# Patient Record
Sex: FEMALE | Race: WHITE | NOT HISPANIC OR LATINO | Employment: FULL TIME | URBAN - METROPOLITAN AREA
[De-identification: names, ages, dates, MRNs, and addresses within clinical notes are randomized per-mention and may not be internally consistent; named-entity substitution may affect disease eponyms.]

---

## 2017-01-04 DIAGNOSIS — G43.809 OTHER MIGRAINE, NOT INTRACTABLE, WITHOUT STATUS MIGRAINOSUS: Primary | ICD-10-CM

## 2017-01-04 NOTE — TELEPHONE ENCOUNTER
Elavil     Last Written Prescription Date: 10/10/16  Last Fill Quantity: 60; # refills: 2  Last Office Visit with [facility], [facility] or  Health prescribing provider:  10/4/16        Last PHQ-9 score on record=   PHQ-9 SCORE 11/3/2015   Total Score -   Total Score 8       AST       10   4/28/2016  ALT       18   4/28/2016

## 2017-01-18 DIAGNOSIS — F31.81 BIPOLAR 2 DISORDER (H): Primary | ICD-10-CM

## 2017-01-18 RX ORDER — LAMOTRIGINE 100 MG/1
TABLET ORAL
Qty: 90 TABLET | Refills: 3 | Status: SHIPPED | OUTPATIENT
Start: 2017-01-18 | End: 2017-05-21

## 2017-03-10 ENCOUNTER — OFFICE VISIT (OUTPATIENT)
Dept: FAMILY MEDICINE | Facility: CLINIC | Age: 22
End: 2017-03-10
Payer: COMMERCIAL

## 2017-03-10 VITALS
SYSTOLIC BLOOD PRESSURE: 104 MMHG | DIASTOLIC BLOOD PRESSURE: 50 MMHG | RESPIRATION RATE: 16 BRPM | TEMPERATURE: 98.3 F | HEART RATE: 124 BPM | BODY MASS INDEX: 22.08 KG/M2 | WEIGHT: 120 LBS | HEIGHT: 62 IN | OXYGEN SATURATION: 97 %

## 2017-03-10 DIAGNOSIS — J06.9 VIRAL UPPER RESPIRATORY INFECTION: Primary | ICD-10-CM

## 2017-03-10 PROCEDURE — 99213 OFFICE O/P EST LOW 20 MIN: CPT | Performed by: FAMILY MEDICINE

## 2017-03-10 RX ORDER — BUDESONIDE AND FORMOTEROL FUMARATE DIHYDRATE 80; 4.5 UG/1; UG/1
2 AEROSOL RESPIRATORY (INHALATION) 2 TIMES DAILY
Qty: 1 INHALER | Refills: 1 | COMMUNITY
Start: 2017-03-10 | End: 2021-08-05

## 2017-03-10 RX ORDER — PREDNISONE 20 MG/1
TABLET ORAL DAILY
COMMUNITY
Start: 2017-03-10 | End: 2017-04-26

## 2017-03-10 NOTE — PROGRESS NOTES
Chief Complaint   Patient presents with     Cough     Nursing note reviewed with patient.  Accurracy and completeness verified.    5 days of SOB, cough, and ST. started on some prednisone that she had at home a couple of days ago  Mainly worried about having strep    Ill contacts:  others at home with similar illness  Denies These symptoms(Neg ROS): fever, Nausea/Vomiting/Diarrhea, rash,  Review of symptoms except as noted in the HPI is otherwise negative.    MEDICATIONS  Current Outpatient Prescriptions   Medication Sig Dispense Refill     budesonide-formoterol (SYMBICORT) 80-4.5 MCG/ACT Inhaler Inhale 2 puffs into the lungs 2 times daily 1 Inhaler 1     predniSONE (DELTASONE) 20 MG tablet Take by mouth daily       ranitidine (ZANTAC) 150 MG/10ML syrup Take 8 mLs (120 mg) by mouth 2 times daily 600 mL      lamoTRIgine (LAMICTAL) 100 MG tablet Take 2 and 1/2 to 3 per day 90 tablet 3     amitriptyline (ELAVIL) 25 MG tablet Take 2 tablets (50 mg) by mouth At Bedtime 180 tablet 2     guaiFENesin-codeine (ROBITUSSIN AC) 100-10 MG/5ML SOLN Take 5-10 mLs by mouth every 4 hours as needed 120 mL 0     citalopram (CELEXA) 40 MG tablet Take 1 tablet (40 mg) by mouth daily 90 tablet 3     hyoscyamine (LEVSIN/SL) 0.125 MG SL tablet Take 1 tablet (0.125 mg) by mouth every 4 hours as needed 120 tablet 3     medroxyPROGESTERone (DEPO-PROVERA) 150 MG/ML injection Inject 1 mL (150 mg) into the muscle every 3 months 0.9 mL 11     ipratropium (ATROVENT) 0.02 % nebulizer solution Take 0.5 mg by nebulization daily as needed for wheezing       metoclopramide (REGLAN) 5 MG tablet Take 1 tablet (5 mg) by mouth 3 times daily as needed 90 tablet 2     Powders (VAGISIL EX)        medroxyPROGESTERone (DEPO-PROVERA) 150 MG/ML injection Inject 1 mL (150 mg) into the muscle every 3 months 0.9 mL 0     ipratropium (ATROVENT HFA) 17 MCG/ACT inhaler Inhale 2 puffs into the lungs every 6 hours       nystatin-triamcinolone (MYCOLOG II) cream Apply  "topically 2 times daily 30 g 1     triamcinolone (KENALOG) 0.1 % ointment Apply sparingly to affected area three times daily for 14 days. 30 g 0     Melatonin 10 MG TABS Take 10 mg by mouth nightly as needed       Multiple Vitamins-Minerals (MULTIVITAMIN OR) 1 tablet daily       medroxyPROGESTERone (DEPO-PROVERA) 150 MG/ML injection Inject 1 mL (150 mg) into the muscle every 3 months 1 mL 5     VITAMIN D, CHOLECALCIFEROL, PO Take  by mouth daily.       Allergies:    Allergies   Allergen Reactions     Adhesive Tape Hives     EKG Patches; any adhesives including band aides; burns         Azithromycin Nausea and Vomiting       SOCIAL   reports that she has never smoked. She has never used smokeless tobacco.  OBJECTIVE:  /50 (BP Location: Left arm, Cuff Size: Adult Regular)  Pulse 124  Temp 98.3  F (36.8  C) (Oral)  Resp 16  Ht 5' 2\" (1.575 m)  Wt 120 lb (54.4 kg)  SpO2 97%  BMI 21.95 kg/m2  General appearance: healthy, alert and cooperative.  Left Ear: normal; external ear canal and TM clear, nontender.  Right Ear: normal; external ear canal and TM clear, nontender.  Nose: clear rhinorrhea  Sinuses: normal; sinuses nontender  Oropharynx: mild erythema  Neck: few small nontender anterior cervical nodes  Lungs: normal chest wall and respirations; clear to auscultation.    ASSESSMENT/PLAN:    ICD-10-CM    1. Viral upper respiratory infection J06.9     B97.89      Bilateral respiratory tract infection patient is using a lot of inhalers, including Symbicort and steroids  No clear diagnosis of asthma  No wheezing today  Since she is already starting the steroids I think she should continue them for a total of 5 days    Instructions on use of OTC medications given  Call or return to clinic if these symptoms worsen or fail to improve as anticipated.    Italo Cota MD MPH    "

## 2017-03-10 NOTE — MR AVS SNAPSHOT
After Visit Summary   3/10/2017    Angela Jones    MRN: [id number]           Patient Information     Date Of Birth          1995        Visit Information        Provider Department      3/10/2017 2:00 PM Italo Cota MD [facility]        Today's Diagnoses     Viral upper respiratory infection    -  1       Follow-ups after your visit        Your next 10 appointments already scheduled     Mar 23, 2017  3:15 PM CDT   New Patient Visit with Cara Pagan MD   [facility] ([facility])    [facility]  [address]  [address]  [facility] [address] [phone]              Who to contact     If you have questions or need follow up information about today's clinic visit or your schedule please contact [facility] directly at [phone].  Normal or non-critical lab and imaging results will be communicated to you by MyChart, letter or phone within 4 business days after the clinic has received the results. If you do not hear from us within 7 days, please contact the clinic through MyChart or phone. If you have a critical or abnormal lab result, we will notify you by phone as soon as possible.  Submit refill requests through [employer] or call your pharmacy and they will forward the refill request to us. Please allow 3 business days for your refill to be completed.          Additional Information About Your Visit        MyChart Information     [employer] gives you secure access to your electronic health record. If you see a primary care provider, you can also send messages to your care team and make appointments. If you have questions, please call your primary care clinic.  If you do not have a primary care provider, please call [phone] and they will assist you.        Care EveryWhere ID     This is your Care EveryWhere ID. This could be used by other organizations to access your [facility]  "records  [id number]        Your Vitals Were     Pulse Temperature Respirations Height Pulse Oximetry BMI (Body Mass Index)    124 98.3  F (36.8  C) (Oral) 16 5' 2\" (1.575 m) 97% 21.95 kg/m2       Blood Pressure from Last 3 Encounters:   03/10/17 104/50   11/27/16 94/50   11/20/16 105/67    Weight from Last 3 Encounters:   03/10/17 120 lb (54.4 kg)   11/27/16 115 lb (52.2 kg)   11/20/16 116 lb (52.6 kg)              Today, you had the following     No orders found for display       Primary Care Provider Office Phone # Fax #    Dixie Ashely Godoy [phone]       [address]  [facility] [address]        Thank you!     Thank you for choosing [facility]  for your care. Our goal is always to provide you with excellent care. Hearing back from our patients is one way we can continue to improve our services. Please take a few minutes to complete the written survey that you may receive in the mail after your visit with us. Thank you!             Your Updated Medication List - Protect others around you: Learn how to safely use, store and throw away your medicines at www.disposemymeds.org.          This list is accurate as of: 3/10/17  2:26 PM.  Always use your most recent med list.                   Brand Name Dispense Instructions for use    amitriptyline 25 MG tablet    ELAVIL    180 tablet    Take 2 tablets (50 mg) by mouth At Bedtime       ATROVENT HFA 17 MCG/ACT Inhaler   Generic drug:  ipratropium      Inhale 2 puffs into the lungs every 6 hours       budesonide-formoterol 80-4.5 MCG/ACT Inhaler    SYMBICORT    1 Inhaler    Inhale 2 puffs into the lungs 2 times daily       citalopram 40 MG tablet    celeXA    90 tablet    Take 1 tablet (40 mg) by mouth daily       * DEPO-PROVERA 150 MG/ML injection   Generic drug:  medroxyPROGESTERone     1 mL    Inject 1 mL (150 mg) into the muscle every 3 months       * medroxyPROGESTERone 150 MG/ML injection    " DEPO-PROVERA    0.9 mL    Inject 1 mL (150 mg) into the muscle every 3 months       * medroxyPROGESTERone 150 MG/ML injection    DEPO-PROVERA    0.9 mL    Inject 1 mL (150 mg) into the muscle every 3 months       guaiFENesin-codeine 100-10 MG/5ML Soln solution    ROBITUSSIN AC    120 mL    Take 5-10 mLs by mouth every 4 hours as needed       hyoscyamine 0.125 MG sublingual tablet    LEVSIN/SL    120 tablet    Take 1 tablet (0.125 mg) by mouth every 4 hours as needed       ipratropium 0.02 % nebulizer solution    ATROVENT     Take 0.5 mg by nebulization daily as needed for wheezing       lamoTRIgine 100 MG tablet    LaMICtal    90 tablet    Take 2 and 1/2 to 3 per day       Melatonin 10 MG Tabs tablet      Take 10 mg by mouth nightly as needed       metoclopramide 5 MG tablet    REGLAN    90 tablet    Take 1 tablet (5 mg) by mouth 3 times daily as needed       MULTIVITAMIN PO      1 tablet daily       nystatin-triamcinolone cream    MYCOLOG II    30 g    Apply topically 2 times daily       predniSONE 20 MG tablet    DELTASONE     Take by mouth daily       ranitidine 150 MG/10ML syrup    Zantac    600 mL    Take 8 mLs (120 mg) by mouth 2 times daily       triamcinolone 0.1 % ointment    KENALOG    30 g    Apply sparingly to affected area three times daily for 14 days.       VAGISIL EX          VITAMIN D (CHOLECALCIFEROL) PO      Take  by mouth daily.       * Notice:  This list has 3 medication(s) that are the same as other medications prescribed for you. Read the directions carefully, and ask your doctor or other care provider to review them with you.

## 2017-03-10 NOTE — NURSING NOTE
"Chief Complaint   Patient presents with     Cough     initial /50 (BP Location: Left arm, Cuff Size: Adult Regular)  Pulse 124  Temp 98.3  F (36.8  C) (Oral)  Resp 16  Ht 5' 2\" (1.575 m)  Wt 120 lb (54.4 kg)  SpO2 97%  BMI 21.95 kg/m2 Estimated body mass index is 21.95 kg/(m^2) as calculated from the following:    Height as of this encounter: 5' 2\" (1.575 m).    Weight as of this encounter: 120 lb (54.4 kg).  BP completed using cuff size: regular.  L  arm      Health Maintenance that is potentially due pending provider review:  NONE    n/a    Zack Morris ma  "

## 2017-03-23 ENCOUNTER — OFFICE VISIT (OUTPATIENT)
Dept: OBGYN | Facility: CLINIC | Age: 22
End: 2017-03-23
Attending: OBSTETRICS & GYNECOLOGY
Payer: COMMERCIAL

## 2017-03-23 VITALS
BODY MASS INDEX: 21.86 KG/M2 | HEART RATE: 128 BPM | WEIGHT: 118.8 LBS | HEIGHT: 62 IN | SYSTOLIC BLOOD PRESSURE: 106 MMHG | DIASTOLIC BLOOD PRESSURE: 70 MMHG

## 2017-03-23 DIAGNOSIS — Z30.430 ENCOUNTER FOR IUD INSERTION: Primary | ICD-10-CM

## 2017-03-23 PROCEDURE — 58300 INSERT INTRAUTERINE DEVICE: CPT | Mod: ZF | Performed by: STUDENT IN AN ORGANIZED HEALTH CARE EDUCATION/TRAINING PROGRAM

## 2017-03-23 PROCEDURE — 25000125 ZZHC RX 250: Mod: ZF

## 2017-03-23 PROCEDURE — 99213 OFFICE O/P EST LOW 20 MIN: CPT | Mod: ZF

## 2017-03-23 NOTE — LETTER
3/23/2017       RE: Angela Jones  [address]   [facility] [address]     Dear Colleague,    Thank you for referring your patient, Angela Jones, to the [facility] at [facility]. Please see a copy of my visit note below.    [facility]    Chief complaint:  Discussion of contraception    HISTORY OF PRESENT ILLNESS   Angela Jones is a 22 year old , No LMP recorded. Patient has had an injection., here to discuss contraception methods. She had irregular menstrual bleeding in high school. She started depo-provera in high school and has had minimal to no menstrual bleeding since. She would like to switch to a different form of contraception because it is hard to remember to get her shot on time. She would like contraception that she does not need to remind herself to take. She is not interested in a Nexplanon because she does not want a foreign object in her arm. She is interested in the Mirena because of its favorable bleeding profile.     Past medical history significant for:  -Migraine with aura: amitriptyline  -Bipolar disorder, OCD, Anxiety and Depression: Lamotrigine and citalopram  -Asthma: Symbicort, Atrovent     GYN History  - Menses: As above   - Pap Smears:   Lab Results   Component Value Date    PAP NIL 10/04/2016     - Contraception: Depo  - Sexual Activity/Concerns: None  - Hx STIs/UTIs: None    OBHx  Obstetric History       T0      TAB0   SAB0   E0   M0   L0           PAST MEDICAL HISTORY:   Past Medical History:   Diagnosis Date     Flat foot 3/25/2014     JOSHUA (generalised anxiety disorder) 3/25/2014     Hypoxia in liveborn infant     born hypoxic     Migraine with aura 2014     Migraines      Moderate major depression (H) 3/25/2014     OCD (obsessive compulsive disorder) 2013     Palpitations      Self mutilating behavior 2013    cut self with pencil sharpener blade, left arm       PAST SURGICAL  HISTORY:   Past Surgical History:   Procedure Laterality Date     UPPER GI ENDOSCOPY  6/7/12       MEDICATIONS:   Current Outpatient Prescriptions   Medication Sig Dispense Refill     levonorgestrel (MIRENA, 52 MG,) 20 MCG/24HR IUD 1 each (20 mcg) by Intrauterine route once for 1 dose 1 each 0     budesonide-formoterol (SYMBICORT) 80-4.5 MCG/ACT Inhaler Inhale 2 puffs into the lungs 2 times daily 1 Inhaler 1     predniSONE (DELTASONE) 20 MG tablet Take by mouth daily       ranitidine (ZANTAC) 150 MG/10ML syrup Take 8 mLs (120 mg) by mouth 2 times daily 600 mL      lamoTRIgine (LAMICTAL) 100 MG tablet Take 2 and 1/2 to 3 per day 90 tablet 3     amitriptyline (ELAVIL) 25 MG tablet Take 2 tablets (50 mg) by mouth At Bedtime 180 tablet 2     citalopram (CELEXA) 40 MG tablet Take 1 tablet (40 mg) by mouth daily 90 tablet 3     hyoscyamine (LEVSIN/SL) 0.125 MG SL tablet Take 1 tablet (0.125 mg) by mouth every 4 hours as needed 120 tablet 3     ipratropium (ATROVENT) 0.02 % nebulizer solution Take 0.5 mg by nebulization daily as needed for wheezing       metoclopramide (REGLAN) 5 MG tablet Take 1 tablet (5 mg) by mouth 3 times daily as needed 90 tablet 2     Powders (VAGISIL EX)        ipratropium (ATROVENT HFA) 17 MCG/ACT inhaler Inhale 2 puffs into the lungs every 6 hours       nystatin-triamcinolone (MYCOLOG II) cream Apply topically 2 times daily 30 g 1     triamcinolone (KENALOG) 0.1 % ointment Apply sparingly to affected area three times daily for 14 days. 30 g 0     Melatonin 10 MG TABS Take 10 mg by mouth nightly as needed       Multiple Vitamins-Minerals (MULTIVITAMIN OR) 1 tablet daily       VITAMIN D, CHOLECALCIFEROL, PO Take  by mouth daily.         ALLERGIES:       Allergies   Allergen Reactions     Adhesive Tape Hives     EKG Patches; any adhesives including band aides; burns         Azithromycin Nausea and Vomiting       SOCIAL HISTORY:   Social History     Social History     Marital status: Single      "Spouse name: N/A     Number of children: N/A     Years of education: N/A     Occupational History     Not on file.     Social History Main Topics     Smoking status: Never Smoker     Smokeless tobacco: Never Used     Alcohol use No     Drug use: No     Sexual activity: Yes     Partners: Male     Birth control/ protection: Injection     Other Topics Concern     Parent/Sibling W/ Cabg, Mi Or Angioplasty Before 65f 55m? No     Social History Narrative       REVIEW OF SYSTEMS:  Negative    PHYSICAL EXAM  /70  Pulse 128  Ht 1.575 m (5' 2\")  Wt 53.9 kg (118 lb 12.8 oz)  Breastfeeding? No  BMI 21.73 kg/m2  Body mass index is 21.73 kg/(m^2).  Gen: Well-appearing, NAD  HEENT: Normocephalic, atraumatic  Abd: Soft, non-tender, non-distended  Ext: No LE edema, extremities warm and well perfused    Pelvic:  Normal appearing external female genitalia. Normal hair distribution. On bimanual exam, uterus is retroverted, small, mobile, non-tender. No adnexal tenderness or masses. On speculum exam, vagina is without lesions. normal discharge. Cervix nuliparous, no lesions, no cervical motion tenderness.      Verification of Procedure:  Just before the procedure begans through verbal and active participation of team members, I verified:     Initials   Patient Name MG   Patient  MG   Procedure to be performed MG     Consent:  Risks, benefits of treatment, and alternative options for contraception were discussed.  Patient's questions were elicited and answered.  Written consent was obtained and scanned into medical record.     PROCEDURE NOTE  --  Mirena Insertion    Reason for Insertion:  contraception.    Pregnancy test: Negative    Counseling:  Patient counseled on efficacy, benefits, risks, potential side effects of IUD.  Insertion procedure and risk of infection, perforation, and spontaneous expulsion reviewed.   Advised to plan removal and/or replacement of IUD in 5 years from today or when desired.      Under sterile " technique, cervix was visualized with a medium Graves speculum and prepped with Betadine solution. The uterus was sounded to 6 cm. The Mirena IUD insertion apparatus was prepared and placed through the cervix without significant resistance and deployed at the fundus in the usual fashion. The strings were trimmed 3 cm from the external os.      Device Lot #: [id number]  Device Expiration Date: 10/2019     EBL:  Minimal     Complications: None      Angela Jones tolerated procedure well. She had cramping after the procedure, Tylenol given since she does not like Ibuprofen       ASSESSMENT  Angela Jones is a 22 year old  female here for contraception, discussed different options including Mirena, Mary, ParaGard, and Nexplanon. She desires a Mirena.     PLAN  1. IUD insertion   1. Written information on IUD use reviewed and given.    2. Take Naproxen PRN for cramping  3. Instructed to make an appointment in 4 weeks to check IUD strings. Reminded to check for IUD strings every month.      2. Preventative Care  1. Pap smear up to date    Return to clinic in 4 weeks for a string check    Staffed with Dr. Edwin Pagan MD   Resident Physician, PGY1  Obstetrics, Gynecology, and Women's Health    I, Angela Pineda, saw this patient with the resident and agree with the resident's findings and plan of care as documented in the resident's note. I was present throughout the insertion of IUD procedure.  Angela Pineda MD          Again, thank you for allowing me to participate in the care of your patient.      Sincerely,    Cara Pagan MD

## 2017-03-23 NOTE — PROGRESS NOTES
[facility]    Chief complaint:  Discussion of contraception    HISTORY OF PRESENT ILLNESS   Angela Jonse is a 22 year old , No LMP recorded. Patient has had an injection., here to discuss contraception methods. She had irregular menstrual bleeding in high school. She started depo-provera in high school and has had minimal to no menstrual bleeding since. She would like to switch to a different form of contraception because it is hard to remember to get her shot on time. She would like contraception that she does not need to remind herself to take. She is not interested in a Nexplanon because she does not want a foreign object in her arm. She is interested in the Mirena because of its favorable bleeding profile.     Past medical history significant for:  -Migraine with aura: amitriptyline  -Bipolar disorder, OCD, Anxiety and Depression: Lamotrigine and citalopram  -Asthma: Symbicort, Atrovent     GYN History  - Menses: As above   - Pap Smears:   Lab Results   Component Value Date    PAP NIL 10/04/2016     - Contraception: Depo  - Sexual Activity/Concerns: None  - Hx STIs/UTIs: None    OBHx  Obstetric History       T0      TAB0   SAB0   E0   M0   L0           PAST MEDICAL HISTORY:   Past Medical History:   Diagnosis Date     Flat foot 3/25/2014     JOSHUA (generalised anxiety disorder) 3/25/2014     Hypoxia in liveborn infant     born hypoxic     Migraine with aura 2014     Migraines      Moderate major depression (H) 3/25/2014     OCD (obsessive compulsive disorder) 2013     Palpitations      Self mutilating behavior 2013    cut self with pencil sharpener blade, left arm       PAST SURGICAL HISTORY:   Past Surgical History:   Procedure Laterality Date     UPPER GI ENDOSCOPY  12       MEDICATIONS:   Current Outpatient Prescriptions   Medication Sig Dispense Refill     levonorgestrel (MIRENA, 52 MG,) 20 MCG/24HR IUD 1 each (20 mcg) by Intrauterine route once for 1 dose 1 each 0      budesonide-formoterol (SYMBICORT) 80-4.5 MCG/ACT Inhaler Inhale 2 puffs into the lungs 2 times daily 1 Inhaler 1     predniSONE (DELTASONE) 20 MG tablet Take by mouth daily       ranitidine (ZANTAC) 150 MG/10ML syrup Take 8 mLs (120 mg) by mouth 2 times daily 600 mL      lamoTRIgine (LAMICTAL) 100 MG tablet Take 2 and 1/2 to 3 per day 90 tablet 3     amitriptyline (ELAVIL) 25 MG tablet Take 2 tablets (50 mg) by mouth At Bedtime 180 tablet 2     citalopram (CELEXA) 40 MG tablet Take 1 tablet (40 mg) by mouth daily 90 tablet 3     hyoscyamine (LEVSIN/SL) 0.125 MG SL tablet Take 1 tablet (0.125 mg) by mouth every 4 hours as needed 120 tablet 3     ipratropium (ATROVENT) 0.02 % nebulizer solution Take 0.5 mg by nebulization daily as needed for wheezing       metoclopramide (REGLAN) 5 MG tablet Take 1 tablet (5 mg) by mouth 3 times daily as needed 90 tablet 2     Powders (VAGISIL EX)        ipratropium (ATROVENT HFA) 17 MCG/ACT inhaler Inhale 2 puffs into the lungs every 6 hours       nystatin-triamcinolone (MYCOLOG II) cream Apply topically 2 times daily 30 g 1     triamcinolone (KENALOG) 0.1 % ointment Apply sparingly to affected area three times daily for 14 days. 30 g 0     Melatonin 10 MG TABS Take 10 mg by mouth nightly as needed       Multiple Vitamins-Minerals (MULTIVITAMIN OR) 1 tablet daily       VITAMIN D, CHOLECALCIFEROL, PO Take  by mouth daily.         ALLERGIES:       Allergies   Allergen Reactions     Adhesive Tape Hives     EKG Patches; any adhesives including band aides; burns         Azithromycin Nausea and Vomiting       SOCIAL HISTORY:   Social History     Social History     Marital status: Single     Spouse name: N/A     Number of children: N/A     Years of education: N/A     Occupational History     Not on file.     Social History Main Topics     Smoking status: Never Smoker     Smokeless tobacco: Never Used     Alcohol use No     Drug use: No     Sexual activity: Yes     Partners: Male     Birth  "control/ protection: Injection     Other Topics Concern     Parent/Sibling W/ Cabg, Mi Or Angioplasty Before 65f 55m? No     Social History Narrative       REVIEW OF SYSTEMS:  Negative    PHYSICAL EXAM  /70  Pulse 128  Ht 1.575 m (5' 2\")  Wt 53.9 kg (118 lb 12.8 oz)  Breastfeeding? No  BMI 21.73 kg/m2  Body mass index is 21.73 kg/(m^2).  Gen: Well-appearing, NAD  HEENT: Normocephalic, atraumatic  Abd: Soft, non-tender, non-distended  Ext: No LE edema, extremities warm and well perfused    Pelvic:  Normal appearing external female genitalia. Normal hair distribution. On bimanual exam, uterus is retroverted, small, mobile, non-tender. No adnexal tenderness or masses. On speculum exam, vagina is without lesions. normal discharge. Cervix nuliparous, no lesions, no cervical motion tenderness.      Verification of Procedure:  Just before the procedure begans through verbal and active participation of team members, I verified:     Initials   Patient Name MG   Patient  MG   Procedure to be performed MG     Consent:  Risks, benefits of treatment, and alternative options for contraception were discussed.  Patient's questions were elicited and answered.  Written consent was obtained and scanned into medical record.     PROCEDURE NOTE  --  Mirena Insertion    Reason for Insertion:  contraception.    Pregnancy test: Negative    Counseling:  Patient counseled on efficacy, benefits, risks, potential side effects of IUD.  Insertion procedure and risk of infection, perforation, and spontaneous expulsion reviewed.   Advised to plan removal and/or replacement of IUD in 5 years from today or when desired.      Under sterile technique, cervix was visualized with a medium Graves speculum and prepped with Betadine solution. The uterus was sounded to 6 cm. The Mirena IUD insertion apparatus was prepared and placed through the cervix without significant resistance and deployed at the fundus in the usual fashion. The strings " were trimmed 3 cm from the external os.      Device Lot #: [id number]  Device Expiration Date: 10/2019     EBL:  Minimal     Complications: None      Angela Jones tolerated procedure well. She had cramping after the procedure, Tylenol given since she does not like Ibuprofen       ASSESSMENT  Angela Jones is a 22 year old  female here for contraception, discussed different options including Mirena, Mary, ParaGard, and Nexplanon. She desires a Mirena.     PLAN  1. IUD insertion   1. Written information on IUD use reviewed and given.    2. Take Naproxen PRN for cramping  3. Instructed to make an appointment in 4 weeks to check IUD strings. Reminded to check for IUD strings every month.      2. Preventative Care  1. Pap smear up to date    Return to clinic in 4 weeks for a string check    Staffed with Dr. Edwin Pagan MD   Resident Physician, PGY1  Obstetrics, Gynecology, and Women's Health    I, Angela Pineda, saw this patient with the resident and agree with the resident's findings and plan of care as documented in the resident's note. I was present throughout the insertion of IUD procedure.  Angela Pineda MD

## 2017-03-23 NOTE — MR AVS SNAPSHOT
After Visit Summary   3/23/2017    Angela Jones    MRN: [id number]           Patient Information     Date Of Birth          1995        Visit Information        Provider Department      3/23/2017 3:15 PM Cara Pagan MD [facility]        Today's Diagnoses     Encounter for IUD insertion    -  1      Care Instructions    IUD pamphlet reviewed and given to patient.    Return to clinic in 4 weeks for string check        Follow-ups after your visit        Follow-up notes from your care team     Return in about 4 weeks (around 4/20/2017).      Who to contact     Please call your clinic at [phone] to:    Ask questions about your health    Make or cancel appointments    Discuss your medicines    Learn about your test results    Speak to your doctor   If you have compliments or concerns about an experience at your clinic, or if you wish to file a complaint, please contact [facility] Patient Relations at [phone] or email us at [email or web address]         Additional Information About Your Visit        MyChart Information     [employer] gives you secure access to your electronic health record. If you see a primary care provider, you can also send messages to your care team and make appointments. If you have questions, please call your primary care clinic.  If you do not have a primary care provider, please call [phone] and they will assist you.      'Rock' Your Paper is an electronic gateway that provides easy, online access to your medical records. With 'Rock' Your Paper, you can request a clinic appointment, read your test results, renew a prescription or communicate with your care team.     To access your existing account, please contact your [facility] or call [phone] for assistance.        Care EveryWhere ID     This is your Care EveryWhere ID. This could be used by other organizations to access your [place]  "medical records  [id number]        Your Vitals Were     Pulse Height Breastfeeding? BMI (Body Mass Index)          128 1.575 m (5' 2\") No 21.73 kg/m2         Blood Pressure from Last 3 Encounters:   03/23/17 106/70   03/10/17 104/50   11/27/16 94/50    Weight from Last 3 Encounters:   03/23/17 53.9 kg (118 lb 12.8 oz)   03/10/17 54.4 kg (120 lb)   11/27/16 52.2 kg (115 lb)              We Performed the Following     hCG qual urine POCT [POC7]     Insertion of IUD [08329]          Today's Medication Changes          These changes are accurate as of: 3/23/17  4:25 PM.  If you have any questions, ask your nurse or doctor.               Start taking these medicines.        Dose/Directions    levonorgestrel 20 MCG/24HR IUD   Commonly known as:  MIRENA (52 MG)   Used for:  Encounter for IUD insertion   Started by:  Cara Pagan MD        Dose:  1 each   1 each (20 mcg) by Intrauterine route once for 1 dose   Quantity:  1 each   Refills:  0         Stop taking these medicines if you haven't already. Please contact your care team if you have questions.     DEPO-PROVERA 150 MG/ML injection   Generic drug:  medroxyPROGESTERone   Stopped by:  Cara Pagan MD           medroxyPROGESTERone 150 MG/ML injection   Commonly known as:  DEPO-PROVERA   Stopped by:  Cara Pagan MD                Where to get your medicines      Some of these will need a paper prescription and others can be bought over the counter.  Ask your nurse if you have questions.     You don't need a prescription for these medications     levonorgestrel 20 MCG/24HR IUD                Primary Care Provider Office Phone # Fax #    Dixie Godoy [phone]       [address] [address]        Thank you!     Thank you for choosing [facility]  for your care. Our goal is always to provide you with excellent care. Hearing back from our patients is one way we can " continue to improve our services. Please take a few minutes to complete the written survey that you may receive in the mail after your visit with us. Thank you!             Your Updated Medication List - Protect others around you: Learn how to safely use, store and throw away your medicines at www.disposemymeds.org.          This list is accurate as of: 3/23/17  4:25 PM.  Always use your most recent med list.                   Brand Name Dispense Instructions for use    amitriptyline 25 MG tablet    ELAVIL    180 tablet    Take 2 tablets (50 mg) by mouth At Bedtime       ATROVENT HFA 17 MCG/ACT Inhaler   Generic drug:  ipratropium      Inhale 2 puffs into the lungs every 6 hours       budesonide-formoterol 80-4.5 MCG/ACT Inhaler    SYMBICORT    1 Inhaler    Inhale 2 puffs into the lungs 2 times daily       citalopram 40 MG tablet    celeXA    90 tablet    Take 1 tablet (40 mg) by mouth daily       hyoscyamine 0.125 MG sublingual tablet    LEVSIN/SL    120 tablet    Take 1 tablet (0.125 mg) by mouth every 4 hours as needed       ipratropium 0.02 % nebulizer solution    ATROVENT     Take 0.5 mg by nebulization daily as needed for wheezing       lamoTRIgine 100 MG tablet    LaMICtal    90 tablet    Take 2 and 1/2 to 3 per day       levonorgestrel 20 MCG/24HR IUD    MIRENA (52 MG)    1 each    1 each (20 mcg) by Intrauterine route once for 1 dose       Melatonin 10 MG Tabs tablet      Take 10 mg by mouth nightly as needed       metoclopramide 5 MG tablet    REGLAN    90 tablet    Take 1 tablet (5 mg) by mouth 3 times daily as needed       MULTIVITAMIN PO      1 tablet daily       nystatin-triamcinolone cream    MYCOLOG II    30 g    Apply topically 2 times daily       predniSONE 20 MG tablet    DELTASONE     Take by mouth daily       ranitidine 150 MG/10ML syrup    Zantac    600 mL    Take 8 mLs (120 mg) by mouth 2 times daily       triamcinolone 0.1 % ointment    KENALOG    30 g    Apply sparingly to affected area  three times daily for 14 days.       VAGISIL EX          VITAMIN D (CHOLECALCIFEROL) PO      Take  by mouth daily.

## 2017-04-26 ENCOUNTER — OFFICE VISIT (OUTPATIENT)
Dept: OBGYN | Facility: CLINIC | Age: 22
End: 2017-04-26
Attending: ADVANCED PRACTICE MIDWIFE
Payer: COMMERCIAL

## 2017-04-26 VITALS
WEIGHT: 122 LBS | SYSTOLIC BLOOD PRESSURE: 111 MMHG | HEIGHT: 62 IN | BODY MASS INDEX: 22.45 KG/M2 | DIASTOLIC BLOOD PRESSURE: 65 MMHG

## 2017-04-26 DIAGNOSIS — Z30.431 IUD CHECK UP: Primary | ICD-10-CM

## 2017-04-26 PROCEDURE — 99211 OFF/OP EST MAY X REQ PHY/QHP: CPT | Mod: ZF

## 2017-04-26 ASSESSMENT — PAIN SCALES - GENERAL: PAINLEVEL: NO PAIN (0)

## 2017-04-26 NOTE — LETTER
4/26/2017       RE: Angela Jones  [address]   [facility] [address]     Dear Colleague,    Thank you for referring your patient, Angela Jones, to the [facility] at [facility]. Please see a copy of my visit note below.    SUBJECTIVE:   22 year old  female presenting for string check of Mirena IUD placed on 3/23/17.  Reports feeling well overall.   Reports light spotting in first week of having IUD placed, no further discharge, blood, or significant pelvic pain since that time. Denies dyspareunia. No UTI symptoms. Previous birth control method was depo shots but patient reports kept forgetting to schedule appointments for shots and elected to switch to IUD.  Denies history of known exposure to STD.     OBJECTIVE:   She appears well, afebrile.    Pelvic Exam:  Vulva: No external lesions, normal hair distribution, no adenopathy  Vagina: Moist, pink, no abnormal discharge, well rugated, no lesions  Cervix: nulliparous, smooth, pink, no visible lesions; IUD strings visualized  Uterus: bimanual deferred  Rectal exam: No mass, non-tender, normal sphincter tone, hemoccult negative    ASSESSMENT:   IUD in situ    PLAN:   - Reassured pt of correct placement of IUD  - Reviewed monthly string checks if desired  - Patient instructed to return to clinic with significant change in bleeding pattern, severe abdominal cramping/pain, or dyspareunia develops.  - RTC in ~ 1 year for annual check up    ILee MS3 served as a scribe for GISELLE Tinajero CNM - COLUMBA Morris  The encounter was performed by me and scribed by the student. The scribed note accurately reflects my personal services and decisions made by me. -GISELLE Gan CNM

## 2017-04-26 NOTE — MR AVS SNAPSHOT
After Visit Summary   4/26/2017    Angela Jones    MRN: [id number]           Patient Information     Date Of Birth          1995        Visit Information        Provider Department      4/26/2017 1:45 PM Miguel Barriga APRN [facility]        Today's Diagnoses     IUD check up    -  1       Follow-ups after your visit        Follow-up notes from your care team     Return in about 1 year (around 4/26/2018).      Your next 10 appointments already scheduled     May 05, 2017  4:00 PM CDT   Bashir Short with Michelle Pimentel PA-C   [facility]    [address] [place] [address] [phone]              Who to contact     Please call your clinic at [phone] to:    Ask questions about your health    Make or cancel appointments    Discuss your medicines    Learn about your test results    Speak to your doctor   If you have compliments or concerns about an experience at your clinic, or if you wish to file a complaint, please contact [facility] Patient Relations at [phone] or email us at [email or web address]         Additional Information About Your Visit        MyChart Information     [employer] gives you secure access to your electronic health record. If you see a primary care provider, you can also send messages to your care team and make appointments. If you have questions, please call your primary care clinic.  If you do not have a primary care provider, please call [phone] and they will assist you.      NXE is an electronic gateway that provides easy, online access to your medical records. With NXE, you can request a clinic appointment, read your test results, renew a prescription or communicate with your care team.     To access your existing account, please contact your [facility] or call [phone] for  "assistance.        Care EveryWhere ID     This is your Care EveryWhere ID. This could be used by other organizations to access your [place] medical records  [id number]        Your Vitals Were     Height Breastfeeding? BMI (Body Mass Index)             1.575 m (5' 2\") No 22.31 kg/m2          Blood Pressure from Last 3 Encounters:   04/26/17 111/65   03/23/17 106/70   03/10/17 104/50    Weight from Last 3 Encounters:   04/26/17 55.3 kg (122 lb)   03/23/17 53.9 kg (118 lb 12.8 oz)   03/10/17 54.4 kg (120 lb)              Today, you had the following     No orders found for display         Today's Medication Changes          These changes are accurate as of: 4/26/17 11:59 PM.  If you have any questions, ask your nurse or doctor.               Stop taking these medicines if you haven't already. Please contact your care team if you have questions.     predniSONE 20 MG tablet   Commonly known as:  DELTASONE   Stopped by:  Miguel Barriga APRN JONELLE                    Primary Care Provider Office Phone # Fax #    Dixie Ashely Godoy [phone]       [address]  [facility] [address]        Thank you!     Thank you for choosing [facility]  for your care. Our goal is always to provide you with excellent care. Hearing back from our patients is one way we can continue to improve our services. Please take a few minutes to complete the written survey that you may receive in the mail after your visit with us. Thank you!             Your Updated Medication List - Protect others around you: Learn how to safely use, store and throw away your medicines at www.disposemymeds.org.          This list is accurate as of: 4/26/17 11:59 PM.  Always use your most recent med list.                   Brand Name Dispense Instructions for use    amitriptyline 25 MG tablet    ELAVIL    180 tablet    Take 2 tablets (50 mg) by mouth At Bedtime       ATROVENT HFA 17 MCG/ACT " Inhaler   Generic drug:  ipratropium      Inhale 2 puffs into the lungs every 6 hours       budesonide-formoterol 80-4.5 MCG/ACT Inhaler    SYMBICORT    1 Inhaler    Inhale 2 puffs into the lungs 2 times daily       citalopram 40 MG tablet    celeXA    90 tablet    Take 1 tablet (40 mg) by mouth daily       hyoscyamine 0.125 MG sublingual tablet    LEVSIN/SL    120 tablet    Take 1 tablet (0.125 mg) by mouth every 4 hours as needed       ipratropium 0.02 % nebulizer solution    ATROVENT     Take 0.5 mg by nebulization daily as needed for wheezing       lamoTRIgine 100 MG tablet    LaMICtal    90 tablet    Take 2 and 1/2 to 3 per day       levonorgestrel 20 MCG/24HR IUD    MIRENA (52 MG)    1 each    1 each (20 mcg) by Intrauterine route once for 1 dose       Melatonin 10 MG Tabs tablet      Take 10 mg by mouth nightly as needed       metoclopramide 5 MG tablet    REGLAN    90 tablet    Take 1 tablet (5 mg) by mouth 3 times daily as needed       MULTIVITAMIN PO      1 tablet daily       nystatin-triamcinolone cream    MYCOLOG II    30 g    Apply topically 2 times daily       ranitidine 150 MG/10ML syrup    Zantac    600 mL    Take 8 mLs (120 mg) by mouth 2 times daily       triamcinolone 0.1 % ointment    KENALOG    30 g    Apply sparingly to affected area three times daily for 14 days.       VAGISIL EX          VITAMIN D (CHOLECALCIFEROL) PO      Take  by mouth daily.

## 2017-04-26 NOTE — NURSING NOTE
Chief Complaint   Patient presents with     RECHECK   mirena iud check was inserted 3-   Has acne   But otherwise good-  One week of spotting.

## 2017-04-26 NOTE — PROGRESS NOTES
SUBJECTIVE:   22 year old  female presenting for string check of Mirena IUD placed on 3/23/17.  Reports feeling well overall.   Reports light spotting in first week of having IUD placed, no further discharge, blood, or significant pelvic pain since that time. Denies dyspareunia. No UTI symptoms. Previous birth control method was depo shots but patient reports kept forgetting to schedule appointments for shots and elected to switch to IUD.  Denies history of known exposure to STD.     OBJECTIVE:   She appears well, afebrile.    Pelvic Exam:  Vulva: No external lesions, normal hair distribution, no adenopathy  Vagina: Moist, pink, no abnormal discharge, well rugated, no lesions  Cervix: nulliparous, smooth, pink, no visible lesions; IUD strings visualized  Uterus: bimanual deferred  Rectal exam: No mass, non-tender, normal sphincter tone, hemoccult negative    ASSESSMENT:   IUD in situ    PLAN:   - Reassured pt of correct placement of IUD  - Reviewed monthly string checks if desired  - Patient instructed to return to clinic with significant change in bleeding pattern, severe abdominal cramping/pain, or dyspareunia develops.  - RTC in ~ 1 year for annual check up    ILee, MS3 served as a scribe for GISELLE Tinajero CNM - [facility] Morris  The encounter was performed by me and scribed by the student. The scribed note accurately reflects my personal services and decisions made by me. -GISELLE Gan CNM

## 2017-05-03 DIAGNOSIS — G43.809 OTHER MIGRAINE WITHOUT STATUS MIGRAINOSUS, NOT INTRACTABLE: ICD-10-CM

## 2017-05-03 RX ORDER — METOCLOPRAMIDE 5 MG/1
5 TABLET ORAL 3 TIMES DAILY PRN
Qty: 90 TABLET | Refills: 0 | Status: SHIPPED | OUTPATIENT
Start: 2017-05-03 | End: 2018-03-14

## 2017-05-03 NOTE — TELEPHONE ENCOUNTER
Reglan  Last Written Prescription Date:  4/29/16  Last Fill Quantity: 90,   # refills: 2  Last Office Visit : 10/4/16  Future Office visit:  no    Routing refill request to provider for review/approval because:  Future appts show pt scheduled 5/5 with outside provider re: nausea  Refill routed to Dr. Posada and clinic RN if pt contact is needed

## 2017-05-17 ENCOUNTER — OFFICE VISIT (OUTPATIENT)
Dept: DERMATOLOGY | Facility: CLINIC | Age: 22
End: 2017-05-17

## 2017-05-17 DIAGNOSIS — L21.9 DERMATITIS, SEBORRHEIC: ICD-10-CM

## 2017-05-17 DIAGNOSIS — L30.9 DERMATITIS: Primary | ICD-10-CM

## 2017-05-17 DIAGNOSIS — L01.1 SECONDARY IMPETIGINIZATION: ICD-10-CM

## 2017-05-17 RX ORDER — CLOBETASOL PROPIONATE 0.5 MG/ML
SOLUTION TOPICAL 2 TIMES DAILY
Qty: 50 ML | Refills: 3 | Status: SHIPPED | OUTPATIENT
Start: 2017-05-17 | End: 2017-08-29

## 2017-05-17 RX ORDER — KETOCONAZOLE 20 MG/ML
SHAMPOO TOPICAL DAILY PRN
Qty: 120 ML | Refills: 11 | Status: SHIPPED | OUTPATIENT
Start: 2017-05-17 | End: 2021-08-05

## 2017-05-17 RX ORDER — CLOBETASOL PROPIONATE 0.5 MG/G
OINTMENT TOPICAL 2 TIMES DAILY
Qty: 30 G | Refills: 1 | Status: SHIPPED | OUTPATIENT
Start: 2017-05-17 | End: 2021-08-05

## 2017-05-17 RX ORDER — MUPIROCIN 20 MG/G
OINTMENT TOPICAL
Qty: 22 G | Refills: 3 | Status: SHIPPED | OUTPATIENT
Start: 2017-05-17 | End: 2017-09-24

## 2017-05-17 ASSESSMENT — PAIN SCALES - GENERAL: PAINLEVEL: MODERATE PAIN (4)

## 2017-05-17 NOTE — MR AVS SNAPSHOT
After Visit Summary   5/17/2017    Angela Jones    MRN: [id number]           Patient Information     Date Of Birth          1995        Visit Information        Provider Department      5/17/2017 12:00 PM Rhett Campos MD [facility]        Today's Diagnoses     Dermatitis    -  1    Dermatitis, seborrheic        Secondary impetiginization          Care Instructions    [email or web address]          Follow-ups after your visit        Who to contact     Please call your clinic at [phone] to:    Ask questions about your health    Make or cancel appointments    Discuss your medicines    Learn about your test results    Speak to your doctor   If you have compliments or concerns about an experience at your clinic, or if you wish to file a complaint, please contact [facility] Patient Relations at [phone] or email us at [email or web address]         Additional Information About Your Visit        MyChart Information     [employer] gives you secure access to your electronic health record. If you see a primary care provider, you can also send messages to your care team and make appointments. If you have questions, please call your primary care clinic.  If you do not have a primary care provider, please call [phone] and they will assist you.      [employer] is an electronic gateway that provides easy, online access to your medical records. With [employer], you can request a clinic appointment, read your test results, renew a prescription or communicate with your care team.     To access your existing account, please contact your [facility] or call [phone] for assistance.        Care EveryWhere ID     This is your Care EveryWhere ID. This could be used by other organizations to access your [place] medical records  [id number]        Your Vitals Were     Breastfeeding?                   No            Blood Pressure from Last  3 Encounters:   04/26/17 111/65   03/23/17 106/70   03/10/17 104/50    Weight from Last 3 Encounters:   04/26/17 55.3 kg (122 lb)   03/23/17 53.9 kg (118 lb 12.8 oz)   03/10/17 54.4 kg (120 lb)              Today, you had the following     No orders found for display         Today's Medication Changes          These changes are accurate as of: 5/17/17 11:59 PM.  If you have any questions, ask your nurse or doctor.               Start taking these medicines.        Dose/Directions    * clobetasol 0.05 % ointment   Commonly known as:  TEMOVATE   Used for:  Dermatitis   Started by:  Rhett Campos MD        Apply topically 2 times daily Avoid face, underarms, groin.   Quantity:  30 g   Refills:  1       * clobetasol 0.05 % external solution   Commonly known as:  TEMOVATE   Used for:  Dermatitis   Started by:  Rhett Campos MD        Apply topically 2 times daily   Quantity:  50 mL   Refills:  3       ketoconazole 2 % shampoo   Commonly known as:  NIZORAL   Used for:  Dermatitis, seborrheic   Started by:  Rhett Campos MD        Apply topically daily as needed for itching or irritation   Quantity:  120 mL   Refills:  11       mupirocin 2 % ointment   Commonly known as:  BACTROBAN   Used for:  Dermatitis   Started by:  Rhett Campos MD        Use 2 times a day to affected area.   Quantity:  22 g   Refills:  3       * Notice:  This list has 2 medication(s) that are the same as other medications prescribed for you. Read the directions carefully, and ask your doctor or other care provider to review them with you.         Where to get your medicines      These medications were sent to [id number] - [place], [place] - [address]  [address] [address]     Phone:  [phone]     clobetasol 0.05 % external solution    clobetasol 0.05 % ointment    ketoconazole 2 % shampoo    mupirocin 2 % ointment                Primary Care Provider Office Phone # Fax #    Dixie  Ashely Godoy [phone]       [place] [id number]  [facility] [address]        Thank you!     Thank you for choosing [facility] DERMAT[facility]  for your care. Our goal is always to provide you with excellent care. Hearing back from our patients is one way we can continue to improve our services. Please take a few minutes to complete the written survey that you may receive in the mail after your visit with us. Thank you!             Your Updated Medication List - Protect others around you: Learn how to safely use, store and throw away your medicines at www.disposemymeds.org.          This list is accurate as of: 5/17/17 11:59 PM.  Always use your most recent med list.                   Brand Name Dispense Instructions for use    amitriptyline 25 MG tablet    ELAVIL    180 tablet    Take 2 tablets (50 mg) by mouth At Bedtime       ATROVENT HFA 17 MCG/ACT Inhaler   Generic drug:  ipratropium      Inhale 2 puffs into the lungs every 6 hours       budesonide-formoterol 80-4.5 MCG/ACT Inhaler    SYMBICORT    1 Inhaler    Inhale 2 puffs into the lungs 2 times daily       citalopram 40 MG tablet    celeXA    90 tablet    Take 1 tablet (40 mg) by mouth daily       * clobetasol 0.05 % ointment    TEMOVATE    30 g    Apply topically 2 times daily Avoid face, underarms, groin.       * clobetasol 0.05 % external solution    TEMOVATE    50 mL    Apply topically 2 times daily       ipratropium 0.02 % nebulizer solution    ATROVENT     Take 0.5 mg by nebulization daily as needed for wheezing       ketoconazole 2 % shampoo    NIZORAL    120 mL    Apply topically daily as needed for itching or irritation       levonorgestrel 20 MCG/24HR IUD    MIRENA (52 MG)    1 each    1 each (20 mcg) by Intrauterine route once for 1 dose       Melatonin 10 MG Tabs tablet      Take 10 mg by mouth nightly as needed       metoclopramide 5 MG tablet    REGLAN    90 tablet    Take 1 tablet (5 mg) by mouth 3  times daily as needed       MULTIVITAMIN PO      1 tablet daily       mupirocin 2 % ointment    BACTROBAN    22 g    Use 2 times a day to affected area.       nystatin-triamcinolone cream    MYCOLOG II    30 g    Apply topically 2 times daily       ranitidine 150 MG/10ML syrup    Zantac    600 mL    Take 8 mLs (120 mg) by mouth 2 times daily       triamcinolone 0.1 % ointment    KENALOG    30 g    Apply sparingly to affected area three times daily for 14 days.       VAGISIL EX          VITAMIN D (CHOLECALCIFEROL) PO      Take  by mouth daily.       * Notice:  This list has 2 medication(s) that are the same as other medications prescribed for you. Read the directions carefully, and ask your doctor or other care provider to review them with you.

## 2017-05-17 NOTE — LETTER
5/17/2017       RE: Angela Jones  [address]   [place] [facility] [address]     Dear Colleague,    Thank you for referring your patient, Angela Jones, to the [facility] DERMATOLOGY at [facility]. Please see a copy of my visit note below.    [facility] Dermatology Note    Dermatology Problem List:  1. Dermatitis of the neck  - s/p biopsy with subacute spongiotic dermatitis 1/6/15, terbinafine, betamethasone ointment mixed with ketoconazole cream, triamcinolone ointment mixed with ketoconazole cream  - clobetasol 0.05% ointment, mupirocin 2% ointment, clobetasol 0.05% solution  2. Seborrheic dermatitis  - ketoconazole 2% shampoo    CC:   Chief Complaint   Patient presents with     Derm Problem      is here for a rash on the base of her scalp.  States it has been there for about a week and feels it is getting worse      Date of Service: May 17, 2017    History of Present Illness:  Ms. Angela Jones is a 22 year old female who presents for an evaluation of a rash on the base of her scalp. Today the patient reports that it has been there for about a week and it feels like it is getting worse. She states that she has eczema, but it has never gotten this bad with a yellow crust. She has used betamethasone ointment, ketoconazole cream, triamcinolone ointment, and also has taken terbinafine. She does scratch the area on the back of neck. She denies any other involvement. The patient reports no other lesions of concern at this time.    Otherwise, the patient reports no painful, bleeding, nonhealing, or pruritic lesions, and denies new or changing moles.    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 Patient is present with her  and a service dog.  She works at home.     Family History:  No family history of skin cancer.     Medications:  Current Outpatient Prescriptions   Medication Sig Dispense Refill     clobetasol (TEMOVATE) 0.05 % ointment Apply topically 2 times daily Avoid face, underarms, groin. 30 g 1     mupirocin (BACTROBAN) 2 % ointment Use 2 times a day to affected area. 22 g 3     clobetasol (TEMOVATE) 0.05 % external solution Apply topically 2 times daily 50 mL 3     ketoconazole (NIZORAL) 2 % shampoo Apply topically daily as needed for itching or irritation 120 mL 11     metoclopramide (REGLAN) 5 MG tablet Take 1 tablet (5 mg) by mouth 3 times daily as needed 90 tablet 0     levonorgestrel (MIRENA, 52 MG,) 20 MCG/24HR IUD 1 each (20 mcg) by Intrauterine route once for 1 dose 1 each 0     budesonide-formoterol (SYMBICORT) 80-4.5 MCG/ACT Inhaler Inhale 2 puffs into the lungs 2 times daily 1 Inhaler 1     ranitidine (ZANTAC) 150 MG/10ML syrup Take 8 mLs (120 mg) by mouth 2 times daily 600 mL      lamoTRIgine (LAMICTAL) 100 MG tablet Take 2 and 1/2 to 3 per day 90 tablet 3     amitriptyline (ELAVIL) 25 MG tablet Take 2 tablets (50 mg) by mouth At Bedtime 180 tablet 2     citalopram (CELEXA) 40 MG tablet Take 1 tablet (40 mg) by mouth daily 90 tablet 3     hyoscyamine (LEVSIN/SL) 0.125 MG SL tablet Take 1 tablet (0.125 mg) by mouth every 4 hours as needed 120 tablet 3     ipratropium (ATROVENT) 0.02 % nebulizer solution Take 0.5 mg by  nebulization daily as needed for wheezing       Powders (VAGISIL EX)        ipratropium (ATROVENT HFA) 17 MCG/ACT inhaler Inhale 2 puffs into the lungs every 6 hours       nystatin-triamcinolone (MYCOLOG II) cream Apply topically 2 times daily 30 g 1     triamcinolone (KENALOG) 0.1 % ointment Apply sparingly to affected area three times daily for 14 days. 30 g 0     Melatonin 10 MG TABS Take 10 mg by mouth nightly as needed       Multiple Vitamins-Minerals (MULTIVITAMIN OR) 1 tablet daily       VITAMIN D, CHOLECALCIFEROL, PO Take  by mouth daily.       Allergies:  Allergies   Allergen Reactions     Adhesive Tape Hives     EKG Patches; any adhesives including band aides; burns         Azithromycin Nausea and Vomiting     Review of Systems:  - Skin: As above in HPI. No additional skin concerns.    Physical exam:  Vitals: Breastfeeding? No  GEN: This is a well developed, well-nourished female in no acute distress, in a pleasant mood.    SKIN: Sun-exposed skin, which includes the head/face, neck, both arms, digits, and/or nails was examined.   - Geographic 5.5 cm x 2.5 cm thin plaque on the right occipital scalp with overlying honey colored crusting and  serous exudate   - Medially white light flaky scale  - No psoriasiform plate scale is noted  - Post auricular sulcus is clear  - Nails are without pits or oil spots  - Scalp notable for mild seborrhea bitemporally   - No other lesions of concern on areas examined.     Impression/Plan:  1. Dermatitis of the neck; concerning for allergic contact dermatitis to a scalp product, with secondary impetiginization  - Discussion to not scratch the areas and maintain shorter fingernails.    - Start clobetasol 0.05% ointment - apply BID to affected areas. Avoid face, underarms, groin.   - Start mupirocin 2% ointment - apply BID to affected areas.   - Start clobetasol 0.05% solution - apply BID to affected areas once major symptoms have reduced.     2. Seborrheic  dermatitis  - Start ketoconazole 2% shampoo - apply PRN for itching or irritation.     Follow-up PRN.     Staff Involved:  Staff Only    Scribe Disclosure:   I, Stephen Currie, am serving as a scribe to document services personally performed by Dr. Rhett Campos, based on data collection and the provider's statements to me.     Staff attestation:  The documentation recorded by the scribe accurately reflects the services I personally performed and the decisions I personally made.    Rhett Campos MD  Staff Dermatologist    Department of Dermatology

## 2017-05-17 NOTE — PROGRESS NOTES
[facility] Dermatology Note    Dermatology Problem List:  1. Dermatitis of the neck  - s/p biopsy with subacute spongiotic dermatitis 1/6/15, terbinafine, betamethasone ointment mixed with ketoconazole cream, triamcinolone ointment mixed with ketoconazole cream  - clobetasol 0.05% ointment, mupirocin 2% ointment, clobetasol 0.05% solution  2. Seborrheic dermatitis  - ketoconazole 2% shampoo    CC:   Chief Complaint   Patient presents with     Derm Problem      is here for a rash on the base of her scalp.  States it has been there for about a week and feels it is getting worse      Date of Service: May 17, 2017    History of Present Illness:  Ms. Angela Jones is a 22 year old female who presents for an evaluation of a rash on the base of her scalp. Today the patient reports that it has been there for about a week and it feels like it is getting worse. She states that she has eczema, but it has never gotten this bad with a yellow crust. She has used betamethasone ointment, ketoconazole cream, triamcinolone ointment, and also has taken terbinafine. She does scratch the area on the back of neck. She denies any other involvement. The patient reports no other lesions of concern at this time.    Otherwise, the patient reports no painful, bleeding, nonhealing, or pruritic lesions, and denies new or changing moles.    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 Patient is present with her  and a service dog.  She works at home.     Family History:  No family history of skin cancer.     Medications:  Current Outpatient Prescriptions   Medication Sig Dispense Refill     clobetasol (TEMOVATE) 0.05 % ointment Apply topically 2 times daily Avoid face, underarms, groin. 30 g 1     mupirocin (BACTROBAN) 2 % ointment Use 2 times a day to affected area. 22 g 3     clobetasol (TEMOVATE) 0.05 % external solution Apply topically 2 times daily 50 mL 3     ketoconazole (NIZORAL) 2 % shampoo Apply topically daily as needed for itching or irritation 120 mL 11     metoclopramide (REGLAN) 5 MG tablet Take 1 tablet (5 mg) by mouth 3 times daily as needed 90 tablet 0     levonorgestrel (MIRENA, 52 MG,) 20 MCG/24HR IUD 1 each (20 mcg) by Intrauterine route once for 1 dose 1 each 0     budesonide-formoterol (SYMBICORT) 80-4.5 MCG/ACT Inhaler Inhale 2 puffs into the lungs 2 times daily 1 Inhaler 1     ranitidine (ZANTAC) 150 MG/10ML syrup Take 8 mLs (120 mg) by mouth 2 times daily 600 mL      lamoTRIgine (LAMICTAL) 100 MG tablet Take 2 and 1/2 to 3 per day 90 tablet 3     amitriptyline (ELAVIL) 25 MG tablet Take 2 tablets (50 mg) by mouth At Bedtime 180 tablet 2     citalopram (CELEXA) 40 MG tablet Take 1 tablet (40 mg) by mouth daily 90 tablet 3     hyoscyamine (LEVSIN/SL) 0.125 MG SL tablet Take 1 tablet (0.125 mg) by mouth every 4 hours as needed 120 tablet 3     ipratropium (ATROVENT) 0.02 % nebulizer solution Take 0.5 mg by nebulization daily as needed for wheezing       Powders (VAGISIL EX)        ipratropium (ATROVENT HFA) 17 MCG/ACT inhaler Inhale 2 puffs into the lungs every 6 hours       nystatin-triamcinolone (MYCOLOG II) cream Apply topically 2 times daily 30 g 1     triamcinolone (KENALOG) 0.1 % ointment  Apply sparingly to affected area three times daily for 14 days. 30 g 0     Melatonin 10 MG TABS Take 10 mg by mouth nightly as needed       Multiple Vitamins-Minerals (MULTIVITAMIN OR) 1 tablet daily       VITAMIN D, CHOLECALCIFEROL, PO Take  by mouth daily.       Allergies:  Allergies   Allergen Reactions     Adhesive Tape Hives     EKG Patches; any adhesives including band aides; burns         Azithromycin Nausea and Vomiting     Review of Systems:  - Skin: As above in HPI. No additional skin concerns.    Physical exam:  Vitals: Breastfeeding? No  GEN: This is a well developed, well-nourished female in no acute distress, in a pleasant mood.    SKIN: Sun-exposed skin, which includes the head/face, neck, both arms, digits, and/or nails was examined.   - Geographic 5.5 cm x 2.5 cm thin plaque on the right occipital scalp with overlying honey colored crusting and  serous exudate   - Medially white light flaky scale  - No psoriasiform plate scale is noted  - Post auricular sulcus is clear  - Nails are without pits or oil spots  - Scalp notable for mild seborrhea bitemporally   - No other lesions of concern on areas examined.     Impression/Plan:  1. Dermatitis of the neck; concerning for allergic contact dermatitis to a scalp product, with secondary impetiginization  - Discussion to not scratch the areas and maintain shorter fingernails.    - Start clobetasol 0.05% ointment - apply BID to affected areas. Avoid face, underarms, groin.   - Start mupirocin 2% ointment - apply BID to affected areas.   - Start clobetasol 0.05% solution - apply BID to affected areas once major symptoms have reduced.     2. Seborrheic dermatitis  - Start ketoconazole 2% shampoo - apply PRN for itching or irritation.     Follow-up PRN.     Staff Involved:  Staff Only    Scribe Disclosure:   Stephen MIRANDA, am serving as a scribe to document services personally performed by Dr. Rhett Campos, based on data collection and the  provider's statements to me.     Staff attestation:  The documentation recorded by the scribe accurately reflects the services I personally performed and the decisions I personally made.    Rhett Campos MD  Staff Dermatologist    Department of Dermatology

## 2017-05-17 NOTE — NURSING NOTE
Dermatology Rooming Note    Angela Jones's goals for this visit include:   Chief Complaint   Patient presents with     Derm Problem      is here for a rash on the base of her scalp.  States it has been there for about a week and feels it is getting worse          Laurita Jules LPN

## 2017-05-21 DIAGNOSIS — F31.81 BIPOLAR 2 DISORDER (H): ICD-10-CM

## 2017-05-23 RX ORDER — LAMOTRIGINE 100 MG/1
TABLET ORAL
Qty: 90 TABLET | Refills: 3 | Status: SHIPPED | OUTPATIENT
Start: 2017-05-23 | End: 2017-08-04

## 2017-05-30 DIAGNOSIS — K58.9 IRRITABLE BOWEL SYNDROME WITHOUT DIARRHEA: ICD-10-CM

## 2017-05-30 NOTE — TELEPHONE ENCOUNTER
hyoscyamine (LEVSIN/SL) 0.125 MG SL tablet      Last Written Prescription Date:  9/8/2016  Last Fill Quantity: 120,   # refills: 3  Last Office Visit : 10/4/2016  Future Office visit:  0

## 2017-08-04 ENCOUNTER — OFFICE VISIT (OUTPATIENT)
Dept: PSYCHIATRY | Facility: CLINIC | Age: 22
End: 2017-08-04
Payer: COMMERCIAL

## 2017-08-04 DIAGNOSIS — F31.81 BIPOLAR 2 DISORDER (H): Primary | ICD-10-CM

## 2017-08-04 PROCEDURE — 99215 OFFICE O/P EST HI 40 MIN: CPT | Performed by: PSYCHIATRY & NEUROLOGY

## 2017-08-04 RX ORDER — LAMOTRIGINE 200 MG/1
TABLET ORAL
Qty: 60 TABLET | Refills: 4 | Status: SHIPPED | OUTPATIENT
Start: 2017-08-04 | End: 2017-09-13

## 2017-08-04 ASSESSMENT — ANXIETY QUESTIONNAIRES
5. BEING SO RESTLESS THAT IT IS HARD TO SIT STILL: SEVERAL DAYS
GAD7 TOTAL SCORE: 8
1. FEELING NERVOUS, ANXIOUS, OR ON EDGE: MORE THAN HALF THE DAYS
3. WORRYING TOO MUCH ABOUT DIFFERENT THINGS: SEVERAL DAYS
6. BECOMING EASILY ANNOYED OR IRRITABLE: NEARLY EVERY DAY
7. FEELING AFRAID AS IF SOMETHING AWFUL MIGHT HAPPEN: NOT AT ALL
2. NOT BEING ABLE TO STOP OR CONTROL WORRYING: SEVERAL DAYS

## 2017-08-04 ASSESSMENT — PATIENT HEALTH QUESTIONNAIRE - PHQ9: 5. POOR APPETITE OR OVEREATING: NOT AT ALL

## 2017-08-04 NOTE — PATIENT INSTRUCTIONS
Treatment Plan:  Increase Lamictal (lamotrigine) to 400 mg per day   Continue Celexa (citalopram) 40 mg per day and amitriptyline 50 mg at bedtime    Dr. Rock will call with genetic testing results, and may suggest a change in medications.  Will need an evaluation for ADHD and Autism spectrum disorder   A referral has been made for a consultation with a pharmacist to review medications. They should be calling you soon to schedule a visit. Their phone # is [phone] in case you need to reach them.   Continue vitamin D   Safety plan was reviewed; to the ER as needed or call after hours crisis line; [phone]  Education and counseling was done regarding use of medications, psychotherapy options

## 2017-08-04 NOTE — MR AVS SNAPSHOT
MRN:[id number]                      After Visit Summary   8/4/2017    Angela Jones    MRN: [id number]           Visit Information        Provider Department      8/4/2017 11:00 AM Rashawn Rock MD [facility]        Care Instructions          Treatment Plan:  Increase Lamictal (lamotrigine) to 400 mg per day   Continue Celexa (citalopram) 40 mg per day and amitriptyline 50 mg at bedtime    Dr. Rock will call with genetic testing results, and may suggest a change in medications.  Will need an evaluation for ADHD and Autism spectrum disorder   A referral has been made for a consultation with a pharmacist to review medications. They should be calling you soon to schedule a visit. Their phone # is [phone] in case you need to reach them.   Continue vitamin D   Safety plan was reviewed; to the ER as needed or call after hours crisis line; [phone]  Education and counseling was done regarding use of medications, psychotherapy options         MyChart Information     [employer] gives you secure access to your electronic health record. If you see a primary care provider, you can also send messages to your care team and make appointments. If you have questions, please call your primary care clinic.  If you do not have a primary care provider, please call [phone] and they will assist you.        Care EveryWhere ID     This is your Care EveryWhere ID. This could be used by other organizations to access your [place] medical records  [id number]        Equal Access to Services     YESI LAYTON : Hadii morena Steele, mac mancera, qaybta mansi stallworth. So [facility] [phone].    ATENCIÓN: Si habla español, tiene a bennett disposición servicios gratuitos de asistencia lingüística. Llame al [phone].    We comply with applicable federal civil rights laws and [place] laws. We do not discriminate on the basis  of race, color, national origin, age, disability sex, sexual orientation or gender identity.

## 2017-08-04 NOTE — PROGRESS NOTES
Psychiatric Progress Note    Name: Angela Solis  Date: August 4, 2017   Length of Visit: 45 minutes  MRN: [id number]      Current Outpatient Prescriptions   Medication Sig     hyoscyamine (LEVSIN/SL) 0.125 MG sublingual tablet Take 1 tablet (0.125 mg) by mouth every 4 hours as needed     lamoTRIgine (LAMICTAL) 100 MG tablet TAKE TWO AND ONE-HALF TO THREE TABLETS (2.5-3) BY MOUTH PER DAY     clobetasol (TEMOVATE) 0.05 % ointment Apply topically 2 times daily Avoid face, underarms, groin.     mupirocin (BACTROBAN) 2 % ointment Use 2 times a day to affected area.     clobetasol (TEMOVATE) 0.05 % external solution Apply topically 2 times daily     ketoconazole (NIZORAL) 2 % shampoo Apply topically daily as needed for itching or irritation     metoclopramide (REGLAN) 5 MG tablet Take 1 tablet (5 mg) by mouth 3 times daily as needed     levonorgestrel (MIRENA, 52 MG,) 20 MCG/24HR IUD 1 each (20 mcg) by Intrauterine route once for 1 dose     budesonide-formoterol (SYMBICORT) 80-4.5 MCG/ACT Inhaler Inhale 2 puffs into the lungs 2 times daily     ranitidine (ZANTAC) 150 MG/10ML syrup Take 8 mLs (120 mg) by mouth 2 times daily     amitriptyline (ELAVIL) 25 MG tablet Take 2 tablets (50 mg) by mouth At Bedtime     citalopram (CELEXA) 40 MG tablet Take 1 tablet (40 mg) by mouth daily     ipratropium (ATROVENT) 0.02 % nebulizer solution Take 0.5 mg by nebulization daily as needed for wheezing     Powders (VAGISIL EX)      ipratropium (ATROVENT HFA) 17 MCG/ACT inhaler Inhale 2 puffs into the lungs every 6 hours     nystatin-triamcinolone (MYCOLOG II) cream Apply topically 2 times daily     triamcinolone (KENALOG) 0.1 % ointment Apply sparingly to affected area three times daily for 14 days.     Melatonin 10 MG TABS Take 10 mg by mouth nightly as needed     Multiple Vitamins-Minerals (MULTIVITAMIN OR) 1 tablet daily     VITAMIN D, CHOLECALCIFEROL, PO Take  by mouth daily.     No current facility-administered medications  for this visit.      Past medication trials: (patient was presented with a list to review all currently available antidepressants, mood stabilizers, tranquilizers, hypnotics and antipsychotics)  New Antidepressants: Prozac (fluoxetine), Zoloft (sertraline), Celexa (citalopram), Wellbutrin, Zyban, Aplenzin (bupropion), Effexor (venlafaxine) and Cymbalta (duloxetine), Lexapro  Stimulants / ADHD Meds: Adderall (amphetamine salts), Concerta (methylphenidate), Focalin (dexmethylphenidate), Ritalin (methylphenidate), Strattera (atomoxetine), Vyvanse (lisdexamfetamine) and Intuniv (guanfacine)  Newer Antipsychotics: Risperdal (risperidone) and Abilify (aripriprazole)  Sedatives/Hypnotics: OTC: Benadryl (diphenhydramine)   Mood stabilizers: Lamictal    Therapist:  Was with Shannan CENTENO Until pt moved to [facility]  Shannan had suggested Neuropsychiatric testing     PHQ-9:  PHQ-9 SCORE 10/6/2015 11/3/2015 8/4/2017   Total Score - - -   Total Score 7 8 16        JOSHUA-7:  JOSHUA-7 SCORE 10/6/2015 11/3/2015 8/4/2017   Total Score - - -   Total Score 4 4 8   Total Score - - -      Interim History:  Her  comes in with her to this visit.  Since she switched from depo shot to Mirena IUD in March the efficacy of Lamictal has markedly diminished (but was declining a few months before the switch).   More mood swings, irritability, crying spells   Also wondering about Asperger's (would get help with disability with this diagnosis). She does have some difficulty interacting with people, tends to isolate and has difficulties with writing.    Assessment: from last visit 10/6/15  I would like to try increase Lamictal before giving up on it. Plan B will be Topamax.   Lexapro may be helping somewhat, but Celexa may have worked better for her.     Treatment Plan:  May change from 20 mg Lexapro (escitalopram) to 40 mg Celexa (citalopram)   Increase Lamictal (lamotrigine) to 250 mg for a week, then 300 mg as tolerated   Continue vitamin D   Safety  "plan was reviewed; to the ER as needed or call after hours crisis line; [phone]  Education and counseling was done regarding use of medications, psychotherapy options  Call for a follow up appointment with Dr. Rock in 2-3 months; [phone].     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       Mental Status Assessment:  Appearance:  Well groomed    Behavior/relationship to examiner/demeanor:  Cooperative, engaged and pleasant  Motor activity:  Normal  Gait:  Normal   Speech:  Normal in volume, slightly dysarthric   Mood (subjective report):  \"down\"   Affect (objective appearance):  Mood congruent  Thought Process (Associations):  Logical, linear and goal directed  Thought content:  No evidence of suicidal or homicidal ideation,          no overt psychosis and                    patient does not appear to be responding to internal stimuli  Oriented to person, place, date/time   Attention Span and concentration: Intact   Memory:  Short-term memory intact and Long-term memory; Intact  Language:  Fluent   Fund of Knowledge/Intelligence:  Average  Use of language: Intact   Abstraction:  Normal  Insight:  Adequate  Judgment:  Adequate for safety    DSM-5 DIAGNOSIS:  Attention-Deficit/Hyperactivity Disorder 314.01 (F90.1) Predominantly hyperactive / impulsive presentation Serverity: Moderate  F33.1 MDD, mixed   300.00 (F41.9) Unspecified Anxiety Disorder  Cluster B traits; Zanarini BPD scale is positive for 7/10 items     Vitamin D " Deficiency Screening Results:  No results found for: VITDT     Assessment:  She has lost the efficacy of Lamictal likely related to the change from Depo-Provera to the Mirena IUD. Presumably this exposes her to more estrogen which accelerates the metabolism of Lamictal; dose increase may help.  She has well-documented ADHD but had mixed response to the stimulants and Strattera (although I do not recall the details of the problems). Genesight testing may help clarify the issues regarding the response to the medications.  In regards to the question about whether she may have Asperger's, she will need testing by someone who specializes in this area. Also hopefully they would look at the ADHD symptoms.    Treatment Plan:  Increase Lamictal (lamotrigine) to 400 mg per day   Continue Celexa (citalopram) 40 mg per day and amitriptyline 50 mg at bedtime    Dr. Rock will call with genetic testing results, and may suggest a change in medications.  Will need an evaluation for ADHD and Autism spectrum disorder   A referral has been made for a consultation with a pharmacist to review medications. They should be calling you soon to schedule a visit. Their phone # is [phone] in case you need to reach them.   Continue vitamin D   Safety plan was reviewed; to the ER as needed or call after hours crisis line; [phone]  Education and counseling was done regarding use of medications, psychotherapy options  Call for a follow up appointment with Dr. Rock in 6-8 weeks [phone].     Signed: Rashawn Rock M.D.

## 2017-08-05 ASSESSMENT — ANXIETY QUESTIONNAIRES: GAD7 TOTAL SCORE: 8

## 2017-08-05 ASSESSMENT — PATIENT HEALTH QUESTIONNAIRE - PHQ9: SUM OF ALL RESPONSES TO PHQ QUESTIONS 1-9: 16

## 2017-08-06 DIAGNOSIS — F41.9 ANXIETY: ICD-10-CM

## 2017-08-07 ENCOUNTER — TELEPHONE (OUTPATIENT)
Dept: PHARMACY | Facility: OTHER | Age: 22
End: 2017-08-07

## 2017-08-07 RX ORDER — CITALOPRAM HYDROBROMIDE 40 MG/1
40 TABLET ORAL DAILY
Qty: 60 TABLET | Refills: 0 | Status: SHIPPED | OUTPATIENT
Start: 2017-08-07 | End: 2017-08-29

## 2017-08-07 NOTE — TELEPHONE ENCOUNTER
citalopram       Last Written Prescription Date:  11/2/16  Last Fill Quantity: 90,   # refills: 3  Last Office Visit : 10/4/16  Future Office visit:  NONE

## 2017-08-07 NOTE — TELEPHONE ENCOUNTER
MTM referral from: [place] clinic visit (referral by provider)    MTM referral outreach attempt #1 on August 7, 2017 at 4:13 PM      Outcome: Left Message    Brittany Giron MTM Coordinator

## 2017-08-08 DIAGNOSIS — F41.9 ANXIETY: ICD-10-CM

## 2017-08-09 ENCOUNTER — TELEPHONE (OUTPATIENT)
Dept: PSYCHIATRY | Facility: CLINIC | Age: 22
End: 2017-08-09

## 2017-08-09 DIAGNOSIS — E72.12 5,10-METHYLENETETRAHYDROFOLATE REDUCTASE DEFICIENCY (H): Primary | ICD-10-CM

## 2017-08-09 RX ORDER — CITALOPRAM HYDROBROMIDE 40 MG/1
40 TABLET ORAL DAILY
Qty: 60 TABLET | Refills: 0 | Status: SHIPPED | OUTPATIENT
Start: 2017-08-09 | End: 2017-10-27

## 2017-08-09 NOTE — TELEPHONE ENCOUNTER
citalopram       Last Written Prescription Date:  8/7/17  Last Fill Quantity: 60,   # refills: 0  Last Office Visit : 10/4/16  Future Office visit:  0

## 2017-08-09 NOTE — TELEPHONE ENCOUNTER
Patient Medical Record Information  Angela Jones: 1995        Diagnoses  According to DSM-V criteria, patient is suffering from refractory moderate to severe depression.        ICD-10 Codes  (F33.1) Major depressive disorder, recurrent, moderate  (F90.1) Attn-defct hyperactivity disorder, predom hyperactive type  (F33.1) Major depressive disorder, recurrent, moderate        Clinical Notations  Patient has failed or is currently failing at least one neuropsychiatric medication.  Medication(s) failed or failing: Prozac, Zoloft, Celexa, Cymbalta, Lexapro, Wellbutrin, Effexor    Patient has failed or is currently failing at least one neuropsychiatric medication and I am contemplating an alteration in a neuropsychiatric medication treatment.  I am considering folate supplementation for patient.        I am contemplating an alteration in a neuropsychiatric medication treatment, which may include medication elimination, switching, augmentation, and/or dose adjustment, and therefore have ordered GeneSight Psychotropic, ADHD, MTHFR on 2017.      Ordered at the direction of: Rashawn Rock

## 2017-08-15 NOTE — TELEPHONE ENCOUNTER
Metabacusight test results rec'd.  Will provide copy to MD for review.       From last OV with Dr. Rock 8/4/17  Past medication trials: (patient was presented with a list to review all currently available antidepressants, mood stabilizers, tranquilizers, hypnotics and antipsychotics)  New Antidepressants: Prozac (fluoxetine), Zoloft (sertraline), Celexa (citalopram), Wellbutrin, Zyban, Aplenzin (bupropion), Effexor (venlafaxine) and Cymbalta (duloxetine), Lexapro  Stimulants / ADHD Meds: Adderall (amphetamine salts), Concerta (methylphenidate), Focalin (dexmethylphenidate), Ritalin (methylphenidate), Strattera (atomoxetine), Vyvanse (lisdexamfetamine) and Intuniv (guanfacine)  Newer Antipsychotics: Risperdal (risperidone) and Abilify (aripriprazole)  Sedatives/Hypnotics: OTC: Benadryl (diphenhydramine)  Mood stabilizers: Lamictal    Assessment:  She has lost the efficacy of Lamictal likely related to the change from Depo-Provera to the Mirena IUD. Presumably this exposes her to more estrogen which accelerates the metabolism of Lamictal; dose increase may help.  She has well-documented ADHD but had mixed response to the stimulants and Strattera (although I do not recall the details of the problems). Genesight testing may help clarify the issues regarding the response to the medications.  In regards to the question about whether she may have Asperger's, she will need testing by someone who specializes in this area. Also hopefully they would look at the ADHD symptoms.     Treatment Plan:  Increase Lamictal (lamotrigine) to 400 mg per day   Continue Celexa (citalopram) 40 mg per day and amitriptyline 50 mg at bedtime    Dr. Rock will call with genetic testing results, and may suggest a change in medications.  Will need an evaluation for ADHD and Autism spectrum disorder   A referral has been made for a consultation with a pharmacist to review medications. They should be calling you soon to schedule a visit. Their phone #  is [phone] in case you need to reach them.   Continue vitamin D   Safety plan was reviewed; to the ER as needed or call after hours crisis line; [phone]  Education and counseling was done regarding use of medications, psychotherapy options  Call for a follow up appointment with Dr. Rock in 6-8 weeks [phone].      Signed:                     Rashawn Rock M.D.

## 2017-08-16 RX ORDER — PRENATAL VIT/IRON FUM/FOLIC AC 27MG-0.8MG
1 TABLET ORAL DAILY
Qty: 100 TABLET | Refills: 3 | Status: SHIPPED | OUTPATIENT
Start: 2017-08-16 | End: 2021-08-05

## 2017-08-16 NOTE — TELEPHONE ENCOUNTER
"    The Instacart testing was received; results reviewed for interpretation and medication management. Will scan to media tab.     Currently taking   Celexa 40 mg ; middle column  \"serum level may be too high; lower doses may be required\" and is S/S   Lamictal 200 mg BID ; R column ; \"serum level may be too low\"   Has Mirena IUD   Elavil 50 mg ; middle column ; footnotes 3,7 (difficult to predict)     All stimulants are in L column     MTHFR ; middle     I left message;   Sent in RX for prenatal vits   Advised to drop dose of Celexa if having any side effects   To discuss raising Lamictal dose when seen for MTM 8/28. Consider getting serum level if not already done?   "

## 2017-08-17 ENCOUNTER — TELEPHONE (OUTPATIENT)
Dept: PSYCHIATRY | Facility: CLINIC | Age: 22
End: 2017-08-17

## 2017-08-17 NOTE — TELEPHONE ENCOUNTER
Reason for call:  Other   Patient called regarding (reason for call): call back and returning call  Additional comments: Patient called back to discuss closed encounter from today.        Phone number to reach patient:  Home number on file [phone] (home)    Best Time:  any    Can we leave a detailed message on this number?  YES

## 2017-08-18 NOTE — TELEPHONE ENCOUNTER
Left VM for Pt to schedule an appt with Dr. Rock, as well as asking if she had specific questions.

## 2017-08-26 ENCOUNTER — NURSE TRIAGE (OUTPATIENT)
Dept: NURSING | Facility: CLINIC | Age: 22
End: 2017-08-26

## 2017-08-26 NOTE — TELEPHONE ENCOUNTER
Patient calling to report one week history of double vision and dizziness since she increased her Lamictal.  Currently on 400mg per day. No headache, loss of vision reported. Advised to go to  or ED for  Evaluation. Patient states she will go to [facility]>  Gricelda Delgado RN   [facility]    Reason for Disposition    Double vision    Additional Information    Negative: Followed getting substance in the eye    Negative: Foreign body or object is or was lodged in the eye    Negative: Followed an eye injury    Negative: Followed sun lamp or sun exposure (UV keratitis)    Negative: Yellow or green discharge (pus) in the eye    Negative: Pregnant    Negative: Postpartum    Negative: Complete loss of vision in 1 or both eyes    Negative: Severe eye pain    Negative: Flashes of light  (Exception: brief from pressing on the eyeball)    Negative: [1] Eye pain AND [2] brief (now gone) blurred vision or visual changes    Negative: [1] Taking digoxin (e.g., Lanoxin, Digitek, Cardoxin, Lanoxicaps) AND [2] blurred vision, yellow vision, or yellow-green halos    Protocols used: VISION LOSS OR CHANGE-ADULT-

## 2017-08-29 ENCOUNTER — OFFICE VISIT (OUTPATIENT)
Dept: PHARMACY | Facility: CLINIC | Age: 22
End: 2017-08-29
Payer: COMMERCIAL

## 2017-08-29 DIAGNOSIS — L40.9 PSORIASIS: ICD-10-CM

## 2017-08-29 DIAGNOSIS — F31.81 BIPOLAR 2 DISORDER (H): Primary | ICD-10-CM

## 2017-08-29 DIAGNOSIS — K21.9 ESOPHAGEAL REFLUX: ICD-10-CM

## 2017-08-29 DIAGNOSIS — J45.909 ASTHMA: ICD-10-CM

## 2017-08-29 DIAGNOSIS — E55.9 VITAMIN D DEFICIENCY: ICD-10-CM

## 2017-08-29 DIAGNOSIS — G43.109 MIGRAINE WITH AURA: ICD-10-CM

## 2017-08-29 DIAGNOSIS — R11.0 NAUSEA: ICD-10-CM

## 2017-08-29 PROCEDURE — 99607 MTMS BY PHARM ADDL 15 MIN: CPT | Performed by: PHARMACIST

## 2017-08-29 PROCEDURE — 99605 MTMS BY PHARM NP 15 MIN: CPT | Performed by: PHARMACIST

## 2017-08-29 NOTE — Clinical Note
Hi Dr. Posada,  I saw pt for MTM, she has been taking ranitidine OTC for GERD and would like an Rx for this. I let her know I would send you a message, but that you might want to assess her before prescribing.  Let me know if you have questions!  Thank you,   Caryn

## 2017-08-29 NOTE — Clinical Note
Hi Dr. Rock,  Saw this [facility] pt. Advised Lamictal BID, will f/u with her via phone. Are you ok with me putting in vitamin D level?  Thanks,   Caryn

## 2017-08-29 NOTE — MR AVS SNAPSHOT
After Visit Summary   8/29/2017    Angela NIETO    MRN: [id number]           Patient Information     Date Of Birth          1995        Visit Information        Provider Department      8/29/2017 2:30 PM Caryn Wolfe, [facility]        Today's Diagnoses     Bipolar 2 disorder (H)    -  1    Migraine with aura        Asthma        Esophageal reflux        Nausea        Psoriasis        Vitamin D deficiency          Care Instructions    Recommendations from today's MTM visit:                                                        1. Pt to try splitting dose of Lamictal to 200mg twice daily  2. Dr. Rock to consider vitamin D level  3. PCP to consider Rx for ranitidine daily  4. Pt to make appt with Dr. Rock    Next MTM visit: will call in 1-2 weeks    To schedule another MTM appointment, please call the clinic directly or you may call the MTM scheduling line at [phone] or toll-free at [phone].     My Clinical Pharmacist's contact information:                                                      It was a pleasure seeing you today!  Please feel free to contact me with any questions or concerns you have.      Caryn Wolfe, [facility], [facility]  Medication Therapy Management Pharmacist  [facility]      You may receive a survey about the MTM services you received.  I would appreciate your feedback to help me serve you better in the future. Please fill it out and return it when you can. Your comments will be anonymous.                      Follow-ups after your visit        Your next 10 appointments already scheduled     Aug 31, 2017  7:30 AM CDT   (Arrive by 7:15 AM)   Return Visit with Uyen Kimball MD   [facility] ([facility])    [address] [place] [id number] [phone]            Sep 13, 2017 10:30 AM CDT   TELEMEDICINE with PSYCH BERNARDO    [facility] ([facility])    [address] [place] [id number] [phone]           Note: this is not an onsite visit; there is no need to come to the facility.              Who to contact     If you have questions or need follow up information about today's clinic visit or your schedule please contact [facility] directly at [phone].  Normal or non-critical lab and imaging results will be communicated to you by [employer], letter or phone within 4 business days after the clinic has received the results. If you do not hear from us within 7 days, please contact the clinic through [employer] or phone. If you have a critical or abnormal lab result, we will notify you by phone as soon as possible.  Submit refill requests through [employer] or call your pharmacy and they will forward the refill request to us. Please allow 3 business days for your refill to be completed.          Additional Information About Your Visit        [employer] Information     [employer] gives you secure access to your electronic health record. If you see a primary care provider, you can also send messages to your care team and make appointments. If you have questions, please call your primary care clinic.  If you do not have a primary care provider, please call [phone] and they will assist you.        Care EveryWhere ID     This is your Care EveryWhere ID. This could be used by other organizations to access your [facility] medical records  [id number]         Blood Pressure from Last 3 Encounters:   04/26/17 111/65   03/23/17 106/70   03/10/17 104/50    Weight from Last 3 Encounters:   04/26/17 122 lb (55.3 kg)   03/23/17 118 lb 12.8 oz (53.9 kg)   03/10/17 120 lb (54.4 kg)              Today, you had the following     No orders found for display         Today's Medication Changes          These changes are  accurate as of: 8/29/17 11:59 PM.  If you have any questions, ask your nurse or doctor.               These medicines have changed or have updated prescriptions.        Dose/Directions    citalopram 40 MG tablet   Commonly known as:  celeXA   This may have changed:    - when to take this  - additional instructions   Used for:  Anxiety        Dose:  40 mg   Take 1 tablet (40 mg) by mouth daily *SCHEDULE ANNUAL MD APPT.**   Quantity:  60 tablet   Refills:  0       clobetasol 0.05 % ointment   Commonly known as:  TEMOVATE   This may have changed:    - when to take this  - reasons to take this  - additional instructions   Used for:  Dermatitis        Apply topically 2 times daily Avoid face, underarms, groin.   Quantity:  30 g   Refills:  1       lamoTRIgine 200 MG tablet   Commonly known as:  LaMICtal   This may have changed:    - how much to take  - how to take this  - when to take this  - additional instructions   Used for:  Bipolar 2 disorder (H)        Take 1 tablet twice a day   Quantity:  60 tablet   Refills:  4                Primary Care Provider Office Phone # Fax #    Dixie Ashely Godoy [phone]       [address] [place] [address]  [facility] [address]        Equal Access to Services     RENATO LAYTON : Hadii morena kuhno Sodena, waaxda luqadaha, qaybta kaalmada adeegyada, mansi bauman. So [facility] [phone].    ATENCIÓN: Si habla español, tiene a bennett disposición servicios gratuitos de asistencia lingüística. [facility] [phone].    We comply with applicable federal civil rights laws and [place] laws. We do not discriminate on the basis of race, color, national origin, age, disability sex, sexual orientation or gender identity.            Thank you!     Thank you for choosing [facility]  for your care. Our goal is always to provide you with excellent care. Hearing back from our patients is one way we can continue to  improve our services. Please take a few minutes to complete the written survey that you may receive in the mail after your visit with us. Thank you!             Your Updated Medication List - Protect others around you: Learn how to safely use, store and throw away your medicines at www.disposemymeds.org.          This list is accurate as of: 8/29/17 11:59 PM.  Always use your most recent med list.                   Brand Name Dispense Instructions for use Diagnosis    amitriptyline 25 MG tablet    ELAVIL    180 tablet    Take 2 tablets (50 mg) by mouth At Bedtime    Other migraine, not intractable, without status migrainosus       ATROVENT HFA 17 MCG/ACT Inhaler   Generic drug:  ipratropium      Inhale 2 puffs into the lungs every 6 hours    Allergic reaction, subsequent encounter       budesonide-formoterol 80-4.5 MCG/ACT Inhaler    SYMBICORT    1 Inhaler    Inhale 2 puffs into the lungs 2 times daily        citalopram 40 MG tablet    celeXA    60 tablet    Take 1 tablet (40 mg) by mouth daily *SCHEDULE ANNUAL MD APPT.**    Anxiety       clobetasol 0.05 % ointment    TEMOVATE    30 g    Apply topically 2 times daily Avoid face, underarms, groin.    Dermatitis       hyoscyamine 0.125 MG sublingual tablet    LEVSIN/SL    120 tablet    Take 1 tablet (0.125 mg) by mouth every 4 hours as needed    Irritable bowel syndrome without diarrhea       ipratropium 0.02 % nebulizer solution    ATROVENT     Take 0.5 mg by nebulization daily as needed for wheezing        ketoconazole 2 % shampoo    NIZORAL    120 mL    Apply topically daily as needed for itching or irritation    Dermatitis, seborrheic       lamoTRIgine 200 MG tablet    LaMICtal    60 tablet    Take 1 tablet twice a day    Bipolar 2 disorder (H)       levonorgestrel 20 MCG/24HR IUD    MIRENA (52 MG)    1 each    1 each (20 mcg) by Intrauterine route once for 1 dose    Encounter for IUD insertion       Melatonin 10 MG Tabs tablet      Take 10 mg by mouth nightly as  needed        metoclopramide 5 MG tablet    REGLAN    90 tablet    Take 1 tablet (5 mg) by mouth 3 times daily as needed    Other migraine without status migrainosus, not intractable       mupirocin 2 % ointment    BACTROBAN    22 g    Use 2 times a day to affected area.    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    Dermatitis       VAGISIL EX           VITAMIN D (CHOLECALCIFEROL) PO      Take  by mouth daily.

## 2017-08-29 NOTE — PROGRESS NOTES
SUBJECTIVE/OBJECTIVE:                           Angela INETO is a 22 year old female coming in for an initial visit for Medication Therapy Management.  She is here today with her . She was referred to me from Dr. Rock.     Chief Complaint: blurry vision with Lamictal dose increase. Wants to review genesight results.     Allergies/ADRs: Reviewed in Epic  Tobacco: No tobacco use  Alcohol: none  PMH: Reviewed in Epic    Medication Adherence: sets up own med boxes    Bipolar 2: Current therapy includes: Celexa 40mg, melatonin 10mg, Lamictal 400mg daily. She has been on this dose for ~3 weeks (increased from 300mg). She started experiencing double vision 2 days after dose increase. It starts shortly after taking the dose and lasts for 60-90 mins before going away on its own. Takes Lamictal between 10-11am. Did have a little dizziness with dose increase at first, but that has resolved. Has not noticed improvement in symptoms (depression/anxiety) with dose increase, does not feel like Celexa has been particularly helpful. Prenatal vitamin (folic acid) started by Dr. Rock based on Genesight results- pt has not started this yet, but plans to pick it up from the pharmacy. Is interested in reviewing Genesight results today.       Past med trials per pt:   Zoloft, Wellbutrin- sleepiness, Prozac- irritablility, Effexor, Lexapro- not helpful.   Risperidone and Abilify- doesn't remember details    Migraine: Current therapy: Amitriptyline 50mg QHS for ppx (and sleep). Helpful for migraine prevention. Rarely gets migraines. Denies side effects. Sleeping well at night.     Asthma?: No mention of asthma diagnosis in chart/on problem list, no spirometry on file.  Pt reports asthma diagnosis.  Current asthma medications: Ipratropium MDI and Nebs PRN and Budesonide+Formoterol (Symbicort) twice daily.   Pt reports the following symptoms: none.  AAP on file: NO  No ACT on file    GERD: Current medications include: Zantac  (ranitidine) 150mg once daily. Pt has been on this for 1-2 months and feels like it has greatly improved her heartburn symptoms.  She is currently taking it OTC and is interested in getting a prescription for it.      Nausea/GI: Reglan and hyoscamine PRN, takes each 1-3x/week and finds them helpful when needed.  Plans to see a GI specialist for her ongoing issues.      Derm: Reports psoriasis diagnosis. Current therapy includes: clobetasol cream PRN, ketoconazole shampoo PRN, triamcinolone cream PRN.     Vitamin D deficiency: Current therapy includes: vitamin D (dose unknown). No vitamin D level on file.     Current labs include:  BP Readings from Last 3 Encounters:   04/26/17 111/65   03/23/17 106/70   03/10/17 104/50       Liver Function Studies -   Recent Labs   Lab Test  04/28/16   1925   PROTTOTAL  7.0   ALBUMIN  4.5   BILITOTAL  0.2   ALKPHOS  60   AST  10   ALT  18       Last Basic Metabolic Panel:  Lab Results   Component Value Date     04/28/2016      Lab Results   Component Value Date    POTASSIUM 4.2 04/28/2016     Lab Results   Component Value Date    CHLORIDE 108 04/28/2016     Lab Results   Component Value Date    BUN 9 04/28/2016     Lab Results   Component Value Date    CR 0.68 04/28/2016     GFR Estimate   Date Value Ref Range Status   04/28/2016 >90  Non  GFR Calc   >60 mL/min/1.7m2 Final   10/29/2014 >90  Non  GFR Calc   >60 mL/min/1.7m2 Final   11/19/2013 >60 ml/min/1.73m2 Final       TSH   Date Value Ref Range Status   04/28/2016 1.83 0.40 - 4.00 mU/L Final   ]    Most Recent Immunizations   Administered Date(s) Administered     DTAP (<7y) 06/20/2000     HPVQuadrivalent 12/30/2016     HepB-Peds 01/03/2001     MMR 06/20/2000     Meningococcal (Menomune ) 08/31/2007     Poliovirus, inactivated (IPV) 06/20/2000     Tdap (Adacel,Boostrix) 08/13/2007     Varicella 04/19/2000       ASSESSMENT:                             Current medications were reviewed today.      Medication Adherence: no issues identified    Bipolar 2: Needs improvement. Recommend patient split Lamictal dose to 200mg BID to see if that helps with side effect of blurry vision (may be related to Cmax with once daily 400mg dosing). Discussed other medication options to consider if Lamictal+Celexa combination is not fully effective. Discussed option of switching mood stabilizer and consideration of Topamax, as previously mentioned by Dr. Rock (17).  Pt would be agreeable to trying Topamax if needed. We also discussed possible antidepressant options in light of Genesight results (Trintellix, Viibryd).  Will start with splitting dose of Lamictal for now. Advised pt to make f/u appt with Dr. Rock.     Reviewed Genesight results with patient.  Discussed that results will not tell us which drugs will work but can give us some insight into dosing and side effects based on his individual metabolism of each medication.   Given current therapy, notable Genesight results include:   1. Antidepressant medications:     --- Reduced Response of the serotonin transporter gene (short/short polymorphism): may have decreased response to SSRIs   --- Increased sensitivity at the serotonin 2A receptor (G/G allele): may be associated with increased risk of side effects with SSRIs    2. Individual AQP096 enzyme activity:     --- Ultrarapid Metabolizer at CY and UGT1A4: medications metabolized through this pathway may result in lower serum levels than expected (i.e. Clozapine, olanzapine)   --- Intermediate Metabolizer at NGH0G31 and 2C9: medications metabolized through this pathway may result in higher serum levels than expected (i.e. citalopram)    3. Reduced activity at the MTHFR gene- this is associated with reduced folic acid synthesis and low serum folate- may contribute to reduced efficacy and antidepressants. Pt plans to start folic acid.     Migraine: Currently stable.     Asthma?: Unclear diagnosis. Could  consider spirometry to assess.     GERD: Dicussed that PCP could consider prescribing ranitidine for heartburn. Will route to PCP to approval/denial.      Nausea/GI: Currently stable.  Pt plans to see GI.     Derm: Currently stable. Followed by derm.     Vitamin D deficiency: Pt is interested in obtaining vitamin D level.  Recommend this to assess efficacy of current therapy.      PLAN:                            1. Pt to try splitting dose of Lamictal to 200mg BID  2. Dr. Rock to consider vitamin D level  3. PCP to consider Rx for ranitidine daily  4. Pt to make appt with Dr. Rock    I spent 60 minutes with this patient today. A copy of the visit note was provided to the patient's primary care provider and psychiatry provider.    Will follow up in 1-2 weeks.    The patient declined a summary of these recommendations as an after visit summary. Was given copy of [employer] Interactive results    Caryn Wolfe, PharmD, BCPP  Medication Therapy Management Pharmacist  [facility]    Addendum: vitamin D level ok per Dr. Rock. Ordered level.

## 2017-08-30 NOTE — PATIENT INSTRUCTIONS
Recommendations from today's MTM visit:                                                        1. Pt to try splitting dose of Lamictal to 200mg twice daily  2. Dr. Rock to consider vitamin D level  3. PCP to consider Rx for ranitidine daily  4. Pt to make appt with Dr. Rock    Next MTM visit: will call in 1-2 weeks    To schedule another MTM appointment, please call the clinic directly or you may call the MTM scheduling line at [phone] or toll-free at [phone].     My Clinical Pharmacist's contact information:                                                      It was a pleasure seeing you today!  Please feel free to contact me with any questions or concerns you have.      Caryn Wolfe, Lizzette, BCPP  Medication Therapy Management Pharmacist  [facility]      You may receive a survey about the MTM services you received.  I would appreciate your feedback to help me serve you better in the future. Please fill it out and return it when you can. Your comments will be anonymous.

## 2017-09-01 ENCOUNTER — TELEPHONE (OUTPATIENT)
Dept: PSYCHIATRY | Facility: CLINIC | Age: 22
End: 2017-09-01

## 2017-09-01 NOTE — TELEPHONE ENCOUNTER
Reason for call:  Symptom   Symptom or request: panic attacks and double vision    Duration (how long have symptoms been present): since the change in lamictal  Have you been treated for this before? Yes    Additional comments: Per patient, Caryn Wolfe may be a helpful resource as well when discussing these medication symptoms      Phone number to reach patient:  Home number on file [phone] (home)    Best Time:  anytime    Can we leave a detailed message on this number?  YES

## 2017-09-05 NOTE — TELEPHONE ENCOUNTER
"Chart reviewed. Patient has Pharm D appointment on 9/13/17 with Caryn Wolfe.   RN spoke with patient. She still has \"insomnia, double vision, and loss of appetite.\"  She stated double vision started about three weeks ago. The other side effects started about a week ago. She didn't call sooner because she was on vacation. She stated she tried taking 200 mg BID, but felt \"withdrawal side effects\" of nausea, dizziness, depth perception problems, unable to get out of bed.     Plan was to go back down to 300 mg of Lamictal, which she tolerated. She will discuss further at her next appointment. She will call in the interim if needed.       From 08/04/2017:  DSM-5 DIAGNOSIS:  Attention-Deficit/Hyperactivity Disorder 314.01 (F90.1) Predominantly hyperactive / impulsive presentation Serverity: Moderate  F33.1 MDD, mixed   300.00 (F41.9) Unspecified Anxiety Disorder  Cluster B traits; Zanarini BPD scale is positive for 7/10 items      Vitamin D Deficiency Screening Results:  No results found for: VITDT      Assessment:  She has lost the efficacy of Lamictal likely related to the change from Depo-Provera to the Mirena IUD. Presumably this exposes her to more estrogen which accelerates the metabolism of Lamictal; dose increase may help.  She has well-documented ADHD but had mixed response to the stimulants and Strattera (although I do not recall the details of the problems). Genesight testing may help clarify the issues regarding the response to the medications.  In regards to the question about whether she may have Asperger's, she will need testing by someone who specializes in this area. Also hopefully they would look at the ADHD symptoms.     Treatment Plan:  Increase Lamictal (lamotrigine) to 400 mg per day   Continue Celexa (citalopram) 40 mg per day and amitriptyline 50 mg at bedtime    Dr. Rock will call with genetic testing results, and may suggest a change in medications.  Will need an evaluation for ADHD and Autism " spectrum disorder   A referral has been made for a consultation with a pharmacist to review medications. They should be calling you soon to schedule a visit. Their phone # is [phone] in case you need to reach them.   Continue vitamin D   Safety plan was reviewed; to the ER as needed or call after hours crisis line; [phone]  Education and counseling was done regarding use of medications, psychotherapy options  Call for a follow up appointment with Dr. Rock in 6-8 weeks [phone].      Signed:                     Rashawn Rock M.D.

## 2017-09-08 DIAGNOSIS — G43.809 OTHER MIGRAINE, NOT INTRACTABLE, WITHOUT STATUS MIGRAINOSUS: ICD-10-CM

## 2017-09-12 NOTE — TELEPHONE ENCOUNTER
amitriptyline        Last Written Prescription Date: 1/4/17  Last Fill Quantity: 180,   # refills: 2  Last Office Visit : 10/4/16  Future Office visit:  NONE     Creatinine   Date Value Ref Range Status   04/28/2016 0.68 0.52 - 1.04 mg/dL Final     BP Readings from Last 3 Encounters:   04/26/17 111/65   03/23/17 106/70   03/10/17 104/50     Routing refill request to provider for review/approval because: LABS are over due route to provider

## 2017-09-13 ENCOUNTER — ALLIED HEALTH/NURSE VISIT (OUTPATIENT)
Dept: PHARMACY | Facility: CLINIC | Age: 22
End: 2017-09-13
Payer: COMMERCIAL

## 2017-09-13 DIAGNOSIS — F31.81 BIPOLAR 2 DISORDER (H): Primary | ICD-10-CM

## 2017-09-13 PROCEDURE — 99606 MTMS BY PHARM EST 15 MIN: CPT | Performed by: PHARMACIST

## 2017-09-13 NOTE — Clinical Note
Jose Angel- followed up with this pt- blurry vision resolved! Encouraged her to make follow-up appt with you at some point to help monitor efficacy of Lamictal.   Thanks!  Caryn

## 2017-09-13 NOTE — PATIENT INSTRUCTIONS
Recommendations from today's MTM visit:                                                        1. Continue medications as is.  2. Make follow-up appointment with Dr. Rock.     Next MTM visit: 6-8 weeks    To schedule another MTM appointment, please call the clinic directly or you may call the MTM scheduling line at [phone] or toll-free at [phone].     My Clinical Pharmacist's contact information:                                                      It was a pleasure seeing you today!  Please feel free to contact me with any questions or concerns you have.      Caryn Wolfe, Lizzette, [facility]  Medication Therapy Management Pharmacist  [facility]      You may receive a survey about the MTM services you received.  I would appreciate your feedback to help me serve you better in the future. Please fill it out and return it when you can. Your comments will be anonymous.

## 2017-09-13 NOTE — PROGRESS NOTES
"SUBJECTIVE/OBJECTIVE:                Angela NIETO is a 22 year old female called for a follow-up visit for Medication Therapy Management.  She was referred to me from Dr. Rock.       Chief Complaint: Follow up from our visit on 8/29.  Blurry vision from Lamictal.     Allergies/ADRs: Reviewed in Epic  Tobacco: No tobacco use  Alcohol: none  PMH: Reviewed in Epic    Medication Adherence: no issues reported    Bipolar 2: At the last visit we discussed changing Lamictal to 200mg BID (from 400mg QD) to help with blurry vision.  Pt reports her vision was still blurry, so Dr. Rock advised decreasing dose to 350mg daily.  Pt made this change and reports her blurry vision is completely resolved.  She denies any other medication side effects.  She reports overall her mood is \"pretty good, actually\". She reports some occasional mood swings, but overall, things are perhaps a little better.      Current labs include:  BP Readings from Last 3 Encounters:   04/26/17 111/65   03/23/17 106/70   03/10/17 104/50     Liver Function Studies -   Recent Labs   Lab Test  04/28/16   1925   PROTTOTAL  7.0   ALBUMIN  4.5   BILITOTAL  0.2   ALKPHOS  60   AST  10   ALT  18     Last Basic Metabolic Panel:  Lab Results   Component Value Date     04/28/2016      Lab Results   Component Value Date    POTASSIUM 4.2 04/28/2016     Lab Results   Component Value Date    CHLORIDE 108 04/28/2016     Lab Results   Component Value Date    BUN 9 04/28/2016     Lab Results   Component Value Date    CR 0.68 04/28/2016     GFR Estimate   Date Value Ref Range Status   04/28/2016 >90  Non  GFR Calc   >60 mL/min/1.7m2 Final   10/29/2014 >90  Non  GFR Calc   >60 mL/min/1.7m2 Final   11/19/2013 >60 ml/min/1.73m2 Final       TSH   Date Value Ref Range Status   04/28/2016 1.83 0.40 - 4.00 mU/L Final   ]    Most Recent Immunizations   Administered Date(s) Administered     DTAP (<7y) 06/20/2000     HPV 12/30/2016     HepB " 01/03/2001     MMR 06/20/2000     Meningococcal (Menomune ) 08/31/2007     Poliovirus, inactivated (IPV) 06/20/2000     Tdap (Adacel,Boostrix) 08/13/2007     Varicella 04/19/2000       ASSESSMENT:              Current medications were reviewed today as discussed above.      Medication Adherence: no issues identified    Bipolar 2: Improving. Lamictal side effects resolved.  Recommend continuing to monitor mood symptoms and follow-up with Dr. Rock.  Previously discussed changing to Topamax if Lamictal not fully effective. Could also consider changing antidepressant (see MTM note 8/29/17). Pt agreeable with plan.      PLAN:                  1. Continue medications as is.  2. Make follow-up appointment with Dr. Rock.     I spent 15 minutes with this patient today. A copy of the visit note was provided to the patient's primary care provider.     Will follow up in 6-8 weeks, pt to see Dr. Rock in the interim.    The patient declined a summary of these recommendations as an after visit summary.    Caryn Wolfe, PharmD, BCPP  Medication Therapy Management Pharmacist  [facility]

## 2017-09-13 NOTE — MR AVS SNAPSHOT
After Visit Summary   9/13/2017    Angela NIETO    MRN: [id number]           Patient Information     Date Of Birth          1995        Visit Information        Provider Department      9/13/2017 10:30 AM PSYCH PHARMD [facility]        Today's Diagnoses     Bipolar 2 disorder (H)    -  1      Care Instructions    Recommendations from today's MTM visit:                                                        1. Continue medications as is.  2. Make follow-up appointment with Dr. Rock.     Next MTM visit: 6-8 weeks    To schedule another MTM appointment, please call the clinic directly or you may call the MTM scheduling line at [phone] or toll-free at [phone].     My Clinical Pharmacist's contact information:                                                      It was a pleasure seeing you today!  Please feel free to contact me with any questions or concerns you have.      Caryn Wolfe, PharmD, [facility]  Medication Therapy Management Pharmacist  [facility]      You may receive a survey about the MTM services you received.  I would appreciate your feedback to help me serve you better in the future. Please fill it out and return it when you can. Your comments will be anonymous.                        Follow-ups after your visit        Who to contact     If you have questions or need follow up information about today's clinic visit or your schedule please contact [facility] directly at [phone].  Normal or non-critical lab and imaging results will be communicated to you by MyChart, letter or phone within 4 business days after the clinic has received the results. If you do not hear from us within 7 days, please contact the clinic through MyChart or phone. If you have a critical or abnormal lab result, we will notify you by phone as soon as possible.  Submit refill requests through [employer] or  call your pharmacy and they will forward the refill request to us. Please allow 3 business days for your refill to be completed.          Additional Information About Your Visit        [employer] Information     [employer] gives you secure access to your electronic health record. If you see a primary care provider, you can also send messages to your care team and make appointments. If you have questions, please call your primary care clinic.  If you do not have a primary care provider, please call [phone] and they will assist you.        Care EveryWhere ID     This is your Care EveryWhere ID. This could be used by other organizations to access your [place] medical records  [id number]         Blood Pressure from Last 3 Encounters:   04/26/17 111/65   03/23/17 106/70   03/10/17 104/50    Weight from Last 3 Encounters:   04/26/17 122 lb (55.3 kg)   03/23/17 118 lb 12.8 oz (53.9 kg)   03/10/17 120 lb (54.4 kg)              Today, you had the following     No orders found for display         Today's Medication Changes          These changes are accurate as of: 9/13/17 11:51 AM.  If you have any questions, ask your nurse or doctor.               These medicines have changed or have updated prescriptions.        Dose/Directions    citalopram 40 MG tablet   Commonly known as:  celeXA   This may have changed:    - when to take this  - additional instructions   Used for:  Anxiety        Dose:  40 mg   Take 1 tablet (40 mg) by mouth daily *SCHEDULE ANNUAL MD APPT.**   Quantity:  60 tablet   Refills:  0       clobetasol 0.05 % ointment   Commonly known as:  TEMOVATE   This may have changed:    - when to take this  - reasons to take this  - additional instructions   Used for:  Dermatitis        Apply topically 2 times daily Avoid face, underarms, groin.   Quantity:  30 g   Refills:  1                Primary Care Provider Office Phone # Fax #    Dixie Ashely Godoy [phone]       [address] [place] [address]  [facility] [address]  [facility] [address]        Equal Access to Services     YESI LAYTON : Hadii morena pimentel hussein Steele, waaxda luqadaha, qaybta kajaleesa carolynmiguel ángelbruno, waxkianna valentin chaubill harrisjamessumanth bauman. So [facility] [phone].    ATENCIÓN: Si habla español, tiene a bennett disposición servicios gratuitos de asistencia lingüística. Rodolfoame al [phone].    We comply with applicable federal civil rights laws and [place] laws. We do not discriminate on the basis of race, color, national origin, age, disability sex, sexual orientation or gender identity.            Thank you!     Thank you for choosing [facility]  for your care. Our goal is always to provide you with excellent care. Hearing back from our patients is one way we can continue to improve our services. Please take a few minutes to complete the written survey that you may receive in the mail after your visit with us. Thank you!             Your Updated Medication List - Protect others around you: Learn how to safely use, store and throw away your medicines at www.disposemymeds.org.          This list is accurate as of: 9/13/17 11:51 AM.  Always use your most recent med list.                   Brand Name Dispense Instructions for use Diagnosis    amitriptyline 25 MG tablet    ELAVIL    60 tablet    Take 2 tablets (50 mg) by mouth At Bedtime * PLEASE SCHEDULE MD APPT.*    Other migraine, not intractable, without status migrainosus       ATROVENT HFA 17 MCG/ACT Inhaler   Generic drug:  ipratropium      Inhale 2 puffs into the lungs every 6 hours    Allergic reaction, subsequent encounter       budesonide-formoterol 80-4.5 MCG/ACT Inhaler    SYMBICORT    1 Inhaler    Inhale 2 puffs into the lungs 2 times daily        citalopram 40 MG tablet    celeXA    60 tablet    Take 1 tablet (40 mg) by mouth daily *SCHEDULE ANNUAL MD APPT.**    Anxiety       clobetasol 0.05 % ointment    TEMOVATE    30 g    Apply topically 2 times daily Avoid face,  underarms, groin.    Dermatitis       hyoscyamine 0.125 MG sublingual tablet    LEVSIN/SL    120 tablet    Take 1 tablet (0.125 mg) by mouth every 4 hours as needed    Irritable bowel syndrome without diarrhea       ipratropium 0.02 % nebulizer solution    ATROVENT     Take 0.5 mg by nebulization daily as needed for wheezing        ketoconazole 2 % shampoo    NIZORAL    120 mL    Apply topically daily as needed for itching or irritation    Dermatitis, seborrheic       LAMOTRIGINE PO      Take 350 mg by mouth daily        levonorgestrel 20 MCG/24HR IUD    MIRENA (52 MG)    1 each    1 each (20 mcg) by Intrauterine route once for 1 dose    Encounter for IUD insertion       Melatonin 10 MG Tabs tablet      Take 10 mg by mouth nightly as needed        metoclopramide 5 MG tablet    REGLAN    90 tablet    Take 1 tablet (5 mg) by mouth 3 times daily as needed    Other migraine without status migrainosus, not intractable       mupirocin 2 % ointment    BACTROBAN    22 g    Use 2 times a day to affected area.    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    Dermatitis       VAGISIL EX           VITAMIN D (CHOLECALCIFEROL) PO      Take  by mouth daily.

## 2017-09-19 ENCOUNTER — OFFICE VISIT (OUTPATIENT)
Dept: PSYCHIATRY | Facility: CLINIC | Age: 22
End: 2017-09-19
Payer: COMMERCIAL

## 2017-09-19 DIAGNOSIS — G43.809 OTHER MIGRAINE, NOT INTRACTABLE, WITHOUT STATUS MIGRAINOSUS: ICD-10-CM

## 2017-09-19 DIAGNOSIS — F41.1 GENERALIZED ANXIETY DISORDER: Primary | ICD-10-CM

## 2017-09-19 PROCEDURE — 99214 OFFICE O/P EST MOD 30 MIN: CPT | Performed by: PSYCHIATRY & NEUROLOGY

## 2017-09-19 RX ORDER — LAMOTRIGINE 100 MG/1
TABLET ORAL
Qty: 110 TABLET | Refills: 6 | Status: SHIPPED | OUTPATIENT
Start: 2017-09-19 | End: 2022-02-18

## 2017-09-19 RX ORDER — CITALOPRAM HYDROBROMIDE 20 MG/1
20 TABLET ORAL DAILY
Qty: 90 TABLET | Refills: 1 | Status: SHIPPED | OUTPATIENT
Start: 2017-09-19 | End: 2017-10-27

## 2017-09-19 ASSESSMENT — ANXIETY QUESTIONNAIRES
6. BECOMING EASILY ANNOYED OR IRRITABLE: SEVERAL DAYS
GAD7 TOTAL SCORE: 7
5. BEING SO RESTLESS THAT IT IS HARD TO SIT STILL: SEVERAL DAYS
3. WORRYING TOO MUCH ABOUT DIFFERENT THINGS: MORE THAN HALF THE DAYS
7. FEELING AFRAID AS IF SOMETHING AWFUL MIGHT HAPPEN: NOT AT ALL
1. FEELING NERVOUS, ANXIOUS, OR ON EDGE: SEVERAL DAYS
2. NOT BEING ABLE TO STOP OR CONTROL WORRYING: SEVERAL DAYS

## 2017-09-19 ASSESSMENT — PATIENT HEALTH QUESTIONNAIRE - PHQ9
SUM OF ALL RESPONSES TO PHQ QUESTIONS 1-9: 12
5. POOR APPETITE OR OVEREATING: SEVERAL DAYS

## 2017-09-19 NOTE — MR AVS SNAPSHOT
MRN:[id number]                      After Visit Summary   9/19/2017    Angela NIETO    MRN: [id number]           Visit Information        Provider Department      9/19/2017 2:00 PM Rashawn Rock MD [facility]        Care Instructions          Treatment Plan:  Increase Lamictal (lamotrigine) to 350 mg per day   Drop Celexa (citalopram) to 20 mg per day and and continue amitriptyline 50 mg at bedtime    Continue vitamin D   Safety plan was reviewed; to the ER as needed or call after hours crisis line; [phone]  Education and counseling was done regarding use of medications, psychotherapy options  Call for a follow up appointment with Dr. Rock in 8-10 weeks [phone].          MyChart Information     [employer] gives you secure access to your electronic health record. If you see a primary care provider, you can also send messages to your care team and make appointments. If you have questions, please call your primary care clinic.  If you do not have a primary care provider, please call [phone] and they will assist you.        Care EveryWhere ID     This is your Care EveryWhere ID. This could be used by other organizations to access your [facility] medical records  [id number]        Equal Access to Services     YESI LAYTON : Genevieve Steele, mac mancera, jayesh mario, mansi bauman. So [facility] [phone].    ATENCIÓN: Si habla español, tiene a bennett disposición servicios gratuitos de asistencia lingüística. Llame al [phone].    We comply with applicable federal civil rights laws and [place] laws. We do not discriminate on the basis of race, color, national origin, age, disability sex, sexual orientation or gender identity.

## 2017-09-19 NOTE — PATIENT INSTRUCTIONS
Treatment Plan:  Increase Lamictal (lamotrigine) to 350 mg per day   Drop Celexa (citalopram) to 20 mg per day and and continue amitriptyline 50 mg at bedtime    Continue vitamin D   Safety plan was reviewed; to the ER as needed or call after hours crisis line; [phone]  Education and counseling was done regarding use of medications, psychotherapy options  Call for a follow up appointment with Dr. Rock in 8-10 weeks [phone].

## 2017-09-19 NOTE — PROGRESS NOTES
Psychiatric Progress Note    Name: Angela Solis  Date: September 19, 2017   Length of Visit: 45 minutes  MRN: [id number]      Current Outpatient Prescriptions   Medication Sig     LAMOTRIGINE PO Take 350 mg by mouth daily     amitriptyline (ELAVIL) 25 MG tablet Take 2 tablets (50 mg) by mouth At Bedtime * PLEASE SCHEDULE MD APPT.*     ranitidine (ZANTAC) 150 MG capsule Take 150 mg by mouth daily     Prenatal Vit-Fe Fumarate-FA (PRENATAL MULTIVITAMIN PLUS IRON) 27-0.8 MG TABS per tablet Take 1 tablet by mouth daily (Patient not taking: Reported on 8/29/2017)     citalopram (CELEXA) 40 MG tablet Take 1 tablet (40 mg) by mouth daily *SCHEDULE ANNUAL MD APPT.** (Patient taking differently: Take 40 mg by mouth At Bedtime *SCHEDULE ANNUAL MD APPT.**)     hyoscyamine (LEVSIN/SL) 0.125 MG sublingual tablet Take 1 tablet (0.125 mg) by mouth every 4 hours as needed     clobetasol (TEMOVATE) 0.05 % ointment Apply topically 2 times daily Avoid face, underarms, groin. (Patient taking differently: Apply topically 2 times daily as needed Avoid face, underarms, groin.)     mupirocin (BACTROBAN) 2 % ointment Use 2 times a day to affected area.     ketoconazole (NIZORAL) 2 % shampoo Apply topically daily as needed for itching or irritation     metoclopramide (REGLAN) 5 MG tablet Take 1 tablet (5 mg) by mouth 3 times daily as needed     levonorgestrel (MIRENA, 52 MG,) 20 MCG/24HR IUD 1 each (20 mcg) by Intrauterine route once for 1 dose     budesonide-formoterol (SYMBICORT) 80-4.5 MCG/ACT Inhaler Inhale 2 puffs into the lungs 2 times daily     ipratropium (ATROVENT) 0.02 % nebulizer solution Take 0.5 mg by nebulization daily as needed for wheezing     Powders (VAGISIL EX)      ipratropium (ATROVENT HFA) 17 MCG/ACT inhaler Inhale 2 puffs into the lungs every 6 hours     nystatin-triamcinolone (MYCOLOG II) cream Apply topically 2 times daily     triamcinolone (KENALOG) 0.1 % ointment Apply sparingly to affected area three  times daily for 14 days.     Melatonin 10 MG TABS Take 10 mg by mouth nightly as needed     VITAMIN D, CHOLECALCIFEROL, PO Take  by mouth daily.     No current facility-administered medications for this visit.      Past medication trials: (patient was presented with a list to review all currently available antidepressants, mood stabilizers, tranquilizers, hypnotics and antipsychotics)  New Antidepressants: Prozac (fluoxetine), Zoloft (sertraline), Celexa (citalopram), Wellbutrin, Zyban, Aplenzin (bupropion), Effexor (venlafaxine) and Cymbalta (duloxetine), Lexapro  Stimulants / ADHD Meds: Adderall (amphetamine salts), Concerta (methylphenidate), Focalin (dexmethylphenidate), Ritalin (methylphenidate), Strattera (atomoxetine), Vyvanse (lisdexamfetamine) and Intuniv (guanfacine)  Newer Antipsychotics: Risperdal (risperidone) and Abilify (aripriprazole)  Sedatives/Hypnotics: OTC: Benadryl (diphenhydramine)   Mood stabilizers: Lamictal    Therapist:  Was with Shannan CENTENO Until pt moved to [facility]  Shannan had suggested Neuropsychiatric testing     PHQ-9:  PHQ-9 SCORE 11/3/2015 8/4/2017 9/19/2017   Total Score - - -   Total Score 8 16 12        JOSHUA-7:  JOSHUA-7 SCORE 11/3/2015 8/4/2017 9/19/2017   Total Score - - -   Total Score 4 8 7   Total Score - - -      Interim History:  See recent phone calls; had AEs at higher dose, tried BID dosing (didn't work), so ended up back at 300 mg; doing ok at this dose. But felt better at 350 mg   Situation is complicated by her forgetting to take Celexa the week before last. Had some w/d side effects, but mood improved, had more energy, etc.     Assessment: from last visit 8/14  She has lost the efficacy of Lamictal likely related to the change from Depo-Provera to the Mirena IUD. Presumably this exposes her to more estrogen which accelerates the metabolism of Lamictal; dose increase may help.  She has well-documented ADHD but had mixed response to the stimulants and Strattera (although I do  "not recall the details of the problems). Genesight testing may help clarify the issues regarding the response to the medications.  In regards to the question about whether she may have Asperger's, she will need testing by someone who specializes in this area. Also hopefully they would look at the ADHD symptoms.    Treatment Plan:  Increase Lamictal (lamotrigine) to 400 mg per day   Continue Celexa (citalopram) 40 mg per day and amitriptyline 50 mg at bedtime    Dr. Rock will call with genetic testing results, and may suggest a change in medications.  Will need an evaluation for ADHD and Autism spectrum disorder   A referral has been made for a consultation with a pharmacist to review medications. They should be calling you soon to schedule a visit. Their phone # is [phone] in case you need to reach them.   Continue vitamin D   Safety plan was reviewed; to the ER as needed or call after hours crisis line; [phone]  Education and counseling was done regarding use of medications, psychotherapy options  Call for a follow up appointment with Dr. Rock in 6-8 weeks [phone].       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       Mental Status Assessment:  Appearance:  Well groomed    Behavior/relationship to examiner/demeanor:  Cooperative, engaged and pleasant  Motor activity:  Normal  Gait:  Normal   Speech:  Normal in volume, slightly dysarthric   Mood (subjective report):  \"down\"   Affect (objective appearance):  Mood " congruent  Thought Process (Associations):  Logical, linear and goal directed  Thought content:  No evidence of suicidal or homicidal ideation,          no overt psychosis and                    patient does not appear to be responding to internal stimuli  Oriented to person, place, date/time   Attention Span and concentration: Intact   Memory:  Short-term memory intact and Long-term memory; Intact  Language:  Fluent   Fund of Knowledge/Intelligence:  Average  Use of language: Intact   Abstraction:  Normal  Insight:  Adequate  Judgment:  Adequate for safety    DSM-5 DIAGNOSIS:  Attention-Deficit/Hyperactivity Disorder 314.01 (F90.1) Predominantly hyperactive / impulsive presentation Serverity: Moderate  F33.1 MDD, mixed   300.00 (F41.9) Unspecified Anxiety Disorder  Cluster B traits; Zanarini BPD scale is positive for 7/10 items     Vitamin D Deficiency Screening Results:  No results found for: VITDT     Assessment:  Due to Genesight result, she should be on less Celexa   Also does better at 350 mg Lamictal   Re; stimulant; no compelling reason to be on them, but all in L column     Treatment Plan:  Increase Lamictal (lamotrigine) to 350 mg per day   Drop Celexa (citalopram) to 20 mg per day and and continue amitriptyline 50 mg at bedtime    Continue vitamin D   Safety plan was reviewed; to the ER as needed or call after hours crisis line; [phone]  Education and counseling was done regarding use of medications, psychotherapy options  Call for a follow up appointment with Dr. Rock in 8-10 weeks [phone].     Signed: Rashawn Rock M.D.

## 2017-09-20 ASSESSMENT — ANXIETY QUESTIONNAIRES: GAD7 TOTAL SCORE: 7

## 2017-09-24 DIAGNOSIS — L30.9 DERMATITIS: ICD-10-CM

## 2017-09-25 NOTE — TELEPHONE ENCOUNTER
Last seen 5/17/17.        Impression/Plan:  1. Dermatitis of the neck; concerning for allergic contact dermatitis to a scalp product, with secondary impetiginization  - Discussion to not scratch the areas and maintain shorter fingernails.    - Start clobetasol 0.05% ointment - apply BID to affected areas. Avoid face, underarms, groin.   - Start mupirocin 2% ointment - apply BID to affected areas.   - Start clobetasol 0.05% solution - apply BID to affected areas once major symptoms have reduced.      2. Seborrheic dermatitis  - Start ketoconazole 2% shampoo - apply PRN for itching or irritation.      Follow-up PRN.      Staff Involved:  Staff Only

## 2017-09-26 RX ORDER — MUPIROCIN 20 MG/G
OINTMENT TOPICAL
Qty: 22 G | Refills: 2 | Status: SHIPPED | OUTPATIENT
Start: 2017-09-26 | End: 2021-08-05

## 2017-09-28 ENCOUNTER — MYC MEDICAL ADVICE (OUTPATIENT)
Dept: PSYCHIATRY | Facility: CLINIC | Age: 22
End: 2017-09-28

## 2017-09-29 NOTE — TELEPHONE ENCOUNTER
"Will send to MD.        Rashawn Rock MD     4:59 PM   The Genesight testing was received; results reviewed for interpretation and medication management. Will scan to media tab.      Currently taking   Celexa 40 mg ; middle column  \"serum level may be too high; lower doses may be required\" and is S/S   Lamictal 200 mg BID ; R column ; \"serum level may be too low\"   Has Mirena IUD   Elavil 50 mg ; middle column ; footnotes 3,7 (difficult to predict)      All stimulants are in L column      MTHFR ; middle      I left message;   Sent in RX for prenatal vits   Advised to drop dose of Celexa if having any side effects   To discuss raising Lamictal dose when seen for MTM 8/28. Consider getting serum level if not already done?          August 15, 2017           8:56 AM   You routed this conversation to Rashawn Rock MD    Me       8:51 AM   Note      Gene[employer]ight test results rec'd.  Will provide copy to MD for review.         From last OV with Dr. Rock 8/4/17  Past medication trials: (patient was presented with a list to review all currently available antidepressants, mood stabilizers, tranquilizers, hypnotics and antipsychotics)  New Antidepressants: Prozac (fluoxetine), Zoloft (sertraline), Celexa (citalopram), Wellbutrin, Zyban, Aplenzin (bupropion), Effexor (venlafaxine) and Cymbalta (duloxetine), Lexapro  Stimulants / ADHD Meds: Adderall (amphetamine salts), Concerta (methylphenidate), Focalin (dexmethylphenidate), Ritalin (methylphenidate), Strattera (atomoxetine), Vyvanse (lisdexamfetamine) and Intuniv (guanfacine)  Newer Antipsychotics: Risperdal (risperidone) and Abilify (aripriprazole)  Sedatives/Hypnotics: OTC: Benadryl (diphenhydramine)  Mood stabilizers: Lamictal     Assessment:  She has lost the efficacy of Lamictal likely related to the change from Depo-Provera to the Mirena IUD. Presumably this exposes her to more estrogen which accelerates the metabolism of Lamictal; dose increase may help.  She " has well-documented ADHD but had mixed response to the stimulants and Strattera (although I do not recall the details of the problems). Genesight testing may help clarify the issues regarding the response to the medications.  In regards to the question about whether she may have Asperger's, she will need testing by someone who specializes in this area. Also hopefully they would look at the ADHD symptoms.      Treatment Plan:  Increase Lamictal (lamotrigine) to 400 mg per day   Continue Celexa (citalopram) 40 mg per day and amitriptyline 50 mg at bedtime    Dr. Rock will call with genetic testing results, and may suggest a change in medications.  Will need an evaluation for ADHD and Autism spectrum disorder   A referral has been made for a consultation with a pharmacist to review medications. They should be calling you soon to schedule a visit. Their phone # is [phone] in case you need to reach them.   Continue vitamin D   Safety plan was reviewed; to the ER as needed or call after hours crisis line; [phone]  Education and counseling was done regarding use of medications, psychotherapy options  Call for a follow up appointment with Dr. Rock in 6-8 weeks [phone].       Signed:                     Rashawn Rock M.D.

## 2017-10-02 NOTE — TELEPHONE ENCOUNTER
I think that she should go down to 10 mg of Celexa for a week before stopping it (had some discontinuation symptoms before going off of it)   Trintellix does likely have less sexual side effects than Viibryd?   Would have see what they would cost....      Brianna Parikh, [facility]   to Angela NIETO           12:51 PM   Hi ,     I am covering for Caryn while she is on vacation.  You're right- unfortunately, your side effects can be common with medication like Celexa.  I did see in your chart that there may be some considerations about switching to a different antidepressant, such as Viibryd or Trintellix, based on your [employer] testing results.  Both of those medications may have less effect on sex drive than Celexa.  As far as stopping the medication, we would need to check with Dr. Rock before making any medication changes, since he is your prescriber.  He is also out today, but will be back on Tuesday, 10/3.  I would recommend you continue taking it until hearing back from Dr. Rock so that you are not without an antidepressant, but know that there are some other options available, as mentioned above.     Please let me know if you have further questions.     Brianna Parikh, PharmD   Medication Therapy Management Pharmacist   [facility]   Phone: [phone]      Last read by Angela NIETO at 11:55 AM on 10/2/2017.              Question about medications            Sade Nicholson, RN   You 3 days ago                 She is having sexual side effects from the Celexa and has reduced her dose to 20 mg. SHe is interested in stopping, and changing meds. No appt scheduled. You just saw her on the 19th.  (Routing comment)

## 2017-10-03 ENCOUNTER — TELEPHONE (OUTPATIENT)
Dept: PSYCHIATRY | Facility: CLINIC | Age: 22
End: 2017-10-03

## 2017-10-03 DIAGNOSIS — F33.1 MAJOR DEPRESSIVE DISORDER, RECURRENT EPISODE, MODERATE (H): Primary | ICD-10-CM

## 2017-10-03 NOTE — TELEPHONE ENCOUNTER
Provider started a new encounter with below documentation.    Rashawn Rock MD        10/3/17 1:01 PM   Note         I spoke with her about options   Will start Trintellix 10 mg per day (and to call in a few weeks if she wants to go up). RX sent to [facility]    To go down to 10 mg Celexa for 2 weeks, then stop                          Brianna Parikh RPH routed conversation to You; Psych Rn - [facility] 1 hour ago (11:45 AM)

## 2017-10-03 NOTE — TELEPHONE ENCOUNTER
I did a test claim for both Trintellix and Viibryd-  Both are covered by her insurance with no copay.  Please advise what you'd like to do.  Please let me know if you'd like for me to reach her or if RN will take care of it.

## 2017-10-03 NOTE — TELEPHONE ENCOUNTER
I spoke with her about options   Will start Trintellix 10 mg per day (and to call in a few weeks if she wants to go up). RX sent to [facility]    To go down to 10 mg Celexa for 2 weeks, then stop               Brianna Parikh RPH routed conversation to You; Psych Rn - [facility] 1 hour ago (11:45 AM)                     Brianna Parikh RPH 1 hour ago (11:43 AM)                 I did a test claim for both Trintellix and Viibryd-  Both are covered by her insurance with no copay.  Please advise what you'd like to do.  Please let me know if you'd like for me to reach her or if RN will take care of it.

## 2017-10-27 ENCOUNTER — OFFICE VISIT (OUTPATIENT)
Dept: FAMILY MEDICINE | Facility: CLINIC | Age: 22
End: 2017-10-27

## 2017-10-27 VITALS
BODY MASS INDEX: 22.09 KG/M2 | TEMPERATURE: 98.6 F | SYSTOLIC BLOOD PRESSURE: 111 MMHG | HEART RATE: 94 BPM | OXYGEN SATURATION: 98 % | RESPIRATION RATE: 16 BRPM | WEIGHT: 120.8 LBS | DIASTOLIC BLOOD PRESSURE: 73 MMHG

## 2017-10-27 DIAGNOSIS — B37.31 YEAST INFECTION OF THE VAGINA: ICD-10-CM

## 2017-10-27 DIAGNOSIS — R30.0 DYSURIA: Primary | ICD-10-CM

## 2017-10-27 DIAGNOSIS — N89.8 VAGINAL ITCHING: ICD-10-CM

## 2017-10-27 LAB
BILIRUBIN UR: NORMAL MG/DL
BLOOD UR: NEGATIVE MG/DL
CLARITY, URINE: CLEAR
CLUE CELLS: NORMAL
COLOR UR: YELLOW
GLUCOSE URINE: NEGATIVE MG/DL
KETONES UR QL: NORMAL MG/DL
LEUKOCYTE ESTERASE UR: NORMAL U/L
MOTILE TRICHOMONAS: NEGATIVE
NITRITE UR QL STRIP: NEGATIVE MG/DL
PH UR STRIP: 6 [PH] (ref 5–7)
PROTEIN UR: NORMAL MG/DL
SP GR UR STRIP: 1.02
UROBILINOGEN UR STRIP-ACNC: NORMAL E.U./DL
YEAST: PRESENT

## 2017-10-27 RX ORDER — FLUCONAZOLE 150 MG/1
150 TABLET ORAL ONCE
Qty: 2 TABLET | Refills: 0 | Status: SHIPPED | OUTPATIENT
Start: 2017-10-27 | End: 2017-10-27

## 2017-10-27 ASSESSMENT — ENCOUNTER SYMPTOMS
FEVER: 0
CHILLS: 0
FATIGUE: 1

## 2017-10-27 ASSESSMENT — ANXIETY QUESTIONNAIRES
2. NOT BEING ABLE TO STOP OR CONTROL WORRYING: SEVERAL DAYS
5. BEING SO RESTLESS THAT IT IS HARD TO SIT STILL: SEVERAL DAYS
3. WORRYING TOO MUCH ABOUT DIFFERENT THINGS: NOT AT ALL
7. FEELING AFRAID AS IF SOMETHING AWFUL MIGHT HAPPEN: NOT AT ALL
GAD7 TOTAL SCORE: 5
6. BECOMING EASILY ANNOYED OR IRRITABLE: MORE THAN HALF THE DAYS
IF YOU CHECKED OFF ANY PROBLEMS ON THIS QUESTIONNAIRE, HOW DIFFICULT HAVE THESE PROBLEMS MADE IT FOR YOU TO DO YOUR WORK, TAKE CARE OF THINGS AT HOME, OR GET ALONG WITH OTHER PEOPLE: SOMEWHAT DIFFICULT
1. FEELING NERVOUS, ANXIOUS, OR ON EDGE: SEVERAL DAYS

## 2017-10-27 ASSESSMENT — PATIENT HEALTH QUESTIONNAIRE - PHQ9
SUM OF ALL RESPONSES TO PHQ QUESTIONS 1-9: 14
5. POOR APPETITE OR OVEREATING: NOT AT ALL

## 2017-10-27 ASSESSMENT — PAIN SCALES - GENERAL: PAINLEVEL: NO PAIN (0)

## 2017-10-27 NOTE — MR AVS SNAPSHOT
After Visit Summary   10/27/2017    Angela NIETO    MRN: [id number]           Patient Information     Date Of Birth          1995        Visit Information        Provider Department      10/27/2017 3:30 PM Idania Li NP [facility]        Today's Diagnoses     Dysuria    -  1    Vaginal itching        Yeast infection of the vagina          Care Instructions      Candida Vaginal Infection    You have a Candida vaginal infection. This is also known as a yeast infection. It is most often caused by a type of yeast (fungus) called Candida. Candida are normally found in the vagina. But if they increase in number, this can lead to infection and cause symptoms.  Symptoms of a yeast infection can include:    Clumpy or thin, white discharge, which may look like cottage cheese    Itching or burning    Burning with urination  Certain factors can make a yeast infection more likely. These can include:    Taking certain medicines, such as antibiotics or birth control pills    Pregnancy    Diabetes    Weakened immune system  A yeast infection is most often treated with antifungal medicine. This may be given as a vaginal cream or pills you take by mouth. Treatment may last for about 1 to 7 days. Women with severe or recurrent infections may need longer courses of treatment.  Home care    If you re prescribed medicine, be sure to use it as directed. Finish all of the medicine, even if your symptoms go away. Note: Don t try to treat yourself using over-the-counter products without talking to your provider first. He or she will let you know if this is a good option for you.    Ask your provider what steps you can take to help reduce your risk of having a yeast infection in the future.  Follow-up care  Follow up with your healthcare provider, or as directed.  When to seek medical advice  Call your healthcare provider right away if:    You have a fever of 100.4 F (38 C) or higher, or as directed by  your provider.    Your symptoms worsen, or they don t go away within a few days of starting treatment.    You have new pain in the lower belly or pelvic region.    You have side effects that bother you or a reaction to the cream or pills you re prescribed.    You or any partners you have sex with have new symptoms, such as a rash, joint pain, or sores.  Date Last Reviewed: 7/30/2015 2000-2017 The [employer]. [address] [place] [address], [place] [address]. All rights reserved. This information is not intended as a substitute for professional medical care. Always follow your healthcare professional's instructions.                Follow-ups after your visit        Who to contact     Please call your clinic at [phone] to:    Ask questions about your health    Make or cancel appointments    Discuss your medicines    Learn about your test results    Speak to your doctor   If you have compliments or concerns about an experience at your clinic, or if you wish to file a complaint, please contact [facility] Patient Relations at [phone] or email us at [email or web address]         Additional Information About Your Visit        [employer] Information     [employer] gives you secure access to your electronic health record. If you see a primary care provider, you can also send messages to your care team and make appointments. If you have questions, please call your primary care clinic.  If you do not have a primary care provider, please call [phone] and they will assist you.      [employer] is an electronic gateway that provides easy, online access to your medical records. With [employer], you can request a clinic appointment, read your test results, renew a prescription or communicate with your care team.     To access your existing account, please contact your [facility] or call [phone] for assistance.        Care EveryWhere ID     This is your  Care EveryWhere ID. This could be used by other organizations to access your [facility] medical records  [id number]        Your Vitals Were     Pulse Temperature Respirations Last Period Pulse Oximetry Breastfeeding?    94 98.6  F (37  C) (Oral) 16 (LMP Unknown) 98% No    BMI (Body Mass Index)                   22.09 kg/m2            Blood Pressure from Last 3 Encounters:   10/27/17 111/73   04/26/17 111/65   03/23/17 106/70    Weight from Last 3 Encounters:   10/27/17 120 lb 12.8 oz (54.8 kg)   04/26/17 122 lb (55.3 kg)   03/23/17 118 lb 12.8 oz (53.9 kg)              We Performed the Following     Urinalysis, Micro If (UA) ([facility])     Urine Culture Aerobic Bacterial     Wet Prep (LabDAQ)          Today's Medication Changes          These changes are accurate as of: 10/27/17  4:10 PM.  If you have any questions, ask your nurse or doctor.               Start taking these medicines.        Dose/Directions    fluconazole 150 MG tablet   Commonly known as:  DIFLUCAN   Used for:  Yeast infection of the vagina   Started by:  Idania Li, NP        Dose:  150 mg   Take 1 tablet (150 mg) by mouth once for 1 dose May repeat x 1 if no improvement in 72 hours   Quantity:  2 tablet   Refills:  0         These medicines have changed or have updated prescriptions.        Dose/Directions    lamoTRIgine 100 MG tablet   Commonly known as:  LaMICtal   This may have changed:    - how much to take  - how to take this  - when to take this  - additional instructions   Used for:  Generalized anxiety disorder        Take 3 and 1/2 per day   Quantity:  110 tablet   Refills:  6            Where to get your medicines      These medications were sent to [id number] - [place], [place] - [address]  [address] [facility] [address]     Phone:  [phone]     fluconazole 150 MG tablet                Primary Care Provider Office Phone # Fax #    Dixie Godoy [phone]  [phone]       [address] [place] [address] [address]  [facility] [address]        Equal Access to Services     YESI LAYTON : Hadii aad loren bamo Sodena, waaxda luqadaha, qaybta kaalmada adeimani, mansi kazin hayaabill leonandi porter manjinder bauman. So [facility] [phone].    ATENCIÓN: Si habla español, tiene a bennett disposición servicios gratuitos de asistencia lingüística. Llame al [phone].    We comply with applicable federal civil rights laws and [place] laws. We do not discriminate on the basis of race, color, national origin, age, disability, sex, sexual orientation, or gender identity.            Thank you!     Thank you for choosing [facility]  for your care. Our goal is always to provide you with excellent care. Hearing back from our patients is one way we can continue to improve our services. Please take a few minutes to complete the written survey that you may receive in the mail after your visit with us. Thank you!             Your Updated Medication List - Protect others around you: Learn how to safely use, store and throw away your medicines at www.disposemymeds.org.          This list is accurate as of: 10/27/17  4:10 PM.  Always use your most recent med list.                   Brand Name Dispense Instructions for use Diagnosis    amitriptyline 25 MG tablet    ELAVIL    60 tablet    Take 2 tablets (50 mg) by mouth At Bedtime    Other migraine, not intractable, without status migrainosus       ATROVENT HFA 17 MCG/ACT Inhaler   Generic drug:  ipratropium      Inhale 2 puffs into the lungs every 6 hours    Allergic reaction, subsequent encounter       budesonide-formoterol 80-4.5 MCG/ACT Inhaler    SYMBICORT    1 Inhaler    Inhale 2 puffs into the lungs 2 times daily        clobetasol 0.05 % ointment    TEMOVATE    30 g    Apply topically 2 times daily Avoid face, underarms, groin.    Dermatitis       fluconazole 150 MG tablet    DIFLUCAN    2 tablet    Take 1 tablet (150 mg) by mouth once for 1 dose May  repeat x 1 if no improvement in 72 hours    Yeast infection of the vagina       hyoscyamine 0.125 MG sublingual tablet    LEVSIN/SL    120 tablet    Take 1 tablet (0.125 mg) by mouth every 4 hours as needed    Irritable bowel syndrome without diarrhea       ipratropium 0.02 % nebulizer solution    ATROVENT     Take 0.5 mg by nebulization daily as needed for wheezing        ketoconazole 2 % shampoo    NIZORAL    120 mL    Apply topically daily as needed for itching or irritation    Dermatitis, seborrheic       lamoTRIgine 100 MG tablet    LaMICtal    110 tablet    Take 3 and 1/2 per day    Generalized anxiety disorder       levonorgestrel 20 MCG/24HR IUD    MIRENA (52 MG)    1 each    1 each (20 mcg) by Intrauterine route once for 1 dose    Encounter for IUD insertion       Melatonin 10 MG Tabs tablet      Take 10 mg by mouth nightly as needed        metoclopramide 5 MG tablet    REGLAN    90 tablet    Take 1 tablet (5 mg) by mouth 3 times daily as needed    Other migraine without status migrainosus, not intractable       mupirocin 2 % ointment    BACTROBAN    22 g    APPLY TO AFFECTED AREA TWICE DAILY    Dermatitis       nystatin-triamcinolone cream    MYCOLOG II    30 g    Apply topically 2 times daily    Rash       prenatal multivitamin plus iron 27-0.8 MG Tabs per tablet     100 tablet    Take 1 tablet by mouth daily    5,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    Dermatitis       VAGISIL EX           VITAMIN D (CHOLECALCIFEROL) PO      Take  by mouth daily.        vortioxetine 10 MG tablet    TRINTELLIX    30 tablet    Take 1 tablet (10 mg) by mouth daily    Major depressive disorder, recurrent episode, moderate (H)

## 2017-10-27 NOTE — PROGRESS NOTES
HPI:       Angela NIETO is a 22 year old who presents for the following  Patient presents with:  Vaginal Problem: Lots of itching, burning when urinating, when she wipes she notices blood. No change in color to discharge. Have not had a yeast infection before.     Patient has had symptoms for about one week, staying about the same. Most bothersome symptom is vaginal itching and burning with urination. Has noticed scant blood on toilet paper with urination.     History of about 2 UTIs. Believes she may have had one prior yeast infection.     Problem, Medication and Allergy Lists were reviewed and are current.  Patient is a new patient to this clinic and so  I reviewed/updated the Past Medical History.         Review of Systems:   Review of Systems   Constitutional: Positive for fatigue. Negative for chills and fever.        Having changes in medication dosages which she thinks is likely related to her fatigue.   Genitourinary: Negative for menstrual problem, pelvic pain, urgency and vaginal discharge.        Some odorous discharge             Physical Exam:   Patient Vitals for the past 24 hrs:   BP Temp Temp src Pulse Resp SpO2 Weight   10/27/17 1519 111/73 98.6  F (37  C) Oral 94 16 98 % 120 lb 12.8 oz (54.8 kg)     Body mass index is 22.09 kg/(m^2).  Vitals were reviewed and were normal     Physical Exam   Genitourinary: Uterus normal. There is rash on the right labia. There is rash on the left labia. Cervix exhibits discharge. Right adnexum displays no mass and no tenderness. Left adnexum displays no mass and no tenderness. Vaginal discharge found.   Genitourinary Comments: External labia with mild erythema, white discharge, mild odor.   Psychiatric: Her mood appears anxious.   Discharge has mild odor. Labia appears erythematous and irritated.       Results:      Results from the last 24 hours  Results for orders placed or performed in visit on 10/27/17 (from the past 24 hour(s))   Urinalysis, Micro If  (UA) ([facility])   Result Value Ref Range    Leukocyte Esterase UR Trace Negative U/l    Nitrite Urine Negative Negative mg/dL    Protein UR Trace Negative mg/dL    Glucose Urine Negative Negative mg/dL    Ketones Urine 1+ Negative mg/dL    Urobilinogen mg/dL 0.2 E.U./dL 0.2 E.U./dL    Bilirubin UR 1+ Negative mg/dL    Blood UR Negative Negative mg/dL    Specific Gravity Urine 1.025 1.005 - 1.030    pH Urine 6.0 4.5 - 8.0    Color Urine Yellow Yellow    Clarity, urine Clear Clear   Wet Prep (LabDAQ)   Result Value Ref Range    Yeast Wet Prep Present none    Motile Trichomonas Wet Prep Negative Negative    Clue Cells Wet Prep None NONE     Assessment and Plan     1. Dysuria  Painful urination likely related to vaginal irritation. UA did show trace leukocytes. Will culture and follow up with patient by secure message.   - Urinalysis, Micro If (UA) ([facility])  - Urine Culture Aerobic Bacterial    2. Vaginal itching  - Wet Prep (LabDAQ)    3. Yeast infection of the vagina  Yeast found on wet prep. Will treat with diflucan. Patient should return to clinic if symptoms worsen or do not resolve with treatment.   Educated patient to start treatment plan noted above and to continue to monitor, reviewed handout below, all questions answered. Call clinic if worsening or no improvement.     - fluconazole (DIFLUCAN) 150 MG tablet; Take 1 tablet (150 mg) by mouth once for 1 dose May repeat x 1 if no improvement in 72 hours  Dispense: 2 tablet; Refill: 0  Medications Discontinued During This Encounter   Medication Reason     citalopram (CELEXA) 20 MG tablet Therapy completed     citalopram (CELEXA) 40 MG tablet Therapy completed     LAMOTRIGINE PO Therapy completed     Options for treatment and follow-up care were reviewed with the patient. Angela NIETO  engaged in the decision making process and verbalized understanding of the options discussed and agreed with the final plan.    Patient seen and examined with  P student Alisha Isbell. Note co-authored, I agree with documentation and plan.    Idania Li NP    Patient Instructions     Candida Vaginal Infection    You have a Candida vaginal infection. This is also known as a yeast infection. It is most often caused by a type of yeast (fungus) called Candida. Candida are normally found in the vagina. But if they increase in number, this can lead to infection and cause symptoms.  Symptoms of a yeast infection can include:    Clumpy or thin, white discharge, which may look like cottage cheese    Itching or burning    Burning with urination  Certain factors can make a yeast infection more likely. These can include:    Taking certain medicines, such as antibiotics or birth control pills    Pregnancy    Diabetes    Weakened immune system  A yeast infection is most often treated with antifungal medicine. This may be given as a vaginal cream or pills you take by mouth. Treatment may last for about 1 to 7 days. Women with severe or recurrent infections may need longer courses of treatment.  Home care    If you re prescribed medicine, be sure to use it as directed. Finish all of the medicine, even if your symptoms go away. Note: Don t try to treat yourself using over-the-counter products without talking to your provider first. He or she will let you know if this is a good option for you.    Ask your provider what steps you can take to help reduce your risk of having a yeast infection in the future.  Follow-up care  Follow up with your healthcare provider, or as directed.  When to seek medical advice  Call your healthcare provider right away if:    You have a fever of 100.4 F (38 C) or higher, or as directed by your provider.    Your symptoms worsen, or they don t go away within a few days of starting treatment.    You have new pain in the lower belly or pelvic region.    You have side effects that bother you or a reaction to the cream or pills you re prescribed.    You or any  partners you have sex with have new symptoms, such as a rash, joint pain, or sores.  Date Last Reviewed: 7/30/2015 2000-2017 The [employer]. [address] [place] [address], [place] [address]. All rights reserved. This information is not intended as a substitute for professional medical care. Always follow your healthcare professional's instructions.

## 2017-10-27 NOTE — NURSING NOTE
22 year old  Chief Complaint   Patient presents with     Vaginal Problem     Lots of itching, burning when urinating, when she wipes she notices blood. No change in color to discharge. Have not had a yeast infection before.        Blood pressure 111/73, pulse 94, temperature 98.6  F (37  C), temperature source Oral, resp. rate 16, weight 120 lb 12.8 oz (54.8 kg), SpO2 98 %, not currently breastfeeding. Body mass index is 22.09 kg/(m^2).  BP completed using cuff size: regular    Patient Active Problem List   Diagnosis     OCD (obsessive compulsive disorder)     Pes planus     Moderate major depression (H)     Generalized anxiety disorder     Migraine with aura     Health Care Home       Wt Readings from Last 2 Encounters:   10/27/17 120 lb 12.8 oz (54.8 kg)   04/26/17 122 lb (55.3 kg)     BP Readings from Last 3 Encounters:   10/27/17 111/73   04/26/17 111/65   03/23/17 106/70       Current Outpatient Prescriptions   Medication     vortioxetine (TRINTELLIX) 10 MG tablet     amitriptyline (ELAVIL) 25 MG tablet     lamoTRIgine (LAMICTAL) 100 MG tablet     Prenatal Vit-Fe Fumarate-FA (PRENATAL MULTIVITAMIN PLUS IRON) 27-0.8 MG TABS per tablet     hyoscyamine (LEVSIN/SL) 0.125 MG sublingual tablet     ketoconazole (NIZORAL) 2 % shampoo     metoclopramide (REGLAN) 5 MG tablet     ipratropium (ATROVENT) 0.02 % nebulizer solution     Powders (VAGISIL EX)     ipratropium (ATROVENT HFA) 17 MCG/ACT inhaler     triamcinolone (KENALOG) 0.1 % ointment     Melatonin 10 MG TABS     VITAMIN D, CHOLECALCIFEROL, PO     mupirocin (BACTROBAN) 2 % ointment     [DISCONTINUED] LAMOTRIGINE PO     ranitidine (ZANTAC) 150 MG capsule     clobetasol (TEMOVATE) 0.05 % ointment     levonorgestrel (MIRENA, 52 MG,) 20 MCG/24HR IUD     budesonide-formoterol (SYMBICORT) 80-4.5 MCG/ACT Inhaler     nystatin-triamcinolone (MYCOLOG II) cream     No current facility-administered medications for this visit.        Social History   Substance Use Topics      Smoking status: Never Smoker     Smokeless tobacco: Never Used     Alcohol use No       Health Maintenance Due   Topic Date Due     MIGRAINE ACTION PLAN  03/15/2013     DEPRESSION ACTION PLAN Q1 YR  10/13/2016     TETANUS IMMUNIZATION (SYSTEM ASSIGNED)  08/13/2017     INFLUENZA VACCINE (SYSTEM ASSIGNED)  09/01/2017     CHLAMYDIA SCREENING  10/04/2017       Lab Results   Component Value Date    PAP NIL 10/04/2016       PHQ-2 ( 1999 Pfizer) 10/27/2017 3/10/2017   Q1: Little interest or pleasure in doing things 1 0   Q2: Feeling down, depressed or hopeless 1 0   PHQ-2 Score 2 0       PHQ-9 SCORE 11/3/2015 8/4/2017 9/19/2017 10/27/2017   Total Score - - - -   Total Score 8 16 12 14       JOSHUA-7 SCORE 8/4/2017 9/19/2017 10/27/2017   Total Score - - -   Total Score 8 7 5   Total Score - - -       No flowsheet data found.    Kassie Vanessa MA  October 27, 2017 3:26 PM

## 2017-10-27 NOTE — PATIENT INSTRUCTIONS
Candida Vaginal Infection    You have a Candida vaginal infection. This is also known as a yeast infection. It is most often caused by a type of yeast (fungus) called Candida. Candida are normally found in the vagina. But if they increase in number, this can lead to infection and cause symptoms.  Symptoms of a yeast infection can include:    Clumpy or thin, white discharge, which may look like cottage cheese    Itching or burning    Burning with urination  Certain factors can make a yeast infection more likely. These can include:    Taking certain medicines, such as antibiotics or birth control pills    Pregnancy    Diabetes    Weakened immune system  A yeast infection is most often treated with antifungal medicine. This may be given as a vaginal cream or pills you take by mouth. Treatment may last for about 1 to 7 days. Women with severe or recurrent infections may need longer courses of treatment.  Home care    If you re prescribed medicine, be sure to use it as directed. Finish all of the medicine, even if your symptoms go away. Note: Don t try to treat yourself using over-the-counter products without talking to your provider first. He or she will let you know if this is a good option for you.    Ask your provider what steps you can take to help reduce your risk of having a yeast infection in the future.  Follow-up care  Follow up with your healthcare provider, or as directed.  When to seek medical advice  Call your healthcare provider right away if:    You have a fever of 100.4 F (38 C) or higher, or as directed by your provider.    Your symptoms worsen, or they don t go away within a few days of starting treatment.    You have new pain in the lower belly or pelvic region.    You have side effects that bother you or a reaction to the cream or pills you re prescribed.    You or any partners you have sex with have new symptoms, such as a rash, joint pain, or sores.  Date Last Reviewed: 7/30/2015 2000-2017 The  [employer]. [address] [place] [address], [place] [address]. All rights reserved. This information is not intended as a substitute for professional medical care. Always follow your healthcare professional's instructions.

## 2017-10-28 LAB
BACTERIA SPEC CULT: NORMAL
Lab: NORMAL
SPECIMEN SOURCE: NORMAL

## 2017-10-28 ASSESSMENT — ANXIETY QUESTIONNAIRES: GAD7 TOTAL SCORE: 5

## 2017-12-28 ENCOUNTER — TELEPHONE (OUTPATIENT)
Dept: PSYCHIATRY | Facility: CLINIC | Age: 22
End: 2017-12-28

## 2017-12-28 NOTE — TELEPHONE ENCOUNTER
We don't have coupons for Trintellix (vortioxetine)    I think Dr Rock was providing samples in the past, but we cannot do that.  Patient should ask her pharmacy or look to  web site for coupons.

## 2017-12-28 NOTE — TELEPHONE ENCOUNTER
Patient is looking to have coupons of Trintellix put at the  for her to . Once ready, I'll call her and notify her of them being ready. Aware it won't be until the end on next week.

## 2018-01-26 ENCOUNTER — TELEPHONE (OUTPATIENT)
Dept: PSYCHIATRY | Facility: CLINIC | Age: 23
End: 2018-01-26

## 2018-01-26 NOTE — TELEPHONE ENCOUNTER
Called [facility] at [phone] and representative stated that her coverage ended 12/31/2017. Called [facility] at [phone] per pharmacist they do not have her newest insurance information. Called patient at [phone] to see if she has new pharmacy benefit information. She gave ID [id number], Bin [id number], [facility] and group [id number]. Also she stated she just got  and her new last name is Jones.    PA Initiation    Medication: vortioxetine (TRINTELLIX) 10 MG tablet  Insurance Company: [employer] - Phone [phone] Fax [phone]  Pharmacy Filling the Rx: [id number] - [place], [place] - [address]  Filling Pharmacy Phone: [phone]  Filling Pharmacy Fax: [phone]  Start Date: 1/26/2018    Initiated through [employer] Online portal

## 2018-01-26 NOTE — TELEPHONE ENCOUNTER
Reason for call:  Other   Patient called regarding (reason for call): prescription  Additional comments: Patient needs PA done for Trintellix. [facility] [address].     Phone number to reach patient:  Home number on file [phone] (home)    Best Time:  any    Can we leave a detailed message on this number?  YES

## 2018-01-30 NOTE — TELEPHONE ENCOUNTER
Called [employer] at [phone] and confirmed with representative that her new plan does not become active until 02/01/18.     Prior Authorization Approval    Authorization Effective Date: 2/1/2018  Authorization Expiration Date: 2/1/2019  Medication: vortioxetine (TRINTELLIX) 10 MG tablet- APPROVED  Approved Dose/Quantity: 30/30 days  Reference #: [id number]   Insurance Company: [employer] - Phone [phone] Fax [phone]  Expected CoPay: n/a- her new insurance does not start until 02/01/2018     CoPay Card Available:      Foundation Assistance Needed:    Which Pharmacy is filling the prescription (Not needed for infusion/clinic administered): [id number] - [place], [place] - [address]  Pharmacy Notified: No  Patient Notified: YesComment:  left voicemail

## 2018-03-14 ENCOUNTER — OFFICE VISIT (OUTPATIENT)
Dept: PSYCHIATRY | Facility: CLINIC | Age: 23
End: 2018-03-14
Payer: COMMERCIAL

## 2018-03-14 DIAGNOSIS — G43.809 OTHER MIGRAINE WITHOUT STATUS MIGRAINOSUS, NOT INTRACTABLE: ICD-10-CM

## 2018-03-14 DIAGNOSIS — F41.1 GENERALIZED ANXIETY DISORDER: ICD-10-CM

## 2018-03-14 DIAGNOSIS — F33.1 MAJOR DEPRESSIVE DISORDER, RECURRENT EPISODE, MODERATE (H): Primary | ICD-10-CM

## 2018-03-14 PROCEDURE — 99215 OFFICE O/P EST HI 40 MIN: CPT | Performed by: PSYCHIATRY & NEUROLOGY

## 2018-03-14 ASSESSMENT — ANXIETY QUESTIONNAIRES
2. NOT BEING ABLE TO STOP OR CONTROL WORRYING: SEVERAL DAYS
GAD7 TOTAL SCORE: 11
1. FEELING NERVOUS, ANXIOUS, OR ON EDGE: SEVERAL DAYS
7. FEELING AFRAID AS IF SOMETHING AWFUL MIGHT HAPPEN: SEVERAL DAYS
5. BEING SO RESTLESS THAT IT IS HARD TO SIT STILL: SEVERAL DAYS
3. WORRYING TOO MUCH ABOUT DIFFERENT THINGS: SEVERAL DAYS
6. BECOMING EASILY ANNOYED OR IRRITABLE: NEARLY EVERY DAY

## 2018-03-14 ASSESSMENT — PATIENT HEALTH QUESTIONNAIRE - PHQ9: 5. POOR APPETITE OR OVEREATING: NEARLY EVERY DAY

## 2018-03-14 NOTE — MR AVS SNAPSHOT
MRN:[id number]                      After Visit Summary   3/14/2018    Angela Jones    MRN: [id number]           Visit Information        Provider Department      3/14/2018 3:30 PM Rashawn Rock MD [facility]        Care Instructions          Treatment Plan:  Drop Lamictal (lamotrigine) by 50 mg per day per week until off it   Increase Trintellix (vortioxitine) to 20 mg   Continue amitriptyline 50 mg at bedtime and vitamin D   Call the [facility] for neuropsychiatric testing; [phone]   MTM with Caryn is an interim option; [phone]  Safety plan was reviewed; to the ER   Education and counseling was done regarding use of medications, psychotherapy options  Referral through [facility] has been submitted.          [facility] Information     [employer] gives you secure access to your electronic health record. If you see a primary care provider, you can also send messages to your care team and make appointments. If you have questions, please call your primary care clinic.  If you do not have a primary care provider, please call [phone] and they will assist you.        Care EveryWhere ID     This is your Care EveryWhere ID. This could be used by other organizations to access your [place] medical records  [id number]        Equal Access to Services     YESI LAYTON : Genevieve Steele, mac mancera, qamisty kaalnichelle amrio, mansi bauman. So [facility] [phone].    ATENCIÓN: Si habla español, tiene a bennett disposición servicios gratelvisos de asistencia lingüística. Llame al [phone].    We comply with applicable federal civil rights laws and [place] laws. We do not discriminate on the basis of race, color, national origin, age, disability, sex, sexual orientation, or gender identity.

## 2018-03-14 NOTE — PROGRESS NOTES
Psychiatric Progress Note    Name: Angela Solis  Date: March 14, 2018   Length of Visit: Spent 40 minutes face to face with this patient, Where at least 50 % of this time was spent in counselling on/or about depression and anxiety .      MRN: [id number]      Current Outpatient Prescriptions   Medication Sig     vortioxetine (TRINTELLIX) 10 MG tablet Take 1 tablet (10 mg) by mouth daily     mupirocin (BACTROBAN) 2 % ointment APPLY TO AFFECTED AREA TWICE DAILY (Patient not taking: Reported on 10/27/2017)     amitriptyline (ELAVIL) 25 MG tablet Take 2 tablets (50 mg) by mouth At Bedtime     lamoTRIgine (LAMICTAL) 100 MG tablet Take 3 and 1/2 per day (Patient taking differently: Take 350 mg by mouth daily Take 3 and 1/2 per day)     ranitidine (ZANTAC) 150 MG capsule Take 150 mg by mouth daily     Prenatal Vit-Fe Fumarate-FA (PRENATAL MULTIVITAMIN PLUS IRON) 27-0.8 MG TABS per tablet Take 1 tablet by mouth daily     hyoscyamine (LEVSIN/SL) 0.125 MG sublingual tablet Take 1 tablet (0.125 mg) by mouth every 4 hours as needed     clobetasol (TEMOVATE) 0.05 % ointment Apply topically 2 times daily Avoid face, underarms, groin. (Patient not taking: Reported on 10/27/2017)     ketoconazole (NIZORAL) 2 % shampoo Apply topically daily as needed for itching or irritation     metoclopramide (REGLAN) 5 MG tablet Take 1 tablet (5 mg) by mouth 3 times daily as needed     levonorgestrel (MIRENA, 52 MG,) 20 MCG/24HR IUD 1 each (20 mcg) by Intrauterine route once for 1 dose     budesonide-formoterol (SYMBICORT) 80-4.5 MCG/ACT Inhaler Inhale 2 puffs into the lungs 2 times daily     ipratropium (ATROVENT) 0.02 % nebulizer solution Take 0.5 mg by nebulization daily as needed for wheezing     Powders (VAGISIL EX)      ipratropium (ATROVENT HFA) 17 MCG/ACT inhaler Inhale 2 puffs into the lungs every 6 hours     nystatin-triamcinolone (MYCOLOG II) cream Apply topically 2 times daily (Patient not taking: Reported on 10/27/2017)      "triamcinolone (KENALOG) 0.1 % ointment Apply sparingly to affected area three times daily for 14 days.     Melatonin 10 MG TABS Take 10 mg by mouth nightly as needed     VITAMIN D, CHOLECALCIFEROL, PO Take  by mouth daily.     No current facility-administered medications for this visit.      Past medication trials: (patient was presented with a list to review all currently available antidepressants, mood stabilizers, tranquilizers, hypnotics and antipsychotics)  New Antidepressants: Prozac (fluoxetine), Zoloft (sertraline), Celexa (citalopram), Wellbutrin, Zyban, Aplenzin (bupropion), Effexor (venlafaxine) and Cymbalta (duloxetine), Lexapro  Stimulants / ADHD Meds: Adderall (amphetamine salts), Concerta (methylphenidate), Focalin (dexmethylphenidate), Ritalin (methylphenidate), Strattera (atomoxetine), Vyvanse (lisdexamfetamine) and Intuniv (guanfacine)  Newer Antipsychotics: Risperdal (risperidone) and Abilify (aripriprazole)  Sedatives/Hypnotics: OTC: Benadryl (diphenhydramine)   Mood stabilizers: Lamictal    Therapist:  None for 2 years   Shannan had suggested Neuropsychiatric testing     PHQ-9:  PHQ-9 SCORE 9/19/2017 10/27/2017 3/14/2018   Total Score - - -   Total Score 12 14 19        JOSHUA-7:  JOSHUA-7 SCORE 9/19/2017 10/27/2017 3/14/2018   Total Score - - -   Total Score 7 5 11   Total Score - - -      Interim History:  Comes in with her  (of several years) and her emotional support dog Alvin).   \"The Lamictal isn't working\".   The Trintellix seems to work well; due to a lapse of insurance in January she had to go back to Celexa for a month and her mood really slipped.  Doing better back on Trintellix 10 mg per day.   Still having a lot of racing thoughts and intermittent irritability but the Lamictal really has not helped with this.  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   Neuropsychiatric testing has been suggested to them that her  checked on this would be $5000; I think " "he needs to recheck other options.  He is now again to various options to find a therapist      Assessment: from last visit 9/19  Due to Genesight result, she should be on less Celexa   Also does better at 350 mg Lamictal   Re; stimulant; no compelling reason to be on them, but all in L column     Treatment Plan:  Increase Lamictal (lamotrigine) to 350 mg per day   Drop Celexa (citalopram) to 20 mg per day and and continue amitriptyline 50 mg at bedtime    Continue vitamin D   Safety plan was reviewed; to the ER as needed or call after hours crisis line; [phone]  Education and counseling was done regarding use of medications, psychotherapy options  Call for a follow up appointment with Dr. Rock in 8-10 weeks [phone].    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       Mental Status Assessment:  Appearance:  Well groomed    Behavior/relationship to examiner/demeanor:  Cooperative, not very engaged  Motor activity:  Normal  Gait:  Normal   Speech:  Normal in volume, slightly dysarthric   Mood (subjective report):  \"down\"   Affect (objective appearance):  Mood congruent  Thought Process (Associations):  Logical, linear and goal directed  Thought content:  No evidence of suicidal or homicidal ideation,          no overt psychosis and                    patient does not appear to be responding to internal stimuli  Oriented to person, place, date/time   Attention Span and concentration: Intact   Memory:  Short-term memory fair and " Long-term memory; fair  Language:  Fluent   Fund of Knowledge/Intelligence:  Average  Use of language: Intact   Abstraction:  Normal  Insight:  Adequate  Judgment:  Adequate for safety    DSM-5 DIAGNOSIS:  Attention-Deficit/Hyperactivity Disorder 314.01 (F90.1) Predominantly hyperactive / impulsive presentation Serverity: Moderate  F33.1 MDD, mixed   300.00 (F41.9) Unspecified Anxiety Disorder  Cluster B traits; Zanarini BPD scale is positive for 7/10 items     Vitamin D Deficiency Screening Results:  No results found for: VITDT     Assessment:  It appears that the Lamictal is not helping much and may be adding to some of her cognitive difficulties; therefore we will taper off of it.  Will consider Topamax, but cognition is of concern   I think is very important that she start psychotherapy and she was encouraged to do so as soon as possible; it appears like she does have some significant needs in this regard.  Various providers have suggested Neuropsychiatric testing; she should look into this.   We discussed my transition out of the clinic mid March; I think she will need a psychiatrist to provide her with care but she has worked with Caryn for MTM so she is a resource in the interim.      Treatment Plan:  Drop Lamictal (lamotrigine) by 50 mg per day per week until off it   Increase Trintellix (vortioxitine) to 20 mg   Continue amitriptyline 50 mg at bedtime and vitamin D   Call the [facility] for neuropsychiatric testing; [phone]   MTM with Caryn is an interim option; [phone]  Safety plan was reviewed; to the ER   Education and counseling was done regarding use of medications, psychotherapy options  Referral through [facility] has been submitted.       Signed: Rashawn Rock M.D.

## 2018-03-14 NOTE — PATIENT INSTRUCTIONS
Treatment Plan:  Drop Lamictal (lamotrigine) by 50 mg per day per week until off it   Increase Trintellix (vortioxitine) to 20 mg   Continue amitriptyline 50 mg at bedtime and vitamin D   Call the [facility] for neuropsychiatric testing; [phone]   MTM with Caryn is an interim option; [phone]  Safety plan was reviewed; to the ER   Education and counseling was done regarding use of medications, psychotherapy options  Referral through [facility] has been submitted.

## 2018-03-15 ASSESSMENT — PATIENT HEALTH QUESTIONNAIRE - PHQ9: SUM OF ALL RESPONSES TO PHQ QUESTIONS 1-9: 19

## 2018-03-15 ASSESSMENT — ANXIETY QUESTIONNAIRES: GAD7 TOTAL SCORE: 11

## 2018-03-16 DIAGNOSIS — G43.809 OTHER MIGRAINE WITHOUT STATUS MIGRAINOSUS, NOT INTRACTABLE: ICD-10-CM

## 2018-03-16 RX ORDER — METOCLOPRAMIDE 5 MG/1
5 TABLET ORAL 3 TIMES DAILY PRN
Qty: 30 TABLET | Refills: 0 | Status: SHIPPED | OUTPATIENT
Start: 2018-03-16 | End: 2018-04-02

## 2018-03-16 NOTE — TELEPHONE ENCOUNTER
metoclopramide (REGLAN) 5 MG   Last Written Prescription Date:  5/3/17  Last Fill Quantity: 90,   # refills: 0  Last Office Visit : 10/4/16  Future Office visit:  NONE    OVERDUE RTC APPT.    30 DAY (LUPE) RF   Scheduling has been notified to contact the pt for MD APPT.

## 2018-03-19 RX ORDER — METOCLOPRAMIDE 5 MG/1
5 TABLET ORAL 3 TIMES DAILY PRN
Qty: 90 TABLET | Refills: 0 | OUTPATIENT
Start: 2018-03-19

## 2018-03-26 DIAGNOSIS — G43.809 OTHER MIGRAINE, NOT INTRACTABLE, WITHOUT STATUS MIGRAINOSUS: ICD-10-CM

## 2018-03-26 NOTE — TELEPHONE ENCOUNTER
"Last Written Prescription Date:  9/19/17  Last Fill Quantity: 60,  # refills: 4   Last office visit: 3/14/2018 with prescribing provider:     Future Office Visit:      Requested Prescriptions   Pending Prescriptions Disp Refills     amitriptyline (ELAVIL) 25 MG tablet 60 tablet 4     Sig: Take 2 tablets (50 mg) by mouth At Bedtime    Tricyclic Agents ( Annual appt and no PHQ9) Passed    3/26/2018  9:37 AM       Passed - Blood Pressure under 140/90 in past 12 mos    BP Readings from Last 3 Encounters:   10/27/17 111/73   04/26/17 111/65   03/23/17 106/70                Passed - Recent (12 mo) or future (30 days) visit within authorizing provider's specialty    Patient had office visit in the last 12 months or has a visit in the next 30 days with authorizing provider or within the authorizing provider's specialty.  See \"Patient Info\" tab in inbasket, or \"Choose Columns\" in Meds & Orders section of the refill encounter.           Passed - Patient is age 18 or older       Passed - Patient is not pregnant       Passed - No positive pregnancy test on record in past 12 mos          "

## 2018-03-26 NOTE — TELEPHONE ENCOUNTER
RN spoke to patient.     She stated she received a call [facility] but didn't schedule with them yet. She wanted the check with her insurance plan first. She has not yet done this. She has a week of Elavil left and is aware no further refills can come from Dr. Rock's former clinic.    RN gave patient [facility]'s number today and advised that she call and discuss her insurance with them and/or her plan ASAP. Patient agreed.     Will notify PCP team.

## 2018-03-27 ENCOUNTER — TELEPHONE (OUTPATIENT)
Dept: PSYCHIATRY | Facility: CLINIC | Age: 23
End: 2018-03-27

## 2018-03-27 NOTE — TELEPHONE ENCOUNTER
----- Message from Rashawn Rock MD sent at 3/27/2018  3:07 PM CDT -----  Regarding: FW: MENTAL HEALTH ORDER   H  They still don't seem to be going to you...    ----- Message -----     From: Chely Horta     Sent: 3/26/2018   4:17 PM       To: Rashawn Rock MD  Subject: MENTAL HEALTH ORDER                              Patient was contacted by [facility]. Patient was scheduled for medication management services with Uyen Manzo on 3.28.18 at 5PM.      Chely Horta   , [facility]

## 2018-04-02 DIAGNOSIS — G43.809 OTHER MIGRAINE WITHOUT STATUS MIGRAINOSUS, NOT INTRACTABLE: ICD-10-CM

## 2018-04-03 NOTE — TELEPHONE ENCOUNTER
metoclopramide (REGLAN) 5 MG tablet      Last Written Prescription Date:  3/16/18  Last Fill Quantity: 30,   # refills: 0  Last Office Visit : 10/4/16  Future Office visit:  none    Routing refill request to provider for review/approval because:  Needs to be seen- appt past due  Harini refill was sent last month and scheduling was notified.

## 2018-04-04 RX ORDER — METOCLOPRAMIDE 5 MG/1
5 TABLET ORAL 3 TIMES DAILY PRN
Qty: 15 TABLET | Refills: 0 | Status: SHIPPED | OUTPATIENT
Start: 2018-04-04 | End: 2021-11-29

## 2018-08-17 DIAGNOSIS — G43.809 OTHER MIGRAINE, NOT INTRACTABLE, WITHOUT STATUS MIGRAINOSUS: ICD-10-CM

## 2018-08-17 NOTE — TELEPHONE ENCOUNTER
amitriptyline (ELAVIL) 25 MG tablet  Take 2 tablets (50 mg) by mouth At Bedtime    Last Written Prescription Date:  3-26-18  Last Fill Quantity: 60,   # refills: 4  Last Office Visit : 10-4-16  Future Office visit:  none    Routing refill request to provider for review/approval because:  Drug failed the [facility] [place], [facility] or [facility] refill protocol.  Clinic appt due  30 day refill will be pended.

## 2018-08-18 NOTE — TELEPHONE ENCOUNTER
Called patient and left message to call back and schedule annual physical in the Primary Care Clinic.

## 2018-09-07 NOTE — TELEPHONE ENCOUNTER
lamoTRIgine       Last Written Prescription Date:  1/18/17  Last Fill Quantity: 90,   # refills: 3  Last Office Visit : 10/4/16  Future Office visit:  NONE    Routing refill request to provider for review/approval because:  Drug not on the [facility], [facility] or [facility] refill protocol or controlled substance       Patient is 18 years or older (and not pregnant)

## 2018-09-10 ENCOUNTER — COMMUNICATION - HEALTHEAST (OUTPATIENT)
Dept: FAMILY MEDICINE | Facility: CLINIC | Age: 23
End: 2018-09-10

## 2018-09-12 ENCOUNTER — OFFICE VISIT (OUTPATIENT)
Dept: NEUROPSYCHOLOGY | Facility: CLINIC | Age: 23
End: 2018-09-12
Payer: COMMERCIAL

## 2018-09-12 DIAGNOSIS — F09 COGNITIVE DISORDER: Primary | ICD-10-CM

## 2018-09-12 DIAGNOSIS — F41.9 ANXIETY: ICD-10-CM

## 2018-09-12 NOTE — PROGRESS NOTES
Pt was seen for neuropsychological evaluation at the request of Dr. Rashawn Rock for the purposes of diagnostic clarification and treatment planning. 2 hours of face-to-face testing were provided by this writer. Please see Dr. Maribell Rodriguez's report for a full interpretation of the findings.    Baljit [address]  Psychometrist

## 2018-09-12 NOTE — MR AVS SNAPSHOT
After Visit Summary   9/12/2018    Angela Jones    MRN: [id number]           Patient Information     Date Of Birth          1995        Visit Information        Provider Department      9/12/2018 8:30 AM Maribell Rodriguez PsyD [facility]        Today's Diagnoses     Cognitive disorder    -  1    Anxiety           Follow-ups after your visit        Who to contact     Please call your clinic at [phone] to:    Ask questions about your health    Make or cancel appointments    Discuss your medicines    Learn about your test results    Speak to your doctor            Additional Information About Your Visit        MyChart Information     [employer] gives you secure access to your electronic health record. If you see a primary care provider, you can also send messages to your care team and make appointments. If you have questions, please call your primary care clinic.  If you do not have a primary care provider, please call [phone] and they will assist you.      [employer] is an electronic gateway that provides easy, online access to your medical records. With [employer], you can request a clinic appointment, read your test results, renew a prescription or communicate with your care team.     To access your existing account, please contact your [facility] or call [phone] for assistance.        Care EveryWhere ID     This is your Care EveryWhere ID. This could be used by other organizations to access your [place] medical records  [id number]         Blood Pressure from Last 3 Encounters:   10/27/17 111/73   04/26/17 111/65   03/23/17 106/70    Weight from Last 3 Encounters:   10/27/17 54.8 kg (120 lb 12.8 oz)   04/26/17 55.3 kg (122 lb)   03/23/17 53.9 kg (118 lb 12.8 oz)              We Performed the Following     [id number] TESTING, PER HR/PSYCHOLOGIST     NEUROPSYCH TESTING BY TECH          Today's Medication Changes          These changes  are accurate as of 9/12/18 11:59 PM.  If you have any questions, ask your nurse or doctor.               These medicines have changed or have updated prescriptions.        Dose/Directions    lamoTRIgine 100 MG tablet   Commonly known as:  LaMICtal   This may have changed:    - how much to take  - how to take this  - when to take this  - additional instructions   Used for:  Generalized anxiety disorder        Take 3 and 1/2 per day   Quantity:  110 tablet   Refills:  6                Primary Care Provider Office Phone # Fax Nanette Lund Swetha Godoy [phone]       [address] [place] [address] [address] [place] [id number]        Equal Access to Services     [facility] Center[facility] : Hadii morena Steele, wajudy mancera, qamisty kaalmabruno mario, mansi dunbar . So [facility] [phone].    ATENCIÓN: Si habla español, tiene a bennett disposición servicios gratuitos de asistencia lingüística. [facility] [phone].    We comply with applicable federal civil rights laws and [place] laws. We do not discriminate on the basis of race, color, national origin, age, disability, sex, sexual orientation, or gender identity.            Thank you!     Thank you for choosing [facility]  for your care. Our goal is always to provide you with excellent care. Hearing back from our patients is one way we can continue to improve our services. Please take a few minutes to complete the written survey that you may receive in the mail after your visit with us. Thank you!             Your Updated Medication List - Protect others around you: Learn how to safely use, store and throw away your medicines at www.disposemymeds.org.          This list is accurate as of 9/12/18 11:59 PM.  Always use your most recent med list.                   Brand Name Dispense Instructions for use Diagnosis    amitriptyline 25 MG tablet    ELAVIL    60 tablet    Take 2 tablets (50 mg) by mouth at bedtime. Need  clinic appt for further refills, scheduling # [phone]    Other migraine, not intractable, without status migrainosus       ATROVENT HFA 17 MCG/ACT Inhaler   Generic drug:  ipratropium      Inhale 2 puffs into the lungs every 6 hours    Allergic reaction, subsequent encounter       budesonide-formoterol 80-4.5 MCG/ACT Inhaler    SYMBICORT    1 Inhaler    Inhale 2 puffs into the lungs 2 times daily        clobetasol 0.05 % ointment    TEMOVATE    30 g    Apply topically 2 times daily Avoid face, underarms, groin.    Dermatitis       hyoscyamine 0.125 MG sublingual tablet    LEVSIN/SL    120 tablet    Take 1 tablet (0.125 mg) by mouth every 4 hours as needed    Irritable bowel syndrome without diarrhea       ipratropium 0.02 % nebulizer solution    ATROVENT     Take 0.5 mg by nebulization daily as needed for wheezing        ketoconazole 2 % shampoo    NIZORAL    120 mL    Apply topically daily as needed for itching or irritation    Dermatitis, seborrheic       lamoTRIgine 100 MG tablet    LaMICtal    110 tablet    Take 3 and 1/2 per day    Generalized anxiety disorder       levonorgestrel 20 MCG/24HR IUD    MIRENA (52 MG)    1 each    1 each (20 mcg) by Intrauterine route once for 1 dose    Encounter for IUD insertion       Melatonin 10 MG Tabs tablet      Take 10 mg by mouth nightly as needed        metoclopramide 5 MG tablet    REGLAN    15 tablet    Take 1 tablet (5 mg) by mouth 3 times daily as needed *MUST BE SEEN FOR FURTHER REFILLS*,    Other migraine without status migrainosus, not intractable       mupirocin 2 % ointment    BACTROBAN    22 g    APPLY TO AFFECTED AREA TWICE DAILY    Dermatitis       nystatin-triamcinolone cream    MYCOLOG II    30 g    Apply topically 2 times daily    Rash       prenatal multivitamin plus iron 27-0.8 MG Tabs per tablet     100 tablet    Take 1 tablet by mouth daily    5,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    Dermatitis       VAGISIL EX           VITAMIN D (CHOLECALCIFEROL) PO      Take  by mouth daily.        vortioxetine 20 MG tablet    TRINTELLIX    30 tablet    Take 1 tablet (20 mg) by mouth daily    Major depressive disorder, recurrent episode, moderate (H)

## 2018-09-19 ENCOUNTER — OFFICE VISIT - HEALTHEAST (OUTPATIENT)
Dept: FAMILY MEDICINE | Facility: CLINIC | Age: 23
End: 2018-09-19

## 2018-09-19 DIAGNOSIS — F41.1 GAD (GENERALIZED ANXIETY DISORDER): ICD-10-CM

## 2018-09-19 DIAGNOSIS — R56.9 SEIZURE-LIKE ACTIVITY (H): ICD-10-CM

## 2018-09-19 DIAGNOSIS — J45.30 MILD PERSISTENT ASTHMA, UNSPECIFIED WHETHER COMPLICATED: ICD-10-CM

## 2018-09-19 DIAGNOSIS — K58.2 IRRITABLE BOWEL SYNDROME WITH BOTH CONSTIPATION AND DIARRHEA: ICD-10-CM

## 2018-09-19 DIAGNOSIS — F31.81 BIPOLAR 2 DISORDER (H): ICD-10-CM

## 2018-09-19 ASSESSMENT — MIFFLIN-ST. JEOR: SCORE: 1257.41

## 2018-09-24 NOTE — PROGRESS NOTES
Service Date: 09/12/2018      NEUROPSYCHOLOGICAL EVALUATION      RELEVANT HISTORY AND REASON FOR REFERRAL:  Angela Jones is a 23-year-old  white woman with a long history of mood and anxiety disorders, diagnoses including major depression, generalized anxiety disorder, OCD and ADHD.  She is prone to migraines and has had spells of transient confusion witnessed by her . Mental health treatment has included individual and group therapies, an intensive day program, and multiple psychotropic trials, as outlined in the psychiatry records.  Most recently she has treated with psychiatrist, Dr. Rashawn Rock, but now is in the process of getting a new provider as Dr. Rock is no longer seeing outpatients.  The current medication regimen, per Epic, includes Elavil, Symbicort, Levsin, Lamictal, and melatonin.  This neuropsychological evaluation is requested by Dr. Rock to help with clarification of the confusion episodes and to assist with treatment planning.      The third of four girls, she was born in [place], [place], which is north of [place], and lived there for the first five years of her life, before her family relocated to [place], [place].  Her father has psychology training and has been a CD counselor, and now works at a mental health group home.  Her mother is a family therapist by training and has primarily worked with developmentally disabled patient populations, now in a group home.  Ms. Jones skipped the tenth grade, then repeated the eleventh grade, before dropping out during the twelfth grade.  Per her reports, there was no specific learning disability, but she had difficulty getting homework done in a timely manner.  She did get a GED.  She held a few jobs in the past, including [employer]  and a [employer] .  She has not worked in the last four years and is supported by her spouse of two years, who works in the [email or web address] field.  She lives with him in a small [place]  "apartment.      BEHAVIORAL OBSERVATIONS AND CLINICAL INTERVIEW FINDINGS:  Ms. Jones arrived on time and alone, presenting as a lean, petite, fair-skinned young woman wearing glasses, with shoulder length wavy hair.  She was fashionably attired in a long, colorful dress cinched with a sequined belt and a nayla jacket knotted at the bottom with the sleeves rolled up and sandals.  Eye contact was generally poor.  She seemed pensive with a shaky voice, but was quite articulate and seemed to have a good command of her situation.      About six months ago, she started having episodes of confusion, according to her spouse (the spells are less apparent to her).  During the spells, she doesn't respond fully or correctly when spoken to, and her verbalizations are not entirely coherent.  Afterwards, she can remember where she was during the spell, but nothing about what happened during the spells.  It's unclear from this description if she's truly amnestic for the spells.  Usually she starts to feel back to her normal self within about 45 minutes.  Sometimes there are repetitive, jerky movements associated with the events.  She has no recall of any conversations that occur during the spells.  These can occur in spurts with no particular pattern, or as infrequently as one a month.  There is no definite history of past seizures.  Her mother used to say she would not respond if she was absorbed in something, but this is hard to interpret given apparent lifelong inattentiveness (she was diagnosed with ADD as a child).        The extensive psychiatric history is well known to the referring psychiatrist and will not be reiterated in detail here.  Briefly, she thinks depression started around age 11 or 12, and she has been anxiety prone from a very early age as well.  She agonizes over decisions.  OCD features are described as \"rigidity of thinking.\"  She gets set in her ways and has been somewhat prone to intrusive thoughts, " "especially as a teen.  She's compelled to check to be sure doors are locked, but otherwise doesn't engage in ritualistic, compulsive behaviors.  She recalls being on \"just about every medication\" for ADHD until about 2006, and was tried on a host of other psychotropics, now on gabapentin as needed for panic attacks.  Depression is characterized by sleep disturbance (too much or too little).  She still has trouble falling asleep despite taking melatonin and amitriptyline.  Appetite diminishes when depressed, and she cries for no reason.  It is unclear whether she has ever been diagnosed with bipolar disorder, but she reports hypomanic episodes lasting for about a week, during which she becomes antsy, motorically restless, and impulsive.  She shops and judgment is diminished.  \"You feel bullet proof, invincible\".  Speech becomes hyperverbal and pressured.  Therapeutic interventions have included cognitive behavioral therapy approach, which she did not find helpful, and group therapy, which was more helpful.  Around 2012, she participated in an intensive day treatment program through the [facility] in [place].  She has never had psychotic episodes.  She engaged self-harm (cutting) around age 12 or 13, but made no serious suicide attempts.      Apart from the poorly remembered confusion episodes, she is not reporting much in the way of cognitive impairments.  Perhaps memory is a little below baseline.  She tends to have difficulty remembering what she is supposed to do from day to day - such as appointments and chores - and relies on lists.  Memory for conversations is generally intact.  She frequently loses her phone, but this seems to be associated with longstanding disorganization and inattentiveness. The neurological history is negative for head traumas, stroke symptoms, central nervous viruses or infections.      General health is noteworthy for recent asthma symptoms, and she infrequently gets migraine " headaches, the last one around 2009.  Aside from wisdom tooth extraction, she has not had any surgeries.      Regarding habits, she has never been a tobacco user.  Aside from small amounts of marijuana in high school, she has not been a recreational drug user.  Typical alcohol assumption is one or two drinks a week, typically in social situations.     Her routine includes feeding and walking their dog.  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  In the past, she enjoyed painting, sewing, and crafts, but hasn't been active with that lately, given the limited amount of space in their small apartment.  She does not drive due to anxiety.      During testing, she appeared slightly apprehensive and nervous, a slight bilateral hand tremor noticeable at times.  She was sufficiently alert and attentive and had no trouble understanding or following instructions.  Results are considered technically valid and an accurate reflection of current cognitive capabilities.      NEUROPSYCHOLOGICAL FINDINGS:  Select intellectual abilities were assessed with subtests from the Wechsler Adult Intelligence Scale-IV.  Speeded graphomotor learning (Coding) was impaired.  She was accurate but very slow on this timed transcription task.  All other intellectual functions assessed range from average to very superior.  Specifically, working memory on a digit sequence learning exercise (Digit Span) was average.  She could inconsistently repeat up to eight digits in the forward direction and four in the reverse order, and could mentally rearrange up to five random number strings in correct numerical order.  Visuospatial processing and constructional abilities (Block Design) were high average.  Verbal abstract reasoning (Similarities) was superior, and word knowledge and expressive communicability (Vocabulary) and social understanding, practical problem solving and verbal reasoning (Comprehension) were very superior.      Memory performance was  inconsistent.  Immediate memory for two story passages from the Wechsler Memory Scale-Revised was impaired with only 13 of 50 story elements recalled immediately after presentation.  Retention of the stories 30 minutes later was borderline impaired in absolute terms, but virtually all of the originally learned material was recalled after the delay.  In fact, she recalled more detail from the first passage than originally reported.  In contrast, word list learning (Wes Auditory Verbal Learning Test) was above average if not superior, with all 15 words learned by the third trial.  Retention of the entire list of 15 words was perfect after a brief distractor exercise and 30-minute delay, and 14 of the 15 words were correctly recognized when presented in multiple-choice format.  Immediate memory for simpler figural material (four designs from the WMS) was above average with near perfect retention of the material 30 minutes later.  All four figures were recognized when presented in multiple-choice format.  Her copy drawing of a complex figure (Wes-Osterrieth) was mildly impaired due to a few proportional and placement errors, but there were no gross distortions.  Retention of the figure immediately after presentation and 30 minutes later was solidly average, as was recognition of the individual design elements.      Executive abilities were grossly intact.  Performance on a simple numerical sequencing exercise (Trail Making Test, Part A), requiring sustained attention, was average for speed and error free.  Performance on a more complex sequencing exercise (Trail Making Test, Part B), requiring divided attention (alternating between number and letter sequences), was average for speed with one error.  Verbal associative fluency on the Controlled Oral Word Association Test (generating words beginning with target letters) was average, with 41 countable responses produced across the three 60 second trials.  Semantic fluency  (rapid naming of animals, fruits and vegetables) was high average with 55 countable responses produced.  Inductive reasoning on a one-deck version of the Wisconsin Card Sorting Test was within normal limits with four categories readily abstracted and few errors of any kind.      Finger tapping speeds were below average to mildly slowed bilaterally, with comparable speeds for both hands.  Fine motor speed and dexterity (Grooved Pegboard) was below average (mildly slowed) bilaterally, the right (dominant) hand faster than the left, as expected.  A variety of brief exercises requiring sustained attention and cognitive flexibility (Test of Sustained Attention and Tracking) were completed a little slowly with two errors on a serial subtraction exercise.  Confrontation naming on the McBee Naming Test was intact, with all 60 pictured items correctly, spontaneously named.      CONCLUSIONS AND RECOMMENDATIONS:  This 23-year-old woman has received extensive mental health services since late childhood, for mood and anxiety disorders and ADHD.  Within the last six months she's had spells of poorly remembered confusion in which she does not respond coherently when her  tries to talk with her, and seems to have poor recall of the event afterwards.  There is no known history of childhood febrile seizures or any other recognized seizure activity, and the neurological history is otherwise mentionable only for infrequent migraine headaches.  In school she had trouble staying organized and focused and did not get her homework done.  She was advanced a grade, but then repeated the eleventh grade and dropped out in the twelfth grade, later getting a GED, though there is no apparent history of a specific learning disability.  She has not been employed in the last four years, and is supported by her  of two years, but apparently has tried to get on disability.      During the interview and test session, she appeared mildly  anxious, avoiding eye contact, and had a slight hand tremor, but she had no trouble understanding or retaining instructions. The test findings are inconsistently abnormal.  She is slow on a timed, speeded transcription task, but performs at normal speeds on other speeded tasks requiring sustained visual attention.  Immediate memory for story passages is poor, seemingly due to inattentiveness, as she retains virtually all of the originally learned material.  Word list learning is actually above average to superior, with all 15 words learned by the third trial, retained perfectly after short and long delays.  Short and long-term retention of simple and complex figural material ranges from average to above average.  Verbal processing abilities are very superior, nonverbal reasoning above average, working memory average and processing speeds on a graphomotor learning task, impaired.  Executive abilities such as divided attention, speeded word retrieval and inductive reasoning are average to above average.  Finger tapping speeds and fine motor dexterity are mildly to moderately slowed for bilaterally.  Confrontation naming is fully intact.      The episodes of confusion are most likely related to some sort of medication side-effects, as detailed in Dr. Rock's report.  There is a small chance that this represents ictal activity, such as complex-partial epilepsy, which is characterized by brief loss of consciousness/unresponsiveness and amnesia for the event.  If these spells continue, she should see a neurologist to rule out seizures.  These particular spells frequently emanate from mesial temporal areas of the brain and therefore are often associated with poor memory.  That is not apparent, as her inconsistent memory across similar memory tasks is most likely attributable to functional (nonorganic) factors, and not a memory encoding deficit.  In fact, long-term retention was excellent on most tests.  Indeed, the test  results do not provide consistent or compelling evidence of acquired brain disease, and instead reflect longstanding inattentiveness, perhaps conceptualized as ADHD vs the distracting effects of anxiety.  From a cognitive standpoint, she is not disabled.  Continued mental health treatment is indicated, and I defer to her mental health providers in formulating a treatment strategy.        Desmond Linder.   Licensed Psychologist    Board Certified in Clinical Neuropsychology/[facility]      DIAGNOSTIC IMPRESSION:  Cognitive disorder associated with anxiety.  This evaluation included approximately 3 hours of testing administered by a psychometrist with interpretation by a neuropsychologist (CPT code 59053) and an additional 4 hours of professional time spent on the interview, data integration, record review and report preparation (CPT code 67966).         ALLISON LINDER             D: 2018   T: 2018   MT: AKA      Name:     COLEMAN GOOD   MRN:      [id number]        Account:      [id number]   :      1995           Service Date: 2018      Document: [id number]

## 2018-09-25 NOTE — PROGRESS NOTES
WECHSLER ADULT INTELLIGENCE SCALE-IV CONTROLLED WORD ASSOCIATION TEST                                Age-Scaled Score   Score          41       Comprehension                18             Vocabulary                       19       TRAIL MAKING TEST      Digit Span           9                Block Design                 12           Time Errors      Coding           4           A   27           0           Similarities                 15          B         71           1           WECHSLER MEMORY SCALES PSYCHOMOTOR TESTS       Logical Mem Immediate  7/6   Right    Left   Logical Mem 30 Minute  8/5  Finger Tapping   33   33    Visual Repr Immediate                 14  Grooved Pegboard         76           83     Visual Repr 30                  13   Drops: 1           Drops: 0           30 Minute Recognition                   4      [place] CARD SORTING TEST - 1 decEgnyte   TEST OF SUSTAINED ATTENTION & TRACKING            # of categories   4     Total Time     96       Total Errors    2  BOSTON NAMING TEST    Score       60    CLEMENTINE AUDITORY VERBAL LEARNING TEST     SEMANTIC FLUENCY     I  II  III IV  V VI VII  Score       55     7   14       15      15         15    8      15            30 Minute Recall  15       30 Minute Recognition  14     Intrusions           0         CLEMENTINE COMPLEX FIGURE TEST         Raw Score T-score %tile    Copy  32   --   2-5     Immediate Recall  24.5   49   46      30 Minute Recall  24.5   49   46      Recognition  22   52   58

## 2018-10-04 ENCOUNTER — COMMUNICATION - HEALTHEAST (OUTPATIENT)
Dept: FAMILY MEDICINE | Facility: CLINIC | Age: 23
End: 2018-10-04

## 2018-10-04 ENCOUNTER — COMMUNICATION - HEALTHEAST (OUTPATIENT)
Dept: ADMINISTRATIVE | Facility: CLINIC | Age: 23
End: 2018-10-04

## 2018-10-04 ENCOUNTER — OFFICE VISIT - HEALTHEAST (OUTPATIENT)
Dept: BEHAVIORAL HEALTH | Facility: CLINIC | Age: 23
End: 2018-10-04

## 2018-10-04 DIAGNOSIS — F31.81 BIPOLAR 2 DISORDER (H): ICD-10-CM

## 2018-10-23 ENCOUNTER — OFFICE VISIT - HEALTHEAST (OUTPATIENT)
Dept: BEHAVIORAL HEALTH | Facility: CLINIC | Age: 23
End: 2018-10-23

## 2018-10-23 DIAGNOSIS — F31.81 BIPOLAR 2 DISORDER (H): ICD-10-CM

## 2018-10-24 ENCOUNTER — RECORDS - HEALTHEAST (OUTPATIENT)
Dept: ADMINISTRATIVE | Facility: OTHER | Age: 23
End: 2018-10-24

## 2018-11-06 ENCOUNTER — AMBULATORY - HEALTHEAST (OUTPATIENT)
Dept: BEHAVIORAL HEALTH | Facility: CLINIC | Age: 23
End: 2018-11-06

## 2018-11-20 ENCOUNTER — COMMUNICATION - HEALTHEAST (OUTPATIENT)
Dept: TELEHEALTH | Facility: CLINIC | Age: 23
End: 2018-11-20

## 2018-11-20 ENCOUNTER — OFFICE VISIT - HEALTHEAST (OUTPATIENT)
Dept: BEHAVIORAL HEALTH | Facility: CLINIC | Age: 23
End: 2018-11-20

## 2018-11-20 DIAGNOSIS — F41.9 ANXIETY DISORDER, UNSPECIFIED TYPE: ICD-10-CM

## 2018-11-20 DIAGNOSIS — F32.1 CURRENT MODERATE EPISODE OF MAJOR DEPRESSIVE DISORDER, UNSPECIFIED WHETHER RECURRENT (H): ICD-10-CM

## 2018-11-29 ENCOUNTER — RECORDS - HEALTHEAST (OUTPATIENT)
Dept: ADMINISTRATIVE | Facility: OTHER | Age: 23
End: 2018-11-29

## 2018-12-14 ENCOUNTER — OFFICE VISIT - HEALTHEAST (OUTPATIENT)
Dept: BEHAVIORAL HEALTH | Facility: CLINIC | Age: 23
End: 2018-12-14

## 2018-12-14 DIAGNOSIS — F32.1 CURRENT MODERATE EPISODE OF MAJOR DEPRESSIVE DISORDER, UNSPECIFIED WHETHER RECURRENT (H): ICD-10-CM

## 2018-12-14 DIAGNOSIS — F41.9 ANXIETY DISORDER, UNSPECIFIED TYPE: ICD-10-CM

## 2018-12-16 ENCOUNTER — COMMUNICATION - HEALTHEAST (OUTPATIENT)
Dept: FAMILY MEDICINE | Facility: CLINIC | Age: 23
End: 2018-12-16

## 2018-12-28 ENCOUNTER — OFFICE VISIT - HEALTHEAST (OUTPATIENT)
Dept: BEHAVIORAL HEALTH | Facility: CLINIC | Age: 23
End: 2018-12-28

## 2018-12-28 DIAGNOSIS — F40.01 PANIC DISORDER WITH AGORAPHOBIA: ICD-10-CM

## 2018-12-28 DIAGNOSIS — F41.1 GAD (GENERALIZED ANXIETY DISORDER): ICD-10-CM

## 2018-12-28 ASSESSMENT — MIFFLIN-ST. JEOR: SCORE: 1249.47

## 2019-01-02 ENCOUNTER — COMMUNICATION - HEALTHEAST (OUTPATIENT)
Dept: BEHAVIORAL HEALTH | Facility: CLINIC | Age: 24
End: 2019-01-02

## 2019-01-04 ENCOUNTER — HOSPITAL ENCOUNTER (OUTPATIENT)
Dept: MRI IMAGING | Facility: CLINIC | Age: 24
Discharge: HOME | End: 2019-01-04
Attending: PSYCHIATRY & NEUROLOGY

## 2019-01-04 DIAGNOSIS — R40.4 EPISODES OF STARING: ICD-10-CM

## 2019-01-11 ENCOUNTER — RECORDS - HEALTHEAST (OUTPATIENT)
Dept: ADMINISTRATIVE | Facility: OTHER | Age: 24
End: 2019-01-11

## 2019-01-16 ENCOUNTER — OFFICE VISIT - HEALTHEAST (OUTPATIENT)
Dept: BEHAVIORAL HEALTH | Facility: CLINIC | Age: 24
End: 2019-01-16

## 2019-01-16 DIAGNOSIS — F41.9 ANXIETY DISORDER, UNSPECIFIED TYPE: ICD-10-CM

## 2019-01-16 DIAGNOSIS — F32.1 CURRENT MODERATE EPISODE OF MAJOR DEPRESSIVE DISORDER, UNSPECIFIED WHETHER RECURRENT (H): ICD-10-CM

## 2019-02-06 ENCOUNTER — OFFICE VISIT - HEALTHEAST (OUTPATIENT)
Dept: FAMILY MEDICINE | Facility: CLINIC | Age: 24
End: 2019-02-06

## 2019-02-06 DIAGNOSIS — F90.0 ATTENTION DEFICIT HYPERACTIVITY DISORDER (ADHD), PREDOMINANTLY INATTENTIVE TYPE: ICD-10-CM

## 2019-02-06 DIAGNOSIS — F31.81 BIPOLAR 2 DISORDER (H): ICD-10-CM

## 2019-02-06 DIAGNOSIS — Z23 NEED FOR VACCINATION: ICD-10-CM

## 2019-02-06 DIAGNOSIS — K58.2 IRRITABLE BOWEL SYNDROME WITH BOTH CONSTIPATION AND DIARRHEA: ICD-10-CM

## 2019-02-06 DIAGNOSIS — J45.30 MILD PERSISTENT ASTHMA, UNSPECIFIED WHETHER COMPLICATED: ICD-10-CM

## 2019-02-06 DIAGNOSIS — R56.9 SEIZURE-LIKE ACTIVITY (H): ICD-10-CM

## 2019-02-06 ASSESSMENT — MIFFLIN-ST. JEOR: SCORE: 1249.47

## 2019-02-08 ENCOUNTER — AMBULATORY - HEALTHEAST (OUTPATIENT)
Dept: BEHAVIORAL HEALTH | Facility: CLINIC | Age: 24
End: 2019-02-08

## 2019-02-08 ENCOUNTER — OFFICE VISIT - HEALTHEAST (OUTPATIENT)
Dept: BEHAVIORAL HEALTH | Facility: CLINIC | Age: 24
End: 2019-02-08

## 2019-02-08 DIAGNOSIS — F41.1 GAD (GENERALIZED ANXIETY DISORDER): ICD-10-CM

## 2019-02-08 DIAGNOSIS — F32.1 CURRENT MODERATE EPISODE OF MAJOR DEPRESSIVE DISORDER, UNSPECIFIED WHETHER RECURRENT (H): ICD-10-CM

## 2019-02-08 DIAGNOSIS — F90.9 ATTENTION DEFICIT HYPERACTIVITY DISORDER (ADHD), UNSPECIFIED ADHD TYPE: ICD-10-CM

## 2019-02-12 ENCOUNTER — AMBULATORY - HEALTHEAST (OUTPATIENT)
Dept: FAMILY MEDICINE | Facility: CLINIC | Age: 24
End: 2019-02-12

## 2019-02-12 ENCOUNTER — COMMUNICATION - HEALTHEAST (OUTPATIENT)
Dept: FAMILY MEDICINE | Facility: CLINIC | Age: 24
End: 2019-02-12

## 2019-03-08 ENCOUNTER — OFFICE VISIT - HEALTHEAST (OUTPATIENT)
Dept: BEHAVIORAL HEALTH | Facility: CLINIC | Age: 24
End: 2019-03-08

## 2019-03-08 DIAGNOSIS — F41.1 GAD (GENERALIZED ANXIETY DISORDER): ICD-10-CM

## 2019-03-08 DIAGNOSIS — F32.1 CURRENT MODERATE EPISODE OF MAJOR DEPRESSIVE DISORDER, UNSPECIFIED WHETHER RECURRENT (H): ICD-10-CM

## 2019-03-08 DIAGNOSIS — F90.9 ATTENTION DEFICIT HYPERACTIVITY DISORDER (ADHD), UNSPECIFIED ADHD TYPE: ICD-10-CM

## 2019-03-22 ENCOUNTER — OFFICE VISIT - HEALTHEAST (OUTPATIENT)
Dept: BEHAVIORAL HEALTH | Facility: CLINIC | Age: 24
End: 2019-03-22

## 2019-03-22 DIAGNOSIS — F41.1 GAD (GENERALIZED ANXIETY DISORDER): ICD-10-CM

## 2019-03-27 ENCOUNTER — OFFICE VISIT - HEALTHEAST (OUTPATIENT)
Dept: BEHAVIORAL HEALTH | Facility: CLINIC | Age: 24
End: 2019-03-27

## 2019-03-27 DIAGNOSIS — F41.1 GAD (GENERALIZED ANXIETY DISORDER): ICD-10-CM

## 2019-03-27 DIAGNOSIS — F40.01 PANIC DISORDER WITH AGORAPHOBIA: ICD-10-CM

## 2019-03-27 ASSESSMENT — MIFFLIN-ST. JEOR: SCORE: 1240.4

## 2019-04-02 ENCOUNTER — OFFICE VISIT - HEALTHEAST (OUTPATIENT)
Dept: FAMILY MEDICINE | Facility: CLINIC | Age: 24
End: 2019-04-02

## 2019-04-02 DIAGNOSIS — J01.90 ACUTE NON-RECURRENT SINUSITIS, UNSPECIFIED LOCATION: ICD-10-CM

## 2019-04-19 ENCOUNTER — OFFICE VISIT - HEALTHEAST (OUTPATIENT)
Dept: BEHAVIORAL HEALTH | Facility: CLINIC | Age: 24
End: 2019-04-19

## 2019-04-19 DIAGNOSIS — F41.1 GAD (GENERALIZED ANXIETY DISORDER): ICD-10-CM

## 2019-04-25 ENCOUNTER — COMMUNICATION - HEALTHEAST (OUTPATIENT)
Dept: BEHAVIORAL HEALTH | Facility: CLINIC | Age: 24
End: 2019-04-25

## 2019-04-29 ENCOUNTER — COMMUNICATION - HEALTHEAST (OUTPATIENT)
Dept: FAMILY MEDICINE | Facility: CLINIC | Age: 24
End: 2019-04-29

## 2019-04-29 DIAGNOSIS — F90.0 ATTENTION DEFICIT HYPERACTIVITY DISORDER (ADHD), PREDOMINANTLY INATTENTIVE TYPE: ICD-10-CM

## 2019-05-03 ENCOUNTER — OFFICE VISIT - HEALTHEAST (OUTPATIENT)
Dept: BEHAVIORAL HEALTH | Facility: CLINIC | Age: 24
End: 2019-05-03

## 2019-05-03 DIAGNOSIS — F32.1 CURRENT MODERATE EPISODE OF MAJOR DEPRESSIVE DISORDER, UNSPECIFIED WHETHER RECURRENT (H): ICD-10-CM

## 2019-05-03 DIAGNOSIS — F41.1 GAD (GENERALIZED ANXIETY DISORDER): ICD-10-CM

## 2019-05-17 ENCOUNTER — OFFICE VISIT - HEALTHEAST (OUTPATIENT)
Dept: BEHAVIORAL HEALTH | Facility: CLINIC | Age: 24
End: 2019-05-17

## 2019-05-17 DIAGNOSIS — F32.1 CURRENT MODERATE EPISODE OF MAJOR DEPRESSIVE DISORDER, UNSPECIFIED WHETHER RECURRENT (H): ICD-10-CM

## 2019-05-17 DIAGNOSIS — F41.1 GAD (GENERALIZED ANXIETY DISORDER): ICD-10-CM

## 2019-05-20 ENCOUNTER — OFFICE VISIT - HEALTHEAST (OUTPATIENT)
Dept: BEHAVIORAL HEALTH | Facility: CLINIC | Age: 24
End: 2019-05-20

## 2019-05-20 DIAGNOSIS — F41.1 GAD (GENERALIZED ANXIETY DISORDER): ICD-10-CM

## 2019-05-29 ENCOUNTER — COMMUNICATION - HEALTHEAST (OUTPATIENT)
Dept: FAMILY MEDICINE | Facility: CLINIC | Age: 24
End: 2019-05-29

## 2019-05-29 DIAGNOSIS — J45.30 MILD PERSISTENT ASTHMA, UNSPECIFIED WHETHER COMPLICATED: ICD-10-CM

## 2019-05-31 ENCOUNTER — OFFICE VISIT - HEALTHEAST (OUTPATIENT)
Dept: BEHAVIORAL HEALTH | Facility: CLINIC | Age: 24
End: 2019-05-31

## 2019-05-31 DIAGNOSIS — F41.1 GAD (GENERALIZED ANXIETY DISORDER): ICD-10-CM

## 2019-05-31 DIAGNOSIS — F40.01 PANIC DISORDER WITH AGORAPHOBIA: ICD-10-CM

## 2019-06-07 ENCOUNTER — COMMUNICATION - HEALTHEAST (OUTPATIENT)
Dept: BEHAVIORAL HEALTH | Facility: CLINIC | Age: 24
End: 2019-06-07

## 2019-06-14 ENCOUNTER — OFFICE VISIT - HEALTHEAST (OUTPATIENT)
Dept: BEHAVIORAL HEALTH | Facility: CLINIC | Age: 24
End: 2019-06-14

## 2019-06-14 ENCOUNTER — AMBULATORY - HEALTHEAST (OUTPATIENT)
Dept: BEHAVIORAL HEALTH | Facility: CLINIC | Age: 24
End: 2019-06-14

## 2019-06-14 DIAGNOSIS — F41.1 GAD (GENERALIZED ANXIETY DISORDER): ICD-10-CM

## 2019-06-14 DIAGNOSIS — F90.9 ATTENTION DEFICIT HYPERACTIVITY DISORDER (ADHD), UNSPECIFIED ADHD TYPE: ICD-10-CM

## 2019-06-14 DIAGNOSIS — F31.81 BIPOLAR 2 DISORDER (H): ICD-10-CM

## 2019-06-24 ENCOUNTER — OFFICE VISIT - HEALTHEAST (OUTPATIENT)
Dept: FAMILY MEDICINE | Facility: CLINIC | Age: 24
End: 2019-06-24

## 2019-06-24 DIAGNOSIS — F90.0 ATTENTION DEFICIT HYPERACTIVITY DISORDER (ADHD), PREDOMINANTLY INATTENTIVE TYPE: ICD-10-CM

## 2019-06-24 DIAGNOSIS — B86 SCABIES: ICD-10-CM

## 2019-06-24 ASSESSMENT — MIFFLIN-ST. JEOR: SCORE: 1212.62

## 2019-06-27 ENCOUNTER — COMMUNICATION - HEALTHEAST (OUTPATIENT)
Dept: FAMILY MEDICINE | Facility: CLINIC | Age: 24
End: 2019-06-27

## 2019-06-27 DIAGNOSIS — F90.0 ATTENTION DEFICIT HYPERACTIVITY DISORDER (ADHD), PREDOMINANTLY INATTENTIVE TYPE: ICD-10-CM

## 2019-06-28 ENCOUNTER — OFFICE VISIT - HEALTHEAST (OUTPATIENT)
Dept: BEHAVIORAL HEALTH | Facility: CLINIC | Age: 24
End: 2019-06-28

## 2019-06-28 DIAGNOSIS — F90.9 ATTENTION DEFICIT HYPERACTIVITY DISORDER (ADHD), UNSPECIFIED ADHD TYPE: ICD-10-CM

## 2019-06-28 DIAGNOSIS — F41.1 GAD (GENERALIZED ANXIETY DISORDER): ICD-10-CM

## 2019-07-12 ENCOUNTER — OFFICE VISIT - HEALTHEAST (OUTPATIENT)
Dept: BEHAVIORAL HEALTH | Facility: CLINIC | Age: 24
End: 2019-07-12

## 2019-07-12 DIAGNOSIS — F90.9 ATTENTION DEFICIT HYPERACTIVITY DISORDER (ADHD), UNSPECIFIED ADHD TYPE: ICD-10-CM

## 2019-07-12 DIAGNOSIS — F41.1 GAD (GENERALIZED ANXIETY DISORDER): ICD-10-CM

## 2019-07-24 ENCOUNTER — OFFICE VISIT - HEALTHEAST (OUTPATIENT)
Dept: BEHAVIORAL HEALTH | Facility: CLINIC | Age: 24
End: 2019-07-24

## 2019-07-24 DIAGNOSIS — F41.1 GAD (GENERALIZED ANXIETY DISORDER): ICD-10-CM

## 2019-07-24 DIAGNOSIS — F90.9 ATTENTION DEFICIT HYPERACTIVITY DISORDER (ADHD), UNSPECIFIED ADHD TYPE: ICD-10-CM

## 2019-08-02 ENCOUNTER — COMMUNICATION - HEALTHEAST (OUTPATIENT)
Dept: FAMILY MEDICINE | Facility: CLINIC | Age: 24
End: 2019-08-02

## 2019-08-09 ENCOUNTER — COMMUNICATION - HEALTHEAST (OUTPATIENT)
Dept: FAMILY MEDICINE | Facility: CLINIC | Age: 24
End: 2019-08-09

## 2019-08-16 ENCOUNTER — OFFICE VISIT - HEALTHEAST (OUTPATIENT)
Dept: BEHAVIORAL HEALTH | Facility: CLINIC | Age: 24
End: 2019-08-16

## 2019-08-16 DIAGNOSIS — F41.1 GAD (GENERALIZED ANXIETY DISORDER): ICD-10-CM

## 2019-08-16 DIAGNOSIS — F90.9 ATTENTION DEFICIT HYPERACTIVITY DISORDER (ADHD), UNSPECIFIED ADHD TYPE: ICD-10-CM

## 2019-08-30 ENCOUNTER — OFFICE VISIT - HEALTHEAST (OUTPATIENT)
Dept: BEHAVIORAL HEALTH | Facility: CLINIC | Age: 24
End: 2019-08-30

## 2019-08-30 DIAGNOSIS — F90.9 ATTENTION DEFICIT HYPERACTIVITY DISORDER (ADHD), UNSPECIFIED ADHD TYPE: ICD-10-CM

## 2019-08-30 DIAGNOSIS — F41.1 GAD (GENERALIZED ANXIETY DISORDER): ICD-10-CM

## 2019-09-11 ENCOUNTER — COMMUNICATION - HEALTHEAST (OUTPATIENT)
Dept: FAMILY MEDICINE | Facility: CLINIC | Age: 24
End: 2019-09-11

## 2019-09-16 ENCOUNTER — OFFICE VISIT - HEALTHEAST (OUTPATIENT)
Dept: FAMILY MEDICINE | Facility: CLINIC | Age: 24
End: 2019-09-16

## 2019-09-16 ENCOUNTER — COMMUNICATION - HEALTHEAST (OUTPATIENT)
Dept: FAMILY MEDICINE | Facility: CLINIC | Age: 24
End: 2019-09-16

## 2019-09-16 DIAGNOSIS — F52.9 SEXUAL AROUSAL DISORDER: ICD-10-CM

## 2019-09-16 DIAGNOSIS — F31.81 BIPOLAR 2 DISORDER (H): ICD-10-CM

## 2019-09-16 ASSESSMENT — MIFFLIN-ST. JEOR: SCORE: 1228.5

## 2019-09-16 ASSESSMENT — PATIENT HEALTH QUESTIONNAIRE - PHQ9: SUM OF ALL RESPONSES TO PHQ QUESTIONS 1-9: 14

## 2019-09-20 ENCOUNTER — OFFICE VISIT - HEALTHEAST (OUTPATIENT)
Dept: BEHAVIORAL HEALTH | Facility: CLINIC | Age: 24
End: 2019-09-20

## 2019-09-20 ENCOUNTER — AMBULATORY - HEALTHEAST (OUTPATIENT)
Dept: BEHAVIORAL HEALTH | Facility: CLINIC | Age: 24
End: 2019-09-20

## 2019-09-20 DIAGNOSIS — F90.9 ATTENTION DEFICIT HYPERACTIVITY DISORDER (ADHD), UNSPECIFIED ADHD TYPE: ICD-10-CM

## 2019-09-20 DIAGNOSIS — F41.1 GAD (GENERALIZED ANXIETY DISORDER): ICD-10-CM

## 2019-09-20 DIAGNOSIS — F32.1 CURRENT MODERATE EPISODE OF MAJOR DEPRESSIVE DISORDER, UNSPECIFIED WHETHER RECURRENT (H): ICD-10-CM

## 2019-09-20 ASSESSMENT — ANXIETY QUESTIONNAIRES
GAD7 TOTAL SCORE: 7
3. WORRYING TOO MUCH ABOUT DIFFERENT THINGS: SEVERAL DAYS
6. BECOMING EASILY ANNOYED OR IRRITABLE: SEVERAL DAYS
7. FEELING AFRAID AS IF SOMETHING AWFUL MIGHT HAPPEN: NOT AT ALL
5. BEING SO RESTLESS THAT IT IS HARD TO SIT STILL: SEVERAL DAYS
4. TROUBLE RELAXING: SEVERAL DAYS
2. NOT BEING ABLE TO STOP OR CONTROL WORRYING: SEVERAL DAYS
IF YOU CHECKED OFF ANY PROBLEMS ON THIS QUESTIONNAIRE, HOW DIFFICULT HAVE THESE PROBLEMS MADE IT FOR YOU TO DO YOUR WORK, TAKE CARE OF THINGS AT HOME, OR GET ALONG WITH OTHER PEOPLE: VERY DIFFICULT
1. FEELING NERVOUS, ANXIOUS, OR ON EDGE: MORE THAN HALF THE DAYS

## 2019-09-20 ASSESSMENT — PATIENT HEALTH QUESTIONNAIRE - PHQ9: SUM OF ALL RESPONSES TO PHQ QUESTIONS 1-9: 11

## 2019-09-29 ENCOUNTER — COMMUNICATION - HEALTHEAST (OUTPATIENT)
Dept: FAMILY MEDICINE | Facility: CLINIC | Age: 24
End: 2019-09-29

## 2019-09-29 DIAGNOSIS — K58.2 IRRITABLE BOWEL SYNDROME WITH BOTH CONSTIPATION AND DIARRHEA: ICD-10-CM

## 2019-09-30 ENCOUNTER — OFFICE VISIT - HEALTHEAST (OUTPATIENT)
Dept: BEHAVIORAL HEALTH | Facility: CLINIC | Age: 24
End: 2019-09-30

## 2019-09-30 DIAGNOSIS — F90.9 ATTENTION DEFICIT HYPERACTIVITY DISORDER (ADHD), UNSPECIFIED ADHD TYPE: ICD-10-CM

## 2019-09-30 DIAGNOSIS — F41.1 GAD (GENERALIZED ANXIETY DISORDER): ICD-10-CM

## 2019-10-17 ENCOUNTER — OFFICE VISIT - HEALTHEAST (OUTPATIENT)
Dept: BEHAVIORAL HEALTH | Facility: CLINIC | Age: 24
End: 2019-10-17

## 2019-10-17 DIAGNOSIS — F90.9 ATTENTION DEFICIT HYPERACTIVITY DISORDER (ADHD), UNSPECIFIED ADHD TYPE: ICD-10-CM

## 2019-10-17 DIAGNOSIS — F41.1 GAD (GENERALIZED ANXIETY DISORDER): ICD-10-CM

## 2019-10-28 ENCOUNTER — OFFICE VISIT - HEALTHEAST (OUTPATIENT)
Dept: BEHAVIORAL HEALTH | Facility: CLINIC | Age: 24
End: 2019-10-28

## 2019-10-28 DIAGNOSIS — F41.1 GAD (GENERALIZED ANXIETY DISORDER): ICD-10-CM

## 2019-10-28 DIAGNOSIS — F90.9 ATTENTION DEFICIT HYPERACTIVITY DISORDER (ADHD), UNSPECIFIED ADHD TYPE: ICD-10-CM

## 2019-10-28 DIAGNOSIS — F32.1 CURRENT MODERATE EPISODE OF MAJOR DEPRESSIVE DISORDER, UNSPECIFIED WHETHER RECURRENT (H): ICD-10-CM

## 2019-11-01 ENCOUNTER — OFFICE VISIT - HEALTHEAST (OUTPATIENT)
Dept: FAMILY MEDICINE | Facility: CLINIC | Age: 24
End: 2019-11-01

## 2019-11-01 DIAGNOSIS — Z12.4 CERVICAL CANCER SCREENING: ICD-10-CM

## 2019-11-01 DIAGNOSIS — Z00.00 ENCOUNTER FOR GENERAL ADULT MEDICAL EXAMINATION WITHOUT ABNORMAL FINDINGS: ICD-10-CM

## 2019-11-01 ASSESSMENT — MIFFLIN-ST. JEOR: SCORE: 1234.16

## 2019-11-04 LAB
C TRACH DNA SPEC QL PROBE+SIG AMP: NEGATIVE
N GONORRHOEA DNA SPEC QL NAA+PROBE: NEGATIVE

## 2019-11-08 ENCOUNTER — COMMUNICATION - HEALTHEAST (OUTPATIENT)
Dept: BEHAVIORAL HEALTH | Facility: CLINIC | Age: 24
End: 2019-11-08

## 2019-11-08 DIAGNOSIS — F41.1 GAD (GENERALIZED ANXIETY DISORDER): ICD-10-CM

## 2019-11-15 ENCOUNTER — OFFICE VISIT - HEALTHEAST (OUTPATIENT)
Dept: BEHAVIORAL HEALTH | Facility: CLINIC | Age: 24
End: 2019-11-15

## 2019-11-15 DIAGNOSIS — F84.0 AUTISM SPECTRUM DISORDER: ICD-10-CM

## 2019-11-15 DIAGNOSIS — F32.1 CURRENT MODERATE EPISODE OF MAJOR DEPRESSIVE DISORDER, UNSPECIFIED WHETHER RECURRENT (H): ICD-10-CM

## 2019-11-15 DIAGNOSIS — F41.1 GAD (GENERALIZED ANXIETY DISORDER): ICD-10-CM

## 2019-11-15 DIAGNOSIS — F90.9 ATTENTION DEFICIT HYPERACTIVITY DISORDER (ADHD), UNSPECIFIED ADHD TYPE: ICD-10-CM

## 2019-11-25 ENCOUNTER — OFFICE VISIT - HEALTHEAST (OUTPATIENT)
Dept: BEHAVIORAL HEALTH | Facility: CLINIC | Age: 24
End: 2019-11-25

## 2019-11-25 DIAGNOSIS — F41.1 GAD (GENERALIZED ANXIETY DISORDER): ICD-10-CM

## 2019-11-25 DIAGNOSIS — F84.0 AUTISM SPECTRUM DISORDER: ICD-10-CM

## 2019-11-25 DIAGNOSIS — F90.9 ATTENTION DEFICIT HYPERACTIVITY DISORDER (ADHD), UNSPECIFIED ADHD TYPE: ICD-10-CM

## 2019-11-25 DIAGNOSIS — F32.1 CURRENT MODERATE EPISODE OF MAJOR DEPRESSIVE DISORDER, UNSPECIFIED WHETHER RECURRENT (H): ICD-10-CM

## 2019-12-09 ENCOUNTER — COMMUNICATION - HEALTHEAST (OUTPATIENT)
Dept: BEHAVIORAL HEALTH | Facility: CLINIC | Age: 24
End: 2019-12-09

## 2019-12-09 ENCOUNTER — COMMUNICATION - HEALTHEAST (OUTPATIENT)
Dept: FAMILY MEDICINE | Facility: CLINIC | Age: 24
End: 2019-12-09

## 2019-12-09 DIAGNOSIS — F41.1 GAD (GENERALIZED ANXIETY DISORDER): ICD-10-CM

## 2019-12-13 ENCOUNTER — OFFICE VISIT - HEALTHEAST (OUTPATIENT)
Dept: BEHAVIORAL HEALTH | Facility: CLINIC | Age: 24
End: 2019-12-13

## 2019-12-13 ENCOUNTER — AMBULATORY - HEALTHEAST (OUTPATIENT)
Dept: BEHAVIORAL HEALTH | Facility: CLINIC | Age: 24
End: 2019-12-13

## 2019-12-13 DIAGNOSIS — F84.0 AUTISM SPECTRUM DISORDER: ICD-10-CM

## 2019-12-13 DIAGNOSIS — F90.9 ATTENTION DEFICIT HYPERACTIVITY DISORDER (ADHD), UNSPECIFIED ADHD TYPE: ICD-10-CM

## 2019-12-13 DIAGNOSIS — F32.1 CURRENT MODERATE EPISODE OF MAJOR DEPRESSIVE DISORDER, UNSPECIFIED WHETHER RECURRENT (H): ICD-10-CM

## 2019-12-13 DIAGNOSIS — F41.1 GAD (GENERALIZED ANXIETY DISORDER): ICD-10-CM

## 2019-12-13 ASSESSMENT — ANXIETY QUESTIONNAIRES
2. NOT BEING ABLE TO STOP OR CONTROL WORRYING: MORE THAN HALF THE DAYS
1. FEELING NERVOUS, ANXIOUS, OR ON EDGE: NEARLY EVERY DAY
3. WORRYING TOO MUCH ABOUT DIFFERENT THINGS: MORE THAN HALF THE DAYS
4. TROUBLE RELAXING: NOT AT ALL
5. BEING SO RESTLESS THAT IT IS HARD TO SIT STILL: NOT AT ALL
GAD7 TOTAL SCORE: 10
7. FEELING AFRAID AS IF SOMETHING AWFUL MIGHT HAPPEN: NOT AT ALL
6. BECOMING EASILY ANNOYED OR IRRITABLE: NEARLY EVERY DAY
IF YOU CHECKED OFF ANY PROBLEMS ON THIS QUESTIONNAIRE, HOW DIFFICULT HAVE THESE PROBLEMS MADE IT FOR YOU TO DO YOUR WORK, TAKE CARE OF THINGS AT HOME, OR GET ALONG WITH OTHER PEOPLE: VERY DIFFICULT

## 2019-12-13 ASSESSMENT — PATIENT HEALTH QUESTIONNAIRE - PHQ9: SUM OF ALL RESPONSES TO PHQ QUESTIONS 1-9: 13

## 2019-12-14 ENCOUNTER — COMMUNICATION - HEALTHEAST (OUTPATIENT)
Dept: BEHAVIORAL HEALTH | Facility: CLINIC | Age: 24
End: 2019-12-14

## 2019-12-14 DIAGNOSIS — F41.1 GAD (GENERALIZED ANXIETY DISORDER): ICD-10-CM

## 2019-12-20 ENCOUNTER — COMMUNICATION - HEALTHEAST (OUTPATIENT)
Dept: BEHAVIORAL HEALTH | Facility: CLINIC | Age: 24
End: 2019-12-20

## 2019-12-20 ENCOUNTER — COMMUNICATION - HEALTHEAST (OUTPATIENT)
Dept: FAMILY MEDICINE | Facility: CLINIC | Age: 24
End: 2019-12-20

## 2019-12-20 DIAGNOSIS — F41.1 GAD (GENERALIZED ANXIETY DISORDER): ICD-10-CM

## 2019-12-30 ENCOUNTER — OFFICE VISIT - HEALTHEAST (OUTPATIENT)
Dept: BEHAVIORAL HEALTH | Facility: CLINIC | Age: 24
End: 2019-12-30

## 2019-12-30 DIAGNOSIS — F41.1 GAD (GENERALIZED ANXIETY DISORDER): ICD-10-CM

## 2019-12-30 DIAGNOSIS — F32.1 CURRENT MODERATE EPISODE OF MAJOR DEPRESSIVE DISORDER, UNSPECIFIED WHETHER RECURRENT (H): ICD-10-CM

## 2019-12-30 DIAGNOSIS — F84.0 AUTISM SPECTRUM DISORDER: ICD-10-CM

## 2019-12-30 DIAGNOSIS — F90.9 ATTENTION DEFICIT HYPERACTIVITY DISORDER (ADHD), UNSPECIFIED ADHD TYPE: ICD-10-CM

## 2020-01-14 ENCOUNTER — OFFICE VISIT - HEALTHEAST (OUTPATIENT)
Dept: BEHAVIORAL HEALTH | Facility: CLINIC | Age: 25
End: 2020-01-14

## 2020-01-14 DIAGNOSIS — F32.1 CURRENT MODERATE EPISODE OF MAJOR DEPRESSIVE DISORDER, UNSPECIFIED WHETHER RECURRENT (H): ICD-10-CM

## 2020-01-14 DIAGNOSIS — F41.1 GAD (GENERALIZED ANXIETY DISORDER): ICD-10-CM

## 2020-01-14 DIAGNOSIS — F90.9 ATTENTION DEFICIT HYPERACTIVITY DISORDER (ADHD), UNSPECIFIED ADHD TYPE: ICD-10-CM

## 2020-01-14 DIAGNOSIS — F84.0 AUTISM SPECTRUM DISORDER: ICD-10-CM

## 2020-01-18 ENCOUNTER — COMMUNICATION - HEALTHEAST (OUTPATIENT)
Dept: FAMILY MEDICINE | Facility: CLINIC | Age: 25
End: 2020-01-18

## 2020-01-18 DIAGNOSIS — F41.1 GAD (GENERALIZED ANXIETY DISORDER): ICD-10-CM

## 2020-01-31 ENCOUNTER — OFFICE VISIT - HEALTHEAST (OUTPATIENT)
Dept: BEHAVIORAL HEALTH | Facility: CLINIC | Age: 25
End: 2020-01-31

## 2020-01-31 DIAGNOSIS — F32.1 CURRENT MODERATE EPISODE OF MAJOR DEPRESSIVE DISORDER, UNSPECIFIED WHETHER RECURRENT (H): ICD-10-CM

## 2020-01-31 DIAGNOSIS — F41.1 GAD (GENERALIZED ANXIETY DISORDER): ICD-10-CM

## 2020-01-31 DIAGNOSIS — F84.0 AUTISM SPECTRUM DISORDER: ICD-10-CM

## 2020-01-31 DIAGNOSIS — F90.9 ATTENTION DEFICIT HYPERACTIVITY DISORDER (ADHD), UNSPECIFIED ADHD TYPE: ICD-10-CM

## 2020-02-19 ENCOUNTER — COMMUNICATION - HEALTHEAST (OUTPATIENT)
Dept: FAMILY MEDICINE | Facility: CLINIC | Age: 25
End: 2020-02-19

## 2020-02-19 ENCOUNTER — OFFICE VISIT - HEALTHEAST (OUTPATIENT)
Dept: BEHAVIORAL HEALTH | Facility: CLINIC | Age: 25
End: 2020-02-19

## 2020-02-19 ENCOUNTER — AMBULATORY - HEALTHEAST (OUTPATIENT)
Dept: FAMILY MEDICINE | Facility: CLINIC | Age: 25
End: 2020-02-19

## 2020-02-19 DIAGNOSIS — F41.1 GAD (GENERALIZED ANXIETY DISORDER): ICD-10-CM

## 2020-02-19 DIAGNOSIS — F32.1 CURRENT MODERATE EPISODE OF MAJOR DEPRESSIVE DISORDER, UNSPECIFIED WHETHER RECURRENT (H): ICD-10-CM

## 2020-02-19 DIAGNOSIS — F84.0 AUTISM SPECTRUM DISORDER: ICD-10-CM

## 2020-02-19 DIAGNOSIS — F90.9 ATTENTION DEFICIT HYPERACTIVITY DISORDER (ADHD), UNSPECIFIED ADHD TYPE: ICD-10-CM

## 2020-02-21 ENCOUNTER — HOME CARE/HOSPICE - HEALTHEAST (OUTPATIENT)
Dept: HOME HEALTH SERVICES | Facility: HOME HEALTH | Age: 25
End: 2020-02-21

## 2020-02-21 ENCOUNTER — AMBULATORY - HEALTHEAST (OUTPATIENT)
Dept: FAMILY MEDICINE | Facility: CLINIC | Age: 25
End: 2020-02-21

## 2020-02-21 DIAGNOSIS — F90.2 ATTENTION DEFICIT HYPERACTIVITY DISORDER (ADHD), COMBINED TYPE: ICD-10-CM

## 2020-02-21 DIAGNOSIS — F33.1 MODERATE EPISODE OF RECURRENT MAJOR DEPRESSIVE DISORDER (H): ICD-10-CM

## 2020-02-21 DIAGNOSIS — F84.0 AUTISM SPECTRUM DISORDER: ICD-10-CM

## 2020-02-22 ENCOUNTER — COMMUNICATION - HEALTHEAST (OUTPATIENT)
Dept: HOME HEALTH SERVICES | Facility: HOME HEALTH | Age: 25
End: 2020-02-22

## 2020-02-26 ENCOUNTER — COMMUNICATION - HEALTHEAST (OUTPATIENT)
Dept: BEHAVIORAL HEALTH | Facility: CLINIC | Age: 25
End: 2020-02-26

## 2020-03-02 ENCOUNTER — HEALTH MAINTENANCE LETTER (OUTPATIENT)
Age: 25
End: 2020-03-02

## 2020-03-04 ENCOUNTER — OFFICE VISIT - HEALTHEAST (OUTPATIENT)
Dept: BEHAVIORAL HEALTH | Facility: CLINIC | Age: 25
End: 2020-03-04

## 2020-03-04 DIAGNOSIS — F84.0 AUTISM SPECTRUM DISORDER: ICD-10-CM

## 2020-03-04 DIAGNOSIS — F41.1 GAD (GENERALIZED ANXIETY DISORDER): ICD-10-CM

## 2020-03-04 DIAGNOSIS — F90.9 ATTENTION DEFICIT HYPERACTIVITY DISORDER (ADHD), UNSPECIFIED ADHD TYPE: ICD-10-CM

## 2020-03-04 DIAGNOSIS — F32.1 CURRENT MODERATE EPISODE OF MAJOR DEPRESSIVE DISORDER, UNSPECIFIED WHETHER RECURRENT (H): ICD-10-CM

## 2020-03-13 ENCOUNTER — OFFICE VISIT - HEALTHEAST (OUTPATIENT)
Dept: BEHAVIORAL HEALTH | Facility: CLINIC | Age: 25
End: 2020-03-13

## 2020-03-13 DIAGNOSIS — F41.1 GAD (GENERALIZED ANXIETY DISORDER): ICD-10-CM

## 2020-03-13 DIAGNOSIS — F32.1 CURRENT MODERATE EPISODE OF MAJOR DEPRESSIVE DISORDER, UNSPECIFIED WHETHER RECURRENT (H): ICD-10-CM

## 2020-03-13 DIAGNOSIS — F84.0 AUTISM SPECTRUM DISORDER: ICD-10-CM

## 2020-03-13 DIAGNOSIS — F90.9 ATTENTION DEFICIT HYPERACTIVITY DISORDER (ADHD), UNSPECIFIED ADHD TYPE: ICD-10-CM

## 2020-03-20 ENCOUNTER — OFFICE VISIT - HEALTHEAST (OUTPATIENT)
Dept: BEHAVIORAL HEALTH | Facility: CLINIC | Age: 25
End: 2020-03-20

## 2020-03-20 DIAGNOSIS — F41.1 GAD (GENERALIZED ANXIETY DISORDER): ICD-10-CM

## 2020-03-20 DIAGNOSIS — F84.0 AUTISM SPECTRUM DISORDER: ICD-10-CM

## 2020-03-20 DIAGNOSIS — F32.1 CURRENT MODERATE EPISODE OF MAJOR DEPRESSIVE DISORDER, UNSPECIFIED WHETHER RECURRENT (H): ICD-10-CM

## 2020-03-20 DIAGNOSIS — F90.9 ATTENTION DEFICIT HYPERACTIVITY DISORDER (ADHD), UNSPECIFIED ADHD TYPE: ICD-10-CM

## 2020-03-27 ENCOUNTER — AMBULATORY - HEALTHEAST (OUTPATIENT)
Dept: BEHAVIORAL HEALTH | Facility: CLINIC | Age: 25
End: 2020-03-27

## 2020-03-27 ENCOUNTER — OFFICE VISIT - HEALTHEAST (OUTPATIENT)
Dept: BEHAVIORAL HEALTH | Facility: CLINIC | Age: 25
End: 2020-03-27

## 2020-03-27 DIAGNOSIS — F32.1 CURRENT MODERATE EPISODE OF MAJOR DEPRESSIVE DISORDER, UNSPECIFIED WHETHER RECURRENT (H): ICD-10-CM

## 2020-03-27 DIAGNOSIS — F41.1 GAD (GENERALIZED ANXIETY DISORDER): ICD-10-CM

## 2020-03-27 DIAGNOSIS — F84.0 AUTISM SPECTRUM DISORDER: ICD-10-CM

## 2020-03-27 DIAGNOSIS — F90.9 ATTENTION DEFICIT HYPERACTIVITY DISORDER (ADHD), UNSPECIFIED ADHD TYPE: ICD-10-CM

## 2020-04-03 ENCOUNTER — OFFICE VISIT - HEALTHEAST (OUTPATIENT)
Dept: BEHAVIORAL HEALTH | Facility: CLINIC | Age: 25
End: 2020-04-03

## 2020-04-03 DIAGNOSIS — F84.0 AUTISM SPECTRUM DISORDER: ICD-10-CM

## 2020-04-03 DIAGNOSIS — F90.9 ATTENTION DEFICIT HYPERACTIVITY DISORDER (ADHD), UNSPECIFIED ADHD TYPE: ICD-10-CM

## 2020-04-03 DIAGNOSIS — F32.1 CURRENT MODERATE EPISODE OF MAJOR DEPRESSIVE DISORDER, UNSPECIFIED WHETHER RECURRENT (H): ICD-10-CM

## 2020-04-03 DIAGNOSIS — F41.1 GAD (GENERALIZED ANXIETY DISORDER): ICD-10-CM

## 2020-04-10 ENCOUNTER — OFFICE VISIT - HEALTHEAST (OUTPATIENT)
Dept: BEHAVIORAL HEALTH | Facility: CLINIC | Age: 25
End: 2020-04-10

## 2020-04-10 DIAGNOSIS — F41.1 GAD (GENERALIZED ANXIETY DISORDER): ICD-10-CM

## 2020-04-10 DIAGNOSIS — F32.1 CURRENT MODERATE EPISODE OF MAJOR DEPRESSIVE DISORDER, UNSPECIFIED WHETHER RECURRENT (H): ICD-10-CM

## 2020-04-10 DIAGNOSIS — F84.0 AUTISM SPECTRUM DISORDER: ICD-10-CM

## 2020-04-10 DIAGNOSIS — F90.9 ATTENTION DEFICIT HYPERACTIVITY DISORDER (ADHD), UNSPECIFIED ADHD TYPE: ICD-10-CM

## 2020-04-24 ENCOUNTER — OFFICE VISIT - HEALTHEAST (OUTPATIENT)
Dept: BEHAVIORAL HEALTH | Facility: CLINIC | Age: 25
End: 2020-04-24

## 2020-04-24 DIAGNOSIS — F41.1 GAD (GENERALIZED ANXIETY DISORDER): ICD-10-CM

## 2020-04-24 DIAGNOSIS — F32.1 CURRENT MODERATE EPISODE OF MAJOR DEPRESSIVE DISORDER, UNSPECIFIED WHETHER RECURRENT (H): ICD-10-CM

## 2020-04-24 DIAGNOSIS — F84.0 AUTISM SPECTRUM DISORDER: ICD-10-CM

## 2020-04-24 DIAGNOSIS — F90.9 ATTENTION DEFICIT HYPERACTIVITY DISORDER (ADHD), UNSPECIFIED ADHD TYPE: ICD-10-CM

## 2020-05-01 ENCOUNTER — OFFICE VISIT - HEALTHEAST (OUTPATIENT)
Dept: BEHAVIORAL HEALTH | Facility: CLINIC | Age: 25
End: 2020-05-01

## 2020-05-01 DIAGNOSIS — F32.1 CURRENT MODERATE EPISODE OF MAJOR DEPRESSIVE DISORDER, UNSPECIFIED WHETHER RECURRENT (H): ICD-10-CM

## 2020-05-01 DIAGNOSIS — F90.9 ATTENTION DEFICIT HYPERACTIVITY DISORDER (ADHD), UNSPECIFIED ADHD TYPE: ICD-10-CM

## 2020-05-01 DIAGNOSIS — F41.1 GAD (GENERALIZED ANXIETY DISORDER): ICD-10-CM

## 2020-05-01 DIAGNOSIS — F84.0 AUTISM SPECTRUM DISORDER: ICD-10-CM

## 2020-05-04 ENCOUNTER — COMMUNICATION - HEALTHEAST (OUTPATIENT)
Dept: FAMILY MEDICINE | Facility: CLINIC | Age: 25
End: 2020-05-04

## 2020-05-04 DIAGNOSIS — R56.9 SEIZURE-LIKE ACTIVITY (H): ICD-10-CM

## 2020-05-08 ENCOUNTER — OFFICE VISIT - HEALTHEAST (OUTPATIENT)
Dept: BEHAVIORAL HEALTH | Facility: CLINIC | Age: 25
End: 2020-05-08

## 2020-05-08 DIAGNOSIS — F90.9 ATTENTION DEFICIT HYPERACTIVITY DISORDER (ADHD), UNSPECIFIED ADHD TYPE: ICD-10-CM

## 2020-05-08 DIAGNOSIS — F41.1 GAD (GENERALIZED ANXIETY DISORDER): ICD-10-CM

## 2020-05-08 DIAGNOSIS — F84.0 AUTISM SPECTRUM DISORDER: ICD-10-CM

## 2020-05-08 DIAGNOSIS — F32.1 CURRENT MODERATE EPISODE OF MAJOR DEPRESSIVE DISORDER, UNSPECIFIED WHETHER RECURRENT (H): ICD-10-CM

## 2020-05-15 ENCOUNTER — OFFICE VISIT - HEALTHEAST (OUTPATIENT)
Dept: BEHAVIORAL HEALTH | Facility: CLINIC | Age: 25
End: 2020-05-15

## 2020-05-15 DIAGNOSIS — F90.9 ATTENTION DEFICIT HYPERACTIVITY DISORDER (ADHD), UNSPECIFIED ADHD TYPE: ICD-10-CM

## 2020-05-15 DIAGNOSIS — F32.1 CURRENT MODERATE EPISODE OF MAJOR DEPRESSIVE DISORDER, UNSPECIFIED WHETHER RECURRENT (H): ICD-10-CM

## 2020-05-15 DIAGNOSIS — F41.1 GAD (GENERALIZED ANXIETY DISORDER): ICD-10-CM

## 2020-05-15 DIAGNOSIS — F84.0 AUTISM SPECTRUM DISORDER: ICD-10-CM

## 2020-05-27 ENCOUNTER — COMMUNICATION - HEALTHEAST (OUTPATIENT)
Dept: HOME HEALTH SERVICES | Facility: HOME HEALTH | Age: 25
End: 2020-05-27

## 2020-05-27 DIAGNOSIS — F84.0 AUTISM SPECTRUM DISORDER: ICD-10-CM

## 2020-05-27 DIAGNOSIS — F41.1 GAD (GENERALIZED ANXIETY DISORDER): ICD-10-CM

## 2020-05-27 DIAGNOSIS — K58.2 IRRITABLE BOWEL SYNDROME WITH BOTH CONSTIPATION AND DIARRHEA: ICD-10-CM

## 2020-05-27 DIAGNOSIS — R56.9 SEIZURE-LIKE ACTIVITY (H): ICD-10-CM

## 2020-05-27 DIAGNOSIS — J45.30 MILD PERSISTENT ASTHMA, UNSPECIFIED WHETHER COMPLICATED: ICD-10-CM

## 2020-05-27 DIAGNOSIS — F90.0 ATTENTION DEFICIT HYPERACTIVITY DISORDER (ADHD), PREDOMINANTLY INATTENTIVE TYPE: ICD-10-CM

## 2020-05-27 DIAGNOSIS — F33.1 MODERATE EPISODE OF RECURRENT MAJOR DEPRESSIVE DISORDER (H): ICD-10-CM

## 2020-05-28 ENCOUNTER — COMMUNICATION - HEALTHEAST (OUTPATIENT)
Dept: FAMILY MEDICINE | Facility: CLINIC | Age: 25
End: 2020-05-28

## 2020-05-28 ENCOUNTER — AMBULATORY - HEALTHEAST (OUTPATIENT)
Dept: FAMILY MEDICINE | Facility: CLINIC | Age: 25
End: 2020-05-28

## 2020-05-28 DIAGNOSIS — K58.2 IRRITABLE BOWEL SYNDROME WITH BOTH CONSTIPATION AND DIARRHEA: ICD-10-CM

## 2020-05-28 DIAGNOSIS — F90.0 ATTENTION DEFICIT HYPERACTIVITY DISORDER (ADHD), PREDOMINANTLY INATTENTIVE TYPE: ICD-10-CM

## 2020-05-28 DIAGNOSIS — F41.1 GAD (GENERALIZED ANXIETY DISORDER): ICD-10-CM

## 2020-05-28 DIAGNOSIS — R56.9 SEIZURE-LIKE ACTIVITY (H): ICD-10-CM

## 2020-05-28 DIAGNOSIS — J45.30 MILD PERSISTENT ASTHMA, UNSPECIFIED WHETHER COMPLICATED: ICD-10-CM

## 2020-05-29 ENCOUNTER — COMMUNICATION - HEALTHEAST (OUTPATIENT)
Dept: NURSING | Facility: CLINIC | Age: 25
End: 2020-05-29

## 2020-06-01 ENCOUNTER — COMMUNICATION - HEALTHEAST (OUTPATIENT)
Dept: NURSING | Facility: CLINIC | Age: 25
End: 2020-06-01

## 2020-06-01 ENCOUNTER — COMMUNICATION - HEALTHEAST (OUTPATIENT)
Dept: CARE COORDINATION | Facility: CLINIC | Age: 25
End: 2020-06-01

## 2020-06-01 ASSESSMENT — ACTIVITIES OF DAILY LIVING (ADL): DEPENDENT_IADLS:: INDEPENDENT

## 2020-06-02 ENCOUNTER — COMMUNICATION - HEALTHEAST (OUTPATIENT)
Dept: FAMILY MEDICINE | Facility: CLINIC | Age: 25
End: 2020-06-02

## 2020-06-04 ENCOUNTER — COMMUNICATION - HEALTHEAST (OUTPATIENT)
Dept: NURSING | Facility: CLINIC | Age: 25
End: 2020-06-04

## 2020-06-05 ENCOUNTER — OFFICE VISIT - HEALTHEAST (OUTPATIENT)
Dept: BEHAVIORAL HEALTH | Facility: CLINIC | Age: 25
End: 2020-06-05

## 2020-06-05 DIAGNOSIS — F41.1 GAD (GENERALIZED ANXIETY DISORDER): ICD-10-CM

## 2020-06-05 DIAGNOSIS — F32.1 CURRENT MODERATE EPISODE OF MAJOR DEPRESSIVE DISORDER, UNSPECIFIED WHETHER RECURRENT (H): ICD-10-CM

## 2020-06-05 DIAGNOSIS — F90.9 ATTENTION DEFICIT HYPERACTIVITY DISORDER (ADHD), UNSPECIFIED ADHD TYPE: ICD-10-CM

## 2020-06-05 DIAGNOSIS — F84.0 AUTISM SPECTRUM DISORDER: ICD-10-CM

## 2020-06-08 ENCOUNTER — COMMUNICATION - HEALTHEAST (OUTPATIENT)
Dept: NURSING | Facility: CLINIC | Age: 25
End: 2020-06-08

## 2020-06-09 ENCOUNTER — COMMUNICATION - HEALTHEAST (OUTPATIENT)
Dept: CARE COORDINATION | Facility: CLINIC | Age: 25
End: 2020-06-09

## 2020-06-11 ENCOUNTER — COMMUNICATION - HEALTHEAST (OUTPATIENT)
Dept: CARE COORDINATION | Facility: CLINIC | Age: 25
End: 2020-06-11

## 2020-06-12 ENCOUNTER — OFFICE VISIT - HEALTHEAST (OUTPATIENT)
Dept: BEHAVIORAL HEALTH | Facility: CLINIC | Age: 25
End: 2020-06-12

## 2020-06-12 ENCOUNTER — AMBULATORY - HEALTHEAST (OUTPATIENT)
Dept: BEHAVIORAL HEALTH | Facility: CLINIC | Age: 25
End: 2020-06-12

## 2020-06-12 ENCOUNTER — COMMUNICATION - HEALTHEAST (OUTPATIENT)
Dept: BEHAVIORAL HEALTH | Facility: CLINIC | Age: 25
End: 2020-06-12

## 2020-06-12 DIAGNOSIS — F32.1 CURRENT MODERATE EPISODE OF MAJOR DEPRESSIVE DISORDER, UNSPECIFIED WHETHER RECURRENT (H): ICD-10-CM

## 2020-06-12 DIAGNOSIS — F41.1 GAD (GENERALIZED ANXIETY DISORDER): ICD-10-CM

## 2020-06-12 DIAGNOSIS — F84.0 AUTISM SPECTRUM DISORDER: ICD-10-CM

## 2020-06-12 DIAGNOSIS — F90.9 ATTENTION DEFICIT HYPERACTIVITY DISORDER (ADHD), UNSPECIFIED ADHD TYPE: ICD-10-CM

## 2020-06-19 ENCOUNTER — OFFICE VISIT - HEALTHEAST (OUTPATIENT)
Dept: BEHAVIORAL HEALTH | Facility: CLINIC | Age: 25
End: 2020-06-19

## 2020-06-19 DIAGNOSIS — F90.9 ATTENTION DEFICIT HYPERACTIVITY DISORDER (ADHD), UNSPECIFIED ADHD TYPE: ICD-10-CM

## 2020-06-19 DIAGNOSIS — F41.1 GAD (GENERALIZED ANXIETY DISORDER): ICD-10-CM

## 2020-06-19 DIAGNOSIS — F84.0 AUTISM SPECTRUM DISORDER: ICD-10-CM

## 2020-06-19 DIAGNOSIS — F32.1 CURRENT MODERATE EPISODE OF MAJOR DEPRESSIVE DISORDER, UNSPECIFIED WHETHER RECURRENT (H): ICD-10-CM

## 2020-06-22 ENCOUNTER — COMMUNICATION - HEALTHEAST (OUTPATIENT)
Dept: CARE COORDINATION | Facility: CLINIC | Age: 25
End: 2020-06-22

## 2020-06-25 ENCOUNTER — COMMUNICATION - HEALTHEAST (OUTPATIENT)
Dept: FAMILY MEDICINE | Facility: CLINIC | Age: 25
End: 2020-06-25

## 2020-06-26 ENCOUNTER — COMMUNICATION - HEALTHEAST (OUTPATIENT)
Dept: CARE COORDINATION | Facility: CLINIC | Age: 25
End: 2020-06-26

## 2020-06-26 ENCOUNTER — OFFICE VISIT - HEALTHEAST (OUTPATIENT)
Dept: BEHAVIORAL HEALTH | Facility: CLINIC | Age: 25
End: 2020-06-26

## 2020-06-26 DIAGNOSIS — F90.9 ATTENTION DEFICIT HYPERACTIVITY DISORDER (ADHD), UNSPECIFIED ADHD TYPE: ICD-10-CM

## 2020-06-26 DIAGNOSIS — F41.1 GAD (GENERALIZED ANXIETY DISORDER): ICD-10-CM

## 2020-06-26 DIAGNOSIS — F84.0 AUTISM SPECTRUM DISORDER: ICD-10-CM

## 2020-06-26 DIAGNOSIS — F32.1 CURRENT MODERATE EPISODE OF MAJOR DEPRESSIVE DISORDER, UNSPECIFIED WHETHER RECURRENT (H): ICD-10-CM

## 2020-07-03 ENCOUNTER — OFFICE VISIT - HEALTHEAST (OUTPATIENT)
Dept: BEHAVIORAL HEALTH | Facility: CLINIC | Age: 25
End: 2020-07-03

## 2020-07-03 DIAGNOSIS — F90.9 ATTENTION DEFICIT HYPERACTIVITY DISORDER (ADHD), UNSPECIFIED ADHD TYPE: ICD-10-CM

## 2020-07-03 DIAGNOSIS — F41.1 GAD (GENERALIZED ANXIETY DISORDER): ICD-10-CM

## 2020-07-03 DIAGNOSIS — F32.1 CURRENT MODERATE EPISODE OF MAJOR DEPRESSIVE DISORDER, UNSPECIFIED WHETHER RECURRENT (H): ICD-10-CM

## 2020-07-03 DIAGNOSIS — F84.0 AUTISM SPECTRUM DISORDER: ICD-10-CM

## 2020-07-08 ENCOUNTER — COMMUNICATION - HEALTHEAST (OUTPATIENT)
Dept: NURSING | Facility: CLINIC | Age: 25
End: 2020-07-08

## 2020-07-17 ENCOUNTER — OFFICE VISIT - HEALTHEAST (OUTPATIENT)
Dept: BEHAVIORAL HEALTH | Facility: CLINIC | Age: 25
End: 2020-07-17

## 2020-07-17 DIAGNOSIS — F90.9 ATTENTION DEFICIT HYPERACTIVITY DISORDER (ADHD), UNSPECIFIED ADHD TYPE: ICD-10-CM

## 2020-07-17 DIAGNOSIS — F84.0 AUTISM SPECTRUM DISORDER: ICD-10-CM

## 2020-07-17 DIAGNOSIS — F41.1 GAD (GENERALIZED ANXIETY DISORDER): ICD-10-CM

## 2020-07-17 DIAGNOSIS — F32.1 CURRENT MODERATE EPISODE OF MAJOR DEPRESSIVE DISORDER, UNSPECIFIED WHETHER RECURRENT (H): ICD-10-CM

## 2020-07-20 ENCOUNTER — COMMUNICATION - HEALTHEAST (OUTPATIENT)
Dept: NURSING | Facility: CLINIC | Age: 25
End: 2020-07-20

## 2020-07-24 ENCOUNTER — OFFICE VISIT - HEALTHEAST (OUTPATIENT)
Dept: BEHAVIORAL HEALTH | Facility: CLINIC | Age: 25
End: 2020-07-24

## 2020-07-24 DIAGNOSIS — F32.1 CURRENT MODERATE EPISODE OF MAJOR DEPRESSIVE DISORDER, UNSPECIFIED WHETHER RECURRENT (H): ICD-10-CM

## 2020-07-24 DIAGNOSIS — F41.1 GAD (GENERALIZED ANXIETY DISORDER): ICD-10-CM

## 2020-07-24 DIAGNOSIS — F84.0 AUTISM SPECTRUM DISORDER: ICD-10-CM

## 2020-07-24 DIAGNOSIS — F90.9 ATTENTION DEFICIT HYPERACTIVITY DISORDER (ADHD), UNSPECIFIED ADHD TYPE: ICD-10-CM

## 2020-07-31 ENCOUNTER — COMMUNICATION - HEALTHEAST (OUTPATIENT)
Dept: FAMILY MEDICINE | Facility: CLINIC | Age: 25
End: 2020-07-31

## 2020-07-31 ENCOUNTER — OFFICE VISIT - HEALTHEAST (OUTPATIENT)
Dept: BEHAVIORAL HEALTH | Facility: CLINIC | Age: 25
End: 2020-07-31

## 2020-07-31 DIAGNOSIS — F90.9 ATTENTION DEFICIT HYPERACTIVITY DISORDER (ADHD), UNSPECIFIED ADHD TYPE: ICD-10-CM

## 2020-07-31 DIAGNOSIS — F32.1 CURRENT MODERATE EPISODE OF MAJOR DEPRESSIVE DISORDER, UNSPECIFIED WHETHER RECURRENT (H): ICD-10-CM

## 2020-07-31 DIAGNOSIS — F41.1 GAD (GENERALIZED ANXIETY DISORDER): ICD-10-CM

## 2020-07-31 DIAGNOSIS — F84.0 AUTISM SPECTRUM DISORDER: ICD-10-CM

## 2020-08-07 ENCOUNTER — OFFICE VISIT - HEALTHEAST (OUTPATIENT)
Dept: BEHAVIORAL HEALTH | Facility: CLINIC | Age: 25
End: 2020-08-07

## 2020-08-07 DIAGNOSIS — F32.1 CURRENT MODERATE EPISODE OF MAJOR DEPRESSIVE DISORDER, UNSPECIFIED WHETHER RECURRENT (H): ICD-10-CM

## 2020-08-07 DIAGNOSIS — F41.1 GAD (GENERALIZED ANXIETY DISORDER): ICD-10-CM

## 2020-08-07 DIAGNOSIS — F90.9 ATTENTION DEFICIT HYPERACTIVITY DISORDER (ADHD), UNSPECIFIED ADHD TYPE: ICD-10-CM

## 2020-08-07 DIAGNOSIS — F84.0 AUTISM SPECTRUM DISORDER: ICD-10-CM

## 2020-08-14 ENCOUNTER — OFFICE VISIT - HEALTHEAST (OUTPATIENT)
Dept: BEHAVIORAL HEALTH | Facility: CLINIC | Age: 25
End: 2020-08-14

## 2020-08-14 DIAGNOSIS — F90.9 ATTENTION DEFICIT HYPERACTIVITY DISORDER (ADHD), UNSPECIFIED ADHD TYPE: ICD-10-CM

## 2020-08-14 DIAGNOSIS — F32.1 CURRENT MODERATE EPISODE OF MAJOR DEPRESSIVE DISORDER, UNSPECIFIED WHETHER RECURRENT (H): ICD-10-CM

## 2020-08-14 DIAGNOSIS — F41.1 GAD (GENERALIZED ANXIETY DISORDER): ICD-10-CM

## 2020-08-14 DIAGNOSIS — F84.0 AUTISM SPECTRUM DISORDER: ICD-10-CM

## 2020-08-18 ENCOUNTER — COMMUNICATION - HEALTHEAST (OUTPATIENT)
Dept: NURSING | Facility: CLINIC | Age: 25
End: 2020-08-18

## 2020-08-28 ENCOUNTER — OFFICE VISIT - HEALTHEAST (OUTPATIENT)
Dept: BEHAVIORAL HEALTH | Facility: CLINIC | Age: 25
End: 2020-08-28

## 2020-08-28 DIAGNOSIS — F32.1 CURRENT MODERATE EPISODE OF MAJOR DEPRESSIVE DISORDER, UNSPECIFIED WHETHER RECURRENT (H): ICD-10-CM

## 2020-08-28 DIAGNOSIS — F41.1 GAD (GENERALIZED ANXIETY DISORDER): ICD-10-CM

## 2020-08-28 DIAGNOSIS — F84.0 AUTISM SPECTRUM DISORDER: ICD-10-CM

## 2020-08-28 DIAGNOSIS — F90.9 ATTENTION DEFICIT HYPERACTIVITY DISORDER (ADHD), UNSPECIFIED ADHD TYPE: ICD-10-CM

## 2020-09-03 ENCOUNTER — COMMUNICATION - HEALTHEAST (OUTPATIENT)
Dept: FAMILY MEDICINE | Facility: CLINIC | Age: 25
End: 2020-09-03

## 2020-09-03 DIAGNOSIS — F41.1 GAD (GENERALIZED ANXIETY DISORDER): ICD-10-CM

## 2020-09-04 ENCOUNTER — OFFICE VISIT - HEALTHEAST (OUTPATIENT)
Dept: BEHAVIORAL HEALTH | Facility: CLINIC | Age: 25
End: 2020-09-04

## 2020-09-04 DIAGNOSIS — F32.1 CURRENT MODERATE EPISODE OF MAJOR DEPRESSIVE DISORDER, UNSPECIFIED WHETHER RECURRENT (H): ICD-10-CM

## 2020-09-04 DIAGNOSIS — F84.0 AUTISM SPECTRUM DISORDER: ICD-10-CM

## 2020-09-04 DIAGNOSIS — F90.9 ATTENTION DEFICIT HYPERACTIVITY DISORDER (ADHD), UNSPECIFIED ADHD TYPE: ICD-10-CM

## 2020-09-04 DIAGNOSIS — F41.1 GAD (GENERALIZED ANXIETY DISORDER): ICD-10-CM

## 2020-09-11 ENCOUNTER — OFFICE VISIT - HEALTHEAST (OUTPATIENT)
Dept: BEHAVIORAL HEALTH | Facility: CLINIC | Age: 25
End: 2020-09-11

## 2020-09-11 DIAGNOSIS — F32.1 CURRENT MODERATE EPISODE OF MAJOR DEPRESSIVE DISORDER, UNSPECIFIED WHETHER RECURRENT (H): ICD-10-CM

## 2020-09-11 DIAGNOSIS — F41.1 GAD (GENERALIZED ANXIETY DISORDER): ICD-10-CM

## 2020-09-11 DIAGNOSIS — F90.9 ATTENTION DEFICIT HYPERACTIVITY DISORDER (ADHD), UNSPECIFIED ADHD TYPE: ICD-10-CM

## 2020-09-11 DIAGNOSIS — F84.0 AUTISM SPECTRUM DISORDER: ICD-10-CM

## 2020-09-14 ENCOUNTER — COMMUNICATION - HEALTHEAST (OUTPATIENT)
Dept: FAMILY MEDICINE | Facility: CLINIC | Age: 25
End: 2020-09-14

## 2020-09-16 ENCOUNTER — VIRTUAL VISIT (OUTPATIENT)
Dept: FAMILY MEDICINE | Facility: OTHER | Age: 25
End: 2020-09-16

## 2020-09-16 ENCOUNTER — AMBULATORY - HEALTHEAST (OUTPATIENT)
Dept: INTERNAL MEDICINE | Facility: CLINIC | Age: 25
End: 2020-09-16

## 2020-09-16 ENCOUNTER — AMBULATORY - HEALTHEAST (OUTPATIENT)
Dept: FAMILY MEDICINE | Facility: CLINIC | Age: 25
End: 2020-09-16

## 2020-09-16 DIAGNOSIS — Z20.822 SUSPECTED COVID-19 VIRUS INFECTION: ICD-10-CM

## 2020-09-16 DIAGNOSIS — Z20.822 SUSPECTED 2019 NOVEL CORONAVIRUS INFECTION: ICD-10-CM

## 2020-09-16 NOTE — PROGRESS NOTES
"Date: 2020 13:42:49  Clinician: Afshan Evans  Clinician NPI: [id number]  Patient: Angela Jones  Patient : 1995  Patient Address: [address] [place] [address]  Patient Phone: [phone]  Visit Protocol: URI  Patient Summary:   is a 25 year old ( : 1995 ) female who initiated a [facility] Visit for COVID-19 (Coronavirus) evaluation and screening. When asked the question \"Please sign me up to receive news, health information and promotions. \",  responded \"No\".     states her symptoms started gradually 5-6 days ago. After her symptoms started, they improved and then got worse again.   Her symptoms consist of chills, malaise, myalgia, vomiting, nausea, and diarrhea.  also feels feverish.   Symptom details   Temperature: Her current temperature is 99.6 degrees Fahrenheit.     denies having facial pain or pressure, ear pain, anosmia, rhinitis, wheezing, sore throat, teeth pain, ageusia, cough, nasal congestion, and headache. She also denies taking antibiotic medication in the past month and having recent facial or sinus surgery in the past 60 days. She is not experiencing dyspnea.   Precipitating events  She has not recently been exposed to someone with influenza.  has been in close contact with the following high risk individuals: immunocompromised people.   Pertinent COVID-19 (Coronavirus) information  In the past 14 days,  has not worked in a congregate living setting.   She does not work or volunteer as healthcare worker or a  and does not work or volunteer in a healthcare facility.    also has not lived in a congregate living setting in the past 14 days. She does not live with a healthcare worker.    has not had a close contact with a laboratory-confirmed COVID-19 patient within 14 days of symptom onset.   Since 2019,  and has had upper respiratory infection (URI) or influenza-like illness. Has not been " diagnosed with lab-confirmed COVID-19 test      Date(s) of previous URI or influenza-like illness (free-text): Sept. 7-11     Symptoms  experienced during previous URI or influenza-like illness as reported by the patient (free-text): Fever, achyness, chest congestion, sinuses draining        Pertinent medical history   had 1 sinus infection within the past year.    does not get yeast infections when she takes antibiotics.    does not need a return to work/school note.   Weight: 107 lbs    does not smoke or use smokeless tobacco.   She denies pregnancy and denies breastfeeding. She does not menstruate.   Weight: 107 lbs    MEDICATIONS: mirtazapine oral, gabapentin oral, amitriptyline oral, famotidine oral, Trintellix oral, Benadryl Allergy oral, melatonin-herbal no.233 oral, guanfacine oral, hyoscyamine sulfate sublingual, Reglan oral, Complete Multivitamin-Multimineral oral, ALLERGIES: azithromycin  Clinician Response:  Dear ,   Your symptoms show that you may have coronavirus (COVID-19). This illness can cause fever, cough and trouble breathing. Many people get a mild case and get better on their own. Some people can get very sick.  What should I do?  We would like to test you for this virus.   1. Please call [phone] to schedule your visit. Explain that you were referred by [facility] to have a COVID-19 test. Be ready to share [facility].  The following will serve as your written order for this COVID Test, ordered by me, for the indication of suspected COVID [Z20.828]: The test will be ordered in [employer], our electronic health record, after you are scheduled. It will show as ordered and authorized by Devon Bacon MD.  Order: COVID-19 (Coronavirus) PCR for SYMPTOMATIC testing from [facility].      2. When it's time for your COVID test:  Stay at least 6 feet away from others. (If someone will drive you to your test, stay in the backseat, as far away from the  as you  "can.)   Cover your mouth and nose with a mask, tissue or washcloth.  Go straight to the testing site. Don't make any stops on the way there or back.      3.Starting now: Stay home and away from others (self-isolate) until:   You've had no fever---and no medicine that reduces fever---for one full day (24 hours). And...   Your other symptoms have gotten better. For example, your cough or breathing has improved. And...   At least 10 days have passed since your symptoms started.       During this time, don't leave the house except for testing or medical care.   Stay in your own room, even for meals. Use your own bathroom if you can.   Stay away from others in your home. No hugging, kissing or shaking hands. No visitors.  Don't go to work, school or anywhere else.    Clean \"high touch\" surfaces often (doorknobs, counters, handles, etc.). Use a household cleaning spray or wipes. You'll find a full list of  on the EPA website: www.epa.gov/pesticide-registration/list-n-disinfectants-use-against-sars-cov-2.   Cover your mouth and nose with a mask, tissue or washcloth to avoid spreading germs.  Wash your hands and face often. Use soap and water.  Caregivers in these groups are at risk for severe illness due to COVID-19:  o People 65 years and older  o People who live in a nursing home or long-term care facility  o People with chronic disease (lung, heart, cancer, diabetes, kidney, liver, immunologic)  o People who have a weakened immune system, including those who:   Are in cancer treatment  Take medicine that weakens the immune system, such as corticosteroids  Had a bone marrow or organ transplant  Have an immune deficiency  Have poorly controlled HIV or AIDS  Are obese (body mass index of 40 or higher)  Smoke regularly   o Caregivers should wear gloves while washing dishes, handling laundry and cleaning bedrooms and bathrooms.  o Use caution when washing and drying laundry: Don't shake dirty laundry, and use the " warmest water setting that you can.  o For more tips, go to www.cdc.gov/coronavirus/2019-ncov/downloads/10Things.pdf.    4.Sign up for [employer]. We know it's scary to hear that you might have COVID-19. We want to track your symptoms to make sure you're okay over the next 2 weeks. Please look for an email from [employer]---this is a free, online program that we'll use to keep in touch. To sign up, follow the link in the email. Learn more at [email or web address]  How can I take care of myself?   Get lots of rest. Drink extra fluids (unless a doctor has told you not to).   Take Tylenol (acetaminophen) for fever or pain. If you have liver or kidney problems, ask your family doctor if it's okay to take Tylenol.   Adults can take either:    650 mg (two 325 mg pills) every 4 to 6 hours, or...   1,000 mg (two 500 mg pills) every 8 hours as needed.    Note: Don't take more than 3,000 mg in one day. Acetaminophen is found in many medicines (both prescribed and over-the-counter medicines). Read all labels to be sure you don't take too much.   For children, check the Tylenol bottle for the right dose. The dose is based on the child's age or weight.    If you have other health problems (like cancer, heart failure, an organ transplant or severe kidney disease): Call your specialty clinic if you don't feel better in the next 2 days.       Know when to call 911. Emergency warning signs include:    Trouble breathing or shortness of breath Pain or pressure in the chest that doesn't go away Feeling confused like you haven't felt before, or not being able to wake up Bluish-colored lips or face.  Where can I get more information?    [facility] -- About COVID-19: [email or web address]   CDC -- What to Do If You're Sick: www.cdc.gov/coronavirus/2019-ncov/about/steps-when-sick.html   CDC -- Ending Home Isolation: www.cdc.gov/coronavirus/2019-ncov/hcp/disposition-in-home-patients.html   CDC -- Caring for  Someone: www.cdc.gov/coronavirus/2019-ncov/if-you-are-sick/care-for-someone.html   [facility] -- Interim Guidance for Hospital Discharge to Home: www.health.[facility].mn.us/diseases/coronavirus/hcp/hospdischarge.pdf   [facility] clinical trials (COVID-19 research studies): clinicalaffairs.[facility]-clinical-trials    Below are the COVID-19 hotlines at the [place] Department of Health ([facility]). Interpreters are available.    For health questions: Call 405-915-8085 or 1-897.183.6725 (7 a.m. to 7 p.m.) For questions about schools and childcare: Call 041-121-2048 or 1-640.939.7017 (7 a.m. to 7 p.m.)    Diagnosis: Diarrhea, unspecified  Diagnosis ICD: R19.7

## 2020-09-17 ENCOUNTER — AMBULATORY - HEALTHEAST (OUTPATIENT)
Dept: FAMILY MEDICINE | Facility: CLINIC | Age: 25
End: 2020-09-17

## 2020-09-17 ENCOUNTER — COMMUNICATION - HEALTHEAST (OUTPATIENT)
Dept: NURSING | Facility: CLINIC | Age: 25
End: 2020-09-17

## 2020-09-17 DIAGNOSIS — Z20.822 SUSPECTED COVID-19 VIRUS INFECTION: ICD-10-CM

## 2020-09-18 ENCOUNTER — OFFICE VISIT - HEALTHEAST (OUTPATIENT)
Dept: BEHAVIORAL HEALTH | Facility: CLINIC | Age: 25
End: 2020-09-18

## 2020-09-18 DIAGNOSIS — F84.0 AUTISM SPECTRUM DISORDER: ICD-10-CM

## 2020-09-18 DIAGNOSIS — F90.9 ATTENTION DEFICIT HYPERACTIVITY DISORDER (ADHD), UNSPECIFIED ADHD TYPE: ICD-10-CM

## 2020-09-18 DIAGNOSIS — F41.1 GAD (GENERALIZED ANXIETY DISORDER): ICD-10-CM

## 2020-09-18 DIAGNOSIS — F32.1 CURRENT MODERATE EPISODE OF MAJOR DEPRESSIVE DISORDER, UNSPECIFIED WHETHER RECURRENT (H): ICD-10-CM

## 2020-09-19 ENCOUNTER — COMMUNICATION - HEALTHEAST (OUTPATIENT)
Dept: SCHEDULING | Facility: CLINIC | Age: 25
End: 2020-09-19

## 2020-09-20 ENCOUNTER — COMMUNICATION - HEALTHEAST (OUTPATIENT)
Dept: FAMILY MEDICINE | Facility: CLINIC | Age: 25
End: 2020-09-20

## 2020-09-20 ENCOUNTER — VIRTUAL VISIT (OUTPATIENT)
Dept: FAMILY MEDICINE | Facility: OTHER | Age: 25
End: 2020-09-20

## 2020-09-20 DIAGNOSIS — R11.2 NON-INTRACTABLE VOMITING WITH NAUSEA, UNSPECIFIED VOMITING TYPE: ICD-10-CM

## 2020-09-20 DIAGNOSIS — R19.7 DIARRHEA OF PRESUMED INFECTIOUS ORIGIN: ICD-10-CM

## 2020-09-20 NOTE — PROGRESS NOTES
"Date: 2020 17:36:58  Clinician: Conchis Mejia  Clinician NPI: [id number]  Patient: Angela Jones  Patient : 1995  Patient Address: [address] [place] [address]  Patient Phone: [phone]  Visit Protocol: URI  Patient Summary:   is a 25 year old ( : 1995 ) female who initiated a [facility] Visit for COVID-19 (Coronavirus) evaluation and screening. When asked the question \"Please sign me up to receive news, health information and promotions. \",  responded \"No\".     states her symptoms started gradually 10-13 days ago. After her symptoms started, they improved and then got worse again.   Her symptoms consist of chills, malaise, diarrhea, nausea, and myalgia. She is experiencing mild difficulty breathing with activities but can speak normally in full sentences.  also feels feverish.   Symptom details   Temperature: Her current temperature is 99.2 degrees Fahrenheit.     denies having facial pain or pressure, sore throat, teeth pain, ageusia, cough, nasal congestion, headache, ear pain, anosmia, vomiting, rhinitis, and wheezing. She also denies taking antibiotic medication in the past month and having recent facial or sinus surgery in the past 60 days.   Precipitating events  She has not recently been exposed to someone with influenza.  has been in close contact with the following high risk individuals: immunocompromised people and people with asthma, heart disease or diabetes.   Pertinent COVID-19 (Coronavirus) information  In the past 14 days,  has not worked in a congregate living setting.   She does not work or volunteer as healthcare worker or a  and does not work or volunteer in a healthcare facility.    also has not lived in a congregate living setting in the past 14 days. She does not live with a healthcare worker.    has not had a close contact with a laboratory-confirmed COVID-19 patient within 14 days of symptom " onset.   Since December 2019,  and has had upper respiratory infection (URI) or influenza-like illness. Has not been diagnosed with lab-confirmed COVID-19 test      Date(s) of previous URI or influenza-like illness (free-text): Sept. 7-9     Symptoms  experienced during previous URI or influenza-like illness as reported by the patient (free-text): Fever, headache, dizziness, chills, nausea        Pertinent medical history   had 1 sinus infection within the past year.    does not get yeast infections when she takes antibiotics.    does not need a return to work/school note.   Weight: 109 lbs    does not smoke or use smokeless tobacco.   She denies pregnancy and denies breastfeeding. She does not menstruate.   Additional information as reported by the patient (free text): I have asthma   Weight: 109 lbs    MEDICATIONS: Dramamine oral, Vicks NyQuil Severe Cold-Flu oral, Complete Multivitamin-Multimineral oral, Reglan oral, hyoscyamine sulfate sublingual, guanfacine oral, melatonin-herbal no.233 oral, Benadryl Allergy oral, Trintellix oral, famotidine oral, amitriptyline oral, gabapentin oral, mirtazapine oral, ALLERGIES: azithromycin  Clinician Response:  Dear ,   Your symptoms show that you may have coronavirus (COVID-19). This illness can cause fever, cough and trouble breathing. Many people get a mild case and get better on their own. Some people can get very sick.  Based on the symptoms you have shared, I would like you to be re-checked in 2 to 3 days. Please call your family clinic to set up a video or phone visit.  Will I be tested for COVID-19?  We would like to test you for this virus.   Please call [phone] to schedule your visit. Explain that you were referred by [facility] to have a COVID-19 test. Be ready to share your [facility] visit ID number.   The following will serve as your written order for this COVID Test, ordered by me, for the indication of suspected COVID  "[Z20.828]: The test will be ordered in [employer], our electronic health record, after you are scheduled. It will show as ordered and authorized by Devon Bacon MD.  Order: COVID-19 (Coronavirus) PCR [facility]TOMATIC [facility].  1.When it's time for your COVID test:   Stay at least 6 feet away from others. (If someone will drive you to your test, stay in the backseat, as far away from the  as you can.)   Cover your mouth and nose with a mask, tissue or washcloth.  Go straight to the testing site. Don't make any stops on the way there or back.      2.Starting now: Stay home and away from others (self-isolate) until:   You've had no fever---and no medicine that reduces fever---for one full day (24 hours). And...   Your other symptoms have gotten better. For example, your cough or breathing has improved. And...   At least 10 days have passed since your symptoms started.       During this time, don't leave the house except for testing or medical care.   Stay in your own room, even for meals. Use your own bathroom if you can.   Stay away from others in your home. No hugging, kissing or shaking hands. No visitors.  Don't go to work, school or anywhere else.    Clean \"high touch\" surfaces often (doorknobs, counters, handles, etc.). Use a household cleaning spray or wipes. You'll find a full list of  on the EPA website: www.epa.gov/pesticide-registration/list-n-disinfectants-use-against-sars-cov-2.   Cover your mouth and nose with a mask, tissue or washcloth to avoid spreading germs.  Wash your hands and face often. Use soap and water.  Caregivers in these groups are at risk for severe illness due to COVID-19:  o People 65 years and older  o People who live in a nursing home or long-term care facility  o People with chronic disease (lung, heart, cancer, diabetes, kidney, liver, immunologic)   o People who have a weakened immune system, including those who:   Are in cancer treatment  Take medicine that " weakens the immune system, such as corticosteroids  Had a bone marrow or organ transplant  Have an immune deficiency  Have poorly controlled HIV or AIDS  Are obese (body mass index of 40 or higher)  Smoke regularly   o Caregivers should wear gloves while washing dishes, handling laundry and cleaning bedrooms and bathrooms.  o Use caution when washing and drying laundry: Don't shake dirty laundry, and use the warmest water setting that you can.  o For more tips, go to www.cdc.gov/coronavirus/2019-ncov/downloads/10Things.pdf.      How can I take care of myself?   Get lots of rest. Drink extra fluids (unless a doctor has told you not to)   Take Tylenol (acetaminophen) for fever or pain. If you have liver or kidney problems, ask your family doctor if it's okay to take Tylenol.   Adults can take either:    650 mg (two 325 mg pills) every 4 to 6 hours, or...   1,000 mg (two 500 mg pills) every 8 hours as needed.    Note: Don't take more than 3,000 mg in one day. Acetaminophen is found in many medicines (both prescribed and over-the-counter medicines). Read all labels to be sure you don't take too much.   For children, check the Tylenol bottle for the right dose. The dose is based on the child's age or weight.    If you have other health problems (like cancer, heart failure, an organ transplant or severe kidney disease): Call your specialty clinic if you don't feel better in the next 2 days.       Know when to call 911. Emergency warning signs include:    Trouble breathing or shortness of breath Pain or pressure in the chest that doesn't go away Feeling confused like you haven't felt before, or not being able to wake up Bluish-colored lips or face  Where can I get more information?    [facility] -- About COVID-19: [email or web address]   CDC -- What to Do If You're Sick: www.cdc.gov/coronavirus/2019-ncov/about/steps-when-sick.html   CDC -- Ending Home Isolation:  www.cdc.gov/coronavirus/2019-ncov/hcp/disposition-in-home-patients.html   [facility] -- Caring for Someone: www.cdc.gov/coronavirus/2019-ncov/if-you-are-sick/care-for-someone.html   [facility] -- Interim Guidance for Hospital Discharge to Home: www.[facility].mn.us/diseases/coronavirus/hcp/hospdischarge.pdf   [facility] clinical trials (COVID-19 research studies): clinicalaffairs.[facility]-clinical-trials    Below are the COVID-19 hotlines at the [place] Department of Health ([facility]). Interpreters are available.    For health questions: Call 452-395-3525 or 1-270.444.8842 (7 a.m. to 7 p.m.) For questions about schools and childcare: Call 953-939-4950 or 1-395.472.5519 (7 a.m. to 7 p.m.)       Diagnosis: Myalgia, unspecified site  Diagnosis ICD: M79.10

## 2020-09-21 ENCOUNTER — AMBULATORY - HEALTHEAST (OUTPATIENT)
Dept: FAMILY MEDICINE | Facility: CLINIC | Age: 25
End: 2020-09-21

## 2020-09-21 ENCOUNTER — COMMUNICATION - HEALTHEAST (OUTPATIENT)
Dept: SCHEDULING | Facility: CLINIC | Age: 25
End: 2020-09-21

## 2020-09-21 DIAGNOSIS — Z20.822 SUSPECTED COVID-19 VIRUS INFECTION: ICD-10-CM

## 2020-09-22 ENCOUNTER — AMBULATORY - HEALTHEAST (OUTPATIENT)
Dept: FAMILY MEDICINE | Facility: CLINIC | Age: 25
End: 2020-09-22

## 2020-09-22 DIAGNOSIS — Z20.822 SUSPECTED COVID-19 VIRUS INFECTION: ICD-10-CM

## 2020-09-24 ENCOUNTER — OFFICE VISIT - HEALTHEAST (OUTPATIENT)
Dept: BEHAVIORAL HEALTH | Facility: CLINIC | Age: 25
End: 2020-09-24

## 2020-09-24 ENCOUNTER — AMBULATORY - HEALTHEAST (OUTPATIENT)
Dept: BEHAVIORAL HEALTH | Facility: CLINIC | Age: 25
End: 2020-09-24

## 2020-09-24 ENCOUNTER — OFFICE VISIT - HEALTHEAST (OUTPATIENT)
Dept: FAMILY MEDICINE | Facility: CLINIC | Age: 25
End: 2020-09-24

## 2020-09-24 DIAGNOSIS — F32.1 CURRENT MODERATE EPISODE OF MAJOR DEPRESSIVE DISORDER, UNSPECIFIED WHETHER RECURRENT (H): ICD-10-CM

## 2020-09-24 DIAGNOSIS — F90.9 ATTENTION DEFICIT HYPERACTIVITY DISORDER (ADHD), UNSPECIFIED ADHD TYPE: ICD-10-CM

## 2020-09-24 DIAGNOSIS — F84.0 AUTISM SPECTRUM DISORDER: ICD-10-CM

## 2020-09-24 DIAGNOSIS — F41.1 GAD (GENERALIZED ANXIETY DISORDER): ICD-10-CM

## 2020-09-24 DIAGNOSIS — K52.9 GASTROENTERITIS: ICD-10-CM

## 2020-09-24 DIAGNOSIS — B34.9 VIRAL SYNDROME: ICD-10-CM

## 2020-09-24 ASSESSMENT — ANXIETY QUESTIONNAIRES
2. NOT BEING ABLE TO STOP OR CONTROL WORRYING: NEARLY EVERY DAY
GAD7 TOTAL SCORE: 12
1. FEELING NERVOUS, ANXIOUS, OR ON EDGE: NEARLY EVERY DAY
7. FEELING AFRAID AS IF SOMETHING AWFUL MIGHT HAPPEN: NOT AT ALL
4. TROUBLE RELAXING: NEARLY EVERY DAY
3. WORRYING TOO MUCH ABOUT DIFFERENT THINGS: SEVERAL DAYS
IF YOU CHECKED OFF ANY PROBLEMS ON THIS QUESTIONNAIRE, HOW DIFFICULT HAVE THESE PROBLEMS MADE IT FOR YOU TO DO YOUR WORK, TAKE CARE OF THINGS AT HOME, OR GET ALONG WITH OTHER PEOPLE: SOMEWHAT DIFFICULT
6. BECOMING EASILY ANNOYED OR IRRITABLE: NOT AT ALL
5. BEING SO RESTLESS THAT IT IS HARD TO SIT STILL: MORE THAN HALF THE DAYS

## 2020-09-24 ASSESSMENT — PATIENT HEALTH QUESTIONNAIRE - PHQ9: SUM OF ALL RESPONSES TO PHQ QUESTIONS 1-9: 9

## 2020-09-29 ENCOUNTER — COMMUNICATION - HEALTHEAST (OUTPATIENT)
Dept: NURSING | Facility: CLINIC | Age: 25
End: 2020-09-29

## 2020-09-29 ENCOUNTER — AMBULATORY - HEALTHEAST (OUTPATIENT)
Dept: LAB | Facility: CLINIC | Age: 25
End: 2020-09-29

## 2020-09-29 DIAGNOSIS — R19.7 DIARRHEA OF PRESUMED INFECTIOUS ORIGIN: ICD-10-CM

## 2020-09-29 LAB
C DIFF TOX B STL QL: NEGATIVE
RIBOTYPE 027/NAP1/BI: NORMAL

## 2020-09-30 LAB
G LAMBLIA AG STL QL IA: NORMAL
O+P STL MICRO: NORMAL
SHIGA TOXIN 1: NEGATIVE
SHIGA TOXIN 2: NEGATIVE

## 2020-10-02 ENCOUNTER — COMMUNICATION - HEALTHEAST (OUTPATIENT)
Dept: FAMILY MEDICINE | Facility: CLINIC | Age: 25
End: 2020-10-02

## 2020-10-02 LAB — BACTERIA SPEC CULT: NORMAL

## 2020-10-07 ENCOUNTER — COMMUNICATION - HEALTHEAST (OUTPATIENT)
Dept: NURSING | Facility: CLINIC | Age: 25
End: 2020-10-07

## 2020-10-08 ENCOUNTER — COMMUNICATION - HEALTHEAST (OUTPATIENT)
Dept: NURSING | Facility: CLINIC | Age: 25
End: 2020-10-08

## 2020-10-09 ENCOUNTER — OFFICE VISIT - HEALTHEAST (OUTPATIENT)
Dept: BEHAVIORAL HEALTH | Facility: CLINIC | Age: 25
End: 2020-10-09

## 2020-10-09 DIAGNOSIS — F90.9 ATTENTION DEFICIT HYPERACTIVITY DISORDER (ADHD), UNSPECIFIED ADHD TYPE: ICD-10-CM

## 2020-10-09 DIAGNOSIS — F41.1 GAD (GENERALIZED ANXIETY DISORDER): ICD-10-CM

## 2020-10-09 DIAGNOSIS — F84.0 AUTISM SPECTRUM DISORDER: ICD-10-CM

## 2020-10-09 DIAGNOSIS — F32.1 CURRENT MODERATE EPISODE OF MAJOR DEPRESSIVE DISORDER, UNSPECIFIED WHETHER RECURRENT (H): ICD-10-CM

## 2020-10-13 ENCOUNTER — COMMUNICATION - HEALTHEAST (OUTPATIENT)
Dept: NURSING | Facility: CLINIC | Age: 25
End: 2020-10-13

## 2020-10-14 ENCOUNTER — RECORDS - HEALTHEAST (OUTPATIENT)
Dept: ADMINISTRATIVE | Facility: OTHER | Age: 25
End: 2020-10-14

## 2020-10-14 ENCOUNTER — COMMUNICATION - HEALTHEAST (OUTPATIENT)
Dept: NURSING | Facility: CLINIC | Age: 25
End: 2020-10-14

## 2020-10-19 ENCOUNTER — COMMUNICATION - HEALTHEAST (OUTPATIENT)
Dept: CARE COORDINATION | Facility: CLINIC | Age: 25
End: 2020-10-19

## 2020-10-20 ENCOUNTER — COMMUNICATION - HEALTHEAST (OUTPATIENT)
Dept: NURSING | Facility: CLINIC | Age: 25
End: 2020-10-20

## 2020-10-21 ENCOUNTER — COMMUNICATION - HEALTHEAST (OUTPATIENT)
Dept: FAMILY MEDICINE | Facility: CLINIC | Age: 25
End: 2020-10-21

## 2020-10-21 DIAGNOSIS — K52.9 CHRONIC DIARRHEA: ICD-10-CM

## 2020-10-23 ENCOUNTER — OFFICE VISIT - HEALTHEAST (OUTPATIENT)
Dept: BEHAVIORAL HEALTH | Facility: CLINIC | Age: 25
End: 2020-10-23

## 2020-10-23 DIAGNOSIS — F84.0 AUTISM SPECTRUM DISORDER: ICD-10-CM

## 2020-10-23 DIAGNOSIS — F32.1 CURRENT MODERATE EPISODE OF MAJOR DEPRESSIVE DISORDER, UNSPECIFIED WHETHER RECURRENT (H): ICD-10-CM

## 2020-10-23 DIAGNOSIS — F90.9 ATTENTION DEFICIT HYPERACTIVITY DISORDER (ADHD), UNSPECIFIED ADHD TYPE: ICD-10-CM

## 2020-10-23 DIAGNOSIS — F41.1 GAD (GENERALIZED ANXIETY DISORDER): ICD-10-CM

## 2020-10-27 ENCOUNTER — RECORDS - HEALTHEAST (OUTPATIENT)
Dept: ADMINISTRATIVE | Facility: OTHER | Age: 25
End: 2020-10-27

## 2020-10-29 ENCOUNTER — COMMUNICATION - HEALTHEAST (OUTPATIENT)
Dept: FAMILY MEDICINE | Facility: CLINIC | Age: 25
End: 2020-10-29

## 2020-10-29 DIAGNOSIS — F84.0 AUTISM SPECTRUM DISORDER: ICD-10-CM

## 2020-10-29 DIAGNOSIS — K58.2 IRRITABLE BOWEL SYNDROME WITH BOTH CONSTIPATION AND DIARRHEA: ICD-10-CM

## 2020-10-29 DIAGNOSIS — F41.1 GAD (GENERALIZED ANXIETY DISORDER): ICD-10-CM

## 2020-10-30 ENCOUNTER — OFFICE VISIT - HEALTHEAST (OUTPATIENT)
Dept: BEHAVIORAL HEALTH | Facility: CLINIC | Age: 25
End: 2020-10-30

## 2020-10-30 DIAGNOSIS — F90.9 ATTENTION DEFICIT HYPERACTIVITY DISORDER (ADHD), UNSPECIFIED ADHD TYPE: ICD-10-CM

## 2020-10-30 DIAGNOSIS — F41.1 GAD (GENERALIZED ANXIETY DISORDER): ICD-10-CM

## 2020-10-30 DIAGNOSIS — F32.1 CURRENT MODERATE EPISODE OF MAJOR DEPRESSIVE DISORDER, UNSPECIFIED WHETHER RECURRENT (H): ICD-10-CM

## 2020-10-30 DIAGNOSIS — F84.0 AUTISM SPECTRUM DISORDER: ICD-10-CM

## 2020-11-05 ENCOUNTER — COMMUNICATION - HEALTHEAST (OUTPATIENT)
Dept: FAMILY MEDICINE | Facility: CLINIC | Age: 25
End: 2020-11-05

## 2020-11-06 ENCOUNTER — OFFICE VISIT - HEALTHEAST (OUTPATIENT)
Dept: BEHAVIORAL HEALTH | Facility: CLINIC | Age: 25
End: 2020-11-06

## 2020-11-06 DIAGNOSIS — F84.0 AUTISM SPECTRUM DISORDER: ICD-10-CM

## 2020-11-06 DIAGNOSIS — F32.1 CURRENT MODERATE EPISODE OF MAJOR DEPRESSIVE DISORDER, UNSPECIFIED WHETHER RECURRENT (H): ICD-10-CM

## 2020-11-06 DIAGNOSIS — F90.9 ATTENTION DEFICIT HYPERACTIVITY DISORDER (ADHD), UNSPECIFIED ADHD TYPE: ICD-10-CM

## 2020-11-06 DIAGNOSIS — F41.1 GAD (GENERALIZED ANXIETY DISORDER): ICD-10-CM

## 2020-11-13 ENCOUNTER — OFFICE VISIT - HEALTHEAST (OUTPATIENT)
Dept: BEHAVIORAL HEALTH | Facility: CLINIC | Age: 25
End: 2020-11-13

## 2020-11-13 DIAGNOSIS — F32.1 CURRENT MODERATE EPISODE OF MAJOR DEPRESSIVE DISORDER, UNSPECIFIED WHETHER RECURRENT (H): ICD-10-CM

## 2020-11-13 DIAGNOSIS — F84.0 AUTISM SPECTRUM DISORDER: ICD-10-CM

## 2020-11-13 DIAGNOSIS — F90.9 ATTENTION DEFICIT HYPERACTIVITY DISORDER (ADHD), UNSPECIFIED ADHD TYPE: ICD-10-CM

## 2020-11-13 DIAGNOSIS — F41.1 GAD (GENERALIZED ANXIETY DISORDER): ICD-10-CM

## 2020-11-18 ENCOUNTER — COMMUNICATION - HEALTHEAST (OUTPATIENT)
Dept: NURSING | Facility: CLINIC | Age: 25
End: 2020-11-18

## 2020-11-20 ENCOUNTER — RECORDS - HEALTHEAST (OUTPATIENT)
Dept: ADMINISTRATIVE | Facility: OTHER | Age: 25
End: 2020-11-20

## 2020-11-23 ENCOUNTER — COMMUNICATION - HEALTHEAST (OUTPATIENT)
Dept: NURSING | Facility: CLINIC | Age: 25
End: 2020-11-23

## 2020-11-24 ENCOUNTER — COMMUNICATION - HEALTHEAST (OUTPATIENT)
Dept: CARE COORDINATION | Facility: CLINIC | Age: 25
End: 2020-11-24

## 2020-12-04 ENCOUNTER — OFFICE VISIT - HEALTHEAST (OUTPATIENT)
Dept: BEHAVIORAL HEALTH | Facility: CLINIC | Age: 25
End: 2020-12-04

## 2020-12-04 DIAGNOSIS — F32.1 CURRENT MODERATE EPISODE OF MAJOR DEPRESSIVE DISORDER, UNSPECIFIED WHETHER RECURRENT (H): ICD-10-CM

## 2020-12-04 DIAGNOSIS — F84.0 AUTISM SPECTRUM DISORDER: ICD-10-CM

## 2020-12-04 DIAGNOSIS — F41.1 GAD (GENERALIZED ANXIETY DISORDER): ICD-10-CM

## 2020-12-04 DIAGNOSIS — F90.9 ATTENTION DEFICIT HYPERACTIVITY DISORDER (ADHD), UNSPECIFIED ADHD TYPE: ICD-10-CM

## 2020-12-11 ENCOUNTER — OFFICE VISIT - HEALTHEAST (OUTPATIENT)
Dept: BEHAVIORAL HEALTH | Facility: CLINIC | Age: 25
End: 2020-12-11

## 2020-12-11 DIAGNOSIS — F90.9 ATTENTION DEFICIT HYPERACTIVITY DISORDER (ADHD), UNSPECIFIED ADHD TYPE: ICD-10-CM

## 2020-12-11 DIAGNOSIS — F32.1 CURRENT MODERATE EPISODE OF MAJOR DEPRESSIVE DISORDER, UNSPECIFIED WHETHER RECURRENT (H): ICD-10-CM

## 2020-12-11 DIAGNOSIS — F84.0 AUTISM SPECTRUM DISORDER: ICD-10-CM

## 2020-12-11 DIAGNOSIS — F41.1 GAD (GENERALIZED ANXIETY DISORDER): ICD-10-CM

## 2020-12-14 ENCOUNTER — RECORDS - HEALTHEAST (OUTPATIENT)
Dept: ADMINISTRATIVE | Facility: OTHER | Age: 25
End: 2020-12-14

## 2020-12-16 ENCOUNTER — OFFICE VISIT - HEALTHEAST (OUTPATIENT)
Dept: BEHAVIORAL HEALTH | Facility: CLINIC | Age: 25
End: 2020-12-16

## 2020-12-16 DIAGNOSIS — F33.1 MODERATE EPISODE OF RECURRENT MAJOR DEPRESSIVE DISORDER (H): ICD-10-CM

## 2020-12-16 DIAGNOSIS — F90.9 ATTENTION DEFICIT HYPERACTIVITY DISORDER (ADHD), UNSPECIFIED ADHD TYPE: ICD-10-CM

## 2020-12-16 DIAGNOSIS — F41.1 GAD (GENERALIZED ANXIETY DISORDER): ICD-10-CM

## 2020-12-16 DIAGNOSIS — F84.0 AUTISM SPECTRUM DISORDER: ICD-10-CM

## 2020-12-16 ASSESSMENT — ANXIETY QUESTIONNAIRES
IF YOU CHECKED OFF ANY PROBLEMS ON THIS QUESTIONNAIRE, HOW DIFFICULT HAVE THESE PROBLEMS MADE IT FOR YOU TO DO YOUR WORK, TAKE CARE OF THINGS AT HOME, OR GET ALONG WITH OTHER PEOPLE: EXTREMELY DIFFICULT
GAD7 TOTAL SCORE: 11
3. WORRYING TOO MUCH ABOUT DIFFERENT THINGS: MORE THAN HALF THE DAYS
7. FEELING AFRAID AS IF SOMETHING AWFUL MIGHT HAPPEN: NOT AT ALL
5. BEING SO RESTLESS THAT IT IS HARD TO SIT STILL: MORE THAN HALF THE DAYS
6. BECOMING EASILY ANNOYED OR IRRITABLE: MORE THAN HALF THE DAYS
1. FEELING NERVOUS, ANXIOUS, OR ON EDGE: NEARLY EVERY DAY
2. NOT BEING ABLE TO STOP OR CONTROL WORRYING: MORE THAN HALF THE DAYS
4. TROUBLE RELAXING: NOT AT ALL

## 2020-12-16 ASSESSMENT — PATIENT HEALTH QUESTIONNAIRE - PHQ9: SUM OF ALL RESPONSES TO PHQ QUESTIONS 1-9: 21

## 2020-12-20 ENCOUNTER — HEALTH MAINTENANCE LETTER (OUTPATIENT)
Age: 25
End: 2020-12-20

## 2020-12-23 ENCOUNTER — COMMUNICATION - HEALTHEAST (OUTPATIENT)
Dept: FAMILY MEDICINE | Facility: CLINIC | Age: 25
End: 2020-12-23

## 2020-12-23 DIAGNOSIS — R56.9 SEIZURE-LIKE ACTIVITY (H): ICD-10-CM

## 2021-01-04 ENCOUNTER — RECORDS - HEALTHEAST (OUTPATIENT)
Dept: ADMINISTRATIVE | Facility: OTHER | Age: 26
End: 2021-01-04

## 2021-01-08 ENCOUNTER — OFFICE VISIT - HEALTHEAST (OUTPATIENT)
Dept: BEHAVIORAL HEALTH | Facility: CLINIC | Age: 26
End: 2021-01-08

## 2021-01-08 DIAGNOSIS — F33.1 MODERATE EPISODE OF RECURRENT MAJOR DEPRESSIVE DISORDER (H): ICD-10-CM

## 2021-01-08 DIAGNOSIS — F84.0 AUTISM SPECTRUM DISORDER: ICD-10-CM

## 2021-01-08 DIAGNOSIS — F41.1 GAD (GENERALIZED ANXIETY DISORDER): ICD-10-CM

## 2021-01-08 DIAGNOSIS — F90.9 ATTENTION DEFICIT HYPERACTIVITY DISORDER (ADHD), UNSPECIFIED ADHD TYPE: ICD-10-CM

## 2021-01-11 ENCOUNTER — COMMUNICATION - HEALTHEAST (OUTPATIENT)
Dept: FAMILY MEDICINE | Facility: CLINIC | Age: 26
End: 2021-01-11

## 2021-01-13 ENCOUNTER — OFFICE VISIT - HEALTHEAST (OUTPATIENT)
Dept: BEHAVIORAL HEALTH | Facility: CLINIC | Age: 26
End: 2021-01-13

## 2021-01-13 DIAGNOSIS — F33.1 MODERATE EPISODE OF RECURRENT MAJOR DEPRESSIVE DISORDER (H): ICD-10-CM

## 2021-01-13 DIAGNOSIS — F84.0 AUTISM SPECTRUM DISORDER: ICD-10-CM

## 2021-01-13 DIAGNOSIS — F90.8 ATTENTION DEFICIT HYPERACTIVITY DISORDER (ADHD), OTHER TYPE: ICD-10-CM

## 2021-01-13 DIAGNOSIS — F41.1 GENERALIZED ANXIETY DISORDER: ICD-10-CM

## 2021-01-15 ENCOUNTER — AMBULATORY - HEALTHEAST (OUTPATIENT)
Dept: BEHAVIORAL HEALTH | Facility: CLINIC | Age: 26
End: 2021-01-15

## 2021-01-15 ENCOUNTER — OFFICE VISIT - HEALTHEAST (OUTPATIENT)
Dept: BEHAVIORAL HEALTH | Facility: CLINIC | Age: 26
End: 2021-01-15

## 2021-01-15 DIAGNOSIS — F84.0 AUTISM SPECTRUM DISORDER: ICD-10-CM

## 2021-01-15 DIAGNOSIS — F90.8 ATTENTION DEFICIT HYPERACTIVITY DISORDER (ADHD), OTHER TYPE: ICD-10-CM

## 2021-01-15 DIAGNOSIS — F41.1 GENERALIZED ANXIETY DISORDER: ICD-10-CM

## 2021-01-15 DIAGNOSIS — F33.1 MODERATE EPISODE OF RECURRENT MAJOR DEPRESSIVE DISORDER (H): ICD-10-CM

## 2021-01-21 ENCOUNTER — COMMUNICATION - HEALTHEAST (OUTPATIENT)
Dept: NURSING | Facility: CLINIC | Age: 26
End: 2021-01-21

## 2021-01-22 ENCOUNTER — OFFICE VISIT - HEALTHEAST (OUTPATIENT)
Dept: BEHAVIORAL HEALTH | Facility: CLINIC | Age: 26
End: 2021-01-22

## 2021-01-22 ENCOUNTER — COMMUNICATION - HEALTHEAST (OUTPATIENT)
Dept: NURSING | Facility: CLINIC | Age: 26
End: 2021-01-22

## 2021-01-22 DIAGNOSIS — F41.1 GENERALIZED ANXIETY DISORDER: ICD-10-CM

## 2021-01-22 DIAGNOSIS — F84.0 AUTISM SPECTRUM DISORDER: ICD-10-CM

## 2021-01-22 DIAGNOSIS — F90.8 ATTENTION DEFICIT HYPERACTIVITY DISORDER (ADHD), OTHER TYPE: ICD-10-CM

## 2021-01-22 DIAGNOSIS — F33.1 MODERATE EPISODE OF RECURRENT MAJOR DEPRESSIVE DISORDER (H): ICD-10-CM

## 2021-01-25 ENCOUNTER — OFFICE VISIT - HEALTHEAST (OUTPATIENT)
Dept: FAMILY MEDICINE | Facility: CLINIC | Age: 26
End: 2021-01-25

## 2021-01-25 DIAGNOSIS — Z23 NEED FOR VACCINATION: ICD-10-CM

## 2021-01-25 DIAGNOSIS — J45.30 MILD PERSISTENT ASTHMA, UNSPECIFIED WHETHER COMPLICATED: ICD-10-CM

## 2021-01-25 DIAGNOSIS — G25.0 ESSENTIAL TREMOR: ICD-10-CM

## 2021-01-25 DIAGNOSIS — K58.2 IRRITABLE BOWEL SYNDROME WITH BOTH CONSTIPATION AND DIARRHEA: ICD-10-CM

## 2021-01-25 DIAGNOSIS — R42 LIGHTHEADEDNESS: ICD-10-CM

## 2021-01-25 DIAGNOSIS — R00.2 PALPITATIONS: ICD-10-CM

## 2021-01-25 DIAGNOSIS — Z23 NEED FOR IMMUNIZATION AGAINST INFLUENZA: ICD-10-CM

## 2021-01-25 DIAGNOSIS — F32.1 MODERATE MAJOR DEPRESSION (H): ICD-10-CM

## 2021-01-25 LAB
ANION GAP SERPL CALCULATED.3IONS-SCNC: 11 MMOL/L (ref 5–18)
BUN SERPL-MCNC: 11 MG/DL (ref 8–22)
CALCIUM SERPL-MCNC: 9.4 MG/DL (ref 8.5–10.5)
CHLORIDE BLD-SCNC: 105 MMOL/L (ref 98–107)
CO2 SERPL-SCNC: 23 MMOL/L (ref 22–31)
CREAT SERPL-MCNC: 0.7 MG/DL (ref 0.6–1.1)
ERYTHROCYTE [DISTWIDTH] IN BLOOD BY AUTOMATED COUNT: 9.8 % (ref 11–14.5)
GFR SERPL CREATININE-BSD FRML MDRD: >60 ML/MIN/1.73M2
GLUCOSE BLD-MCNC: 82 MG/DL (ref 70–125)
HCT VFR BLD AUTO: 40.5 % (ref 35–47)
HGB BLD-MCNC: 13.6 G/DL (ref 12–16)
MCH RBC QN AUTO: 31.9 PG (ref 27–34)
MCHC RBC AUTO-ENTMCNC: 33.7 G/DL (ref 32–36)
MCV RBC AUTO: 95 FL (ref 80–100)
PLATELET # BLD AUTO: 266 THOU/UL (ref 140–440)
PMV BLD AUTO: 8.1 FL (ref 7–10)
POTASSIUM BLD-SCNC: 4 MMOL/L (ref 3.5–5)
RBC # BLD AUTO: 4.27 MILL/UL (ref 3.8–5.4)
SODIUM SERPL-SCNC: 139 MMOL/L (ref 136–145)
TSH SERPL DL<=0.005 MIU/L-ACNC: 1.22 UIU/ML (ref 0.3–5)
WBC: 7.5 THOU/UL (ref 4–11)

## 2021-01-25 ASSESSMENT — MIFFLIN-ST. JEOR: SCORE: 1248.9

## 2021-01-25 ASSESSMENT — PATIENT HEALTH QUESTIONNAIRE - PHQ9: SUM OF ALL RESPONSES TO PHQ QUESTIONS 1-9: 15

## 2021-01-26 LAB — 25(OH)D3 SERPL-MCNC: 18.1 NG/ML (ref 30–80)

## 2021-01-27 ENCOUNTER — HOSPITAL ENCOUNTER (OUTPATIENT)
Dept: CARDIOLOGY | Facility: HOSPITAL | Age: 26
Discharge: HOME | End: 2021-01-27
Attending: FAMILY MEDICINE

## 2021-01-27 ENCOUNTER — TRANSFERRED RECORDS (OUTPATIENT)
Dept: HEALTH INFORMATION MANAGEMENT | Facility: CLINIC | Age: 26
End: 2021-01-27

## 2021-01-27 ENCOUNTER — COMMUNICATION - HEALTHEAST (OUTPATIENT)
Dept: FAMILY MEDICINE | Facility: CLINIC | Age: 26
End: 2021-01-27

## 2021-01-27 DIAGNOSIS — R42 LIGHTHEADEDNESS: ICD-10-CM

## 2021-01-27 DIAGNOSIS — E55.9 VITAMIN D DEFICIENCY: ICD-10-CM

## 2021-02-01 ENCOUNTER — COMMUNICATION - HEALTHEAST (OUTPATIENT)
Dept: NURSING | Facility: CLINIC | Age: 26
End: 2021-02-01

## 2021-02-08 ENCOUNTER — RECORDS - HEALTHEAST (OUTPATIENT)
Dept: ADMINISTRATIVE | Facility: OTHER | Age: 26
End: 2021-02-08

## 2021-02-12 ENCOUNTER — COMMUNICATION - HEALTHEAST (OUTPATIENT)
Dept: FAMILY MEDICINE | Facility: CLINIC | Age: 26
End: 2021-02-12

## 2021-02-13 ENCOUNTER — OFFICE VISIT - HEALTHEAST (OUTPATIENT)
Dept: FAMILY MEDICINE | Facility: CLINIC | Age: 26
End: 2021-02-13

## 2021-02-13 DIAGNOSIS — Z20.822 SUSPECTED COVID-19 VIRUS INFECTION: ICD-10-CM

## 2021-02-14 ENCOUNTER — AMBULATORY - HEALTHEAST (OUTPATIENT)
Dept: FAMILY MEDICINE | Facility: CLINIC | Age: 26
End: 2021-02-14

## 2021-02-14 DIAGNOSIS — Z20.822 SUSPECTED COVID-19 VIRUS INFECTION: ICD-10-CM

## 2021-02-15 LAB
SARS-COV-2 PCR COMMENT: NORMAL
SARS-COV-2 RNA SPEC QL NAA+PROBE: NEGATIVE
SARS-COV-2 VIRUS SPECIMEN SOURCE: NORMAL

## 2021-02-16 ENCOUNTER — COMMUNICATION - HEALTHEAST (OUTPATIENT)
Dept: SCHEDULING | Facility: CLINIC | Age: 26
End: 2021-02-16

## 2021-02-17 ENCOUNTER — AMBULATORY - HEALTHEAST (OUTPATIENT)
Dept: FAMILY MEDICINE | Facility: CLINIC | Age: 26
End: 2021-02-17

## 2021-02-17 ENCOUNTER — COMMUNICATION - HEALTHEAST (OUTPATIENT)
Dept: CARE COORDINATION | Facility: CLINIC | Age: 26
End: 2021-02-17

## 2021-02-17 ENCOUNTER — OFFICE VISIT - HEALTHEAST (OUTPATIENT)
Dept: FAMILY MEDICINE | Facility: CLINIC | Age: 26
End: 2021-02-17

## 2021-02-17 DIAGNOSIS — Z20.822 SUSPECTED COVID-19 VIRUS INFECTION: ICD-10-CM

## 2021-02-19 ENCOUNTER — OFFICE VISIT - HEALTHEAST (OUTPATIENT)
Dept: BEHAVIORAL HEALTH | Facility: CLINIC | Age: 26
End: 2021-02-19

## 2021-02-19 DIAGNOSIS — F41.1 GENERALIZED ANXIETY DISORDER: ICD-10-CM

## 2021-02-19 DIAGNOSIS — F90.8 ATTENTION DEFICIT HYPERACTIVITY DISORDER (ADHD), OTHER TYPE: ICD-10-CM

## 2021-02-19 DIAGNOSIS — F84.0 AUTISM SPECTRUM DISORDER: ICD-10-CM

## 2021-02-19 DIAGNOSIS — F33.1 MODERATE EPISODE OF RECURRENT MAJOR DEPRESSIVE DISORDER (H): ICD-10-CM

## 2021-03-19 ENCOUNTER — OFFICE VISIT - HEALTHEAST (OUTPATIENT)
Dept: BEHAVIORAL HEALTH | Facility: CLINIC | Age: 26
End: 2021-03-19

## 2021-03-19 DIAGNOSIS — F84.0 AUTISM SPECTRUM DISORDER: ICD-10-CM

## 2021-03-19 DIAGNOSIS — F41.1 GENERALIZED ANXIETY DISORDER: ICD-10-CM

## 2021-03-19 DIAGNOSIS — F90.8 ATTENTION DEFICIT HYPERACTIVITY DISORDER (ADHD), OTHER TYPE: ICD-10-CM

## 2021-03-19 DIAGNOSIS — F33.1 MODERATE EPISODE OF RECURRENT MAJOR DEPRESSIVE DISORDER (H): ICD-10-CM

## 2021-03-25 ENCOUNTER — COMMUNICATION - HEALTHEAST (OUTPATIENT)
Dept: FAMILY MEDICINE | Facility: CLINIC | Age: 26
End: 2021-03-25

## 2021-04-01 ENCOUNTER — OFFICE VISIT - HEALTHEAST (OUTPATIENT)
Dept: BEHAVIORAL HEALTH | Facility: CLINIC | Age: 26
End: 2021-04-01

## 2021-04-01 DIAGNOSIS — F33.1 MODERATE EPISODE OF RECURRENT MAJOR DEPRESSIVE DISORDER (H): ICD-10-CM

## 2021-04-01 DIAGNOSIS — F84.0 AUTISM SPECTRUM DISORDER: ICD-10-CM

## 2021-04-01 DIAGNOSIS — F90.8 ATTENTION DEFICIT HYPERACTIVITY DISORDER (ADHD), OTHER TYPE: ICD-10-CM

## 2021-04-01 DIAGNOSIS — F41.1 GENERALIZED ANXIETY DISORDER: ICD-10-CM

## 2021-04-07 ENCOUNTER — RECORDS - HEALTHEAST (OUTPATIENT)
Dept: ADMINISTRATIVE | Facility: OTHER | Age: 26
End: 2021-04-07

## 2021-04-10 ENCOUNTER — COMMUNICATION - HEALTHEAST (OUTPATIENT)
Dept: FAMILY MEDICINE | Facility: CLINIC | Age: 26
End: 2021-04-10

## 2021-04-16 ENCOUNTER — AMBULATORY - HEALTHEAST (OUTPATIENT)
Dept: BEHAVIORAL HEALTH | Facility: CLINIC | Age: 26
End: 2021-04-16

## 2021-04-16 ENCOUNTER — OFFICE VISIT - HEALTHEAST (OUTPATIENT)
Dept: BEHAVIORAL HEALTH | Facility: CLINIC | Age: 26
End: 2021-04-16

## 2021-04-16 DIAGNOSIS — F84.0 AUTISM SPECTRUM DISORDER: ICD-10-CM

## 2021-04-16 DIAGNOSIS — F33.1 MODERATE EPISODE OF RECURRENT MAJOR DEPRESSIVE DISORDER (H): ICD-10-CM

## 2021-04-16 DIAGNOSIS — F41.1 GENERALIZED ANXIETY DISORDER: ICD-10-CM

## 2021-04-16 DIAGNOSIS — F90.8 ATTENTION DEFICIT HYPERACTIVITY DISORDER (ADHD), OTHER TYPE: ICD-10-CM

## 2021-04-16 ASSESSMENT — ANXIETY QUESTIONNAIRES
IF YOU CHECKED OFF ANY PROBLEMS ON THIS QUESTIONNAIRE, HOW DIFFICULT HAVE THESE PROBLEMS MADE IT FOR YOU TO DO YOUR WORK, TAKE CARE OF THINGS AT HOME, OR GET ALONG WITH OTHER PEOPLE: VERY DIFFICULT
3. WORRYING TOO MUCH ABOUT DIFFERENT THINGS: SEVERAL DAYS
5. BEING SO RESTLESS THAT IT IS HARD TO SIT STILL: NOT AT ALL
6. BECOMING EASILY ANNOYED OR IRRITABLE: NEARLY EVERY DAY
7. FEELING AFRAID AS IF SOMETHING AWFUL MIGHT HAPPEN: NOT AT ALL
4. TROUBLE RELAXING: SEVERAL DAYS
GAD7 TOTAL SCORE: 9
1. FEELING NERVOUS, ANXIOUS, OR ON EDGE: NEARLY EVERY DAY
2. NOT BEING ABLE TO STOP OR CONTROL WORRYING: SEVERAL DAYS

## 2021-04-16 ASSESSMENT — PATIENT HEALTH QUESTIONNAIRE - PHQ9: SUM OF ALL RESPONSES TO PHQ QUESTIONS 1-9: 10

## 2021-04-18 ENCOUNTER — HEALTH MAINTENANCE LETTER (OUTPATIENT)
Age: 26
End: 2021-04-18

## 2021-04-21 ENCOUNTER — AMBULATORY - HEALTHEAST (OUTPATIENT)
Dept: NURSING | Facility: CLINIC | Age: 26
End: 2021-04-21

## 2021-04-22 ENCOUNTER — RECORDS - HEALTHEAST (OUTPATIENT)
Dept: ADMINISTRATIVE | Facility: OTHER | Age: 26
End: 2021-04-22

## 2021-05-10 ENCOUNTER — MEDICAL CORRESPONDENCE (OUTPATIENT)
Dept: HEALTH INFORMATION MANAGEMENT | Facility: CLINIC | Age: 26
End: 2021-05-10

## 2021-05-10 ENCOUNTER — OFFICE VISIT - HEALTHEAST (OUTPATIENT)
Dept: FAMILY MEDICINE | Facility: CLINIC | Age: 26
End: 2021-05-10

## 2021-05-10 DIAGNOSIS — G25.0 ESSENTIAL TREMOR: ICD-10-CM

## 2021-05-10 DIAGNOSIS — G90.A POTS (POSTURAL ORTHOSTATIC TACHYCARDIA SYNDROME): ICD-10-CM

## 2021-05-10 DIAGNOSIS — F84.0 AUTISM SPECTRUM DISORDER: ICD-10-CM

## 2021-05-10 DIAGNOSIS — F32.1 MODERATE MAJOR DEPRESSION (H): ICD-10-CM

## 2021-05-11 ENCOUNTER — TRANSCRIBE ORDERS (OUTPATIENT)
Dept: OTHER | Age: 26
End: 2021-05-11

## 2021-05-11 DIAGNOSIS — G90.A POTS (POSTURAL ORTHOSTATIC TACHYCARDIA SYNDROME): Primary | ICD-10-CM

## 2021-05-12 ENCOUNTER — AMBULATORY - HEALTHEAST (OUTPATIENT)
Dept: NURSING | Facility: CLINIC | Age: 26
End: 2021-05-12

## 2021-05-14 ENCOUNTER — OFFICE VISIT - HEALTHEAST (OUTPATIENT)
Dept: BEHAVIORAL HEALTH | Facility: CLINIC | Age: 26
End: 2021-05-14

## 2021-05-14 DIAGNOSIS — F90.8 ATTENTION DEFICIT HYPERACTIVITY DISORDER (ADHD), OTHER TYPE: ICD-10-CM

## 2021-05-14 DIAGNOSIS — F41.1 GENERALIZED ANXIETY DISORDER: ICD-10-CM

## 2021-05-14 DIAGNOSIS — F84.0 AUTISM SPECTRUM DISORDER: ICD-10-CM

## 2021-05-14 DIAGNOSIS — F33.1 MODERATE EPISODE OF RECURRENT MAJOR DEPRESSIVE DISORDER (H): ICD-10-CM

## 2021-05-26 ASSESSMENT — PATIENT HEALTH QUESTIONNAIRE - PHQ9
SUM OF ALL RESPONSES TO PHQ QUESTIONS 1-9: 11
SUM OF ALL RESPONSES TO PHQ QUESTIONS 1-9: 14
SUM OF ALL RESPONSES TO PHQ QUESTIONS 1-9: 13

## 2021-05-27 ENCOUNTER — COMMUNICATION - HEALTHEAST (OUTPATIENT)
Dept: FAMILY MEDICINE | Facility: CLINIC | Age: 26
End: 2021-05-27

## 2021-05-27 ASSESSMENT — PATIENT HEALTH QUESTIONNAIRE - PHQ9
SUM OF ALL RESPONSES TO PHQ QUESTIONS 1-9: 9
SUM OF ALL RESPONSES TO PHQ QUESTIONS 1-9: 15
SUM OF ALL RESPONSES TO PHQ QUESTIONS 1-9: 10
SUM OF ALL RESPONSES TO PHQ QUESTIONS 1-9: 21

## 2021-05-27 NOTE — PROGRESS NOTES
Correct pharmacy verified with patient and confirmed in snapshot? [x] yes []no    Charge captured ? [x] yes  [] no    Medications Phoned  to Pharmacy [] yes [x]no  Name of Pharmacist:  List Medications, including dose, quantity and instructions      Medication Prescriptions given to patient   [] yes  [x] no   List the name of the drug the prescription was written for.       Medications ordered this visit were e-scribed.  Verified by order class [x] yes  [] no elavil 25mg, gabapentin 100mg, remeron 15mg, trintellix 20mg    Medication changes or discontinuations were communicated to patient's pharmacy: [x] yes  [] no   clonidine 0.1mg  UA collected [] yes  [x] no    [place] Prescription Monitoring Program Reviewed? [] yes  [x] no    Referrals were made to:  None today    Future appointment was made: [x] yes  [] no    Dictation completed at time of chart check: [x] yes  [] no    I have checked the documentation for today s encounters and the above information has been reviewed and completed.

## 2021-05-27 NOTE — PROGRESS NOTES
Last visit Lexi: 1/2/19  Have you seen your PCP: 2/6/19   What medical conditions do we need to know about: no  Are you seeing a therapist: yes, [facility]  Are you taking your medications: yes  Any concerns about your sleep: yes  Any concerns about your Appetite: no  How is your Mood: fair  Any Pain scale 0-10, ten being the worst: 0   Any Anxiety scale 0-5, five being the worst: 3-4   Any Depression scale 0-5, five being the worst: 2  Any Suicidal or homicidal ideation: denies SI/HI.

## 2021-05-27 NOTE — PROGRESS NOTES
Mental Health Visit Note    This is a dual signature report. My supervising clinician is CHANTAL Cm.    3/22/2019    Start time: 4:00pm    Stop Time: 4:55pm   Session # 4    Session Type: Patient is presenting for an Individual session.    Angela Jones is a 24 y.o. female is being seen today for    Chief Complaint   Patient presents with      Follow Up     Psychotherapy follow-up visit for anxiety   .     New symptoms or complaints: Interpersonal conflicts and communication problems    Functional Impairment:   Personal: 4  Family: 2  Work: 4  Social:3    Clinical assessment of mental status:   Grooming: Well groomed  Attire: Appropriate  Age: Appears Stated  Behavior Towards Examiner: Cooperative  Motor Activity: Tremors/Tics   Eye Contact: Avoidant  Mood: Anxious  Affect: Congruent w/content of speech and Anxious  Speech/Language: Stuttering/Slammering  Attention: Distractible  Concentration: Brief  Thought Process: other  Thought Content: Hallucinations: none reported  Delusions: none reported  Orientation: X 3  Memory: Impairment, Recent and Remote  Judgement: Impairment, Minimal and Moderate  Estimated Intelligence: Average  Demonstrated Insight: Adequate  Fund of Knowledge: adequate   I've re-evaluated the mental status of the patient and no changes were noted since the date of the last encounter, 3/8/19.    Suicidal/Homicidal Ideation present: None Reported This Session    Patient's impression of their current status:   The patient reported that she is having a lot of difficulty at home with task completion. She feels overwhelmed and doesn't know how to begin. She also noted that she and her  disagree about how to organize and unpack the apartment. She wants everything to have a place and she has an idea about how to categorize the items they have. Her  has a different perspective and this is challenging for her. She is home alone during the day and feels pressure to get the  "house organized. She believes that her  may project his own anxieties onto her. She shared, \"he gets nervous that I'm going to get nervous\" which then stops her from participating in activities that may be more challenging. She shared, \"he doesn't like when I get anxious because he wants to fix the problem and find an immediate solution.\" She believes that she would have more capacity to manage her feelings if given the opportunity. She understands that he may be trying to help but sometimes his involvement is more hindering. She shared that she feels stuck at home all day because she can't drive on her own and can't leave the dog home because of separation anxiety. She never learned to drive and doesn't have her license. She would benefit from assistance in this area.       Therapist impression of patients current state:   The patient arrived on-time for a follow-up therapy visit. She began session without her  present but did have her emotional support animal with her. The patient's dog was very anxious today and appeared hypervigilant, creating a distraction during session. After 20 minutes, the patient brought the animal back out to the lobby to sit with her . She asserted that this was the first time the dog was present in the therapy room without her  so the animal was more anxious. The therapist wonders about the animal's effectiveness in actually soothing the patient and helping with anxiety.   The patient appeared anxious with slight tremors today. She was receptive to feedback and followed therapist prompts with little difficulty. She was more focused when her animal was not present in the room. The majority of today's session focused on recent interactions with patient's . She believes it may be helpful to invite him back to the next session in order to talk more about helpful ways to communicate. The therapist encouraged patient to stop avoiding situations that might " presumably make her anxious such as unpacking certain boxes in their new apartment. She and her  may need help establishing better expectations and boundaries around managing patient's anxiety.       Type of psychotherapeutic technique provided: Insight oriented and Client centered    Progress toward short term goals:Progress as expected, as patient appeared willing and motivated to implement techniques to help address anxiety symptoms and increase autonomy and independence    Review of long term goals: Not done at today's visit   Treatment plan updated on 2/8/19  Date of next review: May 2019    Diagnosis:   1. JOSHUA (generalized anxiety disorder)        Plan and Follow up: The patient will be scheduled to return for a follow-up visit on 4/5/2019.  Invite  to next session to talk about helpful responses to stress      Discharge Criteria/Planning: Client has chronic symptoms and ongoing therapy for maintenance stability recommended.     Performed and documented by CHANTAL Blackmon    I have read, discussed and reviewed the documentation as presented by CHANTAL Blackmon LICSW

## 2021-05-28 ASSESSMENT — ANXIETY QUESTIONNAIRES
GAD7 TOTAL SCORE: 9
GAD7 TOTAL SCORE: 12
GAD7 TOTAL SCORE: 11
GAD7 TOTAL SCORE: 10
GAD7 TOTAL SCORE: 7

## 2021-05-28 ASSESSMENT — ASTHMA QUESTIONNAIRES: ACT_TOTALSCORE: 20

## 2021-05-28 NOTE — PROGRESS NOTES
Correct pharmacy verified with patient and confirmed in snapshot? [x] yes []no    Charge captured ? [x] yes  [] no    Medications Phoned  to Pharmacy [] yes [x]no  Name of Pharmacist:  List Medications, including dose, quantity and instructions      Medication Prescriptions given to patient   [] yes  [x] no   List the name of the drug the prescription was written for.       Medications ordered this visit were e-scribed.  Verified by order class [x] yes  [] no elavil 25mg, neurontin 100mg, remeron 15mg, trintellix 20mg    Medication changes or discontinuations were communicated to patient's pharmacy: [] yes  [x] no    UA collected [] yes  [x] no    [place] Prescription Monitoring Program Reviewed? [] yes  [x] no    Referrals were made to:  no    Future appointment was made: [] yes  [x] no    Dictation completed at time of chart check: [x] yes  [] no    I have checked the documentation for today s encounters and the above information has been reviewed and completed.

## 2021-05-28 NOTE — PROGRESS NOTES
Mental Health Visit Note    This is a dual signature report. My supervising clinician is CHANTAL Cm.    5/17/2019    Start time: 3:00pm    Stop Time: 3:55pm   Session # 7    Session Type: Patient is presenting for an Individual session.    Angela Jones is a 24 y.o. female is being seen today for    Chief Complaint   Patient presents with      Follow Up     Psychotherapy follow-up visit for anxiety   .     New symptoms or complaints: None    Functional Impairment:   Personal: 4  Family: 2  Work: 4  Social:3    Clinical assessment of mental status:   Grooming: Well groomed  Attire: Appropriate  Age: Appears Stated  Behavior Towards Examiner: Cooperative  Motor Activity: Tremors/Tics   Eye Contact: Avoidant  Mood: Anxious  Affect: Congruent w/content of speech and Anxious  Speech/Language: Stuttering/Slammering  Attention: Distractible  Concentration: Brief  Thought Process: other  Thought Content: Hallucinations: none reported  Delusions: none reported  Orientation: X 3  Memory: Impairment, Recent and Remote  Judgement: Impairment, Minimal and Moderate  Estimated Intelligence: Average  Demonstrated Insight: Adequate  Fund of Knowledge: adequate   I've re-evaluated the mental status of the patient and no changes were noted since the date of the last encounter, 5/3/19.    Suicidal/Homicidal Ideation present: None Reported This Session    Patient's impression of their current status:   The patient reported that she has been working on self-soothing skills at home, finding and organizing her materials and sensory items in her new apartment. She discussed how she tries to implement strategies for self-regulation throughout the day. It has been a more stressful week as her  was sick. They still have some difficulty identifying expectations for daily tasks such as cleaning the apartment. She does create a list of goals for each day including a task list. She tries to go at her own pace and  acknowledge her accomplishments. She has found that writing down the daily task list does help her feel more organized and motivated. She is also keeping track of her mood. She continues to have some disagreements with her  about expectations. There is also a tension that exists surrounding her medical care and treatment. He is in the role of care taker and she wishes she could be more independent.     Therapist impression of patients current state:   The patient arrived on-time for a follow-up psychotherapy visit. She did not bring her service animal to session with her today, noting that the dog can be a distraction and is more comfortable in the waiting room with her . The patient easily engaged in dialogue about underlying symptoms of anxiety. She appeared insightful about strategies she is working to implement on a daily basis to improve overall functioning and productivity. She has found that keeping a journal which includes a daily task list is helpful. She and her  continue to disagree about household chores. She was encouraged to talk with him about family values and expectations that are more realistic.   The therapist completed a referral for [facility] services through [facility] during today's visit. The therapist had previously completed a referral for [facility] services through [facility] but patient never heard back.  The patient has upcoming neurological testing at [facility] next week and will keep therapist informed about results.  Future visits will continue to focus on helping patient practice skills in self-regulation. She may also consider asking her  to attend visits so that the therapist can help facilitate conversations about expectations.      Type of psychotherapeutic technique provided: Insight oriented and Client centered    Progress toward short term goals:Progress as expected, as patient has demonstrated some improved time management and task  completion skills with the help of a notebook/organizer.    Review of long term goals: Not done at today's visit   Treatment plan updated on 2/8/19  Date of next review: 5/31/2019    Diagnosis:   1. JOSHUA (generalized anxiety disorder)    2. Current moderate episode of major depressive disorder, unspecified whether recurrent (H)    R/O Panic Disorder with agoraphobia   R/O Bipolar II  R/O ADHD  R/O PTSD    Plan and Follow up: The patient will be scheduled to return for a follow-up visit on 5/31/19. Patient will notify clinic if unable to attend her next appointment.  Patient needs new psychiatrist.  [facility] appointment is on 5/23/19.  Patient still waiting on [facility] services - referral for [facility] completed during session today.      Discharge Criteria/Planning: Client has chronic symptoms and ongoing therapy for maintenance stability recommended.     Performed and documented by CHANTAL Blackmon    I have read, discussed and reviewed the documentation as presented by CHANTAL Blackmon LICSW

## 2021-05-28 NOTE — TELEPHONE ENCOUNTER
RN cannot approve Refill Request    RN can NOT refill this medication med is not covered by policy/route to provider     . Last office visit: 2/6/2019 Marissa Walton MD Last Physical: Visit date not found Last MTM visit: Visit date not found Last visit same specialty: 2/6/2019 Marissa Walton MD.  Next visit within 3 mo: Visit date not found  Next physical within 3 mo: Visit date not found      Uyen Bae, [facility] Triage/Med Refill 4/30/2019    Requested Prescriptions   Pending Prescriptions Disp Refills     guanFACINE 1 mg Tb24 [Pharmacy Med Name: guanFACINE HCl ER Oral Tablet Extended Release 24 Hour 1 MG] 90 tablet 0     Sig: Take 1 tablet (1 mg total) by mouth at bedtime. After 1 week, if needed, increase to 2 mg.       There is no refill protocol information for this order

## 2021-05-28 NOTE — PROGRESS NOTES
Mental Health Visit Note    This is a dual signature report. My supervising clinician is CHANTAL Cm.    5/3/2019    Start time: 4:00pm    Stop Time: 4:54pm   Session # 6    Session Type: Patient is presenting for an Individual session.    Angela Jones is a 24 y.o. female is being seen today for    Chief Complaint   Patient presents with      Follow Up     Psychotherapy follow-up visit for anxiety   .     New symptoms or complaints: None    Functional Impairment:   Personal: 4  Family: 2  Work: 4  Social:3    Clinical assessment of mental status:   Grooming: Well groomed  Attire: Appropriate  Age: Appears Stated  Behavior Towards Examiner: Cooperative  Motor Activity: Tremors/Tics   Eye Contact: Avoidant  Mood: Anxious  Affect: Congruent w/content of speech and Anxious  Speech/Language: Stuttering/Slammering  Attention: Distractible  Concentration: Brief  Thought Process: other  Thought Content: Hallucinations: none reported  Delusions: none reported  Orientation: X 3  Memory: Impairment, Recent and Remote  Judgement: Impairment, Minimal and Moderate  Estimated Intelligence: Average  Demonstrated Insight: Adequate  Fund of Knowledge: adequate   I've re-evaluated the mental status of the patient and no changes were noted since the date of the last encounter, 4/19/19.    Suicidal/Homicidal Ideation present: None Reported This Session    Patient's impression of their current status:   The patient reported that her  still becomes very anxious when she gets anxious. She and therapist examined expectations and boundaries regarding 's involvement in managing patient's symptoms. The patient does at times request that her  help soothe her but also recognizes how she may be expecting him to care for her instead of learning how to care for herself - these behavior patterns suggest codependency instead of interdependence. She agrees that they need to learn regulating skills independently  "instead of always relying upon each other. She would like him to seek help in the community to manage symptoms of anxiety. She also believes he would benefit from gaining support as a family member living with someone dealing with severe mental illness. The therapist recommended they look into [facility] support groups. The patient believes she had another \"episode\" on Sunday night (4/28). She reportedly couldn't speak or talk and doesn't understand what triggered it. She is still eager to better understand how her brain is functioning.      Therapist impression of patients current state:   The patient arrived on-time for a follow-up psychotherapy visit. She was accompanied by her service animal for the first portion of session but after 30 minutes delivered the dog back to her  waiting in the lobby. The patient insists that she and her  are training her dog to be a service animal but they cannot currently afford additional training in the community. The dog is very anxious (per patient report, he has separation anxiety and takes medication) and does not appear to help patient remain calm while in session.   The patient discussed plans to work with [facility] regarding suspected neurological issues. She needs to find a new psychiatrist. She still has not heard from [facility] regarding referral for [facility] services. Therapist will follow-up on this referral process.   The therapist encouraged patient to focus on short-term goals to manage symptoms of anxiety by identifying and practicing self-soothing skills as a way to work towards improved self-regulation. She was instructed to create a list of self-soothing strategies to bring to her next visit.   The long-term goal will be to help promote patient's independence.      Type of psychotherapeutic technique provided: Insight oriented and Client centered    Progress toward short term goals:Progress as expected, as patient appeared receptive to learning " strategies for self-regulation. Patient appeared aware of areas for self-improvement and change. Patient is still seeking to better understand the nature of her condition.    Review of long term goals: Not done at today's visit   Treatment plan updated on 2/8/19  Date of next review: May 2019    Diagnosis:   1. JOSHUA (generalized anxiety disorder)    2. Current moderate episode of major depressive disorder, unspecified whether recurrent (H)    R/O Panic Disorder  R/O Bipolar II  R/O ADHD  R/O PTSD    Plan and Follow up: The patient will be scheduled to return for a follow-up visit on 5/17/2019.  HW: develop list of self-soothing techniques to improve independent self-regulation skills; encourage  to seek support  Patient needs new psychiatrist.  [facility] appointment is on 5/23/19.  Patient still waiting on [facility] services.       Discharge Criteria/Planning: Client has chronic symptoms and ongoing therapy for maintenance stability recommended.     Performed and documented by CHANTAL Blackmon    I have read, discussed and reviewed the documentation as presented by CHANTAL Blackmon LICSW

## 2021-05-28 NOTE — PROGRESS NOTES
Mental Health Visit Note    This is a dual signature report. My supervising clinician is CHANTAL Cm.    4/19/2019    Start time: 4:20pm    Stop Time: 4:55pm   Session # 5    Session Type: Patient is presenting for an Individual session.    Angela Jones is a 24 y.o. female is being seen today for    Chief Complaint   Patient presents with      Follow Up     Psychotherapy follow-up visit for anxiety   .     New symptoms or complaints: None    Functional Impairment:   Personal: 4  Family: 2  Work: 4  Social:3    Clinical assessment of mental status:   Grooming: Well groomed  Attire: Appropriate  Age: Appears Stated  Behavior Towards Examiner: Cooperative  Motor Activity: Tremors/Tics   Eye Contact: Avoidant  Mood: Anxious  Affect: Congruent w/content of speech and Anxious  Speech/Language: Stuttering/Slammering  Attention: Distractible  Concentration: Brief  Thought Process: other  Thought Content: Hallucinations: none reported  Delusions: none reported  Orientation: X 3  Memory: Impairment, Recent and Remote  Judgement: Impairment, Minimal and Moderate  Estimated Intelligence: Average  Demonstrated Insight: Adequate  Fund of Knowledge: adequate   I've re-evaluated the mental status of the patient and no changes were noted since the date of the last encounter, 3/22/19.    Suicidal/Homicidal Ideation present: None Reported This Session    Patient's impression of their current status:   The patient reported that she wanted her  to join session today in order to revisit topics from last session about managing her anxiety. She openly communicated to her  that she does not want him to complete tasks for her just because she feels anxious. She also does not want to avoid tasks that may cause her to feel anxious. She feels that her  tries to prevent her from feeling anxious at all which only perpetuates her codependency and lack of self-confidence. He agreed that he does try to protect  her and limit anxiety provoking tasks out of love. They discussed different cleaning standards for their home and how they could work to compromise. They discussed their values and beliefs in an effort to establish more cohesive expectations for their family system. They discussed ideas surrounding conflict. The patient's  worries that conflict can be harmful and the patient cannot tolerate unresolved conflict. Thus, they both have their own ways of managing conflict which often results in confusion. They discussed how they could compromise about this.     Therapist impression of patients current state:   The patient arrived 20 minutes late for a follow-up psychotherapy visit. Her  and therapy dog joined today's session. The patient appeared anxious with slight tremors. She was receptive to feedback and easily engaged in dialogue with therapist and . The patient expressed a desire to obtain help establishing better expectations and boundaries around managing patient's anxiety with her . She initially requested some assistance from the therapist in regards to communicating her feelings but appeared able to do this independently. The couple appeared open and honest with each other about behavior patterns and factors influencing patient's underlying anxiety symptoms. They have different perceptions and ideas about how tasks should be completed at home, especially as they are attempting to resettle into a new apartment. They discussed the influence of their different family systems and appeared willing to establish their own family values as a way to move forward. The therapist provided them with a task list of items to work on in between sessions a homework.   The long-term goal will be to help promote patient's independence.      Type of psychotherapeutic technique provided: Insight oriented and Client centered    Progress toward short term goals:Progress as expected, as patient appeared  communicative with her  about presenting concerns. The couple appeared eager to work on assigned homework tasks.    Review of long term goals: Not done at today's visit   Treatment plan updated on 2/8/19  Date of next review: May 2019    Diagnosis:   1. JOSHUA (generalized anxiety disorder)    R/O Panic Disorder  R/O Bipolar II  R/O ADHD  R/O PTSD    Plan and Follow up: The patient will be scheduled to return for a follow-up visit on 5/3/2019.  Therapist provided task list for patient and  to work on items in between sessions.      Discharge Criteria/Planning: Client has chronic symptoms and ongoing therapy for maintenance stability recommended.     Performed and documented by CHANTAL Blackmon    I have read, discussed and reviewed the documentation as presented by CHANTAL Blackmon LICSW

## 2021-05-29 NOTE — PROGRESS NOTES
Mental Health Visit Note    This is a dual signature report. My supervising clinician is CHANTAL Cm.    6/14/2019    Start time: 4:00pm    Stop Time: 4:45pm   Session # 9    Session Type: Patient is presenting for an Individual session.    Angela Jones is a 24 y.o. female is being seen today for    Chief Complaint   Patient presents with      Follow Up     Psychotherapy follow-up visit for anxiety   .     New symptoms or complaints: Paperwork questions    Functional Impairment:   Personal: 4  Family: 2  Work: 4  Social:3    Clinical assessment of mental status:   Grooming: Well groomed  Attire: Appropriate  Age: Appears Stated  Behavior Towards Examiner: Cooperative  Motor Activity: Tremors/Tics   Eye Contact: Avoidant  Mood: Anxious  Affect: Congruent w/content of speech and Anxious  Speech/Language: Stuttering/Slammering  Attention: Distractible  Concentration: Brief  Thought Process: other  Thought Content: Hallucinations: none reported  Delusions: none reported  Orientation: X 3  Memory: Impairment, Recent and Remote  Judgement: Impairment, Minimal and Moderate  Estimated Intelligence: Average  Demonstrated Insight: Adequate  Fund of Knowledge: adequate   I've re-evaluated the mental status of the patient and no changes were noted since the date of the last encounter, 5/31/19.    Suicidal/Homicidal Ideation present: None Reported This Session    Patient's impression of their current status:   The patient initiated session by presenting a form from the social security office. The patient requested help addressing the form, noting that she was trying to obtain a new state ID. After much deliberation and help from [facility], the patient's plan was to return to the office to obtain a new form and to verify what information was actually needed from the clinic.   The patient's  noted that he will be transferring care to [facility] and plans to work with the other therapist here.  While updating  "patient's treatment plan, she noted that symptoms of depression are \"always there\" but are not causing impairments to functioning. She feels that her underlying depressive symptoms are more manageable compared to underlying symptoms of anxiety.   The patient reported that she is undergoing a 24 hour EEG on 7/1/19 at [facility] and her first psychiatry visit at [place] is scheduled for 7/3/19.     Therapist impression of patients current state:   The patient arrived on-time for a follow-up visit. She was accompanied by her  today while her therapy dog was at [facility]. The patient presented with an anxious mood and affect.  In review of patient's treatment plan, she completed assessment measurements for anxiety and depression. The patient noted that her depressive symptoms are more manageable and less problematic compared to symptoms of anxiety. The therapist noted that the patient's assessment measurement scores indicate a different picture. For instance, the patient's PHQ-9 score is much higher than her JOSHUA-7 which would suggest that depressive symptoms are more prevalent and more problematic. The patient became slightly defensive about this feedback, unable to explain or clarify this observation. It is possible that the patient is avoiding tasks that make her feel more anxious so her scores are lower in regards to prevalence and frequency. However, the patient continues to assert that anxiety symptoms are causing the most impairments to functioning. The patient continues to present with various discrepancies about her underlying symptoms. Ongoing assessment is needed to inform the treatment planning process. Furthermore, the patient is very focused on current seizure-like symptoms at the moment which is taking precedence over treatment of underlying mental health symptoms.   The patient was encouraged to continue utilizing her journal to help with daily task completion and in order to record thoughts, " feelings and behaviors.     Type of psychotherapeutic technique provided: Insight oriented and Client centered    Progress toward short term goals:Progress as expected, Patient is still working toward short-term goal of understand nature of seizures.   Patient exhibiting some progress with task completion by using a journal.        Review of long term goals: Treatment Plan updated   Date of next review: September 2019    Diagnosis:   1. JOSHUA (generalized anxiety disorder)    2. Attention deficit hyperactivity disorder (ADHD), unspecified ADHD type    3. Bipolar 2 disorder (H)    R/O Panic Disorder  R/O PTSD    Plan and Follow up: The patient will be scheduled to return for a follow-up visit on 6/28/19.  Patient's  is interested in establishing care with other [facility] therapist, Jessica Miller, and will talk to primary doctor about the referral process. He may transfer care to [facility] clinic anyway.  Patient presented a document from social security today but will bring in a new form to her next visit. Therapist will also consult with patient's PCP about the form.    Discharge Criteria/Planning: Client has chronic symptoms and ongoing therapy for maintenance stability recommended.     Performed and documented by CHANTAL Blackmon    I have read, discussed and reviewed the documentation as presented by CHANTAL Blackmon LICSW

## 2021-05-29 NOTE — PROGRESS NOTES
Outpatient Mental Health Treatment Plan    Name:  Angela Jones  :  1995  MRN:  [id number]    Treatment Plan:  Updated Treatment Plan  Intake/initial treatment plan date:    Intake: 2018  Benefit and risks and alternatives have been discussed: Yes  Is this treatment appropriate with minimal intrusion/restrictions: Yes  Estimated duration of treatment:  Approximately 10 sessions.  Anticipated frequency of services:  Every 2 weeks  Necessity for frequency: This frequency is needed to establish therapeutic goals and for continuity of care in order to monitor progress.  Necessity for treatment: To address cognitive, behavioral, and/or emotional barriers in order to work toward goals and to improve quality of life.    Session Type: Patient is presenting for an Individual session.    Plan:           ?   ? Anxiety    Goal:  Decrease average anxiety level from 4 to 3.   Strategies: ? [x] Learn and practice relaxation techniques and other coping strategies (e.g., thought stopping, reframing, meditation)     ? [x] Increase involvement in meaningful activities     ? [x] Discuss sleep hygiene     ? [x] Explore thoughts and expectations about self and others     ? [x] Identify and monitor triggers for panic/anxiety symptoms     ? [x] Implement physical activity routine (with physician approval)     ? [x] Consider introduction of bibliotherapy and/or videos     ? [x] Continue compliance with medical treatment plan (or explore barriers)   ?Degree to which this is a problem: 4  Degree to which goal is met: 1  Date of Review: 2019       ? Depression    Goal:  Decrease average depression level from 2 to 1.   Strategies:    ?[x] Decrease social isolation     [x] Increase involvement in meaningful activities     ?[x] Discuss sleep hygiene     ?[x] Explore thoughts and expectations about self and others     ?[x] Process grief (loss of significant person, independence, role, etc.)     ?[x] Assess for suicide  "risk     ?[x] Implement physical activity routine (with physician approval)     [x] Consider introduction of bibliotherapy and/or videos     [x] Continue compliance with medical treatment plan (or explore barriers)?  Degree to which this is a problem: 2  Degree to which goal is met: 2  Date of Review: September 2019    Additional goals:  Improve self-care  Improve organization skills (\"life skills\")  Reduce seizure-like episodes  Increase independence in daily functioning        Functional Impairment:  1=Not at all/Rarely  2=Some days  3=Most Days  4=Every Day    Personal : 4  Family : 3  Social : 2   Work/school : 4    Diagnosis (non-Axial as defined in DSM-5)  1. Generalized Anxiety Disorder      2. Current moderate episode of major depressive disorder, unspecified whether recurrent (H)      ADHD  Provisional Diagnosis (list only- no explanation needed)  Bipolar Disorder  OCD  PTSD      Clinical assessments and measures completed:.   WHODAS 2.0 12-item version: 7    Scores presented in qualifiers to represent level of disability.  MILD Problem (slight, low, ...) 5-24%  H1= 30  H2= 2  H3= 18    PHQ-9: 13 (2/8/19)  PHQ-9: 14 (6/14/19)     JOSHUA-7: 7 (2/8/19)  JOSHUA-7: 6 (6/14/19)     C-SSRS: Low to moderate risk. Patient does have history of self-harm and suicidal ideation with intent. No current ideation or intent.         Strengths:  Articulate, open-minded and willing to participate in mental health services. Good support from  and family. Good access to care and resourcefulness.   Limitations:  Trouble with self-control; intrinstic motivation; executive dysfunction with ADHD. Patient also limited by seizure-like activity with no clear understanding of origin.  Cultural Considerations: Patient is a 24  female. Identifies as Yazidism but more interested in science. Grew up in small town [place] and always had family close by. There are no significant ethnic, cultural or Evangelical factors that " may be relevant for therapy.     Persons responsible for this plan: Patient and Provider            Psychotherapist Signature           Patient Signature:              Guardian Signature             Provider: Performed and documented by CHANTAL Blackmon   Date:  6/14/2019

## 2021-05-29 NOTE — TELEPHONE ENCOUNTER
RN cannot approve Refill Request    RN can NOT refill this medication med is not covered by policy/route to provider       . Last office visit: 2/6/2019 Marissa Walton MD Last Physical: Visit date not found Last MTM visit: Visit date not found Last visit same specialty: 2/6/2019 Marissa Walton MD.  Next visit within 3 mo: Visit date not found  Next physical within 3 mo: Visit date not found      Uyen Bae, [facility] Triage/Med Refill 5/29/2019    Requested Prescriptions   Pending Prescriptions Disp Refills     SYMBICORT 80-4.5 mcg/actuation inhaler [Pharmacy Med Name: Symbicort Inhalation Aerosol 80-4.5 MCG/ACT]  2     Sig: Inhale 2 puffs 2 (two) times a day.       There is no refill protocol information for this order

## 2021-05-29 NOTE — PROGRESS NOTES
ASSESMENT AND PLAN:  Diagnoses and all orders for this visit:    Scabies  -     permethrin (ELIMITE) 5 % cream; Apply to body from neck down.  Wash off in 10 hours.  Repeat in 2 weeks.  Dispense: 60 g; Refill: 1  Follow-up if not resolved.  If her  develops similar symptoms he will call for treatment.    Attention deficit hyperactivity disorder (ADHD), predominantly inattentive type  refill-     guanFACINE 1 mg Tb24; Take 2 tablets (2 mg total) by mouth at bedtime.  Dispense: 60 tablet; Refill: 0  Follow-up in clinic with Dr. Walton          SUBJECTIVE: 24-year-old female with a 1 day history of itchy raised bumps on her fingers.  She had similar symptoms in the past and was diagnosed and treated for scabies, she tolerated that permethrin treatment well, and it worked well for her.  Currently her symptoms are localized to the right hand.  No itching or visible rash/bumps anywhere else on the body.  Patient also needs a refill on her ADD medication, she currently takes 2 mg/day and tolerates that well.    Past Medical History:   Diagnosis Date     Migraine      Patient Active Problem List   Diagnosis     Seizure-like activity (H)     Mild persistent asthma     Irritable bowel syndrome with both constipation and diarrhea     Bipolar 2 disorder (H)     JOSHUA (generalized anxiety disorder)     Current Outpatient Medications   Medication Sig Dispense Refill     amitriptyline (ELAVIL) 25 MG tablet Take 2 tablets (50 mg) by mouth at bedtime. 60 tablet 6     diphenhydrAMINE (BENADRYL) 50 MG capsule Take 50 mg by mouth every 6 (six) hours as needed for itching.       gabapentin (NEURONTIN) 100 MG capsule Take 200 mg by mouth 3 (three) times a day. 3 times a day as needed 20 minutes before stressful event 180 capsule 6     guanFACINE 1 mg Tb24 Take 2 tablets (2 mg total) by mouth at bedtime. 60 tablet 0     hyoscyamine (LEVSIN/SL) 0.125 mg SL tablet Take 0.125 mg by mouth.       ipratropium (ATROVENT HFA) 17 mcg/actuation  "inhaler Inhale 2 puffs.       levonorgestrel (MIRENA) 20 mcg/24 hr (5 years) IUD 20 mcg by Intrauterine route.       melatonin 10 mg Tab Take 10 mg by mouth.       metoclopramide (REGLAN) 5 MG tablet Take 5 mg by mouth.       mirtazapine (REMERON) 15 MG tablet Take 1 tablet (15 mg total) by mouth at bedtime. 90 tablet 1     ranitidine (ZANTAC) 150 MG tablet Take 1 tablet (150 mg total) by mouth 2 (two) times a day. 120 tablet 3     SYMBICORT 80-4.5 mcg/actuation inhaler Inhale 2 puffs 2 (two) times a day. 1 Inhaler 2     vortioxetine (TRINTELLIX) 20 mg Tab tablet Take 1 tablet (20 mg total) by mouth daily. 90 tablet 1     inhalational spacing device Spcr Inhale 2 Inhalation 2 (two) times a day. To use with Symbiocort inhaler. 1 each 0     inhalational spacing device Spcr Use as directed. 1 each 0     permethrin (ELIMITE) 5 % cream Apply to body from neck down.  Wash off in 10 hours.  Repeat in 2 weeks. 60 g 1     No current facility-administered medications for this visit.      Social History     Tobacco Use   Smoking Status Never Smoker   Smokeless Tobacco Never Used       OBJECTICE: /56 (Patient Site: Right Arm, Patient Position: Sitting)   Pulse 87   Temp 98.7  F (37.1  C) (Oral)   Resp 20   Ht 5' 3\" (1.6 m)   Wt 111 lb (50.3 kg)   SpO2 98%   Breastfeeding? No   BMI 19.66 kg/m       No results found for this or any previous visit (from the past 24 hour(s)).     GEN-alert, appropriate, in no apparent distress   SKIN-small raised papules of the hand with some mild surrounding redness consistent with scabies lesions.      Prem Pérez"

## 2021-05-29 NOTE — PROGRESS NOTES
Mental Health Visit Note    This is a dual signature report. My supervising clinician is CHANTAL Cm.    5/31/2019    Start time: 4:15pm    Stop Time: 5:00pm   Session # 8    Session Type: Patient is presenting for an Individual session.    Angela Jones is a 24 y.o. female is being seen today for    Chief Complaint   Patient presents with      Follow Up     Psychotherapy follow-up visit for anxiety   .     New symptoms or complaints: None    Functional Impairment:   Personal: 4  Family: 2  Work: 4  Social:3    Clinical assessment of mental status:   Grooming: Well groomed  Attire: Appropriate  Age: Appears Stated  Behavior Towards Examiner: Cooperative  Motor Activity: Tremors/Tics   Eye Contact: Avoidant  Mood: Anxious  Affect: Congruent w/content of speech and Anxious  Speech/Language: Stuttering/Slammering  Attention: Distractible  Concentration: Brief  Thought Process: other  Thought Content: Hallucinations: none reported  Delusions: none reported  Orientation: X 3  Memory: Impairment, Recent and Remote  Judgement: Impairment, Minimal and Moderate  Estimated Intelligence: Average  Demonstrated Insight: Adequate  Fund of Knowledge: adequate   I've re-evaluated the mental status of the patient and no changes were noted since the date of the last encounter, 5/17/19.    Suicidal/Homicidal Ideation present: None Reported This Session    Patient's impression of their current status:   The patient reflected upon her recent visit to [facility] and plans for further testing. She will be establishing care with a psychiatrist through [facility] in July. She was prompted to discuss the origins of her seizure-like symptoms, which started approximately 1 or 2 years ago. Her parents do not recall the symptoms being present during childhood. She denies any history of TBI. She feels frustrated about the episodes but denied feeling anxious that they will occur. She believes that the symptoms do cause impairments to  functioning because she doesn't feel that she's in control over her physical health. She reported being unaware when an episode is happening, only remembering feeling groggy afterwards. She and her  initially could not recall the last time she had an episode. Patient is not sure if she has them when  is not home. They believe the episodes happen in clusters but couldn't recall specific dates. They were asked to take a video next time an episode occurred. The patient eventually shared that the inquiry felt like an interrogation. She doesn't want people to think she's making this up. The conversation then shifted to her 's anxiety for which he agreed to obtain additional support.    Therapist impression of patients current state:   The patient arrived 15 minutes late for a follow-up therapy visit. She called ahead, notifying clinic staff that she would be late due to traffic delays. The patient presented for session with her  and emotional support animal today. The therapist attempted to gather more information about the nature of patient's symptoms today and the patient became very defensive and agitated. It became clear that patient perceives too many questions as an interrogation and signs of doubt about her reported problems. The therapist affirmed patient for sharing her feedback and feelings. The therapist explained the reasoning for questioning while demonstrating unconditional positive regard. The therapist believes that ongoing assessment is necessary to better understand the nature of her symptoms in order to inform the treatment process. At this time, there are many unanswered questions in regards to patient's functional impairments impacting the development of a thorough treatment plan. The patient has established care with [facility] for neurology and psychiatry which will be important for her future care and treatment trajectory. The therapist will continue to help patient  attempt to manage symptoms of anxiety during future visits. She was encouraged to continue utilizing her journal to help with daily task completion and in order to record thoughts, feelings and behaviors.     Type of psychotherapeutic technique provided: Insight oriented and Client centered    Progress toward short term goals:Progress as expected, as patient was able to communicate her feelings to her  and advocate that he establish care with a therapist. The couple appears willing to do independent work which would strengthen the relationship.   Patient is still working toward short-term goal of understand nature of seizures.    Review of long term goals: Not done at today's visit - patient presented late so review of goals was not completed due to shortened session time  Treatment plan updated on 2/8/19  Date of next review: 6/14/2019    Diagnosis:   1. JOSHUA (generalized anxiety disorder)    2. Panic disorder with agoraphobia      R/O Bipolar II  R/O ADHD  R/O PTSD    Plan and Follow up: The patient will be scheduled to return for a follow-up visit on 6/14/19.  Patient's  is interested in establishing care with other [facility] therapist, Jessica Miller, and will talk to primary doctor about the referral process.    Discharge Criteria/Planning: Client has chronic symptoms and ongoing therapy for maintenance stability recommended.     Performed and documented by CHANTAL Blackmon    I have read, discussed and reviewed the documentation as presented by CHANTAL Blackmon LICSW

## 2021-05-29 NOTE — TELEPHONE ENCOUNTER
Per Dr Rosales directive on 6.4.19, writer called patient to offer her follow up care in clinic with Tila Gilbert NP. Patient was informed she can make new patient 60 min appt with Tila in late August or early September 2019. Patient stated she will call back to schedule.

## 2021-05-30 NOTE — PROGRESS NOTES
Mental Health Visit Note    7/12/2019    Start time: 4:00pm    Stop Time: 4:52pm   Session # 11    Session Type: Patient is presenting for an Individual session.     Persons Present: Therapist, patient, patient's , service animal    Angela Jones is a 24 y.o. female is being seen today for    Chief Complaint   Patient presents with      Follow Up     Psychotherapy follow-up visit for anxiety and other concerns impacting daily functioning   .     New symptoms or complaints: Received diagnosis of functional neurological disorder     Functional Impairment:   Personal: 4  Family: 2  Work: 4  Social:3    Clinical assessment of mental status:   Grooming: Well groomed  Attire: Appropriate  Age: Appears Stated  Behavior Towards Examiner: Cooperative  Motor Activity: Tremors/Tics   Eye Contact: Avoidant  Mood: Anxious  Affect: Congruent w/content of speech and Anxious  Speech/Language: Stuttering/Slammering  Attention: Distractible  Concentration: Brief  Thought Process: other  Thought Content: Hallucinations: none reported  Delusions: none reported  Orientation: X 3  Memory: Impairment, Recent and Remote  Judgement: Impairment, Minimal and Moderate  Estimated Intelligence: Average  Demonstrated Insight: Adequate  Fund of Knowledge: adequate   I've re-evaluated the mental status of the patient and no changes were noted since the date of the last encounter, 6/28/19.    Suicidal/Homicidal Ideation present: None Reported This Session    Patient's impression of their current status:   The patient reflected upon recent assessment process through [facility] in [place]. They found that she was having nonepileptic episodes and diagnosed her with functional neurological disorder. She was also diagnosed with Borderline Personality Disorder traits and PTSD. She was suspected as being on the spectrum with high functioning Asperger's but will require further testing. She does feel hopeful that she is making progress in  regards to her desire to understand the nature of her condition. She is trying to be patient about further testing, remaining open-minded about treatment recommendations. She plans to talk more with her parents about her social development and early childhood experiences. She is currently seeking a long-term psychiatrist. Her  is in the process of seeking a referral for individual counseling.        Therapist impression of patients current state:   The patient arrived on-time for a follow-up visit. The patient was accompanied by her  and service animal for today's appointment. The patient appeared anxious but easily engaged in dialogue with the therapist. Today's visit was mostly an overview of current events and future care plans. The therapist continues to act as more of a care coordinator because patient's condition is so complex and still so unclear. The therapist provides unconditional positive regard from a strengths based perspective, offering feedback and reflections to help validate patient's experience. It was recommended by the doctor at [place] that patient participate in weekly individual therapy visits as well as DBT therapy. The patient and therapist agreed to hold off on any major intervention changes until after patient completes testing through [facility].       Type of psychotherapeutic technique provided: Insight oriented and Client centered    Progress toward short term goals:Progress as expected, as patient continues to create action steps promoting a growth mindset.      Review of long term goals: Not done at today's visit   Treatment plan updated on 6/14/19   Date of next review: September 2019    Diagnosis:   1. JOSHUA (generalized anxiety disorder)    2. Attention deficit hyperactivity disorder (ADHD), unspecified ADHD type    R/O Panic Disorder  R/O PTSD  R/O ASD    Plan and Follow up: The patient will be scheduled to return for a follow-up visit on 7/24/2019.  Plan: schedule intake  assessment with [facility] by contacting [facility] doctor for referral    Discharge Criteria/Planning: Client has chronic symptoms and ongoing therapy for maintenance stability recommended.     Performed and documented by CHANTAL Blackmon

## 2021-05-30 NOTE — PROGRESS NOTES
"Mental Health Visit Note    This is a dual signature report. My supervising clinician is CHANTAL Cm.    6/28/2019    Start time: 3:00pm    Stop Time: 3:50pm   Session # 10    Session Type: Patient is presenting for an Individual session.    Angela Jones is a 24 y.o. female is being seen today for    Chief Complaint   Patient presents with      Follow Up     Psychotherapy follow-up visit for anxiety   .     New symptoms or complaints: Task completion problems     Functional Impairment:   Personal: 4  Family: 2  Work: 4  Social:3    Clinical assessment of mental status:   Grooming: Well groomed  Attire: Appropriate  Age: Appears Stated  Behavior Towards Examiner: Cooperative  Motor Activity: Tremors/Tics   Eye Contact: Avoidant  Mood: Anxious  Affect: Congruent w/content of speech and Anxious  Speech/Language: Stuttering/Slammering  Attention: Distractible  Concentration: Brief  Thought Process: other  Thought Content: Hallucinations: none reported  Delusions: none reported  Orientation: X 3  Memory: Impairment, Recent and Remote  Judgement: Impairment, Minimal and Moderate  Estimated Intelligence: Average  Demonstrated Insight: Adequate  Fund of Knowledge: adequate   I've re-evaluated the mental status of the patient and no changes were noted since the date of the last encounter, 6/14/19.    Suicidal/Homicidal Ideation present: None Reported This Session    Patient's impression of their current status:   The patient expressed some feelings of embarrassment in regards to how others might respond to her service animal when he barks. She feels anxious when the dog gets anxious. She is still working on time management and task completion during the day. The expectation appears to be to keep the apartment clean but she finds this difficult because she becomes easily overwhelmed with the variety of tasks and energy each task requires. She stated, \"it's a matter of motivation - I have low motivation to " "do things especially with cleaning and I have trouble getting started.\" She has trouble getting started when each task appears to require a lot of energy. She discussed \"The Spoon Theory.\" She is managing feelings of guilt and shame when her  gets home. She believes that she should be keeping the house clean because she's home all day and he's working. She has frequent crying spells. She has been trying to keep a sticker chart.      Therapist impression of patients current state:   The patient arrived on-time for a follow-up visit. The patient presented with an anxious affect including excessive motor activity and rapid speech patterns in addition to stuttering and stammering. Her service animal stayed in the lobby today with her . The patient continues to experience impairments to functioning due to a combination of anxiety, depression and ADHD symptoms. She was receptive to feedback about using the SUDS scale to determine how she might be able to better prioritize tasks during the day which would help her create a more realistic daily and weekly routine/schedule. The patient has a long history of problems with task completion and time management. 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 She does not feel particularly inspired about staying home, tasked with cleaning and organizing but does not know what else to do. Further attention to increased career or school support would be beneficial for patient.     Type of psychotherapeutic technique provided: Insight oriented and Client centered    Progress toward short term goals:Progress as expected, as patient was receptive to feedback today regarding strategies for self-improvement and change.      Review of long term goals: Not done at today's visit   Treatment plan updated on 6/14/19   Date of next review: September 2019    Diagnosis:   1. JOSHUA (generalized anxiety " disorder)    2. Attention deficit hyperactivity disorder (ADHD), unspecified ADHD type    R/O Panic Disorder  R/O PTSD    Plan and Follow up: The patient will be scheduled to return for a follow-up visit on 7/12/2019.  HW task: utilize SUDS scale measurement to determine experienced level of distress/anxiety associated with daily tasks. Short-term goal will be to learn how to prioritize tasks, exert energy appropriately and create realistic expectations in order to decrease functional impairments. Long-term goal may be to consider obtaining assistance for employment or further training.     Discharge Criteria/Planning: Client has chronic symptoms and ongoing therapy for maintenance stability recommended.     Performed and documented by CHANTAL Blackmon    I have read, discussed and reviewed the documentation as presented by CHANTAL Blackmon LICSW

## 2021-05-30 NOTE — TELEPHONE ENCOUNTER
Per call to [facility] patient has a plan limitation of maximum of 1 tablet per day of this medication.  No PA can be done.  Rep stated that patient can take one tablet of the Guanfacine 2mg ER 24hr.        If provider is ok with this please send new Rx of Guanfacine 2mg ER 24hr tablet.  This is covered.   If patient is still titrating (previous Rx's state she was to take 1mg for 1 week then increase to 2mg)  Please route back to [facility] PA team and we can contact the pharmacy to see if they can do an override.

## 2021-05-30 NOTE — TELEPHONE ENCOUNTER
Queued med as 2mg  - IF ok with PCP, then sign and send, if not contact PA team as in message below. Thanks.

## 2021-05-30 NOTE — PROGRESS NOTES
Mental Health Visit Note    7/24/2019    Start time: 3:00pm    Stop Time: 3:50pm   Session # 12    Session Type: Patient is presenting for an Individual session.     Persons Present: Therapist and Patient    Angela Jones is a 24 y.o. female is being seen today for    Chief Complaint   Patient presents with      Follow Up     Psychotherapy follow-up visit for anxiety   .     New symptoms or complaints: None     Functional Impairment:   Personal: 4  Family: 2  Work: 4  Social:3    Clinical assessment of mental status:   Grooming: Well groomed  Attire: Appropriate  Age: Appears Stated  Behavior Towards Examiner: Cooperative  Motor Activity: Tremors/Tics   Eye Contact: Avoidant  Mood: Anxious  Affect: Congruent w/content of speech and Anxious  Speech/Language: Stuttering/Slammering  Attention: Distractible  Concentration: Brief  Thought Process: other  Thought Content: Hallucinations: none reported  Delusions: none reported  Orientation: X 3  Memory: Impairment, Recent and Remote  Judgement: Impairment, Minimal and Moderate  Estimated Intelligence: Average  Demonstrated Insight: Adequate  Fund of Knowledge: adequate   I've re-evaluated the mental status of the patient and no changes were noted since the date of the last encounter, 7/12/19.    Suicidal/Homicidal Ideation present: None Reported This Session    Patient's impression of their current status:   The patient reported feeling good today. She was recently visiting her family cabin. She was prompted to provide more details about her relationship history today. She described meeting her  and how they decided to get  in 2016. She described feeling insecure growing up without much dating experience prior to meeting her . She described having sensory issues and a speech impediment growing up which impacted her self-esteem. She recalled failing her classes and dropping out of high school in 12th grade. She relocated to a [employer] school where  she had more success because of the individualized education plan. She was never referred for neuropsychological testing to determine diagnosis of Autism before.        Therapist impression of patients current state:   The patient arrived on-time for a follow-up visit. She was not accompanied by her  or service animal today, they reportedly stayed in the car. The patient presented with signs of psychomotor agitation including slight tics and tremors. She appeared anxious and had some difficulty maintaining focus. The patient continues to present with features associated with Autism Spectrum Disorder. She was recommended by Dr. Chowdary, psychiatrist at [facility], to be referred for further testing through [facility]. The therapist helped complete paperwork for that referral today. Future visits will focus on helping patient establish appropriate care to address areas of dysfunction.      Type of psychotherapeutic technique provided: Insight oriented and Client centered    Progress toward short term goals:Progress as expected, as patient continues to create action steps promoting a growth mindset. No other changes noted today.    Review of long term goals: Not done at today's visit   Treatment plan updated on 6/14/19   Date of next review: September 2019    Diagnosis:   1. JOSHUA (generalized anxiety disorder)    2. Attention deficit hyperactivity disorder (ADHD), unspecified ADHD type    R/O Panic Disorder  R/O PTSD  R/O ASD    Plan and Follow up: The patient will be scheduled to return for a follow-up visit on 8/16/2019.  Patient requested that therapist complete referral for [facility]. Form was completed and faxed on 7/31/19.     Discharge Criteria/Planning: Client has chronic symptoms and ongoing therapy for maintenance stability recommended.     Performed and documented by CHANTAL Blackmon

## 2021-05-30 NOTE — TELEPHONE ENCOUNTER
Fax received from [facility] pharmacy requesting Prior Authorization    Medication Name:   guanFACINE 1 mg Tb24 60 tablet 0 6/24/2019     Sig - Route: Take 2 tablets (2 mg total) by mouth at bedtime. - Oral    Sent to pharmacy as: guanFACINE 1 mg Tb24    E-Prescribing Status: Receipt confirmed by pharmacy (6/24/2019  2:57 PM CDT)          Insurance Plan: [employer]   PBM: [facility]   Patient ID Number: [id number]    Please start PA process for Qty Limit

## 2021-05-30 NOTE — TELEPHONE ENCOUNTER
Central PA team  [phone]  Pool: HE PA MED ([id number])          PA has been initiated.       PA form completed and faxed insurance via Cover My Meds     Key:  [id number]       Medication:  guanFACINE 1 mg Tb24    Insurance:  [employer] [facility]         Response will be received via fax and may take up to 5-10 business days depending on plan

## 2021-05-31 NOTE — TELEPHONE ENCOUNTER
report indicate that the patient needs Physical/ pap  writer intends to contact the patient to assist in scheduling appointment.   Called patient and schedule for September 26,2019.

## 2021-05-31 NOTE — TELEPHONE ENCOUNTER
FYI - Status Update  Who is Calling: Laurita Nunez  Update: We are calling to up date [facility] know in regards to her referral that we were unable to reach this patient.  Okay to leave a detailed message?:  No return call needed

## 2021-05-31 NOTE — PROGRESS NOTES
Mental Health Visit Note    8/30/2019    Start time: 4:00pm    Stop Time: 4:50pm   Session # 14    Session Type: Patient is presenting for an Individual session.     Persons Present: Therapist and Patient    Angela Jones is a 24 y.o. female is being seen today for    Chief Complaint   Patient presents with      Follow Up     Psychotherapy follow-up visit for anxiety and ADHD   .     New symptoms or complaints: None     Functional Impairment:   Personal: 3  Family: 2  Work: 4  Social:3    Clinical assessment of mental status:   Grooming: Well groomed  Attire: Appropriate  Age: Appears Stated  Behavior Towards Examiner: Cooperative  Motor Activity: Tremors/Tics   Eye Contact: Avoidant  Mood: Anxious  Affect: Congruent w/content of speech and Anxious  Speech/Language: Stuttering/Slammering  Attention: Distractible  Concentration: Brief  Thought Process: other  Thought Content: Hallucinations: none reported  Delusions: none reported  Orientation: X 3  Memory: Impairment, Recent and Remote  Judgement: Impairment, Minimal and Moderate  Estimated Intelligence: Average  Demonstrated Insight: Adequate  Fund of Knowledge: adequate   I've re-evaluated the mental status of the patient and no changes were noted since the date of the last encounter, 8/16/19.    Suicidal/Homicidal Ideation present: None Reported This Session    Patient's impression of their current status:   The patient reported that she is doing okay. She continues to have difficulty getting started on various plans and tasks. She noted that the problem continues to be the expectation that she will keep the house clean while her  is at work. They do not reportedly share house tasks and have not established expectations about cleanliness. She would like to set a time to talk with her  weekly in order to communicate with intention and review responsibilities - this has not happened yet. She struggles with feelings of guilt and shame and has  difficulty being assertive about her needs. She is open to practicing communication techniques.     Therapist impression of patients current state:   The patient arrived on-time for a follow-up visit. She presented to session by herself today while her  and service animal waited in the car. Patient continues to present for scheduled visits without the presence of her service animal. Therapist impression is that patient has maintained the same level of functioning without any significant changes since intake. She has demonstrated small areas of improvement in regards to organization and task completion. She and her  continue to experience communication issues which reportedly impacts her functioning at home. The therapist plans to help patient practice assertive communication skills during future visits.      Type of psychotherapeutic technique provided: Insight oriented, Client centered and CBT     Progress toward short term goals:Progress as expected, as patient adequately engaged in the therapeutic process. She was open-minded while practicing communication skills. No other changes noted today.     Review of long term goals: Not done at today's visit   Treatment plan updated on 6/14/19   Date of next review: September 2019    Diagnosis:   1. JOSHUA (generalized anxiety disorder)    2. Attention deficit hyperactivity disorder (ADHD), unspecified ADHD type    R/O Panic Disorder  R/O PTSD  R/O ASD    Plan and Follow up: The patient will be scheduled to return for a follow-up visit on 9/20/19.  HW: Establish timeline and expectations for daily tasks; practice assertive communication techniques with     Discharge Criteria/Planning: Client has chronic symptoms and ongoing therapy for maintenance stability recommended.     Performed and documented by CHANTAL Blackmon

## 2021-05-31 NOTE — PROGRESS NOTES
Mental Health Visit Note    8/16/2019    Start time: 3:55pm    Stop Time: 4:50pm   Session # 13    Session Type: Patient is presenting for an Individual session.     Persons Present: Therapist and Patient    Angela Jones is a 24 y.o. female is being seen today for    Chief Complaint   Patient presents with      Follow Up     Psychotherapy follow-up visit for anxiety   .     New symptoms or complaints: None     Functional Impairment:   Personal: 4  Family: 2  Work: 4  Social:3    Clinical assessment of mental status:   Grooming: Well groomed  Attire: Appropriate  Age: Appears Stated  Behavior Towards Examiner: Cooperative  Motor Activity: Tremors/Tics   Eye Contact: Avoidant  Mood: Anxious  Affect: Congruent w/content of speech and Anxious  Speech/Language: Stuttering/Slammering  Attention: Distractible  Concentration: Brief  Thought Process: other  Thought Content: Hallucinations: none reported  Delusions: none reported  Orientation: X 3  Memory: Impairment, Recent and Remote  Judgement: Impairment, Minimal and Moderate  Estimated Intelligence: Average  Demonstrated Insight: Adequate  Fund of Knowledge: adequate   I've re-evaluated the mental status of the patient and no changes were noted since the date of the last encounter, 7/24/19.    Suicidal/Homicidal Ideation present: None Reported This Session    Patient's impression of their current status:   The patient reported that she has an intake appointment established with [facility] on October 31. She utilized session time to discuss current barriers and impairments to functioning. Notably, she feels stuck at home with limited transportation options. She is also battling feelings of guilt, shame and issues of co-dependence with her . There are also some underlying communication problems with her  that she reviewed today, seeking help to find opportunities for improvement.     Therapist impression of patients current state:   The patient arrived  on-time for a follow-up visit. She presented to session by herself today while her  and service animal waited in the lobby. She exhibited a pleasant demeanor and anxious affect. She displayed signs of rigid thinking with difficulty problem-solving especially in regards to transportation issues. She and the therapist discussed opportunities to implement CBT skills such as thought stopping and re-framing in addition to practicing more positive self-talk. She agreed that improving her communication patterns with her  would be helpful. He is in the process of obtaining his own therapist which should be beneficial for the couple. The patient has been more independent before in her life with less overall impairments to functioning, prior to getting . Short-term goals will focus on increasing independence and improving daily living skills.       Type of psychotherapeutic technique provided: Insight oriented, Client centered and CBT     Progress toward short term goals:Progress as expected, as patient is following through with care plans for further assessment and treatment planning. She has an intake with [facility] scheduled in October.  No other changes noted today.    Review of long term goals: Not done at today's visit   Treatment plan updated on 6/14/19   Date of next review: September 2019    Diagnosis:   1. JOSHUA (generalized anxiety disorder)    2. Attention deficit hyperactivity disorder (ADHD), unspecified ADHD type    R/O Panic Disorder  R/O PTSD  R/O ASD    Plan and Follow up: The patient will be scheduled to return for a follow-up visit on 8/30/19.  HW: practice positive self-talk; learn more about [facility]    Discharge Criteria/Planning: Client has chronic symptoms and ongoing therapy for maintenance stability recommended.     Performed and documented by CHANTAL Blackmon

## 2021-06-01 NOTE — PROGRESS NOTES
AURA Jones is a 24 y.o. female here with  Javy and medical dog Alvin for discussion of low libido.     She has noticed this issue for several years. She wants to be intimate with her  more often but finds it difficult to have interest in sex.  She attributes much of this to being on many psychiatric medications.  She states that about 2 years ago, she was started on vortioxetine after having tried many other SSRIs.  For couple of months, her libido seemed to improve but after that, it got worse.  However, the vortioxetine does help a lot with her anxiety and depression.    She and Javy to try to plan special date nights about once a week.  They feel they have a strong relationship.    They also wondering whether her medications are affecting vaginal lubrication.  They buy lubricant online and make sure it is a good quality type.    She is doing several things to try to help her mental health: therapy with [facility], weekly yoga, walking her dog. Doesn't like to do much cardio because she has asthma and finds it hard to breathe. Tries to wake up by 9:30 each AM, otherwise sleeps too long. She doesn't have much of a schedule during the day. She and Javy play [employer] each week with friends and also do a game night.     Past Medical History:   Diagnosis Date     Migraine      Current Outpatient Medications on File Prior to Visit   Medication Sig Dispense Refill     amitriptyline (ELAVIL) 25 MG tablet Take 2 tablets (50 mg) by mouth at bedtime. 60 tablet 6     cholecalciferol, vitamin D3, 5,000 unit Tab Take 10,000 Units by mouth.       diphenhydrAMINE (BENADRYL) 50 MG capsule Take 50 mg by mouth every 6 (six) hours as needed for itching.       gabapentin (NEURONTIN) 100 MG capsule Take 200 mg by mouth 3 (three) times a day. 3 times a day as needed 20 minutes before stressful event 180 capsule 6     guanFACINE 2 mg Tb24 Take 2 mg by mouth daily. 90 tablet 3     hyoscyamine  "(LEVSIN/SL) 0.125 mg SL tablet Take 0.125 mg by mouth.       inhalational spacing device Spcr Inhale 2 Inhalation 2 (two) times a day. To use with Symbiocort inhaler. 1 each 0     inhalational spacing device Spcr Use as directed. 1 each 0     ipratropium (ATROVENT HFA) 17 mcg/actuation inhaler Inhale 2 puffs.       levonorgestrel (MIRENA) 20 mcg/24 hr (5 years) IUD 20 mcg by Intrauterine route.       melatonin 10 mg Tab Take 10 mg by mouth.       metoclopramide (REGLAN) 5 MG tablet Take 5 mg by mouth.       mirtazapine (REMERON) 15 MG tablet Take 1 tablet (15 mg total) by mouth at bedtime. 90 tablet 1     ranitidine (ZANTAC) 150 MG tablet Take 1 tablet (150 mg total) by mouth 2 (two) times a day. 120 tablet 3     SYMBICORT 80-4.5 mcg/actuation inhaler Inhale 2 puffs 2 (two) times a day. 1 Inhaler 2     vortioxetine (TRINTELLIX) 20 mg Tab tablet Take 1 tablet (20 mg total) by mouth daily. 90 tablet 1     [DISCONTINUED] permethrin (ELIMITE) 5 % cream Apply to body from neck down.  Wash off in 10 hours.  Repeat in 2 weeks. 60 g 1     No current facility-administered medications on file prior to visit.        Past medical and social history reviewed with no changes.       ?  O  BP 92/56   Pulse 90   Temp 98.2  F (36.8  C) (Oral)   Resp 16   Ht 5' 3\" (1.6 m)   Wt 114 lb 8 oz (51.9 kg)   SpO2 98% Comment: ra  BMI 20.28 kg/m     Vitals reviewed. Nursing note reviewed.  General Appearance: Pleasant and alert, in no acute distress  HEENT: mucous membranes moist  Neuro: no focal deficits, CNs II-XII normal. Slight intention tremor of hands  Psych: mood and affect are normal. Slightly anxious    A/P   was seen today for follow-up.    Diagnoses and all orders for this visit:    Bipolar 2 disorder (H): PHQ-9 score is 14 today- similar score as In the past.   -     PHQ9 Depression Screen    Sexual arousal disorder    I had an extensive discussion with  and Javy about the connections between her low libido, " mental health and medications.  Discussed increasing her physical exercise (e.g. Doing home workout videos) which could benefit both mental health and self-image, experimenting with different locations and positions for sex, having physical contact with partner such as massage or cuddling in addition to sex.     Also offered to try decreasing vortioxetine to 10 or 15 mg/day and increasing gabapentin to 300 mg three times a day instead of 200 mg per dose, since vortioxetine is the medication most likely to be affecting libido (SSRI-like). She is going to think about it and let me know. Also explained that amitriptyline and benadryl are likely affecting her lubrication. She feels comfortable using lubricant.     Briefly discussed flibanserin and bremelanotide. Both have significant side effects (dizziness and nausea, respectively). I will do more reading on these and send her a [facility] message. Would like request OB/GYN consult if she wanted to pursue this further since I am not familiar with prescribing these.     Spent >25 min face to face with patient with more the 50% spent in counseling, reviewing chart, and coordination of care and discussing problems listed above.     Return in about 6 months (around 3/16/2020) for recheck.      Options for treatment and follow-up care were reviewed with the patient and/or guardian. Angela Jones and/or guardian engaged in the decision making process and verbalized understanding of the options discussed and agreed with the final plan.    Marissa Walton MD

## 2021-06-01 NOTE — PROGRESS NOTES
Outpatient Mental Health Treatment Plan    Name:  Angela Jones  :  1995  MRN:  [id number]    Treatment Plan:  Updated Treatment Plan  Intake/initial treatment plan date:    Intake: 2018  Benefit and risks and alternatives have been discussed: Yes  Is this treatment appropriate with minimal intrusion/restrictions: Yes  Estimated duration of treatment:  Approximately 10 sessions.  Anticipated frequency of services:  Every 2 weeks  Necessity for frequency: This frequency is needed to establish therapeutic goals and for continuity of care in order to monitor progress.  Necessity for treatment: To address cognitive, behavioral, and/or emotional barriers in order to work toward goals and to improve quality of life.    Session Type: Patient is presenting for an Individual session.    Plan:           ?   ? Anxiety    Goal:  Decrease average anxiety level from 4 to 3.   Strategies: ? [x] Learn and practice relaxation techniques and other coping strategies (e.g., thought stopping, reframing, meditation)     ? [x] Increase involvement in meaningful activities     ? [x] Discuss sleep hygiene     ? [x] Explore thoughts and expectations about self and others     ? [x] Identify and monitor triggers for panic/anxiety symptoms     ? [x] Implement physical activity routine (with physician approval)     ? [x] Consider introduction of bibliotherapy and/or videos     ? [x] Continue compliance with medical treatment plan (or explore barriers)   ?Degree to which this is a problem: 4  Degree to which goal is met: 1  Date of Review: 2019       ? Depression    Goal:  Decrease average depression level from 2 to 1.   Strategies:    ?[x] Decrease social isolation     [x] Increase involvement in meaningful activities     ?[x] Discuss sleep hygiene     ?[x] Explore thoughts and expectations about self and others     ?[x] Process grief (loss of significant person, independence, role, etc.)     ?[x] Assess for suicide  "risk     ?[x] Implement physical activity routine (with physician approval)     [x] Consider introduction of bibliotherapy and/or videos     [x] Continue compliance with medical treatment plan (or explore barriers)?  Degree to which this is a problem: 2  Degree to which goal is met: 3  Date of Review: November 2019    Additional goals:  Improve self-care  Improve organization skills (\"life skills\")  Reduce seizure-like episodes  Increase independence in daily functioning  Complete testing at [place] for ASD  Consider referral for DBT program        Functional Impairment:  1=Not at all/Rarely  2=Some days  3=Most Days  4=Every Day    Personal : 4  Family : 3  Social : 2   Work/school : 4    Diagnosis (non-Axial as defined in DSM-5)  1. Generalized Anxiety Disorder      2. Current moderate episode of major depressive disorder, unspecified whether recurrent (H)      ADHD  Provisional Diagnosis (list only- no explanation needed)  Autism Spectrum Disorder  Bipolar Disorder  OCD  PTSD      Clinical assessments and measures completed:.   WHODAS 2.0 12-item version: 7    Scores presented in qualifiers to represent level of disability.  MILD Problem (slight, low, ...) 5-24%  H1= 30  H2= 2  H3= 18    PHQ-9: 13 (2/8/19)  PHQ-9: 14 (6/14/19)  PHQ-9: 11 (9/20/19)     JOSHUA-7: 7 (2/8/19)  JOSHUA-7: 6 (6/14/19)  JOSHUA-7: 7 (9/20/19)     C-SSRS: Low to moderate risk. Patient does have history of self-harm and suicidal ideation with intent. No current ideation or intent.         Strengths:  Articulate, open-minded and willing to participate in mental health services. Good support from  and family. Good access to care and resourcefulness.   Limitations:  Trouble with self-control; intrinstic motivation; executive dysfunction with ADHD. Patient also limited by seizure-like activity with no clear understanding of origin.  Cultural Considerations: Patient is a 24  female. Identifies as Zoroastrian but more interested in science. " Grew up in [place] and always had family close by. There are no significant ethnic, cultural or Cheondoism factors that may be relevant for therapy.     Persons responsible for this plan: Patient and Provider            Psychotherapist Signature           Patient Signature:              Guardian Signature             Provider: Performed and documented by CHANTAL Blackmon   Date:  9/20/2019

## 2021-06-01 NOTE — PROGRESS NOTES
"Mental Health Visit Note    9/20/2019    Start time: 4:00pm    Stop Time: 4:52pm   Session # 15    Session Type: Patient is presenting for an Individual session.     Persons Present: Therapist and Patient    Angela Jones is a 24 y.o. female is being seen today for    Chief Complaint   Patient presents with      Follow Up     Psychotherapy follow-up visit for anxiety and depression   .     New symptoms or complaints: None     Functional Impairment:   Personal: 3  Family: 2  Work: 4  Social:3    Clinical assessment of mental status:   Grooming: Well groomed  Attire: Appropriate  Age: Appears Stated  Behavior Towards Examiner: Cooperative  Motor Activity: Tremors/Tics   Eye Contact: Avoidant  Mood: Anxious  Affect: Congruent w/content of speech and Anxious  Speech/Language: Stuttering/Slammering  Attention: Distractible  Concentration: Brief  Thought Process: other  Thought Content: Hallucinations: none reported  Delusions: none reported  Orientation: X 3  Memory: Impairment, Recent and Remote  Judgement: Impairment, Minimal and Moderate  Estimated Intelligence: Average  Demonstrated Insight: Adequate  Fund of Knowledge: adequate   I've re-evaluated the mental status of the patient and no changes were noted since the date of the last encounter, 8/30/19.    Suicidal/Homicidal Ideation present: None Reported This Session    Patient's impression of their current status:   The patient's impression of her current status is that things remain largely unchanged. She has been having trouble falling asleep and also finds it hard to wake up in the morning. She is experiencing a poor appetite and trouble concentrating while attempting to complete daily tasks. She reports that \"anxiety is a problem but also depends on the task at hand.\" She reported that her  gets nervous about her feeling anxious and tries to prevent her from participating in tasks that might increase anxiety. She reported that she \"has to be able to " "work through my anxiety and accepts that there will be times when I'm anxious.\" Her  is still planning to seek help for managing his own anxiety symptoms. She did talk to her  about identifying expectations for cleaning the house. They are trying to formulate a chore list so that it feels more equal. She is going to her family cabin for 1 week and reflected upon family dynamics and her upbringing. She reported that she never really learned how to manage her anxiety and how to operate within different systems with rules and expectations. She reported feeling stuck right now.   She reported that she and her  may have to move again.      Therapist impression of patients current state:   The patient arrived on-time for a follow-up visit. She presented to session by herself again today while her  and service animal waited in the car. Patient continues to present for scheduled visits without the presence of her service animal which is curious in regards to the effectiveness of the animal's service. Therapist impression is that patient has maintained the same level of functioning without any significant changes since intake. She continues to exhibit rigid thinking patterns while identifying many barriers to change. She identifies as feeling stuck without any solutions for small adjustments. She has not been observed as responding well to motivational interviewing techniques although she does identify the benefit of practicing assertive communication techniques to help improve communication in her marriage. She has followed through with small assignments to improve direct communication habits with her .     Type of psychotherapeutic technique provided: Insight oriented, Client centered and CBT     Progress toward short term goals:Progress as expected, as patient has been utilizing a planner to help organize daily tasks. She has demonstrated a commitment to using this coping strategy. " "Anxiety and depression symptoms still appear to be causing significant impairments to functioning.     Review of long term goals: Treatment Plan updated   Date of next review: December 2019    Diagnosis:   1. JOSHUA (generalized anxiety disorder)    2. Attention deficit hyperactivity disorder (ADHD), unspecified ADHD type    3. Current moderate episode of major depressive disorder, unspecified whether recurrent (H)    R/O Panic Disorder  R/O PTSD  R/O ASD    Plan and Follow up: The patient will be scheduled to return for a follow-up visit on 9/30/19.  HW: continue to practice assertive communication techniques with ; continue to challenge rigid thinking and anxious thought patterns; continue to utilize daily task planner  Assessment at [facility] is scheduled for 10/31/19 - patient reports \"feeling stuck\" and unable to make any significant changes at the moment    Discharge Criteria/Planning: Client has chronic symptoms and ongoing therapy for maintenance stability recommended.     Performed and documented by CHANTAL Blackmon      "

## 2021-06-01 NOTE — PATIENT INSTRUCTIONS - HE
One option to consider:     -Decreasing trintellix to 10 or 15 mg/day and increasing gabapentin from 200 to 300 mg three times per day.

## 2021-06-01 NOTE — PROGRESS NOTES
Mental Health Visit Note    9/30/2019    Start time: 4:00pm    Stop Time: 4:50pm   Session # 16    Session Type: Patient is presenting for an Individual session.     Persons Present: Therapist and Patient    Angela Jones is a 24 y.o. female is being seen today for    Chief Complaint   Patient presents with      Follow Up     Psychotherapy follow-up visit for anxiety   .     New symptoms or complaints: None     Functional Impairment:   Personal: 3  Family: 2  Work: 4  Social:3    Clinical assessment of mental status:   Grooming: Well groomed  Attire: Appropriate  Age: Appears Stated  Behavior Towards Examiner: Cooperative  Motor Activity: Tremors/Tics   Eye Contact: Avoidant  Mood: Anxious and tired  Affect: Congruent w/content of speech and Anxious and tired, slightly flat  Speech/Language: Stuttering/Slammering  Attention: Distractible  Concentration: Brief  Thought Process: other  Thought Content: Hallucinations: none reported  Delusions: none reported  Orientation: X 3  Memory: Impairment, Recent and Remote  Judgement: Impairment, Minimal and Moderate  Estimated Intelligence: Average  Demonstrated Insight: Adequate  Fund of Knowledge: adequate     Suicidal/Homicidal Ideation present: None Reported This Session    Patient's impression of their current status:   The patient's impression of her current status is that things remain largely unchanged although she reported feeling very tired today. She and her  continue to experience some interpersonal conflicts associated with poor communication patterns. They are both willing to work on improving their communication techniques.       Therapist impression of patients current state:   The patient arrived on-time for a follow-up visit. She presented to session by herself again today while her  and service animal waited in the car. Patient continues to present for scheduled visits without the presence of her service animal. Therapist impression is  "that patient has maintained the same level of functioning without any significant changes since intake. She appeared more tired and required more prompting to engage in today's visit. She was receptive to therapeutic interventions focused on improving communication techniques and practicing mindfulness skills.     Type of psychotherapeutic technique provided: Insight oriented, Client centered and CBT     Progress toward short term goals:Progress as expected, although patient believes that her progress is very gradual and slow. She is still improving her ability to complete tasks during the day.      Review of long term goals: Not done at today's visit   Treatment plan updated on 9/20/19   Date of next review: December 2019    Diagnosis:   1. JOSHUA (generalized anxiety disorder)    2. Attention deficit hyperactivity disorder (ADHD), unspecified ADHD type    R/O Panic Disorder  R/O PTSD  R/O ASD    Plan and Follow up: The patient will be scheduled to return for a follow-up visit on 10/17/19.  HW: continue to practice assertive communication techniques with ; continue to challenge rigid thinking and anxious thought patterns; continue to utilize daily task planner.  Therapist provided several handouts and worksheets to help patient practice communication strategies.  HW also to complete daily gratitude journal.    Assessment at [place] is scheduled for 10/31/19 - patient reports \"feeling stuck\" and unable to make any significant changes at the moment    Discharge Criteria/Planning: Client has chronic symptoms and ongoing therapy for maintenance stability recommended.     Performed and documented by CHANTAL Blackmon      "

## 2021-06-02 VITALS — BODY MASS INDEX: 18.49 KG/M2 | WEIGHT: 111 LBS | HEIGHT: 65 IN

## 2021-06-02 VITALS — WEIGHT: 114.75 LBS | HEIGHT: 65 IN | BODY MASS INDEX: 19.12 KG/M2

## 2021-06-02 VITALS — BODY MASS INDEX: 18.83 KG/M2 | WEIGHT: 113 LBS | HEIGHT: 65 IN

## 2021-06-02 NOTE — PROGRESS NOTES
"Mental Health Visit Note    10/17/2019    Start time: 4:11pm    Stop Time: 4:53pm   Session # 17    Session Type: Patient is presenting for an Individual session.     Persons Present: Therapist and Patient    Angela Jones is a 24 y.o. female is being seen today for    Chief Complaint   Patient presents with      Follow Up     Psychotherapy follow-up visit for anxiety   .     New symptoms or complaints: None     Functional Impairment:   Personal: 3  Family: 2  Work: 4  Social:3    Clinical assessment of mental status:   Grooming: Well groomed  Attire: Appropriate  Age: Appears Stated  Behavior Towards Examiner: Cooperative  Motor Activity: Tremors/Tics   Eye Contact: Avoidant  Mood: Anxious   Affect: Congruent w/content of speech and Anxious   Speech/Language: Stuttering/Slammering  Attention: Distractible  Concentration: Brief  Thought Process: other  Thought Content: Hallucinations: none reported  Delusions: none reported  Orientation: X 3  Memory: Impairment, Recent and Remote  Judgement: Impairment, Minimal and Moderate  Estimated Intelligence: Average  Demonstrated Insight: Adequate  Fund of Knowledge: adequate     Suicidal/Homicidal Ideation present: None Reported This Session    Patient's impression of their current status:   The patient's impression of her current status is that things remain largely unchanged. She reported feeling okay with no significant issues to address. She did provide an update and shared that they will be moving into a house. She reported being busy without much time to practice homework or skills. She does notice feeling irritated when experiencing a \"sensory overload.\" She is just trying to notice when and why this happens. She agreed to use a CBT model to document these experiences in order to work on adjusting behavior patterns.       Therapist impression of patients current state:   The patient arrived on-time for a follow-up visit. She presented to session by herself again " "today. Patient continues to present for scheduled visits without the presence of her service animal. The  therapist's impression is that patient has maintained the same level of functioning without any significant changes since intake. The therapist suspects that progress has stalled because she is awaiting further testing to determine diagnosis and severity of functional impairments. Her testing date at [place] is scheduled for 10/31/19. The therapist believes that treatment plans may change after testing is complete and will continue meeting with patient in the mean time.     Type of psychotherapeutic technique provided: Insight oriented, Client centered and CBT     Progress toward short term goals:Progress as expected, although patient believes that her progress is very gradual and slow. She is still improving her ability to complete tasks during the day.  No major changes noted.    Review of long term goals: Not done at today's visit   Treatment plan updated on 9/20/19   Date of next review: December 2019    Diagnosis:   1. JOSHUA (generalized anxiety disorder)    2. Attention deficit hyperactivity disorder (ADHD), unspecified ADHD type    R/O Panic Disorder  R/O PTSD  R/O ASD    Plan and Follow up: The patient will be scheduled to return for a follow-up visit on 10/28/19.  HW: continue to practice assertive communication techniques with ; continue to challenge rigid thinking and anxious thought patterns; continue to utilize daily task planner.  Therapist provided several handouts and worksheets to help patient practice CBT model.    Assessment at [place] is scheduled for 10/31/19 - patient reports \"feeling stuck\" and unable to make any significant changes at the moment    Discharge Criteria/Planning: Client has chronic symptoms and ongoing therapy for maintenance stability recommended.     Performed and documented by CHANTAL Blackmon      "

## 2021-06-03 ENCOUNTER — OFFICE VISIT - HEALTHEAST (OUTPATIENT)
Dept: BEHAVIORAL HEALTH | Facility: CLINIC | Age: 26
End: 2021-06-03

## 2021-06-03 VITALS
RESPIRATION RATE: 16 BRPM | SYSTOLIC BLOOD PRESSURE: 92 MMHG | DIASTOLIC BLOOD PRESSURE: 56 MMHG | TEMPERATURE: 98.2 F | BODY MASS INDEX: 20.29 KG/M2 | HEIGHT: 63 IN | OXYGEN SATURATION: 98 % | WEIGHT: 114.5 LBS | HEART RATE: 90 BPM

## 2021-06-03 VITALS — BODY MASS INDEX: 19.67 KG/M2 | WEIGHT: 111 LBS | HEIGHT: 63 IN

## 2021-06-03 VITALS
DIASTOLIC BLOOD PRESSURE: 52 MMHG | SYSTOLIC BLOOD PRESSURE: 90 MMHG | HEART RATE: 97 BPM | WEIGHT: 114 LBS | HEIGHT: 64 IN | RESPIRATION RATE: 16 BRPM | TEMPERATURE: 98.7 F | OXYGEN SATURATION: 97 % | BODY MASS INDEX: 19.46 KG/M2

## 2021-06-03 DIAGNOSIS — F41.1 GENERALIZED ANXIETY DISORDER: ICD-10-CM

## 2021-06-03 DIAGNOSIS — F33.1 MODERATE EPISODE OF RECURRENT MAJOR DEPRESSIVE DISORDER (H): ICD-10-CM

## 2021-06-03 DIAGNOSIS — F90.8 ATTENTION DEFICIT HYPERACTIVITY DISORDER (ADHD), OTHER TYPE: ICD-10-CM

## 2021-06-03 DIAGNOSIS — F84.0 AUTISM SPECTRUM DISORDER: ICD-10-CM

## 2021-06-03 NOTE — TELEPHONE ENCOUNTER
Pt did not establish care w/new provider as discussed 6/2019.  Telephone encounter: 6/2019 Was informed to call and schedule consult with Tila Gilbert CNP. Pt has yet to schedule at this time.

## 2021-06-03 NOTE — PROGRESS NOTES
Mental Health Visit Note    11/15/2019    Start time: 5:00pm    Stop Time: 5:50pm   Session # 19    Session Type: Patient is presenting for an Individual session.     Persons Present: Therapist and Patient    Angela Jones is a 24 y.o. female is being seen today for    Chief Complaint   Patient presents with      Follow Up     Psychotherapy follow-up visit for anxiety and depression   .     New symptoms or complaints: None     Functional Impairment:   Personal: 3  Family: 2  Work: 4  Social:3    Clinical assessment of mental status:   Grooming: Well groomed  Attire: Appropriate  Age: Appears Stated  Behavior Towards Examiner: Cooperative  Motor Activity: Tremors/Tics   Eye Contact: Avoidant  Mood: Anxious   Affect: Congruent w/content of speech and Anxious   Speech/Language: Stuttering/Slammering  Attention: Distractible  Concentration: Brief  Thought Process: other  Thought Content: Hallucinations: none reported  Delusions: none reported  Orientation: X 3  Memory: Impairment, Recent and Remote  Judgement: Impairment, Minimal and Moderate  Estimated Intelligence: Average  Demonstrated Insight: Adequate  Fund of Knowledge: adequate   I've re-evaluated the mental status of the patient and no changes were noted since the date of the last encounter, 10/28/19.    Suicidal/Homicidal Ideation present: None Reported This Session    Patient's impression of their current status:   The patient's impression of her current status is that she is feeling about the same. She did complete testing at [facility] which determined the following diagnoses: anxiety, depression, ADHD and autism spectrum disorder. She does feel encouraged about the potential resources but also feels slightly overwhelmed. This led to a discussion about finances, a topic that creates some distress in the relationship. They agreed to set aside time to come up with a budget for spending.      Therapist impression of patients current state:   The patient  arrived on-time for a follow-up visit. The patient presented for session with her  and therapy dog today. Therapist's impression is that patient prefers to have her  present when there is a conflict or topic to discuss between the couple that is creating stress for the patient. For instance, today they discussed the problem of finances with recurring problems about communication. The therapist has strongly encouraged the pair to consider couples counseling, as ongoing practice to improve interpersonal communication skills is needed.   The patient was receptive to the CBT model today, helping her organize the facts about a situation, identify thoughts and feelings, and then work towards a resolution that ultimately reduced stress.      Type of psychotherapeutic technique provided: Insight oriented, Client centered and CBT     Progress toward short term goals:Progress as expected, as patient demonstrated good awareness about problematic communication patterns which she adequately navigated today with some guidance. No other major changes noted.    Review of long term goals: Not done at today's visit   Treatment plan updated on 9/20/19   Date of next review: December 2019    Diagnosis:   1. JOSHUA (generalized anxiety disorder)    2. Attention deficit hyperactivity disorder (ADHD), unspecified ADHD type    3. Current moderate episode of major depressive disorder, unspecified whether recurrent (H)    4. Autism spectrum disorder    R/O Panic Disorder  R/O PTSD    Plan and Follow up: The patient will be scheduled to return for a follow-up visit on 11/25/19.  Therapy plan will continue to incorporate CBT interventions to help patient manage thoughts, feelings and behaviors.  Therapy plan will work in conjunction with [facility] resources. Patient encouraged to follow through with referral for [facility] and psychiatry services offered through [facility].   Patient and  were able to establish guidelines and review  expectations to help resolve an issue about finances. The couple continues to benefit from practicing communication techniques together and were encouraged to consider a referral for couples counseling.  Patient's  is still planning to obtain his own individual therapist but there were some concerns about changing his primary care provider.     Discharge Criteria/Planning: Client has chronic symptoms and ongoing therapy for maintenance stability recommended.     Performed and documented by CHANTAL Blackmon

## 2021-06-03 NOTE — PROGRESS NOTES
Mental Health Visit Note    11/25/2019    Start time: 4:00pm    Stop Time: 4:50pm   Session # 20    Session Type: Patient is presenting for an Individual session.     Persons Present: Therapist and Patient    Angela Jones is a 24 y.o. female is being seen today for    Chief Complaint   Patient presents with      Follow Up     Psychotherapy follow-up visit for anxiety   .     New symptoms or complaints: None     Functional Impairment:   Personal: 3  Family: 2  Work: 4  Social:3    Clinical assessment of mental status:   Grooming: Well groomed  Attire: Appropriate  Age: Appears Stated  Behavior Towards Examiner: Cooperative  Motor Activity: Tremors/Tics   Eye Contact: Avoidant  Mood: Anxious   Affect: Congruent w/content of speech and Anxious   Speech/Language: Stuttering/Slammering  Attention: Distractible  Concentration: Brief  Thought Process: other  Thought Content: Hallucinations: none reported  Delusions: none reported  Orientation: X 3  Memory: Impairment, Recent and Remote  Judgement: Impairment, Minimal and Moderate  Estimated Intelligence: Average  Demonstrated Insight: Adequate  Fund of Knowledge: adequate   I've re-evaluated the mental status of the patient and no changes were noted since the date of the last encounter, 11/15/19.    Suicidal/Homicidal Ideation present: None Reported This Session    Patient's impression of their current status:   The patient's impression of her current status is that she is feeling very tired and stressed. They are in the process of moving out of an apartment and into a home so there is a lot of packing to be done. The patient feels overwhelmed very easily and has difficulty completing tasks. She and her  agreed to talk each night to help the patient create a task list for the following day.     Therapist impression of patients current state:   The patient arrived on-time for a follow-up visit. The patient presented for session with her  and therapy dog  today. Therapist's impression is that patient benefits from having her  present for session in order to receive therapist guidance to practice and improve communication skills. The presence of her  allows the patient to practice therapeutic skills in and outside of session as well. The therapist has strongly encouraged the pair to consider couples counseling, as ongoing practice to improve interpersonal communication skills is needed. The patient appeared receptive to CBT interventions, helping her organize the facts about a situation, identify thoughts and feelings, and then work towards a resolution that ultimately reduced stress.      Type of psychotherapeutic technique provided: Insight oriented, Client centered and CBT     Progress toward short term goals:Progress as expected, as patient was able to articulate her needs and implement problem solving skills. No other major changes noted.    Review of long term goals: Not done at today's visit   Treatment plan updated on 9/20/19   Date of next review: December 2019    Diagnosis:   1. JOSHUA (generalized anxiety disorder)    2. Attention deficit hyperactivity disorder (ADHD), unspecified ADHD type    3. Current moderate episode of major depressive disorder, unspecified whether recurrent (H)    4. Autism spectrum disorder    R/O Panic Disorder  R/O PTSD    Plan and Follow up: The patient will be scheduled to return for a follow-up visit on 12/13/19.  Therapy plan will continue to incorporate CBT interventions to help patient manage thoughts, feelings and behaviors.  Therapy plan will work in conjunction with [facility] resources. Patient encouraged to follow through with referral for [facility] and psychiatry services offered through [facility]. Patient is going to call to inquire about [facility] services between visits.  Task for the couple will be to commit to a 5 minute conversation every night to help patient organize task list for the following day.    Discharge  Criteria/Planning: Client has chronic symptoms and ongoing therapy for maintenance stability recommended.     Performed and documented by CHANTAL Blackmon

## 2021-06-04 NOTE — PROGRESS NOTES
Mental Health Visit Note    12/30/2019    Start time: 3:00pm    Stop Time: 3:50pm   Session # 22    Session Type: Patient is presenting for an Individual session.     Persons Present: Therapist, Patient, Patient's  and therapy dog    Angela Jones is a 24 y.o. female is being seen today for    Chief Complaint   Patient presents with      Follow Up     Psychotherapy follow-up visit for anxiety   .     New symptoms or complaints: None     Functional Impairment:   Personal: 3  Family: 2  Work: 4  Social:3    Clinical assessment of mental status:   Grooming: Well groomed  Attire: Appropriate  Age: Appears Stated  Behavior Towards Examiner: Cooperative  Motor Activity: Tremors/Tics   Eye Contact: Avoidant  Mood: Anxious   Affect: Congruent w/content of speech and Anxious   Speech/Language: Stuttering/Slammering  Attention: Distractible  Concentration: Brief  Thought Process: other  Thought Content: Hallucinations: none reported  Delusions: none reported  Orientation: X 3  Memory: Impairment, Recent and Remote  Judgement: Impairment, Minimal and Moderate  Estimated Intelligence: Average  Demonstrated Insight: Adequate  Fund of Knowledge: adequate   I've re-evaluated the mental status of the patient and no changes were noted since the date of the last encounter, 12/13/19.    Suicidal/Homicidal Ideation present: None Reported This Session    Patient's impression of their current status:   The patient's impression of her current status is that she is feeling very anxious and overwhelmed. She explored precipitating factors. She is quite critical of herself and reported feeling emotionally exhausted. She reported having little energy and being unable to organize thoughts. She is having difficulty completing tasks. She is having difficulty remembering to take her medications in the morning. She reported trouble relaxing. She feels stuck.   She did request to have an independent project in the home. She also believes  it would be helpful to better understand how her  budgets their money. They are both stressed about finances.     Therapist impression of patients current state:   The patient arrived on-time for a follow-up visit. The patient presented for session with her  and therapy dog today. Therapist's impression of patient's status is that she is in a transitional period which is exacerbating underlying anxiety symptoms. She is having difficulty prioritizing tasks and establishing a routine causing her to feel overwhelmed. She was reminded to be mindful about her accomplishments during this transition while using positive self-talk. She and her  are still working on improving their communication patterns. Patient and  agreed that additional in-home services would be beneficial so that her  does not maintain the role of caretaker.      Type of psychotherapeutic technique provided: Insight oriented, Client centered and CBT     Progress toward short term goals:Progress as expected, as patient returned for a scheduled visit. She utilized the session to express thoughts and feelings while searching for solutions to problems. She continues to advocate for her needs.     Review of long term goals: Not done at today's visit   Treatment plan updated on 12/13/19   Date of next review: March 2020    Diagnosis:   1. JOSHUA (generalized anxiety disorder)    2. Attention deficit hyperactivity disorder (ADHD), unspecified ADHD type    3. Autism spectrum disorder    4. Current moderate episode of major depressive disorder, unspecified whether recurrent (H)    R/O Panic Disorder  R/O PTSD    Plan and Follow up: The patient will be scheduled to return for a follow-up visit on 1/14/20.  Patient requested a referral for [facility]. Therapist will complete referral following today's visit.  Therapy plan will continue to incorporate CBT interventions to help patient manage  "thoughts, feelings and behaviors. Patient will require assistance managing symptoms associated with ASD such as rigid thinking patterns and processing difficulties impacting communication skills.   Patient encouraged to consider how to challenge rigid thinking patterns by remembering that \"good enough is okay\" and that she has the capacity to make adjustments later as opposed to completing a task perfectly the first time. She was reminded that making \"mistakes\" is okay.   Patient would benefit from in-home services to help improve medication compliance.     Discharge Criteria/Planning: Client has chronic symptoms and ongoing therapy for maintenance stability recommended.     Performed and documented by CHANTAL Blackmon      "

## 2021-06-04 NOTE — TELEPHONE ENCOUNTER
Refill request sent to Belkys Carpenter CNP.  [facility] notified that we would not be refilling the medication, as  was notified in June that Belkys Carpenter CNP, would not be a provider in our clinic any more.  She was offered to see a different provider in our clinic, but turned that down.  [facility] instructed to contact  to identify who she is now following with.

## 2021-06-04 NOTE — PROGRESS NOTES
"Mental Health Visit Note    12/13/2019    Start time: 2:10pm    Stop Time: 2:55pm   Session # 21    Session Type: Patient is presenting for an Individual session.     Persons Present: Therapist and Patient    Angela Jones is a 24 y.o. female is being seen today for    Chief Complaint   Patient presents with      Follow Up     Psychotherapy follow-up visit for anxiety and ASD   .     New symptoms or complaints: None     Functional Impairment:   Personal: 3  Family: 2  Work: 4  Social:3    Clinical assessment of mental status:   Grooming: Well groomed  Attire: Appropriate  Age: Appears Stated  Behavior Towards Examiner: Cooperative  Motor Activity: Tremors/Tics   Eye Contact: Avoidant  Mood: Anxious   Affect: Congruent w/content of speech and Anxious   Speech/Language: Stuttering/Slammering  Attention: Distractible  Concentration: Brief  Thought Process: other  Thought Content: Hallucinations: none reported  Delusions: none reported  Orientation: X 3  Memory: Impairment, Recent and Remote  Judgement: Impairment, Minimal and Moderate  Estimated Intelligence: Average  Demonstrated Insight: Adequate  Fund of Knowledge: adequate   I've re-evaluated the mental status of the patient and no changes were noted since the date of the last encounter, 11/25/19.    Suicidal/Homicidal Ideation present: None Reported This Session    Patient's impression of their current status:   The patient's impression of her current status is that she is feeling more anxious most likely due to the moving process. She processed through thoughts and feelings about organization and task completion. She was able to agree that she puts a lot of pressure on herself to do something \"perfect\" the first time. She stated, \"I don't do good enough\" but agreed that this often prevents her from starting a task at all for fear that she won't do it well enough. She finds it overwhelming to have to adjust a mistake or fix something but later clarified that " she would probably be able to fix something if needed.   The patient discussed how she is adjusting to her identity and understanding of her ASD diagnosis.   She is still seeking a new psychiatrist and is interested in [facility] services.     Therapist impression of patients current state:   The patient arrived on-time for a follow-up visit. The patient presented for session without her  and therapy dog today. Therapist's impression of patient's status is that she did appear quite anxious. She was receptive to guidance and feedback in order to organize thoughts and feelings. She was receptive to thought re-framing while being open to the idea of challenging her rigid beliefs. Therapist helped describe some ASD characteristics that she was experiencing and offered some suggestions regarding how she might make adjustments.       Type of psychotherapeutic technique provided: Insight oriented, Client centered and CBT     Progress toward short term goals:Progress as expected, as patient was able to articulate her needs and implement problem solving skills. She was receptive to feedback and suggestions for how to achieve short-term goals. She did complete the assigned homework task of calling to inquire about an [facility] worker.  No other major changes noted.    Review of long term goals: Treatment Plan updated   Date of next review: March 2020    Diagnosis:   1. JOSHUA (generalized anxiety disorder)    2. Attention deficit hyperactivity disorder (ADHD), unspecified ADHD type    3. Autism spectrum disorder    4. Current moderate episode of major depressive disorder, unspecified whether recurrent (H)    R/O Panic Disorder  R/O PTSD    Plan and Follow up: The patient will be scheduled to return for a follow-up visit on 12/30/19.  Therapy plan will continue to incorporate CBT interventions to help patient manage thoughts, feelings and behaviors. Patient will require assistance managing symptoms associated with ASD such as rigid  "thinking patterns and processing difficulties impacting communication skills.   [facility] Patient may benefit from being referred for CCC due to complex needs and lack of access to community agencies.  Patient encouraged to consider how to challenge rigid thinking patterns by remembering that \"good enough is okay\" and that she has the capacity to make adjustments later as opposed to completing a task perfectly the first time. She was reminded that making \"mistakes\" is okay.     Discharge Criteria/Planning: Client has chronic symptoms and ongoing therapy for maintenance stability recommended.     Performed and documented by CHANTAL Blackmon      "

## 2021-06-04 NOTE — TELEPHONE ENCOUNTER
Medication Request  Medication name:    Disp Refills Start End    amitriptyline (ELAVIL) 25 MG tablet 60 tablet 6 5/20/2019     Sig: Take 2 tablets (50 mg) by mouth at bedtime.    Sent to pharmacy as: amitriptyline (ELAVIL) 25 MG tablet    E-Prescribing Status: Receipt confirmed by pharmacy (5/20/2019  2:52 PM CDT)      Pharmacy Name and Location: [facility]  Reason for request: Patient stated she talk to Marsisa Walton MD about filling this prescription while she waited for an appointment to see a new behavorial specialist. Patient stated Marissa Walton MD told her she would fill it as an interim fill.  When did you use medication last?:  Last night  Patient offered appointment:  No  Okay to leave a detailed message: yes  [phone]

## 2021-06-04 NOTE — PROGRESS NOTES
Outpatient Mental Health Treatment Plan    Name:  Angela Jones  :  1995  MRN:  [id number]    Treatment Plan:  Updated Treatment Plan  Intake/initial treatment plan date:    Intake: 2018  Benefit and risks and alternatives have been discussed: Yes  Is this treatment appropriate with minimal intrusion/restrictions: Yes  Estimated duration of treatment:  Approximately 10 sessions.  Anticipated frequency of services:  Every 2 weeks  Necessity for frequency: This frequency is needed to establish therapeutic goals and for continuity of care in order to monitor progress.  Necessity for treatment: To address cognitive, behavioral, and/or emotional barriers in order to work toward goals and to improve quality of life.    Session Type: Patient is presenting for an Individual session.    Plan:           ?   ? Anxiety    Goal:  Decrease average anxiety level from 4 to 3.   Strategies: ? [x] Learn and practice relaxation techniques and other coping strategies (e.g., thought stopping, reframing, meditation)     ? [x] Increase involvement in meaningful activities     ? [x] Discuss sleep hygiene     ? [x] Explore thoughts and expectations about self and others     ? [x] Identify and monitor triggers for panic/anxiety symptoms     ? [x] Implement physical activity routine (with physician approval)     ? [x] Consider introduction of bibliotherapy and/or videos     ? [x] Continue compliance with medical treatment plan (or explore barriers)   ?Degree to which this is a problem: 4  Degree to which goal is met: 1  Date of Review: 2020       ? Depression    Goal:  Decrease average depression level from 2 to 1.   Strategies:    ?[x] Decrease social isolation     [x] Increase involvement in meaningful activities     ?[x] Discuss sleep hygiene     ?[x] Explore thoughts and expectations about self and others     ?[x] Process grief (loss of significant person, independence, role, etc.)     ?[x] Assess for suicide  "risk     ?[x] Implement physical activity routine (with physician approval)     [x] Consider introduction of bibliotherapy and/or videos     [x] Continue compliance with medical treatment plan (or explore barriers)?  Degree to which this is a problem: 2  Degree to which goal is met: 3  Date of Review: March 2020    Additional goals:  Improve self-care  Improve organization skills (\"life skills\")  Reduce seizure-like episodes  Increase independence in daily functioning  Consider referral for DBT program  Complete testing at [facility] for ASD (task complete)          Functional Impairment:  1=Not at all/Rarely  2=Some days  3=Most Days  4=Every Day    Personal : 4  Family : 3  Social : 2   Work/school : 4    Diagnosis (non-Axial as defined in DSM-5)  1. Generalized Anxiety Disorder      2. Current moderate episode of major depressive disorder, unspecified whether recurrent (H)      ADHD  Autism Spectrum Disorder    Provisional Diagnosis (list only- no explanation needed)  Bipolar Disorder  OCD  PTSD      Clinical assessments and measures completed:.   WHODAS 2.0 12-item version: 7    Scores presented in qualifiers to represent level of disability.  MILD Problem (slight, low, ...) 5-24%  H1= 30  H2= 2  H3= 18    PHQ-9: 13 (2/8/19)  PHQ-9: 14 (6/14/19)  PHQ-9: 11 (9/20/19)  PHQ-9: 13 (12/13/19)     JOSHUA-7: 7 (2/8/19)  JOSHUA-7: 6 (6/14/19)  JOSHUA-7: 7 (9/20/19)  JOSHUA-7: 10 (12/13/19)     C-SSRS: Low to moderate risk. Patient does have history of self-harm and suicidal ideation with intent. No current ideation or intent.         Strengths:  Articulate, open-minded and willing to participate in mental health services. Good support from  and family. Good access to care and resourcefulness.   Limitations:  Trouble with self-control; intrinstic motivation; executive dysfunction with ADHD. Patient also limited by seizure-like activity with no clear understanding of origin. Patient recently diagnosed with Autism Spectrum " Disorder.   Cultural Considerations: Patient is a 24  female. Identifies as Hindu but more interested in science. G[place] up in [place] and always had family close by. There are no significant ethnic, cultural or Episcopalian factors that may be relevant for therapy.     Persons responsible for this plan: Patient and Provider            Psychotherapist Signature           Patient Signature:              Guardian Signature             Provider: Performed and documented by CHANTAL Blackmon   Date:  12/13/2019

## 2021-06-04 NOTE — TELEPHONE ENCOUNTER
Will hold for PCP, as it's not clear to me whether we are managing this med (vs managed by psychiatry).

## 2021-06-05 VITALS
DIASTOLIC BLOOD PRESSURE: 58 MMHG | RESPIRATION RATE: 16 BRPM | OXYGEN SATURATION: 96 % | TEMPERATURE: 98.3 F | BODY MASS INDEX: 20.02 KG/M2 | HEART RATE: 75 BPM | WEIGHT: 117.25 LBS | SYSTOLIC BLOOD PRESSURE: 100 MMHG | HEIGHT: 64 IN

## 2021-06-05 NOTE — TELEPHONE ENCOUNTER
Refill Approved    Rx renewed per Medication Renewal Policy. Medication was last renewed on 12/20/19.    Uyen Bae, [facility] Triage/Med Refill 1/19/2020     Requested Prescriptions   Pending Prescriptions Disp Refills     amitriptyline (ELAVIL) 25 MG tablet [Pharmacy Med Name: Amitriptyline HCl Oral Tablet 25 MG] 60 tablet 0     Sig: Take 2 tablets (50 mg) by mouth at bedtime.       Tricyclics/Misc Antidepressant/Antianxiety Meds Refill Protocol Passed - 1/18/2020  7:00 AM        Passed - PCP or prescribing provider visit in last year     Last office visit with prescriber/PCP: Visit date not found OR same dept: 9/16/2019 Marissa Walton MD OR same specialty: 9/16/2019 Marissa Walton MD  Last physical: Visit date not found Last MTM visit: Visit date not found   Next visit within 3 mo: Visit date not found  Next physical within 3 mo: Visit date not found  Prescriber OR PCP: Britany Greenberg MD  Last diagnosis associated with med order: 1. JOSHUA (generalized anxiety disorder)  - amitriptyline (ELAVIL) 25 MG tablet [Pharmacy Med Name: Amitriptyline HCl Oral Tablet 25 MG]; Take 2 tablets (50 mg) by mouth at bedtime.  Dispense: 60 tablet; Refill: 0    If protocol passes may refill for 12 months if within 3 months of last provider visit (or a total of 15 months).

## 2021-06-05 NOTE — PROGRESS NOTES
Mental Health Visit Note    1/14/2020    Start time: 3:07pm    Stop Time: 3:50pm   Session # 1    Session Type: Patient is presenting for an Individual session.     Persons Present: Therapist and Patient    Angela Jones is a 24 y.o. female is being seen today for    Chief Complaint   Patient presents with      Follow Up     Psychotherapy follow-up visit for anxiety   .     New symptoms or complaints: None     Functional Impairment:   Personal: 3  Family: 2  Work: 4  Social:3    Clinical assessment of mental status:   Grooming: Well groomed  Attire: Appropriate  Age: Appears Stated  Behavior Towards Examiner: Cooperative  Motor Activity: Tremors/Tics   Eye Contact: Avoidant  Mood: Anxious   Affect: Congruent w/content of speech and Anxious   Speech/Language: Stuttering/Slammering  Attention: Distractible  Concentration: Brief  Thought Process: other  Thought Content: Hallucinations: none reported  Delusions: none reported  Orientation: X 3  Memory: Impairment, Recent and Remote  Judgement: Impairment, Minimal and Moderate  Estimated Intelligence: Average  Demonstrated Insight: Adequate  Fund of Knowledge: adequate   I've re-evaluated the mental status of the patient and no changes were noted since the date of the last encounter, 12/30/19.    Suicidal/Homicidal Ideation present: None Reported This Session    Patient's impression of their current status:   The patient's impression of her current status is that she is still feeling very anxious. She and her  are still unpacking and getting settled in their new home. She discussed the difficulties associated with this transition. There are many things that make her anxious. She has been trying to create a list for her  to help her with things she can't fix on her own. She discussed ongoing communication issues with her . She agrees they would benefit from receiving more help in this area. She continues to face similar problems with lack of  independence mostly due to lack of independent transportation. She is very dependent upon her  which creates other issues for their relationship. She wonders how she could be more aware of her physical body. She wonders about support groups for Autism.      Therapist impression of patients current state:   The patient arrived several minutes late for a follow-up visit. The patient presented for session alone today, without her  and therapy dog. Therapist's impression of patient's status is that she is still in a transitional period (moving) which is exacerbating underlying anxiety symptoms. She has yet to establish a new daily routine and is becoming easily overwhelmed which is impacted by her rigid thinking patterns, lack of flexibility, and difficulty problem solving (associated with ASD diagnosis). She was again reminded to be mindful about her accomplishments during this transition while using positive self-talk. She was affirmed for keeping a daily planner and weekly to-do list. She did appear receptive to therapist suggestions and treatment recommendations today.        Type of psychotherapeutic technique provided: Insight oriented, Client centered and CBT     Progress toward short term goals:Progress as expected, as patient returned for a scheduled visit. She utilized the session to express thoughts and feelings while searching for solutions to problems. She continues to advocate for her needs. She continues to utilize her planner to help complete daily tasks.     Review of long term goals: Not done at today's visit   Treatment plan updated on 12/13/19   Date of next review: March 2020    Diagnosis:   1. Autism spectrum disorder    2. Attention deficit hyperactivity disorder (ADHD), unspecified ADHD type    3. JOSHUA (generalized anxiety disorder)    4. Current moderate episode of major depressive disorder, unspecified whether recurrent (H)    R/O Panic Disorder  R/O PTSD    Plan and Follow up: The  patient will be scheduled to return for a follow-up visit on 1/31/20.  Therapist completed a referral for [facility] as of 1/3/20.  Therapy plan will continue to incorporate CBT interventions to help patient manage thoughts, feelings and behaviors impacted by symptoms of anxiety.   Patient will require assistance managing symptoms associated with ASD such as rigid thinking patterns and processing difficulties impacting communication skills.   Therapist created a task list chart for patient to utilize in between appointments in order to establish a daily routine.      Discharge Criteria/Planning: Client has chronic symptoms and ongoing therapy for maintenance stability recommended.     Performed and documented by CHANTAL Blackmon

## 2021-06-05 NOTE — PROGRESS NOTES
"Mental Health Visit Note    1/31/2020    Start time: 4:11pm    Stop Time: 5:00pm   Session # 2    Session Type: Patient is presenting for an Individual session.     Persons Present: Therapist, Patient, Patient's  and patient's therapy dog    Angela Jones is a 24 y.o. female is being seen today for    Chief Complaint   Patient presents with      Follow Up     Psychotherapy follow-up visit for anxiety   .     New symptoms or complaints: incrased anxiety and \"tantrums\" including increased episodes of tearfulness      Functional Impairment:   Personal: 3  Family: 2  Work: 4  Social:3    Clinical assessment of mental status:   Grooming: Well groomed  Attire: Appropriate  Age: Appears Stated  Behavior Towards Examiner: Cooperative  Motor Activity: Tremors/Tics   Eye Contact: Avoidant  Mood: Anxious   Affect: Congruent w/content of speech and Anxious   Speech/Language: Stuttering/Slammering  Attention: Distractible  Concentration: Brief  Thought Process: other  Thought Content: Hallucinations: none reported  Delusions: none reported  Orientation: X 3  Memory: Impairment, Recent and Remote  Judgement: Impairment, Minimal and Moderate  Estimated Intelligence: Average  Demonstrated Insight: Adequate  Fund of Knowledge: adequate   I've re-evaluated the mental status of the patient and no changes were noted since the date of the last encounter, 1/14/2020.    Suicidal/Homicidal Ideation present: None Reported This Session    Patient's impression of their current status:   The patient's impression of her current status is that she had a \"very bad week.\" She is feeling overwhelmed today and requested that her  help articulate her symptoms at the beginning of session. The patient is feeling very anxious and is worried about the future. She lost sight of concrete goals to help her with long-term symptom management.  She did receive a letter from [facility] with instructions to call and set up an intake " "appointment. She agreed to do this next week after some coaching and reassurance. She did get in touch with someone from [facility] about psychiatry and is waiting to schedule. She requested to increase therapy session frequency due to increase in anxiety symptoms. She expressed a desire to \"be able to control the anxiety.\"      Therapist impression of patients current state:   The patient arrived several minutes late for a follow-up visit. She called ahead to notify therapist. The patient presented for session with her  and therapy dog today. Therapist's impression of patient's status is that she is still in a transitional period (moving) which is greatly exacerbating underlying anxiety symptoms. She is becoming easily overwhelmed with tasks which is impacted by her rigid thinking patterns, lack of flexibility, and difficulty problem solving (associated with ASD diagnosis). She was again reminded to be mindful about her accomplishments during this transition period while using positive self-talk. She was affirmed for areas of progress such as calling about psychiatry. She did appear receptive to therapist suggestions and treatment recommendations. A task list was created to help patient feel organized which can be found below.         Type of psychotherapeutic technique provided: Insight oriented, Client centered and CBT     Progress toward short term goals:Progress as expected, as patient returned for a scheduled visit. She did call [facility] to inquire about psychiatry services. She is receptive to treatment recommendations.      Review of long term goals: Not done at today's visit   Treatment plan updated on 12/13/19   Date of next review: March 2020    Diagnosis:   1. Autism spectrum disorder    2. Attention deficit hyperactivity disorder (ADHD), unspecified ADHD type    3. JOSHUA (generalized anxiety disorder)    4. Current moderate episode of major depressive disorder, unspecified whether recurrent (H)  "   R/O Panic Disorder  R/O PTSD    Plan and Follow up: The patient will be scheduled to return for a follow-up visit on 2/10/20.  *Request for in-home services sent to PCP      Working list towards long-term anxiety management:  Smaller Picture:  1. Daily practice of self-acceptance and non-judgment  2. Daily mindfulness practice   3. Return to daily task list  4. Use your planner  5. Start using self-soothing items again  6. Feel free to pick daily afternoon  chore without asking Javy  7. Try to pick one moving box per day  8. PROBABLY try to prioritize kitchen stuff  Big Picture:  1. Obtain  by scheduling intake  2. Establish care with a psychiatrist  3. Attend weekly therapy visits after 2/10  4. Table discussions about children for 2 months  5. Getting house organized (can take up to 1 year at least)  6. Consider group support for education about autism or anxiety     Discharge Criteria/Planning: Client has chronic symptoms and ongoing therapy for maintenance stability recommended.     Performed and documented by CHANTAL Blackmon

## 2021-06-05 NOTE — TELEPHONE ENCOUNTER
The pt would like to schedule a psychiatry appt w/ Tila Gilbert now.     Please connect to advise for scheduling.

## 2021-06-06 NOTE — PROGRESS NOTES
"Mental Health Visit Note    3/13/2020    Start time: 2:10pm    Stop Time: 2:50pm   Session # 5    Session Type: Patient is presenting for an Individual session.     Persons Present: Therapist and Patient    Angela Jones is a 25 y.o. female is being seen today for    Chief Complaint   Patient presents with      Follow Up     Psychotherapy follow-up visit for anxiety   .     New symptoms or complaints: None     Functional Impairment:   Personal: 4  Family: 3  Work: 4  Social:3    Clinical assessment of mental status:   Grooming: Well groomed  Attire: Appropriate  Age: Appears Stated  Behavior Towards Examiner: Cooperative  Motor Activity: Tremors/Tics   Eye Contact: Avoidant  Mood: Anxious   Affect: Congruent w/content of speech and Anxious   Speech/Language: Stuttering/Slammering  Attention: Distractible  Concentration: Brief  Thought Process: other  Thought Content: Hallucinations: none reported  Delusions: none reported  Orientation: X 3  Memory: Impairment, Recent and Remote  Judgement: Impairment, Minimal and Moderate  Estimated Intelligence: Average  Demonstrated Insight: Adequate  Fund of Knowledge: adequate   I've re-evaluated the mental status of the patient and no changes were noted since the date of the last encounter, 3/4/20.    Suicidal/Homicidal Ideation present: None Reported This Session    Patient's impression of their current status:   The patient's impression of her current status is that \"I'm doing a bit better this week.\" She is having \"less meltdowns.\" She is trying to follow her daily routine. She is seeking assistance with insurance changes in order to qualify for more services. She is working on improving her affect regulation skills.     Therapist impression of patients current state:   The patient arrived several minutes late for a follow-up visit. The patient presented for session without her  and therapy dog again today. Therapist's impression is that patient appeared anxious " but was observed as following prompts to self-soothe and improve mind-body connection. The therapist led patient through a grounding exercise, encouraging practice outside of session. The therapist identified and affirmed areas of progress.          Type of psychotherapeutic technique provided: Insight oriented, Client centered and CBT     Progress toward short term goals:Progress as expected, as patient returned for a scheduled visit. Progress toward short-term goal to follow daily routine and utilize planner. Progress toward goal to utilize community support including CM.         Review of long term goals: Not done at today's visit   Treatment plan updated on 12/13/19   Date of next review: March 2020  *Next visit    Diagnosis:   1. JOSHUA (generalized anxiety disorder)    2. Autism spectrum disorder    3. Current moderate episode of major depressive disorder, unspecified whether recurrent (H)    4. Attention deficit hyperactivity disorder (ADHD), unspecified ADHD type    R/O Panic Disorder  R/O PTSD    Plan and Follow up: The patient is scheduled to return for a follow-up visit on 3/20/20 but patient will be offered a tele-health visit due to COVID19 protocol instead.  *Pt still needs new psychiatrist  * still seeking a therapist    Updated working list towards long-term anxiety management:  Smaller Picture:  1. Daily practice of self-acceptance and non-judgment  2. Daily mindfulness practice   3. Create and refer to daily task list  4. Use your planner  5. Utilize self-soothing items   6. Pick daily afternoon  chore without asking   Big Picture:  1. Obtain  by scheduling intake (task complete)  2. Obtain home health nurse to help with medication compliance  3. Establish care with a psychiatrist  4. Attend weekly therapy visits   5. Table discussions about children for 2 months  6. Getting house organized (can take up to 1 year at least)  7. Consider group support for education about autism  or anxiety (resources provided)  8. Consider couples therapy depending upon insurance    Discharge Criteria/Planning: Client has chronic symptoms and ongoing therapy for maintenance stability recommended.     Performed and documented by CHANTAL Blackmon

## 2021-06-06 NOTE — TELEPHONE ENCOUNTER
reports indicate that the patient needs follow up for ASTHMA ACTION PLAN, HIV SCRN, ADVANCED CARE PLAN, ACT . Per clinic policy, writer will three times prior to closing encounter.

## 2021-06-06 NOTE — PROGRESS NOTES
"Mental Health Visit Note    2/19/2020    Start time: 2:08pm    Stop Time: 2:57pm   Session # 3    Session Type: Patient is presenting for an Individual session.     Persons Present: Therapist, Patient, Patient's  and patient's therapy dog    Angela Jones is a 24 y.o. female is being seen today for    Chief Complaint   Patient presents with      Follow Up     Psychotherapy follow-up visit for anxiety   .     New symptoms or complaints: Severe anxiety; self-doubt     Functional Impairment:   Personal: 4  Family: 3  Work: 4  Social:3    Clinical assessment of mental status:   Grooming: Well groomed  Attire: Appropriate  Age: Appears Stated  Behavior Towards Examiner: Cooperative  Motor Activity: Tremors/Tics   Eye Contact: Avoidant  Mood: Anxious and sad  Affect: Congruent w/content of speech and Anxious   Speech/Language: Stuttering/Slammering  Attention: Distractible  Concentration: Brief  Thought Process: other  Thought Content: Hallucinations: none reported  Delusions: none reported  Orientation: X 3  Memory: Impairment, Recent and Remote  Judgement: Impairment, Minimal and Moderate  Estimated Intelligence: Average  Demonstrated Insight: Adequate  Fund of Knowledge: adequate     Suicidal/Homicidal Ideation present: None Reported This Session    Patient's impression of their current status:   The patient's impression of her current status is that she continues to feel very anxious. With much prompting, patient was able to verbalize the following: I don't want to be different from everyone else. There's nothing that will fix Autism and it's not something I can just be okay with. I've just been having a hard time. I'm not trying to make your () life hard.   The patient agrees that she is questioning herself a lot more especially during social interactions. She believes that she is easily confused and \"can't use myself as a good measure.\" The couple believes that their relationship has shifted since " the patient received her Autism diagnosis. Her  is trying to navigate her needs but struggles with his own anxiety as well. He often feels unsure how to help her but patient explained that she often just needs validation and support without any proposed solution. The couple agreed to continue working on improving their communication.     Therapist impression of patients current state:   The patient arrived several minutes late for a follow-up visit. The patient presented for session with her  and therapy dog today. Therapist's impression is that patient is experiencing severe anxiety symptoms. The patient is questioning her sense of self after receiving the Autism diagnosis, contributing to self-doubt and increased stress. She and her  are navigating some significant life changes, causing some disruptions in their communication patterns. The therapist utilized CBT and mindfulness skills to help patient work towards acceptance from a stance of non-judgment. She was again reminded to be mindful about her accomplishments during this transition period while using positive self-talk. She was affirmed for areas of progress such as completing the CM intake. She did appear receptive to therapist suggestions and treatment recommendations. She appeared relieved when therapist was able to summarize and validate her current concerns. The couple was validated for demonstrating compassion and understanding toward each other.         Type of psychotherapeutic technique provided: Insight oriented, Client centered and CBT     Progress toward short term goals:Progress as expected, as patient returned for a scheduled visit. She followed through with short term task to complete case management intake process. She is using her planner and daily task list. She was open today about thoughts and feelings. She and her  exhibited good progress with improving communication patterns      Review of long term goals:  Not done at today's visit   Treatment plan updated on 12/13/19   Date of next review: March 2020    Diagnosis:   1. Autism spectrum disorder    2. JOSHUA (generalized anxiety disorder)    3. Current moderate episode of major depressive disorder, unspecified whether recurrent (H)    4. Attention deficit hyperactivity disorder (ADHD), unspecified ADHD type    R/O Panic Disorder  R/O PTSD    Plan and Follow up: The patient will be scheduled to return for a follow-up visit on 2/26/20.  Next PCP visit scheduled for 2/20/20.  *Request for in-home services sent to PCP for help with medication management.    Updated working list towards long-term anxiety management:  Smaller Picture:  1. Daily practice of self-acceptance and non-judgment  2. Daily mindfulness practice   3. Return to daily task list  4. Use your planner  5. Start using self-soothing items again  6. Feel free to pick daily afternoon  chore without asking Javy  7. Try to pick one moving box per day  8. PROBABLY try to prioritize kitchen stuff  Big Picture:  1. Obtain  by scheduling intake  2. Obtain home health nurse to help with medication compliance  3. Establish care with a psychiatrist  4. Attend weekly therapy visits   5. Table discussions about children for 2 months  6. Getting house organized (can take up to 1 year at least)  7. Consider group support for education about autism or anxiety   8. Consider couples therapy depending upon insurance    Discharge Criteria/Planning: Client has chronic symptoms and ongoing therapy for maintenance stability recommended.     Performed and documented by CHANTAL Blackmon

## 2021-06-06 NOTE — PROGRESS NOTES
"Mental Health Visit Note    3/4/2020    Start time: 2:10pm    Stop Time: 2:55pm   Session # 4    Session Type: Patient is presenting for an Individual session.     Persons Present: Therapist and Patient    Angela Jones is a 24 y.o. female is being seen today for    Chief Complaint   Patient presents with      Follow Up     Psychotherapy follow-up visit for anxiety   .     New symptoms or complaints: None     Functional Impairment:   Personal: 4  Family: 3  Work: 4  Social:3    Clinical assessment of mental status:   Grooming: Well groomed  Attire: Appropriate  Age: Appears Stated  Behavior Towards Examiner: Cooperative  Motor Activity: Tremors/Tics   Eye Contact: Avoidant  Mood: Anxious   Affect: Congruent w/content of speech and Anxious   Speech/Language: Stuttering/Slammering  Attention: Distractible  Concentration: Brief  Thought Process: other  Thought Content: Hallucinations: none reported  Delusions: none reported  Orientation: X 3  Memory: Impairment, Recent and Remote  Judgement: Impairment, Minimal and Moderate  Estimated Intelligence: Average  Demonstrated Insight: Adequate  Fund of Knowledge: adequate     Suicidal/Homicidal Ideation present: None Reported This Session    Patient's impression of their current status:   The patient's impression of her current status is that \"I'm still having meltdowns sometimes\" in reference to anxiety symptoms. She and her  have different ideas about how to manage anxiety symptoms. The patient stated, \"It's okay if things make me anxious. I don't need my  to fix it or intervene.\" They are still working on improving their communication skills.      Therapist impression of patients current state:   The patient arrived several minutes late for a follow-up visit. The patient presented for session without her  and therapy dog today. Therapist's impression is that patient appeared anxious. Long-term goals were reviewed in regards to managing anxiety " symptoms. Therapist provided resources today to increase support around Autism diagnosis, suggesting participation in free support groups through [employer] [place]. The therapist identified and affirmed areas of progress.          Type of psychotherapeutic technique provided: Insight oriented, Client centered and CBT     Progress toward short term goals:Progress as expected, as patient returned for a scheduled visit. She completed assigned hw tasks such as completing intake with [facility]. She provided contact information today and signed EDDI. Progress toward short-term goal to utilize daily planner. Progress toward goal to participate in independent activities such as taking the bus to the library. Progress toward short-term goal of gaining acceptance of diagnoses, embracing anxiety symptoms and using self-soothing techniques. Patient also working on improving medication compliance by using a medication box - she requested assistance from the  with [facility].       Review of long term goals: Not done at today's visit   Treatment plan updated on 12/13/19   Date of next review: March 2020  *Next visit    Diagnosis:   1. JOSHUA (generalized anxiety disorder)    2. Autism spectrum disorder    3. Current moderate episode of major depressive disorder, unspecified whether recurrent (H)    4. Attention deficit hyperactivity disorder (ADHD), unspecified ADHD type    R/O Panic Disorder  R/O PTSD    Plan and Follow up: The patient will be scheduled to return for a follow-up visit on 3/13/20.    Updated working list towards long-term anxiety management:  Smaller Picture:  1. Daily practice of self-acceptance and non-judgment  2. Daily mindfulness practice   3. Create and refer to daily task list  4. Use your planner  5. Utilize self-soothing items   6. Pick daily afternoon  chore without asking   Big Picture:  1. Obtain  by scheduling intake (task complete)  2. Obtain  home health nurse to help with medication compliance  3. Establish care with a psychiatrist  4. Attend weekly therapy visits   5. Table discussions about children for 2 months  6. Getting house organized (can take up to 1 year at least)  7. Consider group support for education about autism or anxiety (resources provided)  8. Consider couples therapy depending upon insurance    Discharge Criteria/Planning: Client has chronic symptoms and ongoing therapy for maintenance stability recommended.     Performed and documented by CHANTAL Blackmon

## 2021-06-06 NOTE — TELEPHONE ENCOUNTER
Dr. Walton,      Spoke with patient regarding her homebound status, she did confirm that she is not homebound and would like home care.    Explain that her [employer] will not cover her at 100% if patient is homebound and she will be responsible for 20% until her out of pocket amount is met.     Which she still decline home care. Advise that if she feels that she would like home care in the future, she will need to contact her primary care MD.    -Thanks,     [facility].  [phone]

## 2021-06-07 NOTE — PROGRESS NOTES
"Mental Health Visit Note    Patient: Angela Jones    : 1995 MRN: [id number]    Date: 3/27/2020   Start time: 1:30pm   Stop Time: 2:00pm   Session # 7    The patient has been notified of the following:   \"We have found that certain health care needs can be provided without the need for a face to face visit.  This service lets us provide the care you need with a phone conversation.  I will have full access to your [facility] medical record during this entire phone call.   I will be taking notes for your medical record. Since this is like an office visit, we will bill your insurance company for this service.  There are potential benefits and risks of telephone visits (e.g. limits to patient confidentiality) that differ from in-person visits.?  Confidentiality still applies for telephone services, and nobody will record the visit.  It is important to be in a quiet, private space that is free of distractions (including cell phone or other devices) during the visit.?? If during the course of the call I believe a telephone visit is not appropriate, you will not be charged for this service\"  Consent has been obtained for this service by care team member: Yes, per verbal agreement     Session Type: Patient is participating in a telephone visit    Chief Complaint   Patient presents with     Follow-up     Telephone visit for mental health       New symptoms or complaints: None    Functional Impairment:   Personal: 2  Family: 0  Work: 4  Social:2          ASSESSMENT: Current Emotional / Mental Status (status of significant symptoms):              Risk status (Self / Other harm or suicidal ideation)              Patient denies any risks to personal safety              Patient denies current or recent suicidal ideation or behaviors.              Patient denies current or recent homicidal ideation or behaviors.              Patient denies current or recent self injurious behavior or ideation.              Patient denies " "other safety concerns.              Patient denies any major risk factors              Patient denies any changes to protective factors              Recommended that patient call 911 or go to the local ED should there be a change in any of these risk factors.                Attitude:                                   Cooperative               Orientation:                             x3              Speech                          Rate / Production:       Normal                           Volume:                       Normal               Mood:                                      Anxious  Normal              Thought Content:                    Clear  Perservative               Thought Form:                        Coherent  Logical               Insight:                                     Good       Patient's impression of their current status:   No major reported changes over the past week. Status is relatively the same.   \"whatever I need to get done will get done\" - demonstrating more flexible thinking as opposed to rigid thinking.   Working on limiting comparisons with others and being less hard on self.     Therapist impression of patients current state:   Therapist prompted patient to share about current status and symptoms.   Patient encouraged to focus on the here and now. Patient is demonstrating progress toward goals to adjust mindset.   Therapist provided unconditional positive regard and support.     Type of psychotherapeutic technique provided: Insight oriented and CBT    Progress toward short term goals:Progress as expected, as patient has implemented a morning routine and is establishing a daily routine.    Review of long term goals: Treatment Plan updated   Patient not able to sign due to telephone visit       Diagnosis:  1. JOSHUA (generalized anxiety disorder)    2. Autism spectrum disorder    3. Current moderate episode of major depressive disorder, unspecified whether recurrent (H)    4. Attention " deficit hyperactivity disorder (ADHD), unspecified ADHD type          Plan and Follow up:   Patient and therapist agreed to conduct another telephone encounter on 4/3/20 until further notice.     Patient encouraged to do the followin. Stick to a routine. Go to sleep and wake up at a reasonable time, write a schedule that is varied and includes time for work as well as self-care.  2. Get out at least once a day, for at least thirty minutes. Try first thing in the morning if concerned about contact or just open all the windows in your house and blast a fan.   3. Find some time to move each day, again daily for at least thirty minutes. [employer] videos off free movement classes or just turn on the music and dance.  4. Lower expectations and practice radical self-acceptance. Accept everything about yourself, your current situation, and your life without question, blame or pushback. You cannot fail at this--there is no roadmap, no precedent for this, and we are all truly doing the best we can in an impossible situation.   Patient plans to start planting and sewing.     *Patient does have capability to due virtual visits.    Discharge Criteria/Planning: Client has chronic symptoms and ongoing therapy for maintenance stability recommended.              I have reviewed the note as documented above.  This accurately captures the substance of my conversation with the patient.  CHANTAL Blackmon

## 2021-06-07 NOTE — PROGRESS NOTES
Mental Health tele Visit Note    Patient: Angela Jones    : 1995 MRN: [id number]    Date: 2020   Start time: 3:00pm   Stop Time: 3:28pm   Session # 11    Consent:  The patient has verbally consented to: the potential risks and benefits of telemedicine (video visit) versus in person care; bill my insurance or make self-payment for services provided; and responsibility for payment of non-covered services.   Mode of Communication:  Video Conference via [employer]  Session Type: Patient is participating in a video visit for mental health    Telemedicine Visit: The patient's condition can be safely assessed and treated via synchronous audio and visual telemedicine encounter.    Reason for Telemedicine Visit: Services only offered telehealth  Originating Site (Patient Location): Patient's home  Distant Site (Provider Location): Provider Remote Setting  As the provider I attest to compliance with applicable laws and regulations related to telemedicine.  Those present for this visit include patient and therapist.    Follow up in regards to ongoing symptom management of anxiety and depression in addition to barriers associated with Autism Spectrum Disorder.      Chief Complaint   Patient presents with      Follow Up     Virtual visit for anxiety       New symptoms or complaints: None    Functional Impairment:   Personal: 3  Family: 1  Work: 2  Social:2          ASSESSMENT: Current Emotional / Mental Status (status of significant symptoms):              Risk status (Self / Other harm or suicidal ideation)              Patient denies risks to personal safety              Patient denies current or recent suicidal ideation or behaviors.              Patient denies current or recent homicidal ideation or behaviors.              Patient denies current or recent self injurious behavior or ideation.              Patient denies other safety concerns.              Patient denies changes to risk factors               "Patient denies changes to protective factors              Recommended that patient call 911 or go to the local ED should there be a change in any of these risk factors.                Attitude:                                   Cooperative               Orientation:                             x3              Speech                          Rate / Production:       Pressured                           Volume:                       Normal               Mood:                                      Anxious  Normal              Thought Content:                    Clear  Perservative               Thought Form:                        Coherent  Logical               Insight:                                     Good       Patient's impression of their current status:   Patient reports that things are still stressful. Patient reports feeling tired and having low energy. Patient reports feeling overwhelmed. Patient reports that she is taking care of her  and her dog is sick. Patient reports sleep disturbances.   \"I work better in a space that is clean and organized but I am the only one that can clean and organize right now.\"    Patient again expressed feeling overwhelmed. \"I'm trying to have more energy to do stuff.\"   Patient reports prioritizing tasks and identifying what is more important.  Patient reports being in her head a lot and having trouble shifting focus to her body.      Therapist impression of patients current state:   Therapist prompted patient to express thoughts and feelings related to current status. Therapist encouraged patient to approach emotions from a stance of non-judgment. Therapist encouraged patient to revisit expectations especially in regards to tasks. Therapist and patient discussed strategies to manage stress and implement learned coping strategies.   Therapist reviewed strategies for focusing on the mind-body connection.     Type of psychotherapeutic technique provided: Client centered " and CBT    Progress toward short term goals: Progress as expected, Pt discussing concerns and coping strategies during tele-sessions. Pt working on homework assignments and skills learned in therapy between sessions    Review of long term goals: Not done at today's visit  Treatment plan updated on 3/27/20       Diagnosis:  1. JOSHUA (generalized anxiety disorder)    2. Autism spectrum disorder    3. Current moderate episode of major depressive disorder, unspecified whether recurrent (H)    4. Attention deficit hyperactivity disorder (ADHD), unspecified ADHD type          Plan and Follow up:   Therapist and patient agreed to participate in weekly video visits to address symptoms of anxiety.    Patient encouraged to do the following for homework:  1. Stick to a routine. Go to sleep and wake up at a reasonable time, write a schedule that is varied and includes time for work as well as self-care.  2. Get out at least once a day, for at least thirty minutes. Try first thing in the morning if concerned about contact or just open all the windows in your house and blast a fan.   3. Find some time to move each day, again daily for at least thirty minutes. [employer] videos off free movement classes or just turn on the music and dance.  4. Lower expectations and practice radical self-acceptance. Accept everything about yourself, your current situation, and your life without question, blame or pushback. You cannot fail at this--there is no roadmap, no precedent for this, and we are all truly doing the best we can in an impossible situation.   5. Listen to Claus Brown podcast    Discharge Criteria/Planning: Client has chronic symptoms and ongoing therapy for maintenance stability recommended.    I have reviewed the note as documented above.  This accurately captures the substance of my conversation with the patient.    Performed and documented by CHANTAL Blackmon LICSW

## 2021-06-07 NOTE — PROGRESS NOTES
"Mental Health tele Visit Note    Patient: Angela Jones    : 1995 MRN: [id number]    Date: 4/3/2020   Start time: 3:00pm   Stop Time: 3:30pm   Session # 8    The patient has been notified of the following:   \"We have found that certain health care needs can be provided without the need for a face to face visit.  This service lets us provide the care you need with a phone conversation.  I will have full access to your [facility] medical record during this entire phone call.   I will be taking notes for your medical record. Since this is like an office visit, we will bill your insurance company for this service.  There are potential benefits and risks of telephone visits (e.g. limits to patient confidentiality) that differ from in-person visits.?  Confidentiality still applies for telephone services, and nobody will record the visit.  It is important to be in a quiet, private space that is free of distractions (including cell phone or other devices) during the visit.?? If during the course of the call I believe a telephone visit is not appropriate, you will not be charged for this service\"  Consent has been obtained for this service by care team member: Yes, per verbal agreement     Session Type: Patient is participating in a video visit    Chief Complaint   Patient presents with     Follow-up     Video visit for mental health       New symptoms or complaints: None    Functional Impairment:   Personal: 3  Family: 1  Work: 2  Social:2          ASSESSMENT: Current Emotional / Mental Status (status of significant symptoms):              Risk status (Self / Other harm or suicidal ideation)              Patient denies risks to personal safety              Patient denies current or recent suicidal ideation or behaviors.              Patient denies current or recent homicidal ideation or behaviors.              Patient denies current or recent self injurious behavior or ideation.              Patient denies other " safety concerns.              Patient denies changes to risk factors              Patient denies changes to protective factors              Recommended that patient call 911 or go to the local ED should there be a change in any of these risk factors.                Attitude:                                   Cooperative               Orientation:                             x3              Speech                          Rate / Production:       Pressured                           Volume:                       Normal               Mood:                                      Anxious  Normal              Thought Content:                    Clear  Perservative               Thought Form:                        Coherent  Logical               Insight:                                     Good       Patient's impression of their current status:   Patient experiencing 'cabin fever' due to being stuck at home. Patient feeling bored with limited access to meaningful activities. Patient attempting to get along with roommates. Patient reported trying to get outside when she can. Patient trying to craft. Has morning and evening routines in place but struggling with creating daytime routine.     Therapist impression of patients current state:   Therapist prompted patient to express thoughts and feelings related to current status. Therapist and patient explored opportunities to add more activities into her daily routine.   Patient encouraged to do the followin. Stick to a routine. Go to sleep and wake up at a reasonable time, write a schedule that is varied and includes time for work as well as self-care.  2. Get out at least once a day, for at least thirty minutes. Try first thing in the morning if concerned about contact or just open all the windows in your house and blast a fan.   3. Find some time to move each day, again daily for at least thirty minutes. [employer] videos off free movement classes or just turn on the music  and dance.  4. Lower expectations and practice radical self-acceptance. Accept everything about yourself, your current situation, and your life without question, blame or pushback. You cannot fail at this--there is no roadmap, no precedent for this, and we are all truly doing the best we can in an impossible situation.     Type of psychotherapeutic technique provided: Client centered and CBT    Progress toward short term goals: Progress as expected, Pt discussing concerns and coping strategies during tele-sessions. Pt working on homework assignments and skills learned in therapy between sessions    Review of long term goals: Not done at today's visit  Treatment plan updated on 3/27/20       Diagnosis:  1. JOSHUA (generalized anxiety disorder)    2. Autism spectrum disorder    3. Current moderate episode of major depressive disorder, unspecified whether recurrent (H)    4. Attention deficit hyperactivity disorder (ADHD), unspecified ADHD type          Plan and Follow up: To continue to work on personal goals. To continue participating in tele-therapy until face-to-face meetings are allowed. To maintain mental health and physical health through this period of heightened health concerns related to corona virus. To remain medication compliant. To notify this psychotherapist if additional psychotherapy sessions are needed due to environmental stressors. To communicate with community providers to address systemic barriers.      Discharge Criteria/Planning: Client has chronic symptoms and ongoing therapy for maintenance stability recommended.              I have reviewed the note as documented above.  This accurately captures the substance of my conversation with the patient.  CHANTAL Blackmon

## 2021-06-07 NOTE — PROGRESS NOTES
Outpatient Mental Health Treatment Plan    Name:  Angela Jones  :  1995  MRN:  [id number]    Treatment Plan:  Updated Treatment Plan  Intake/initial treatment plan date:    Intake: 2018  Benefit and risks and alternatives have been discussed: Yes  Is this treatment appropriate with minimal intrusion/restrictions: Yes  Estimated duration of treatment:  Approximately 10 sessions.  Anticipated frequency of services:  Every week  Necessity for frequency: This frequency is needed to establish therapeutic goals and for continuity of care in order to monitor progress.  Necessity for treatment: To address cognitive, behavioral, and/or emotional barriers in order to work toward goals and to improve quality of life.    Session Type: Patient is presenting for an Individual session.    Plan:           ?   ? Anxiety    Goal:  Decrease average anxiety level from 4 to 3.   Strategies: ? [x] Learn and practice relaxation techniques and other coping strategies (e.g., thought stopping, reframing, meditation)     ? [x] Increase involvement in meaningful activities     ? [x] Discuss sleep hygiene     ? [x] Explore thoughts and expectations about self and others     ? [x] Identify and monitor triggers for panic/anxiety symptoms     ? [x] Implement physical activity routine (with physician approval)     ? [x] Consider introduction of bibliotherapy and/or videos     ? [x] Continue compliance with medical treatment plan (or explore barriers)   ?Degree to which this is a problem: 3  Degree to which goal is met: 1.5  Date of Review: 2020       ? Depression    Goal:  Decrease average depression level from 2 to 1.   Strategies:    ?[x] Decrease social isolation     [x] Increase involvement in meaningful activities     ?[x] Discuss sleep hygiene     ?[x] Explore thoughts and expectations about self and others     ?[x] Process grief (loss of significant person, independence, role, etc.)     ?[x] Assess for suicide  "risk     ?[x] Implement physical activity routine (with physician approval)     [x] Consider introduction of bibliotherapy and/or videos     [x] Continue compliance with medical treatment plan (or explore barriers)?  Degree to which this is a problem: 2  Degree to which goal is met: 3  Date of Review: June 2020    Additional goals:  Improve self-care  Improve organization skills (\"life skills\")  Reduce seizure-like episodes  Increase independence in daily functioning  Consider referral for DBT program  Complete testing at [facility] for ASD (task complete)          Functional Impairment:  1=Not at all/Rarely  2=Some days  3=Most Days  4=Every Day    Personal : 4  Family : 3  Social : 2   Work/school : 4    Diagnosis (non-Axial as defined in DSM-5)  1. Generalized Anxiety Disorder      2. Current moderate episode of major depressive disorder, unspecified whether recurrent (H)      ADHD  Autism Spectrum Disorder    Provisional Diagnosis (list only- no explanation needed)  Bipolar Disorder  OCD  PTSD      Clinical assessments and measures completed:.   WHODAS 2.0 12-item version: 7    Scores presented in qualifiers to represent level of disability.  MILD Problem (slight, low, ...) 5-24%  H1= 30  H2= 2  H3= 18    PHQ-9: 13 (2/8/19)  PHQ-9: 14 (6/14/19)  PHQ-9: 11 (9/20/19)  PHQ-9: 13 (12/13/19)     JOSHUA-7: 7 (2/8/19)  JOSHUA-7: 6 (6/14/19)  JOSHUA-7: 7 (9/20/19)  JOSHUA-7: 10 (12/13/19)     C-SSRS: Low to moderate risk. Patient does have history of self-harm and suicidal ideation with intent. No current ideation or intent.         Strengths:  Articulate, open-minded and willing to participate in mental health services. Good support from  and family. Good access to care and resourcefulness.   Limitations:  Trouble with self-control; intrinstic motivation; executive dysfunction with ADHD. Patient also limited by seizure-like activity with no clear understanding of origin. Patient recently diagnosed with Autism Spectrum Disorder. "   Cultural Considerations: Patient is a 25  female. Identifies as Jain but more interested in science. [place] and always had family close by. There are no significant ethnic, cultural or Anabaptist factors that may be relevant for therapy.     Persons responsible for this plan: Patient and Provider   Patient not able to sign due to telephone visit            Psychotherapist Signature           Patient Signature:              Guardian Signature             Provider: Performed and documented by CHANTAL Blackmon   Date:  3/27/2020

## 2021-06-07 NOTE — PROGRESS NOTES
"Telephone Visit Note    Patient Name: Angela Jones                   Date:   3/20/20                                          Service Type:            Telephone Visit                The patient has been notified of following:     \"This telephone visit will be conducted via a call between you and your provider. We have found that certain health care needs can be provided without the need for an office visit.  This service lets us provide the care you need with a short phone conversation.    If during the course of the call the provider feels a telephone visit is not appropriate, you will not be charged for this service.\"                 Session Start Time:  3:05pm                    Session End Time:   3:35pm                Session Length:        30 minutes    Session #:      1 via telephone     Attendees:     Therapist and Patient                DATA                            Progress Since Last Session (Related to Symptoms / Goals / Homework):  Symptoms: Increased anxiety due to COVID19  \"Trying to get daily routine back in place\"                            Episode of Care Goals:   Review of symptoms and status                Current / Ongoing Stressors and Concerns:  \"Feeling kind of trapped\"  \"Feeling really stressed out\"  \"Feeling frustrated with uncertainty\"  \"I don't like not being able to plan\"                Intervention:  Active listening  Unconditional positive regard  CBT                              ASSESSMENT: Current Emotional / Mental Status (status of significant symptoms):              Risk status (Self / Other harm or suicidal ideation)              Patient denies any changes to personal safety              Patient denies current or recent suicidal ideation or behaviors.              Patient denies current or recent homicidal ideation or behaviors.              Patient denies current or recent self injurious behavior or ideation.              Patient denies other safety concerns.              " Patient denies any changes to risk factors              Patient denies any changes to protective factors              Recommended that patient call 911 or go to the local ED should there be a change in any of these risk factors.                Attitude:                                   Cooperative               Orientation:                             x3              Speech                          Rate / Production:       Normal                           Volume:                       Normal               Mood:                                      Anxious  Normal              Thought Content:                    Perservative  Rumination               Thought Form:                        Coherent  Logical               Insight:                                     Good                 Medication Compliance:              No reported changes in medication compliance                Changes in Health Issues:              No reported changes in health issues                Chemical Use Review:              Substance Use: no              Tobacco Use: no    Diagnosis:  1. JOSHUA (generalized anxiety disorder)    2. Autism spectrum disorder    3. Current moderate episode of major depressive disorder, unspecified whether recurrent (H)    4. Attention deficit hyperactivity disorder (ADHD), unspecified ADHD type          PLAN: (Patient Tasks / Therapist Tasks / Other)  Patient and therapist agreed to schedule weekly telephone visits until further notice.    Homework tasks:  1. Daily practice of self-acceptance and non-judgment  2. Daily mindfulness practice   3. Create and refer to daily task list  4. Use your planner  5. Utilize self-soothing items     I have reviewed the note as documented above.  This accurately captures the substance of my conversation with the patient.  Fanny Cobb, LASTSW

## 2021-06-07 NOTE — PROGRESS NOTES
Mental Health tele Visit Note    Patient: Angela Jones    : 1995 MRN: [id number]    Date: 2020   Start time: 3:00pm   Stop Time: 3:30pm   Session # 10    Consent:  The patient has verbally consented to: the potential risks and benefits of telemedicine (video visit) versus in person care; bill my insurance or make self-payment for services provided; and responsibility for payment of non-covered services.   Mode of Communication:  Video Conference via [employer]  Session Type: Patient is participating in a video visit for mental health    Telemedicine Visit: The patient's condition can be safely assessed and treated via synchronous audio and visual telemedicine encounter.    Reason for Telemedicine Visit: Services only offered telehealth  Originating Site (Patient Location): Patient's home  Distant Site (Provider Location): Provider Remote Setting  As the provider I attest to compliance with applicable laws and regulations related to telemedicine.  Those present for this visit include patient and therapist.    Follow up in regards to ongoing symptom management of anxiety and depression in addition to barriers associated with Autism Spectrum Disorder.      Chief Complaint   Patient presents with      Follow Up     Virtual visit for mental health symptoms associated with anxiety       New symptoms or complaints: None    Functional Impairment:   Personal: 3  Family: 1  Work: 2  Social:2          ASSESSMENT: Current Emotional / Mental Status (status of significant symptoms):              Risk status (Self / Other harm or suicidal ideation)              Patient denies risks to personal safety              Patient denies current or recent suicidal ideation or behaviors.              Patient denies current or recent homicidal ideation or behaviors.              Patient denies current or recent self injurious behavior or ideation.              Patient denies other safety concerns.              Patient  "denies changes to risk factors              Patient denies changes to protective factors              Recommended that patient call 911 or go to the local ED should there be a change in any of these risk factors.                Attitude:                                   Cooperative               Orientation:                             x3              Speech                          Rate / Production:       Pressured                           Volume:                       Normal               Mood:                                      Anxious  Normal              Thought Content:                    Clear  Perservative               Thought Form:                        Coherent  Logical               Insight:                                     Good       Patient's impression of their current status:   Patient reports that they found someone to take their cat. Patient reports that her  may have COVID19 but they are not able to test.  They are now on quarantine.  Patient reports feeling \"pretty stressed out.\" \"I'm just trying to do things when I have energy to do them.\" \"I've been kind of overwhelmed with trying to keep the house clean on my own but it seems to be falling into some sort of rhythm so that's something.\" \"I've been trying not to feel like I have to do everything myself.\" \"I do feel badly about concerning other people when I can't help it.\" Patient reports efforts to stay busy but that things are \"more or less the same in her house day to day.\"     Therapist impression of patients current state:   Therapist prompted patient to express thoughts and feelings related to current status. Therapist encouraged patient to approach emotions from a stance of non-judgment. Therapist and patient discussed strategies to manage stress and implement learned coping strategies.     Type of psychotherapeutic technique provided: Client centered and CBT    Progress toward short term goals: Progress as expected, Pt " discussing concerns and coping strategies during tele-sessions. Pt working on homework assignments and skills learned in therapy between sessions    Review of long term goals: Not done at today's visit  Treatment plan updated on 3/27/20       Diagnosis:  1. JOSHUA (generalized anxiety disorder)    2. Autism spectrum disorder    3. Current moderate episode of major depressive disorder, unspecified whether recurrent (H)    4. Attention deficit hyperactivity disorder (ADHD), unspecified ADHD type          Plan and Follow up:   Therapist and patient agreed to participate in weekly video visits to address symptoms of anxiety.    Patient encouraged to do the following for homework:  1. Stick to a routine. Go to sleep and wake up at a reasonable time, write a schedule that is varied and includes time for work as well as self-care.  2. Get out at least once a day, for at least thirty minutes. Try first thing in the morning if concerned about contact or just open all the windows in your house and blast a fan.   3. Find some time to move each day, again daily for at least thirty minutes. [employer] videos off free movement classes or just turn on the music and dance.  4. Lower expectations and practice radical self-acceptance. Accept everything about yourself, your current situation, and your life without question, blame or pushback. You cannot fail at this--there is no roadmap, no precedent for this, and we are all truly doing the best we can in an impossible situation.   5. Listen to Claus Leal podcast    Discharge Criteria/Planning: Client has chronic symptoms and ongoing therapy for maintenance stability recommended.    I have reviewed the note as documented above.  This accurately captures the substance of my conversation with the patient.  CHANTAL Blackmon

## 2021-06-07 NOTE — TELEPHONE ENCOUNTER
Per orders pt is to be taking gabapentin     Take 200 mg by mouth 3 (three) times a day. 3 times a day as needed 20 minutes before stressful event - Oral     So pt should be taking 600 mg a day.  PCP is not back until 3/27.  Does pt NTBS or just do a video/telephone visit with [facility] provider?  Thanks.

## 2021-06-07 NOTE — PROGRESS NOTES
Mental Health tele Visit Note    Patient: Angela Jones    : 1995 MRN: [id number]    Date: 4/10/2020   Start time: 2:30pm   Stop Time: 3:00pm   Session # 9    Consent:  The patient has verbally consented to: the potential risks and benefits of telemedicine (video visit) versus in person care; bill my insurance or make self-payment for services provided; and responsibility for payment of non-covered services.   Mode of Communication:  Video Conference via [employer]  Session Type: Patient is participating in a video visit for mental health    Telemedicine Visit: The patient's condition can be safely assessed and treated via synchronous audio and visual telemedicine encounter.    Reason for Telemedicine Visit: Services only offered telehealth  Originating Site (Patient Location): Patient's home  Distant Site (Provider Location): [facility]: [facility]  As the provider I attest to compliance with applicable laws and regulations related to telemedicine.  Those present for this visit include patient and therapist.    Follow up in regards to ongoing symptom management of anxiety and depression in addition to barriers associated with Autism Spectrum Disorder.      Chief Complaint   Patient presents with      Follow Up     Video visit for mental health       New symptoms or complaints: None    Functional Impairment:   Personal: 3  Family: 1  Work: 2  Social:2          ASSESSMENT: Current Emotional / Mental Status (status of significant symptoms):              Risk status (Self / Other harm or suicidal ideation)              Patient denies risks to personal safety              Patient denies current or recent suicidal ideation or behaviors.              Patient denies current or recent homicidal ideation or behaviors.              Patient denies current or recent self injurious behavior or ideation.              Patient denies other safety concerns.              Patient denies changes to  "risk factors              Patient denies changes to protective factors              Recommended that patient call 911 or go to the local ED should there be a change in any of these risk factors.                Attitude:                                   Cooperative               Orientation:                             x3              Speech                          Rate / Production:       Pressured                           Volume:                       Normal               Mood:                                      Anxious  Normal              Thought Content:                    Clear  Perservative               Thought Form:                        Coherent  Logical               Insight:                                     Good       Patient's impression of their current status:   Patient reported feeling stressed due to potential for getting rid of cat due to  allergies. Patient reported making (sewing) masks. Patient reported that the pandemic is a \"sensory nightmare\" especially as she is the one going out for items. Patient reported feeling tired because \"it's a lot to keep track of.\" Patient is exerting more energy than normal. Patient indicated being more aware of what she is doing and \"hyper-awareness.\" She is working on pre-planning which helps reduce some anxiety. Patient reported that she is doing more things to keep her mind distracted and keep herself busy.     Therapist impression of patients current state:   Therapist prompted patient to express thoughts and feelings related to current status. Therapist prompted patient to review homework tasks from last week. Patient is making progress toward short-term goals to follow a daily task plan while managing symptoms of anxiety. Therapist encouraged patient to approach emotions from a stance of non-judgment. Therapist and patient discussed plans for energy budgeting and maintaining balance. Therapist offered idea of a morning meeting with " roommates.     Type of psychotherapeutic technique provided: Client centered and CBT    Progress toward short term goals: Progress as expected, Pt discussing concerns and coping strategies during tele-sessions. Pt working on homework assignments and skills learned in therapy between sessions    Review of long term goals: Not done at today's visit  Treatment plan updated on 3/27/20       Diagnosis:  1. JOSHUA (generalized anxiety disorder)    2. Autism spectrum disorder    3. Current moderate episode of major depressive disorder, unspecified whether recurrent (H)    4. Attention deficit hyperactivity disorder (ADHD), unspecified ADHD type          Plan and Follow up:   Therapist and patient agreed to participate in weekly video visits to address symptoms of anxiety.  Patient encouraged to do the following for homework:  1. Stick to a routine. Go to sleep and wake up at a reasonable time, write a schedule that is varied and includes time for work as well as self-care.  2. Get out at least once a day, for at least thirty minutes. Try first thing in the morning if concerned about contact or just open all the windows in your house and blast a fan.   3. Find some time to move each day, again daily for at least thirty minutes. [employer] videos off free movement classes or just turn on the music and dance.  4. Lower expectations and practice radical self-acceptance. Accept everything about yourself, your current situation, and your life without question, blame or pushback. You cannot fail at this--there is no roadmap, no precedent for this, and we are all truly doing the best we can in an impossible situation.       Discharge Criteria/Planning: Client has chronic symptoms and ongoing therapy for maintenance stability recommended.              I have reviewed the note as documented above.  This accurately captures the substance of my conversation with the patient.  LAST BlackmonSW

## 2021-06-08 NOTE — PROGRESS NOTES
Outpatient Mental Health Treatment Plan    Name:  Angela Jones  :  1995  MRN:  [id number]    Treatment Plan:  Updated Treatment Plan  Intake/initial treatment plan date:    Intake: 2018  Benefit and risks and alternatives have been discussed: Yes  Is this treatment appropriate with minimal intrusion/restrictions: Yes  Estimated duration of treatment:  Approximately 10 sessions.  Anticipated frequency of services:  Every week  Necessity for frequency: This frequency is needed to establish therapeutic goals and for continuity of care in order to monitor progress.  Necessity for treatment: To address cognitive, behavioral, and/or emotional barriers in order to work toward goals and to improve quality of life.    Session Type: Patient is presenting for an Individual session.via video visit due to COVID19    Plan:           ?   ? Anxiety    Goal:  Decrease average anxiety level from 3 to 2.   Strategies: ? [x] Learn and practice relaxation techniques and other coping strategies (e.g., thought stopping, reframing, meditation)     ? [x] Increase involvement in meaningful activities     ? [x] Discuss sleep hygiene     ? [x] Explore thoughts and expectations about self and others     ? [x] Identify and monitor triggers for panic/anxiety symptoms     ? [x] Implement physical activity routine (with physician approval)     ? [x] Consider introduction of bibliotherapy and/or videos     ? [x] Continue compliance with medical treatment plan (or explore barriers)   ?Degree to which this is a problem: 2.5  Degree to which goal is met: 1.5  Date of Review: 2020       ? Depression    Goal:  Decrease average depression level from 2 to 1.   Strategies:    ?[x] Decrease social isolation     [x] Increase involvement in meaningful activities     ?[x] Discuss sleep hygiene     ?[x] Explore thoughts and expectations about self and others     ?[x] Process grief (loss of significant person, independence, role,  "etc.)     ?[x] Assess for suicide risk     ?[x] Implement physical activity routine (with physician approval)     [x] Consider introduction of bibliotherapy and/or videos     [x] Continue compliance with medical treatment plan (or explore barriers)?  Degree to which this is a problem: 2  Degree to which goal is met: 3  Date of Review: September 2020    Additional goals:  Improve self-care  Improve organization skills (\"life skills\")  Reduce seizure-like episodes  Increase independence in daily functioning  Consider referral for DBT program  Complete testing at [facility] for ASD (task complete)          Functional Impairment:  1=Not at all/Rarely  2=Some days  3=Most Days  4=Every Day    Personal : 4  Family : 3  Social : 2   Work/school : 4    Diagnosis (non-Axial as defined in DSM-5)  1. Generalized Anxiety Disorder      2. Current moderate episode of major depressive disorder, unspecified whether recurrent (H)      ADHD  Autism Spectrum Disorder    Provisional Diagnosis (list only- no explanation needed)  Bipolar Disorder  OCD  PTSD      Clinical assessments and measures completed:.   WHODAS 2.0 12-item version: 7    Scores presented in qualifiers to represent level of disability.  MILD Problem (slight, low, ...) 5-24%  H1= 30  H2= 2  H3= 18    PHQ-9: 13 (2/8/19)  PHQ-9: 14 (6/14/19)  PHQ-9: 11 (9/20/19)  PHQ-9: 13 (12/13/19)     JOSHUA-7: 7 (2/8/19)  JOSHUA-7: 6 (6/14/19)  JOSHUA-7: 7 (9/20/19)  JOSHUA-7: 10 (12/13/19)     C-SSRS: Low to moderate risk. Patient does have history of self-harm and suicidal ideation with intent. No current ideation or intent.         Strengths:  Articulate, open-minded and willing to participate in mental health services. Good support from  and family. Good access to care and resourcefulness.   Limitations:  Trouble with self-control; intrinstic motivation; executive dysfunction with ADHD. Patient also limited by seizure-like activity with no clear understanding of origin. Patient recently " diagnosed with Autism Spectrum Disorder.   Cultural Considerations: Patient is a 25  female. Identifies as Mandaeism but more interested in science. G[place] [place] [facility] [place] and always had family close by. There are no significant ethnic, cultural or Islam factors that may be relevant for therapy.     Persons responsible for this plan: Patient and Provider   Patient not able to sign due to virtual visit            Psychotherapist Signature           Patient Signature:              Guardian Signature             Provider: Performed and documented by CHANTAL Blackmon   Date:  6/12/2020

## 2021-06-08 NOTE — PROGRESS NOTES
Mental Health tele Visit Note    Patient: Angela Jones    : 1995 MRN: [id number]    Date: 2020   Start time: 3:30pm   Stop Time: 3:58pm   Session # 12    Consent:  The patient has verbally consented to: the potential risks and benefits of telemedicine (video visit) versus in person care; bill my insurance or make self-payment for services provided; and responsibility for payment of non-covered services.   Mode of Communication:  Video Conference via [employer]  Session Type: Patient is participating in a video visit for mental health    Telemedicine Visit: The patient's condition can be safely assessed and treated via synchronous audio and visual telemedicine encounter.    Reason for Telemedicine Visit: Services only offered telehealth  Originating Site (Patient Location): Patient's home  Distant Site (Provider Location): Provider Remote Setting  As the provider I attest to compliance with applicable laws and regulations related to telemedicine.  Those present for this visit include patient and therapist.    Follow up in regards to ongoing symptom management of anxiety and depression in addition to barriers associated with Autism Spectrum Disorder.      Chief Complaint   Patient presents with      Follow Up     Virtual visit to address symptoms of anxiety and depression       New symptoms or complaints: None    Functional Impairment:   Personal: 3  Family: 1  Work: 2  Social:2        ASSESSMENT: Current Emotional / Mental Status (status of significant symptoms):              Risk status (Self / Other harm or suicidal ideation)              Patient denies risks to personal safety              Patient denies current or recent suicidal ideation or behaviors.              Patient denies current or recent homicidal ideation or behaviors.              Patient denies current or recent self injurious behavior or ideation.              Patient denies other safety concerns.              Patient denies  "changes to risk factors              Patient denies changes to protective factors              Recommended that patient call 911 or go to the local ED should there be a change in any of these risk factors.                Attitude:                                   Cooperative               Orientation:                             x3              Speech                          Rate / Production:       Pressured                           Volume:                       Normal               Mood:                                      Anxious  Normal              Thought Content:                    Clear  Perservative               Thought Form:                        Coherent  Logical               Insight:                                     Good     Patient's impression of their current status:   Patient reports that the days are blending together. Patient reports that things are \"not great\" especially while  is sick.   Patient reports trying to be more flexible but this is still hard and she is still feeling quite anxious. Patient describes getting stuck in rigid thought patterns and spirals.     Therapist impression of patients current state:   Therapist prompted patient to express thoughts and feelings related to current status. Therapist encouraged patient to approach emotions from a stance of non-judgment.   Therapist reviewed strategies for focusing on the mind-body connection using screen-sharing for progressive muscle relaxation guided exercises.     Type of psychotherapeutic technique provided: Client centered and CBT    Progress toward short term goals: Progress as expected, Pt discussing concerns and coping strategies during tele-sessions. Pt working on homework assignments and skills learned in therapy between sessions    Review of long term goals: Not done at today's visit  Treatment plan updated on 3/27/20       Diagnosis:  1. JOSHUA (generalized anxiety disorder)    2. Autism spectrum disorder    3. " Current moderate episode of major depressive disorder, unspecified whether recurrent (H)    4. Attention deficit hyperactivity disorder (ADHD), unspecified ADHD type        Plan and Follow up:   Therapist and patient agreed to participate in weekly video visits to address symptoms of anxiety.    Patient encouraged to do the following for homework:  1. Stick to a routine. Go to sleep and wake up at a reasonable time, write a schedule that is varied and includes time for work as well as self-care.  2. Get out at least once a day, for at least thirty minutes. Try first thing in the morning if concerned about contact or just open all the windows in your house and blast a fan.   3. Find some time to move each day, again daily for at least thirty minutes. [employer] videos off free movement classes or just turn on the music and dance.  4. Lower expectations and practice radical self-acceptance. Accept everything about yourself, your current situation, and your life without question, blame or pushback. You cannot fail at this--there is no roadmap, no precedent for this, and we are all truly doing the best we can in an impossible situation.   5. Listen to Brene Brown podcast  6. Listen to progressive muscle relaxation video provided during today's visit    Discharge Criteria/Planning: Client has chronic symptoms and ongoing therapy for maintenance stability recommended.    I have reviewed the note as documented above.  This accurately captures the substance of my conversation with the patient.    Performed and documented by CHANTAL Blackmon LICSW

## 2021-06-08 NOTE — PROGRESS NOTES
Mental Health tele Visit Note    Patient: Angela Jones    : 1995 MRN: [id number]    Date: 2020   Start time: 3:00pm   Stop Time: 3:30pm   Session # 14    Consent:  The patient has verbally consented to: the potential risks and benefits of telemedicine (video visit) versus in person care; bill my insurance or make self-payment for services provided; and responsibility for payment of non-covered services.   Mode of Communication:  Video Conference via [employer]  Session Type: Patient is participating in a video visit for mental health    Telemedicine Visit: The patient's condition can be safely assessed and treated via synchronous audio and visual telemedicine encounter.    Reason for Telemedicine Visit: Services only offered telehealth  Originating Site (Patient Location): Patient's home  Distant Site (Provider Location): Provider Remote Setting  As the provider I attest to compliance with applicable laws and regulations related to telemedicine.  Those present for this visit include patient and therapist.    Follow up in regards to ongoing symptom management of anxiety and depression in addition to barriers associated with Autism Spectrum Disorder.      Chief Complaint   Patient presents with      Follow Up     Psychotherapy follow-up visit for anxiety       New symptoms or complaints: None    Functional Impairment:   Personal: 3  Family: 1  Work: 2  Social:2        ASSESSMENT: Current Emotional / Mental Status (status of significant symptoms):              Risk status (Self / Other harm or suicidal ideation)              Patient denies risks to personal safety              Patient denies current or recent suicidal ideation or behaviors.              Patient denies current or recent homicidal ideation or behaviors.              Patient denies current or recent self injurious behavior or ideation.              Patient denies other safety concerns.              Patient denies changes to risk  factors              Patient denies changes to protective factors              Recommended that patient call 911 or go to the local ED should there be a change in any of these risk factors.                Attitude:                                   Cooperative               Orientation:                             x3              Speech                          Rate / Production:       Pressured                           Volume:                       Normal               Mood:                                      Anxious  Normal              Thought Content:                    Clear  Perservative               Thought Form:                        Coherent  Logical               Insight:                                     Good     Patient's impression of their current status:   Patient reports that her  lost his job and they have been quite stressed. Now they no longer have health insurance through her 's job but patient can finally apply for MA. They talked with the  and financial worker through [place] and received some help.  Patient was able to obtain all of her medications before her insurance .   Patient discusses efforts to help out and to be proactive.      Therapist impression of patients current state:   Therapist prompted patient to express thoughts and feelings related to current status. Therapist normalized patient emotions and experiences with increased anxiety considering all of the circumstances and stressors. Therapist provided unconditional positive regard and support. Therapist reviewed strategies for grounding and mindfulness as strategies for managing anxiety symptoms.      Type of psychotherapeutic technique provided: Client centered and CBT    Progress toward short term goals: Progress as expected, Pt discussing concerns and coping strategies during tele-sessions. Pt working on homework assignments such as reviewing progressive muscle relaxation video.      Review of long term goals: Not done at today's visit  Treatment plan updated on 3/27/20  *need to update during next visit    Diagnosis:  1. JOSHUA (generalized anxiety disorder)    2. Autism spectrum disorder    3. Attention deficit hyperactivity disorder (ADHD), unspecified ADHD type    4. Current moderate episode of major depressive disorder, unspecified whether recurrent (H)        Plan and Follow up:   Therapist and patient agreed to participate in weekly video visits to address symptoms of anxiety.    Patient encouraged to do the following for homework:  1. Stick to a routine. Go to sleep and wake up at a reasonable time, write a schedule that is varied and includes time for work as well as self-care.  2. Get out at least once a day, for at least thirty minutes. Try first thing in the morning if concerned about contact or just open all the windows in your house and blast a fan.   3. Find some time to move each day, again daily for at least thirty minutes. [employer] videos off free movement classes or just turn on the music and dance.  4. Lower expectations and practice radical self-acceptance. Accept everything about yourself, your current situation, and your life without question, blame or pushback. You cannot fail at this--there is no roadmap, no precedent for this, and we are all truly doing the best we can in an impossible situation.   5. Listen to Brene Brown podcast  6. Listen to progressive muscle relaxation video provided during today's visit    Discharge Criteria/Planning: Client has chronic symptoms and ongoing therapy for maintenance stability recommended.    I have reviewed the note as documented above.  This accurately captures the substance of my conversation with the patient.    Performed and documented by CHANTAL Blackmon LICSW

## 2021-06-08 NOTE — TELEPHONE ENCOUNTER
Prior Authorization Request  Who s requesting:  Pharmacy  Pharmacy Name and Location: [facility] #[id number]  Medication Name: budesonide-formoteroL (SYMBICORT) 80-4.5 mcg/actuation inhaler   Insurance Plan: No info under the Verify Rx Benefits tab.   Insurance Member ID Number:  No info under the Verify Rx Benefits tab.   CoverMyMeds Key: [id number]  Informed patient that prior authorizations can take up to 10 business days for response:   NA  Okay to leave a detailed message: No

## 2021-06-08 NOTE — TELEPHONE ENCOUNTER
This patient is losing herinsurance because her  is being laid off. She has multiple medical and psychiatric conditions and really needs insurance! Could GRACIE Abdullahi connect with her about a way to apply  For M[employer]ure, perhaps?     Thank you.     Marissa Walton MD

## 2021-06-08 NOTE — PROGRESS NOTES
Clinic Care Coordination Contact  [facility]      Clinical Data: Care Coordinator Outreach    Outreach attempted x 1 regarding [facility] enrollment.  Could not leave a message.    Plan: Care Coordinator will attempt another outreach to the patient via phone regarding enrollment into [facility].      - CHW will also send a [employer] message and did tentatively schedule her with the [facility] SW for an Assessment for Monday 6/1 at 2pm and put this info in the [employer] message.

## 2021-06-08 NOTE — PROGRESS NOTES
Programs Applying For: Health Insurance   County:  Case #:  [facility] Worker:   [place] #:   PMI #:   Tracking:   Date Applied:     Outreach:   6/1/2020: FRW called patient on referral. Patient's spouse is losing job end of June and will lose family health insurance. FRW made appointment for 6/22 @ 11:00. FRW will call patient.       Health Insurance Information:       Referral:    does not work, and her  is being laid off and therefor they need help with insurance. She knows you will be reaching out to her. She has a SW Assessment scheduled for Monday 6/1 at 2pm to address any other concerns they may have.     I was able to connect with her via Connect for the screening questions. See below.     Here are the prescreening questions' answers below:     Pre-Screening Questions:   1. Have you recently applied for insurance or due for a renewal? If so, when? - No   2. How many people in your household? 2 people   3. Do you file taxes, who do you claim? No, I do not file taxes. I don't have a job and do not have any income.   5. What is the monthly gross income for the household (wages, social security, workers comp, and pension)? At the moment, $0. But it has been around $4,000, due to my  being out of the office and working half-time due to COVID-19 the past 2.5 months.     I'm currently also waiting on being certified as disabled by the state/[facility].     Let me know if you need anything else from me.           Thank you,     Adrienne

## 2021-06-08 NOTE — PROGRESS NOTES
Mental Health tele Visit Note    Patient: Angela Jones    : 1995 MRN: [id number]    Date: 2020   Start time: 3:00pm   Stop Time: 3:30pm   Session # 15    Consent:  The patient has verbally consented to: the potential risks and benefits of telemedicine (video visit) versus in person care; bill my insurance or make self-payment for services provided; and responsibility for payment of non-covered services.   Mode of Communication:  Video Conference via [employer]  Session Type: Patient is participating in a video visit for mental health    Telemedicine Visit: The patient's condition can be safely assessed and treated via synchronous audio and visual telemedicine encounter.    Reason for Telemedicine Visit: Services only offered telehealth  Originating Site (Patient Location): Patient's home  Distant Site (Provider Location): Provider Remote Setting  As the provider I attest to compliance with applicable laws and regulations related to telemedicine.  Those present for this visit include patient and therapist.    Follow up in regards to ongoing symptom management of anxiety and depression in addition to barriers associated with Autism Spectrum Disorder.      Chief Complaint   Patient presents with      Follow Up     Psychotherapy follow-up visit for anxiety       New symptoms or complaints: None    Functional Impairment:   Personal: 3  Family: 1  Work: 2  Social:2        ASSESSMENT: Current Emotional / Mental Status (status of significant symptoms):              Risk status (Self / Other harm or suicidal ideation)              Patient denies risks to personal safety              Patient denies current or recent suicidal ideation or behaviors.              Patient denies current or recent homicidal ideation or behaviors.              Patient denies current or recent self injurious behavior or ideation.              Patient denies other safety concerns.              Patient denies changes to risk  "factors              Patient denies changes to protective factors              Recommended that patient call 911 or go to the local ED should there be a change in any of these risk factors.                Attitude:                                   Cooperative               Orientation:                             x3              Speech                          Rate / Production:       Pressured                           Volume:                       Normal               Mood:                                      Anxious  Normal              Thought Content:                    Clear  Perservative               Thought Form:                        Coherent  Logical               Insight:                                     Good     Patient's impression of their current status:   Patient reports that this week has been relatively the same. She reports that \"there's nothing I can do about the situation right now so it is what it is.\" More tired than anything. Patient has a virtual visit with CM on Tuesday. Patient notices how the anxiety is present in her mind and body. Patient notices that it's hard to sit still and carrying tension in her neck. Patient reports that physical exercise can be painful and uncomfortable. Patient is still working on getting in touch with her body. Patient is hoping to get a bike soon.     Therapist impression of patients current state:   Therapist prompted patient to express thoughts and feelings following a CBT framework. Therapist provided unconditional positive regard and support. Therapist reviewed strategies for grounding and mindfulness as strategies for managing anxiety symptoms.      Type of psychotherapeutic technique provided: Client centered and CBT    Progress toward short term goals: Progress as expected, Pt discussing concerns and coping strategies during tele-sessions. Pt working on increasing awareness to focus on \"getting into her body.\"     Review of long term goals: "   Treatment plan updated     Diagnosis:  1. JOSHUA (generalized anxiety disorder)    2. Autism spectrum disorder    3. Attention deficit hyperactivity disorder (ADHD), unspecified ADHD type    4. Current moderate episode of major depressive disorder, unspecified whether recurrent (H)        Plan and Follow up:   Therapist and patient agreed to participate in weekly video visits to address symptoms of anxiety.    Patient encouraged to do the following for homework:  1. Stick to a routine. Go to sleep and wake up at a reasonable time, write a schedule that is varied and includes time for work as well as self-care.  2. Get out at least once a day, for at least thirty minutes. Try first thing in the morning if concerned about contact or just open all the windows in your house and blast a fan.   3. Find some time to move each day, again daily for at least thirty minutes. [facility] videos off free movement classes or just turn on the music and dance.  4. Lower expectations and practice radical self-acceptance. Accept everything about yourself, your current situation, and your life without question, blame or pushback. You cannot fail at this--there is no roadmap, no precedent for this, and we are all truly doing the best we can in an impossible situation.   5. Listen to Brene Brown podcast  6. Listen to progressive muscle relaxation video provided during today's visit    Discharge Criteria/Planning: Client has chronic symptoms and ongoing therapy for maintenance stability recommended.    I have reviewed the note as documented above.  This accurately captures the substance of my conversation with the patient.    Performed and documented by CHANTAL Blackmon LICSW

## 2021-06-08 NOTE — PROGRESS NOTES
Here are the prescreening questions' answers below:     Pre-Screening Questions:   1. Have you recently applied for insurance or due for a renewal? If so, when? - I know you have not applied yet as you are currently under you 's health insurance which will end in June.   2. How many people in your household? 2 people   3. Do you file taxes, who do you claim? No, I do not file taxes. I don't have a job and do not have any income.   5. What is the monthly gross income for the household (wages, social security, workers comp, and pension)? At the moment, $0. But it has been around $4,000, due to my  being out of the office and working half-time due to COVID-19 the past 2.5 months.     I'm currently also waiting on being certified as disabled by the state/[facility].     Let me know if you need anything else from me.       REMIND ME MESSAGE SENT TO FRW TODAY    SW Phone Assessment Monday 6/1 at 2pm

## 2021-06-08 NOTE — PROGRESS NOTES
Programs Applying For: Health Insurance   County:  Case #:  [facility] Worker:   [place] #:   PMI #:   Tracking:   Date Applied:     Outreach:    6/11/2020: FRW called patient again and left VM. FRW will wait to hear from patient if she would like to schedule appointment sooner then 6/22.  6/9/2020: CHW reached out to FRW stating patient lost insurance last month instead of this month. FRW called patient to see if she would like to schedule earlier FRW appointment. FRW left VM and will make outreach in 2-5 days.   6/1/2020: FRW called patient on referral. Patient's spouse is losing job end of June and will lose family health insurance. FRW made appointment for 6/22 @ 11:00. FRW will call patient.       Health Insurance Information:       Referral:    does not work, and her  is being laid off and therefor they need help with insurance. She knows you will be reaching out to her. She has a SW Assessment scheduled for Monday 6/1 at 2pm to address any other concerns they may have.     I was able to connect with her via [employer] for the screening questions. See below.     Here are the prescreening questions' answers below:     Pre-Screening Questions:   1. Have you recently applied for insurance or due for a renewal? If so, when? - No   2. How many people in your household? 2 people   3. Do you file taxes, who do you claim? No, I do not file taxes. I don't have a job and do not have any income.   5. What is the monthly gross income for the household (wages, social security, workers comp, and pension)? At the moment, $0. But it has been around $4,000, due to my  being out of the office and working half-time due to COVID-19 the past 2.5 months.     I'm currently also waiting on being certified as disabled by the state/county.     Let me know if you need anything else from me.           Thank you,     Adrienne

## 2021-06-08 NOTE — PROGRESS NOTES
Programs Applying For: Health Insurance   County:  Case #:  [facility] Worker:   Yuval #:   PMI #:   Tracking:   Date Applied:     Outreach:    6/9/2020: CHW reached out to FRW stating patient lost insurance last month instead of this month. FRW called patient to see if she would like to schedule earlier FRW appointment. FRW left  and will make outreach in 2-5 days.   6/1/2020: FRW called patient on referral. Patient's spouse is losing job end of June and will lose family health insurance. FRW made appointment for 6/22 @ 11:00. FRW will call patient.       Health Insurance Information:       Referral:    does not work, and her  is being laid off and therefor they need help with insurance. She knows you will be reaching out to her. She has a SW Assessment scheduled for Monday 6/1 at 2pm to address any other concerns they may have.     I was able to connect with her via [employer] for the screening questions. See below.     Here are the prescreening questions' answers below:     Pre-Screening Questions:   1. Have you recently applied for insurance or due for a renewal? If so, when? - No   2. How many people in your household? 2 people   3. Do you file taxes, who do you claim? No, I do not file taxes. I don't have a job and do not have any income.   5. What is the monthly gross income for the household (wages, social security, workers comp, and pension)? At the moment, $0. But it has been around $4,000, due to my  being out of the office and working half-time due to COVID-19 the past 2.5 months.     I'm currently also waiting on being certified as disabled by the [facility].     Let me know if you need anything else from me.           Thank you,     Adrienne
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
## 2021-06-08 NOTE — PROGRESS NOTES
Mental Health tele Visit Note    Patient: Angela Jones    : 1995 MRN: [id number]    Date: 5/15/2020   Start time: 3:08pm   Stop Time: 3:30pm   Session # 13    Consent:  The patient has verbally consented to: the potential risks and benefits of telemedicine (video visit) versus in person care; bill my insurance or make self-payment for services provided; and responsibility for payment of non-covered services.   Mode of Communication:  Video Conference via [employer]  Session Type: Patient is participating in a video visit for mental health    Telemedicine Visit: The patient's condition can be safely assessed and treated via synchronous audio and visual telemedicine encounter.    Reason for Telemedicine Visit: Services only offered telehealth  Originating Site (Patient Location): Patient's home  Distant Site (Provider Location): Provider Remote Setting  As the provider I attest to compliance with applicable laws and regulations related to telemedicine.  Those present for this visit include patient and therapist.    Follow up in regards to ongoing symptom management of anxiety and depression in addition to barriers associated with Autism Spectrum Disorder.      Chief Complaint   Patient presents with      Follow Up     Virtual visit to address symptoms of anxiety       New symptoms or complaints: None    Functional Impairment:   Personal: 3  Family: 1  Work: 2  Social:2        ASSESSMENT: Current Emotional / Mental Status (status of significant symptoms):              Risk status (Self / Other harm or suicidal ideation)              Patient denies risks to personal safety              Patient denies current or recent suicidal ideation or behaviors.              Patient denies current or recent homicidal ideation or behaviors.              Patient denies current or recent self injurious behavior or ideation.              Patient denies other safety concerns.              Patient denies changes to risk  "factors              Patient denies changes to protective factors              Recommended that patient call 911 or go to the local ED should there be a change in any of these risk factors.                Attitude:                                   Cooperative               Orientation:                             x3              Speech                          Rate / Production:       Pressured                           Volume:                       Normal               Mood:                                      Anxious  Normal              Thought Content:                    Clear  Perservative               Thought Form:                        Coherent  Logical               Insight:                                     Good     Patient's impression of their current status:   Patient reports that her  is feeling better and will return to work Monday for 4 hours per day. Patient reports that things are \"definitely better\" now that  has recovered. Patient reports feeling anxious about things being clean. Patient reports that she doesn't have a space in the house where she can be alone. Wondering about cleaning up the guest house and possibly hiring .     Therapist impression of patients current state:   Therapist prompted patient to express thoughts and feelings related to current status. Therapist provided unconditional positive regard and support. Therapist reviewed strategies for grounding and mindfulness as strategies for managing anxiety symptoms.      Type of psychotherapeutic technique provided: Client centered and CBT    Progress toward short term goals: Progress as expected, Pt discussing concerns and coping strategies during tele-sessions. Pt working on homework assignments such as reviewing progressive muscle relaxation video.     Review of long term goals: Not done at today's visit  Treatment plan updated on 3/27/20       Diagnosis:  1. JOSHUA (generalized anxiety disorder)    2. " Autism spectrum disorder    3. Attention deficit hyperactivity disorder (ADHD), unspecified ADHD type    4. Current moderate episode of major depressive disorder, unspecified whether recurrent (H)        Plan and Follow up:   Therapist and patient agreed to participate in weekly video visits to address symptoms of anxiety.    Patient encouraged to do the following for homework:  1. Stick to a routine. Go to sleep and wake up at a reasonable time, write a schedule that is varied and includes time for work as well as self-care.  2. Get out at least once a day, for at least thirty minutes. Try first thing in the morning if concerned about contact or just open all the windows in your house and blast a fan.   3. Find some time to move each day, again daily for at least thirty minutes. [email or web address] videos off free movement classes or just turn on the music and dance.  4. Lower expectations and practice radical self-acceptance. Accept everything about yourself, your current situation, and your life without question, blame or pushback. You cannot fail at this--there is no roadmap, no precedent for this, and we are all truly doing the best we can in an impossible situation.   5. Listen to Brene Brown podcast  6. Listen to progressive muscle relaxation video provided during today's visit    Discharge Criteria/Planning: Client has chronic symptoms and ongoing therapy for maintenance stability recommended.    I have reviewed the note as documented above.  This accurately captures the substance of my conversation with the patient.    Performed and documented by CHANTAL Blackmon LICSW

## 2021-06-09 ENCOUNTER — COMMUNICATION - HEALTHEAST (OUTPATIENT)
Dept: NURSING | Facility: CLINIC | Age: 26
End: 2021-06-09

## 2021-06-09 ENCOUNTER — COMMUNICATION - HEALTHEAST (OUTPATIENT)
Dept: FAMILY MEDICINE | Facility: CLINIC | Age: 26
End: 2021-06-09

## 2021-06-09 ENCOUNTER — OFFICE VISIT - HEALTHEAST (OUTPATIENT)
Dept: FAMILY MEDICINE | Facility: CLINIC | Age: 26
End: 2021-06-09

## 2021-06-09 DIAGNOSIS — F41.1 GAD (GENERALIZED ANXIETY DISORDER): ICD-10-CM

## 2021-06-09 DIAGNOSIS — F32.1 MODERATE MAJOR DEPRESSION (H): ICD-10-CM

## 2021-06-09 DIAGNOSIS — F84.0 AUTISM SPECTRUM DISORDER: ICD-10-CM

## 2021-06-09 DIAGNOSIS — G90.A POTS (POSTURAL ORTHOSTATIC TACHYCARDIA SYNDROME): ICD-10-CM

## 2021-06-09 NOTE — PROGRESS NOTES
Mental Health tele Visit Note    Patient: Angela Jones    : 1995 MRN: [id number]    Date: 2020   Start time: 3:05pm   Stop Time: 3:45pm   Session # 20    Consent:  The patient has verbally consented to: the potential risks and benefits of telemedicine (video visit) versus in person care; bill my insurance or make self-payment for services provided; and responsibility for payment of non-covered services.   Mode of Communication:  Video Conference via [employer] sent via email link ([email or web address])  Session Type: Patient is participating in a video visit for mental health    Telemedicine Visit: The patient's condition can be safely assessed and treated via synchronous audio and visual telemedicine encounter.    Reason for Telemedicine Visit: Services only offered telehealth  Originating Site (Patient Location): Patient's home  Distant Site (Provider Location): Provider Remote Setting  As the provider I attest to compliance with applicable laws and regulations related to telemedicine.  Those present for this visit include patient and therapist.    Follow up in regards to ongoing symptom management of anxiety and depression in addition to barriers associated with Autism Spectrum Disorder.      Chief Complaint   Patient presents with      Follow Up     Psychotherapy follow-up visit for anxiety, ADHD and autism       New symptoms or complaints: None    Functional Impairment:   Personal: 3  Family: 1  Work: 2  Social:2        ASSESSMENT: Current Emotional / Mental Status (status of significant symptoms):              Risk status (Self / Other harm or suicidal ideation)              Patient denies risks to personal safety              Patient denies current or recent suicidal ideation or behaviors.              Patient denies current or recent homicidal ideation or behaviors.              Patient denies current or recent self injurious behavior or ideation.              Patient denies other safety  "concerns.              Patient denies changes to risk factors              Patient denies changes to protective factors              Recommended that patient call 911 or go to the local ED should there be a change in any of these risk factors.                Attitude:                                   Cooperative               Orientation:                             x3              Speech                          Rate / Production:       Pressured                           Volume:                       Normal               Mood:                                      Anxious  Normal              Thought Content:                    Clear  Perservative               Thought Form:                        Coherent  Logical               Insight:                                     Good     Patient's impression of their current status:   Patient reports talking to her CM on Monday. [facility] application for financial aid for billing.     She and her  are worried about their current medical bill balance. She reports that her  is feeling very stressed and depressed. Patient reports that she's not as worried about finances as he is. Patient notices that there may be more on her plate. Patient reports \"feeling bad about not having a job to bring income in.\" She is feeling pretty stuck because there's no great option. However, she does notice being \"the most okay\" amongst her roommates. She is trying to follow her routine and \"not as much trouble starting things.\"     Therapist impression of patients current state:   Therapist prompted patient to express thoughts and feelings following a CBT framework. Therapist provided unconditional positive regard and support, normalizing patient emotions and experiences.  Therapist affirmed patient's ability to focus on her routine and stick to what is within her control.     Type of psychotherapeutic technique provided: Client centered and CBT    Progress toward short term " goals: Progress as expected, Pt discussing concerns and coping strategies during tele-sessions.   Patient is being more proactive in applying learned coping strategies:  Patient is focusing on taking actions on items within her control including eating 3 meals per day, drinking water, personal hygiene, stretching, using journal, take medications, get outside, etc. Adding achievements, gratitude, and mantra categories in her journal (as a way to slow down and be mindful)     Review of long term goals:   Not done at today's visit   Treatment plan updated on 6/12/20     Diagnosis:  1. JOSHUA (generalized anxiety disorder)    2. Autism spectrum disorder    3. Attention deficit hyperactivity disorder (ADHD), unspecified ADHD type    4. Current moderate episode of major depressive disorder, unspecified whether recurrent (H)        Plan and Follow up:   Therapist and patient agreed to participate in weekly video visits to address symptoms of anxiety.    Patient encouraged to do the following for homework:  1. Stick to morning routine and start the day on a positive note.  2. Get out at least once a day, for at least thirty minutes. Try first thing in the morning if concerned about contact or just open all the windows in your house and blast a fan.   3. Find some time to move each day, again daily for at least thirty minutes. [employer] videos off free movement classes or just turn on the music and dance.  4. Lower expectations and practice radical self-acceptance. Accept everything about yourself, your current situation, and your life without question, blame or pushback. You cannot fail at this--there is no roadmap, no precedent for this, and we are all truly doing the best we can in an impossible situation.   5. Listen to Brene Brown podcast  6. Practice deep breathing throughout the week  7. Use positive self-talk and daily affirmations     Discharge Criteria/Planning: Client has chronic symptoms and ongoing therapy for  maintenance stability recommended.    I have reviewed the note as documented above.  This accurately captures the substance of my conversation with the patient.    Performed and documented by CHANTAL Blackmon LICSW

## 2021-06-09 NOTE — TELEPHONE ENCOUNTER
Called to make virtual appt (f/u mental health) with Dr Walton but pt stated that she doesn't have insurance at this time and will call back once it is active again.

## 2021-06-09 NOTE — PROGRESS NOTES
Mental Health tele Visit Note    Patient: Angela Jones    : 1995 MRN: [id number]    Date: 2020   Start time: 3:00pm   Stop Time: 3:30pm   Session # 17    Consent:  The patient has verbally consented to: the potential risks and benefits of telemedicine (video visit) versus in person care; bill my insurance or make self-payment for services provided; and responsibility for payment of non-covered services.   Mode of Communication:  Video Conference via [employer]  Session Type: Patient is participating in a video visit for mental health    Telemedicine Visit: The patient's condition can be safely assessed and treated via synchronous audio and visual telemedicine encounter.    Reason for Telemedicine Visit: Services only offered telehealth  Originating Site (Patient Location): Patient's home  Distant Site (Provider Location): Provider Remote Setting  As the provider I attest to compliance with applicable laws and regulations related to telemedicine.  Those present for this visit include patient and therapist.    Follow up in regards to ongoing symptom management of anxiety and depression in addition to barriers associated with Autism Spectrum Disorder.      Chief Complaint   Patient presents with      Follow Up     Psychotherapy follow-up visit for anxiety       New symptoms or complaints: None    Functional Impairment:   Personal: 3  Family: 1  Work: 2  Social:2        ASSESSMENT: Current Emotional / Mental Status (status of significant symptoms):              Risk status (Self / Other harm or suicidal ideation)              Patient denies risks to personal safety              Patient denies current or recent suicidal ideation or behaviors.              Patient denies current or recent homicidal ideation or behaviors.              Patient denies current or recent self injurious behavior or ideation.              Patient denies other safety concerns.              Patient denies changes to risk  "factors              Patient denies changes to protective factors              Recommended that patient call 911 or go to the local ED should there be a change in any of these risk factors.                Attitude:                                   Cooperative               Orientation:                             x3              Speech                          Rate / Production:       Pressured                           Volume:                       Normal               Mood:                                      Anxious  Normal              Thought Content:                    Clear  Perservative               Thought Form:                        Coherent  Logical               Insight:                                     Good     Patient's impression of their current status:   Patient [place] from the cabin. She describes feeling frustrated about current status related to inability to obtain appropriate services. Her  is still seeking employment, she does not qualify for MA and now they have to shop around for new insurance. They will also call billing to determine any possible payment plans for current services. They are doing their best to manage these unfortunate circumstances. \"everything is frustrating but I'm trying to distract myself and focus on releasing tension in my body.\"    Therapist impression of patients current state:   Therapist prompted patient to express thoughts and feelings following a CBT framework. Therapist provided unconditional positive regard and support. Therapist reviewed strategies for grounding and mindfulness as strategies for managing anxiety symptoms.  Therapist affirmed patient's efforts to be an active participant in her mental health treatment.     Type of psychotherapeutic technique provided: Client centered and CBT    Progress toward short term goals: Progress as expected, Pt discussing concerns and coping strategies during tele-sessions.   Patient is " being more proactive in applying learned coping strategies:  Patient is focusing on taking actions on items within her control including eating 3 meals per day, drinking water, personal hygiene, stretching, using journal, take medications, get outside, etc. Adding achievements, gratitude, and mantra categories in her journal (as a way to slow down and be mindful)     Review of long term goals:   Not done at today's visit   Treatment plan updated on 6/12/20     Diagnosis:  1. JOSHUA (generalized anxiety disorder)    2. Autism spectrum disorder    3. Attention deficit hyperactivity disorder (ADHD), unspecified ADHD type    4. Current moderate episode of major depressive disorder, unspecified whether recurrent (H)        Plan and Follow up:   Therapist and patient agreed to participate in weekly video visits to address symptoms of anxiety.    Patient encouraged to do the following for homework:  1. Stick to a routine. Go to sleep and wake up at a reasonable time, write a schedule that is varied and includes time for work as well as self-care.  2. Get out at least once a day, for at least thirty minutes. Try first thing in the morning if concerned about contact or just open all the windows in your house and blast a fan.   3. Find some time to move each day, again daily for at least thirty minutes. [employer][email or web address]ube videos off free movement classes or just turn on the music and dance.  4. Lower expectations and practice radical self-acceptance. Accept everything about yourself, your current situation, and your life without question, blame or pushback. You cannot fail at this--there is no roadmap, no precedent for this, and we are all truly doing the best we can in an impossible situation.   5. Listen to Brene Brown podcast  6. Listen to progressive muscle relaxation video provided during today's visit    *Patient reports that despite insurance problems, she would like to continue participating in weekly therapy visits because it is  an important part of her regimen.     Discharge Criteria/Planning: Client has chronic symptoms and ongoing therapy for maintenance stability recommended.    I have reviewed the note as documented above.  This accurately captures the substance of my conversation with the patient.    Performed and documented by CHANTAL Blackmon LICSW

## 2021-06-09 NOTE — PROGRESS NOTES
"Mental Health tele Visit Note    Patient: Angela Jones    : 1995 MRN: [id number]    Date: 2020   Start time: 3:00pm   Stop Time: 3:20pm   Session # 16    The patient has been notified of the following:   \"We have found that certain health care needs can be provided without the need for a face to face visit.  This service lets us provide the care you need with a phone conversation.  I will have full access to your [place] medical record during this entire phone call.   I will be taking notes for your medical record. Since this is like an office visit, we will bill your insurance company for this service.  There are potential benefits and risks of telephone visits (e.g. limits to patient confidentiality) that differ from in-person visits.?  Confidentiality still applies for telephone services, and nobody will record the visit.  It is important to be in a quiet, private space that is free of distractions (including cell phone or other devices) during the visit.?? If during the course of the call I believe a telephone visit is not appropriate, you will not be charged for this service\"  Consent has been obtained for this service by care team member: Yes, per verbal agreement   Session Type: Patient is participating in a telemedicine phone visit due to technology issues affecting [email or web address]    Chief Complaint   Patient presents with      Follow Up     Psychotherapy follow-up visit for anxiety       New symptoms or complaints: None    Functional Impairment:   Personal: 3  Family: 1  Work: 2  Social:2        ASSESSMENT: Current Emotional / Mental Status (status of significant symptoms):              Risk status (Self / Other harm or suicidal ideation)              Patient denies risks to personal safety              Patient denies current or recent suicidal ideation or behaviors.              Patient denies current or recent homicidal ideation or behaviors.              Patient denies current or " "recent self injurious behavior or ideation.              Patient denies other safety concerns.              Patient denies changes to risk factors              Patient denies changes to protective factors              Recommended that patient call 911 or go to the local ED should there be a change in any of these risk factors.                Attitude:                                   Cooperative               Orientation:                             x3              Speech                          Rate / Production:       Pressured                           Volume:                       Normal               Mood:                                      Anxious  Normal              Thought Content:                    Clear  Perservative               Thought Form:                        Coherent  Logical               Insight:                                     Good     Patient's impression of their current status:   Patient reports that she has been doing pretty well this past week. She talked with her  on Tuesday who is looking into additional resources. Patient discovered that she cannot qualify for MA. She still does not have active health insurance. She is scheduled to talk with the [facility]SE [facility]BERLAIN on Monday. Patient reports that \"it's been hot and they don't have air conditioning.\" Patient reports feeling more irritable due to this variable. She and her  have been trying to get out of the house more. She is now [place] the [place] cabin and will be there through next Thursday.     Therapist impression of patients current state:   Therapist prompted patient to express thoughts and feelings following a CBT framework. Therapist provided unconditional positive regard and support. Therapist reviewed strategies for grounding and mindfulness as strategies for managing anxiety symptoms especially as she is in a different environment at the cabin.       Type of psychotherapeutic technique provided: Client centered " "and CBT    Progress toward short term goals: Progress as expected, Pt discussing concerns and coping strategies during tele-sessions. Pt working on increasing awareness to focus on \"getting into her body.\"     Review of long term goals:   Not done at today's visit  Treatment plan updated 6/12/20    Diagnosis:  1. JOSHUA (generalized anxiety disorder)    2. Autism spectrum disorder    3. Attention deficit hyperactivity disorder (ADHD), unspecified ADHD type    4. Current moderate episode of major depressive disorder, unspecified whether recurrent (H)        Plan and Follow up:   Therapist and patient agreed to participate in weekly video visits to address symptoms of anxiety.    Patient encouraged to do the following for homework:  1. Stick to a routine. Go to sleep and wake up at a reasonable time, write a schedule that is varied and includes time for work as well as self-care.  2. Get out at least once a day, for at least thirty minutes. Try first thing in the morning if concerned about contact or just open all the windows in your house and blast a fan.   3. Find some time to move each day, again daily for at least thirty minutes. [employer] videos off free movement classes or just turn on the music and dance.  4. Lower expectations and practice radical self-acceptance. Accept everything about yourself, your current situation, and your life without question, blame or pushback. You cannot fail at this--there is no roadmap, no precedent for this, and we are all truly doing the best we can in an impossible situation.   5. Listen to Brene Brown podcast  6. Watch progressive muscle relaxation video and continue with mind-body practices  7. Take notes in regards to the presence and impact of anxiety over next week, particularly in a new environment    Discharge Criteria/Planning: Client has chronic symptoms and ongoing therapy for maintenance stability recommended.    I have reviewed the note as documented above.  This " accurately captures the substance of my conversation with the patient.    Performed and documented by CHANTAL Blackmon LICSW

## 2021-06-09 NOTE — PROGRESS NOTES
Clinic Care Coordination Contact  [facility]/Voicemail       Clinical Data: Care Coordinator Outreach    Outreach attempted x 1.  Left message on patient's voicemail with call back information and requested return call.    Plan: Care Coordinator will make another attempt to reach the patient via phone.    Care Coordinator outreach on 7/20/2020

## 2021-06-09 NOTE — PROGRESS NOTES
Programs Applying For: Health Insurance   County:  Case #:  [facility] Worker:   [place] #:   PMI #:   Tracking:   Date Applied:     Outreach:     6/22/2020: Patient called FRW. New appointment set for 6/26 @ 10.  6/22/2020: FRW called patient at scheduled time to apply for health insurance. FRW left VM and will make another outreach in 1 week.   6/11/2020: FRW called patient again and left VM. FRW will wait to hear from patient if she would like to schedule appointment sooner then 6/22.  6/9/2020: CHW reached out to FRW stating patient lost insurance last month instead of this month. FRW called patient to see if she would like to schedule earlier FRW appointment. FRW left VM and will make outreach in 2-5 days.   6/1/2020: FRW called patient on referral. Patient's spouse is losing job end of June and will lose family health insurance. FRW made appointment for 6/22 @ 11:00. FRW will call patient.       Health Insurance Information:       Referral:    does not work, and her  is being laid off and therefor they need help with insurance. She knows you will be reaching out to her. She has a SW Assessment scheduled for Monday 6/1 at 2pm to address any other concerns they may have.     I was able to connect with her via [employer] for the screening questions. See below.     Here are the prescreening questions' answers below:     Pre-Screening Questions:   1. Have you recently applied for insurance or due for a renewal? If so, when? - No   2. How many people in your household? 2 people   3. Do you file taxes, who do you claim? No, I do not file taxes. I don't have a job and do not have any income.   5. What is the monthly gross income for the household (wages, social security, workers comp, and pension)? At the moment, $0. But it has been around $4,000, due to my  being out of the office and working half-time due to COVID-19 the past 2.5 months.     I'm currently also waiting on being certified as disabled by  the state/county.     Let me know if you need anything else from me.           Thank you,     Adrienne

## 2021-06-09 NOTE — PROGRESS NOTES
Programs Applying For: Health Insurance   County:  Case #:  [facility] Worker:   [place] #:   PMI #:   Tracking:   Date Applied:     Outreach:      6/26/2020: FRW called patient for scheduled appointment to apply for health insurance. Patient was able to conference her  in. Javy () explained his gross monthly income is $2,960. Family of 2 (filing taxes together) are over income for MA/MNCARE. Patient is going to call around carriers to get added to insurance plan until spouse gets new job/open enrollment. Patient is going to call billing department to get amount of how much therapy would be to see Fanny at [facility]. Patient will call FRW if she has any questions throughout this process. No further FRW needs, taking patient off panel.   6/22/2020: Patient called FRW. New appointment set for 6/26 @ 10.  6/22/2020: FRW called patient at scheduled time to apply for health insurance. FRW left VM and will make another outreach in 1 week.   6/11/2020: FRW called patient again and left VM. FRW will wait to hear from patient if she would like to schedule appointment sooner then 6/22.  6/9/2020: CHW reached out to FRW stating patient lost insurance last month instead of this month. FRW called patient to see if she would like to schedule earlier FRW appointment. FRW left VM and will make outreach in 2-5 days.   6/1/2020: FRW called patient on referral. Patient's spouse is losing job end of June and will lose family health insurance. FRW made appointment for 6/22 @ 11:00. FRW will call patient.       Health Insurance Information:       Referral:    does not work, and her  is being laid off and therefor they need help with insurance. She knows you will be reaching out to her. She has a SW Assessment scheduled for Monday 6/1 at 2pm to address any other concerns they may have.     I was able to connect with her via [employer] for the screening questions. See below.     Here are the prescreening  questions' answers below:     Pre-Screening Questions:   1. Have you recently applied for insurance or due for a renewal? If so, when? - No   2. How many people in your household? 2 people   3. Do you file taxes, who do you claim? No, I do not file taxes. I don't have a job and do not have any income.   5. What is the monthly gross income for the household (wages, social security, workers comp, and pension)? At the moment, $0. But it has been around $4,000, due to my  being out of the office and working half-time due to COVID-19 the past 2.5 months.     I'm currently also waiting on being certified as disabled by the state/county.     Let me know if you need anything else from me.           Thank you,     Adrienne

## 2021-06-09 NOTE — PROGRESS NOTES
Programs Applying For: Health Insurance   County:  Case #:  [facility] Worker:   [place] #:   PMI #:   Tracking:   Date Applied:     Outreach:     6/22/2020: FRW called patient at scheduled time to apply for health insurance. FRW left VM and will make another outreach in 1 week.   6/11/2020: FRW called patient again and left VM. FRW will wait to hear from patient if she would like to schedule appointment sooner then 6/22.  6/9/2020: CHW reached out to FRW stating patient lost insurance last month instead of this month. FRW called patient to see if she would like to schedule earlier FRW appointment. FRW left VM and will make outreach in 2-5 days.   6/1/2020: FRW called patient on referral. Patient's spouse is losing job end of June and will lose family health insurance. FRW made appointment for 6/22 @ 11:00. FRW will call patient.       Health Insurance Information:       Referral:    does not work, and her  is being laid off and therefor they need help with insurance. She knows you will be reaching out to her. She has a SW Assessment scheduled for Monday 6/1 at 2pm to address any other concerns they may have.     I was able to connect with her via [employer] for the screening questions. See below.     Here are the prescreening questions' answers below:     Pre-Screening Questions:   1. Have you recently applied for insurance or due for a renewal? If so, when? - No   2. How many people in your household? 2 people   3. Do you file taxes, who do you claim? No, I do not file taxes. I don't have a job and do not have any income.   5. What is the monthly gross income for the household (wages, social security, workers comp, and pension)? At the moment, $0. But it has been around $4,000, due to my  being out of the office and working half-time due to COVID-19 the past 2.5 months.     I'm currently also waiting on being certified as disabled by the state/[facility].     Let me know if you need anything else from me.            Thank you,     Adrienne

## 2021-06-09 NOTE — PROGRESS NOTES
Mental Health tele Visit Note    Patient: Angela Jones    : 1995 MRN: [id number]    Date: 2020   Start time: 3:00pm   Stop Time: 3:45pm   Session # 19    Consent:  The patient has verbally consented to: the potential risks and benefits of telemedicine (video visit) versus in person care; bill my insurance or make self-payment for services provided; and responsibility for payment of non-covered services.   Mode of Communication:  Video Conference via [employer] sent via email link ([email or web address])  Session Type: Patient is participating in a video visit for mental health    Telemedicine Visit: The patient's condition can be safely assessed and treated via synchronous audio and visual telemedicine encounter.    Reason for Telemedicine Visit: Services only offered telehealth  Originating Site (Patient Location): Patient's home  Distant Site (Provider Location): Provider Remote Setting  As the provider I attest to compliance with applicable laws and regulations related to telemedicine.  Those present for this visit include patient and therapist.    Follow up in regards to ongoing symptom management of anxiety and depression in addition to barriers associated with Autism Spectrum Disorder.      Chief Complaint   Patient presents with      Follow Up     Psychotherapy follow-up visit for anxiety       New symptoms or complaints: None    Functional Impairment:   Personal: 3  Family: 1  Work: 2  Social:2        ASSESSMENT: Current Emotional / Mental Status (status of significant symptoms):              Risk status (Self / Other harm or suicidal ideation)              Patient denies risks to personal safety              Patient denies current or recent suicidal ideation or behaviors.              Patient denies current or recent homicidal ideation or behaviors.              Patient denies current or recent self injurious behavior or ideation.              Patient denies other safety concerns.           "    Patient denies changes to risk factors              Patient denies changes to protective factors              Recommended that patient call 911 or go to the local ED should there be a change in any of these risk factors.                Attitude:                                   Cooperative               Orientation:                             x3              Speech                          Rate / Production:       Pressured                           Volume:                       Normal               Mood:                                      Anxious  Normal              Thought Content:                    Clear  Perservative               Thought Form:                        Coherent  Logical               Insight:                                     Good     Patient's impression of their current status:   Patient reports that her mom came to visit last week to help organize the house.  Patient reports feeling calmer in the space now that things are more organized. However, patient reports some difficulty with \"keeping things clean.\" Patient is wondering about semi-independent living skills (organizing, scheduling, meal planning, cleaning, errand completion) through the County. She is going to ask her CM about this on Monday.   Her  has had some job interviews this week which has been quite a relief.      Therapist impression of patients current state:   Therapist prompted patient to express thoughts and feelings following a CBT framework. Therapist provided unconditional positive regard and support. Therapist reviewed strategies for managing signs and symptoms of anxiety. Therapist helped patient identify physical signs of stress in order to gain awareness of when and how to implement relaxation, mind-body techniques.     Type of psychotherapeutic technique provided: Client centered and CBT    Progress toward short term goals: Progress as expected, Pt discussing concerns and coping strategies during " tele-sessions.   Patient is being more proactive in applying learned coping strategies:  Patient is focusing on taking actions on items within her control including eating 3 meals per day, drinking water, personal hygiene, stretching, using journal, take medications, get outside, etc. Adding achievements, gratitude, and mantra categories in her journal (as a way to slow down and be mindful)     Review of long term goals:   Not done at today's visit   Treatment plan updated on 6/12/20     Diagnosis:  1. JOSHUA (generalized anxiety disorder)    2. Autism spectrum disorder    3. Attention deficit hyperactivity disorder (ADHD), unspecified ADHD type    4. Current moderate episode of major depressive disorder, unspecified whether recurrent (H)        Plan and Follow up:   Therapist and patient agreed to participate in weekly video visits to address symptoms of anxiety.    Patient encouraged to do the following for homework:  1. Stick to a routine. Go to sleep and wake up at a reasonable time, write a schedule that is varied and includes time for work as well as self-care.  2. Get out at least once a day, for at least thirty minutes. Try first thing in the morning if concerned about contact or just open all the windows in your house and blast a fan.   3. Find some time to move each day, again daily for at least thirty minutes. [employer] videos off free movement classes or just turn on the music and dance.  4. Lower expectations and practice radical self-acceptance. Accept everything about yourself, your current situation, and your life without question, blame or pushback. You cannot fail at this--there is no roadmap, no precedent for this, and we are all truly doing the best we can in an impossible situation.   5. Listen to Brene Brown podcast  6. Practice deep breathing throughout the week    Discharge Criteria/Planning: Client has chronic symptoms and ongoing therapy for maintenance stability recommended.    I have reviewed  the note as documented above.  This accurately captures the substance of my conversation with the patient.    Performed and documented by CHANTAL Blackmon LICSW

## 2021-06-09 NOTE — PROGRESS NOTES
Clinic Care Coordination Contact  [facility] [place]/Voicemail       Clinical Data: Care Coordinator Outreach    Outreach attempted x 2.  Left message on patient's voicemail with call back information and requested return call.    Plan: Care Coordinator will make another attempt to reach the patient via phone.    Care Coordinator outreach on 8/20/2020

## 2021-06-09 NOTE — PROGRESS NOTES
Mental Health tele Visit Note    Patient: Angela Jones    : 1995 MRN: [id number]    Date: 7/3/2020   Start time: 3:00pm   Stop Time: 3:45pm   Session # 18    Consent:  The patient has verbally consented to: the potential risks and benefits of telemedicine (video visit) versus in person care; bill my insurance or make self-payment for services provided; and responsibility for payment of non-covered services.   Mode of Communication:  Video Conference via [employer]  Session Type: Patient is participating in a video visit for mental health    Telemedicine Visit: The patient's condition can be safely assessed and treated via synchronous audio and visual telemedicine encounter.    Reason for Telemedicine Visit: Services only offered telehealth  Originating Site (Patient Location): Patient's home  Distant Site (Provider Location): Provider Remote Setting  As the provider I attest to compliance with applicable laws and regulations related to telemedicine.  Those present for this visit include patient and therapist.    Follow up in regards to ongoing symptom management of anxiety and depression in addition to barriers associated with Autism Spectrum Disorder.      Chief Complaint   Patient presents with      Follow Up     Psychotherapy follow-up visit for anxiety       New symptoms or complaints: None    Functional Impairment:   Personal: 3  Family: 1  Work: 2  Social:2        ASSESSMENT: Current Emotional / Mental Status (status of significant symptoms):              Risk status (Self / Other harm or suicidal ideation)              Patient denies risks to personal safety              Patient denies current or recent suicidal ideation or behaviors.              Patient denies current or recent homicidal ideation or behaviors.              Patient denies current or recent self injurious behavior or ideation.              Patient denies other safety concerns.              Patient denies changes to risk  "factors              Patient denies changes to protective factors              Recommended that patient call 911 or go to the local ED should there be a change in any of these risk factors.                Attitude:                                   Cooperative               Orientation:                             x3              Speech                          Rate / Production:       Pressured                           Volume:                       Normal               Mood:                                      Anxious  Normal              Thought Content:                    Clear  Perservative               Thought Form:                        Coherent  Logical               Insight:                                     Good     Patient's impression of their current status:   Patient reports doing fine today. Patient reports feeling \"pretty anxious\" due to housework. \"I don't know what I'm supposed to be doing around the house.\" Patient reports that her  still has not found a job yet. Patient still does not have active insurance.   Patient is really trying to navigate the discomfort of their situation right now which is really challenging. Patient is starting to feel overwhelmed with the big picture. Her mom is going to come visit next week to help organize the house.       Therapist impression of patients current state:   Therapist prompted patient to express thoughts and feelings following a CBT framework. Therapist provided unconditional positive regard and support. Therapist reviewed strategies to help instill more self-compassion using positive self-talk. Therapist affirmed patient's efforts to be an active participant in her mental health treatment.     Type of psychotherapeutic technique provided: Client centered and CBT    Progress toward short term goals: Progress as expected, Pt discussing concerns and coping strategies during tele-sessions.   Patient is being more proactive in applying learned " coping strategies:  Patient is focusing on taking actions on items within her control including eating 3 meals per day, drinking water, personal hygiene, stretching, using journal, take medications, get outside, etc. Adding achievements, gratitude, and mantra categories in her journal (as a way to slow down and be mindful)     Review of long term goals:   Not done at today's visit   Treatment plan updated on 6/12/20   *reviewed bigger picture goals listed in note on 3/13/20    Diagnosis:  1. JOSHUA (generalized anxiety disorder)    2. Autism spectrum disorder    3. Attention deficit hyperactivity disorder (ADHD), unspecified ADHD type    4. Current moderate episode of major depressive disorder, unspecified whether recurrent (H)        Plan and Follow up:   Therapist and patient agreed to participate in weekly video visits to address symptoms of anxiety.    Patient encouraged to do the following for homework:  1. Stick to a routine. Go to sleep and wake up at a reasonable time, write a schedule that is varied and includes time for work as well as self-care.  2. Get out at least once a day, for at least thirty minutes. Try first thing in the morning if concerned about contact or just open all the windows in your house and blast a fan.   3. Find some time to move each day, again daily for at least thirty minutes. [employer] videos off free movement classes or just turn on the music and dance.  4. Lower expectations and practice radical self-acceptance. Accept everything about yourself, your current situation, and your life without question, blame or pushback. You cannot fail at this--there is no roadmap, no precedent for this, and we are all truly doing the best we can in an impossible situation.   5. Listen to Brene Brown podcast  6. Listen to progressive muscle relaxation video provided during today's visit    *Patient reports that despite insurance problems, she would like to continue participating in weekly therapy  visits because it is an important part of her regimen.     Discharge Criteria/Planning: Client has chronic symptoms and ongoing therapy for maintenance stability recommended.    I have reviewed the note as documented above.  This accurately captures the substance of my conversation with the patient.    Performed and documented by CHANTAL Blackmon LICSW

## 2021-06-10 NOTE — PROGRESS NOTES
Mental Health tele Visit Note    Patient: Angela Jones    : 1995 MRN: [id number]    Date: 2020   Start time: 2:00pm   Stop Time: 2:45pm   Session # 23    Consent:  The patient has verbally consented to: the potential risks and benefits of telemedicine (video visit) versus in person care; bill my insurance or make self-payment for services provided; and responsibility for payment of non-covered services.   Mode of Communication:  Video Conference via [employer] sent via email link ([email or web address])  Session Type: Patient is participating in a video visit for mental health    Telemedicine Visit: The patient's condition can be safely assessed and treated via synchronous audio and visual telemedicine encounter.    Reason for Telemedicine Visit: Services only offered telehealth  Originating Site (Patient Location): Patient's home  Distant Site (Provider Location): Provider Remote Setting  As the provider I attest to compliance with applicable laws and regulations related to telemedicine.  Those present for this visit include patient and therapist.    Follow up in regards to ongoing symptom management of anxiety and depression in addition to barriers associated with Autism Spectrum Disorder.      Chief Complaint   Patient presents with      Follow Up     Psychotherapy follow-up visit for anxiety, ADHD and autism spectrum disorder       New symptoms or complaints: None    Functional Impairment:   Personal: 3  Family: 1  Work: 2  Social:2        ASSESSMENT: Current Emotional / Mental Status (status of significant symptoms):              Risk status (Self / Other harm or suicidal ideation)              Patient denies risks to personal safety              Patient denies current or recent suicidal ideation or behaviors.              Patient denies current or recent homicidal ideation or behaviors.              Patient denies current or recent self injurious behavior or ideation.              Patient  "denies other safety concerns.              Patient denies changes to risk factors              Patient denies changes to protective factors              Recommended that patient call 911 or go to the local ED should there be a change in any of these risk factors.                Attitude:                                   Cooperative               Orientation:                             x3              Speech                          Rate / Production:       Pressured                           Volume:                       Normal               Mood:                                      Anxious  Normal              Thought Content:                    Clear  Perservative               Thought Form:                        Coherent  Logical               Insight:                                     Good     Patient's impression of their current status:   Patient has been up at her parent's house for the week.  She and her  are working on improving their communication especially in regards to schedule and structure.  Patient reports \"I'm not good at being flexible\" which can cause conflicts in the relationship. Patient reports feeling upset when things change last minute. Patient reports \"planning my day based on the information I have\" and wishing to have advance notice from her  about his own plans/schedule.  Patient worries that she's missing cues and interpreting things incorrectly causing her to feel more anxious and insecure in the relationship.   Patient reports that her  tried to apply for MA twice but faced some errors. Her CM will be calling her next week to help finish the application. CM may also try to find galen money to help her pay for her meds if  doesn't find a job.     Therapist impression of patients current state:   Therapist prompted patient to express thoughts and feelings following a CBT framework. Therapist provided unconditional positive regard and support, " normalizing patient emotions and experiences.    Therapist identified patient strengths and areas of progress (resourcefulness, assertiveness).  Therapist reviewed ground rule #1: you are only responsible for your own emotions. Pt agrees to talk to her  about this ground rule and will let therapist know if a family session is necessary.      Type of psychotherapeutic technique provided: Client centered and CBT    Progress toward short term goals: Progress as expected, Pt discussing concerns and coping strategies during tele-sessions.   Patient followed through with short-term task to talk to CM about MA, [employer] and medication galen money.    She also followed through with task to submit [employer] application.     Review of long term goals:   Not done at today's visit   Treatment plan updated on 6/12/20     Diagnosis:  1. JOSHUA (generalized anxiety disorder)    2. Autism spectrum disorder    3. Attention deficit hyperactivity disorder (ADHD), unspecified ADHD type    4. Current moderate episode of major depressive disorder, unspecified whether recurrent (H)        Plan and Follow up:   Therapist and patient agreed to participate in weekly video visits to address symptoms of anxiety.  *Pt will be completing MA application by next Monday with     Next visit: call [facility] with pt [phone]    Patient encouraged to do the following for homework:  1. Stick to morning routine and start the day on a positive note.  2. Get out at least once a day, for at least thirty minutes. Try first thing in the morning if concerned about contact or just open all the windows in your house and blast a fan.   3. Find some time to move each day, again daily for at least thirty minutes. [employer] videos off free movement classes or just turn on the music and dance.  4. Lower expectations and practice radical self-acceptance. Accept everything about yourself, your current situation, and your life  without question, blame or pushback. You cannot fail at this--there is no roadmap, no precedent for this, and we are all truly doing the best we can in an impossible situation.   5. Listen to Brene Brown podcast  6. Practice deep breathing throughout the week  7. Use positive self-talk and daily affirmations   8. Explore autism support groups in the community     Discharge Criteria/Planning: Client has chronic symptoms and ongoing therapy for maintenance stability recommended.    I have reviewed the note as documented above.  This accurately captures the substance of my conversation with the patient.    Performed and documented by CHANTAL Blackmon LICSW

## 2021-06-10 NOTE — PROGRESS NOTES
Mental Health tele Visit Note    Patient: Angela Jones    : 1995 MRN: [id number]    Date: 2020   Start time: 3:00pm   Stop Time: 3:45pm   Session # 21    Consent:  The patient has verbally consented to: the potential risks and benefits of telemedicine (video visit) versus in person care; bill my insurance or make self-payment for services provided; and responsibility for payment of non-covered services.   Mode of Communication:  Video Conference via [employer] sent via email link ([email or web address])  Session Type: Patient is participating in a video visit for mental health    Telemedicine Visit: The patient's condition can be safely assessed and treated via synchronous audio and visual telemedicine encounter.    Reason for Telemedicine Visit: Services only offered telehealth  Originating Site (Patient Location): Patient's home  Distant Site (Provider Location): Provider Remote Setting  As the provider I attest to compliance with applicable laws and regulations related to telemedicine.  Those present for this visit include patient and therapist.    Follow up in regards to ongoing symptom management of anxiety and depression in addition to barriers associated with Autism Spectrum Disorder.      Chief Complaint   Patient presents with      Follow Up     Psychotherapy follow-up visit for anxiety       New symptoms or complaints: None    Functional Impairment:   Personal: 3  Family: 1  Work: 2  Social:2        ASSESSMENT: Current Emotional / Mental Status (status of significant symptoms):              Risk status (Self / Other harm or suicidal ideation)              Patient denies risks to personal safety              Patient denies current or recent suicidal ideation or behaviors.              Patient denies current or recent homicidal ideation or behaviors.              Patient denies current or recent self injurious behavior or ideation.              Patient denies other safety concerns.           "    Patient denies changes to risk factors              Patient denies changes to protective factors              Recommended that patient call 911 or go to the local ED should there be a change in any of these risk factors.                Attitude:                                   Cooperative               Orientation:                             x3              Speech                          Rate / Production:       Pressured                           Volume:                       Normal               Mood:                                      Anxious  Normal              Thought Content:                    Clear  Perservative               Thought Form:                        Coherent  Logical               Insight:                                     Good     Patient's impression of their current status:   Patient reports that she and her  visited the family cabin over the past week. She briefly discussed dynamics with her parents.    She did talk to her CM this week and was provided with a resource for independent living skills.   Patient reports \"everything fell to the wayside due to being out of town this week.\" Patient reports that her  has also been sick.   Patient reports that she still struggles to ask specifically for \"what I need.\"     Therapist impression of patients current state:   Therapist prompted patient to express thoughts and feelings following a CBT framework. Therapist provided unconditional positive regard and support, normalizing patient emotions and experiences.  Therapist explored barriers to change. Therapist and patient discussed long-term goals to improve self-advocacy skills. Therapist and patient reviewed to do list: mail out application form today; Tuesday try to reapply for MA.      Type of psychotherapeutic technique provided: Client centered and CBT    Progress toward short term goals: Progress as expected, Pt discussing concerns and coping strategies during " tele-sessions.   *Pt did not follow-up with weekly task to get in contact with care guide or send in application for [facility]  Patient is being more proactive in applying learned coping strategies:  Patient is focusing on taking actions on items within her control including eating 3 meals per day, drinking water, personal hygiene, stretching, using journal, take medications, get outside, etc. Adding achievements, gratitude, and mantra categories in her journal (as a way to slow down and be mindful)     Review of long term goals:   Not done at today's visit   Treatment plan updated on 6/12/20     Diagnosis:  1. JOSHUA (generalized anxiety disorder)    2. Autism spectrum disorder    3. Attention deficit hyperactivity disorder (ADHD), unspecified ADHD type    4. Current moderate episode of major depressive disorder, unspecified whether recurrent (H)        Plan and Follow up:   Therapist and patient agreed to participate in weekly video visits to address symptoms of anxiety.    Patient encouraged to do the following for homework:  1. Stick to morning routine and start the day on a positive note.  2. Get out at least once a day, for at least thirty minutes. Try first thing in the morning if concerned about contact or just open all the windows in your house and blast a fan.   3. Find some time to move each day, again daily for at least thirty minutes. [employer] videos off free movement classes or just turn on the music and dance.  4. Lower expectations and practice radical self-acceptance. Accept everything about yourself, your current situation, and your life without question, blame or pushback. You cannot fail at this--there is no roadmap, no precedent for this, and we are all truly doing the best we can in an impossible situation.   5. Listen to Brene Brown podcast  6. Practice deep breathing throughout the week  7. Use positive self-talk and daily affirmations     Discharge Criteria/Planning: Client has chronic  symptoms and ongoing therapy for maintenance stability recommended.    I have reviewed the note as documented above.  This accurately captures the substance of my conversation with the patient.    Performed and documented by CHANTAL Blackmon LICSW

## 2021-06-10 NOTE — PROGRESS NOTES
Mental Health tele Visit Note    Patient: Angela Jones    : 1995 MRN: [id number]    Date: 2020   Start time: 3:00pm   Stop Time: 3:45pm   Session # 22    Consent:  The patient has verbally consented to: the potential risks and benefits of telemedicine (video visit) versus in person care; bill my insurance or make self-payment for services provided; and responsibility for payment of non-covered services.   Mode of Communication:  Video Conference via [employer] sent via email link ([email or web address])  Session Type: Patient is participating in a video visit for mental health    Telemedicine Visit: The patient's condition can be safely assessed and treated via synchronous audio and visual telemedicine encounter.    Reason for Telemedicine Visit: Services only offered telehealth  Originating Site (Patient Location): Patient's home  Distant Site (Provider Location): Provider Remote Setting  As the provider I attest to compliance with applicable laws and regulations related to telemedicine.  Those present for this visit include patient and therapist.    Follow up in regards to ongoing symptom management of anxiety and depression in addition to barriers associated with Autism Spectrum Disorder.      Chief Complaint   Patient presents with      Follow Up     Psychotherapy follow-up visit for anxiety and depression        New symptoms or complaints: None    Functional Impairment:   Personal: 3  Family: 1  Work: 2  Social:2        ASSESSMENT: Current Emotional / Mental Status (status of significant symptoms):              Risk status (Self / Other harm or suicidal ideation)              Patient denies risks to personal safety              Patient denies current or recent suicidal ideation or behaviors.              Patient denies current or recent homicidal ideation or behaviors.              Patient denies current or recent self injurious behavior or ideation.              Patient denies other safety  "concerns.              Patient denies changes to risk factors              Patient denies changes to protective factors              Recommended that patient call 911 or go to the local ED should there be a change in any of these risk factors.                Attitude:                                   Cooperative               Orientation:                             x3              Speech                          Rate / Production:       Pressured                           Volume:                       Normal               Mood:                                      Anxious  Normal              Thought Content:                    Clear  Perservative               Thought Form:                        Coherent  Logical               Insight:                                     Good     Patient's impression of their current status:   Patient reports feeling really tired today. She reports feeling on edge yesterday, with sensory issues and irritability. She had difficulty processing in her head causing her to verbalize thoughts that may have caused some issues with others. \"I don't have space in my head, need to get it out.\" She describes the presence of racing thoughts which she wanted to communicate out loud.  In the moment, she had difficulty articulating what she needed to her  when experiencing the severe anxiety. Patient recalls being on the verge of meltdown or shut down.   Patient has difficulty anticipating how she may feel and instead notices the feeling as it's happening.     Therapist impression of patients current state:   Therapist prompted patient to express thoughts and feelings following a CBT framework. Therapist provided unconditional positive regard and support, normalizing patient emotions and experiences.  Therapist attempted to explore patient's psychosocial history to help better understand her shut-downs and intervention history.     Type of psychotherapeutic technique provided: Client " centered and CBT    Progress toward short term goals: Progress as expected, Pt discussing concerns and coping strategies during tele-sessions.   *has not sent in application for [facility] application yet and will plan to talk to CM about the MA application   Patient is being more proactive in applying learned coping strategies:  Patient is focusing on taking actions on items within her control including eating 3 meals per day, drinking water, personal hygiene, stretching, using journal, take medications, get outside, etc. Adding achievements, gratitude, and mantra categories in her journal (as a way to slow down and be mindful)     Review of long term goals:   Not done at today's visit   Treatment plan updated on 6/12/20     Diagnosis:  1. JOSHUA (generalized anxiety disorder)    2. Autism spectrum disorder    3. Attention deficit hyperactivity disorder (ADHD), unspecified ADHD type    4. Current moderate episode of major depressive disorder, unspecified whether recurrent (H)        Plan and Follow up:   Therapist and patient agreed to participate in weekly video visits to address symptoms of anxiety.    Patient encouraged to do the following for homework:  1. Stick to morning routine and start the day on a positive note.  2. Get out at least once a day, for at least thirty minutes. Try first thing in the morning if concerned about contact or just open all the windows in your house and blast a fan.   3. Find some time to move each day, again daily for at least thirty minutes. [employer] videos off free movement classes or just turn on the music and dance.  4. Lower expectations and practice radical self-acceptance. Accept everything about yourself, your current situation, and your life without question, blame or pushback. You cannot fail at this--there is no roadmap, no precedent for this, and we are all truly doing the best we can in an impossible situation.   5. Listen to Brene Brown podcast  6. Practice deep  breathing throughout the week  7. Use positive self-talk and daily affirmations   8. Explore autism support groups in the community     Discharge Criteria/Planning: Client has chronic symptoms and ongoing therapy for maintenance stability recommended.    I have reviewed the note as documented above.  This accurately captures the substance of my conversation with the patient.    Performed and documented by CHANTAL Blackmon LICSW

## 2021-06-11 NOTE — PROGRESS NOTES
Outpatient Mental Health Treatment Plan    Name:  Angela Jones  :  1995  MRN:  [id number]    Treatment Plan:  Updated Treatment Plan  Intake/initial treatment plan date:    Intake: 2018  Benefit and risks and alternatives have been discussed: Yes  Is this treatment appropriate with minimal intrusion/restrictions: Yes  Estimated duration of treatment:  Approximately 10 sessions.  Anticipated frequency of services:  Every week  Necessity for frequency: This frequency is needed to establish therapeutic goals and for continuity of care in order to monitor progress.  Necessity for treatment: To address cognitive, behavioral, and/or emotional barriers in order to work toward goals and to improve quality of life.    Session Type: Patient is presenting for an Individual session.via video visit due to COVID19    Plan:           ?   ? Anxiety    Goal:  Decrease average anxiety level from 4 to 3.   Strategies: ? [x] Learn and practice relaxation techniques and other coping strategies (e.g., thought stopping, reframing, meditation)     ? [x] Increase involvement in meaningful activities     ? [x] Discuss sleep hygiene     ? [x] Explore thoughts and expectations about self and others     ? [x] Identify and monitor triggers for panic/anxiety symptoms     ? [x] Implement physical activity routine (with physician approval)     ? [x] Consider introduction of bibliotherapy and/or videos     ? [x] Continue compliance with medical treatment plan (or explore barriers)   ?Degree to which this is a problem: 2.5  Degree to which goal is met: 1.5  Date of Review: 2020       ? Depression    Goal:  Decrease average depression level from 3 to 2.   Strategies:    ?[x] Decrease social isolation     [x] Increase involvement in meaningful activities     ?[x] Discuss sleep hygiene     ?[x] Explore thoughts and expectations about self and others     ?[x] Process grief (loss of significant person, independence, role,  "etc.)     ?[x] Assess for suicide risk     ?[x] Implement physical activity routine (with physician approval)     [x] Consider introduction of bibliotherapy and/or videos     [x] Continue compliance with medical treatment plan (or explore barriers)?  Degree to which this is a problem: 2  Degree to which goal is met: 2  Date of Review: December 2020    Additional goals:  Improve self-care  Improve organization skills (\"life skills\")  Reduce seizure-like episodes  Increase independence in daily functioning  Consider referral for DBT program or support group for Autism           Functional Impairment:  1=Not at all/Rarely  2=Some days  3=Most Days  4=Every Day    Personal : 4  Family : 3  Social : 2   Work/school : 4    Diagnosis (non-Axial as defined in DSM-5)  1. Generalized Anxiety Disorder      2. Current moderate episode of major depressive disorder, unspecified whether recurrent (H)      ADHD  Autism Spectrum Disorder    Provisional Diagnosis (list only- no explanation needed)  Bipolar Disorder  OCD  PTSD      Clinical assessments and measures completed:.   WHODAS 2.0 12-item version: 7    Scores presented in qualifiers to represent level of disability.  MILD Problem (slight, low, ...) 5-24%  H1= 30  H2= 2  H3= 18    PHQ-9: 13 (2/8/19)  PHQ-9: 14 (6/14/19)  PHQ-9: 11 (9/20/19)  PHQ-9: 13 (12/13/19)  PHQ-9: 9 (9/24/20)     JOSHUA-7: 7 (2/8/19)  JOSHUA-7: 6 (6/14/19)  JOSHUA-7: 7 (9/20/19)  JOSHUA-7: 10 (12/13/19)  JOSHUA-7: 12 (9/24/20)     C-SSRS: Low to moderate risk. Patient does have history of self-harm and suicidal ideation with intent. No current ideation or intent.         Strengths:  Articulate, open-minded and willing to participate in mental health services. Good support from  and family. Good access to care and resourcefulness.   Limitations:  Trouble with self-control; intrinstic motivation; executive dysfunction with ADHD. Patient also limited by seizure-like activity with no clear understanding of origin. " Patient recently diagnosed with Autism Spectrum Disorder.   Cultural Considerations: Patient is a 25  female. Identifies as Hindu but more interested in science. Grew up in [place] [facility] and always had family close by. There are no significant ethnic, cultural or Jain factors that may be relevant for therapy.     Persons responsible for this plan: Patient and Provider   Patient not able to sign due to virtual visit            Psychotherapist Signature           Patient Signature:              Guardian Signature             Provider: Performed and documented by CHANTAL Blackmno   Date:  9/24/2020

## 2021-06-11 NOTE — PROGRESS NOTES
Mental Health tele Visit Note    Patient: Angela Jones    : 1995 MRN: [id number]    Date: 2020   Start time: 3:00pm   Stop Time: 3:45pm   Session # 27    Chief Complaint   Patient presents with      Follow Up     Psychotherapy follow-up visit for anxiety, depression, ADHD and ASD       Consent:  The patient has verbally consented to: the potential risks and benefits of telemedicine (video visit) versus in person care; bill my insurance or make self-payment for services provided; and responsibility for payment of non-covered services.   Mode of Communication:  Video Conference via [employer] sent via email link ([email or web address])  Session Type: Patient is participating in a video visit for mental health    Telemedicine Visit: The patient's condition can be safely assessed and treated via synchronous audio and visual telemedicine encounter.    Reason for Telemedicine Visit: Services only offered telehealth  Originating Site (Patient Location): Patient's home  Distant Site (Provider Location): Provider Remote Setting  As the provider I attest to compliance with applicable laws and regulations related to telemedicine.  Those present for this visit include patient and therapist.    Follow up in regards to ongoing symptom management of anxiety and depression in addition to barriers associated with Autism Spectrum Disorder.    New symptoms or complaints: None    Functional Impairment:   Personal: 3  Family: 1  Work: 2  Social:2        ASSESSMENT: Current Emotional / Mental Status (status of significant symptoms):              Risk status (Self / Other harm or suicidal ideation)              Patient denies risks to personal safety              Patient denies current or recent suicidal ideation or behaviors.              Patient denies current or recent homicidal ideation or behaviors.              Patient denies current or recent self injurious behavior or ideation.              Patient denies other  "safety concerns.              Patient denies changes to risk factors              Patient denies changes to protective factors              Recommended that patient call 911 or go to the local ED should there be a change in any of these risk factors.                Attitude:                                   Cooperative               Orientation:                             x3              Speech                          Rate / Production:       Pressured                           Volume:                       Normal               Mood:                                      Anxious  Normal              Thought Content:                    Clear  Perservative               Thought Form:                        Coherent  Logical               Insight:                                     Good     Patient's impression of their current status:   Patient reports that she has had a low-grade fever for the past 2 weeks. She has experienced some nausea and vomiting earlier in the week. Her roommate had exposure to COVID19 so she has known exposure. Her  is not staying at home to avoid exposure. She did get tested and is waiting for results. She has had to \"do everything by herself\" while not feeling well. She is feeling more anxious due to not knowing about her health status, \"like how long it will last?\" She is also experiencing some sensory issues around not feeling good.      Therapist impression of patients current state:   Therapist prompted patient to express thoughts and feelings following a CBT framework.   Therapist provided unconditional positive regard and support, normalizing patient emotions and experiences.        Type of psychotherapeutic technique provided: Client centered and CBT    Progress toward short term goals: Progress as expected, Patient discussing concerns and coping strategies during tele-sessions.   Patient practicing learning skills outside of session.     Review of long term goals:   Not " done at today's visit   Treatment plan updated on 6/12/20   Update by end of September (next visit)     Diagnosis:  1. JOSHUA (generalized anxiety disorder)    2. Autism spectrum disorder    3. Attention deficit hyperactivity disorder (ADHD), unspecified ADHD type    4. Current moderate episode of major depressive disorder, unspecified whether recurrent (H)        Plan and Follow up:   Therapist and patient agreed to participate in weekly video visits to address symptoms of anxiety.  Future appts have been made through October.      Patient encouraged to do the following for homework:  1. Stick to morning routine and start the day on a positive note.  2. Get out at least once a day, for at least thirty minutes. Try first thing in the morning if concerned about contact or just open all the windows in your house and blast a fan.   3. Find some time to move each day, again daily for at least thirty minutes. [employer] videos off free movement classes or just turn on the music and dance.  4. Lower expectations and practice radical self-acceptance. Accept everything about yourself, your current situation, and your life without question, blame or pushback. You cannot fail at this--there is no roadmap, no precedent for this, and we are all truly doing the best we can in an impossible situation.   5. Listen to Brene Brown podcast  6. Practice deep breathing throughout the week  7. Use positive self-talk and daily affirmations   8. Explore autism support groups in the community     Discharge Criteria/Planning: Client has chronic symptoms and ongoing therapy for maintenance stability recommended.    I have reviewed the note as documented above.  This accurately captures the substance of my conversation with the patient.    Performed and documented by CHANTAL Blackmon LICSW

## 2021-06-11 NOTE — PROGRESS NOTES
Mental Health tele Visit Note    Patient: Angela Jones    : 1995 MRN: [id number]    Date: 2020   Start time: 3:00pm   Stop Time: 3:45pm   Session # 26    Chief Complaint   Patient presents with      Follow Up     Psychotherapy follow-up visit for anxiety, depression, ADHD and autism        Consent:  The patient has verbally consented to: the potential risks and benefits of telemedicine (video visit) versus in person care; bill my insurance or make self-payment for services provided; and responsibility for payment of non-covered services.   Mode of Communication:  Video Conference via [employer] sent via email link ([email or web address])  Session Type: Patient is participating in a video visit for mental health    Telemedicine Visit: The patient's condition can be safely assessed and treated via synchronous audio and visual telemedicine encounter.    Reason for Telemedicine Visit: Services only offered telehealth  Originating Site (Patient Location): Patient's home  Distant Site (Provider Location): Provider Remote Setting  As the provider I attest to compliance with applicable laws and regulations related to telemedicine.  Those present for this visit include patient and therapist.    Follow up in regards to ongoing symptom management of anxiety and depression in addition to barriers associated with Autism Spectrum Disorder.    New symptoms or complaints: None    Functional Impairment:   Personal: 3  Family: 1  Work: 2  Social:2        ASSESSMENT: Current Emotional / Mental Status (status of significant symptoms):              Risk status (Self / Other harm or suicidal ideation)              Patient denies risks to personal safety              Patient denies current or recent suicidal ideation or behaviors.              Patient denies current or recent homicidal ideation or behaviors.              Patient denies current or recent self injurious behavior or ideation.              Patient denies  other safety concerns.              Patient denies changes to risk factors              Patient denies changes to protective factors              Recommended that patient call 911 or go to the local ED should there be a change in any of these risk factors.                Attitude:                                   Cooperative               Orientation:                             x3              Speech                          Rate / Production:       Pressured                           Volume:                       Normal               Mood:                                      Anxious  Normal              Thought Content:                    Clear  Perservative               Thought Form:                        Coherent  Logical               Insight:                                     Good     Patient's impression of their current status:    Patient expresses some concerns about the prospect of finding part-time work. She has more anxiety about the idea of finding a job versus anxiety about actually working. She does agree that working would give here more structure and that the added income would be helpful. It would also help her obtain MA-EPD insurance. She shares that she would be more successful at a job with accommodations. She hasn't worked since 2012 and isn't sure what kind of work she could even do.   [employer] certification- waiting for response - applied months ago.  She reports that she has been slightly sick this week but was still able to decorate her house and manage her anxiety.     Therapist impression of patients current state:   Therapist was able to connect with patient's CM, Stephanie, and shared about this with patient today. Therapist and patient reviewed patient's goals with her CM.  Therapist prompted patient to express thoughts and feelings following a CBT framework.   Therapist provided unconditional positive regard and support, normalizing patient emotions and experiences.        Type of  psychotherapeutic technique provided: Client centered and CBT    Progress toward short term goals: Progress as expected, Patient discussing concerns and coping strategies during tele-sessions.   Patient practicing learning skills outside of session. She is working with her CM on other goals.     Review of long term goals:   Not done at today's visit   Treatment plan updated on 6/12/20   Update by end of September     Diagnosis:  1. JOSHUA (generalized anxiety disorder)    2. Autism spectrum disorder    3. Attention deficit hyperactivity disorder (ADHD), unspecified ADHD type    4. Current moderate episode of major depressive disorder, unspecified whether recurrent (H)        Plan and Follow up:   Therapist and patient agreed to participate in weekly video visits to address symptoms of anxiety.  Future appts have been made through October.      Patient encouraged to do the following for homework:  1. Stick to morning routine and start the day on a positive note.  2. Get out at least once a day, for at least thirty minutes. Try first thing in the morning if concerned about contact or just open all the windows in your house and blast a fan.   3. Find some time to move each day, again daily for at least thirty minutes. [employer] videos off free movement classes or just turn on the music and dance.  4. Lower expectations and practice radical self-acceptance. Accept everything about yourself, your current situation, and your life without question, blame or pushback. You cannot fail at this--there is no roadmap, no precedent for this, and we are all truly doing the best we can in an impossible situation.   5. Listen to Brene Brown podcast  6. Practice deep breathing throughout the week  7. Use positive self-talk and daily affirmations   8. Explore autism support groups in the community     Discharge Criteria/Planning: Client has chronic symptoms and ongoing therapy for maintenance stability recommended.    I have reviewed the  note as documented above.  This accurately captures the substance of my conversation with the patient.    Performed and documented by CHANTAL Blackmon LICSW

## 2021-06-11 NOTE — PROGRESS NOTES
did respond back to the [employer] message that was sent on 9/17 stating she did not have an update on the stats of her financial aid application.

## 2021-06-11 NOTE — PROGRESS NOTES
Mental Health tele Visit Note    Patient: Angela Jones    : 1995 MRN: [id number]    Date: 2020   Start time: 3:00pm   Stop Time: 3:45pm   Session # 24    Chief Complaint   Patient presents with      Follow Up     Psychotherapy follow-up visit for anxiety and depression       Consent:  The patient has verbally consented to: the potential risks and benefits of telemedicine (video visit) versus in person care; bill my insurance or make self-payment for services provided; and responsibility for payment of non-covered services.   Mode of Communication:  Video Conference via [employer] sent via email link ([email or web address])  Session Type: Patient is participating in a video visit for mental health    Telemedicine Visit: The patient's condition can be safely assessed and treated via synchronous audio and visual telemedicine encounter.    Reason for Telemedicine Visit: Services only offered telehealth  Originating Site (Patient Location): Patient's home  Distant Site (Provider Location): Provider Remote Setting  As the provider I attest to compliance with applicable laws and regulations related to telemedicine.  Those present for this visit include patient and therapist.    Follow up in regards to ongoing symptom management of anxiety and depression in addition to barriers associated with Autism Spectrum Disorder.      Chief Complaint   Patient presents with      Follow Up     Psychotherapy follow-up visit for anxiety and depression       New symptoms or complaints: None    Functional Impairment:   Personal: 3  Family: 1  Work: 2  Social:2        ASSESSMENT: Current Emotional / Mental Status (status of significant symptoms):              Risk status (Self / Other harm or suicidal ideation)              Patient denies risks to personal safety              Patient denies current or recent suicidal ideation or behaviors.              Patient denies current or recent homicidal ideation or behaviors.      "         Patient denies current or recent self injurious behavior or ideation.              Patient denies other safety concerns.              Patient denies changes to risk factors              Patient denies changes to protective factors              Recommended that patient call 911 or go to the local ED should there be a change in any of these risk factors.                Attitude:                                   Cooperative               Orientation:                             x3              Speech                          Rate / Production:       Pressured                           Volume:                       Normal               Mood:                                      Anxious  Normal              Thought Content:                    Clear  Perservative               Thought Form:                        Coherent  Logical               Insight:                                     Good     Patient's impression of their current status:   Patient was spending more time at her parent's house the past week.   She believes that she and her  are attempting to improve their communication patterns. Her  still doesn't have a job.   She did successfully apply [place] so is still a work in progress.   She reports feeling pretty stuck and frustrated, \"I just want everything to go back to normal.\"    Therapist impression of patients current state:   Therapist prompted patient to express thoughts and feelings following a CBT framework.   Therapist provided unconditional positive regard and support, normalizing patient emotions and experiences.    Therapist helped patient send an email today to inquire about support groups at [facility].   Therapist and patient discussed other strategies for managing pandemic fatigue.     Type of psychotherapeutic technique provided: Client centered and CBT    Progress toward short term goals: Progress as expected, Pt " discussing concerns and coping strategies during tele-sessions.   Patient followed through with short-term task to talk to CM about insurance coverage.      Review of long term goals:   Not done at today's visit   Treatment plan updated on 6/12/20     Diagnosis:  1. JOSHUA (generalized anxiety disorder)    2. Autism spectrum disorder    3. Attention deficit hyperactivity disorder (ADHD), unspecified ADHD type    4. Current moderate episode of major depressive disorder, unspecified whether recurrent (H)        Plan and Follow up:   Therapist and patient agreed to participate in weekly video visits to address symptoms of anxiety.    Therapist will consult with patient CM to make sure that we are all following structured treatment plan:  Stephanie - [phone]  Patient gives verbal consent for therapist to communicate with CM    Patient encouraged to do the following for homework:  1. Stick to morning routine and start the day on a positive note.  2. Get out at least once a day, for at least thirty minutes. Try first thing in the morning if concerned about contact or just open all the windows in your house and blast a fan.   3. Find some time to move each day, again daily for at least thirty minutes. [employer] videos off free movement classes or just turn on the music and dance.  4. Lower expectations and practice radical self-acceptance. Accept everything about yourself, your current situation, and your life without question, blame or pushback. You cannot fail at this--there is no roadmap, no precedent for this, and we are all truly doing the best we can in an impossible situation.   5. Listen to Brene Brown podcast  6. Practice deep breathing throughout the week  7. Use positive self-talk and daily affirmations   8. Explore autism support groups in the community     Discharge Criteria/Planning: Client has chronic symptoms and ongoing therapy for maintenance stability recommended.    I have reviewed the note as documented  above.  This accurately captures the substance of my conversation with the patient.    Performed and documented by CHANTAL Blackmon LICSW

## 2021-06-11 NOTE — TELEPHONE ENCOUNTER
Patient complains of fever, fatigue, nausea, diarrhea.  Oral temperature last night 99.2; no diarrhea this date.  Taking dramamine, Ibuprofen, Nyquil with some relief.  Patient had Covid-19 testing on 9/17/20; results negative.  Patient has curbside Covid-19 testing later this afternoon.  Asking if she needs antibiotics.  Reviewed isolation guidelines with patient and general care instructions and that there is no prescribed medications for Covid-19.  If testing is negative, patient may need to be seen at office.    Angela Cabrera RN  [facility] Triage Nurse Advisor    Reason for Disposition    [1] Fever returns after gone for over 24 hours AND [2] symptoms worse or not improved    HIGH RISK patient (e.g., age > 64 years, diabetes, heart or lung disease, weak immune system) (Exception: Has already been evaluated by healthcare provider and has no new or worsening symptoms)    Additional Information    Negative: SEVERE difficulty breathing (e.g., struggling for each breath, speaks in single words)    Negative: Difficult to awaken or acting confused (e.g., disoriented, slurred speech)    Negative: Bluish (or gray) lips or face now    Negative: Shock suspected (e.g., cold/pale/clammy skin, too weak to stand, low BP, rapid pulse)    Negative: Sounds like a life-threatening emergency to the triager    Negative: [1] COVID-19 exposure AND [2] no symptoms    Negative: COVID-19 and Breastfeeding, questions about    Negative: [1] Adult with possible COVID-19 symptoms AND [2] triager concerned about severity of symptoms or other causes    Negative: Fever present > 3 days (72 hours)    Negative: [1] Fever > 100.0 F (37.8 C) AND [2] bedridden (e.g., nursing home patient, CVA, chronic illness, recovering from surgery)    Negative: [1] Fever > 101 F (38.3 C) AND [2] age > 60    Negative: Fever > 103 F (39.4 C)    Negative: Chest pain or pressure    Negative: MILD difficulty breathing (e.g., minimal/no SOB at rest, SOB with  walking, pulse <100)    Negative: Patient sounds very sick or weak to the triager    Negative: SEVERE or constant chest pain or pressure (Exception: mild central chest pain, present only when coughing)    Negative: MODERATE difficulty breathing (e.g., speaks in phrases, SOB even at rest, pulse 100-120)    Protocols used: CORONAVIRUS (COVID-19) DIAGNOSED OR [id number]-EZ 8.4.20

## 2021-06-11 NOTE — PROGRESS NOTES
Clinic Care Coordination Contact    Community Health Worker Follow Up    Goals:     Goals Addressed                 This Visit's Progress       Patient Stated      Financial Wellbeing (pt-stated)   60%     Goal Statement: I want to find out the status of my [employer] Application in the next 60 days.    Date Goal set: 8/18/2020  Barriers: Mental health   Strengths: Case Management support, family support, mental health therapy  Date to Achieve By: 10/18/2020  Patient expressed understanding of goal: Yes    Action steps to achieve this goal:  1. I will complete a new [employer] Application and provide proofs of income with the application and send it to the billing department in the next 4 weeks.                CHW Outreach Conversation:     was sent a [employer] message by the CHW today requesting an update on the status of her financial aid application with the billing department.     has chosen not to call the billing department to follow up on the status of her financial aid application since she submitted it. The CHW contacted the billing department and they report that do not see an application on file. The CHW reported to  that she will need to complete a new financial aid application and that the CHW did put in a request for a new one to be mailed to her.     understands that she can also access an application online if she has a printer.      was provided the contact name and information for the individual in the billing department who she will need to follow up with regarding her financial aid application.    Financial Aid  - Emilia Hinojosa - [phone]      CHW Next Follow-up: 10/8/2020    CHW Plan: Outreach via [employer] and ask  if she has received a new [employer] care application.

## 2021-06-11 NOTE — PROGRESS NOTES
"Angela Jones is a 25 y.o. female who is being evaluated via a billable video visit.      The patient has been notified of following:     \"This video visit will be conducted via a call between you and your physician/provider. We have found that certain health care needs can be provided without the need for an in-person physical exam.  This service lets us provide the care you need with a video conversation.  If a prescription is necessary we can send it directly to your pharmacy.  If lab work is needed we can place an order for that and you can then stop by our lab to have the test done at a later time.    Video visits are billed at different rates depending on your insurance coverage. Please reach out to your insurance provider with any questions.    If during the course of the call the physician/provider feels a video visit is not appropriate, you will not be charged for this service.\"    Patient has given verbal consent to a Video visit? Yes  How would you like to obtain your AVS? AVS Preference: MyChart.  If dropped by the video visit, the video invitation should be sent to: Send to e-mail at: [email or web address]  Will anyone else be joining your video visit? No      Video Start Time: 12:11 pm      is following up on her recent illness. Everything started 2 weeks ago. Had respiratory illness (cough, sneezing), and fever and got better for a day, then worse again. Then, had a fever for a week. Temp is still 99.8 this AM. She did have diarrhea but it stopped now. One day, she was in the bathroom for 4 hours. It was only liquid for 2-3 days. After that, she was so nauseous that she wasn't eating much but now, has started eating again and is not having diarrhea. She took zofran twice, one 3 days ago and one yesterday.      Now, hasn't been able to have a bowel movement in the last 4 days.     She is still doing weekly therapy visits with Fanny Cobb. She is frustrated because she did apply for chuck " "care since she has no insurance but hasn't heard back.     Still dizzy, fatigued, sees stars, hears \"static, whooshing\" in my ears.     She did not end up needing to start citalopram and was able to fill and pay for her Trintellix. Her  helped her with this.     She did two COVID tests and both were negative. One was 1 week ago and the other was 2 days ago.      Exam:  GENERAL: Healthy, alert and no distress  EYES: Eyes grossly normal to inspection. No discharge or erythema, or obvious scleral/conjunctival abnormalities.  RESP: No audible wheeze, cough, or visible cyanosis.  No visible retractions or increased work of breathing.    NEURO: Cranial nerves grossly intact. Mentation and speech appropriate for age.  PSYCH: Mentation appears normal, affect normal/bright, judgement and insight intact, normal speech and appearance well-groomed     was seen today for follow-up.    Diagnoses and all orders for this visit:    Viral syndrome    Gastroenteritis    Generalized anxiety disorder    Autism spectrum disorder    It is reassuring that her symptoms are getting better.  I explained that Zofran can be quite constipating, so I would recommend that she not take this again unless she really needs it.  She has taken Dulcolax for constipation in the past and she will try this again.  I had previously ordered stool studies for her given her diarrhea and fevers, but now that this has resolved, this is not is crucial.  She already picked up the containers so we will plan to do them once she has a bowel movement.    COVID-19 infection is still on the differential since she had several characteristic symptoms, including fever, cough, and GI upset.  However, this is less likely since she has had 2 negative tests.    She feels like her mental health is stable.  She will continue her weekly visits with Fanny Cobb and her same medication regimen.  She will get back in touch with me if she feels like her mental " health is getting worse.      Video-Visit Details    Type of service:  Video Visit    Video End Time (time video stopped): 12:24 pm  Originating Location (pt. Location): Home    Distant Location (provider location):  [facility] MEDICINE/OB      Platform used for Video Visit: Georgette Walton MD

## 2021-06-11 NOTE — PROGRESS NOTES
Mental Health tele Visit Note    Patient: Angela Jones    : 1995 MRN: [id number]    Date: 2020   Start time: 3:00pm   Stop Time: 3:45pm   Session # 25    Chief Complaint   Patient presents with      Follow Up     Psychotherapy follow-up visit for anxiety, ADHD and autism spectrum disorder       Consent:  The patient has verbally consented to: the potential risks and benefits of telemedicine (video visit) versus in person care; bill my insurance or make self-payment for services provided; and responsibility for payment of non-covered services.   Mode of Communication:  Video Conference via [employer] sent via email link ([email or web address])  Session Type: Patient is participating in a video visit for mental health    Telemedicine Visit: The patient's condition can be safely assessed and treated via synchronous audio and visual telemedicine encounter.    Reason for Telemedicine Visit: Services only offered telehealth  Originating Site (Patient Location): Patient's home  Distant Site (Provider Location): Provider Remote Setting  As the provider I attest to compliance with applicable laws and regulations related to telemedicine.  Those present for this visit include patient and therapist.    Follow up in regards to ongoing symptom management of anxiety and depression in addition to barriers associated with Autism Spectrum Disorder.    New symptoms or complaints: None    Functional Impairment:   Personal: 3  Family: 1  Work: 2  Social:2        ASSESSMENT: Current Emotional / Mental Status (status of significant symptoms):              Risk status (Self / Other harm or suicidal ideation)              Patient denies risks to personal safety              Patient denies current or recent suicidal ideation or behaviors.              Patient denies current or recent homicidal ideation or behaviors.              Patient denies current or recent self injurious behavior or ideation.              Patient denies  other safety concerns.              Patient denies changes to risk factors              Patient denies changes to protective factors              Recommended that patient call 911 or go to the local ED should there be a change in any of these risk factors.                Attitude:                                   Cooperative               Orientation:                             x3              Speech                          Rate / Production:       Pressured                           Volume:                       Normal               Mood:                                      Anxious  Normal              Thought Content:                    Clear  Perservative               Thought Form:                        Coherent  Logical               Insight:                                     Good     Patient's impression of their current status:   Patient reports working with her CM on obtaining assistance for her medications. She is almost out of vortioxetine and can't afford to refill without insurance.   Patient expresses concerns about finances and lack of insurance. She still hasn't heard back from [place] regarding [employer] care application.    Patient reports feeling badly that she isn't able to work. She shares how her  feels like a failure due to not working right now.     Therapist impression of patients current state:   Therapist prompted patient to express thoughts and feelings following a CBT framework.   Therapist provided unconditional positive regard and support, normalizing patient emotions and experiences.    Therapist and patient have not heard back about support groups at [facility]. Therapist and patient will continue to seek out sources of support in the community.  Therapist and patient discussed other strategies for managing stress.     Type of psychotherapeutic technique provided: Client centered and CBT    Progress toward short term goals: Progress as expected, Patient  discussing concerns and coping strategies during tele-sessions.   Patient practicing learning skills outside of session.      Review of long term goals:   Not done at today's visit   Treatment plan updated on 6/12/20     Diagnosis:  1. JOSHUA (generalized anxiety disorder)    2. Autism spectrum disorder    3. Attention deficit hyperactivity disorder (ADHD), unspecified ADHD type    4. Current moderate episode of major depressive disorder, unspecified whether recurrent (H)        Plan and Follow up:   Therapist and patient agreed to participate in weekly video visits to address symptoms of anxiety.  Future appts have been made through October.    Therapist will consult with patient CM to make sure that we are all following structured treatment plan:  Stephanie - [phone]  Patient gives verbal consent for therapist to communicate with CM    Patient encouraged to do the following for homework:  1. Stick to morning routine and start the day on a positive note.  2. Get out at least once a day, for at least thirty minutes. Try first thing in the morning if concerned about contact or just open all the windows in your house and blast a fan.   3. Find some time to move each day, again daily for at least thirty minutes. [employer] videos off free movement classes or just turn on the music and dance.  4. Lower expectations and practice radical self-acceptance. Accept everything about yourself, your current situation, and your life without question, blame or pushback. You cannot fail at this--there is no roadmap, no precedent for this, and we are all truly doing the best we can in an impossible situation.   5. Listen to Brene Brown podcast  6. Practice deep breathing throughout the week  7. Use positive self-talk and daily affirmations   8. Explore autism support groups in the community     Discharge Criteria/Planning: Client has chronic symptoms and ongoing therapy for maintenance stability recommended.    I have reviewed the note  as documented above.  This accurately captures the substance of my conversation with the patient.    Performed and documented by CHANTAL Blackmon LICSW

## 2021-06-11 NOTE — PROGRESS NOTES
Clinic Care Coordination Contact  [facility]/Voicemail       Clinical Data: Care Coordinator Outreach    Outreach attempted x 1.  CHW sent a [employer] message to  today and will give it up to 2 weeks to hear back from her.    Plan: Care Coordinator will make another attempt to reach the patient via phone.    Care Coordinator outreach on 9/30/2020

## 2021-06-11 NOTE — PROGRESS NOTES
Mental Health tele Visit Note    Patient: Angela Jones    : 1995 MRN: [id number]    Date: 2020   Start time: 1:05pm   Stop Time: 1:50pm   Session # 28    Chief Complaint   Patient presents with      Follow Up     Psychotherapy follow-up visit for anxiety, depression, ADHD and autism spectrum disorder       Consent:  The patient has verbally consented to: the potential risks and benefits of telemedicine (video visit) versus in person care; bill my insurance or make self-payment for services provided; and responsibility for payment of non-covered services.   Mode of Communication:  Video Conference via [employer] sent via email link ([email or web address])  Session Type: Patient is participating in a video visit for mental health    Telemedicine Visit: The patient's condition can be safely assessed and treated via synchronous audio and visual telemedicine encounter.    Reason for Telemedicine Visit: Services only offered telehealth  Originating Site (Patient Location): Patient's home  Distant Site (Provider Location): Provider Remote Setting  As the provider I attest to compliance with applicable laws and regulations related to telemedicine.  Those present for this visit include patient and therapist.    Follow up in regards to ongoing symptom management of anxiety and depression in addition to barriers associated with Autism Spectrum Disorder.    New symptoms or complaints: dizzy and low-grade fever; negative results for COVID19    Functional Impairment:   Personal: 3  Family: 1  Work: 2  Social:2        ASSESSMENT: Current Emotional / Mental Status (status of significant symptoms):              Risk status (Self / Other harm or suicidal ideation)              Patient denies risks to personal safety              Patient denies current or recent suicidal ideation or behaviors.              Patient denies current or recent homicidal ideation or behaviors.              Patient denies current or recent  self injurious behavior or ideation.              Patient denies other safety concerns.              Patient denies changes to risk factors              Patient denies changes to protective factors              Recommended that patient call 911 or go to the local ED should there be a change in any of these risk factors.                Attitude:                                   Cooperative               Orientation:                             x3              Speech                          Rate / Production:       Pressured                           Volume:                       Normal               Mood:                                      Anxious  Normal              Thought Content:                    Clear  Perservative               Thought Form:                        Coherent  Logical               Insight:                                     Good     Patient's impression of their current status:   Patient reports that she does not have COVID19 after being tested two times. She does feel dizzy and low-grade fever. Her  will plan to return home on Monday. She is feeling a lot better this week compared to last week at her last visit.   She notices how being sick impacts and increases her anxiety (worst case scenario thought patterns, ruminating and racing thoughts).   It's been hard for her to manage her anxiety over the past week while she was sick and home alone.   She reports less trouble with sleep over the past week. She historically has always had trouble sleeping (she depends upon her medication to fall asleep).     Therapist impression of patients current state:   Therapist administered assessment measurements to evaluate underlying symptoms.   Therapist prompted patient to express thoughts and feelings following a CBT framework.   Therapist provided unconditional positive regard and support, normalizing patient emotions and experiences.   Therapist and patient are still having trouble  connecting with support groups and agencies in the community.      Type of psychotherapeutic technique provided: Client centered and CBT    Progress toward short term goals: Progress as expected, Patient discussing concerns and coping strategies during tele-sessions.   Patient practicing learning skills outside of session.     Review of long term goals:   Treatment plan updated     JOSHUA-7 = 12  PHQ-9 = 9    Diagnosis:  1. JOSHUA (generalized anxiety disorder)    2. Autism spectrum disorder    3. Attention deficit hyperactivity disorder (ADHD), unspecified ADHD type    4. Current moderate episode of major depressive disorder, unspecified whether recurrent (H)        Plan and Follow up:   Therapist and patient agreed to participate in weekly video visits to address symptoms of anxiety.  Future appts have been made through October.  Patient will call billing.     Patient encouraged to do the following for homework:  1. Stick to morning routine and start the day on a positive note.  2. Get out at least once a day, for at least thirty minutes. Try first thing in the morning if concerned about contact or just open all the windows in your house and blast a fan.   3. Find some time to move each day, again daily for at least thirty minutes. [employer] videos off free movement classes or just turn on the music and dance.  4. Lower expectations and practice radical self-acceptance. Accept everything about yourself, your current situation, and your life without question, blame or pushback. You cannot fail at this--there is no roadmap, no precedent for this, and we are all truly doing the best we can in an impossible situation.   5. Listen to Brene Brown podcast  6. Practice deep breathing throughout the week  7. Use positive self-talk and daily affirmations   8. Explore autism support groups in the community     Discharge Criteria/Planning: Client has chronic symptoms and ongoing therapy for maintenance stability recommended.    I  have reviewed the note as documented above.  This accurately captures the substance of my conversation with the patient.    Performed and documented by CHANTAL Blackmon LICSW

## 2021-06-11 NOTE — TELEPHONE ENCOUNTER
Last office visit: managed by behavioral health   Last ordered: 05/28/20  Last lab check:   Next appointment: None

## 2021-06-12 NOTE — PROGRESS NOTES
Mental Health tele Visit Note    Patient: Angela Jones    : 1995 MRN: [id number]    Date: 10/23/2020   Start time: 3:00pm   Stop Time: 3:45pm   Session # 30    Chief Complaint   Patient presents with     MH Follow Up     Psychotherapy follow-up visit for a variety of mental health concerns       Consent:  The patient has verbally consented to: the potential risks and benefits of telemedicine (video visit) versus in person care; bill my insurance or make self-payment for services provided; and responsibility for payment of non-covered services.   Mode of Communication:  Video Conference via [employer] sent via email link ([email or web address])  Session Type: Patient is participating in a video visit for mental health    Telemedicine Visit: The patient's condition can be safely assessed and treated via synchronous audio and visual telemedicine encounter.    Reason for Telemedicine Visit: Services only offered telehealth  Originating Site (Patient Location): Patient's home  Distant Site (Provider Location): Provider Remote Setting  As the provider I attest to compliance with applicable laws and regulations related to telemedicine.  Those present for this visit include patient and therapist.    Follow up in regards to ongoing symptom management of anxiety and depression in addition to barriers associated with Autism Spectrum Disorder.    New symptoms or complaints: no new symptoms    Functional Impairment:   Personal: 3  Family: 1  Work: 2  Social:2        ASSESSMENT: Current Emotional / Mental Status (status of significant symptoms):              Risk status (Self / Other harm or suicidal ideation)              Patient denies risks to personal safety              Patient denies current or recent suicidal ideation or behaviors.              Patient denies current or recent homicidal ideation or behaviors.              Patient denies current or recent self injurious behavior or ideation.              Patient  "denies other safety concerns.              Patient denies changes to risk factors              Patient denies changes to protective factors              Recommended that patient call 911 or go to the local ED should there be a change in any of these risk factors.                Attitude:                                   Cooperative               Orientation:                             x3              Speech                          Rate / Production:       Pressured                           Volume:                       Normal               Mood:                                      Anxious  Normal              Thought Content:                    Clear  Perservative               Thought Form:                        Coherent  Logical               Insight:                                     Good     Patient's impression of their current status:   Patient reports that she has felt dizzy, nauseous and irritable bowel movements. She has been sick on and off for a few weeks. She needs to make an appointment with the GI per referral from her PCP.   She is trying to adjust her diet. She has been feeling more stressed and irritable due to not feeling good. She feels like she's taking things out on her  because she doesn't feel comfortable sharing too much with her family.   She discusses her limited social network - \"I'm not sure what adult friendships should look like.\"   Her  finally got a new job and starts on 11/2! They will obtain insurance through his job.     Therapist impression of patients current state:   Therapist prompted patient to express thoughts and feelings following a CBT framework.   Therapist affirmed patient's efforts to make a plan of action to improve her health.   Therapist helped identify cognitive distortions such as \"I don't want to bother other people with my problems.\" Therapist encouraged her to continue reaching out to members of her support network while practicing her " communication skills to increase interpersonal relationships.     Type of psychotherapeutic technique provided: Client centered and CBT    Progress toward short term goals: Progress as expected, Patient discussing concerns and coping strategies during tele-sessions. Patient practicing learning skills outside of session. She was open and honest about presenting issues.     Review of long term goals:   Not done at today's visit  Treatment plan updated on 9/24/20      Diagnosis:  1. JOSHUA (generalized anxiety disorder)    2. Autism spectrum disorder    3. Attention deficit hyperactivity disorder (ADHD), unspecified ADHD type    4. Current moderate episode of major depressive disorder, unspecified whether recurrent (H)        Plan and Follow up:   Therapist and patient agreed to participate in weekly video visits to address symptoms of anxiety.  Patient plans to follow-up with an autism support group facilitator.   She plans to call for a psychiatry appt.     Patient encouraged to do the following for homework:  1. Stick to morning routine and start the day on a positive note.  2. Get out at least once a day, for at least thirty minutes. Try first thing in the morning if concerned about contact or just open all the windows in your house and blast a fan.   3. Find some time to move each day, again daily for at least thirty minutes. [employer] videos off free movement classes or just turn on the music and dance.  4. Lower expectations and practice radical self-acceptance. Accept everything about yourself, your current situation, and your life without question, blame or pushback. You cannot fail at this--there is no roadmap, no precedent for this, and we are all truly doing the best we can in an impossible situation.   5. Listen to Brene Brown podcast  6. Practice deep breathing throughout the week  7. Use positive self-talk and daily affirmations   8. Explore autism support groups in the community     Discharge  Criteria/Planning: Client has chronic symptoms and ongoing therapy for maintenance stability recommended.    I have reviewed the note as documented above.  This accurately captures the substance of my conversation with the patient.    Performed and documented by CHANTAL Blackmon LICSW

## 2021-06-12 NOTE — PROGRESS NOTES
Clinic Care Coordination Contact  [facility]       Clinical Data: [facility] Outreach    Outreach attempted x 2. [employer] message sent yesterday.    Plan: [facility] will make another attempt to reach the patient via phone or [employer] in 1 month.    [facility] outreach on 11/11/2020

## 2021-06-12 NOTE — PROGRESS NOTES
Mental Health tele Visit Note    Patient: Angela Jones    : 1995 MRN: [id number]    Date: 10/9/2020   Start time: 3:02pm   Stop Time: 3:45pm   Session # 29    Chief Complaint   Patient presents with      Follow Up     Psychotherapy follow-up visit for anxiety, depression, ASD and ADHD       Consent:  The patient has verbally consented to: the potential risks and benefits of telemedicine (video visit) versus in person care; bill my insurance or make self-payment for services provided; and responsibility for payment of non-covered services.   Mode of Communication:  Video Conference via [employer] sent via email link ([email or web address])  Session Type: Patient is participating in a video visit for mental health    Telemedicine Visit: The patient's condition can be safely assessed and treated via synchronous audio and visual telemedicine encounter.    Reason for Telemedicine Visit: Services only offered telehealth  Originating Site (Patient Location): Patient's home  Distant Site (Provider Location): Provider Remote Setting  As the provider I attest to compliance with applicable laws and regulations related to telemedicine.  Those present for this visit include patient and therapist.    Follow up in regards to ongoing symptom management of anxiety and depression in addition to barriers associated with Autism Spectrum Disorder.    New symptoms or complaints: dizzy and upset stomach    Functional Impairment:   Personal: 3  Family: 1  Work: 2  Social:2        ASSESSMENT: Current Emotional / Mental Status (status of significant symptoms):              Risk status (Self / Other harm or suicidal ideation)              Patient denies risks to personal safety              Patient denies current or recent suicidal ideation or behaviors.              Patient denies current or recent homicidal ideation or behaviors.              Patient denies current or recent self injurious behavior or ideation.               "Patient denies other safety concerns.              Patient denies changes to risk factors              Patient denies changes to protective factors              Recommended that patient call 911 or go to the local ED should there be a change in any of these risk factors.                Attitude:                                   Cooperative               Orientation:                             x3              Speech                          Rate / Production:       Pressured                           Volume:                       Normal               Mood:                                      Anxious  Normal              Thought Content:                    Clear  Perservative               Thought Form:                        Coherent  Logical               Insight:                                     Good     Patient's impression of their current status:   Patient reports that she has been experiencing dizziness and upset stomach. She has had a series of medical exams with no underlying causation. Wondering if it is related to stress? She can identify various stressors that have remained unchanged for many months. She states, \"we're all trying to be productive but it's not working.\" She describes trying to follow a daily routine. She has trouble getting started with tasks.  She discusses the impact of her 's depression.     Therapist impression of patients current state:   Therapist prompted patient to express thoughts and feelings following a CBT framework.   Therapist provided unconditional positive regard and support, normalizing patient emotions and experiences.   Therapist and patient reviewed opportunities for following a daily structure and routine. Therapist prompted patient to examine moments in the day that bring her michelle.        Type of psychotherapeutic technique provided: Client centered and CBT    Progress toward short term goals: Progress as expected, Patient discussing concerns and coping " strategies during tele-sessions. Patient practicing learning skills outside of session.     Review of long term goals:   Not done at today's visit  Treatment plan updated on 9/24/20      Diagnosis:  1. JOSHUA (generalized anxiety disorder)    2. Autism spectrum disorder    3. Attention deficit hyperactivity disorder (ADHD), unspecified ADHD type    4. Current moderate episode of major depressive disorder, unspecified whether recurrent (H)        Plan and Follow up:   Therapist and patient agreed to participate in weekly video visits to address symptoms of anxiety.  Future appts have been made through October.  *take notice in the ordinary things that bring michelle     Patient encouraged to do the following for homework:  1. Stick to morning routine and start the day on a positive note.  2. Get out at least once a day, for at least thirty minutes. Try first thing in the morning if concerned about contact or just open all the windows in your house and blast a fan.   3. Find some time to move each day, again daily for at least thirty minutes. [employer] videos off free movement classes or just turn on the music and dance.  4. Lower expectations and practice radical self-acceptance. Accept everything about yourself, your current situation, and your life without question, blame or pushback. You cannot fail at this--there is no roadmap, no precedent for this, and we are all truly doing the best we can in an impossible situation.   5. Listen to Brene Brown podcast  6. Practice deep breathing throughout the week  7. Use positive self-talk and daily affirmations   8. Explore autism support groups in the community     Discharge Criteria/Planning: Client has chronic symptoms and ongoing therapy for maintenance stability recommended.    I have reviewed the note as documented above.  This accurately captures the substance of my conversation with the patient.    Performed and documented by CHANTAL Blackmon LICSW

## 2021-06-12 NOTE — PROGRESS NOTES
Mental Health tele Visit Note    Patient: Angela Jones    : 1995 MRN: [id number]    Date: 2020   Start time: 3:10pm   Stop Time: 3:50pm   Session # 32    Chief Complaint   Patient presents with      Follow Up     Psychotherapy follow-up visit for anxiety and depression and autism spectrum disorder       Consent:  The patient has verbally consented to: the potential risks and benefits of telemedicine (video visit) versus in person care; bill my insurance or make self-payment for services provided; and responsibility for payment of non-covered services.   Mode of Communication:  Video Conference via [employer] sent via email link ([email or web address])  Session Type: Patient is participating in a video visit for mental health    Telemedicine Visit: The patient's condition can be safely assessed and treated via synchronous audio and visual telemedicine encounter.    Reason for Telemedicine Visit: Services only offered telehealth  Originating Site (Patient Location): Patient's home  Distant Site (Provider Location): Provider Remote Setting  As the provider I attest to compliance with applicable laws and regulations related to telemedicine.  Those present for this visit include patient and therapist.    Follow up in regards to ongoing symptom management of anxiety and depression in addition to barriers associated with Autism Spectrum Disorder.    New symptoms or complaints: no new symptoms    Functional Impairment:   Personal: 3  Family: 1  Work: 2  Social:2        ASSESSMENT: Current Emotional / Mental Status (status of significant symptoms):              Risk status (Self / Other harm or suicidal ideation)              Patient denies risks to personal safety              Patient denies current or recent suicidal ideation or behaviors.              Patient denies current or recent homicidal ideation or behaviors.              Patient denies current or recent self injurious behavior or ideation.               Patient denies other safety concerns.              Patient denies changes to risk factors              Patient denies changes to protective factors              Recommended that patient call 911 or go to the local ED should there be a change in any of these risk factors.                Attitude:                                   Cooperative               Orientation:                             x3              Speech                          Rate / Production:       Pressured                           Volume:                       Normal               Mood:                                      Anxious  Normal              Thought Content:                    Clear  Perservative               Thought Form:                        Coherent  Logical               Insight:                                     Good     Patient's impression of their current status:   Patient reflects upon her symptoms in light of the stressful election week. She is doing her best to stay busy and maintain a positive outlook.  She reports that her stomach has been upset this week. She does have plans to follow up with doctor recommendations to examine underlying health concerns.   She identifies short-term goal of obtaining employment.     Therapist impression of patients current state:   Therapist prompted patient to express thoughts and feelings following a CBT framework.   Patient is practicing learned skills outside of session and is receptive to therapist feedback and recommendations while in session. She continues to be open and honest about presenting issues, demonstrating improved overall insight into the nature of her symptoms. Therapist provided unconditional positive regard and support.     Type of psychotherapeutic technique provided: Client centered and CBT    Progress toward short term goals: Progress as expected, with patient following through with assigned tasks over the past week including getting in touch with her  doctor about physical health concerns (dizziness).  She is making progress toward goal to adhere to a daily schedule.     Review of long term goals:   Not done at today's visit  Treatment plan updated on 9/24/20      Diagnosis:  1. JOSHUA (generalized anxiety disorder)    2. Autism spectrum disorder    3. Attention deficit hyperactivity disorder (ADHD), unspecified ADHD type    4. Current moderate episode of major depressive disorder, unspecified whether recurrent (H)        Plan and Follow up:   Therapist and patient agreed to participate in weekly video visits to address symptoms of anxiety.  Patient plans to follow-up with an autism support group facilitator.   Patient plans to explore possible employment opportunities.    Patient encouraged to do the following for homework:  1. Stick to morning routine and start the day on a positive note.  2. Get out at least once a day, for at least thirty minutes. Try first thing in the morning if concerned about contact or just open all the windows in your house and blast a fan.   3. Find some time to move each day, again daily for at least thirty minutes. [employer] videos off free movement classes or just turn on the music and dance.  4. Lower expectations and practice radical self-acceptance. Accept everything about yourself, your current situation, and your life without question, blame or pushback. You cannot fail at this--there is no roadmap, no precedent for this, and we are all truly doing the best we can in an impossible situation.   5. Listen to Brene Brown podcast  6. Practice deep breathing throughout the week  7. Use positive self-talk and daily affirmations   8. Explore autism support groups in the community     Discharge Criteria/Planning: Client has chronic symptoms and ongoing therapy for maintenance stability recommended.    I have reviewed the note as documented above.  This accurately captures the substance of my conversation with the patient.    Performed and  documented by Fanny Cobb, [facility]  Fanny Cobb, [facility]

## 2021-06-12 NOTE — PROGRESS NOTES
Clinic Care Coordination Contact    Community Health Worker Follow Up    Goals:     Goals Addressed                 This Visit's Progress       Patient Stated      Financial Wellbeing (pt-stated)   60%     Goal Statement: I want to find out the status of my [facility] Care Application in the next 4 months.    Date Goal set: 8/18/2020  Barriers: Mental health   Strengths: Case Management support, family support, mental health therapy  Date to Achieve By: 12/18/2020  Patient expressed understanding of goal: Yes    Action steps to achieve this goal:  1. I will monitor my incoming emails so that I may correspond with Emilia Hinojosa in the billing department regarding my new financial aid application.  2. I will send any additional documentation that may be needed.                CHW Outreach Conversation:    's original financial aid application was denied because she did not turn in all necessary documentation (checking, savings, 401K, MNSure denial). Emilia reported to the CHW that she will email  since  has chosen to not call for updates and follow up on her financial aid.     will need to stay in contact with Emilia to ensure Emilia has all needed documentation regarding the new financial aid application. The CHW did also send a new [employer] (MM) message providing  with all of the above information plus a better fax number for Emilia ([phone]).      CHW Next Follow-up: 11/20/2020    CHW Plan: Check on status of financial aid application, contact Emilia directly to see if she is missing any further information

## 2021-06-12 NOTE — PROGRESS NOTES
Clinic Care Coordination Contact    Community Health Worker Follow Up    Goals:     Goals Addressed                 This Visit's Progress       Patient Stated      Financial Wellbeing (pt-stated)        Goal Statement: I want to find out the status of my [facility] Care Application in the next 60 days.    Date Goal set: 8/18/2020  Barriers: Mental health   Strengths: Case Management support, family support, mental health therapy  Date to Achieve By: 10/18/2020  Patient expressed understanding of goal: Yes    Action steps to achieve this goal:  1. I will monitor incoming mail for a letter from the billing department regarding whether I was approved or denied financial aid.  2. I will reply back to the CHW [employer] message at next outreach.                  CHW Outreach Conversation:     did reply back to the CHW [employer] message and reported she faxed a new financial aid application and documents to the billing department.     CHW did leave Emilia in the billing department a detailed voicemail message requesting a call back regarding whether they have the faxed documents.      CHW Next Follow-up: 10/22/2020    CHW Plan: Follow up with  via [employer] with any updates

## 2021-06-12 NOTE — PROGRESS NOTES
Clinic Care Coordination Contact  [facility]       Clinical Data: Care Coordinator Outreach    Outreach attempted x 1.  [employer] message sent    Plan: CHW will make another attempt to reach the patient via [employer] if the CHW receives no response by next scheduled outreach.    Care Coordinator outreach on 10/14/2020

## 2021-06-12 NOTE — PROGRESS NOTES
Mental Health tele Visit Note    Patient: Angela Jones    : 1995 MRN: [id number]    Date: 10/30/2020   Start time: 3:00pm   Stop Time: 3:40pm   Session # 31    Chief Complaint   Patient presents with      Follow Up     Psychotherapy follow-up visit for anxiety and depression       Consent:  The patient has verbally consented to: the potential risks and benefits of telemedicine (video visit) versus in person care; bill my insurance or make self-payment for services provided; and responsibility for payment of non-covered services.   Mode of Communication:  Video Conference via [employer] sent via email link ([email or web address])  Session Type: Patient is participating in a video visit for mental health    Telemedicine Visit: The patient's condition can be safely assessed and treated via synchronous audio and visual telemedicine encounter.    Reason for Telemedicine Visit: Services only offered telehealth  Originating Site (Patient Location): Patient's home  Distant Site (Provider Location): Provider Remote Setting  As the provider I attest to compliance with applicable laws and regulations related to telemedicine.  Those present for this visit include patient and therapist.    Follow up in regards to ongoing symptom management of anxiety and depression in addition to barriers associated with Autism Spectrum Disorder.    New symptoms or complaints: no new symptoms    Functional Impairment:   Personal: 3  Family: 1  Work: 2  Social:2        ASSESSMENT: Current Emotional / Mental Status (status of significant symptoms):              Risk status (Self / Other harm or suicidal ideation)              Patient denies risks to personal safety              Patient denies current or recent suicidal ideation or behaviors.              Patient denies current or recent homicidal ideation or behaviors.              Patient denies current or recent self injurious behavior or ideation.              Patient denies other  safety concerns.              Patient denies changes to risk factors              Patient denies changes to protective factors              Recommended that patient call 911 or go to the local ED should there be a change in any of these risk factors.                Attitude:                                   Cooperative               Orientation:                             x3              Speech                          Rate / Production:       Pressured                           Volume:                       Normal               Mood:                                      Anxious  Normal              Thought Content:                    Clear  Perservative               Thought Form:                        Coherent  Logical               Insight:                                     Good     Patient's impression of their current status:   Patient reports that she had a GI tele-health appointment this week. She was told that she might have abdominal migraines - she is unable to fill the medication until Monday next week when the insurance starts.   She did follow through with getting a psychiatry appointment set up.  She heard back from [facility] with very positive results.   Her  will start his new job on Monday.    She reports feeling quite dizzy which impairs her ability to be more physically active. She reports that she has fainted twice over the past week.     Therapist impression of patients current state:   Therapist prompted patient to express thoughts and feelings following a CBT framework.   Therapist provided unconditional positive regard and support.     Type of psychotherapeutic technique provided: Client centered and CBT    Progress toward short term goals: Progress as expected, patient discussing concerns and coping strategies during tele-sessions. Patient is practicing learned skills outside of session. She continues to be open and honest about presenting issues.   She followed through with  plan to call for a psychiatry appointment which is scheduled for December.     Review of long term goals:   Not done at today's visit  Treatment plan updated on 9/24/20      Diagnosis:  1. JOSHUA (generalized anxiety disorder)    2. Autism spectrum disorder    3. Attention deficit hyperactivity disorder (ADHD), unspecified ADHD type    4. Current moderate episode of major depressive disorder, unspecified whether recurrent (H)        Plan and Follow up:   Therapist and patient agreed to participate in weekly video visits to address symptoms of anxiety.  Patient plans to follow-up with an autism support group facilitator.   She plans to talk with her PCP about dizziness problems    Patient encouraged to do the following for homework:  1. Stick to morning routine and start the day on a positive note.  2. Get out at least once a day, for at least thirty minutes. Try first thing in the morning if concerned about contact or just open all the windows in your house and blast a fan.   3. Find some time to move each day, again daily for at least thirty minutes. [employer] videos off free movement classes or just turn on the music and dance.  4. Lower expectations and practice radical self-acceptance. Accept everything about yourself, your current situation, and your life without question, blame or pushback. You cannot fail at this--there is no roadmap, no precedent for this, and we are all truly doing the best we can in an impossible situation.   5. Listen to Brene Brown podcast  6. Practice deep breathing throughout the week  7. Use positive self-talk and daily affirmations   8. Explore autism support groups in the community     Discharge Criteria/Planning: Client has chronic symptoms and ongoing therapy for maintenance stability recommended.    I have reviewed the note as documented above.  This accurately captures the substance of my conversation with the patient.    Performed and documented by CHANTAL Blackmon  Reza, LICSW

## 2021-06-13 NOTE — PROGRESS NOTES
Mental Health tele Visit Note    Patient: Angela Jones    : 1995 MRN: [id number]    Date: 2020   Start time: 3:00pm   Stop Time: 3:50pm   Session # 34    Chief Complaint   Patient presents with      Follow Up     Psychotherapy follow-up visit for anxiety, depression, ASD and ADHD       Consent:  The patient has verbally consented to: the potential risks and benefits of telemedicine (video visit) versus in person care; bill my insurance or make self-payment for services provided; and responsibility for payment of non-covered services.   Mode of Communication:  Video Conference via [employer] sent via email link ([email or web address])  Session Type: Patient is participating in a video visit for mental health    Telemedicine Visit: The patient's condition can be safely assessed and treated via synchronous audio and visual telemedicine encounter.    Reason for Telemedicine Visit: Services only offered telehealth  Originating Site (Patient Location): Patient's home  Distant Site (Provider Location): Provider Remote Setting  As the provider I attest to compliance with applicable laws and regulations related to telemedicine.  Those present for this visit include patient and therapist.    Follow up in regards to ongoing symptom management of anxiety and depression in addition to barriers associated with Autism Spectrum Disorder.    New symptoms or complaints: no new symptoms    Functional Impairment:   Personal: 3  Family: 1  Work: 2  Social:2        ASSESSMENT: Current Emotional / Mental Status (status of significant symptoms):              Risk status (Self / Other harm or suicidal ideation)              Patient denies risks to personal safety              Patient denies current or recent suicidal ideation or behaviors.              Patient denies current or recent homicidal ideation or behaviors.              Patient denies current or recent self injurious behavior or ideation.              Patient  "denies other safety concerns.              Patient denies changes to risk factors              Patient denies changes to protective factors              Recommended that patient call 911 or go to the local ED should there be a change in any of these risk factors.                Attitude:                                   Cooperative               Orientation:                             x3              Speech                          Rate / Production:       Pressured                           Volume:                       Normal               Mood:                                      Anxious  Normal              Thought Content:                    Clear  Perservative               Thought Form:                        Coherent  Logical               Insight:                                     Good     Patient's impression of their current status:   Patient's impression is that things are going okay but \"I'm quite anxious.\" She still isn't feeling very well, physically. She reports \"I mostly feel okay if I'm not trying to do things.\" She has followed up with other providers for stomach type issues.  She worked with her CM to get OT setup (through professional rehabilitation services) - this will start next week. She finally received an email back from the [employer] [place]. She has links to support groups (Thursdays).  She has rescheduled with a psychiatrist through [facility] and her appointment is that the end of the month.      Therapist impression of patients current state:   Patient easily engages in dialogue.  She is receptive to therapist feedback and recommendations.     Type of psychotherapeutic technique provided: Client centered and CBT    Progress toward short term goals: Progress as expected, with patient applying learned skills outside of session.  She has made progress toward goals to get connected with community resources.     Review of long term goals:   Not done at today's " visit  Treatment plan updated on 9/24/20      Diagnosis:  1. JOSHUA (generalized anxiety disorder)    2. Autism spectrum disorder    3. Attention deficit hyperactivity disorder (ADHD), unspecified ADHD type    4. Current moderate episode of major depressive disorder, unspecified whether recurrent (H)        Plan and Follow up:   Therapist and patient agreed to participate in weekly video visits to address symptoms of anxiety.  Patient plans to follow-up with employment opportunities.   Patient is interested in engaging in more trauma focused therapy techniques - she was encouraged to practice some active recovery techniques following session in order to regulate her emotions    Patient encouraged to do the following for homework:  1. Stick to morning routine and start the day on a positive note.  2. Get out at least once a day, for at least thirty minutes. Try first thing in the morning if concerned about contact or just open all the windows in your house and blast a fan.   3. Find some time to move each day, again daily for at least thirty minutes. [employer] videos off free movement classes or just turn on the music and dance.  4. Lower expectations and practice radical self-acceptance. Accept everything about yourself, your current situation, and your life without question, blame or pushback. You cannot fail at this--there is no roadmap, no precedent for this, and we are all truly doing the best we can in an impossible situation.   5. Practice deep breathing throughout the week  6. Use positive self-talk and daily affirmations     Discharge Criteria/Planning: Client has chronic symptoms and ongoing therapy for maintenance stability recommended.    I have reviewed the note as documented above.  This accurately captures the substance of my conversation with the patient.    Performed and documented by CHANTAL Blackmon LICSW

## 2021-06-13 NOTE — PROGRESS NOTES
Clinic Care Coordination Contact    Community Health Worker Follow Up    Goals:     Goals Addressed                 This Visit's Progress       Patient Stated      Financial Wellbeing (pt-stated)   90%     Goal Statement: I want to find out the status of my [facility] Care Application in the next 4 months.    Date Goal set: 8/18/2020  Barriers: Mental health   Strengths: Case Management support, family support, mental health therapy  Date to Achieve By: 12/18/2020  Patient expressed understanding of goal: Yes    Action steps to achieve this goal:  1. I will reply back the CHW's [employer] message in the next 2 weeks with an update regarding my [facility] Care Application.    Note: CHW does see a zero account balance so it is assumed  was approved. Message also was left for Emilia in billing by the CHW to confirm this.                CHW Outreach Conversation:    [employer] message sent to  regarding current goal. Other possible goal may be medication related but the CHW is waiting to confirm with  if there are still concerns about her medication costs.      CHW Next Follow-up: 12/2/2020    CHW Plan: Review current goals and outreach via [employer]

## 2021-06-13 NOTE — PATIENT INSTRUCTIONS - HE
Continue medications as prescribed  Have your pharmacy contact us for a refill if you are running low on medications (We may ask you to come into clinic to get a refill from the nurse  No Alcohol or drug use  No driving if sedated  Call the clinic with any questions or concerns   Reach out for help if you feel like hurting yourself or others ([facility] [phone]: [address] [facility] [place], [address] or [facility] Suicide Hotline [phone] , call 911 or go to nearest Emergency room    Follow up as directed, for your appointments, per your After Visit Summary Form.

## 2021-06-13 NOTE — PROGRESS NOTES
Clinic Care Coordination Contact     Situation: Pt chart reviewed by  care coordinator.     Background:   - Most recent  assessment completed on 6/1/20.  - Pt initially enrolled for assistance applying for health insurance and picking up 90-day-supplies of her medications (in June).  - Pt was determined to be over income for MA/MNCARE by FRW on 6/26/20. See note from this date for further detail.  - Pt reported (via Vaybee message with CHW on 8/18/20) that she had completed and submitted a financial aid/[employer] application.  - Per Vaybee message exchange between pt and CHW dated 9/29/20, billing department did not receive pt's financial aid application. Pt downloaded PDF of application to fill out and submit again per 10/13/20 Vaybee message exchange between pt and CHW.  - Per Vaybee message exchange between pt and CHW dated 11/18/20, pt was approved for [employer] and currently has an account balance of $0. Pt also indicated that she is working with [facility] to pay for her medications.  - Pt has continued working with CHANTAL Blackmon for psychotherapy. Last visit on 11/13/20. Upcoming visit scheduled for 12/4/20.  - Last outreach by CHW on 11/18/20. During this conversation, no new goals or concerns were identified.      Assessment: All previous goals completed. Enrollment status adjusted.      Plan/Recommendations:   1.) Pt will be transitioned to [facility]3] maintenance at this time.   - If no new goals/concerns identified at next outreach in 2 months (on/around 1/24/21), CHW will route chart to [facility]3] SW for graduation review.

## 2021-06-13 NOTE — PROGRESS NOTES
Mental Health tele Visit Note    Patient: Angela Jones    : 1995 MRN: [id number]    Date: 2020   Start time: 3:00pm   Stop Time: 3:45pm   Session # 35    Chief Complaint   Patient presents with      Follow Up     Psychotherapy follow-up visit for anxiety, depression, autism, ADHD       Consent:  The patient has verbally consented to: the potential risks and benefits of telemedicine (video visit) versus in person care; bill my insurance or make self-payment for services provided; and responsibility for payment of non-covered services.   Mode of Communication:  Video Conference via [employer] sent via email link ([email or web address])  Session Type: Patient is participating in a video visit for mental health    Telemedicine Visit: The patient's condition can be safely assessed and treated via synchronous audio and visual telemedicine encounter.    Reason for Telemedicine Visit: Services only offered telehealth  Originating Site (Patient Location): Patient's home  Distant Site (Provider Location): Provider Remote Setting  As the provider I attest to compliance with applicable laws and regulations related to telemedicine.  Those present for this visit include patient and therapist.    Follow up in regards to ongoing symptom management of anxiety and depression in addition to barriers associated with Autism Spectrum Disorder.    New symptoms or complaints: no new symptoms    Functional Impairment:   Personal: 3  Family: 1  Work: 2  Social:2        ASSESSMENT: Current Emotional / Mental Status (status of significant symptoms):              Risk status (Self / Other harm or suicidal ideation)              Patient denies risks to personal safety              Patient denies current or recent suicidal ideation or behaviors.              Patient denies current or recent homicidal ideation or behaviors.              Patient denies current or recent self injurious behavior or ideation.              Patient  denies other safety concerns.              Patient denies changes to risk factors              Patient denies changes to protective factors              Recommended that patient call 911 or go to the local ED should there be a change in any of these risk factors.                Attitude:                                   Cooperative               Orientation:                             x3              Speech                          Rate / Production:       Pressured                           Volume:                       Normal               Mood:                                      Anxious  Normal              Thought Content:                    Clear  Perservative               Thought Form:                        Coherent  Logical               Insight:                                     Good     Patient's impression of their current status:   Patient's impression is that she has been feeling more depressed. She notes decreased appetite, anhedonia, fatigue and low energy.   She has difficulty completing organizational type tasks.   She had an intake appointment for OT this week but won't start services until 2021.    She is getting a colonoscopy next week and is also meeting with psychiatry. She plans to attend the autism support group next Thursday as well.      Therapist impression of patients current state:   Patient easily engages in dialogue.  She is receptive to therapist feedback and recommendations. Her mental health symptoms continue to fluctuate, compounded by physical health complications.     Type of psychotherapeutic technique provided: Client centered and CBT    Progress toward short term goals: Progress as expected, with patient applying learned skills outside of session and following through with treatment recommendations.     Review of long term goals:   Not done at today's visit  Treatment plan updated on 9/24/20      Diagnosis:  1. JOSHUA (generalized anxiety disorder)    2. Autism spectrum  disorder    3. Attention deficit hyperactivity disorder (ADHD), unspecified ADHD type    4. Current moderate episode of major depressive disorder, unspecified whether recurrent (H)        Plan and Follow up:   Therapist and patient agreed to participate in weekly video visits to address symptoms of anxiety - this will adjust some in the coming weeks due to the holidays.    Patient encouraged to do the following for homework/continued practice of therapy skills:  1. Stick to morning routine and start the day on a positive note.  2. Get out at least once a day, for at least thirty minutes.    3. Find some time to move each day, again daily for at least thirty minutes.   4. Lower expectations and practice radical self-acceptance.   5. Practice deep breathing   6. Use positive self-talk and daily affirmations     Discharge Criteria/Planning: Client has chronic symptoms and ongoing therapy for maintenance stability recommended.    I have reviewed the note as documented above.  This accurately captures the substance of my conversation with the patient.    Performed and documented by CHANTAL Blackmon LICSW
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
## 2021-06-13 NOTE — PROGRESS NOTES
Clinic Care Coordination Contact     Patient has completed all goals with Clinic Care Coordination.     Please review the chart and confirm if Maintenance is approved.     - Approved or [employer] Application and she will need to reapply every 6 months while she needs it.    - Working with Neeraj for her medications at this time due to not having health insurance.      CHW Outreach: 2 months    CHW Plan: Review for Graduation
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
## 2021-06-14 NOTE — PROGRESS NOTES
Mental Health tele Visit Note    Patient: Angela Jones    : 1995 MRN: [id number]    Date: 2021   Start time: 3:00pm   Stop Time: 3:45pm   Session # 3    Chief Complaint   Patient presents with      Follow Up     Psychotherapy follow-up visit for anxiety, depression and ASD       Consent:  The patient has verbally consented to: the potential risks and benefits of telemedicine (video visit) versus in person care; bill my insurance or make self-payment for services provided; and responsibility for payment of non-covered services.   Mode of Communication:  Video Conference via [employer] sent via email link ([email or web address])  Session Type: Patient is participating in a video visit for mental health    Telemedicine Visit: The patient's condition can be safely assessed and treated via synchronous audio and visual telemedicine encounter.    Reason for Telemedicine Visit: Services only offered telehealth  Originating Site (Patient Location): Patient's home  Distant Site (Provider Location): Provider Remote Setting  As the provider I attest to compliance with applicable laws and regulations related to telemedicine.  Those present for this visit include patient and therapist.    Follow up in regards to ongoing symptom management of anxiety and depression in addition to barriers associated with Autism Spectrum Disorder.    New symptoms or complaints: no new symptoms    Functional Impairment:   Personal: 3  Family: 1  Work: 2  Social:2        ASSESSMENT: Current Emotional / Mental Status (status of significant symptoms):              Risk status (Self / Other harm or suicidal ideation)              Patient denies risks to personal safety              Patient denies current or recent suicidal ideation or behaviors.              Patient denies current or recent homicidal ideation or behaviors.              Patient denies current or recent self injurious behavior or ideation.              Patient denies  "other safety concerns.              Patient denies changes to risk factors              Patient denies changes to protective factors              Recommended that patient call 911 or go to the local ED should there be a change in any of these risk factors.                Attitude:                                   Cooperative               Orientation:                             x3              Speech                          Rate / Production:       Pressured                           Volume:                       Normal               Mood:                                      Anxious  Normal              Thought Content:                    Clear  Perservative               Thought Form:                        Coherent  Logical               Insight:                                     Good     Patient's impression of their current status:   Patient's impression is that for the last two weeks, nearly every day, feeling nauseous and dizzy. She's not sure what's going on. She states, \"I'm just sick of not feeling well all of the time.\" She does have an appointment on Monday with her PCP.   She is also frustrated with lack of response from various resources.   Her  has been driving her to OT. She has asked her mom to come stay with them for a bit to help her out. She feels like everything in the house is messy because she is too exhausted to clean.     Therapist impression of patients current state:   Patient easily engages in dialogue and displays fair insight into her underlying mental health symptoms. She does present with flat affect today, seemingly more exhausted compared to her presentation last week. Therapist supports her plan to see her PCP on Monday.       Type of psychotherapeutic technique provided: Client centered and CBT    Progress toward short term goals: Progress as expected, with patient applying learned skills outside of session and following through with treatment recommendations. However, " poor progress in regards to overall health status that does not seem to be improving.     Review of long term goals:   Not done at today's visit   Treatment plan updated on 1/15/21      Diagnosis:  1. Autism spectrum disorder    2. Attention deficit hyperactivity disorder (ADHD), other type    3. Moderate episode of recurrent major depressive disorder (H)    4. Generalized anxiety disorder        Plan and Follow up:   Therapist and patient agreed to reduce session frequency to accommodate for patient's increased weekly tasks including OT and Autism support group - she will switch to participating in therapy every other week as of today      Discharge Criteria/Planning: Client has chronic symptoms and ongoing therapy for maintenance stability recommended.    I have reviewed the note as documented above.  This accurately captures the substance of my conversation with the patient.    Performed and documented by CHANTAL Blackmon LICSW

## 2021-06-14 NOTE — PROGRESS NOTES
"AURA Jones is a 25 y.o. female here for dizziness, achiness, fatigue. Some pain in \"limbs.\" Sometimes feels like she has to pass out. Gets heart palpitations. Hard to sleep sometimes. Has had these symptoms since October. The symptoms might go away for 3 days but they keep coming back.     At OT, her BP goes down and HR goes up. She was told she should be evaluated for POTS. If stands up too quickly or bends over, feels dizzy and nauseous.     Somewhat depressed. Doing therapy. Also seeing psychiatric NP Tila Garza. PHQ-9 is 15 today. No thoughts of self harm. Ms. garza recommended she see Neurology again since she has a tremor of unknown origin. She believes she has had it since she was a child. She has seen Neurology at [facility] before.     Also had colonoscopy and endoscopy which were both normal. Linzess made her nauseous and had uncontrollable shaking and tremors so she stopped it. However, she is not constipated anymore and has been off Linzess about 3 weeks. Threw up last week 4 days ago. Diarrhea not too often. She does have lactose and fructose intolerance. Daily bowel movements, sometimes has to strain but not usually.     She tried OTC esomeprazole because she had run out of famotidine and felt like it worked better for her reflux, so is wondering if she can continue this.   ?  O  /58 (Patient Site: Left Arm, Patient Position: Sitting, Cuff Size: Adult Regular)   Pulse 75   Temp 98.3  F (36.8  C) (Oral)   Resp 16   Ht 5' 3.5\" (1.613 m)   Wt 117 lb 4 oz (53.2 kg)   SpO2 96%   BMI 20.44 kg/m     Vitals reviewed. Nursing note reviewed.  General Appearance: Pleasant and alert, in no acute distress  HEENT: mucous membranes moist  CV: RRR, no murmur, rubs, gallops  Resp: No respiratory distress. Clear to auscultation bilaterally. No wheezes, rales, rhonchi  Ext: No peripheral edema, good distal perfusion  Skin: warm, dry, intact, no rash noted  Neuro: intention tremor of hands present. no " focal deficits, CNs II-XII normal.   Psych: mood and affect are normal.    A/P   was seen today for follow-up, dizziness and nausea.    Diagnoses and all orders for this visit:    Lightheadedness: since she reports some dizziness with a sensation of her heart racing, will check the following including 2 week heart monitor. Also encouraged her to see her neurologist again and mention these symptoms to them. Will follow up after these results are back to discuss next steps.   -     HOLDEN Monitor Hook-Up; Future  -     Thyroid Stimulating Hormone (TSH)  -     Basic Metabolic Panel  -     HM2(CBC w/o Differential)  -     Vitamin D, Total (25-Hydroxy)    Palpitations  -     Thyroid Stimulating Hormone (TSH)  -     Basic Metabolic Panel  -     HM2(CBC w/o Differential)  -     Vitamin D, Total (25-Hydroxy)    Essential tremor: it is certainly unusual that she has a tremor at her young age- as above, recommended seeing Neurology again.   -     Ambulatory referral to Neurology    Irritable bowel syndrome with both constipation and diarrhea: her bowel habits have improved in the past several months. It seems as though she had a virus that caused diarrhea and then constipation, but things have regulated. Explained I would not recommend taking esomeprazole on a long-term basis due to risk of malabsorption. She feels like famotidine is satisfactory and will continue this.   -     famotidine (PEPCID) 20 MG tablet; Take 1 tablet (20 mg total) by mouth 2 (two) times a day.    Mild persistent asthma, unspecified whether complicated: needs refill of ipratropium.   -     ipratropium (ATROVENT HFA) 17 mcg/actuation inhaler; Inhale 2 puffs 2 (two) times a day.      Moderate major depression (H): recommended she continue engaging with her psychiatric provider and therapist and her OT.   -     PHQ9 Depression Screen      Need for vaccination  -     Pneumococcal polysaccharide vaccine 23-valent 1 yo or older, subq/IM  -     Cancel:  Influenza, Seasonal Quad, PF =/> 6months    Need for immunization against influenza  -     Influenza, Recombinant, Inj, Quadrivalent, PF, 18+YRS    Return in about 1 month (around 2/25/2021) for recheck.      Options for treatment and follow-up care were reviewed with the patient and/or guardian. Angela Jones and/or guardian engaged in the decision making process and verbalized understanding of the options discussed and agreed with the final plan.    Marissa Walton MD

## 2021-06-14 NOTE — PATIENT INSTRUCTIONS - HE
Continue medications as prescribed  Have your pharmacy contact us for a refill if you are running low on medications (We may ask you to come into clinic to get a refill from the nurse  No Alcohol or drug use  No driving if sedated  Call the clinic with any questions or concerns   Reach out for help if you feel like hurting yourself or others ([facility] [phone]: [address] [facility], [phone] or [facility] Suicide Hotline [phone] , call 911 or go to nearest Emergency room    Follow up as directed, for your appointments, per your After Visit Summary Form.

## 2021-06-14 NOTE — PROGRESS NOTES
Angela Jones is a 25 y.o. female who is being evaluated via a billable video visit.      How would you like to obtain your AVS? Mail a copy.  If dropped from the video visit, the video invitation should be resent by: Send to e-mail at: [email or web address]  Will anyone else be joining your video visit? No      Video Start Time: 3:32 PM  Psychiatric  Out- Patient  Follow Up Progress Note  Date of visit:1/13/2021           Discussion of Care and Treatment Recommendations:   This is a 25 y.o. female with an extensive mental health history and diagnoses of ADHD Anxiety, autism spectrum, depression and Bipolar Disorder.  Patient reports a long history of mental health and seeing a psychiatrist since age 7 .  Patient presents to the clinic today for follow-up appointment.  Patient was last seen on 12/16/2020 to establish psychiatric care.      Last visit 10/16/2020  Recommendation at last visit .     1.  Continue current psychiatric medications-Trintellix 20 mg daily,Guanfacine 2mg daily   - ADHD, Mirtazapine 15 mg at bedtime  - Gabapentin  300 mg two times a day - anxiety   2.  Highly recommend continue with psychotherapy  3.  Medical issues including tremors and dizziness-defer to primary care and neurologist  4.  Patient to sign EDDI so we can receive records from neurology.  She stated she will be faxing those records to us directly.  5.  Return to the clinic in 6 to 8 weeks call in between visits any questions or concerns.  Crisis numbers provided  Patient and I reviewed diagnosis and treatment plan and patient agrees with following recommendations:  Ongoing education given regarding diagnostic and treatment options with adequate verbalization of understanding.  Plan      1.  Continue current psychiatric medications-Trintellix 20 mg daily,Guanfacine 2mg daily   - ADHD, Mirtazapine 15 mg at bedtime  - Gabapentin  300 mg two times a day - anxiety   2.  Highly recommend continue with psychotherapy  3.  Medical issues  "including tremors and dizziness-defer to primary care and neurologist  4.  Patient to sign EDDI so we can receive records from neurology.  She stated she will be faxing those records to us directly.  5.  Return to the clinic in 6 to 8 weeks call in between visits any questions or concerns.  Crisis numbers provided         DIagnoses:      Generalized anxiety disorder  Autism spectrum disorder  Major depressive episode  Hx ADHD unspecifiedr  Hx  PTSD    Patient Active Problem List   Diagnosis     Mild persistent asthma     Irritable bowel syndrome with both constipation and diarrhea     JOSHUA (generalized anxiety disorder)     Autism spectrum disorder             Chief Complaint / Subjective:    Chief complaint: \" I am ok\"     History of Present Illness:  Per patient statement-she reports that she has been falling and has been working with physical therapy and the positional therapy and has notified her of seeing urology but has no appointment yet.  He has brought in some extra bouts of anxiety and is wondering whether she could get some anxiety medications.  She is currently taking gabapentin 300 mg twice daily for anxiety which has been prescribed to her by her previous psychiatric provider.  In addition she is also taking guanfacine 2 mg for ADHD.  We discussed that ADHD medications tends to sometimes aggravate anxiety.  Rather than discontinue the medication I did recommend she tries to skip a dose to see whether her anxiety will decrease.  We also discussed that at this time I would recommend waiting until she had a neurological evaluation before making any further medication adjustments.  She endorsed understanding.  She denies all other psychiatric issues she offers no other concerns.  Will defer all her neurological issues to neurology.  She will let neurology know to fax blood findings twice a week and have some records to work with.  She has a good support system and also find psychotherapy helpful.  We will " "see her after that she is a neurologist in approximately 8 weeks-recommend she calls in between visits with any questions or concerns.  Mental Status Examination:   Appearance: unable to assess  Orientation: Patient alert and oriented to person, place, time, and situation  Reliability:  Patient appears to be an adequate historian.    Behavior: unable to assess  Speech: Speech is spontaneous and coherent, with a normal rate, rhythm and tone.    Language:There are no difficulties with expressive or receptive language as observed throughout the interview.    Mood: Described as \"ok\".    Affect: unable to assess  Judgement: Able to make basic decision regarding safety.  Insight: Good awareness of physical and mental health conditions and aware of needs around care for these.  Gait and station: unable to assess  Thought process: Logical   Thought content: No evidence of delusions or paranoia.  No thoughts of self harm or suicide. No thoughts of harming others.  Associations: Connected  Fund of knowledge: Average  Attention / Concentration: Able to remain focused during the interview with minimal distractibility or need for redirection.  Short Term Memory: Grossly intact as evidence by client recalling themes and ideas discussed.  Long Term Memory: Intact  Motor Status: unable to asse    Drug/treatment history and current pattern of use:   Denies      Medication changes: See Above   Medication adherence: compliant  Medication side effects: absent  Information about medications: Side effects, benefits and alternative treatments discussed and patient agrees .    Psychotherapy: Supportive therapy day-to-day living    Education: Diet, exercise, abstinence from drugs and alcohol, patient will not drive if sedated and medications or  under influence of any substance    Lab Results:   Personally reviewed and discussed with the patient    No results found for: WBC, HGB, HCT, PLT, CHOL, TRIG, HDL, LDLDIRECT, ALT, AST, NA, K, CL, " CREATININE, BUN, CO2, TSH, PSA, INR, GLUF, HGBA1C, MICROALBUR    Vital signs:  There were no vitals taken for this visit.  Telemedicine visit-no vital signs completed  Allergies: Adhesive tape-silicones, Corn syrup, Other allergy (see comments), and Azithromycin         Medications:       Current Outpatient Medications on File Prior to Visit   Medication Sig Dispense Refill     amitriptyline (ELAVIL) 50 MG tablet Take 1 tablet (50 mg total) by mouth at bedtime. 90 tablet 4     cholecalciferol, vitamin D3, 5,000 unit Tab Take 10,000 Units by mouth.       diphenhydrAMINE (BENADRYL) 50 MG capsule Take 50 mg by mouth every 6 (six) hours as needed for itching.       famotidine (PEPCID) 20 MG tablet Take 1 tablet (20 mg total) by mouth 2 (two) times a day. 180 tablet 3     gabapentin (NEURONTIN) 300 MG capsule Take 1 capsule (300 mg total) by mouth 2 (two) times a day. 180 capsule 1     guanFACINE 2 mg Tb24 Take 2 mg by mouth daily. 90 tablet 3     hyoscyamine (LEVSIN/SL) 0.125 mg SL tablet Take 0.125 mg by mouth.       inhalational spacing device Spcr Inhale 2 Inhalation 2 (two) times a day. To use with Symbiocort inhaler. 1 each 0     ipratropium (ATROVENT HFA) 17 mcg/actuation inhaler Inhale 2 puffs 2 (two) times a day. 1 Inhaler 11     levonorgestrel (MIRENA) 20 mcg/24 hr (5 years) IUD 20 mcg by Intrauterine route.       melatonin 10 mg Tab Take 10 mg by mouth.       metoclopramide (REGLAN) 5 MG tablet Take 1 tablet (5 mg total) by mouth 3 (three) times a day as needed for nausea. 90 tablet 6     mirtazapine (REMERON) 15 MG tablet Take 1 tablet (15 mg total) by mouth at bedtime. 90 tablet 3     ondansetron (ZOFRAN-ODT) 4 MG disintegrating tablet Take 1 tablet (4 mg total) by mouth every 8 (eight) hours as needed for nausea. 30 tablet 0     vortioxetine (TRINTELLIX) 20 mg Tab tablet Take 1 tablet (20 mg total) by mouth daily. 30 tablet 11     No current facility-administered medications on file prior to visit.                  Review of Systems:      ROS:    Subjective Data Only- Tele-Health Visit    10 point ROS was negative except for the items listed in HPI.      Coordination of Care:   More than 30 minutes spent on this visit  with more than 50% of time spent on coordination of care including: Educating patient about diagnosis, prognosis, side effects and benefits of medications, diet, exercise.  Time also spent providing supportive therapy regarding above issues.    This note was created using a dictation system. All typing errors or contextual distortion is unintentional and software inherent.    Video-Visit Details    Type of service:  Video Visit    Video End Time (time video stopped): 3:46 PM  Originating Location (pt. Location): Home    Distant Location (provider location):  [facility]     Platform used for Video Visit: Other: my chart

## 2021-06-14 NOTE — PROGRESS NOTES
This video/telephone visit will be conducted via a call between you and your physician/provider. We have found that certain health care needs can be provided without the need for an in-person physical exam. This service lets us provide the care you need with a video /telephone conversation. If a prescription is necessary we can send it directly to your pharmacy. If lab work is needed we can place an order for that and you can then stop by our lab to have the test done at a later time.    Just as we bill insurance for in-person visits, we also bill insurance for video/telephone visits. If you have questions about your insurance coverage, we recommend that you speak with your insurance company.    Patient has given verbal consent for video/Telephone visit? yes  Patient would like the video visit invitation sent by: Text to cell phone: ELIE Send to email: [employer] or SIP CALL to:  ELIE HERNANDEZ/GILSON : Redd MAYES LPN    Patient verified allergies, medications and pharmacy via phone and [employer]. Patient states she is ready for visit.    [facility]    [employer] must be enabled to view this report.  Angela Jones, 25F  Powered by       Cardiovascular Decisions Report      Resources  Date: 01/13/2021  Download PDF        Angela Jones  Risk Indicators Armen: 000    Sed: 000    Stim: 000    ORS: 000    Additional Risk Indicators [ 0 ]  Graphs    Summary  Rx Data  PRESCRIPTIONS  Total Prescriptions: 7   Total Private Pay: 0   Fill Date ID Written Sold Drug Qty Days Prescriber Rx # Pharmacy Refill Daily Dose * Pymt Type [facility]   01/04/2021 1 12/23/2020 01/04/2021 Gabapentin 300 Mg Capsule  60.00 30 Th Ho [id number] Sup (3713) 0/1  [facility] [place]   10/13/2020 1 05/28/2020 10/14/2020 Gabapentin 300 Mg Capsule  180.00 90 Ev Pes [id number] [facility] (6396[place]   05/30/2020 1 05/28/2020 06/04/2020 Gabapentin 300 Mg Capsule  180.00 90 Ev Pes [id number] Sup (3713[place] 0/1  [place]   05/12/2020 1 05/20/2019 05/14/2020 Gabapentin 100 Mg Capsule  180.00 60 Ta Gra [id number] Sup (3713[place]  6/6  [place]   05/05/2020 1 05/05/2020 05/05/2020 Gabapentin 300 Mg Capsule  60.00 30 Da Lar [id number] Sup (3713[place] 0/1  [place]   03/04/2020 1 05/20/2019 03/04/2020 Gabapentin 100 Mg Capsule  180.00 60 Ta Gra [id number] Sup (3713[place] 5/6  [place]   02/06/2020 1 05/20/2019 02/07/2020 Gabapentin 100 Mg Capsule  90.00 30 Ta Gra [id number] Sup (3713[place] 4/6  [place]     ________________________________________  Medications Phoned  to Pharmacy [] yes [x]no  Name of Pharmacist:  List Medications, including dose, quantity and instructions    Medications ordered this visit were e-scribed.  Verified by order class [] yes  [x] no    Medication changes or discontinuations were communicated to patient's pharmacy: [] yes  [x] no    Dictation completed at time of chart check: [x] yes  [] no    I have checked the documentation for today s encounters and the above information has been reviewed and completed.

## 2021-06-14 NOTE — PROGRESS NOTES
Mental Health tele Visit Note    Patient: Angela Jones    : 1995 MRN: [id number]    Date: 2021   Start time: 3:00pm   Stop Time: 3:45pm   Session # 1    Chief Complaint   Patient presents with      Follow Up     Psychotherapy follow-up visit for anxiety and ASD       Consent:  The patient has verbally consented to: the potential risks and benefits of telemedicine (video visit) versus in person care; bill my insurance or make self-payment for services provided; and responsibility for payment of non-covered services.   Mode of Communication:  Video Conference via [employer] sent via email link ([email or web address])  Session Type: Patient is participating in a video visit for mental health    Telemedicine Visit: The patient's condition can be safely assessed and treated via synchronous audio and visual telemedicine encounter.    Reason for Telemedicine Visit: Services only offered telehealth  Originating Site (Patient Location): Patient's home  Distant Site (Provider Location): Provider Remote Setting  As the provider I attest to compliance with applicable laws and regulations related to telemedicine.  Those present for this visit include patient and therapist.    Follow up in regards to ongoing symptom management of anxiety and depression in addition to barriers associated with Autism Spectrum Disorder.    New symptoms or complaints: no new symptoms    Functional Impairment:   Personal: 3  Family: 1  Work: 2  Social:2        ASSESSMENT: Current Emotional / Mental Status (status of significant symptoms):              Risk status (Self / Other harm or suicidal ideation)              Patient denies risks to personal safety              Patient denies current or recent suicidal ideation or behaviors.              Patient denies current or recent homicidal ideation or behaviors.              Patient denies current or recent self injurious behavior or ideation.              Patient denies other safety  "concerns.              Patient denies changes to risk factors              Patient denies changes to protective factors              Recommended that patient call 911 or go to the local ED should there be a change in any of these risk factors.                Attitude:                                   Cooperative               Orientation:                             x3              Speech                          Rate / Production:       Pressured                           Volume:                       Normal               Mood:                                      Anxious  Normal              Thought Content:                    Clear  Perservative               Thought Form:                        Coherent  Logical               Insight:                                     Good     Patient's impression of their current status:   Patient's impression is that she felt sort of \"down\" over Marvel. Although, she tried to make the best of it by baking lots of cookies.   She initiated care with a psychiatrist. She is still needing to set up an appointment with neurology. She started OT this week. She is struggling to figure out how to get rides to this service. They are getting a new puppy.     Therapist impression of patients current state:   Patient easily engages in dialogue and displays fair insight into her underlying mental health symptoms. She is receptive to therapist support around managing depression symptoms. She continues to experience stress related to being unable to obtain appropriate services.     Type of psychotherapeutic technique provided: Client centered and CBT    Progress toward short term goals: Progress as expected, with patient applying learned skills outside of session and following through with treatment recommendations.     Review of long term goals:   Not done at today's visit  Treatment plan updated on 9/24/20      Diagnosis:  1. Moderate episode of recurrent major depressive disorder (H) "    2. JOSHUA (generalized anxiety disorder)    3. Autism spectrum disorder    4. Attention deficit hyperactivity disorder (ADHD), unspecified ADHD type        Plan and Follow up:   Therapist and patient agreed to participate in weekly video visits    Patient encouraged to do the following for homework/continued practice of therapy skills:  1. Stick to morning routine and start the day on a positive note.  2. Get out at least once a day, for at least thirty minutes.    3. Find some time to move each day, again daily for at least thirty minutes.   4. Lower expectations and practice radical self-acceptance.   5. Practice deep breathing   6. Use positive self-talk and daily affirmations     Discharge Criteria/Planning: Client has chronic symptoms and ongoing therapy for maintenance stability recommended.    I have reviewed the note as documented above.  This accurately captures the substance of my conversation with the patient.    Performed and documented by CHANTAL Blackmon LICSW

## 2021-06-14 NOTE — PROGRESS NOTES
Clinic Care Coordination Contact  [facility]/Voicemail       Clinical Data: [facility] Outreach    Outreach attempted x 2.  Left message on patient's voicemail with call back information and requested return call.    Plan: CHW will make another attempt to reach the patient via phone or MyChart.    [facility] outreach on 3/1/2021

## 2021-06-14 NOTE — PROGRESS NOTES
Outpatient Mental Health Treatment Plan    Name:  Angela Jones  :  1995  MRN:  [id number]    Treatment Plan:  Updated Treatment Plan  Intake/initial treatment plan date:    Intake: 2018  Benefit and risks and alternatives have been discussed: Yes  Is this treatment appropriate with minimal intrusion/restrictions: Yes  Estimated duration of treatment:  Approximately 10 sessions.  Anticipated frequency of services:  Every week  Necessity for frequency: This frequency is needed to establish therapeutic goals and for continuity of care in order to monitor progress.  Necessity for treatment: To address cognitive, behavioral, and/or emotional barriers in order to work toward goals and to improve quality of life.    Session Type: Patient is presenting for an Individual session.via video visit due to COVID19    Plan:           ?   ? Anxiety    Goal:  Decrease average anxiety level from 3 to 2.   Strategies: ? [x] Learn and practice relaxation techniques and other coping strategies (e.g., thought stopping, reframing, meditation)     ? [x] Increase involvement in meaningful activities     ? [x] Discuss sleep hygiene     ? [x] Explore thoughts and expectations about self and others     ? [x] Identify and monitor triggers for panic/anxiety symptoms     ? [x] Implement physical activity routine (with physician approval)     ? [x] Consider introduction of bibliotherapy and/or videos     ? [x] Continue compliance with medical treatment plan (or explore barriers)   ?Degree to which this is a problem: 2  Degree to which goal is met: 2  Date of Review: 2021       ? Depression    Goal:  Decrease average depression level from 3 to 2.   Strategies:    ?[x] Decrease social isolation     [x] Increase involvement in meaningful activities     ?[x] Discuss sleep hygiene     ?[x] Explore thoughts and expectations about self and others     ?[x] Process grief (loss of significant person, independence, role, etc.)     ?[x]  "Assess for suicide risk     ?[x] Implement physical activity routine (with physician approval)     [x] Consider introduction of bibliotherapy and/or videos     [x] Continue compliance with medical treatment plan (or explore barriers)?  Degree to which this is a problem: 2  Degree to which goal is met: 2  Date of Review: April 2021    Additional goals:  Improve self-care  Improve organization skills (\"life skills\")  Increase independence in daily functioning          Functional Impairment:  1=Not at all/Rarely  2=Some days  3=Most Days  4=Every Day    Personal : 4  Family : 3  Social : 2   Work/school : 4    Diagnosis (non-Axial as defined in DSM-5)  1. Generalized Anxiety Disorder      2. Current moderate episode of major depressive disorder, unspecified whether recurrent (H)      ADHD  Autism Spectrum Disorder    Provisional Diagnosis (list only- no explanation needed)  Bipolar Disorder  OCD  PTSD      Clinical assessments and measures completed:.   WHODAS 2.0 12-item version: 7    Scores presented in qualifiers to represent level of disability.  MILD Problem (slight, low, ...) 5-24%  H1= 30  H2= 2  H3= 18    PHQ-9: 13 (2/8/19)  PHQ-9: 14 (6/14/19)  PHQ-9: 11 (9/20/19)  PHQ-9: 13 (12/13/19)  PHQ-9: 9 (9/24/20)  PHQ-9: 21 (12/16/20)     JOSHUA-7: 7 (2/8/19)  JOSHUA-7: 6 (6/14/19)  JOSHUA-7: 7 (9/20/19)  JOSHUA-7: 10 (12/13/19)  JOSHUA-7: 12 (9/24/20)  JOSHUA-7: 11 (12/16/20)     C-SSRS: Low to moderate risk. Patient does have history of self-harm and suicidal ideation with intent. No current ideation or intent.         Strengths:  Articulate, open-minded and willing to participate in mental health services. Good support from  and family. Good access to care and resourcefulness.   Limitations:  Trouble with self-control; intrinstic motivation; executive dysfunction with ADHD. Patient also limited by seizure-like activity with no clear understanding of origin. Patient recently diagnosed with Autism Spectrum Disorder.   Cultural " Considerations: Patient is a 25  female. Identifies as Protestant but more interested in science. Grew up in small town of [place] and always had family close by. There are no significant ethnic, cultural or Anabaptist factors that may be relevant for therapy.     Persons responsible for this plan: Patient and Provider   Patient not able to sign due to virtual visit            Psychotherapist Signature           Patient Signature:              Guardian Signature             Provider: Performed and documented by CHANTAL Blackmon   Date:  1/15/2021

## 2021-06-14 NOTE — PROGRESS NOTES
Mental Health tele Visit Note    Patient: Angela Jones    : 1995 MRN: [id number]    Date: 1/15/2021   Start time: 3:00pm   Stop Time: 3:50pm   Session # 2    Chief Complaint   Patient presents with      Follow Up     Psychotherapy follow-up visit for depression, anxiety and ASD       Consent:  The patient has verbally consented to: the potential risks and benefits of telemedicine (video visit) versus in person care; bill my insurance or make self-payment for services provided; and responsibility for payment of non-covered services.   Mode of Communication:  Video Conference via Pairin sent via email link ([email or web address])  Session Type: Patient is participating in a video visit for mental health    Telemedicine Visit: The patient's condition can be safely assessed and treated via synchronous audio and visual telemedicine encounter.    Reason for Telemedicine Visit: Services only offered telehealth  Originating Site (Patient Location): Patient's home  Distant Site (Provider Location): Provider Remote Setting  As the provider I attest to compliance with applicable laws and regulations related to telemedicine.  Those present for this visit include patient and therapist.    Follow up in regards to ongoing symptom management of anxiety and depression in addition to barriers associated with Autism Spectrum Disorder.    New symptoms or complaints: no new symptoms    Functional Impairment:   Personal: 3  Family: 1  Work: 2  Social:2        ASSESSMENT: Current Emotional / Mental Status (status of significant symptoms):              Risk status (Self / Other harm or suicidal ideation)              Patient denies risks to personal safety              Patient denies current or recent suicidal ideation or behaviors.              Patient denies current or recent homicidal ideation or behaviors.              Patient denies current or recent self injurious behavior or ideation.              Patient denies  "other safety concerns.              Patient denies changes to risk factors              Patient denies changes to protective factors              Recommended that patient call 911 or go to the local ED should there be a change in any of these risk factors.                Attitude:                                   Cooperative               Orientation:                             x3              Speech                          Rate / Production:       Pressured                           Volume:                       Normal               Mood:                                      Anxious  Normal              Thought Content:                    Clear  Perservative               Thought Form:                        Coherent  Logical               Insight:                                     Good     Patient's impression of their current status:   Patient's impression is that this week has been tiring. She has been doing OT 3x/week. She is trying to figure out transportation to and from OT - she is working with her CM for [employer] mobility. She feels badly that her  has to take care of her. She feels frustrated about the political climate while her  feels more anxious. She shares that the mood in the house feels tense.      Therapist impression of patients current state:   Patient easily engages in dialogue and displays fair insight into her underlying mental health symptoms. She is receptive to therapist support using thought re-framing skills to manage \"feeling like an inconvenience.\"  Patient and therapist reworked her schedule to accommodate other treatments occurring so that she does not feel overwhelmed.     Type of psychotherapeutic technique provided: Client centered and CBT    Progress toward short term goals: Progress as expected, with patient applying learned skills outside of session and following through with treatment recommendations.     Review of long term goals:   Treatment plan updated "     PHQ-9 = 21 (12/16/20)  JOSHUA-7 = 11 (12/16/20)      Diagnosis:  1. Autism spectrum disorder    2. Attention deficit hyperactivity disorder (ADHD), other type    3. Moderate episode of recurrent major depressive disorder (H)    4. Generalized anxiety disorder        Plan and Follow up:   Therapist and patient agreed to participate in weekly video visits    Patient encouraged to do the following for homework/continued practice of therapy skills:  1. Stick to morning routine and start the day on a positive note.  2. Get out at least once a day, for at least thirty minutes.    3. Find some time to move each day, again daily for at least thirty minutes.   4. Lower expectations and practice radical self-acceptance.   5. Practice deep breathing   6. Use positive self-talk and daily affirmations   *continue OT - 3 days/week  *will see therapist on weeks when she does not have the support group    Discharge Criteria/Planning: Client has chronic symptoms and ongoing therapy for maintenance stability recommended.    I have reviewed the note as documented above.  This accurately captures the substance of my conversation with the patient.    Performed and documented by CHANTAL Blackmon LICSW

## 2021-06-14 NOTE — PROGRESS NOTES
Clinic Care Coordination Contact  [facility]/Voicemail       Clinical Data: CHW Outreach    Outreach attempted x 1.  Sent TORIA message.    Plan: CHW will make another attempt to reach the patient via TORIA.    CHW outreach on 2/2/2021

## 2021-06-15 ENCOUNTER — COMMUNICATION - HEALTHEAST (OUTPATIENT)
Dept: FAMILY MEDICINE | Facility: CLINIC | Age: 26
End: 2021-06-15

## 2021-06-15 DIAGNOSIS — F41.1 GAD (GENERALIZED ANXIETY DISORDER): ICD-10-CM

## 2021-06-15 NOTE — PATIENT INSTRUCTIONS - HE
Dear Angela Jones,    Your symptoms show that you may have coronavirus (COVID-19). This illness can cause fever, cough and trouble breathing. Many people get a mild case and get better on their own. Some people can get very sick.    Will I be tested for COVID-19?  We would like to test you for Covid-19 virus. I have placed orders for this test.     To schedule: go to your [employer] home page and scroll down to the section that says  You have an appointment that needs to be scheduled  and click the large green button that says  Schedule Now  and follow the steps to find the next available openings.    If you are unable to complete these [employer] scheduling steps, please call [phone] to schedule your testing.     Return to work/school/ guidance:  Please let your workplace manager and staffing office know when your quarantine ends     We can t give you an exact date as it depends on the above. You can calculate this on your own or work with your manager/staffing office to calculate this. (For example if you were exposed on 10/4, you would have to quarantine for 14 full days. That would be through 10/18. You could return on 10/19.)      If you receive a positive COVID-19 test result, follow the guidance of the those who are giving you the results. Usually the return to work is 10 (or in some cases 20 days from symptom onset.) If you work at [facility], you must also be cleared by Employee Occupational Health and Safety to return to work.        If you receive a negative COVID-19 test result and did not have a high risk exposure to someone with a known positive COVID-19 test, you can return to work once you're free of fever for 24 hours without fever-reducing medication and your symptoms are improving or resolved.      If you receive a negative COVID-19 test and If you had a high risk exposure to someone who has tested positive for COVID-19 then you can return to work 14 days after your last contact  with the positive individual    Note: If you have ongoing exposure to the covid positive person, this quarantine period may be more than 14 days. (For example, if you are continued to be exposed to your child who tested positive and cannot isolate from them, then the quarantine of 7-14 days can't start until your child is no longer contagious. This is typically 10 days from onset of the child's symptoms. So the total duration may be 17-24 days in this case.)    Sign up for [employer].   We know it's scary to hear that you might have COVID-19. We want to track your symptoms to make sure you're okay over the next 2 weeks. Please look for an email from [employer]--this is a free, online program that we'll use to keep in touch. To sign up, follow the link in the email you will receive. Learn more at [email or web address]    How can I take care of myself?    Get lots of rest. Drink extra fluids (unless a doctor has told you not to)    Take Tylenol (acetaminophen) or ibuprofen for fever or pain. If you have liver or kidney problems, ask your family doctor if it's okay to take Tylenol o ibuprofen    If you have other health problems (like cancer, heart failure, an organ transplant or severe kidney disease): Call your specialty clinic if you don't feel better in the next 2 days.    Know when to call 911. Emergency warning signs include:  o Trouble breathing or shortness of breath  o Pain or pressure in the chest that doesn't go away  o Feeling confused like you haven't felt before, or not being able to wake up  o Bluish-colored lips or face    Where can I get more information?  [facility] [employer] [place] - About COVID-19:   [email or web address]    CDC - What to Do If You're Sick:   www.cdc.gov/coronavirus/2019-ncov/about/steps-when-sick.html

## 2021-06-15 NOTE — PATIENT INSTRUCTIONS - HE
Dear Angela Jones,    Your symptoms show that you may have coronavirus (COVID-19). This illness can cause fever, cough and trouble breathing. Many people get a mild case and get better on their own. Some people can get very sick.    Will I be tested for COVID-19?  We would like to test you for Covid-19 virus. I have placed orders for this test.     To schedule: go to your [employer] home page and scroll down to the section that says  You have an appointment that needs to be scheduled  and click the large green button that says  Schedule Now  and follow the steps to find the next available openings.    If you are unable to complete these [employer] scheduling steps, please call [phone] to schedule your testing.     Return to work/school/ guidance:  Please let your workplace manager and staffing office know when your quarantine ends     We can t give you an exact date as it depends on the above. You can calculate this on your own or work with your manager/staffing office to calculate this. (For example if you were exposed on 10/4, you would have to quarantine for 14 full days. That would be through 10/18. You could return on 10/19.)      If you receive a positive COVID-19 test result, follow the guidance of the those who are giving you the results. Usually the return to work is 10 (or in some cases 20 days from symptom onset.) If you work at [facility], you must also be cleared by Employee Occupational Health and Safety to return to work.        If you receive a negative COVID-19 test result and did not have a high risk exposure to someone with a known positive COVID-19 test, you can return to work once you're free of fever for 24 hours without fever-reducing medication and your symptoms are improving or resolved.      If you receive a negative COVID-19 test and If you had a high risk exposure to someone who has tested positive for COVID-19 then you can return to work 14 days after your last contact  with the positive individual    Note: If you have ongoing exposure to the covid positive person, this quarantine period may be more than 14 days. (For example, if you are continued to be exposed to your child who tested positive and cannot isolate from them, then the quarantine of 7-14 days can't start until your child is no longer contagious. This is typically 10 days from onset of the child's symptoms. So the total duration may be 17-24 days in this case.)    Sign up for [employer].   We know it's scary to hear that you might have COVID-19. We want to track your symptoms to make sure you're okay over the next 2 weeks. Please look for an email from [employer]--this is a free, online program that we'll use to keep in touch. To sign up, follow the link in the email you will receive. Learn more at [email or web address]    How can I take care of myself?    Get lots of rest. Drink extra fluids (unless a doctor has told you not to)    Take Tylenol (acetaminophen) or ibuprofen for fever or pain. If you have liver or kidney problems, ask your family doctor if it's okay to take Tylenol o ibuprofen    If you have other health problems (like cancer, heart failure, an organ transplant or severe kidney disease): Call your specialty clinic if you don't feel better in the next 2 days.    Know when to call 911. Emergency warning signs include:  o Trouble breathing or shortness of breath  o Pain or pressure in the chest that doesn't go away  o Feeling confused like you haven't felt before, or not being able to wake up  o Bluish-colored lips or face    Where can I get more information?  [facility] [employer] [place] - About COVID-19:   [email or web address]    CDC - What to Do If You're Sick:   www.cdc.gov/coronavirus/2019-ncov/about/steps-when-sick.html

## 2021-06-15 NOTE — PROGRESS NOTES
Mental Health tele Visit Note    Patient: Angela Jones    : 1995 MRN: [id number]    Date: 2021   Start time: 3:00pm   Stop Time: 3:45pm   Session # 4    Chief Complaint   Patient presents with      Follow Up     Psychotherapy follow-up visit for depression, anxiety and ASD       Consent:  The patient has verbally consented to: the potential risks and benefits of telemedicine (video visit) versus in person care; bill my insurance or make self-payment for services provided; and responsibility for payment of non-covered services.   Mode of Communication:  Video Conference via [employer] technology sent via email link ([email or web address])  Session Type: Patient is participating in a video visit for mental health    Telemedicine Visit: The patient's condition can be safely assessed and treated via synchronous audio and visual telemedicine encounter.    Reason for Telemedicine Visit: Services only offered telehealth  Originating Site (Patient Location): Patient's home  Distant Site (Provider Location): Provider Remote Setting  As the provider I attest to compliance with applicable laws and regulations related to telemedicine.  Those present for this visit include patient and therapist.    Follow up in regards to ongoing symptom management of anxiety and depression in addition to barriers associated with Autism Spectrum Disorder.    New symptoms or complaints: no new symptoms    Functional Impairment:   Personal: 3  Family: 1  Work: 2  Social:2        ASSESSMENT: Current Emotional / Mental Status (status of significant symptoms):              Risk status (Self / Other harm or suicidal ideation)              Patient denies risks to personal safety              Patient denies current or recent suicidal ideation or behaviors.              Patient denies current or recent homicidal ideation or behaviors.              Patient denies current or recent self injurious behavior or ideation.              Patient denies  other safety concerns.              Patient denies changes to risk factors              Patient denies changes to protective factors              Recommended that patient call 911 or go to the local ED should there be a change in any of these risk factors.                Attitude:                                   Cooperative               Orientation:                             x3              Speech                          Rate / Production:       Pressured                           Volume:                       Normal               Mood:                                      Anxious  Normal              Thought Content:                    Clear  Perservative               Thought Form:                        Coherent  Logical               Insight:                                     Good     Patient's impression of their current status:   Patient's impression is that she still feels anxious all the time and has not noticed any improvements. She still feels nauseous and dizzy and learned that her heart rate spikes for no reason. They are getting a new puppy this weekend because their service dog is too old. Her mother has been staying with her for the past 3 weeks. She has been attending OT and finds this helpful. She has an upcoming appointment with [facility] for neurology.  She is still working with her CM and seeking a care needs assessment.     Therapist impression of patients current state:   Patient easily engages in dialogue and displays fair insight into her underlying mental health symptoms. She continues to experience medical and physical health problems impacting her mental health status. She is following through with provider recommendations and is receptive to feedback.       Type of psychotherapeutic technique provided: Client centered and CBT    Progress toward short term goals: Progress as expected, with patient returning for a scheduled visit and following through with treatment recommendations.      Review of long term goals:   Not done at today's visit   Treatment plan updated on 1/15/21      Diagnosis:  1. Autism spectrum disorder    2. Attention deficit hyperactivity disorder (ADHD), other type    3. Moderate episode of recurrent major depressive disorder (H)    4. Generalized anxiety disorder        Plan and Follow up:   Patient will return for therapy in 2 weeks    Patient encouraged to do the following for homework/continued practice of therapy skills:  1. Stick to morning routine   2. Complete daily chores   3. Incorporate daily gratitude practice   4. Practice radical self-acceptance.   5. Practice deep breathing     Discharge Criteria/Planning: Client has chronic symptoms and ongoing therapy for maintenance stability recommended.    I have reviewed the note as documented above.  This accurately captures the substance of my conversation with the patient.    Performed and documented by CHANTAL Blackmon LICSW

## 2021-06-15 NOTE — PROGRESS NOTES
Clinic Care Coordination Contact     Situation: Pt chart reviewed by  care coordinator.     Background:   - Most recent  assessment completed on 6/1/20.  - Moved to maintenance on 11/24/20. See SW note from this date for further detail regarding course of [facility] enrollment.  - Pt initially enrolled for assistance applying for health insurance and picking up 90-day-supplies of her medications (in June 2020).  - Pt has continued working with CHANTAL Blackmon for psychotherapy. Last visit on 1/22/21. Upcoming visit scheduled for 2/19/21.  - Pt currently has no active goals and was due for graduation review in January. Due to her being unreachable, maintenance was extended for one additional month.   - Last attempted outreaches by CHW were on 1/22/21 (via phone) and 2/1/21 (via RealMatch[employer]). CHW has not been able to reach pt via phone or MyChart. Due to lack of engagement, pt will be graduated from [facility] today.     Assessment: All previous goals completed. Episode resolved. Enrollment status adjusted. CCC team members removed from Care Team.     Plan/Recommendations:   1.) Pt will be graduated from [facility] at this time.

## 2021-06-16 NOTE — PROGRESS NOTES
Mental Health tele Visit Note    Patient: Angela Jones    : 1995 MRN: [id number]    Date: 2021   Start time: 3:02pm   Stop Time: 3:50pm   Session # 6    Chief Complaint   Patient presents with      Follow Up     Psychotherapy follow-up visit for ASD, depression and anxiety       Consent:  The patient has verbally consented to: the potential risks and benefits of telemedicine (video visit) versus in person care; bill my insurance or make self-payment for services provided; and responsibility for payment of non-covered services.   Mode of Communication:  Video Conference via [employer] sent via email link ([email or web address])  Session Type: Patient is participating in a video visit for mental health    Telemedicine Visit: The patient's condition can be safely assessed and treated via synchronous audio and visual telemedicine encounter.    Reason for Telemedicine Visit: Services only offered telehealth  Originating Site (Patient Location): Patient's home  Distant Site (Provider Location): Provider Remote Setting  As the provider I attest to compliance with applicable laws and regulations related to telemedicine.  Those present for this visit include patient and therapist.    Follow up in regards to ongoing symptom management of anxiety and depression in addition to barriers associated with Autism Spectrum Disorder.    New symptoms or complaints: no new symptoms    Functional Impairment:   Personal: 3  Family: 1  Work: 2  Social:2        ASSESSMENT: Current Emotional / Mental Status (status of significant symptoms):              Risk status (Self / Other harm or suicidal ideation)              Patient denies risks to personal safety              Patient denies current or recent suicidal ideation or behaviors.              Patient denies current or recent homicidal ideation or behaviors.              Patient denies current or recent self injurious behavior or ideation.              Patient denies  other safety concerns.              Patient denies changes to risk factors              Patient denies changes to protective factors              Recommended that patient call 911 or go to the local ED should there be a change in any of these risk factors.                Attitude:                                   Cooperative               Orientation:                             x3              Speech                          Rate / Production:       Pressured                           Volume:                       Normal               Mood:                                      Anxious  Depressed  Normal              Thought Content:                    Clear  Perservative               Thought Form:                        Coherent  Logical               Insight:                                     Good     Patient's impression of their current status:   Patient's impression is that she is not feeling well today. Her heart rate is increasing whenever she stands or is active causing her to feel dizzy and frustrated because she can't get anything done. She wants to clean the house but can't. Her  is also not feeling well today and neither feel they are able to help the other. They are struggling to take care of the new puppy. She is feeling very overwhelmed.   She is frustrated about not being able to easily obtain a COVID vaccine.      Therapist impression of patients current state:   Patient easily engages in dialogue and displays fair insight into her underlying mental health symptoms. She continues to experience medical and physical health problems impacting her mental health status. She is fairly receptive to therapist support around managing expectations of self and others.  She is receptive to therapist feedback around using stress management techniques.       Type of psychotherapeutic technique provided: Client centered and CBT    Progress toward short term goals: Progress as expected, with patient  returning for a scheduled visit and following through with treatment recommendations.     Review of long term goals:   Not done at today's visit   Treatment plan updated on 1/15/21  Update tx plan in April     Update DA prior to November 2021    Diagnosis:  1. Autism spectrum disorder    2. Attention deficit hyperactivity disorder (ADHD), other type    3. Moderate episode of recurrent major depressive disorder (H)    4. Generalized anxiety disorder        Plan and Follow up:   Patient will return for therapy in 2 weeks    Patient encouraged to do the following for homework/continued practice of therapy skills:  1. Stick to morning routine   2. Complete daily chores   3. Incorporate daily gratitude practice   4. Practice radical self-acceptance.   5. Practice deep breathing  6. Continue mindfulness meditation exercises  7. Building in a sensory break every 3-4 hours      Discharge Criteria/Planning: Client has chronic symptoms and ongoing therapy for maintenance stability recommended.    I have reviewed the note as documented above.  This accurately captures the substance of my conversation with the patient.    Performed and documented by CHANTAL Blackmon LICSW

## 2021-06-16 NOTE — PROGRESS NOTES
Mental Health tele Visit Note    Patient: Angela Jones    : 1995 MRN: [id number]    Date: 3/19/2021   Start time: 3:00pm   Stop Time: 3:50pm   Session # 5    Chief Complaint   Patient presents with      Follow Up     Psychotherapy follow-up visit for anxiety, depression, ADHD and ASD       Consent:  The patient has verbally consented to: the potential risks and benefits of telemedicine (video visit) versus in person care; bill my insurance or make self-payment for services provided; and responsibility for payment of non-covered services.   Mode of Communication:  Video Conference via [employer] sent via email link ([email or web address])  Session Type: Patient is participating in a video visit for mental health    Telemedicine Visit: The patient's condition can be safely assessed and treated via synchronous audio and visual telemedicine encounter.    Reason for Telemedicine Visit: Services only offered telehealth  Originating Site (Patient Location): Patient's home  Distant Site (Provider Location): Provider Remote Setting  As the provider I attest to compliance with applicable laws and regulations related to telemedicine.  Those present for this visit include patient and therapist.    Follow up in regards to ongoing symptom management of anxiety and depression in addition to barriers associated with Autism Spectrum Disorder.    New symptoms or complaints: no new symptoms    Functional Impairment:   Personal: 3  Family: 1  Work: 2  Social:2        ASSESSMENT: Current Emotional / Mental Status (status of significant symptoms):              Risk status (Self / Other harm or suicidal ideation)              Patient denies risks to personal safety              Patient denies current or recent suicidal ideation or behaviors.              Patient denies current or recent homicidal ideation or behaviors.              Patient denies current or recent self injurious behavior or ideation.              Patient  denies other safety concerns.              Patient denies changes to risk factors              Patient denies changes to protective factors              Recommended that patient call 911 or go to the local ED should there be a change in any of these risk factors.                Attitude:                                   Cooperative               Orientation:                             x3              Speech                          Rate / Production:       Pressured                           Volume:                       Normal               Mood:                                      Anxious  Normal              Thought Content:                    Clear  Perservative               Thought Form:                        Coherent  Logical               Insight:                                     Good     Patient's impression of their current status:   Patient's impression is that she is doing okay. She has recently completed a lot of blood work and other tests to help better understand the nature of her medical complaints. She received confirmation in regards to the status of her blood pressure (POTS). She has learned that she has to get up slowly and wear compression garments - this diagnosis has been the cause of brain fog, dizziness and nausea.   She is still fighting with her insurance company in regards to coverage of services.   They are adjusting to having a new puppy.  She is feeling less motivated and less energetic to things.      Therapist impression of patients current state:   Patient easily engages in dialogue and displays fair insight into her underlying mental health symptoms. She continues to experience medical and physical health problems impacting her mental health status but has been finding some answers after some recent testing. She is following through with provider recommendations and following up with various providers for assistance with medical problems.       Type of psychotherapeutic  technique provided: Client centered and CBT    Progress toward short term goals: Progress as expected, with patient returning for a scheduled visit and following through with treatment recommendations.     Review of long term goals:   Not done at today's visit   Treatment plan updated on 1/15/21  Update tx plan in April     Update DA prior to November 2021    Diagnosis:  1. Autism spectrum disorder    2. Attention deficit hyperactivity disorder (ADHD), other type    3. Moderate episode of recurrent major depressive disorder (H)    4. Generalized anxiety disorder        Plan and Follow up:   Patient will return for therapy in 2 weeks    Patient encouraged to do the following for homework/continued practice of therapy skills:  1. Stick to morning routine   2. Complete daily chores   3. Incorporate daily gratitude practice   4. Practice radical self-acceptance.   5. Practice deep breathing     Discharge Criteria/Planning: Client has chronic symptoms and ongoing therapy for maintenance stability recommended.    I have reviewed the note as documented above.  This accurately captures the substance of my conversation with the patient.    Performed and documented by CHANTAL Blackmon LICSW

## 2021-06-16 NOTE — PROGRESS NOTES
Mental Health tele Visit Note    Patient: Angela Jones    : 1995 MRN: [id number]    Date: 2021   Start time: 3:10pm   Stop Time: 3:50pm   Session # 7    Chief Complaint   Patient presents with      Follow Up     Psychotherapy follow-up visit for ASD, anxiety and depression       Consent:  The patient has verbally consented to: the potential risks and benefits of telemedicine (video visit) versus in person care; bill my insurance or make self-payment for services provided; and responsibility for payment of non-covered services.   Mode of Communication:  Video Conference via [email or web address]  Session Type: Patient is participating in a video visit for mental health    Telemedicine Visit: The patient's condition can be safely assessed and treated via synchronous audio and visual telemedicine encounter.    Reason for Telemedicine Visit: Services only offered telehealth  Originating Site (Patient Location): Patient's home  Distant Site (Provider Location): Provider Remote Setting  As the provider I attest to compliance with applicable laws and regulations related to telemedicine.  Those present for this visit include patient and therapist.    Follow up in regards to ongoing symptom management of anxiety and depression in addition to barriers associated with Autism Spectrum Disorder.    New symptoms or complaints: no new symptoms    Functional Impairment:   Personal: 3  Family: 1  Work: 2  Social:2        ASSESSMENT: Current Emotional / Mental Status (status of significant symptoms):              Risk status (Self / Other harm or suicidal ideation)              Patient denies risks to personal safety              Patient denies current or recent suicidal ideation or behaviors.              Patient denies current or recent homicidal ideation or behaviors.              Patient denies current or recent self injurious behavior or ideation.              Patient denies other safety concerns.               "Patient denies changes to risk factors              Patient denies changes to protective factors              Recommended that patient call 911 or go to the local ED should there be a change in any of these risk factors.                Attitude:                                   Cooperative               Orientation:                             x3              Speech                          Rate / Production:       Pressured                           Volume:                       Normal               Mood:                                      Anxious  Depressed  Normal              Thought Content:                    Clear  Perservative               Thought Form:                        Coherent  Logical               Insight:                                     Good      Mental status reviewed and no changes noted as of 4/16/21      Patient's impression of their current status:   Patient's impression is that she is always feeling anxious. She reports that she is mostly feeling more irritable and annoyed. She and her  have been arguing more. Her  reports that \"everything changed after she received the Autism diagnosis.\" She is not sure if she agrees with this perception although she does agree that they are having problems communicating.   She shares that she reportedly has made little progress at OT after 5 weeks. She believes that it's been helpful but there are no quantifiable measurements to demonstrate this. She's not sure if she'll continue these services.     Therapist impression of patients current state:   Patient easily engages in dialogue and displays fair insight into her underlying mental health symptoms. Therapist administered assessment measurements today to evaluate underlying symptoms. Both scores indicate a reduction in symptoms.  She continues to experience medical and physical health problems impacting her mental health status. She is fairly receptive to therapist support around " managing expectations of self and others.  She is receptive to therapist feedback around using stress management techniques.       Type of psychotherapeutic technique provided: Client centered and CBT    Progress toward short term goals: Progress as expected, with patient returning for a scheduled visit and following through with treatment recommendations.     Review of long term goals:   Treatment plan updated  PHQ-9 = 10  JOSHUA-7 = 9    Update DA prior to November 2021    Diagnosis:  1. Autism spectrum disorder    2. Attention deficit hyperactivity disorder (ADHD), other type    3. Moderate episode of recurrent major depressive disorder (H)    4. Generalized anxiety disorder        Plan and Follow up:   Patient will return for therapy in 2 weeks    Patient encouraged to do the following for homework/continued practice of therapy skills:  1. Stick to morning routine   2. Complete daily chores   3. Incorporate daily gratitude practice   4. Practice radical self-acceptance.   5. Practice deep breathing  6. Continue mindfulness meditation exercises  7. Building in a sensory break every 3-4 hours      Discharge Criteria/Planning: Client has chronic symptoms and ongoing therapy for maintenance stability recommended.    I have reviewed the note as documented above.  This accurately captures the substance of my conversation with the patient.    Performed and documented by CHANTAL Blackmon LICSW

## 2021-06-16 NOTE — TELEPHONE ENCOUNTER
Telephone Encounter by Glendy Noe CMA at 1/30/2020  1:57 PM     Author: Glendy Noe CMA Service: -- Author Type: Certified Medical Assistant    Filed: 1/30/2020  2:04 PM Encounter Date: 11/8/2019 Status: Signed    : Glendy Noe CMA (Certified Medical Assistant)       Patient wants to establish care now, Pt used to see Lexi VidalSAMMY yoder last seen 5/20/2019.    Back in June 2019 was instructed to call and schedule between August and September 2019.     Please advise if this patient should be scheduled.    Conversation: transfer of care to Tila Gilbert in late August 2019   (Newest Message First)   Leatha Lloyd        6/7/19 9:12 AM   Note      Per Dr Rosales directive on 6.4.19, writer called patient to offer her follow up care in clinic with Tila Gilbert NP. Patient was informed she can make new patient 60 min appt with Tila in late August or early September 2019. Patient stated she will call back to schedule.      Leatha Lloyd   to Angela Jones           6/7/19 9:10 AM   spoke to patient

## 2021-06-16 NOTE — PROGRESS NOTES
Outpatient Mental Health Treatment Plan    Name:  Angela Jones  :  1995  MRN:  [id number]    Treatment Plan:  Updated Treatment Plan  Intake/initial treatment plan date:    Intake: 2018  Benefit and risks and alternatives have been discussed: Yes  Is this treatment appropriate with minimal intrusion/restrictions: Yes  Estimated duration of treatment:  unknown due to chronic symptoms  Anticipated frequency of services:  Every 2 weeks  Necessity for frequency: This frequency is needed to establish therapeutic goals and for continuity of care in order to monitor progress.  Necessity for treatment: To address cognitive, behavioral, and/or emotional barriers in order to work toward goals and to improve quality of life.    Session Type: Patient is presenting for an Individual session.via video visit due to COVID19    Plan:           ?   ? Anxiety    Goal:  Decrease average anxiety level from 3 to 2.   Strategies: ? [x] Learn and practice relaxation techniques and other coping strategies (e.g., thought stopping, reframing, meditation)     ? [x] Increase involvement in meaningful activities     ? [x] Discuss sleep hygiene     ? [x] Explore thoughts and expectations about self and others     ? [x] Identify and monitor triggers for panic/anxiety symptoms     ? [x] Implement physical activity routine (with physician approval)     ? [x] Consider introduction of bibliotherapy and/or videos     ? [x] Continue compliance with medical treatment plan (or explore barriers)   ?Degree to which this is a problem: 2  Degree to which goal is met: 2  Date of Review: 2021       ? Depression    Goal:  Maintain average depression level around 1   Strategies:    ?[x] Decrease social isolation     [x] Increase involvement in meaningful activities     ?[x] Discuss sleep hygiene     ?[x] Explore thoughts and expectations about self and others     ?[x] Process grief (loss of significant person, independence, role,  "etc.)     ?[x] Assess for suicide risk     ?[x] Implement physical activity routine (with physician approval)     [x] Consider introduction of bibliotherapy and/or videos     [x] Continue compliance with medical treatment plan (or explore barriers)?  Degree to which this is a problem: 1.5  Degree to which goal is met: 3  Date of Review: July 2021    Additional goals:  Improve self-care  Improve organization skills (\"life skills\")  Increase independence in daily functioning          Functional Impairment:  1=Not at all/Rarely  2=Some days  3=Most Days  4=Every Day    Personal : 4  Family : 3  Social : 2   Work/school : 4    Diagnosis (non-Axial as defined in DSM-5)  1. Generalized Anxiety Disorder      2. Current moderate episode of major depressive disorder, unspecified whether recurrent (H)      ADHD  Autism Spectrum Disorder    Provisional Diagnosis (list only- no explanation needed)  Bipolar Disorder  OCD  PTSD      Clinical assessments and measures completed:.   WHODAS 2.0 12-item version: 7    Scores presented in qualifiers to represent level of disability.  MILD Problem (slight, low, ...) 5-24%  H1= 30  H2= 2  H3= 18    PHQ-9: 13 (2/8/19)  PHQ-9: 14 (6/14/19)  PHQ-9: 11 (9/20/19)  PHQ-9: 13 (12/13/19)  PHQ-9: 9 (9/24/20)  PHQ-9: 21 (12/16/20)  PHQ-9: 10 (4/16/21)     JOSHUA-7: 7 (2/8/19)  JOSHUA-7: 6 (6/14/19)  JOSHUA-7: 7 (9/20/19)  JOSHUA-7: 10 (12/13/19)  JOSHUA-7: 12 (9/24/20)  JOSHUA-7: 11 (12/16/20)  JOSHUA-7: 9 (4/16/21)     C-SSRS: Low to moderate risk. Patient does have history of self-harm and suicidal ideation with intent. No current ideation or intent.         Strengths:  Articulate, open-minded and willing to participate in mental health services. Good support from  and family. Good access to care and resourcefulness.   Limitations:  Trouble with self-control; intrinstic motivation; executive dysfunction with ADHD. Patient also limited by seizure-like activity with no clear understanding of origin. Patient " recently diagnosed with Autism Spectrum Disorder.   Cultural Considerations: Patient is a 26  female. Identifies as Spiritism but more interested in science. G[place] up in [place] [facility] [place] [facility] and always had family close by. There are no significant ethnic, cultural or Jainism factors that may be relevant for therapy.     Persons responsible for this plan: Patient and Provider   Patient not able to sign due to virtual visit            Psychotherapist Signature           Patient Signature:              Guardian Signature             Provider: Performed and documented by CHANTAL Blackmon   Date:  4/16/2021

## 2021-06-17 ENCOUNTER — OFFICE VISIT - HEALTHEAST (OUTPATIENT)
Dept: BEHAVIORAL HEALTH | Facility: CLINIC | Age: 26
End: 2021-06-17

## 2021-06-17 DIAGNOSIS — F33.1 MODERATE EPISODE OF RECURRENT MAJOR DEPRESSIVE DISORDER (H): ICD-10-CM

## 2021-06-17 DIAGNOSIS — F84.0 AUTISM SPECTRUM DISORDER: ICD-10-CM

## 2021-06-17 DIAGNOSIS — F41.1 GAD (GENERALIZED ANXIETY DISORDER): ICD-10-CM

## 2021-06-17 DIAGNOSIS — F90.8 ATTENTION DEFICIT HYPERACTIVITY DISORDER (ADHD), OTHER TYPE: ICD-10-CM

## 2021-06-17 NOTE — PATIENT INSTRUCTIONS - HE
Patient Instructions by Marissa Walton MD at 4/3/2019 12:37 PM     Author: Marissa Walton MD Service: -- Author Type: Physician    Filed: 4/3/2019 12:43 PM Encounter Date: 4/2/2019 Status: Addendum    : Marissa Walton MD (Physician)    Related Notes: Original Note by Marissa Walton MD (Physician) filed at 4/3/2019 12:41 PM       Hi ,    I am sorry you are feeling so poorly.  Have you tried nasal irrigation such as with a Neti pot before?  I would do this twice per day.  This has been shown to help symptoms of a sinus infection even more than antibiotics.  Do this for 1 week and take Tylenol or ibuprofen for your headaches.  If you are having worsening trouble breathing or if you have a fever of 100.5 or higher, you need to come in to be seen.    If you are feeling no better after 1 week, please come in to see me.  Please read the instructions below about doing nasal irrigation and about sinusitis in general.    You may want to try a nasal lavage (also known as nasal irrigation). You can find over-the-counter products, such as Neti-Pot, at retail locations or make your own at home. Instructions for homemade nasal lavage and more information on the process are available online at https://www.Btarget/contents/image?imageKey=PI%1M10443  and  https://www.Space Adventures.Stukent/contents/rinsing-out-your-nose-with-salt-water-the-basics?fcknyXpb=9286&source=see_link    Sinusitis (No Antibiotics)    The sinuses are air-filled spaces within the bones of the face. They connect to the inside of the nose. Sinusitis is an inflammation of the tissue that lines the sinuses. Sinusitis can occur during a cold. It can also happen due to allergies to pollens and other particles in the air. It can cause symptoms such as sinus congestion, headache, sore throat, facial swelling, and a feeling of fullness. It may also cause a low-grade fever. Your sinusitis does not include an infection with bacteria. Because of this, antibiotics are not used  to treat this problem.  Home care    Drink plenty of water, hot tea, and other liquids. This may help thin nasal mucus. It also may help your sinuses drain fluids.    Heat may help soothe painful areas of your face. Use a towel soaked in hot water. Or,  the shower and direct the warm spray onto your face. Using a vaporizer along with a menthol rub at night may also help soothe symptoms.     An expectorant with guaifenesin may help thin nasal mucus and help your sinuses drain fluids.    You can use an over-the-counter decongestant, unless a similar medicine was prescribed to you. Nasal sprays work the fastest. Use one that contains phenylephrine or oxymetazoline. First blow your nose gently. Then use the spray. Do not use these medicines more often than directed on the label. If you do, your symptoms may get worse. You may also take pills that contain pseudoephedrine. Dont use products that combine multiple medicines. This is because side effects may be increased. Read all medicine labels. You can also ask the pharmacist for help. (People with high blood pressure should not use decongestants. They can raise blood pressure.)    Over-the-counter antihistamines may help if allergies contributed to your sinusitis.      Use acetaminophen or ibuprofen to control pain, unless another pain medicine was prescribed to you. If you have chronic liver or kidney disease or ever had a stomach ulcer, talk with your healthcare provider before using these medicines. (Aspirin should never be taken by anyone under age 18 who is ill with a fever. It may cause severe liver damage.)    Use nasal rinses or irrigation as instructed by your healthcare provider.    Don't smoke. This can make symptoms worse.  Follow-up care  Follow up with your healthcare provider or our staff if you are better in 1 week.  When to seek medical advice  Call your healthcare provider if any of these occur:    Green or yellow fluid draining from your nose  or into your throat    Facial pain or headache that gets worse    Stiff neck    Unusual drowsiness or confusion    Swelling of your forehead or eyelids    Vision problems, such as blurred or double vision    Fever of 100.4 F (38 C) or higher, or as directed by your healthcare provider    Seizure    Breathing problems    Symptoms that don't go away in 10 days  Date Last Reviewed: 11/1/2017 2000-2017 [employer]. [address] [place] [address], [place] [address]. All rights reserved. This information is not intended as a substitute for professional medical care. Always follow your healthcare professional's instructions.          Viral Upper Respiratory Illness (Adult)  You have a viral upper respiratory illness (URI), which is another term for the common cold. This illness is contagious during the first few days. It is spread through the air by coughing and sneezing. It may also be spread by direct contact (touching the sick person and then touching your own eyes, nose, or mouth). Frequent handwashing will decrease risk of spread. Most viral illnesses go away within 7 to 10 days with rest and simple home remedies. Sometimes the illness may last for several weeks. Antibiotics will not kill a virus, and they are generally not prescribed for this condition.    Home care    If symptoms are severe, rest at home for the first 2 to 3 days. When you resume activity, don't let yourself get too tired.    Avoid being exposed to cigarette smoke (yours or others).    You may use acetaminophen or ibuprofen to control pain and fever, unless another medicine was prescribed. If you have chronic liver or kidney disease, have ever had a stomach ulcer or gastrointestinal bleeding, or are taking blood-thinning medicines, talk with your healthcare provider before using these medicines. Aspirin should never be given to anyone under 18 years of age who is ill with a viral infection or fever. It may cause severe liver or brain  damage.    Your appetite may be poor, so a light diet is fine. Avoid dehydration by drinking 6 to 8 glasses of fluids per day (water, soft drinks, juices, tea, or soup). Extra fluids will help loosen secretions in the nose and lungs.    Over-the-counter cold medicines will not shorten the length of time youre sick, but they may be helpful for the following symptoms: cough, sore throat, and nasal and sinus congestion. (Note: Do not use decongestants if you have high blood pressure.)  Follow-up care  Follow up with your healthcare provider, or as advised.  When to seek medical advice  Call your healthcare provider right away if any of these occur:    Cough with lots of colored sputum (mucus)    Severe headache; face, neck, or ear pain    Difficulty swallowing due to throat pain    Fever of 100.4 F (38 C) or higher, or as directed by your healthcare provider  Call 911  Call 911 if any of these occur:    Chest pain, shortness of breath, wheezing, or difficulty breathing    Coughing up blood    Inability to swallow due to throat pain  Date Last Reviewed: 9/13/2015 2000-2017 [employer]. [address] [place] [address]. All rights reserved. This information is not intended as a substitute for professional medical care. Always follow your healthcare professional's instructions.

## 2021-06-17 NOTE — TELEPHONE ENCOUNTER
Telephone Encounter by Serenity Huff at 6/2/2020  1:07 PM     Author: Serenity Huff Service: -- Author Type: --    Filed: 6/2/2020  1:08 PM Encounter Date: 6/2/2020 Status: Signed    : Serenity Huff       PA cannot be completed at this time.  Confirmed with pharmacy patient currently does not have any insurance coverage.  Patient may call pharmacy as soon as she has coverage and we can begin PA. Pharmacy and patient is aware.

## 2021-06-17 NOTE — PROGRESS NOTES
Mental Health tele Visit Note    Patient: Angela Jones    : 1995 MRN: [id number]    Date: 2021   Start time: 2:00pm   Stop Time: 2:50pm   Session # 8    Chief Complaint   Patient presents with      Follow Up     Psychotherapy follow-up visit for anxiety, depression and autism spectrum disorder       Consent:  The patient has verbally consented to: the potential risks and benefits of telemedicine (video visit) versus in person care; bill my insurance or make self-payment for services provided; and responsibility for payment of non-covered services.   Mode of Communication:  Video Conference via indeni  Session Type: Patient is participating in a video visit for mental health    Telemedicine Visit: The patient's condition can be safely assessed and treated via synchronous audio and visual telemedicine encounter.    Reason for Telemedicine Visit: Services only offered telehealth  Originating Site (Patient Location): Patient's home  Distant Site (Provider Location): Provider Remote Setting  As the provider I attest to compliance with applicable laws and regulations related to telemedicine.  Those present for this visit include patient and therapist.    Follow up in regards to ongoing symptom management of anxiety and depression in addition to barriers associated with Autism Spectrum Disorder.    New symptoms or complaints: no new symptoms    Functional Impairment:   Personal: 3  Family: 1  Work: 2  Social:2        ASSESSMENT: Current Emotional / Mental Status (status of significant symptoms):              Risk status (Self / Other harm or suicidal ideation)              Patient denies risks to personal safety              Patient denies current or recent suicidal ideation or behaviors.              Patient denies current or recent homicidal ideation or behaviors.              Patient denies current or recent self injurious behavior or ideation.              Patient denies other safety  "concerns.              Patient denies changes to risk factors              Patient denies changes to protective factors              Recommended that patient call 911 or go to the local ED should there be a change in any of these risk factors.                Attitude:                                   Cooperative               Orientation:                             x3              Speech                          Rate / Production:       Pressured                           Volume:                       Normal               Mood:                                      Anxious  Depressed  Normal              Thought Content:                    Clear  Perservative               Thought Form:                        Coherent  Logical               Insight:                                     Good      Mental status reviewed and no changes noted as of 5/14/21      Patient's impression of their current status:   Patient's impression is that things are going okay. She reports that she and her  are doing a lot better. They are working on their communication patterns. She is working on being more aware of her reactions. She has difficulty managing her emotions which often manifests as yelling at her partner. She describes her experience with \"melt-downs\" as debilitating.      Therapist impression of patients current state:   Patient easily engages in dialogue and displays fair insight into her underlying mental health symptoms. She appears receptive to therapist feedback around working backwards from behaviors in order to improve affect regulation in the moment. She is encouraged to consider inviting partner to her sessions to improve their communication patterns.    Type of psychotherapeutic technique provided: Client centered and CBT    Progress toward short term goals: Progress as expected, with patient returning for a scheduled visit and following through with treatment recommendations.     Review of long term " goals:   Not done at today's visit   Treatment plan updated on 4/16/21    Update DA prior to November 2021    Diagnosis:  1. Autism spectrum disorder    2. Attention deficit hyperactivity disorder (ADHD), other type    3. Moderate episode of recurrent major depressive disorder (H)    4. Generalized anxiety disorder        Plan and Follow up:   Patient will return for therapy in 3 weeks  Will resume every other week in June - will think about in-person visits in the future     Patient encouraged to do the following for homework/continued practice of therapy skills:  1. Stick to morning routine   2. Complete daily chores   3. Incorporate daily gratitude practice   4. Practice radical self-acceptance.   5. Practice deep breathing  6. Continue mindfulness meditation exercises  7. Build in a sensory breaks    Discharge Criteria/Planning: Client has chronic symptoms and ongoing therapy for maintenance stability recommended.    I have reviewed the note as documented above.  This accurately captures the substance of my conversation with the patient.    Performed and documented by CHANTAL Blackmon LICSW

## 2021-06-17 NOTE — PROGRESS NOTES
"Angela Jones is a 26 y.o. female who is being evaluated via a billable video visit.      How would you like to obtain your AVS? BlackJethart.  If dropped from the video visit, the video invitation should be resent by: Text to cell phone: [phone] patient will log in Plivo.  Will anyone else be joining your video visit? No    Video Start Time: 11:45 AM    Assessment & Plan      was seen today for follow-up.    Diagnoses and all orders for this visit:    POTS (postural orthostatic tachycardia syndrome): will refer to the specialist Dr. Lim recommended by [facility].  is still deciding if she wants to do the pain program through [facility].   -Since she was recommended by OT to use a walker for stability, I agreed to complete a handicapped parking sticker for her since she needs extra space in order to get her walker in and out.   -     [facility]    Autism spectrum disorder:    Moderate major depression (H)    Continue therapy with Fanny Trudell as well as meds (vortioxetine, bedtime amitriptyline).     Essential tremor: per pt report, Neurology did not find any clear reason for this. Will monitor for any signs of worsening.          Return in about 6 months (around 11/10/2021) for POTS, depression, anxiety.    Marissa Walton MD  [facility]    Subjective   Angela Jones is 26 y.o. and presents today for the following health issues: POTS and mental health.    Got POTS diagnosis at [facility]. They recommended she establish care with a cardiologist for long-term POTS management. Dr. Lim at  [facility] was recommended.      Not doing OT right now, too expensive. [facility] referred her to their \"pain rehabilitation program\" This would be 17 days of is OT/, PT, groups, etc. In [place]. Looking into hospitality houses to stay so they wouldn't have to drive to [place] every day.     Has a walker she uses sometimes for balance which was " "recommended by OT. Sometimes it gets stuck in tight parking spaces, so she is wondering if she would qualify for a handicapped parking sticker in order to have more space for the walker.     Mood is better, always better in the spring and summer. Not as depressed    Essential tremor: neurologist thought it might be an \"imbalance of adrenaline\" and couldn't find any other cause.       Objective       Vitals:  No vitals were obtained today due to virtual visit.    Physical Exam  GENERAL: Healthy, alert and no distress  EYES: Eyes grossly normal to inspection. No discharge or erythema, or obvious scleral/conjunctival abnormalities.  RESP: No audible wheeze, cough, or visible cyanosis.  No visible retractions or increased work of breathing.    NEURO: Cranial nerves grossly intact. Mentation and speech appropriate for age.  PSYCH: Mentation appears normal, affect normal/bright, judgement and insight intact, normal speech and appearance well-groomed          Video-Visit Details    Type of service:  Video Visit    Video End Time (time video stopped): 11:57 AM  Originating Location (pt. Location): Home    Distant Location (provider location):  [facility]     Platform used for Video Visit: Sidney  "

## 2021-06-17 NOTE — PATIENT INSTRUCTIONS - HE
Patient Instructions by Marissa Walton MD at 11/1/2019  1:20 PM     Author: Marissa Walton MD Service: -- Author Type: Physician    Filed: 11/4/2019  2:23 PM Encounter Date: 11/1/2019 Status: Signed    : Marissa Walton MD (Physician)         Patient Education   Understanding USDA MyPlate  The USDA (US Department of Agriculture) has guidelines to help you make healthy food choices. These are called MyPlate. MyPlate shows the food groups that make up healthy meals using the image of a place setting. Before you eat, think about the healthiest choices for what to put onto your plate or into your cup or bowl. To learn more about building a healthy plate, visit www.choosemyplate.gov.       The Food Groups    Fruits: Any fruit or 100% fruit juice counts as part of the Fruit Group. Fruits may be fresh, canned, frozen, or dried, and may be whole, cut-up, or pureed. Make half your plate fruits and vegetables.    Vegetables: Any vegetable or 100% vegetable juice counts as a member of the Vegetable Group. Vegetables may be fresh, frozen, canned, or dried. They can be served raw or cooked and may be whole, cut-up, or mashed. Make half your plate fruits and vegetables.     Grains: All foods made from grains are part of the Grains Group. These include wheat, rice, oats, cornmeal, and barley such as bread, pasta, oatmeal, cereal, tortillas, and grits. Grains should be no more than a quarter of your plate. At least half of your grains should be whole grains.    Protein: This group includes meat, poultry, seafood, beans and peas, eggs, processed soy products (like tofu), nuts (including nut butters), and seeds. Make protein choices no more than a quarter of your plate. Meat and poultry choices should be lean or low fat.    Dairy: All fluid milk products and foods made from milk that contain calcium, like yogurt and cheese are part of the Dairy Group. (Foods that have little calcium, such as cream, butter, and cream cheese, are not part  of the group.) Most dairy choices should be low-fat or fat-free.    Oils: These are fats that are liquid at room temperature. They include canola, corn, olive, soybean, and sunflower oil. Foods that are mainly oil include mayonnaise, certain salad dressings, and soft margarines. You should have only 5 to 7 teaspoons of oils a day. You probably already get this much from the food you eat.  Use [employer] to Help Build Your Meals  The SuperTracker can help you plan and track your meals and activity. You can look up individual foods to see or compare their nutritional value. You can get guidelines for what and how much you should eat. You can compare your food choices. And you can assess personal physical activities and see ways you can improve. Go to www.chooseInkerwangplate.gov/supertracker/.    7235-5251 [employer]. [address] [place] [address], [place] [address]. All rights reserved. This information is not intended as a substitute for professional medical care. Always follow your healthcare professional's instructions.

## 2021-06-18 ENCOUNTER — COMMUNICATION - HEALTHEAST (OUTPATIENT)
Dept: NURSING | Facility: CLINIC | Age: 26
End: 2021-06-18

## 2021-06-18 ENCOUNTER — MYC MEDICAL ADVICE (OUTPATIENT)
Dept: CARDIOLOGY | Facility: CLINIC | Age: 26
End: 2021-06-18

## 2021-06-18 DIAGNOSIS — G43.809 OTHER MIGRAINE, NOT INTRACTABLE, WITHOUT STATUS MIGRAINOSUS: ICD-10-CM

## 2021-06-19 NOTE — LETTER
Letter by Belkys Carpenter CNP at      Author: Belkys Carpenter CNP Service: -- Author Type: --    Filed:  Encounter Date: 4/25/2019 Status: (Other)         Angela Jones  [address]  [place] [address]                 April 25, 2019       Dear Ms. Jones,    We regret to inform you that Lexi Carpenter CNP has resigned from her position and as of Friday, May 31 will no longer be providing care in the Outpatient Mental Health & Addiction Care Clinic at [facility].      In the event of a medical or mental health emergency, please call 911 or go to the nearest Emergency Department.  While any emergency department can assist you, [facility] emergency departments are located at [facility] in [place], [facility] in [facility] and [facility] in [place].     We want to assist you in transitioning your care to another provider.      For psychiatric medications, Dr. Cristina Rosales, Senior Medical Director for [facility] Mental Health and Addiction Care, based on clinical review may be able to renew your prescription for an equivalent up to an additional month of medications while you are in the process of connecting with another provider.  Before this option, please contact the [facility] Mental Health and Addiction Clinic at [phone].      For issues limited to primary care, your primary care provider will need to manage those medications.  If you do not currently have a primary care provider, please contact [facility] at [phone] and they will assist you in scheduling with a primary care provider at one of our fourteen medical clinics in the [facility] area.      [facility] and [facility] work in partnership with [facility] ([facility]), a large network of community mental health services and providers. In order to provide you with support in locating psychiatry services, [facility] can be asked to contact you to  help locate and schedule an appointment with a community provider.        In the event that you would prefer to connect with a community psychiatry provider on your own we have provided a list of community clinics below.  We have confirmed that all of these clinics are taking new patients.  Please find the attached Release of Information (EDDI) form.  To have your records transferred to another care provider, please complete the form and send directly to the following address:    Central Release of Information Department  [address]  [place] [address]    Fax - [phone]        Email - [email or web address]    With any questions regarding the transfer of your records, please contact Release of Information directly at [phone].    Sincerely,       [facility]   Outpatient Mental Health & Addiction Care Clinic  [phone]    SAMMY Lagunas MD  Clinic Manager  Medical Director  [phone]       [facility], [facility].  [address]  [place] [address] [phone]    [facility]  [facility] Locations  [phone]    [facility]  [facility]  [phone]    [facility]  [address]  [place] [address] [phone]

## 2021-06-20 NOTE — LETTER
Letter by Adelaida Yang BSW at      Author: Adelaida Yang BSW Service: -- Author Type: --    Filed:  Encounter Date: 6/1/2020 Status: (Other)       CARE COORDINATION   [facility]  [address]  [place] [address]    June 1, 2020    Angela Jones  [address]  [place] [address]      Dear ,    I am a clinic care coordinator  who works with Marissa Walton MD at the [facility]. I wanted to thank you for spending the time to talk with me.  Below is a description of clinic care coordination and how I can further assist you.      The clinic care coordination team is made up of a registered nurse,  and community health worker who understand the health care system. The goal of clinic care coordination is to help you manage your health and improve access to the health care system in the most efficient manner. The team can assist you in meeting your health care goals by providing education, coordinating services, strengthening the communication among your providers and supporting you with any resource needs.    Please feel free to contact Adrienne the Community Health Worker at [phone] with any questions or concerns. We are focused on providing you with the highest-quality healthcare experience possible and that all starts with you.     Sincerely,     EDGARDO Cuevas, LSW    Enclosed: I have enclosed a copy of the Care Plan. This has helpful information and goals that we have talked about. Please keep this in an easy to access place to use as needed.
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
## 2021-06-20 NOTE — PROGRESS NOTES
"AURA Jones is a 23 y.o. female new patient here with her  Javy to establish care.  Her previous PCP was at [place], but she had difficulty getting in for an appointment and wants to switch to [facility]. She has the following health concerns:   -Migraines: once every 2 weeks, not as often as she used to get them.  She takes amitriptyline as a preventive every night.  She does not have an abortive medication, and reports that she cannot take triptans due to interactions with other medications.  -Possible seizures. Her Psychiatrist thinks they are probably complex seizures.   Javy reports that she will be \"out of it,\" not responsive to him, and then she doesn't remember the episode. She will exhibit \"tics,\" with her face and hands. She went to a neuropsychologist, Dr. Maribell Rodriguez, and got some memory testing. This was at [facility].  She has not been to a neurologist yet or had an EEG.  She has gabapentin 100 mg, which Javy will give her if he can tell a seizure is starting.  It seems to take 30-45 minutes to take effect and then she acts normal again. She recently got a dog, \"Alvin,\" who is here with them today and is trained to sense when she's going to get a seizure or migraine. She would like a referral to Neurology.  -She has Bipolar Type 2.  This manifests mostly as depression.  She has had hypomanic episodes where she will buy a lot of things online and get impulsive.  She takes vorioxetine for this. Also has Generalized Anxiety Disorder.  She has not worked since 2014 due to her mental health struggles.  -She has asthma and takes a Symbicort inhaler twice per day.  She takes ipratropium for a rescue inhaler, and also has albuterol in nebulized form.  She says she takes ipratropium because albuterol makes her incredibly jittery and exacerbates her slight tremor.   -Irritable bowel syndrome: She feels that her lactose and fructose are the main triggers, and she takes hyoscyamine to help " "with this.  She has previously taken over-the-counter ranitidine to help with stomach upset, but is wondering if I could prescribe this so that her insurance could cover it.  -Uyen Manzo is her psychiatrist at [facility].  Her longtime psychiatrist retired earlier this year and she has not seen Dr. Manzo yet.  Past Medical History:   Diagnosis Date     Migraine      No current outpatient prescriptions on file prior to visit.     No current facility-administered medications on file prior to visit.        Past medical and social history reviewed:  Med history: see HPI  Surg hist: no surgeries  Social hist: Likes to sew, embroider, sing in a choir, play video games.  Javy is a  at [employer].   ?  ROS:   General: No fevers, chills, weight loss  Head: migraines at baseline  Eyes: No acute change in vision or other eye disease  Ears: No acute change in hearing  Oropharynx: No sore throat   Nose: no nasal congestion or sneezing   CV: No chest pain or palpitations.  Resp: No shortness of breath or cough.   GI: No nausea, vomiting, constipation, diarrhea, or abdominal pain  : No urinary pain, change in color, or frequency   MS: No arthralgias or myalgias  Psych: No significant change in mood, anxiety level   Skin: No new rashes or lesions  Neuro: see HPI  ?  O  BP 98/60  Pulse 76  Temp 98.3  F (36.8  C) (Oral)   Resp 16  Ht 5' 4.75\" (1.645 m)  Wt 114 lb 12 oz (52.1 kg)  LMP Comment: Mirena  Breastfeeding? No  BMI 19.24 kg/m2   Vitals reviewed. Nursing note reviewed.  General Appearance: Pleasant and alert, in no acute distress  HEENT: TMs clear, oropharynx without edema or exudate, mucous membranes moist, neck supple, no lymphadenopathy  CV: RRR, no murmur, rubs, gallops  Resp: No respiratory distress. Clear to auscultation bilaterally. No wheezes, rales, rhonchi  Abd: Soft, nontender, nondistended, bowel sounds present. No masses.  Ext: No peripheral edema, good distal " "perfusion  Skin: warm, dry, intact, no rash noted  Neuro: no focal deficits, CNs II-XII normal.   A/P   was seen today for establish care and seizures.    Diagnoses and all orders for this visit:    Seizure-like activity (H): This sounds most consistent with complex partial seizures.  We will refer to neurology for an EEG and evaluation.  In the meantime, will continue with gabapentin 100 mg which she takes if she feels a seizure is coming on.  I am hesitant to start an empiric antiseizure drug without expert consultation due to potential for destabilization of her mood.  I reviewed the notes from her psychiatrist, and it appears she was on lamotrigine but this was discontinued earlier this year due to concerns that it was causing cognitive deficits.  -     Ambulatory referral to Neurology    Mild persistent asthma, unspecified whether complicated: Continue current regimen.  Do ACT at next visit.  -     budesonide-formoterol (SYMBICORT) 80-4.5 mcg/actuation inhaler; Inhale 2 puffs 2 (two) times a day.    Irritable bowel syndrome with both constipation and diarrhea  -     ranitidine (ZANTAC) 150 MG tablet; Take 1 tablet (150 mg total) by mouth 2 (two) times a day.    Bipolar 2 disorder (H): Was previously on lamotrigine but this was discontinued earlier this year because her psychiatrist thought it was contributing to cognitive deficits.  She feels her mood is well controlled right now on the vorioxetine. However, PHQ-9 score is 11 today, and she circled a \"1\" for thoughts of self-harm.  She does have intruding thoughts of wishing she were not alive, and feelings of worthlessness.  She has no desire to act on these, she reports, and she just tries to distract herself.  She is interested in starting therapy again.  I made a referral today and she will schedule with Fanny Cobb or Reggie Power.   -     Ambulatory referral to Psychology    JOSHUA (generalized anxiety disorder)  -     Ambulatory referral to " Psychology     Options for treatment and follow-up care were reviewed with the patient and/or guardian. Angela Jones and/or guardian engaged in the decision making process and verbalized understanding of the options discussed and agreed with the final plan.    Marissa Watlon MD

## 2021-06-21 ENCOUNTER — COMMUNICATION - HEALTHEAST (OUTPATIENT)
Dept: NURSING | Facility: CLINIC | Age: 26
End: 2021-06-21

## 2021-06-21 ASSESSMENT — ACTIVITIES OF DAILY LIVING (ADL): DEPENDENT_IADLS:: INDEPENDENT

## 2021-06-21 NOTE — LETTER
Letter by Marissa Walton MD at      Author: Marissa Walton MD Service: -- Author Type: --    Filed:  Encounter Date: 1/25/2021 Status: (Other)       My Asthma Action Plan     Name: Angela Jones   YOB: 1995  Date: 1/25/2021   My doctor: Marissa Walton MD   My clinic: [facility]        My Rescue Medicine:   Albuterol (Proair/Ventolin/Proventil HFA) 2-4 puffs EVERY 4 HOURS as needed. Use a spacer if recommended by your provider.   My Asthma Severity:   Mild Persistent  Know your asthma triggers: upper respiratory infections             GREEN ZONE   Good Control    I feel good    No cough or wheeze    Can work, sleep and play without asthma symptoms     Take your asthma control medicine every day.     1. If exercise triggers your asthma, take your rescue medication    15 minutes before exercise or sports, and    During exercise if you have asthma symptoms  2. Spacer to use with inhaler: If you have a spacer, make sure to use it with your inhaler             YELLOW ZONE Getting Worse  I have ANY of these:    I do not feel good    Cough or wheeze    Chest feels tight    Wake up at night 1. Keep taking your Green Zone medications  2. Start taking your rescue medicine:    every 20 minutes for up to 1 hour. Then every 4 hours for 24-48 hours.  3. If you stay in the Yellow Zone for more than 12-24 hours, contact your doctor.  4. If you do not return to the Green Zone in 12-24 hours or you get worse, start taking your oral steroid medicine if prescribed by your provider.           RED ZONE Medical Alert - Get Help  I have ANY of these:    I feel awful    Medicine is not helping    Breathing getting harder    Trouble walking or talking    Nose opens wide to breathe     1. Take your rescue medicine NOW  2. If your provider has prescribed an oral steroid medicine, start taking it NOW  3. Call your doctor NOW  4. If you are still in the Red Zone after 20 minutes and you have not reached your  doctor:    Take your rescue medicine again and    Call 911 or go to the emergency room right away    See your regular doctor within 2 weeks of an Emergency Room or Urgent Care visit for follow-up treatment.          Annual Reminders:  Meet with Asthma Educator,  Flu Shot in the Fall, consider Pneumonia Vaccination for patients with asthma (aged 19 and older).    Pharmacy:   [id number] - [place], [place] - [address]  [address] [address]  Phone: [phone] Fax: [phone]    [id number] - [place], [place] - [address]  [address]  [place] [address]  Phone: [phone] Fax: [phone]      Electronically signed by Marissa Walton MD   Date: 01/25/21                      Asthma Triggers  How To Control Things That Make Your Asthma Worse    Triggers are things that make your asthma worse.  Look at the list below to help you find your triggers and what you can do about them.  You can help prevent asthma flare-ups by staying away from your triggers.      Trigger                                                          What you can do   Cigarette Smoke  Tobacco smoke can make asthma worse. Do not allow smoking in your home, car or around you.  Be sure no one smokes at a ambrocio day care or school.  If you smoke, ask your health care provider for ways to help you quit.  Ask family members to quit too.  Ask your health care provider for a referral to Quit Plan to help you quit smoking, or call 1-358-017-PLAN.     Colds, Flu, Bronchitis  These are common triggers of asthma. Wash your hands often.  Dont touch your eyes, nose or mouth.  Get a flu shot every year.     Dust Mites  These are tiny bugs that live in cloth or carpet. They are too small to see. Wash sheets and blankets in hot water every week.   Encase pillows and mattress in dust mite proof covers.  Avoid having carpet if you can. If you have carpet, vacuum  weekly.   Use a dust mask and HEPA vacuum.   Pollen and Outdoor Mold  Some people are allergic to trees, grass, or weed pollen, or molds. Try to keep your windows closed.  Limit time out doors when pollen count is high.   Ask you health care provider about taking medicine during allergy season.     Animal Dander  Some people are allergic to skin flakes, urine or saliva from pets with fur or feathers. Keep pets with fur or feathers out of your home.    If you cant keep the pet outdoors, then keep the pet out of your bedroom.  Keep the bedroom door closed.  Keep pets off cloth furniture and away from stuffed toys.     Mice, Rats, and Cockroaches  Some people are allergic to the waste from these pests.   Cover food and garbage.  Clean up spills and food crumbs.  Store grease in the refrigerator.   Keep food out of the bedroom.   Indoor Mold  This can be a trigger if your home has high moisture. Fix leaking faucets, pipes, or other sources of water.   Clean moldy surfaces.  Dehumidify basement if it is damp and smelly.   Smoke, Strong Odors, and Sprays  These can reduce air quality. Stay away from strong odors and sprays, such as perfume, powder, hair spray, paints, smoke incense, paint, cleaning products, candles and new carpet.   Exercise or Sports  Some people with asthma have this trigger. Be active!  Ask your doctor about taking medicine before sports or exercise to prevent symptoms.    Warm up for 5-10 minutes before and after sports or exercise.     Other Triggers of Asthma  Cold air:  Cover your nose and mouth with a scarf.  Sometimes laughing or crying can be a trigger.  Some medicines and food can trigger asthma.
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
## 2021-06-21 NOTE — PROGRESS NOTES
Patient was scheduled for a 3rd intake visit with this provider on 11/6/18 at 1pm. The therapist called and left a voice message for patient at 1:20pm inquiring about her status. She is already scheduled for a follow-up visit on 11/20/18 and was reminded of this in the voice message. The therapist also requested that patient call back in regards to current status and reason for missing today's visit.
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
## 2021-06-22 NOTE — PROGRESS NOTES
"Mental Health Visit Note    This is a dual signature report. My supervising clinician is CHANTAL Cm.    12/14/2018    Start time: 3:00pm    Stop Time: 3:50pm   Session # 1    Session Type: Patient is presenting for an Individual session.    Angela Jones is a 23 y.o. female is being seen today for    Chief Complaint   Patient presents with      Follow Up     Psychotherapy follow-up visit for anxiety, depression and history of trauma   .     New symptoms or complaints: Request for letter for emotional support animal    Functional Impairment:   Personal: 4  Family: 2  Work: 4  Social:3    Clinical assessment of mental status:   Grooming: Well groomed  Attire: Appropriate  Age: Appears Stated  Behavior Towards Examiner: Cooperative  Motor Activity: Tremors/Tics   Eye Contact: Avoidant  Mood: Anxious  Affect: Congruent w/content of speech and Anxious  Speech/Language: Stuttering/Slammering  Attention: Distractible  Concentration: Brief  Thought Process: other  Thought Content: Hallucinations: none reported  Delusions: none reported  Orientation: X 3  Memory: Impairment, Recent and Remote  Judgement: Impairment, Minimal and Moderate  Estimated Intelligence: Average  Demonstrated Insight: Adequate  Fund of Knowledge: adequate    Suicidal/Homicidal Ideation present: None Reported This Session    Patient's impression of their current status:   The patient reported that she was feeling okay today. She is frustrated by the results of a recent neurologist appointment because she does not yet know or understand how her brain is functioning. She was informed that her EEG was abnormal and there was \"showing on the left side of her brain.\" She will have to undergo further testing for clarification. She is eager to better understand the problem. She has some difficulty managing the anxiety associated with the idea of not knowing. She reported that she often \"doesn't trust what she's thinking.\" She identified as " "experiencing an internal struggle. She has reportedly been fighting her  on taking medications when she may start to experience an \"episode.\" There is a power struggle and some discrepancy in regards to reported, experienced and observed symptomology.   She and her  found a new apartment and will need a letter in order to have an emotional support animal. They are not moving until the end of January.  The therapist inquired about patient's extensive mental health history today. She has received a variety of different diagnoses. She does believe that her diagnoses of ADHD, anxiety and depression are all true. She is not sure about OCD or Bipolar Disorder. She doesn't remember the last time she experienced a manic or hypomanic episode. She is also not sure what these symptoms may entail. She spent some time reflecting upon the impact of her family system in regards to her development. She continues to have trouble organizing and completing tasks.     Therapist impression of patients current state:   The patient arrived on-time for a follow-up psychotherapy visit. She was accompanied by her , Javy, and therapy dog, Alvin. The patient presented with an anxious affect and exhibited slight tremors and tics throughout session. As noted above, the therapist has observed a power struggle between the patient and her . There is some discrepancy in regards to reported, experienced and observed symptomology. The  appears hypervigilant about patient's potential episodes while the patient appears unaware and unable to identify the severity of observed concerns. The therapist is curious about patient's extensive mental health history and various diagnoses. There is a lot of overwhelming pathology without much attention on patient's beliefs or values. The therapist intends to utilize a person-centered approach to emphasize the patient over pathology and will begin to inquire about the patient's " adverse childhood affects in addition to trauma history during future sessions.  Therapist plans to refer patient for [facility] services to help improve completion of ADL's and increase independence in daily functioning.     Type of psychotherapeutic technique provided: Insight oriented and Client centered    Progress toward short term goals:Progress as expected, as patient appeared cooperative and open-minded today. She is able to advocate for her needs. Her mood is stable with assistance from psychotropic medication and the support of her .    Review of long term goals: Not done at today's visit    Diagnosis:   1. Anxiety disorder, unspecified type    2. Current moderate episode of major depressive disorder, unspecified whether recurrent (H)        Plan and Follow up: The patient will be scheduled to return for a follow-up visit on 1/16 at 4pm. She requested that therapist complete paperwork from their new apartment complex for an emotional support animal. Therapist will call patient to notify her when paperwork is complete.       Discharge Criteria/Planning: Client has chronic symptoms and ongoing therapy for maintenance stability recommended.     Performed and documented by CHANTAL Blackmon    I have read, discussed and reviewed the documentation as presented by CHANTAL Blackmon LICSW Ashley Trudell 12/18/2018

## 2021-06-22 NOTE — PROGRESS NOTES
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 Disorder.  The therapist suspects that the patient may meet criteria for PTSD and will explore complex trauma during future visits      Explanation for any provisional diagnosis. Hypothesis why alternative diagnosis was considered and ruled out.  The patient's presenting concerns include episodes of confusion. The patient participated in neuropsychological testing on 9/12/2018 with Desmond Linder Licensed Psychologist, Board Certified in Clinical Neuropsychology/ABPP. 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 Below are the findings from that testing which also provide an overview of the patient's mental health history:  This 23-year-old woman has received extensive mental health services since late childhood, for mood and anxiety disorders and ADHD. Within the last six months she's had spells of poorly remembered confusion in which she does not respond coherently when her  tries to talk with her, and seems to have poor recall of the event afterwards. There is no  known history of childhood febrile seizures or any other recognized seizure activity, and the neurological history is otherwise mentionable only for infrequent migraine headaches. In school she had trouble staying organized and focused and did not get her homework done. She was advanced a grade, but then repeated the eleventh grade and dropped out in the twelfth grade, later getting a GED, though there is no apparent history of a specific learning disability. She has not been employed in the last four years, and is supported by her  of two years, but apparently has tried to get on disability.   During the interview and test session, she appeared mildly anxious, avoiding eye contact, and had a slight hand tremor, but she had no trouble understanding or retaining instructions. The test findings are inconsistently abnormal. She is slow on a timed, speeded transcription task, but performs at normal speeds on other speeded tasks requiring sustained visual attention. Immediate memory for story passages is poor, seemingly due to inattentiveness, as she retains virtually all of the originally learned material. Word list learning is actually above average to superior, with all 15 words learned by the third trial, retained perfectly after short and long delays. Short and long-term retention of simple and complex figural material ranges from average to above average. Verbal processing abilities are very superior, nonverbal reasoning above average, working memory average and processing speeds on a graphomotor learning task, impaired. Executive abilities such as divided attention, speeded word retrieval and inductive reasoning are average to above average. Finger tapping speeds and fine motor dexterity are mildly to moderately slowed for bilaterally. Confrontation naming is fully intact.   The episodes of confusion are most likely related to some sort of medication side-effects, as detailed in Dr. Rock's report. There is  a small chance that this represents ictal activity, such as complex-partial epilepsy, which is characterized by brief loss of consciousness/unresponsiveness and amnesia for the event. If these spells continue, she should see a neurologist to rule out seizures. These particular spells frequently emanate from mesial temporal areas of the brain and therefore are often associated with poor memory. That is not apparent, as her inconsistent memory across similar memory tasks is most likely attributable to functional (nonorganic) factors, and not a memory encoding deficit. In fact, long-term retention was excellent on most tests. Indeed, the test results do not provide consistent or compelling evidence of acquired brain disease, and instead reflect longstanding inattentiveness, perhaps conceptualized as ADHD vs the distracting effects of anxiety. From a cognitive standpoint, she is not disabled. Continued mental health treatment is indicated, and I defer to her mental health providers in formulating a treatment strategy.       Provisional diagnoses for rule-out will include: Bipolar Disorder, OCD and PTSD    Recommendations (treatment, referrals, services needed).  The patient has been referred for psychiatry through [facility] and has an appointment scheduled for 12/28/18 to establish care.  In association with testing from 9/12/2018, the patient may benefit from additional neuropsychological testing.     Diagnosis (non-Axial as defined in DSM-5)  1. Anxiety disorder, unspecified type     2. Current moderate episode of major depressive disorder, unspecified whether recurrent (H)     ADHD  Provisional Diagnosis (list only- no explanation needed)  Bipolar Disorder  OCD  PTSD    Sources/references used in completing this assessment:   -Face to face interview  -Patient Chart  -Adult Intake Questionnaire  -Measures completed: WHODAS, PHQ-9, JOSHUA-7, C-SSRS, CAGE   WHODAS 2.0 12-item version: 7    Scores presented in qualifiers  to represent level of disability.  MILD Problem (slight, low, ...) 5-24%  H1= 30  H2= 2  H3= 18    PHQ-9: 13 . Difficulty with daily functioning= somewhat .              Indicates moderately-severe severity.    JOSHUA-7: 7 . Difficulty with daily functioning= somewhat .               Indicates moderate severity.     C-SSRS: Low to moderate risk. Patient does have history of self-harm and suicidal ideation with intent. No current ideation or intent.       Assessment of client resolving presenting mental health concerns:  Ability  [x] low     [x] average     [] high  Motivation [] low     [x] average     [] high  Willingness [] low     [x] average     [] high    Initial Assessment Objectives (ex: Refer to psychiatry/psych testing, Return for follow up psychotherapy, Refer to, Obtain, Administer measures, etc.):  1. Patient and therapist will develop therapeutic relationship.  2. Patient to present for follow up appointment to initiate psychotherapy services.  3. Patient to continue to follow up with primary care physician as needed.  4. Develop comprehensive treatment plan.   5. Refer to psychiatry services for medication management.  6. Refer for neuropsychological testing.       Is patient's family involved in the treatment?  [] No     [x] Yes  If yes, How?  Patient's  attends sessions and provides supportive care        Therapist s Signature/Supervision/co-signature statement:   Performed and documented by CHANTAL Blackmon    I have read, discussed and reviewed the documentation as presented by CHANTAL Blackmon LICSW

## 2021-06-22 NOTE — TELEPHONE ENCOUNTER
Requested Medical Records received via fax from [facility].  Labeled and placed in Lexi's mailbox for review.

## 2021-06-22 NOTE — PROGRESS NOTES
Last visit Lexi: Initial consult today, ok for spouse Javy and therapy dog Alvin to be in room during visit.   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   Any Pain scale 0-10, ten being the worst: 0  Any Anxiety scale 0-5, five being the worst: 3-4/5  Any Depression scale 0-5, five being the worst: 2-3/5  Any Suicidal or homicidal ideation: Denies at this time.       [place] :  Provider will review herself.      Correct pharmacy verified with patient and confirmed in snapshot? [x] yes []no    Charge captured ? [x] yes  [] no    Medications Phoned  to Pharmacy [] yes [x]no  Name of Pharmacist:  List Medications, including dose, quantity and instructions      Medication Prescriptions given to patient   [] yes  [x] no   List the name of the drug the prescription was written for.       Medications ordered this visit were e-scribed.  Verified by order class [x] yes  [] no  Amitriptyline 25 mg; clonidine 0.1 mg/24 hr patch; gabapentin 100 mg; trintellix 20 mg     Medication changes or discontinuations were communicated to patient's pharmacy: [] yes  [x] no    UA collected [] yes  [x] no    [place] Prescription Monitoring Program Reviewed? [] yes  [x] no    Referrals were made to: None      Future appointment was made: [x] yes  [] no  03/28/2019  Dictation completed at time of chart check: [] yes  [x] no    I have checked the documentation for today s encounters and the above information has been reviewed and completed.

## 2021-06-22 NOTE — PROGRESS NOTES
" Behavioral Health Outpatient Assessment          Chief Complaint:              Information prior to arriving on the unit:     Patient's impression of their current status:  According to session done October 4, 2018 with a licensed graduate   The patient described reasons for engaging in therapy services during today's session. She stated, \"I have a history of anxiety and depression and am looking for a new therapist.\" The patient believes that the symptoms began when she was in elementary school. She has a historical diagnosis of ADHD in addition to Bipolar 2, anxiety and depression. The patient has engaged in psychotherapy services before but not within the past 4 or 5 years.   She described expectations for treatment when she stated, \"I'd like to establish care with a therapist as part of my ongoing mental health treatment.\"  The patient is also in the process of assessing for possible seizures. She has no official diagnosis yet and is attempting to establish care with a neurologist.              History of Present Illness:     The patient is a 23 y.o. female, who presents with her  and therapy dog for evaluation related to her panic and agoraphobia  That she has had mental health concerns since elementary school  Perseverates on situations and has difficulty attending appointments but has not been consistent in her management of her ongoing concerns  Really engages is polite and cooperative-states that she would like to continue her current medication and feels that what she is on is working well  Is that she has good community support-         Psychiatric Review Of Systems:     Sleep problems: no  appetite changes: no  weight changes: no  Low energy/anergy: no  Low interest/pleasure/anhedonia: no  somatic symptoms: no  Decreased libido: no  Increased anxiety/panic: no  guilty/hopeless: no  S.I.B.s/risky behavior: no  Illicit drug use: no  alcohol: no          Past Psychiatric History:  "     Psychiatrist:  no  Therapist: looking now - has had In the past   /ACT team: no   Prior Admissions: no   Commitments:  no   ECT:  no   Suicide attempts:  no             Chemical Use History:   No concerns    Allergies:     Adhesive tape-silicones; Other allergy (see comments); and Azithromycin         Medical History:     Past Medical History:   Diagnosis Date     Migraine               Surgical History:       Past Surgical History:   Procedure Laterality Date     NO PAST SURGERIES              Social History:     Social History     Socioeconomic History     Marital status:      Spouse name: Not on file     Number of children: Not on file     Years of education: Not on file     Highest education level: Not on file   Social Needs     Financial resource strain: Not on file     Food insecurity - worry: Not on file     Food insecurity - inability: Not on file     Transportation needs - medical: Not on file     Transportation needs - non-medical: Not on file   Occupational History     Not on file   Tobacco Use     Smoking status: Never Smoker     Smokeless tobacco: Never Used   Substance and Sexual Activity     Alcohol use: Yes     Alcohol/week: 0.6 oz     Types: 1 Standard drinks or equivalent per week     Frequency: Monthly or less     Comment: socially      Drug use: No     Sexual activity: Yes     Partners: Male     Birth control/protection: IUD     Comment:    Other Topics Concern     Not on file   Social History Narrative    Likes to sew, embroider, in a choir, play videogames.  Javy is a  at [employer].               Family History:     Family History   Problem Relation Age of Onset     Neuropathy Father      Diabetes type II Father      Migraines Father      Bipolar disorder Father      Breast cancer Maternal Grandmother      Alzheimer's disease Maternal Grandmother               Medications:     Current Outpatient Medications on File Prior to Visit  "  Medication Sig Dispense Refill     amitriptyline (ELAVIL) 25 MG tablet Take 2 tablets (50 mg) by mouth at bedtime. Need clinic appt for further refills, scheduling # [phone]       budesonide-formoterol (SYMBICORT) 80-4.5 mcg/actuation inhaler Inhale 2 puffs 2 (two) times a day. 1 Inhaler 0     diphenhydrAMINE (BENADRYL) 50 MG capsule Take 50 mg by mouth every 6 (six) hours as needed for itching.       gabapentin (NEURONTIN) 100 MG capsule Take 100 mg by mouth 3 (three) times a day. Take 2 capsules up to three times a day        1     hyoscyamine (LEVSIN/SL) 0.125 mg SL tablet Take 0.125 mg by mouth.       ipratropium (ATROVENT HFA) 17 mcg/actuation inhaler Inhale 2 puffs.       levonorgestrel (MIRENA) 20 mcg/24 hr (5 years) IUD 20 mcg by Intrauterine route.       melatonin 10 mg Tab Take 10 mg by mouth.       metoclopramide (REGLAN) 5 MG tablet Take 5 mg by mouth.       ranitidine (ZANTAC) 150 MG tablet Take 1 tablet (150 mg total) by mouth 2 (two) times a day. 120 tablet 3     UNABLE TO FIND Inhale 0.5 mg. ipratropium (ATROVENT) 0.02 % nebulizer solution       vortioxetine (TRINTELLIX) 20 mg Tab tablet Take 1 tablet (20 mg total) by mouth daily. 30 tablet 0     ketoconazole (NIZORAL) 2 % shampoo Apply topically.       No current facility-administered medications on file prior to visit.             Labs:   personally reviewed.   No visits with results within 2 Month(s) from this visit.   Latest known visit with results is:   No results found for any previous visit.            Medical Review Of Systems:     Pertinent items are noted in HPI.    Vitals:    12/28/18 0923   BP: 109/61   Pulse: 90   Weight: 113 lb (51.3 kg)   Height: 5' 4.75\" (1.645 m)      Body mass index is 18.95 kg/m .            Mental Status Exam:       Appearance: The patient appears her stated age.  She is appropriately dressed. Alert and awake  Reliability:  She appears to be an adequate historian.    Behavior: She makes good eye contact.  " She is cooperative and engaged in the interview.   no evidence of responding to hallucinations or flashbacks.  Speech: Her speech is spontaneous and coherent, with a normal rate, rhythm and tone. Associations: Her associations are connected.    Language:There are no difficulties with expressive or receptive language as observed throughout the interview.    Mood: Described as anxous  Affect: congruent and shows a normal range and level of reactivity.    Judgement: Able to make basic decision regarding safety.  Insight: Limited into her behaviors and the disease process has good support systems  Gait and station:steady.    Thought process: logical   Thought content: no evidence of delusions or paranoia.    Fund of knowledge: Average  Attention / Concentration: Able to remain focused during the interview with minimal distractibility or need for redirection.  Short Term Memory: Intact  Long Term Memory: Intact           Diagnoses:     History of bipolar 2  Panic with agoraphobia      Patient Active Problem List   Diagnosis     Seizure-like activity (H)     Mild persistent asthma     Irritable bowel syndrome with both constipation and diarrhea     Bipolar 2 disorder (H)     JOSHUA (generalized anxiety disorder)               Assessment:            Plan:     Medications Ordered   Medications     amitriptyline (ELAVIL) 25 MG tablet     Sig: Take 2 tablets (50 mg) by mouth at bedtime.     Dispense:  60 tablet     Refill:  3     cloNIDine (CATAPRES-TTS) 0.1 mg/24 hr     Sig: Place 1 patch on the skin every 7 days.     Dispense:  4 patch     Refill:  3     gabapentin (NEURONTIN) 100 MG capsule     Sig: Take 100 mg by mouth 3 (three) times a day. 3 times a day as needed 20 minutes before stressful event     Dispense:  180 capsule     Refill:  3     vortioxetine (TRINTELLIX) 20 mg Tab tablet     Sig: Take 1 tablet (20 mg total) by mouth daily.     Dispense:  30 tablet     Refill:  3         -Record Review: extensive.  -Treatment  options and alternatives reviewed with patient and they concur with the above plan.  -Face to face time  70 minutes with > 50% spent on consultation, education and coordination of care.    Return to clinic in 3 months sooner as needed  This note was created with help of Dragon dictation system. Grammatical / typing errors are not intentional.  Belkys Carpenter, SAMMY   12/28/2018

## 2021-06-23 NOTE — PROGRESS NOTES
Outpatient Mental Health Treatment Plan    Name:  Angela Jones  :  1995  MRN:  [id number]    Treatment Plan:  Initial Treatment Plan  Intake/initial treatment plan date:  2018  Benefit and risks and alternatives have been discussed: Yes  Is this treatment appropriate with minimal intrusion/restrictions: Yes  Estimated duration of treatment:  Approximately 10 sessions.  Anticipated frequency of services:  Every 2-3 weeks  Necessity for frequency: This frequency is needed to establish therapeutic goals and for continuity of care in order to monitor progress.  Necessity for treatment: To address cognitive, behavioral, and/or emotional barriers in order to work toward goals and to improve quality of life.    Session Type: Patient is presenting for an Individual session.    Plan:           ?   ? Anxiety    Goal:  Decrease average anxiety level from 3 to 1.   Strategies: ? [x] Learn and practice relaxation techniques and other coping strategies (e.g., thought stopping, reframing, meditation)     ? [x] Increase involvement in meaningful activities     ? [x] Discuss sleep hygiene     ? [x] Explore thoughts and expectations about self and others     ? [x] Identify and monitor triggers for panic/anxiety symptoms     ? [x] Implement physical activity routine (with physician approval)     ? [x] Consider introduction of bibliotherapy and/or videos     ? [x] Continue compliance with medical treatment plan (or explore barriers)   ?Degree to which this is a problem: 3  Degree to which goal is met: 1  Date of Review: May 2019       ? Depression    Goal:  Decrease average depression level from 2 to 1.   Strategies:    ?[x] Decrease social isolation     [x] Increase involvement in meaningful activities     ?[x] Discuss sleep hygiene     ?[x] Explore thoughts and expectations about self and others     ?[x] Process grief (loss of significant person, independence, role, etc.)     ?[x] Assess for suicide risk     ?[x]  "Implement physical activity routine (with physician approval)     [x] Consider introduction of bibliotherapy and/or videos     [x] Continue compliance with medical treatment plan (or explore barriers)?  Degree to which this is a problem: 2  Degree to which goal is met: 2  Date of Review: May 2019    Additional goals:  Improve self-care  Improve organization skills (\"life skills\")       Functional Impairment:  1=Not at all/Rarely  2=Some days  3=Most Days  4=Every Day    Personal : 4  Family : 3  Social : 2   Work/school : 4    Diagnosis (non-Axial as defined in DSM-5)  1. Anxiety disorder, unspecified type      2. Current moderate episode of major depressive disorder, unspecified whether recurrent (H)      ADHD  Provisional Diagnosis (list only- no explanation needed)  Bipolar Disorder  OCD  PTSD      Clinical assessments and measures completed:.   WHODAS 2.0 12-item version: 7    Scores presented in qualifiers to represent level of disability.  MILD Problem (slight, low, ...) 5-24%  H1= 30  H2= 2  H3= 18     PHQ-9: 13 . Difficulty with daily functioning= somewhat .              Indicates moderately-severe severity.     JOSHUA-7: 7 . Difficulty with daily functioning= somewhat .               Indicates moderate severity.      C-SSRS: Low to moderate risk. Patient does have history of self-harm and suicidal ideation with intent. No current ideation or intent.     Strengths:  Articulate, open-minded and willing to participate in mental health services. Good support from  and family. Good access to care and resourcefulness.   Limitations:  Trouble with self-control; intrinstic motivation; executive dysfunction with ADHD  Cultural Considerations: Patient is a 23  female. Identifies as Sabianist but more interested in science. Grew up in [place] and always had family close by. There are no significant ethnic, cultural or Mosque factors that may be relevant for therapy.     Persons " responsible for this plan: Patient and Provider            Psychotherapist Signature           Patient Signature:              Guardian Signature             Provider: Performed and documented by LUIS FERNANDO Blackmon   Date:  2/8/2019

## 2021-06-23 NOTE — PROGRESS NOTES
"AURA Jones is a 23 y.o. female here for follow up of several issues. Mostly wants to discuss medication for ADHD. She was diagnosed with this as a child and took guanfacine. She is not on medication right now because she doesn't work or go to school, but she and her  recently moved to a new home and has a lot to do with organizing the new home and keeping up on day-to-day household activities.  Feels like she cannot concentrate on anything and is wondering if she could restart guanfacine.    Waiting for ambulatory EEG.  This was recommended by neurology because her initial EEG was abnormal but nonspecific.  She has been having \"episodes,\" or possible seizures, once or twice a week, where she appears \"out of it\". takes gabapentin 3x/day.  Sees Fanny Cobb for therapy, which is also going well.    Next appt with Psych NP is end of March.  She has only seen this provider once but felt that it went well.  She feels that her bipolar disorder and anxiety have been well controlled.  She takes vorioxetine and gabapentin 3x/day.     Asthma flared when it was cold.  She does fine as long as she does not go outside when it's very cold.  Her asthma is usually worse in summer and spring with pollen.     Would like a refill on ranitidine, which she takes for irritable bowel syndrome.  Past Medical History:   Diagnosis Date     Migraine      Current Outpatient Medications on File Prior to Visit   Medication Sig Dispense Refill     amitriptyline (ELAVIL) 25 MG tablet Take 2 tablets (50 mg) by mouth at bedtime. 60 tablet 3     budesonide-formoterol (SYMBICORT) 80-4.5 mcg/actuation inhaler Inhale 2 puffs 2 (two) times a day. 1 Inhaler 0     cloNIDine (CATAPRES-TTS) 0.1 mg/24 hr Place 1 patch on the skin every 7 days. 4 patch 3     diphenhydrAMINE (BENADRYL) 50 MG capsule Take 50 mg by mouth every 6 (six) hours as needed for itching.       gabapentin (NEURONTIN) 100 MG capsule Take 100 mg by mouth 3 (three) times a " "day. 3 times a day as needed 20 minutes before stressful event 180 capsule 3     hyoscyamine (LEVSIN/SL) 0.125 mg SL tablet Take 0.125 mg by mouth.       ipratropium (ATROVENT HFA) 17 mcg/actuation inhaler Inhale 2 puffs.       levonorgestrel (MIRENA) 20 mcg/24 hr (5 years) IUD 20 mcg by Intrauterine route.       melatonin 10 mg Tab Take 10 mg by mouth.       metoclopramide (REGLAN) 5 MG tablet Take 5 mg by mouth.       ranitidine (ZANTAC) 150 MG tablet Take 1 tablet (150 mg total) by mouth 2 (two) times a day. 120 tablet 3     vortioxetine (TRINTELLIX) 20 mg Tab tablet Take 1 tablet (20 mg total) by mouth daily. 30 tablet 3     ketoconazole (NIZORAL) 2 % shampoo Apply topically.       UNABLE TO FIND Inhale 0.5 mg. ipratropium (ATROVENT) 0.02 % nebulizer solution       No current facility-administered medications on file prior to visit.        Past medical and social history reviewed with no changes.   ?  ROS:    Head: No headaches  Psych: No significant change in mood, anxiety level   Neuro: No alterations in thinking, paresthesias, or weakness   ?  O  BP 96/64   Pulse 89   Temp 98.5  F (36.9  C) (Oral)   Resp 12   Ht 5' 4.75\" (1.645 m)   Wt 113 lb (51.3 kg)   SpO2 99% Comment: ra  Breastfeeding? No   BMI 18.95 kg/m     Vitals reviewed. Nursing note reviewed.  General Appearance: Pleasant and alert, in no acute distress  HEENT:mucous membranes moist  Skin: warm, dry, intact, no rash noted  Neuro: no focal deficits, CNs II-XII normal.   A/P   was seen today for follow-up and immunizations.    Diagnoses and all orders for this visit:    Attention deficit hyperactivity disorder (ADHD), predominantly inattentive type: She does not remember what dose of seen she was previously on.  We will start at 1 mg/day and each week, she can increase by 1 mg/day until she gets to a dosage that works well.  If she is taking 4 mg/day and is still not having improvement, she will come back in to see me.  -     guanFACINE " 1 mg Tb24; Take 1 tablet (1 mg total) by mouth at bedtime. After 1 week, if needed, increase to 2 mg.    Need for vaccination  -     Tdap vaccine,  8yo or older,  IM  -     HPV vaccine 9 valent 3 dose IM    Mild persistent asthma, unspecified whether complicated: Refilled Symbicort.  ACT score was 21 today.  -     budesonide-formoterol (SYMBICORT) 80-4.5 mcg/actuation inhaler; Inhale 2 puffs 2 (two) times a day.    Irritable bowel syndrome with both constipation and diarrhea  -     ranitidine (ZANTAC) 150 MG tablet; Take 1 tablet (150 mg total) by mouth 2 (two) times a day.    Seizure-like activity (H): She will follow up with Neurology after her ambulatory EEG.     Bipolar 2 disorder (H): well-controlled. Will continue with psychiatry and therapy with Fanny.     Options for treatment and follow-up care were reviewed with the patient and/or guardian. Angela Jones and/or guardian engaged in the decision making process and verbalized understanding of the options discussed and agreed with the final plan.    Marissa Walton MD

## 2021-06-23 NOTE — PROGRESS NOTES
Mental Health Visit Note    This is a dual signature report. My supervising clinician is CHANTAL Cm.    1/16/2019    Start time: 4:00pm    Stop Time: 4:55pm   Session # 1    Session Type: Patient is presenting for an Individual session.    Angela Jones is a 23 y.o. female is being seen today for    Chief Complaint   Patient presents with      Follow Up     Psychotherapy follow-up visit for anxiety   .     New symptoms or complaints: Difficulty organizing and completing tasks    Functional Impairment:   Personal: 4  Family: 2  Work: 4  Social:3    Clinical assessment of mental status:   Grooming: Well groomed  Attire: Appropriate  Age: Appears Stated  Behavior Towards Examiner: Cooperative  Motor Activity: Tremors/Tics   Eye Contact: Avoidant  Mood: Anxious  Affect: Congruent w/content of speech and Anxious  Speech/Language: Stuttering/Slammering  Attention: Distractible  Concentration: Brief  Thought Process: other  Thought Content: Hallucinations: none reported  Delusions: none reported  Orientation: X 3  Memory: Impairment, Recent and Remote  Judgement: Impairment, Minimal and Moderate  Estimated Intelligence: Average  Demonstrated Insight: Adequate  Fund of Knowledge: adequate   I've re-evaluated the mental status of the patient and no changes were noted since the date of the last encounter, 12/18/18.    Suicidal/Homicidal Ideation present: None Reported This Session    Patient's impression of their current status:   The patient reported that she and her  will be moving into their new apartment by 1/25/19. They are in the process of packing which is overwhelming for the patient. They have enlisted help from a family friend and from patient's mom. The patient has aspirations to keep a planner to help organize daily tasks but has not found one she likes yet. She established care with Belkys Carpenter CNP. She reported that her most recent MRI was normal so she plans to undergo further  "testing. She still feels frustrated that she doesn't know what is wrong with her brain functioning. She discussed some issues organizing tasks and \"not knowing what to do next.\" She can complete daily routine tasks such as brushing teeth, emptying  and cleaning the bathroom. She believes she can only do these tasks because she had practice growing up. She reported that completing tasks takes a lot of energy and she often feels tired during the day. She wants to work eventually but is not sure what type of work environment would be appropriate considering her skills and training.     Therapist impression of patients current state:   The patient arrived on-time for a follow-up psychotherapy visit. She was accompanied by her , Javy, and therapy dog, Alvin. The patient presented with an anxious affect and exhibited slight tremors and tics throughout session. She often had difficulty staying on topic but was receptive to guidance and re-directing from the therapist. The patient continues to identify various symptoms that impair her daily functioning mostly associated with anxiety and ADHD. The therapist also believes that the patient's symptoms are exacerbated by the current move. Once she is settled into her new home, it would be a good opportunity for patient to create organizational tools. Therapist referred patient for [facility] services to help improve completion of ADL's and increase independence in daily functioning - referral was faxed after session concluded. The therapist and patient will create a treatment plan during her next session.     Type of psychotherapeutic technique provided: Insight oriented and Client centered    Progress toward short term goals:Progress as expected, as patient appeared insightful regarding functional impairments    Review of long term goals: Not done at today's visit   Treatment plan will be updated on 2/8/19    Diagnosis:   1. Anxiety disorder, unspecified type    2. " Current moderate episode of major depressive disorder, unspecified whether recurrent (H)        Plan and Follow up: The patient will be scheduled to return for a follow-up visit on 2/8/19 at 4pm.   Therapist faxed completed paperwork for [facility] referral following today's session.      Discharge Criteria/Planning: Client has chronic symptoms and ongoing therapy for maintenance stability recommended.     Performed and documented by CHANTAL Blackmon    I have read, discussed and reviewed the documentation as presented by CHANTAL Blackmon LICSW Ashley Trudell 1/17/2019

## 2021-06-23 NOTE — PROGRESS NOTES
"Mental Health Visit Note    This is a dual signature report. My supervising clinician is CHANTAL Cm.    2/8/2019    Start time: 4:08pm    Stop Time: 5:05pm   Session # 2    Session Type: Patient is presenting for an Individual session.    Angela Jones is a 23 y.o. female is being seen today for    Chief Complaint   Patient presents with      Follow Up     Psychotherapy follow-up visit for anxiety and ADHD   .     New symptoms or complaints: Concerns about participating in choir    Functional Impairment:   Personal: 4  Family: 2  Work: 4  Social:3    Clinical assessment of mental status:   Grooming: Well groomed  Attire: Appropriate  Age: Appears Stated  Behavior Towards Examiner: Cooperative  Motor Activity: Tremors/Tics   Eye Contact: Avoidant  Mood: Anxious  Affect: Congruent w/content of speech and Anxious  Speech/Language: Stuttering/Slammering  Attention: Distractible  Concentration: Brief  Thought Process: other  Thought Content: Hallucinations: none reported  Delusions: none reported  Orientation: X 3  Memory: Impairment, Recent and Remote  Judgement: Impairment, Minimal and Moderate  Estimated Intelligence: Average  Demonstrated Insight: Adequate  Fund of Knowledge: adequate   I've re-evaluated the mental status of the patient and no changes were noted since the date of the last encounter, 1/16/19.    Suicidal/Homicidal Ideation present: None Reported This Session    Patient's impression of their current status:   The patient reported feeling \"okay\" today. She and her  are in the process of moving to a new apartment. She wanted to process some feelings of discomfort about her  and the song choices. While processing, she began to question her thought process and was receptive to some mindfulness techniques to engage her breathing and regain focus on the topic at hand. She agreed that she often has difficulty trusting herself and often depends upon her  for " reassurance. She agreed that improving self-esteem and confidence would be beneficial for future therapy visits. Thus, she agreed to start attending sessions without her  present. Her  was supportive of this decision as well.  The patient plans to ask her  to have coffee with her so patient can share her thoughts and feelings.    Therapist impression of patients current state:   The patient checked in 7 minutes late for a follow-up psychotherapy visit. She was accompanied by her , Javy, and therapy dog, Alvin. The patient presented with an anxious affect and exhibited slight tremors and tics throughout session. She was receptive to therapist feedback and guidance today, particularly when she began to exhibit significant signs of anxiety. She benefited from gentle guidance and reconnection with her breath and body which helped her slow down her thought process to improve communication. The therapist will continue to provide comfort and support while patient works to gain self-confidence and implement strategies for managing stress during future sessions. The majority of today's session focused on creating patient's treatment plan which was signed and uploaded to patient's chart.     Type of psychotherapeutic technique provided: Insight oriented and Client centered    Progress toward short term goals:Progress as expected, as patient demonstrated a strong capacity for engaging in cognitive processing. She was receptive to feedback and guidance regarding mindfulness skills.    Review of long term goals: Treatment Plan updated   Signed and uploaded to patient chart after session  Date of next review: May 2019    Diagnosis:   1. JOSHUA (generalized anxiety disorder)    2. Current moderate episode of major depressive disorder, unspecified whether recurrent (H)    3. Attention deficit hyperactivity disorder (ADHD), unspecified ADHD type        Plan and Follow up: The patient will be scheduled  to return for a follow-up visit on 2/22/19 at 4pm. Additional appointments have been scheduled in advance to accommodate preferred slot at 4pm.   Goals for next visit: continue using mindfulness based techniques to manage anxiety; prepare for conversation with     **Next session:  will not attend      Discharge Criteria/Planning: Client has chronic symptoms and ongoing therapy for maintenance stability recommended.     Performed and documented by CHANTAL Blackmon    I have read, discussed and reviewed the documentation as presented by CHANTAL Blackmon LICSW Ashley Trudell 2/8/2019

## 2021-06-23 NOTE — PATIENT INSTRUCTIONS - HE
Start guanfacine 1 mg/day. After 1 week, if you need to, you can increase to 2 mg/day.   If needed, increase to 3 mg/day after another week.  If needed, increase to 4 mg/day after another week.     Send me a frooly message when you find a dose that's working.     Nice to see you again!     Dr. Walton

## 2021-06-24 NOTE — TELEPHONE ENCOUNTER
Called pt and relayed spacer sent to pharmacy. Relayed message about nebulizer. Pt unable to schedule apt with PCP as she is out of town with family emergency.  Will call for apt when gets back.  Thanks.

## 2021-06-24 NOTE — TELEPHONE ENCOUNTER
Most Pharmacies don't carry nebulizers and will need to get at Medical supply store.  I called her Pharmacy but they do not carry them.  Pt can make appt with PCP and if appropriated, a written script can be given.

## 2021-06-25 NOTE — PROGRESS NOTES
Mental Health tele Visit Note    Patient: Anegla Jones    : 1995 MRN: [id number]    Date: 6/3/2021   Start time: 3:00pm   Stop Time: 3:50pm   Session # 9    Chief Complaint   Patient presents with      Follow Up     Psychotherapy follow-up visit for anxiety, depression, ASD and ADHD       Consent:  The patient has verbally consented to: the potential risks and benefits of telemedicine (video visit) versus in person care; bill my insurance or make self-payment for services provided; and responsibility for payment of non-covered services.   Mode of Communication:  Video Conference via [employer]  Session Type: Patient is participating in a video visit for mental health    Telemedicine Visit: The patient's condition can be safely assessed and treated via synchronous audio and visual telemedicine encounter.    Reason for Telemedicine Visit: Services only offered telehealth  Originating Site (Patient Location): Patient's home  Distant Site (Provider Location): Provider Remote Setting  As the provider I attest to compliance with applicable laws and regulations related to telemedicine.  Those present for this visit include patient and therapist.    Follow up in regards to ongoing symptom management of anxiety and depression in addition to barriers associated with Autism Spectrum Disorder.    New symptoms or complaints: no new symptoms    Functional Impairment:   Personal: 3  Family: 1  Work: 2  Social:2        ASSESSMENT: Current Emotional / Mental Status (status of significant symptoms):              Risk status (Self / Other harm or suicidal ideation)              Patient denies risks to personal safety              Patient denies current or recent suicidal ideation or behaviors.              Patient denies current or recent homicidal ideation or behaviors.              Patient denies current or recent self injurious behavior or ideation.              Patient denies other safety concerns.           "    Patient denies changes to risk factors              Patient denies changes to protective factors              Recommended that patient call 911 or go to the local ED should there be a change in any of these risk factors.                Attitude:                                   Cooperative               Orientation:                             x3              Speech                          Rate / Production:       Pressured                           Volume:                       Normal               Mood:                                      Anxious  Depressed  Normal              Thought Content:                    Clear  Perservative               Thought Form:                        Coherent  Logical               Insight:                                     Good      Mental status reviewed and no changes noted as of 6/3/21      Patient's impression of their current status:   Patient's impression is that her health status is \"okay-annie.\"   Patient reports that they bought a new car. She started learning to drive at age 14 but the anxiety was too problematic so she never got her license.  She and her  doing okay.   She is trying not to stress over finances by \"not thinking too much about it.\"   She describes having difficulty distinguishing the severity of problems - hard to identify big problem versus small problem. She states, \"It's hard for me to take a step back and decide whether or not something is important.\"   She is still quite dependent upon her  for completing tasks of daily living.     She is planning to participate in a treatment program at [facility] for several weeks at the end of July.     Therapist impression of patients current state:   Patient easily engages in dialogue and displays fair insight into her underlying mental health symptoms. She is receptive to therapist guidance in regards to processing emotions and connecting thoughts. She appears quite communicative during " therapy despite reported communication problems in her relationship. Therapist has suggested they attend sessions together for more support.     Type of psychotherapeutic technique provided: Client centered and CBT    Progress toward short term goals: Progress as expected, with patient returning for a scheduled visit and following through with treatment recommendations.     Review of long term goals:   Not done at today's visit   Treatment plan updated on 4/16/21    Update DA prior to November 2021    Diagnosis:  1. Autism spectrum disorder    2. Attention deficit hyperactivity disorder (ADHD), other type    3. Moderate episode of recurrent major depressive disorder (H)    4. Generalized anxiety disorder        Plan and Follow up:   Patient will return for therapy in 2 weeks    Patient encouraged to do the following for homework/continued practice of therapy skills:  1. Stick to morning routine   2. Complete daily chores   3. Incorporate daily gratitude practice   4. Practice radical self-acceptance.   5. Practice deep breathing  6. Continue mindfulness meditation exercises  7. Build in a sensory breaks    Discharge Criteria/Planning: Client has chronic symptoms and ongoing therapy for maintenance stability recommended.    I have reviewed the note as documented above.  This accurately captures the substance of my conversation with the patient.    Performed and documented by CHANTAL Blackmon LICSW

## 2021-06-25 NOTE — TELEPHONE ENCOUNTER
Per  and her spouse the PCP needs to write an order for PCA services and provide all medical necessity information to their insurance company ([employer] [facility]). Insurance card is scanned into the chart.    [facility] is not allowed to verify or confirm insurance benefits, nor does [facility] provide pre-certification follow-up support as we are not insurance specialists.    [facility] will call  on Friday 6/18/2021 at 11am to complete a [facility] assessment to determine if there are other ways [facility] can offer support, but based on today's conversation, Javy (patient's spouse) is her primary support and can follow up with his insurance company regarding the approval or denial of the PCA services. Patient also has [employer] access for continued communication with PCP if any additional information is needed by the insurance company.

## 2021-06-25 NOTE — TELEPHONE ENCOUNTER
Adelaida, can you please help this patient get referred for a PCA assessment? I believe they can pursue this on my own without my ordering PCA services. If there is a form to be filled out for insurance, they can provide that to me in the clinic.     Thank you and please let me know if you have questions about this situation.     Marissa Walton MD

## 2021-06-25 NOTE — TELEPHONE ENCOUNTER
Refill Approved    Rx renewed per Medication Renewal Policy. Medication was last renewed on 5/28/20.    Mitchel Christine, [facility] Triage/Med Refill 6/16/2021     Requested Prescriptions   Pending Prescriptions Disp Refills     mirtazapine (REMERON) 15 MG tablet [Pharmacy Med Name: Mirtazapine Oral Tablet 15 MG] 90 tablet 0     Sig: Take 1 tablet (15 mg total) by mouth at bedtime.       Tricyclics/Misc Antidepressant/Antianxiety Meds Refill Protocol Passed - 6/15/2021  5:53 PM        Passed - PCP or prescribing provider visit in last year     Last office visit with prescriber/PCP: 1/25/2021 Marissa Walton MD OR same dept: 1/25/2021 Marissa Walton MD OR same specialty: 1/25/2021 Marissa Walton MD  Last physical: 11/1/2019 Last MTM visit: Visit date not found   Next visit within 3 mo: Visit date not found  Next physical within 3 mo: Visit date not found  Prescriber OR PCP: Marissa Walton MD  Last diagnosis associated with med order: 1. JOSHUA (generalized anxiety disorder)  - mirtazapine (REMERON) 15 MG tablet [Pharmacy Med Name: Mirtazapine Oral Tablet 15 MG]; Take 1 tablet (15 mg total) by mouth at bedtime.  Dispense: 90 tablet; Refill: 0    If protocol passes may refill for 12 months if within 3 months of last provider visit (or a total of 15 months).

## 2021-06-25 NOTE — PROGRESS NOTES
"Mental Health Visit Note    This is a dual signature report. My supervising clinician is CHANTAL Cm.    3/8/2019    Start time: 4:05pm    Stop Time: 4:55pm   Session # 3    Session Type: Patient is presenting for an Individual session.    Angela Jones is a 24 y.o. female is being seen today for    Chief Complaint   Patient presents with      Follow Up     Psychotherapy follow-up visit for anxiety   .     New symptoms or complaints: Conflict with  due to influence of immediate family     Functional Impairment:   Personal: 4  Family: 2  Work: 4  Social:3    Clinical assessment of mental status:   Grooming: Well groomed  Attire: Appropriate  Age: Appears Stated  Behavior Towards Examiner: Cooperative  Motor Activity: Tremors/Tics   Eye Contact: Avoidant  Mood: Anxious  Affect: Congruent w/content of speech and Anxious  Speech/Language: Stuttering/Slammering  Attention: Distractible  Concentration: Brief  Thought Process: other  Thought Content: Hallucinations: none reported  Delusions: none reported  Orientation: X 3  Memory: Impairment, Recent and Remote  Judgement: Impairment, Minimal and Moderate  Estimated Intelligence: Average  Demonstrated Insight: Adequate  Fund of Knowledge: adequate   I've re-evaluated the mental status of the patient and no changes were noted since the date of the last encounter, 19.    Suicidal/Homicidal Ideation present: None Reported This Session    Patient's impression of their current status:   The patient reported that she was recently in [facility] for 2 weeks for a family . She and her  noticed that upon her return, their communication \"has been off.\" Her  expressed some general concerns about the patient's response to spending time with her immediate family. He wasn't sure how to approach the family dynamics. The patient reported that her family \"treats her like a child\" as if she were incapable of completing things on her own. This has " "apparently been the dynamics for her whole life. The patient and  reported that they have been fighting since she came back which is unusual for them as a couple.      Therapist impression of patients current state:   The patient arrived 5 minutes late for a follow-up therapy visit. She was originally going to attend today's visit without her  but they requested to be seen together in order to address some recent conflicts. The majority of today's session focused on helping the couple communicate their thoughts and feelings without judgment. The therapist helped patient articulate why she may have been more on edge after returning from a stressful 2 week visit with family and how this would understandably have impacted their marital dynamics temporarily. The therapist encouraged the patient and her  to be mindful of underlying emotions and to create space for discomfort without rushing to \"fixing\" or finding a solution. The therapist then reviewed techniques for mind-body practices to help patient manage underlying anxiety symptoms. The patient agreed to practice diaphragmatic breathing techniques outside of session.      Type of psychotherapeutic technique provided: Insight oriented and Client centered    Progress toward short term goals:Progress as expected, as patient appeared insightful and communicative.     Review of long term goals: Not done at today's visit   Treatment plan updated on 2/8/19  Date of next review: May 2019    Diagnosis:   1. JOSHUA (generalized anxiety disorder)    2. Current moderate episode of major depressive disorder, unspecified whether recurrent (H)    3. Attention deficit hyperactivity disorder (ADHD), unspecified ADHD type        Plan and Follow up: The patient will be scheduled to return for a follow-up visit on 3/22/2019.  HW: use handout for deep breathing at home and explore additional mind-body practices for relaxation      Discharge Criteria/Planning: Client " has chronic symptoms and ongoing therapy for maintenance stability recommended.     Performed and documented by CHANTAL Blackmon    I have read, discussed and reviewed the documentation as presented by CHANTAL Blackmon LICSW

## 2021-06-26 NOTE — PROGRESS NOTES
Mental Health tele Visit Note    Patient: Angela Jones    : 1995 MRN: [id number]    Date: 2021   Start time: 3:00pm   Stop Time: 3:50pm   Session # 10    Chief Complaint   Patient presents with      Follow Up     Psychotherapy follow-up visit for anxiety and autism       Consent:  The patient has verbally consented to: the potential risks and benefits of telemedicine (video visit) versus in person care; bill my insurance or make self-payment for services provided; and responsibility for payment of non-covered services.   Mode of Communication:  Video Conference via [employer] You[employer]  Session Type: Patient is participating in a video visit for mental health    Telemedicine Visit: The patient's condition can be safely assessed and treated via synchronous audio and visual telemedicine encounter.    Reason for Telemedicine Visit: Services only offered telehealth  Originating Site (Patient Location): Patient's home  Distant Site (Provider Location): Provider Remote Setting  As the provider I attest to compliance with applicable laws and regulations related to telemedicine.  Those present for this visit include patient and therapist.    Follow up in regards to ongoing symptom management of anxiety and depression in addition to barriers associated with Autism Spectrum Disorder.    New symptoms or complaints: no new symptoms    Functional Impairment:   Personal: 3  Family: 1  Work: 2  Social:2        ASSESSMENT: Current Emotional / Mental Status (status of significant symptoms):              Risk status (Self / Other harm or suicidal ideation)              Patient denies risks to personal safety              Patient denies current or recent suicidal ideation or behaviors.              Patient denies current or recent homicidal ideation or behaviors.              Patient denies current or recent self injurious behavior or ideation.              Patient denies other safety concerns.              Patient  denies changes to risk factors              Patient denies changes to protective factors              Recommended that patient call 911 or go to the local ED should there be a change in any of these risk factors.                Attitude:                                   Cooperative               Orientation:                             x3              Speech                          Rate / Production:       Pressured                           Volume:                       Normal               Mood:                                      Anxious  Depressed  Normal              Thought Content:                    Clear  Perservative               Thought Form:                        Coherent  Logical               Insight:                                     Good      Mental status reviewed and no changes noted as of 6/17/21      Patient's impression of their current status:   Patient's impression is that not much has changed since her last visit. She is trying to get outside more. She is collecting mulberry fruit from a tree in her backyard. She has been trying to be careful about not over-heating. She spent time at a friend's house last week. She describes feeling better when she is around people as opposed to being home alone. She had a visit with her PCP on 6/9. They are working on obtaining PCA services and she feels encouraged by the idea.     She is planning to participate in a treatment program at [facility] for several weeks at the end of July.     Therapist impression of patients current state:   Patient easily engages in dialogue and displays fair insight into her underlying mental health symptoms. She is receptive to therapist guidance around navigating stressors. She is receptive to therapist feedback around helpful lifestyle changes to promote health and wellness.  She and therapist discuss future care plans and needs like using DBT skills.     Type of psychotherapeutic technique provided: Client  centered and CBT    Progress toward short term goals: Progress as expected, with patient returning for a scheduled visit and following through with treatment recommendations.     Review of long term goals:   Not done at today's visit   Treatment plan updated on 4/16/21    Update DA prior to November 2021    Diagnosis:  1. Autism spectrum disorder    2. JOSHUA (generalized anxiety disorder)    3. Moderate episode of recurrent major depressive disorder (H)    4. Attention deficit hyperactivity disorder (ADHD), other type        Plan and Follow up:   Patient will return for therapy in 2 weeks    Short-term goal: set alarms or reminders for mindfulness skills during the day     Patient encouraged to do the following for homework/continued practice of therapy skills:  1. Stick to daily routine   2. Complete daily chores   3. Practice mindfulness meditation exercises  4. Build in a sensory breaks    Discharge Criteria/Planning: Client has chronic symptoms and ongoing therapy for maintenance stability recommended.    I have reviewed the note as documented above.  This accurately captures the substance of my conversation with the patient.    Performed and documented by CHANTAL Blackmon LICSW

## 2021-06-26 NOTE — PROGRESS NOTES
Clinic Care Coordination Contact  Community Health Worker Initial Outreach    CHW Initial Information Gathering:  Referral Source: PCP  Preferred Hospital: [facility]  [phone]  Preferred Urgent Care: [facility], [phone]  Current living arrangement:: I live in a private home with family  Type of residence:: Private home - stairs  Community Resources: OP Mental Health(Therapy and Case Management services)  Supplies Currently Used at Home: None  Equipment Currently Used at Home: none  Informal Support system:: Spouse  No PCP office visit in Past Year: No  Transportation means:: Regular car  CHW Additional Questions  MyChart active?: Yes  Patient sent Social Determinants of Health questionnaire?: No    Patient accepts CC: Yes. Patient scheduled for assessment with CCC SW on Friday 6/18/2021 at 11am. Patient noted desire to discuss whether CCC can support her in the process of finding out if she qualifies for PCA services through her commercial insurance.        and her  Javy report that their current health insurance has home health care coverage and will cover 180 PCA visits per year. They state the PCP needs to place this order and provide all medical necessity documents to the insurance company. The insurance company will review the medical necessity via the medical review team and then make a determination.    CHW is not sure how much CCC can assist with this process but the CHW will send a Telephone Encounter to the PCP team to have a record of how the PCP wants to proceed.

## 2021-06-26 NOTE — PROGRESS NOTES
"Angela Jones is a 26 y.o. female who is being evaluated via a billable video visit.      How would you like to obtain your AVS? MyChart.  Will anyone else be joining your video visit?  Javy     Video Start Time: 9:25 AM    Assessment & Plan     POTS (postural orthostatic tachycardia syndrome): has appt in July with Cardiology and she is doing an OT program at [facility] at the end of July. She has been suffering from physical instability from this, and does use a walker for ambulation much of the time.  We discussed this today and I advised that I do not think a wheelchair is the best choice for her at this time because if she uses it too much, she may become too weak.  Encouraged her to proceed with her program she is scheduled for, and to engage with physical therapy since her deductible is met now.  - Ambulatory referral to Care Management (Primary Care)    Autism spectrum disorder  - Ambulatory referral to Care Management (Primary Care)    JOSHUA (generalized anxiety disorder)  - Ambulatory referral to Care Management (Primary Care)    Moderate major depression (H)  - Ambulatory referral to Care Management (Primary Care)     and her  Javy are going to pursue PCA services and will ask their insurance company how to get help with this.       No follow-ups on file.    Marissa Walton MD  [facility]    Subjective   Angela Jones is 26 y.o. and presents today for the following health issues: difficulty with mobility. She is wondering if she could get an order for a wheelchair since she gets dizzy. She does use a walker. She and  Javy are also wondering if she might qualify for PCA services.       At the end of July, will start an OT program with [facility] focused on POTS and mobility. Her previous OT recommended PT but she didn't do PT because couldn't afford it.     She has a cardiology appt at the end of July at the  with a POTS specialist.     some days \"can't get off the " "couch\" because she is dizzy and will faint.     Has a mental health . Feels like her mental health is currently stable.       Objective       Vitals:  No vitals were obtained today due to virtual visit.    Physical Exam  GENERAL: Healthy, alert and no distress  EYES: Eyes grossly normal to inspection. No discharge or erythema, or obvious scleral/conjunctival abnormalities.  RESP: No audible wheeze, cough, or visible cyanosis.  No visible retractions or increased work of breathing.    NEURO: Cranial nerves grossly intact. Mentation and speech appropriate for age.  PSYCH: Mentation appears normal, affect normal/bright, judgement and insight intact, normal speech and appearance well-groomed          Video-Visit Details    Type of service:  Video Visit    Video End Time (time video stopped): 9:48 AM  Originating Location (pt. Location): Home    Distant Location (provider location):  [facility]     Platform used for Video Visit: Georgette    "

## 2021-06-26 NOTE — PROGRESS NOTES
Progress Notes by Fanny Cobb LGSW at 10/4/2018  4:00 PM     Author: Fanny Cobb LGSW Service: Behavioral Author Type: Licensed Graduate     Filed: 10/5/2018 12:09 PM Encounter Date: 10/4/2018 Status: Attested    : Fanny Cobb LGSW (Licensed Graduate ) Cosigner: Gosia Cruz LICSW at 10/8/2018  1:03 PM    Attestation signed by Gosia Cruz LICSW at 10/8/2018  1:03 PM    I have read, discussed and reviewed the documentation as presented by LUIS FERNANDO Blackmon LICSW                Mental Health Visit Note    This is a dual signature report. My supervising clinician is CHANTAL Cm.    10/4/2018    Start time: 4:20pm    Stop Time: 4:50pm   Session # Intake Session 1    Session Type: Patient is presenting for an Individual session.    Angela Jones is a 23 y.o. female is being seen today for    Chief Complaint   Patient presents with   ? Intake     Intake session 1 to establish care with therapist    .     New symptoms or complaints: Patient is looking to establish care with a therapist    Functional Impairment:   Personal: 3  Family: 1  Work: 3  Social:2    Clinical assessment of mental status:   Grooming: Well groomed  Attire: Appropriate  Age: Appears Stated  Behavior Towards Examiner: Cooperative  Motor Activity: Tremors/Tics   Eye Contact: Appropriate  Mood: Euthymic  Affect: Congruent w/content of speech and Anxious  Speech/Language: Stuttering/Slammering  Attention: Distractible  Concentration: Within normal  Thought Process: Within normal  Thought Content: Hallucinations: none reported  Delusions: none reported  Orientation: X 3  Memory: Impairment, Recent and Remote  Judgement: No Evidence of Impairment  Estimated Intelligence: Average  Demonstrated Insight: Adequate  Fund of Knowledge: adequate    Suicidal/Homicidal Ideation present: None Reported This Session    Patient's impression of their current  "status:   The patient described reasons for engaging in therapy services during today's session. She stated, \"I have a history of anxiety and depression and am looking for a new therapist.\" The patient believes that the symptoms began when she was in elementary school. She has a historical diagnosis of ADHD in addition to Bipolar 2, anxiety and depression. The patient has engaged in psychotherapy services before but not within the past 4 or 5 years.   She described expectations for treatment when she stated, \"I'd like to establish care with a therapist as part of my ongoing mental health treatment.\"  The patient is also in the process of assessing for possible seizures. She has no official diagnosis yet and is attempting to establish care with a neurologist.     Therapist impression of patients current state:   The patient presented for an initial intake session with this provider. She was accompanied by her  and therapy dog, Alvin. The patient arrived 20 minutes late due to traffic but she called ahead in order to notify therapist. Patient is a 23 year old  female. Patient was referred by Dr. Walton to engage in individual psychotherapy to address symptoms of anxiety and depression. The patient appeared cooperative and open-minded throughout the intake and easily engaged in dialogue. Therapist and patient reviewed consent and privacy policy. Patient reported understanding and signed document- sent to be scanned into EMR. The patient has engaged in outpatient psychotherapy services in the community but there is no diagnostic assessment on file or available at intake. The therapist will attempt to retrieve records. The patient did not complete the following questionnaires for session today: WHODAS, CAGE, PHQ-9 and JOSHUA-7. She also did not finish the adult intake questionnaire packet. The patient and  agreed to complete the packet and questionnaires before their next follow-up visit.   The patient " denied any suicidal or homicidal ideation at intake. Therapist and patient will further review patient's history during the next follow-up encounter in order to complete a standard diagnostic assessment. Goals for treatment will be established after the 2nd or 3rd follow-up session with this provider. The patient plans to follow-up with psychotropic medication management with her psychiatrist in the community. Therapist will request that patient sign an EDDI in order to coordinate care and retrieve medical records.      Type of psychotherapeutic technique provided: Client centered and supportive counseling and information gathering    Progress toward short term goals:Progress as expected, as patient easily engaged in dialogue with the therapist and appeared motivated to participate in the therapeutic process.    Review of long term goals: Not done at today's visit    Diagnosis:   1. Bipolar 2 disorder (H)        Plan and Follow up: The patient is scheduled to return for a 2nd intake visit on 10/23/2018.      Discharge Criteria/Planning: Client has chronic symptoms and ongoing therapy for maintenance stability recommended.     Performed and documented by LUIS FERNANDO Blackmon    I have read, discussed and reviewed the documentation as presented by LUIS FERNANDO Blackmon [facility] Hospital[facility]INTIA Cobb 10/5/2018

## 2021-06-26 NOTE — PROGRESS NOTES
Clinic Care Coordination Contact     Spoke very briefly with pt at her scheduled appointment time today. Explained that I cannot make a referral for PCA services unless she is on Medical Assistance. She reports that her private insurance company will cover up to 180 home health visits per calendar year. Encouraged her and her spouse to contact their insurance company directly to discuss exactly what those visits entail. Spouse requested that I call back on Monday June 21st at 11am as he was in the middle of a meeting today. Will call back at that time.

## 2021-06-27 NOTE — PROGRESS NOTES
Progress Notes by Marissa Walton MD at 4/3/2019 12:41 PM     Author: Marissa Walton MD Service: -- Author Type: Physician    Filed: 4/3/2019 12:43 PM Encounter Date: 4/2/2019 Status: Signed    : Marissa Walton MD (Physician)       Patient has pain over frontal and maxillary sinuses, with nasal congestion, headaches, but no fevers.  She states she is taking her inhaler every 4 hours to help her breathe, since she has asthma.  I gave the following patient instructions:    Patient Instructions   Wiliam Miller,    I am sorry you are feeling so poorly.  Have you tried nasal irrigation such as with a Neti pot before?  I would do this twice per day.  This has been shown to help symptoms of a sinus infection even more than antibiotics.  Do this for 1 week and take Tylenol or ibuprofen for your headaches.  If you are having worsening trouble breathing or if you have a fever of 100.5 or higher, you need to come in to be seen.    If you are feeling no better after 1 week, please come in to see me.  Please read the instructions below about doing nasal irrigation and about sinusitis in general.    You may want to try a nasal lavage (also known as nasal irrigation). You can find over-the-counter products, such as Neti-Pot, at retail locations or make your own at home. Instructions for homemade nasal lavage and more information on the process are available online at https://[email or web address]/contents/image?imageKey=PI%6A97523  and  https://[email or web address]/contents/rinsing-out-your-nose-with-[email or web address]-the-basics?efahwZrj=9884&source=see_link      Marissa Walton MD

## 2021-06-28 NOTE — PROGRESS NOTES
Progress Notes by Marissa Walton MD at 11/1/2019  1:20 PM     Author: Marissa Walton MD Service: -- Author Type: Physician    Filed: 11/4/2019  2:26 PM Encounter Date: 11/1/2019 Status: Signed    : Marissa Walton MD (Physician)       FEMALE PREVENTATIVE EXAM    Assessment and Plan:        was seen today for annual exam.    Encounter for general adult medical examination without abnormal findings    Cervical cancer screening  -     Gynecologic Cytology (PAP Smear)  -     Chlamydia trachomatis & Neisseria gonorrhoeae, Amplified Detection        Next follow up:  No follow-ups on file.    Immunization Review  Adult Imm Review: will get flu shot at pharmacy so she can get a coupon.   No tobacco use    I discussed the following with the patient:   Adult Healthy Living: Importance of regular exercise  Healthy nutrition  Stress management    Subjective:   Chief Complaint: Angela Jones is an 24 y.o. female here for a preventative health visit.     HPI:  No concerns today- willing to update Pap and GC/chlam. Had Mirena placed in 2017- happy with this contraception.      Healthy Habits  Are you taking a daily aspirin? No  Do you typically exercising at least 40 min, 3-4 times per week?  NO  Do you usually eat at least 4 servings of fruit and vegetables a day, include whole grains and fiber and avoid regularly eating high fat foods? NO  Have you had an eye exam in the past two years? Yes  Do you see a dentist twice per year? Yes  Do you have any concerns regarding sleep? No    Safety Screen  If you own firearms, are they secured in a locked gun cabinet or with trigger locks? The patient does not own any firearms  Do you feel you are safe where you are living?: Yes (11/1/2019  1:35 PM)  Do you feel you are safe in your relationship(s)?: Yes (11/1/2019  1:35 PM)      Review of Systems:  Please see above.  The rest of the review of systems are negative for all systems.     Cancer Screening       Status Date      PAP SMEAR  "Next Due 10/14/2019      Done 10/14/2016           Patient Care Team:  Marissa Walton MD as PCP - General (Family Medicine)  Marissa Walton MD as Assigned PCP        History     Not marked as reviewed during this visit.            Objective:   Vital Signs:   Visit Vitals  BP 90/52   Pulse 97   Temp 98.7  F (37.1  C) (Oral)   Resp 16   Ht 5' 3.5\" (1.613 m)   Wt 114 lb (51.7 kg)   SpO2 97% Comment: ra   Breastfeeding? No   BMI 19.88 kg/m           PHYSICAL EXAM  Gen: The patient appears well, in NAD.    HEENT: PERRL, EOMI. Oral mucosa is moist and pink, normal dentition.    Neck: supple. No adenopathy or thyromegaly.    Resp: Lungs are clear, good air entry, no wheezes, rhonchi or rales.    CV: S1 and S2 normal, no murmurs, regular rate and rhythm.    Abd: soft, nontender, nondistended, no appreciable organomegaly or masses    BREAST EXAM: normal without suspicious masses, skin or nipple changes or axillary nodes    PELVIC EXAM: normal vagina and vulva, normal cervix without lesions, polyps or tenderness, uterus normal size, shape, consistency, no mass or tenderness    Skin: 1 flesh-colored nevus on left side, just inferior to axilla.     Musculoskeletal: Normal gait and strength.     Neuro: no gross focal deficits, alert and oriented    Psych: affect is bright and appropriate             Medication List          Accurate as of November 1, 2019 11:59 PM. If you have any questions, ask your nurse or doctor.            CONTINUE taking these medications    amitriptyline 25 MG tablet  Also known as:  ELAVIL  INSTRUCTIONS:  Take 2 tablets (50 mg) by mouth at bedtime.        cholecalciferol (vitamin D3) 5,000 unit Tab  INSTRUCTIONS:  Take 10,000 Units by mouth.        diphenhydrAMINE 50 MG capsule  Also known as:  BENADRYL  INSTRUCTIONS:  Take 50 mg by mouth every 6 (six) hours as needed for itching.        famotidine 20 MG tablet  Also known as:  PEPCID  INSTRUCTIONS:  Take 1 tablet (20 mg total) by mouth 2 (two) times a day.   "      gabapentin 100 MG capsule  Also known as:  NEURONTIN  INSTRUCTIONS:  Take 200 mg by mouth 3 (three) times a day. 3 times a day as needed 20 minutes before stressful event        guanFACINE 2 mg Tb24  INSTRUCTIONS:  Take 2 mg by mouth daily.        hyoscyamine 0.125 mg SL tablet  Also known as:  LEVSIN/SL  INSTRUCTIONS:  Take 0.125 mg by mouth.        * inhalational spacing device Spcr  INSTRUCTIONS:  Inhale 2 Inhalation 2 (two) times a day. To use with Symbiocort inhaler.        * inhalational spacing device Spcr  INSTRUCTIONS:  Use as directed.  Doctor's comments:  AeroChamber device for Symbicort.        ipratropium 17 mcg/actuation inhaler  Also known as:  ATROVENT HFA  INSTRUCTIONS:  Inhale 2 puffs.        levonorgestrel 20 mcg/24 hours (5 yrs) 52 mg IUD  Also known as:  MIRENA  INSTRUCTIONS:  20 mcg by Intrauterine route.        melatonin 10 mg Tab  INSTRUCTIONS:  Take 10 mg by mouth.        metoclopramide 5 MG tablet  Also known as:  REGLAN  INSTRUCTIONS:  Take 5 mg by mouth.        mirtazapine 15 MG tablet  Also known as:  REMERON  INSTRUCTIONS:  Take 1 tablet (15 mg total) by mouth at bedtime.        ranitidine 150 MG tablet  Also known as:  ZANTAC  INSTRUCTIONS:  Take 1 tablet (150 mg total) by mouth 2 (two) times a day.        SYMBICORT 80-4.5 mcg/actuation inhaler  INSTRUCTIONS:  Inhale 2 puffs 2 (two) times a day.  Generic drug:  budesonide-formoterol        vortioxetine 20 mg Tab tablet  Also known as:  TRINTELLIX  INSTRUCTIONS:  Take 1 tablet (20 mg total) by mouth daily.            * This list has 2 medication(s) that are the same as other medications prescribed for you. Read the directions carefully, and ask your doctor or other care provider to review them with you.                Additional Screenings Completed Today:     The patient was counseled and encouraged to consider modifying their diet and eating habits. She was provided with information on recommended healthy diet options.
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
## 2021-06-30 NOTE — PROGRESS NOTES
Progress Notes by Glendy Noe CMA at 12/16/2020  2:00 PM     Author: Glendy Noe CMA Service: -- Author Type: Certified Medical Assistant    Filed: 12/17/2020 12:11 PM Encounter Date: 12/16/2020 Status: Signed    : Glendy Noe CMA (Certified Medical Assistant)        This video/telephone visit will be conducted via a call between you and your physician/provider. We have found that certain health care needs can be provided without the need for an in-person physical exam. This service lets us provide the care you need with a video /telephone conversation. If a prescription is necessary we can send it directly to your pharmacy. If lab work is needed we can place an order for that and you can then stop by our lab to have the test done at a later time.   Just as we bill insurance for in-person visits, we also bill insurance for video/telephone visits. If you have questions about your insurance coverage, we recommend that you speak with your insurance company.   Patient has given verbal consent for video visit? Yes   Patient would like the video visit invitation sent by: [employer] if dropped call [phone]  DAVID/GILSON NEVAREZ CMA   Referral from Dr. Marissa Walton for medication management.      Patient verified allergies, medications and pharmacy via phone. PHQ: 21 ; JOSHUA: 11 and MD-Current: 03 done verbally with writer. Patient states she  is ready for visit.  [place]; [place]; [place] and [facility]   was reviewed prior to seeing patient today. See below for embedded report.

## 2021-07-03 NOTE — ADDENDUM NOTE
Addendum Note by Bessie Oakes MA at 2/20/2020  8:10 AM     Author: Bessie Oakes MA Service: -- Author Type: Medical Assistant    Filed: 2/20/2020  8:10 AM Encounter Date: 2/19/2020 Status: Signed    : Bessie Oakes MA (Medical Assistant)    Addended by: BESSIE OAKES on: 2/20/2020 08:10 AM        Modules accepted: Orders

## 2021-07-04 NOTE — PROGRESS NOTES
Progress Notes by Adelaida Yang BSW at 6/21/2021 11:00 AM     Author: Adelaida Yang BSW Service: -- Author Type:     Filed: 6/21/2021  3:34 PM Encounter Date: 6/21/2021 Status: Signed    : Adelaida Yang BSW ()       Clinic Care Coordination Contact    Clinic Care Coordination Contact  OUTREACH    Visit Note:    Present for today's phone assessment is pt, pt's spouse (Javy), and [facility] SW.    Angela Jones is a 27yo female who was referred for CCC assessment by PCP to discuss PCA services.    She and her  report that their private insurance company told them that they will cover up to 180 home health visits per calendar year. They believe that PCA visits are included as part of these allotted home health visits and would like a referral for these services. I am not aware of any such referral that I can provide.    She and her  state that her PCP needs to provide the referral in order for insurance to pay for the PCA services. I am not aware of any such referral of this nature, either. I encouraged her and  to contact Member Services through their insurance company and ask for exactly what is needed for said referral. If there are specific forms or a letter that needs to be written, this request will need to be forwarded to Dr. Walton. She and her  verbalize understanding of this and will call Member Services within the next 1-2 days. I encouraged them to call the clinic or send a [employer] message to Dr. Walton when they know what exactly they need from her.    She and her  report that there is a free case management/care coordination team through their insurance company ([employer]) as well. I strongly recommend that they work with this team to discuss what in-home support services (if any) their insurance plan will cover/provide. Unfortunately, CCC cannot provide further guidance or insight into specific services offered through  private insurance.    Plan:  1.) Remove pt from work queue.    Referral Information:  Referral Source: PCP  Primary Diagnosis: Psychosocial    Chief Complaint   Patient presents with   ? Clinic Care Coordination - Initial     Clinic Utilization  Difficulty keeping appointments:: No(Will lose insurance through 's employer at the end of June; scheduled with FRW to apply for insurance on 6/22/20; concerned about affording appointments and medications if there is a lapse in insurance coverage)  Compliance Concerns: No  No-Show Concerns: No  No PCP office visit in Past Year: No     Utilization    Last refreshed: 6/21/2021 11:56 AM: Hospital Admissions 0           Last refreshed: 6/21/2021 11:56 AM: ED Visits 0           Last refreshed: 6/21/2021 11:56 AM: No Show Count (past year) 4              Current as of: 6/21/2021 11:56 AM            Clinical Concerns:  Current Medical Concerns: See Problem List below.    Current Behavioral Concerns: See Problem List below.    Patient Active Problem List   Diagnosis   ? Mild persistent asthma   ? Irritable bowel syndrome with both constipation and diarrhea   ? JOSHUA (generalized anxiety disorder)   ? Autism spectrum disorder   ? Essential tremor   ? Moderate major depression (H)   ? POTS (postural orthostatic tachycardia syndrome)     Education Provided to patient: Role of CCC.   Pain  Pain (GOAL):: No  Health Maintenance Reviewed: Up to date(was just seen by PCP; remaining care gaps to be addressed at a later date)  Clinical Pathway: None     Medication Management: Pt reports taking all medications as prescribed.     Functional Status:  Dependent ADLs:: Independent  Dependent IADLs:: Independent  Bed or wheelchair confined:: No  Mobility Status: Independent  Fallen 2 or more times in the past year?: No  Any fall with injury in the past year?: No    Living Situation:  Current living arrangement:: I live in a private home with family  Type of residence:: Private home -  stairs    Lifestyle & Psychosocial Needs:  Lifestyle   ? Physical activity     Days per week: 3 days     Minutes per session: 30 min   ? Stress: Rather much     Social Needs   ? Financial resource strain: Not very hard   ? Food insecurity     Worry: Never true     Inability: Never true   ? Transportation needs     Medical: No     Non-medical: No     Diet:: Regular  Inadequate nutrition (GOAL):: No  Tube Feeding: No  Inadequate activity/exercise (GOAL):: No  Significant changes in sleep pattern (GOAL): No  Transportation means:: Regular car, Family     Restoration or spiritual beliefs that impact treatment:: No  Mental health DX:: Yes  Mental health DX how managed:: Medication, Outpatient Counseling, Mental Health Targeted Care Manager  Mental health management concern (GOAL):: Yes  Chemical Dependency Status: Not Applicable  Informal Support system:: Spouse, Family     Socioeconomic History   ? Marital status:      Spouse name: Javy Jones   ? Number of children: 0   ? Years of education: Not on file   ? Highest education level: Not on file   Occupational History   ? Occupation: Unemployed   Relationships   ? Social connections     Talks on phone: More than three times a week     Gets together: More than three times a week     Attends Quaker service: Never     Active member of club or organization: Yes     Attends meetings of clubs or organizations: More than 4 times per year     Relationship status:    ? Intimate partner violence     Fear of current or ex partner: No     Emotionally abused: No     Physically abused: No     Forced sexual activity: No     Tobacco Use   ? Smoking status: Never Smoker   ? Smokeless tobacco: Never Used   Substance and Sexual Activity   ? Alcohol use: Yes     Alcohol/week: 1.0 standard drinks     Types: 1 Standard drinks or equivalent per week     Frequency: Monthly or less     Drinks per session: 1 or 2     Binge frequency: Never     Comment: socially    ? Drug use: No    ? Sexual activity: Yes     Partners: Male     Birth control/protection: I.U.D.     Comment:      Community Resources: OP Mental Health, Financial/Insurance(Therapy and Case Management services)  Supplies Currently Used at Home: None  Equipment Currently Used at Home: none  Type of Employment: Unemployed    Advance Care Plan/Directive  Advanced Care Plans/Directives on file:: No  Advanced Care Plan/Directive Status: Considering Options    Referrals Placed: Financial Services, Mental Health    Goals: None    Future Appointments              In 3 weeks Rashawn Lim MD

## 2021-07-14 ENCOUNTER — PRE VISIT (OUTPATIENT)
Dept: CARDIOLOGY | Facility: CLINIC | Age: 26
End: 2021-07-14

## 2021-07-14 ENCOUNTER — E-VISIT (OUTPATIENT)
Dept: FAMILY MEDICINE | Facility: CLINIC | Age: 26
End: 2021-07-14
Payer: COMMERCIAL

## 2021-07-14 DIAGNOSIS — J01.90 ACUTE SINUSITIS WITH SYMPTOMS > 10 DAYS: Primary | ICD-10-CM

## 2021-07-14 PROBLEM — F33.1 MODERATE EPISODE OF RECURRENT MAJOR DEPRESSIVE DISORDER (H): Status: RESOLVED | Noted: 2020-02-21 | Resolved: 2020-09-25

## 2021-07-14 PROBLEM — F31.81 BIPOLAR 2 DISORDER (H): Status: RESOLVED | Noted: 2018-09-19 | Resolved: 2020-02-21

## 2021-07-14 PROCEDURE — 99422 OL DIG E/M SVC 11-20 MIN: CPT | Performed by: PHYSICIAN ASSISTANT

## 2021-07-14 NOTE — PATIENT INSTRUCTIONS
Dear Angela Jones    After reviewing your responses, I've been able to diagnose you with?a sinus infection caused by bacteria.?     Based on your responses and diagnosis, I have prescribed Augmentin to treat your symptoms. I have sent this to your pharmacy.?     It is also important to stay well hydrated, get lots of rest and take over-the-counter decongestants,?tylenol?or ibuprofen if you?are able to?take those medications per your primary care provider to help relieve discomfort.?     It is important that you take?all of?your prescribed medication even if your symptoms are improving after a few doses.? Taking?all of?your medicine helps prevent the symptoms from returning.?     If your symptoms worsen, you develop severe headache, vomiting, high fever (>102), or are not improving in 7 days, please contact your primary care provider for an appointment or visit any of our convenient Walk-in Care or Urgent Care Centers to be seen which can be found on our website?here.?     Thanks again for choosing?us?as your health care partner,?   ?  Ingris Regalado PA-C?   You may want to try a nasal lavage (also known as nasal irrigation). You can find over-the-counter products, such as Neti-Pot, at retail locations or make your own at home. Instructions for homemade nasal lavage and more information on the process are available online at http://www.aafp.org/[email or web address]/1115/[email or web address].html.      Sinusitis (Antibiotic Treatment)    The sinuses are air-filled spaces within the bones of the face. They connect to the inside of the nose. Sinusitis is an inflammation of the tissue that lines the sinuses. Sinusitis can occur during a cold. It can also happen due to allergies to pollens and other particles in the air. Sinusitis can cause symptoms of sinus congestion and a feeling of fullness. A sinus infection causes fever, headache, and facial pain. There is often green or yellow fluid draining from the nose or into the back of the throat  (post-nasal drip). You have been given antibiotics to treat this condition.   Home care    Take the full course of antibiotics as instructed. Don't stop taking them, even when you feel better.    Drink plenty of water, hot tea, and other liquids as directed by the healthcare provider. This may help thin nasal mucus. It also may help your sinuses drain fluids.    Heat may help soothe painful areas of your face. Use a towel soaked in hot water. Or,  the shower and direct the warm spray onto your face. Using a vaporizer along with a menthol rub at night may also help soothe symptoms.     An expectorant with guaifenesin may help thin nasal mucus and help your sinuses drain fluids. Talk with your provider or pharmacists before taking an over-the-counter (OTC) medicine if you have any questions about it or its side effects..    You can use an OTC decongestant, unless a similar medicine was prescribed to you. Nasal sprays work the fastest. Use one that contains phenylephrine or oxymetazoline. First blow your nose gently. Then use the spray. Don't use these medicines more often than directed on the label. If you do, your symptoms may get worse. You may also take pills that contain pseudoephedrine. Don t use products that combine multiple medicines. This is because side effects may be increased. Read labels. You can also ask the pharmacist for help. (People with high blood pressure should not use decongestants. They can raise blood pressure.) Talk with your provider or pharmacist if you have any questions about the medicine..    OTC antihistamines may help if allergies contributed to your sinusitis. Talk with your provider or pharmacist if you have any questions about the medicine..    Don't use nasal rinses or irrigation during an acute sinus infection, unless your healthcare provider tells you to. Rinsing may spread the infection to other areas in your sinuses.    Use acetaminophen or ibuprofen to control pain,  unless another pain medicine was prescribed to you. If you have chronic liver or kidney disease or ever had a stomach ulcer, talk with your healthcare provider before using these medicines. Never give aspirin to anyone under age 18 who is ill with a fever. It may cause severe liver damage.    Don't smoke. This can make symptoms worse.    Follow-up care  Follow up with your healthcare provider, or as advised.   When to seek medical advice  Call your healthcare provider if any of these occur:     Facial pain or headache that gets worse    Stiff neck    Unusual drowsiness or confusion    Swelling of your forehead or eyelids    Symptoms don't go away in 10 days    Vision problems, such as blurred or double vision    Fever of 100.4 F (38 C) or higher, or as directed by your healthcare provider  Call 911  Call 911 if any of these occur:     Seizure    Trouble breathing    Feeling dizzy or faint    Fingernails, skin or lips look blue, purple , or gray  Prevention  Here are steps you can take to help prevent an infection:     Keep good hand washing habits.    Don t have close contact with people who have sore throats, colds, or other upper respiratory infections.    Don t smoke, and stay away from secondhand smoke.    Stay up to date with of your vaccines.  [employer] last reviewed this educational content on 12/1/2019 2000-2021 [employer]. All rights reserved. This information is not intended as a substitute for professional medical care. Always follow your healthcare professional's instructions.

## 2021-08-03 PROBLEM — R56.9 SEIZURE-LIKE ACTIVITY (H): Status: RESOLVED | Noted: 2018-09-19 | Resolved: 2020-09-25

## 2021-08-05 ENCOUNTER — VIRTUAL VISIT (OUTPATIENT)
Dept: CARDIOLOGY | Facility: CLINIC | Age: 26
End: 2021-08-05
Attending: INTERNAL MEDICINE
Payer: COMMERCIAL

## 2021-08-05 DIAGNOSIS — R55 SYNCOPE, UNSPECIFIED SYNCOPE TYPE: ICD-10-CM

## 2021-08-05 DIAGNOSIS — I95.1 ORTHOSTATIC HYPOTENSION: ICD-10-CM

## 2021-08-05 DIAGNOSIS — R55 SYNCOPE AND COLLAPSE: ICD-10-CM

## 2021-08-05 DIAGNOSIS — G90.A POSTURAL ORTHOSTATIC TACHYCARDIA SYNDROME: Primary | ICD-10-CM

## 2021-08-05 PROCEDURE — 99205 OFFICE O/P NEW HI 60 MIN: CPT | Mod: 95 | Performed by: INTERNAL MEDICINE

## 2021-08-05 RX ORDER — DIPHENHYDRAMINE HCL 50 MG
50 CAPSULE ORAL PRN
COMMUNITY

## 2021-08-05 RX ORDER — ONDANSETRON 4 MG/1
4 TABLET, ORALLY DISINTEGRATING ORAL PRN
COMMUNITY
Start: 2020-09-21 | End: 2024-05-20

## 2021-08-05 RX ORDER — MIRTAZAPINE 15 MG/1
15 TABLET, FILM COATED ORAL DAILY
COMMUNITY
Start: 2020-05-28 | End: 2022-02-22

## 2021-08-05 RX ORDER — GABAPENTIN 300 MG/1
300 CAPSULE ORAL 2 TIMES DAILY
COMMUNITY
Start: 2020-12-23 | End: 2022-02-18

## 2021-08-05 NOTE — LETTER
"8/5/2021      RE: Angela Jones  [address] [place] [address]       Dear Colleague,    Thank you for the opportunity to participate in the care of your patient, Angela Jones, at the [facility]. Please see a copy of my visit note below.          HPI:    is a pleasant 26-year-old woman with multiple comorbidities who was referred after initial evaluation at [facility] in which she diagnosis of postural orthostatic tachycardia syndrome was made.  At [facility] anginal was given advice regarding lower body recumbent bicycle exercise and resistance training.  She was advised to come to our clinic*4 further modification of her treatment regimen as needed.     continues to have primarily symptoms of dizziness and nausea but also orthostatic hypotension and occasional collapse events may be orthostatic syncope.  She is not fully embraced the [facility] recommendations as yet.I have encouraged to do so and we discussed additional interventions that are summarized below.   is not troubled by musculoskeletal issues but does complain of \"flatfeet\".  the latter did limit her athleticism during high school.  Nonetheless she did dance and do yoga.  She has not done yoga recently due to Covid restrictions.    At today's visit I discussed the initial steps in management of pots.  In particular reviewed the value of lower body resistance training (exercise bands) as well as the role of the splanchnic and lower body muscle vascular beds.  I emphasized the importance of increased salt and electrolyte intake with minimizing carbohydrates.  I also encouraged returning to yoga as soon as it was possible.  We agreed that she would try to put.his interventions into action and then we could reassess in a couple months.      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 Need clinic appt for further refills, scheduling # [phone] 60 tablet 0     gabapentin (NEURONTIN) 300 MG capsule Take 300 mg by mouth 2 times daily       hyoscyamine (LEVSIN/SL) 0.125 MG sublingual tablet Take 1 tablet (0.125 mg) by mouth every 4 hours as needed 120 tablet 5     ipratropium (ATROVENT HFA) 17 MCG/ACT inhaler Inhale 2 puffs into the lungs every 6 hours       ipratropium (ATROVENT) 0.02 % nebulizer solution Take 0.5 mg by nebulization daily as needed for wheezing       Melatonin 10 MG TABS Take 10 mg by mouth nightly as needed       metoclopramide (REGLAN) 5 MG tablet Take 1 tablet (5 mg) by mouth 3 times daily as needed *MUST BE SEEN FOR FURTHER REFILLS*, 15 tablet 0     mirtazapine (REMERON) 15 MG tablet Take 15 mg by mouth daily       ondansetron (ZOFRAN-ODT) 4 MG ODT tab Take 4 mg by mouth as needed       VITAMIN D, CHOLECALCIFEROL, PO Take  by mouth daily.       vortioxetine (TRINTELLIX/BRINTELLIX) 20 MG tablet Take 1 tablet (20 mg) by mouth daily 30 tablet 5     budesonide-formoterol (SYMBICORT) 80-4.5 MCG/ACT Inhaler Inhale 2 puffs into the lungs 2 times daily 1 Inhaler 1     clobetasol (TEMOVATE) 0.05 % ointment Apply topically 2 times daily Avoid face, underarms, groin. (Patient not taking: Reported on 10/27/2017) 30 g 1     diphenhydrAMINE (BENADRYL) 50 MG capsule Take 50 mg by mouth as needed       esomeprazole (NEXIUM) 20 MG DR capsule 20 mg daily       ketoconazole  (NIZORAL) 2 % shampoo Apply topically daily as needed for itching or irritation 120 mL 11     lamoTRIgine (LAMICTAL) 100 MG tablet Take 3 and 1/2 per day (Patient taking differently: Take 350 mg by mouth daily Take 3 and 1/2 per day) 110 tablet 6     levonorgestrel (MIRENA, 52 MG,) 20 MCG/24HR IUD 1 each (20 mcg) by Intrauterine route once for 1 dose 1 each 0     mupirocin (BACTROBAN) 2 % ointment APPLY TO AFFECTED AREA TWICE DAILY (Patient not taking: Reported on 10/27/2017) 22 g 2     nystatin-triamcinolone (MYCOLOG II) cream Apply topically 2 times daily (Patient not taking: Reported on 10/27/2017) 30 g 1     Powders (VAGISIL EX)        Prenatal Vit-Fe Fumarate-FA (PRENATAL MULTIVITAMIN PLUS IRON) 27-0.8 MG TABS per tablet Take 1 tablet by mouth daily 100 tablet 3     ranitidine (ZANTAC) 150 MG capsule Take 150 mg by mouth daily       triamcinolone (KENALOG) 0.1 % ointment Apply sparingly to affected area three times daily for 14 days. 30 g 0       PAST SURGICAL HISTORY:  Past Surgical History:   Procedure Laterality Date     NO PAST SURGERIES       UPPER GI ENDOSCOPY  6/7/12       ALLERGIES:     Allergies   Allergen Reactions     Adhesive Tape Hives     EKG Patches; any adhesives including band aides; burns      EKG Patches; any adhesives including band aides; burns  EKG Patches; any adhesives including band aides; burns  EKG Patches; any adhesives including band aides; burns       Glucose Other (See Comments)     Other reaction(s): GI intolerance  GI intolerance       Azithromycin Nausea and Vomiting       FAMILY HISTORY:  Family History   Problem Relation Age of Onset     Depression Mother         depression and anxiety      Anxiety Disorder Mother      Mental Illness Mother      Neurologic Disorder Father         neuropathy      Depression Father         depression/anxiety      Bipolar Disorder Father      Anxiety Disorder Father      Heart Disease Father      Cerebrovascular Disease Maternal Grandmother       Cancer Paternal Grandfather         Lung     Cerebrovascular Disease Paternal Grandfather      Substance Abuse Other      Mental Illness Other      Skin Cancer No family hx of      Neuropathy Father      Diabetes Type 2  Father      Migraines Father      Breast Cancer Maternal Grandmother      Alzheimer Disease Maternal Grandmother        SOCIAL HISTORY:  Social History     Tobacco Use     Smoking status: Never Smoker     Smokeless tobacco: Never Used   Substance Use Topics     Alcohol use: Yes     Alcohol/week: 1.0 standard drinks     Comment: Alcoholic Drinks/day: socially      Drug use: No       ROS:   Constitutional: No fever, chills, or sweats.   ENT: No visual disturbance,.   Cardiovascular: As per HPI.   Respiratory: No cough, hemoptysis.    GI: Nausea is common without  vomiting, hematemesis,  : No hematuria.   Integument: Negative.   Psychiatric: Negative.   Hematologic:   no easy bleeding.  Neuro: Negative.   Endocrinology: No significant heat or cold intolerance   Musculoskeletal: No myalgia but marked exertional limitation which varies from time to time and occasionally requires her to use a walker.    Exam:  There were no vitals taken for this visit.  GENERAL APPEARANCE: healthy, alert and no distress  HEENT:normal palate, mucosa moist, no central cyanosis  RESPIRATORY:no rales, rhonchi or wheezes, no use of accessory muscles, no retractions, respirations are unlabored, normal respiratory rate    NEURO: alert and oriented to person/place/time, normal speech,  and affect    SKIN: no ecchymoses, no rashes reported    Labs:  CBC RESULTS:   Lab Results   Component Value Date    WBC 7.5 01/25/2021    WBC 8.3 04/28/2016    RBC 4.27 01/25/2021    RBC 4.13 04/28/2016    HGB 13.6 01/25/2021    HGB 13.2 04/28/2016    HCT 40.5 01/25/2021    HCT 39.1 04/28/2016    MCV 95 01/25/2021    MCV 95 04/28/2016    MCH 31.9 01/25/2021    MCH 32.0 04/28/2016    MCHC 33.7 01/25/2021    MCHC 33.8 04/28/2016    RDW 9.8 (L)  01/25/2021    RDW 11.3 04/28/2016     01/25/2021     04/28/2016       BMP RESULTS:  Lab Results   Component Value Date     01/25/2021     04/28/2016    POTASSIUM 4.0 01/25/2021    POTASSIUM 4.2 04/28/2016    CHLORIDE 105 01/25/2021    CHLORIDE 108 04/28/2016    CO2 23 01/25/2021    CO2 23 04/28/2016    ANIONGAP 11 01/25/2021    ANIONGAP 11 04/28/2016    GLC 82 01/25/2021     (H) 04/28/2016    BUN 11 01/25/2021    BUN 9 04/28/2016    CR 0.70 01/25/2021    CR 0.68 04/28/2016    GFRESTIMATED >60 01/25/2021    GFRESTIMATED >90  Non  GFR Calc   04/28/2016    GFRESTBLACK >60 01/25/2021    GFRESTBLACK >90   GFR Calc   04/28/2016    CHHAYA 9.4 01/25/2021    CHHAYA 9.0 04/28/2016        INR RESULTS:  No results found for: INR    Procedures:  PULMONARY FUNCTION TESTS:   No flowsheet data found.      ECHOCARDIOGRAM:   No results found for this or any previous visit (from the past 8760 hour(s)).      Assessment and Plan:   1.  Postural orthostatic tachycardia syndrome diagnosed at [facility] with referral here for care  2.  Orthostatic syncope    Plan  1.  Treatment as described above incorporating increased salt and volume and lower body isometrics    2.  Follow-up in 3 to 4 months    Video on 4: 05; video off 4: 30 p.m.  Platform Doximity  Patient at home; clinic [facility] heart    Total elapsed time today with chart review, face-to-face visit, and documentation 60 minutes    I very much appreciated the opportunity to see and assess Angela Jones in the clinic today. Please do not hesitate to contact my office if you have any questions or concerns.      Rashawn Lim MD  Cardiac Arrhythmia Service  [facility]  [phone]      RASHAWN JOHNSON

## 2021-08-05 NOTE — PROGRESS NOTES
" is a 26 year old who is being evaluated via a billable video visit.      How would you like to obtain your AVS? MyChart  If the video visit is dropped, the invitation should be resent by: Text to cell phone: [phone]  Will anyone else be joining your video visit? No      Vitals - Patient Reported  Pain Score: No Pain (0) (No SOB)    Vitals were taken and medications reconciled.     Armando Maguire CMA  3:46 PM    HPI:    is a pleasant 26-year-old woman with multiple comorbidities who was referred after initial evaluation at [facility] in which she diagnosis of postural orthostatic tachycardia syndrome was made.  At [facility] slime was given advice regarding lower body recumbent bicycle exercise and resistance training.  She was advised to come to our clinic*4 further modification of her treatment regimen as needed.     continues to have primarily symptoms of dizziness and nausea but also orthostatic hypotension and occasional collapse events may be orthostatic syncope.  She is not fully embraced the [facility] recommendations as yet.I have encouraged to do so and we discussed additional interventions that are summarized below.   is not troubled by musculoskeletal issues but does complain of \"flatfeet\".  the latter did limit her athleticism during high school.  Nonetheless she did dance and do yoga.  She has not done yoga recently due to Covid restrictions.    At today's visit I discussed the initial steps in management of pots.  In particular reviewed the value of lower body resistance training (exercise bands) as well as the role of the splanchnic and lower body muscle vascular beds.  I emphasized the importance of increased salt and electrolyte intake with minimizing carbohydrates.  I also encouraged returning to yoga as soon as it was possible.  We agreed that she would try to put.his interventions into action and then we could reassess in a couple months.      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 Need clinic appt for further refills, scheduling # [phone] 60 tablet 0     gabapentin (NEURONTIN) 300 MG capsule Take 300 mg by mouth 2 times daily       hyoscyamine (LEVSIN/SL) 0.125 MG sublingual tablet Take 1 tablet (0.125 mg) by mouth every 4 hours as needed 120 tablet 5     ipratropium (ATROVENT HFA) 17 MCG/ACT inhaler Inhale 2 puffs into the lungs every 6 hours       ipratropium (ATROVENT) 0.02 % nebulizer solution Take 0.5 mg by nebulization daily as needed for wheezing       Melatonin 10 MG TABS Take 10 mg by mouth nightly as needed       metoclopramide (REGLAN) 5 MG tablet Take 1 tablet (5 mg) by mouth 3 times daily as needed *MUST BE SEEN FOR FURTHER REFILLS*, 15 tablet 0     mirtazapine (REMERON) 15 MG tablet Take 15 mg by mouth daily       ondansetron (ZOFRAN-ODT) 4 MG ODT tab Take 4 mg by mouth as needed       VITAMIN D, CHOLECALCIFEROL, PO Take  by mouth daily.       vortioxetine (TRINTELLIX/BRINTELLIX) 20 MG tablet Take 1 tablet (20 mg) by mouth daily 30 tablet 5     budesonide-formoterol (SYMBICORT) 80-4.5 MCG/ACT Inhaler Inhale 2 puffs into the lungs 2 times daily 1 Inhaler 1     clobetasol (TEMOVATE) 0.05 % ointment Apply topically 2 times daily Avoid face, underarms, groin. (Patient not taking: Reported on 10/27/2017) 30 g 1     diphenhydrAMINE (BENADRYL) 50 MG capsule Take 50 mg by mouth as  needed       esomeprazole (NEXIUM) 20 MG DR capsule 20 mg daily       ketoconazole (NIZORAL) 2 % shampoo Apply topically daily as needed for itching or irritation 120 mL 11     lamoTRIgine (LAMICTAL) 100 MG tablet Take 3 and 1/2 per day (Patient taking differently: Take 350 mg by mouth daily Take 3 and 1/2 per day) 110 tablet 6     levonorgestrel (MIRENA, 52 MG,) 20 MCG/24HR IUD 1 each (20 mcg) by Intrauterine route once for 1 dose 1 each 0     mupirocin (BACTROBAN) 2 % ointment APPLY TO AFFECTED AREA TWICE DAILY (Patient not taking: Reported on 10/27/2017) 22 g 2     nystatin-triamcinolone (MYCOLOG II) cream Apply topically 2 times daily (Patient not taking: Reported on 10/27/2017) 30 g 1     Powders (VAGISIL EX)        Prenatal Vit-Fe Fumarate-FA (PRENATAL MULTIVITAMIN PLUS IRON) 27-0.8 MG TABS per tablet Take 1 tablet by mouth daily 100 tablet 3     ranitidine (ZANTAC) 150 MG capsule Take 150 mg by mouth daily       triamcinolone (KENALOG) 0.1 % ointment Apply sparingly to affected area three times daily for 14 days. 30 g 0       PAST SURGICAL HISTORY:  Past Surgical History:   Procedure Laterality Date     NO PAST SURGERIES       UPPER GI ENDOSCOPY  6/7/12       ALLERGIES:     Allergies   Allergen Reactions     Adhesive Tape Hives     EKG Patches; any adhesives including band aides; burns      EKG Patches; any adhesives including band aides; burns  EKG Patches; any adhesives including band aides; burns  EKG Patches; any adhesives including band aides; burns       Glucose Other (See Comments)     Other reaction(s): GI intolerance  GI intolerance       Azithromycin Nausea and Vomiting       FAMILY HISTORY:  Family History   Problem Relation Age of Onset     Depression Mother         depression and anxiety      Anxiety Disorder Mother      Mental Illness Mother      Neurologic Disorder Father         neuropathy      Depression Father         depression/anxiety      Bipolar Disorder Father      Anxiety Disorder  Father      Heart Disease Father      Cerebrovascular Disease Maternal Grandmother      Cancer Paternal Grandfather         Lung     Cerebrovascular Disease Paternal Grandfather      Substance Abuse Other      Mental Illness Other      Skin Cancer No family hx of      Neuropathy Father      Diabetes Type 2  Father      Migraines Father      Breast Cancer Maternal Grandmother      Alzheimer Disease Maternal Grandmother        SOCIAL HISTORY:  Social History     Tobacco Use     Smoking status: Never Smoker     Smokeless tobacco: Never Used   Substance Use Topics     Alcohol use: Yes     Alcohol/week: 1.0 standard drinks     Comment: Alcoholic Drinks/day: socially      Drug use: No       ROS:   Constitutional: No fever, chills, or sweats.   ENT: No visual disturbance,.   Cardiovascular: As per HPI.   Respiratory: No cough, hemoptysis.    GI: Nausea is common without  vomiting, hematemesis,  : No hematuria.   Integument: Negative.   Psychiatric: Negative.   Hematologic:   no easy bleeding.  Neuro: Negative.   Endocrinology: No significant heat or cold intolerance   Musculoskeletal: No myalgia but marked exertional limitation which varies from time to time and occasionally requires her to use a walker.    Exam:  There were no vitals taken for this visit.  GENERAL APPEARANCE: healthy, alert and no distress  HEENT:normal palate, mucosa moist, no central cyanosis  RESPIRATORY:no rales, rhonchi or wheezes, no use of accessory muscles, no retractions, respirations are unlabored, normal respiratory rate    NEURO: alert and oriented to person/place/time, normal speech,  and affect    SKIN: no ecchymoses, no rashes reported    Labs:  CBC RESULTS:   Lab Results   Component Value Date    WBC 7.5 01/25/2021    WBC 8.3 04/28/2016    RBC 4.27 01/25/2021    RBC 4.13 04/28/2016    HGB 13.6 01/25/2021    HGB 13.2 04/28/2016    HCT 40.5 01/25/2021    HCT 39.1 04/28/2016    MCV 95 01/25/2021    MCV 95 04/28/2016    MCH 31.9 01/25/2021     MCH 32.0 04/28/2016    MCHC 33.7 01/25/2021    MCHC 33.8 04/28/2016    RDW 9.8 (L) 01/25/2021    RDW 11.3 04/28/2016     01/25/2021     04/28/2016       BMP RESULTS:  Lab Results   Component Value Date     01/25/2021     04/28/2016    POTASSIUM 4.0 01/25/2021    POTASSIUM 4.2 04/28/2016    CHLORIDE 105 01/25/2021    CHLORIDE 108 04/28/2016    CO2 23 01/25/2021    CO2 23 04/28/2016    ANIONGAP 11 01/25/2021    ANIONGAP 11 04/28/2016    GLC 82 01/25/2021     (H) 04/28/2016    BUN 11 01/25/2021    BUN 9 04/28/2016    CR 0.70 01/25/2021    CR 0.68 04/28/2016    GFRESTIMATED >60 01/25/2021    GFRESTIMATED >90  Non  GFR Calc   04/28/2016    GFRESTBLACK >60 01/25/2021    GFRESTBLACK >90   GFR Calc   04/28/2016    CHHAYA 9.4 01/25/2021    CHHAYA 9.0 04/28/2016        INR RESULTS:  No results found for: INR    Procedures:  PULMONARY FUNCTION TESTS:   No flowsheet data found.      ECHOCARDIOGRAM:   No results found for this or any previous visit (from the past 8760 hour(s)).      Assessment and Plan:   1.  Postural orthostatic tachycardia syndrome diagnosed at [facility] with referral here for care  2.  Orthostatic syncope    Plan  1.  Treatment as described above incorporating increased salt and volume and lower body isometrics    2.  Follow-up in 3 to 4 months    Video on 4: 05; video off 4: 30 p.m.  Platform Doximity  Patient at home; clinic [facility] heart    Total elapsed time today with chart review, face-to-face visit, and documentation 60 minutes    I very much appreciated the opportunity to see and assess Angela Jones in the clinic today. Please do not hesitate to contact my office if you have any questions or concerns.      Rashawn Lim MD  Cardiac Arrhythmia Service  [facility]  [phone]      RASHAWN JOHNSON

## 2021-08-05 NOTE — PATIENT INSTRUCTIONS
You were seen in the Electrophysiology Clinic today by: Dr. Lim    Plan:     Follow up visit:   3-4 months with Dr. Lim    Further Instructions:   -Lower body resistance training with or without exercise bands   -Increase salt and electrolytes while minimizing carbohydrates   -Return to Yoga as soon as possible         Your Care Team:  [facility] Cardiology   Telephone Number     Nurse Line [phone]     For scheduling appts or procedures:    Bri Lawler   [phone]   For the Device Clinic (Pacemakers, ICDs, Loop Recorders)    During business hours: [phone]  After business hours:   [phone]- select option 4 and ask for job code [id number].     On-call cardiologist for after hours or on weekends: [phone], option #4, and ask to speak to the on-call cardiologist.     Cardiovascular Clinic:   [address] [place] [address]. [place] [address]      As always, Thank you for trusting us with your health care needs!

## 2021-08-08 ENCOUNTER — E-VISIT (OUTPATIENT)
Dept: URGENT CARE | Facility: URGENT CARE | Age: 26
End: 2021-08-08
Payer: COMMERCIAL

## 2021-08-08 DIAGNOSIS — R06.02 SOB (SHORTNESS OF BREATH): ICD-10-CM

## 2021-08-08 DIAGNOSIS — R05.9 COUGH: ICD-10-CM

## 2021-08-08 PROCEDURE — 98970 NQHP OL DIG ASSMT&MGMT 5-10: CPT | Performed by: FAMILY MEDICINE

## 2021-08-08 NOTE — PATIENT INSTRUCTIONS
Dear Angela Jones,    We are sorry you are not feeling well. Based on the responses you provided, it is recommended that you be seen in-person in urgent care so we can better evaluate your symptoms. Please click here to find the nearest urgent care location to you.   Thank you for trusting us with your care.    Mitchel Arellano, DO

## 2021-08-12 ENCOUNTER — OFFICE VISIT (OUTPATIENT)
Dept: FAMILY MEDICINE | Facility: CLINIC | Age: 26
End: 2021-08-12
Payer: COMMERCIAL

## 2021-08-12 VITALS
SYSTOLIC BLOOD PRESSURE: 98 MMHG | HEIGHT: 64 IN | TEMPERATURE: 98 F | BODY MASS INDEX: 19.7 KG/M2 | HEART RATE: 90 BPM | OXYGEN SATURATION: 100 % | RESPIRATION RATE: 16 BRPM | DIASTOLIC BLOOD PRESSURE: 52 MMHG | WEIGHT: 115.38 LBS

## 2021-08-12 DIAGNOSIS — R06.2 WHEEZING: ICD-10-CM

## 2021-08-12 DIAGNOSIS — R05.9 COUGH: Primary | ICD-10-CM

## 2021-08-12 DIAGNOSIS — R06.02 SHORTNESS OF BREATH: ICD-10-CM

## 2021-08-12 PROCEDURE — U0005 INFEC AGEN DETEC AMPLI PROBE: HCPCS | Performed by: FAMILY MEDICINE

## 2021-08-12 PROCEDURE — U0003 INFECTIOUS AGENT DETECTION BY NUCLEIC ACID (DNA OR RNA); SEVERE ACUTE RESPIRATORY SYNDROME CORONAVIRUS 2 (SARS-COV-2) (CORONAVIRUS DISEASE [COVID-19]), AMPLIFIED PROBE TECHNIQUE, MAKING USE OF HIGH THROUGHPUT TECHNOLOGIES AS DESCRIBED BY CMS-2020-01-R: HCPCS | Performed by: FAMILY MEDICINE

## 2021-08-12 PROCEDURE — 99213 OFFICE O/P EST LOW 20 MIN: CPT | Performed by: FAMILY MEDICINE

## 2021-08-12 RX ORDER — DEXTROMETHORPHAN HBR. AND GUAIFENESIN 10; 100 MG/5ML; MG/5ML
5 SOLUTION ORAL 4 TIMES DAILY PRN
Qty: 200 ML | Refills: 0 | Status: SHIPPED | OUTPATIENT
Start: 2021-08-12 | End: 2022-02-18

## 2021-08-12 ASSESSMENT — MIFFLIN-ST. JEOR: SCORE: 1240.4

## 2021-08-12 NOTE — PROGRESS NOTES
ASSESMENT AND PLAN:  Diagnoses and all orders for this visit:  Cough  -     Symptomatic COVID-19 Virus (Coronavirus) by PCR Nasopharyngeal; Future  -     dextromethorphan-guaiFENesin (TUSSIN DM)  MG/5ML liquid; Take 5 mLs by mouth 4 times daily as needed (cough)  We will check Covid.    Shortness of breath  O2 sat 100% today.  Her lungs are clear on exam.  Discussed chest x-ray, patient elected to defer.    Wheezing  She has albuterol inhaler at home.  She will use it every 4 hours as needed.    This transcription uses voice recognition software, which may contain typographical errors.      SUBJECTIVE: Angela Jones is a 26-year-old female with history of depression, anxiety and obsessive-compulsive disorder here with a complaint of cough and shortness of breath started few weeks ago.  She had ED visit, was diagnosed with sinus sinus infection and was prescribed Augmentin.  She completed Augmentin for a week and half ago.  Sinus symptoms improved with Augmentin but still having cough, shortness of breath with exertion and wheezing, symptoms are usually worse at night.  The cough is mostly dry cough.  No fever or chills.  No loss of taste or smell.  No known exposure to COVID-19.  She has history of allergy, taking OTC allergy medication.    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/2013    cut self with pencil sharpener blade, left arm     Patient Active Problem List   Diagnosis     OCD (obsessive compulsive disorder)     Pes planus     Moderate major depression (H)     Generalized anxiety disorder     Migraine with aura     Health Care Home       Allergies:    Allergies   Allergen  "Reactions     Adhesive Tape Hives     EKG Patches; any adhesives including band aides; burns      EKG Patches; any adhesives including band aides; burns  EKG Patches; any adhesives including band aides; burns  EKG Patches; any adhesives including band aides; burns       Glucose Other (See Comments)     Other reaction(s): GI intolerance  GI intolerance       Azithromycin Nausea and Vomiting       History   Smoking Status     Never Smoker   Smokeless Tobacco     Never Used       Review of systems otherwise negative except as listed in HPI.   History   Smoking Status     Never Smoker   Smokeless Tobacco     Never Used       OBJECTICE: BP 98/52 (BP Location: Right arm, Patient Position: Sitting, Cuff Size: Adult Regular)   Pulse 90   Temp 98  F (36.7  C) (Oral)   Resp 16   Ht 1.613 m (5' 3.5\")   Wt 52.3 kg (115 lb 6 oz)   LMP  (LMP Unknown)   SpO2 100%   BMI 20.12 kg/m      DATA REVIEWED:  Additional History from Old Records Summarized (2):      GEN-alert,  in no apparent distress.  HEENT- neck is supple.  CV-regular rate and rhythm with no murmur.   RESP-lungs clear to auscultation .  ABDOMEN- Soft , not tender.  EXTREM- No edema.  SKIN-no rash        Marquise Dominguez MD   8/12/2021   "

## 2021-08-13 LAB — SARS-COV-2 RNA RESP QL NAA+PROBE: NEGATIVE

## 2021-08-17 DIAGNOSIS — G43.809 OTHER MIGRAINE, NOT INTRACTABLE, WITHOUT STATUS MIGRAINOSUS: ICD-10-CM

## 2021-09-14 ENCOUNTER — TELEPHONE (OUTPATIENT)
Dept: FAMILY MEDICINE | Facility: CLINIC | Age: 26
End: 2021-09-14

## 2021-09-14 NOTE — TELEPHONE ENCOUNTER
Received fax for Angela Jones, case #[id number].  No  or other identifiers included in paperwork.  Called to clarify patient identity, as two patients with that name appear in our system.  Confirmed  as 1995.

## 2021-09-16 ENCOUNTER — VIRTUAL VISIT (OUTPATIENT)
Dept: BEHAVIORAL HEALTH | Facility: CLINIC | Age: 26
End: 2021-09-16
Payer: COMMERCIAL

## 2021-09-16 DIAGNOSIS — F33.0 MILD EPISODE OF RECURRENT MAJOR DEPRESSIVE DISORDER (H): ICD-10-CM

## 2021-09-16 DIAGNOSIS — F41.1 GAD (GENERALIZED ANXIETY DISORDER): ICD-10-CM

## 2021-09-16 DIAGNOSIS — F84.0 AUTISM SPECTRUM DISORDER: Primary | ICD-10-CM

## 2021-09-16 PROCEDURE — 90834 PSYTX W PT 45 MINUTES: CPT | Mod: 95 | Performed by: SOCIAL WORKER

## 2021-09-16 ASSESSMENT — ANXIETY QUESTIONNAIRES
IF YOU CHECKED OFF ANY PROBLEMS ON THIS QUESTIONNAIRE, HOW DIFFICULT HAVE THESE PROBLEMS MADE IT FOR YOU TO DO YOUR WORK, TAKE CARE OF THINGS AT HOME, OR GET ALONG WITH OTHER PEOPLE: SOMEWHAT DIFFICULT
2. NOT BEING ABLE TO STOP OR CONTROL WORRYING: SEVERAL DAYS
1. FEELING NERVOUS, ANXIOUS, OR ON EDGE: MORE THAN HALF THE DAYS
7. FEELING AFRAID AS IF SOMETHING AWFUL MIGHT HAPPEN: NOT AT ALL
6. BECOMING EASILY ANNOYED OR IRRITABLE: SEVERAL DAYS
3. WORRYING TOO MUCH ABOUT DIFFERENT THINGS: SEVERAL DAYS
GAD7 TOTAL SCORE: 5
5. BEING SO RESTLESS THAT IT IS HARD TO SIT STILL: NOT AT ALL

## 2021-09-16 ASSESSMENT — PATIENT HEALTH QUESTIONNAIRE - PHQ9
5. POOR APPETITE OR OVEREATING: NOT AT ALL
SUM OF ALL RESPONSES TO PHQ QUESTIONS 1-9: 6

## 2021-09-16 NOTE — PROGRESS NOTES
Progress Note    Patient Name: Angela Jones  Date: 9/16/2021         Service Type: Individual      Session Start Time: 8:00am  Session End Time: 8:45am     Session Length: 45 minutes    Session #: 12    Attendees: Client attended alone    Service Modality:  Video Visit:      Provider verified identity through the following two step process.  Patient provided:  Patient is known previously to provider    Telemedicine Visit: The patient's condition can be safely assessed and treated via synchronous audio and visual telemedicine encounter.      Reason for Telemedicine Visit: Patient has requested telehealth visit and Services only offered telehealth    Originating Site (Patient Location): Patient's home    Distant Site (Provider Location): Provider Remote Setting- Home Office    Consent:  The patient/guardian has verbally consented to: the potential risks and benefits of telemedicine (video visit) versus in person care; bill my insurance or make self-payment for services provided; and responsibility for payment of non-covered services.     Patient would like the video invitation sent by:  My Chart    Mode of Communication:  Video Conference via [employer]    As the provider I attest to compliance with applicable laws and regulations related to telemedicine.     Treatment Plan Last Reviewed: 9/16/2021  PHQ-9 = 6 / JOSHUA-7 = 5 completed on 9/16/2021    DATA  Interactive Complexity: No  Crisis: No       Progress Since Last Session (Related to Symptoms / Goals / Homework):   Symptoms: No change *    Homework: Achieved / completed to satisfaction - did obtain employment       Episode of Care Goals: Minimal progress - PREPARATION (Decided to change - considering how); Intervened by negotiating a change plan and determining options / strategies for behavior change, identifying triggers, exploring social supports, and working towards setting a date to begin behavior change     Current /  Ongoing Stressors and Concerns:   Started working at [employer] in August which made her quite busy creating a gap in services; is feeling quite exhausted especially after work; having trouble getting tasks done at home;      Treatment Objective(s) Addressed in This Session:   use daily planner % of the time  identify the fears / thoughts that contribute to feeling anxious  Identify negative self-talk and behaviors: challenge core beliefs, myths, and actions  Identify barriers to change     Intervention:   Discussed cognitive therapy today in session.  Reviewed that cognitive therapy addresses cognitions on three levels: automatic thoughts, conditional assumptions, and core beliefs.  Identified that way that these three levels of cognition can influence emotions, behaviors, and body sensations.  Discussed interpersonal strategies informed by these three levels of cognitive dysfunction that the patient has used in the past.        ASSESSMENT: Current Emotional / Mental Status (status of significant symptoms):   Risk status (Self / Other harm or suicidal ideation)   Patient denies current fears or concerns for personal safety.   Patient denies current or recent suicidal ideation or behaviors.   Patient denies current or recent homicidal ideation or behaviors.   Patient denies current or recent self injurious behavior or ideation.   Patient denies other safety concerns.   Patient reports there has been no change in risk factors since their last session.     Patient reports there has been no change in protective factors since their last session.     Recommended that patient call 911 or go to the local ED should there be a change in any of these risk factors.     Appearance:   Appropriate    Eye Contact:   Fair    Psychomotor Behavior: Hyperactive    Attitude:   Cooperative    Orientation:   All   Speech    Rate / Production: Normal/ Responsive Normal     Volume:  Normal    Mood:    Anxious  Depressed     Affect:    Restricted    Thought Content:  Clear    Thought Form:  Coherent  Logical    Insight:    Good      Medication Review:   No changes to current psychiatric medication(s)     Medication Compliance:   Yes     Changes in Health Issues:   None reported - still experiencing physical symptoms and problems      Chemical Use Review:   Substance Use: Chemical use reviewed, no active concerns identified      Tobacco Use: No current tobacco use.      Diagnosis:  1. Autism spectrum disorder    2. JOSHUA (generalized anxiety disorder)        Collateral Reports Completed:   Not Applicable    PLAN: (Patient Tasks / Therapist Tasks / Other)  Goal: to increase advocacy at work and start taking more breaks  Task: review expectations for managing household responsibilities    Return in 2 weeks    Fanny Cobb, [facility]                                                         ______________________________________________________________________    Treatment Plan    Patient's Name: Angela Jones  YOB: 1995    Date: 9/16/2021    DSM5 Diagnoses: Autism Spectrum Disorder 299.00(F84.0)  Associated with another neurodevelopmental, mental or behavioral disorder, 296.31 (F33.0) Major Depressive Disorder, Recurrent Episode, Mild _ or 300.02 (F41.1) Generalized Anxiety Disorder  Psychosocial / Contextual Factors: 26 year old  female, , no kids  WHODAS: No flowsheet data found.    Referral / Collaboration:  Referral to another professional/service is not indicated at this time..    Anticipated number of session or this episode of care: ongoing      MeasurableTreatment Goal(s) related to diagnosis / functional impairment(s)  Goal 1: Patient will manage symptoms of anxiety    I will know I've met my goal when I feel confident about managing my anxiety.      Objective #A (Patient Action)    Patient will identify the initial signs or symptoms of anxiety.  Status: New - Date: 9/16/2021      Intervention(s)  Therapist will help patient gain awareness.    Objective #B  Patient will practice deep breathing at least twice a day.  Status: New - Date: 9/16/2021     Intervention(s)  Therapist will teach diaphragmatic breathing skills.      Goal 2: Patient will manage symptoms of depression    I will know I've met my goal when I feel that I am managing symptoms of depression.      Objective #A (Patient Action)    Status: New - Date: 9/16/2021     Patient will Increase interest, engagement, and pleasure in doing things.    Intervention(s)  Therapist will help patient gain awareness of joyful activities.    Objective #B  Patient will Identify negative self-talk and behaviors: challenge core beliefs, myths, and actions.    Status: New - Date: 9/16/2021     Intervention(s)  Therapist will help patient identify patterns of negative self-talk    Goal 3: Patient will identify and manage ASD related symptoms and problems    I will know I've met my goal when I increase functioning in different areas of my life.      Objective #A (Patient Action)    Status: New - Date: 9/16/2021     Patient will continue working    Intervention(s)  Therapist will support patient in maintaining employment.        Patient has reviewed and agreed to the above plan.      Fanny Cobb, [facility]  September 16, 2021

## 2021-09-17 DIAGNOSIS — G43.809 OTHER MIGRAINE, NOT INTRACTABLE, WITHOUT STATUS MIGRAINOSUS: ICD-10-CM

## 2021-09-17 ASSESSMENT — ANXIETY QUESTIONNAIRES: GAD7 TOTAL SCORE: 5

## 2021-09-21 DIAGNOSIS — F33.1 MAJOR DEPRESSIVE DISORDER, RECURRENT EPISODE, MODERATE (H): ICD-10-CM

## 2021-09-29 ENCOUNTER — VIRTUAL VISIT (OUTPATIENT)
Dept: BEHAVIORAL HEALTH | Facility: CLINIC | Age: 26
End: 2021-09-29
Payer: COMMERCIAL

## 2021-09-29 DIAGNOSIS — F33.0 MILD EPISODE OF RECURRENT MAJOR DEPRESSIVE DISORDER (H): ICD-10-CM

## 2021-09-29 DIAGNOSIS — F90.8 ATTENTION DEFICIT HYPERACTIVITY DISORDER (ADHD), OTHER TYPE: ICD-10-CM

## 2021-09-29 DIAGNOSIS — F41.1 GAD (GENERALIZED ANXIETY DISORDER): ICD-10-CM

## 2021-09-29 DIAGNOSIS — F84.0 AUTISM SPECTRUM DISORDER: Primary | ICD-10-CM

## 2021-09-29 PROCEDURE — 90834 PSYTX W PT 45 MINUTES: CPT | Mod: 95 | Performed by: SOCIAL WORKER

## 2021-09-29 NOTE — PROGRESS NOTES
"                                           Progress Note    Patient Name: Angela Jones  Date: 9/29/2021         Service Type: Individual      Session Start Time: 11:00am  Session End Time: 11:45am     Session Length: 45 minutes    Session #: 13    Attendees: Client attended alone    Service Modality:  Phone Visit:      Provider verified identity through the following two step process.  Patient provided:  Patient is known previously to provider    The patient has been notified of the following:      \"We have found that certain health care needs can be provided without the need for a face to face visit.  This service lets us provide the care you need with a phone conversation.       I will have full access to your [facility] medical record during this entire phone call.   I will be taking notes for your medical record.      Since this is like an office visit, we will bill your insurance company for this service.       There are potential benefits and risks of telephone visits (e.g. limits to patient confidentiality) that differ from in-person visits.?Confidentiality still applies for telephone services, and nobody will record the visit.  It is important to be in a quiet, private space that is free of distractions (including cell phone or other devices) during the visit.??      If during the course of the call I believe a telephone visit is not appropriate, you will not be charged for this service\"     Consent has been obtained for this service by care team member: Yes      Treatment Plan Last Reviewed: 9/16/2021  PHQ-9 = 6 / JOSHUA-7 = 5 completed on 9/16/2021    DATA  Interactive Complexity: No  Crisis: No       Progress Since Last Session (Related to Symptoms / Goals / Homework):   Symptoms: No change *    Homework: Achieved / completed to satisfaction - maintaining employment       Episode of Care Goals: Minimal progress - PREPARATION (Decided to change - considering how); Intervened by negotiating a change " "plan and determining options / strategies for behavior change, identifying triggers, exploring social supports, and working towards setting a date to begin behavior change     Current / Ongoing Stressors and Concerns:  Current: work stress and trouble knowing whether or not people are upset with her or if she is just anxious; experiencing some \"brain fog\" at work; exploring ways in which she operates differently based upon her diagnoses;      Treatment Objective(s) Addressed in This Session:   use daily planner % of the time  identify the fears / thoughts that contribute to feeling anxious  Identify negative self-talk and behaviors: challenge core beliefs, myths, and actions  Identify barriers to change  Exploring options to improve situation at work      Intervention:  Discussed the role of avoidance in erroneous fear structures, and identified the short term benefits (through negative reinforcement) and long-term problems of disengaging with important tasks and relationships.      ASSESSMENT: Current Emotional / Mental Status (status of significant symptoms):   Risk status (Self / Other harm or suicidal ideation)   Patient denies current fears or concerns for personal safety.   Patient denies current or recent suicidal ideation or behaviors.   Patient denies current or recent homicidal ideation or behaviors.   Patient denies current or recent self injurious behavior or ideation.   Patient denies other safety concerns.   Patient reports there has been no change in risk factors since their last session.     Patient reports there has been no change in protective factors since their last session.     Recommended that patient call 911 or go to the local ED should there be a change in any of these risk factors.     Appearance:   Appropriate    Eye Contact:   Fair    Psychomotor Behavior: Hyperactive    Attitude:   Cooperative    Orientation:   All   Speech    Rate / Production: Normal/ Responsive Normal "     Volume:  Normal    Mood:    Anxious  Depressed    Affect:    Restricted    Thought Content:  Clear    Thought Form:  Coherent  Logical    Insight:    Good    Mental status reviewed and no changes noted as of 9/29/21     Medication Review:   No changes to current psychiatric medication(s)     Medication Compliance:   Yes     Changes in Health Issues:   None reported      Chemical Use Review:   Substance Use: Chemical use reviewed, no active concerns identified      Tobacco Use: No current tobacco use.      Diagnosis:  1. Autism spectrum disorder    2. JOSHUA (generalized anxiety disorder)    3. Mild episode of recurrent major depressive disorder (H)    4. Attention deficit hyperactivity disorder (ADHD), other type        Collateral Reports Completed:   Not Applicable    PLAN: (Patient Tasks / Therapist Tasks / Other)  Task: to ask manager to set aside time to discuss work performance and challenges - come up with some solutions to advocate for self such as asking for more direction and typing up processes or checklists     Return in 2 weeks    Fanny Cobb, [facility]                                                         ______________________________________________________________________    Treatment Plan    Patient's Name: Angela Jones  YOB: 1995    Date: 9/16/2021    DSM5 Diagnoses: Autism Spectrum Disorder 299.00(F84.0)  Associated with another neurodevelopmental, mental or behavioral disorder, 296.31 (F33.0) Major Depressive Disorder, Recurrent Episode, Mild _ or 300.02 (F41.1) Generalized Anxiety Disorder  Psychosocial / Contextual Factors: 26 year old  female, , no kids  WHODAS: No flowsheet data found.    Referral / Collaboration:  Referral to another professional/service is not indicated at this time..    Anticipated number of session or this episode of care: ongoing      MeasurableTreatment Goal(s) related to diagnosis / functional impairment(s)  Goal 1: Patient will  manage symptoms of anxiety    I will know I've met my goal when I feel confident about managing my anxiety.      Objective #A (Patient Action)    Patient will identify the initial signs or symptoms of anxiety.  Status: New - Date: 9/16/2021     Intervention(s)  Therapist will help patient gain awareness.    Objective #B  Patient will practice deep breathing at least twice a day.  Status: New - Date: 9/16/2021     Intervention(s)  Therapist will teach diaphragmatic breathing skills.      Goal 2: Patient will manage symptoms of depression    I will know I've met my goal when I feel that I am managing symptoms of depression.      Objective #A (Patient Action)    Status: New - Date: 9/16/2021     Patient will Increase interest, engagement, and pleasure in doing things.    Intervention(s)  Therapist will help patient gain awareness of joyful activities.    Objective #B  Patient will Identify negative self-talk and behaviors: challenge core beliefs, myths, and actions.    Status: New - Date: 9/16/2021     Intervention(s)  Therapist will help patient identify patterns of negative self-talk    Goal 3: Patient will identify and manage ASD related symptoms and problems    I will know I've met my goal when I increase functioning in different areas of my life.      Objective #A (Patient Action)    Status: New - Date: 9/16/2021     Patient will continue working    Intervention(s)  Therapist will support patient in maintaining employment.        Patient has reviewed and agreed to the above plan.      Fanny Cobb, [facility]  September 16, 2021

## 2021-10-03 ENCOUNTER — HEALTH MAINTENANCE LETTER (OUTPATIENT)
Age: 26
End: 2021-10-03

## 2021-10-11 DIAGNOSIS — G43.809 OTHER MIGRAINE, NOT INTRACTABLE, WITHOUT STATUS MIGRAINOSUS: ICD-10-CM

## 2021-10-12 ENCOUNTER — MYC REFILL (OUTPATIENT)
Dept: CARDIOLOGY | Facility: CLINIC | Age: 26
End: 2021-10-12

## 2021-10-12 ENCOUNTER — MYC MEDICAL ADVICE (OUTPATIENT)
Dept: FAMILY MEDICINE | Facility: CLINIC | Age: 26
End: 2021-10-12

## 2021-10-12 DIAGNOSIS — K21.9 GASTROESOPHAGEAL REFLUX DISEASE WITHOUT ESOPHAGITIS: Primary | ICD-10-CM

## 2021-10-12 DIAGNOSIS — G43.809 OTHER MIGRAINE, NOT INTRACTABLE, WITHOUT STATUS MIGRAINOSUS: ICD-10-CM

## 2021-10-12 NOTE — TELEPHONE ENCOUNTER
amitriptyline (ELAVIL) 25 MG tablet   TAKE 2 TABLETS(50 MG) BY MOUTH AT BEDTIME     Last Written Prescription Date:  10/13/21  Last Fill Quantity: 60,   # refills: 0

## 2021-10-13 ENCOUNTER — VIRTUAL VISIT (OUTPATIENT)
Dept: BEHAVIORAL HEALTH | Facility: CLINIC | Age: 26
End: 2021-10-13
Payer: COMMERCIAL

## 2021-10-13 DIAGNOSIS — F90.8 ATTENTION DEFICIT HYPERACTIVITY DISORDER (ADHD), OTHER TYPE: ICD-10-CM

## 2021-10-13 DIAGNOSIS — F33.0 MILD EPISODE OF RECURRENT MAJOR DEPRESSIVE DISORDER (H): ICD-10-CM

## 2021-10-13 DIAGNOSIS — F84.0 AUTISM SPECTRUM DISORDER: Primary | ICD-10-CM

## 2021-10-13 DIAGNOSIS — F41.1 GAD (GENERALIZED ANXIETY DISORDER): ICD-10-CM

## 2021-10-13 PROCEDURE — 90834 PSYTX W PT 45 MINUTES: CPT | Mod: 95 | Performed by: SOCIAL WORKER

## 2021-10-13 NOTE — PROGRESS NOTES
Progress Note    Patient Name: Angela Joens  Date: 10/13/2021         Service Type: Individual      Session Start Time: 1:00pm  Session End Time: 1:45pm     Session Length: 45 minutes    Session #: 14    Attendees: Client attended alone     Service Modality:  Video Visit:      Provider verified identity through the following two step process.  Patient provided:  Patient is known previously to provider    Telemedicine Visit: The patient's condition can be safely assessed and treated via synchronous audio and visual telemedicine encounter.      Reason for Telemedicine Visit: Patient has requested telehealth visit    Originating Site (Patient Location): Patient's home    Distant Site (Provider Location): Provider Remote Setting- Home Office    Consent:  The patient/guardian has verbally consented to: the potential risks and benefits of telemedicine (video visit) versus in person care; bill my insurance or make self-payment for services provided; and responsibility for payment of non-covered services.     Patient would like the video invitation sent by:  Text to cell phone: [phone]    Mode of Communication:  Video Conference via [employer]    As the provider I attest to compliance with applicable laws and regulations related to telemedicine.     Treatment Plan Last Reviewed: 9/16/2021  PHQ-9 = 6 / JOSHUA-7 = 5 completed on 9/16/2021    DATA  Interactive Complexity: No  Crisis: No       Progress Since Last Session (Related to Symptoms / Goals / Homework):   Symptoms: No change : feeling anxious    Homework: Achieved / completed to satisfaction - maintaining employment       Episode of Care Goals: Minimal progress - PREPARATION (Decided to change - considering how); Intervened by negotiating a change plan and determining options / strategies for behavior change, identifying triggers, exploring social supports, and working towards setting a date to begin behavior  "change     Current / Ongoing Stressors and Concerns:  Current: going to [place] for vacation with some friends to celebrate their 5th wedding anniversary; is feeling anxious about spending money for the vacation; work has been going better - she did not need to have a conversation with her boss about work performance - not as much work stress; does feel tired with low energy after a work day so it trying to build stamina   Ongoing: employment struggles, health problems      Treatment Objective(s) Addressed in This Session:   use daily planner % of the time  identify the fears / thoughts that contribute to feeling anxious  Identify negative self-talk and behaviors: challenge core beliefs, myths, and actions  Identify barriers to change      Intervention:  Discussed productive and unproductive worry.  Framed productive worry as prudent, focused on events or problems, might reasonably cause a problem if left unattended, and that lead to a potential solution of a problem.  Explained productive worry as a \"to do\" list and unproductive worry as a \"what if\" list.  Used metaphor of car trip to describe the differences.  Explored how client has distinguish between the two in the past and might do this better in the future.  Discussed logging productive and unproductive worry between sessions.            ASSESSMENT: Current Emotional / Mental Status (status of significant symptoms):   Risk status (Self / Other harm or suicidal ideation)   Patient denies current fears or concerns for personal safety.   Patient denies current or recent suicidal ideation or behaviors.   Patient denies current or recent homicidal ideation or behaviors.   Patient denies current or recent self injurious behavior or ideation.   Patient denies other safety concerns.   Patient reports there has been no change in risk factors since their last session.     Patient reports there has been no change in protective factors since their last session.  "    Recommended that patient call 911 or go to the local ED should there be a change in any of these risk factors.     Appearance:   Appropriate    Eye Contact:   Fair    Psychomotor Behavior: Hyperactive    Attitude:   Cooperative    Orientation:   All   Speech    Rate / Production: Normal/ Responsive Normal     Volume:  Normal    Mood:    Anxious  Depressed    Affect:    Restricted    Thought Content:  Clear    Thought Form:  Coherent  Logical    Insight:    Good    Mental status reviewed and no changes noted as of 10/13/21     Medication Review:   No changes to current psychiatric medication(s)     Medication Compliance:   Yes     Changes in Health Issues:   None reported      Chemical Use Review:   Substance Use: Chemical use reviewed, no active concerns identified      Tobacco Use: No current tobacco use.      Diagnosis:  1. Autism spectrum disorder    2. JOSHUA (generalized anxiety disorder)    3. Mild episode of recurrent major depressive disorder (H)    4. Attention deficit hyperactivity disorder (ADHD), other type        Collateral Reports Completed:   Not Applicable    PLAN: (Patient Tasks / Therapist Tasks / Other)  Task: to practice stress management techniques     Return in 2 weeks    Fanny Cobb, [facility]                                                         ______________________________________________________________________    Treatment Plan    Patient's Name: Angela Jones  YOB: 1995    Date: 9/16/2021    DSM5 Diagnoses: Autism Spectrum Disorder 299.00(F84.0)  Associated with another neurodevelopmental, mental or behavioral disorder, 296.31 (F33.0) Major Depressive Disorder, Recurrent Episode, Mild _ or 300.02 (F41.1) Generalized Anxiety Disorder  Psychosocial / Contextual Factors: 26 year old  female, , no kids  WHODAS: No flowsheet data found.    Referral / Collaboration:  Referral to another professional/service is not indicated at this time..    Anticipated  number of session or this episode of care: ongoing      MeasurableTreatment Goal(s) related to diagnosis / functional impairment(s)  Goal 1: Patient will manage symptoms of anxiety    I will know I've met my goal when I feel confident about managing my anxiety.      Objective #A (Patient Action)    Patient will identify the initial signs or symptoms of anxiety.  Status: New - Date: 9/16/2021     Intervention(s)  Therapist will help patient gain awareness.    Objective #B  Patient will practice deep breathing at least twice a day.  Status: New - Date: 9/16/2021     Intervention(s)  Therapist will teach diaphragmatic breathing skills.      Goal 2: Patient will manage symptoms of depression    I will know I've met my goal when I feel that I am managing symptoms of depression.      Objective #A (Patient Action)    Status: New - Date: 9/16/2021     Patient will Increase interest, engagement, and pleasure in doing things.    Intervention(s)  Therapist will help patient gain awareness of joyful activities.    Objective #B  Patient will Identify negative self-talk and behaviors: challenge core beliefs, myths, and actions.    Status: New - Date: 9/16/2021     Intervention(s)  Therapist will help patient identify patterns of negative self-talk    Goal 3: Patient will identify and manage ASD related symptoms and problems    I will know I've met my goal when I increase functioning in different areas of my life.      Objective #A (Patient Action)    Status: New - Date: 9/16/2021     Patient will continue working    Intervention(s)  Therapist will support patient in maintaining employment.        Patient has reviewed and agreed to the above plan.      Fanny Cobb, [facility]  September 16, 2021

## 2021-10-27 ENCOUNTER — TELEPHONE (OUTPATIENT)
Dept: BEHAVIORAL HEALTH | Facility: CLINIC | Age: 26
End: 2021-10-27

## 2021-10-27 NOTE — TELEPHONE ENCOUNTER
Therapist outreach attempt after patient did not present for a scheduled video visit. Therapist left voice message reminding patient of next scheduled appointment on 11/18/21.

## 2021-11-09 DIAGNOSIS — G43.809 OTHER MIGRAINE, NOT INTRACTABLE, WITHOUT STATUS MIGRAINOSUS: ICD-10-CM

## 2021-11-28 DIAGNOSIS — Z76.0 ENCOUNTER FOR MEDICATION REFILL: Primary | ICD-10-CM

## 2021-11-28 DIAGNOSIS — G43.809 OTHER MIGRAINE WITHOUT STATUS MIGRAINOSUS, NOT INTRACTABLE: ICD-10-CM

## 2021-11-29 RX ORDER — METOCLOPRAMIDE 5 MG/1
TABLET ORAL
Qty: 90 TABLET | Refills: 0 | Status: SHIPPED | OUTPATIENT
Start: 2021-11-29 | End: 2024-04-17

## 2021-11-30 ENCOUNTER — E-VISIT (OUTPATIENT)
Dept: URGENT CARE | Facility: CLINIC | Age: 26
End: 2021-11-30
Payer: COMMERCIAL

## 2021-11-30 DIAGNOSIS — Z20.822 CLOSE EXPOSURE TO 2019 NOVEL CORONAVIRUS: Primary | ICD-10-CM

## 2021-11-30 PROCEDURE — 99421 OL DIG E/M SVC 5-10 MIN: CPT | Performed by: EMERGENCY MEDICINE

## 2021-11-30 NOTE — PATIENT INSTRUCTIONS
"  Dear Angela Jones,    Based on your exposure to COVID-19 (coronavirus), we would like to test you for this virus. I have placed an order for this test.The best time for testing is 5-7 days after the exposure.    How to schedule:  Go to your [employer] home page and scroll down to the section that says  You have an appointment that needs to be scheduled  and click the large green button that says  Schedule Now  and follow the steps to find the next available opening.     If you are unable to complete these [employer] scheduling steps, please call [phone] to schedule your testing.     Return to work/school/ guidance:   For people with high risk exposures outside the home    Please let your workplace manager and staffing office know when your quarantine ends.     We can not give you an exact date as it depends on the information below. You can calculate this on your own or work with your manager/staffing office to calculate this. (For example if you were exposed on 10/4, you would have to quarantine for 14 full days. That would be through 10/18. You could return on 10/19.)    Quarantine Guidelines:  Patients (\"contacts\") who have been in close prolonged contact of an infected person(s) (within six feet for at least 15 minutes within a 24 hour period), and remain asymptomatic should enter quarantine based on the following options:    14-day quarantine period (this remains the CDC recommendation for the greatest protection against spread of COVID-19) OR    Minimum 7-day quarantine with negative RT-PCR test collected on day 5 or later OR    10-day quarantine with no test  Quarantine Guideline exceptions are as follows:    People who have been fully vaccinated do not need to quarantine if the exposure was at least 2 weeks after the last vaccination. This includes vaccinated health care workers.    Not fully vaccinated and unvaccinated Individuals who work in health care, congregate care, or congregate living " should be off work for 14 days from their last date of exposure. Community activities for this group can be resumed based on options above. Fully vaccinated individuals in this group do not need to quarantine from work after exposure.    Not fully vaccinated and unvaccinated people whose high-risk exposure was a household member should always quarantine for 14 days from their last date of exposure. Fully vaccinated people in this category do not need to quarantine.    Not fully vaccinated or unvaccinated residents of congregate care and congregate living settings should always quarantine for 14 days from their last date of exposure. Fully vaccinated residents do not need to quarantine.  Note: If you have ongoing exposure to the covid positive person, this quarantine period may be more than 14 days. (For example, if you are continued to be exposed to your child who tested positive and cannot isolate from them, then the quarantine of 7-14 days can't start until your child is no longer contagious. This is typically 10 days from onset of the child's symptoms. So the total duration may be 17-24 days in this case.)    You should continue symptom monitoring until day 14 post-exposure. If you develop signs or symptoms of COVID-19, isolate and get tested (even if you have been tested already).    How to quarantine:   Stay home and away from others. Don't go to school or anywhere else. Generally quarantine means staying home from work but there are some exceptions to this. Please contact your workplace.  No hugging, kissing or shaking hands.  Don't let anyone visit.  Cover your mouth and nose with a mask, tissue or washcloth to avoid spreading germs.  Wash your hands and face often. Use soap and water.    What are the symptoms of COVID-19?  The most common symptoms are cough, fever and trouble breathing. Less common symptoms include headache, body aches, fatigue (feeling very tired), chills, sore throat, stuffy or runny nose,  diarrhea (loose poop), loss of taste or smell, belly pain, and nausea or vomiting (feeling sick to your stomach or throwing up).  After 14 days, if you have still don't have symptoms, you likely don't have this virus.  If you develop symptoms, follow these guidelines.  If you're normally healthy: Please start another eVisit.  If you have a serious health problem (like cancer, heart failure, an organ transplant or kidney disease): Call your specialty clinic. Let them know that you might have COVID-19.    Where can I get more information?  [facility] - About COVID-19: [email or web address]  CDC - What to Do If You're Sick: www.cdc.gov/coronavirus/2019-ncov/about/steps-when-sick.html  CDC - Ending Home Isolation: www.cdc.gov/coronavirus/2019-ncov/hcp/disposition-in-home-patients.html  CDC - Caring for Someone: www.cdc.gov/coronavirus/2019-ncov/if-you-are-sick/care-for-someone.html  [facility] clinical trials (COVID-19 research studies): clinicalaffairs.[facility]-clinical-trials  Below are the COVID-19 hotlines at the [facility] of Health ([facility]). Interpreters are available.  For health questions: Call 598-843-1248 or 1-864.849.4673 (7 a.m. to 7 p.m.)  For questions about schools and childcare: Call 032-054-1180 or 1-924.514.6097 (7 a.m. to 7 p.m.)        November 30, 2021  RE:  Angela VELASCO Jones                                                                                                                   [address] [place] [address]      To whom it may concern:    I evaluated Angela Jones on November 30, 2021. Anegla Jones should be excused from work/school.    They should let their workplace manager and staffing office know when their quarantine ends.    We can not give an exact date as it depends on the information below. They can calculate this on their own or work with their manager/staffing office to calculate this. (For example if they were exposed on  "10/04, they would have to quarantine for 14 full days. That would be through 10/18. They could return on 10/19.)    Quarantine Guidelines:    Patients (\"contacts\") who have been in close prolonged contact of an infected person(s) (within six feet for at least 15 minutes within a 24 hour period) and remain asymptomatic should enter quarantine based on the following options:      14-day quarantine period (this remains the CDC recommendation for the greatest protection against spread of COVID-19) OR    Minimum 7-day quarantine with negative RT-PCR test collected on day 5 or later OR    10-day quarantine with no test   Quarantine Guideline exceptions are as follows:    People who have been fully vaccinated do not need to quarantine if the exposure was at least 2 weeks after the last vaccination. This includes vaccinated health care workers.    Not fully vaccinated and unvaccinated Individuals who work in health care, congregate care, or congregate living should be off work for 14 days from their last date of exposure. Community activities for this group can be resumed based on options above. Fully vaccinated individuals in this group do not need to quarantine from work after exposure.    Not fully vaccinated and unvaccinated people whose high-risk exposure was a household member should always quarantine for 14 days from their last date of exposure. Fully vaccinated people in this category do not need to quarantine.    Not fully vaccinated or unvaccinated residents of congregate care and congregate living settings should always quarantine for 14 days from their last date of exposure. Fully vaccinated residents do not need to quarantine.    Note: If there is ongoing exposure to the covid positive person, this quarantine period may be longer than 14 days. (For example, if they are continually exposed to their child, who tested positive and cannot isolate from them, then the quarantine of 7-14 days can't start until their " child is no longer contagious. This is typically 10 days from onset to the child's symptoms. So the total duration may be 17-24 days in this case.)    Angela VELASCO Karen should continue symptom monitoring until day 14 post-exposure. If they develop signs or symptoms of COVID-19, they should isolate and get tested (even if they have been tested already).    Sincerely,  Boo Dee MD

## 2021-12-01 ENCOUNTER — LAB (OUTPATIENT)
Dept: LAB | Facility: CLINIC | Age: 26
End: 2021-12-01
Attending: EMERGENCY MEDICINE

## 2021-12-01 DIAGNOSIS — Z20.822 CLOSE EXPOSURE TO 2019 NOVEL CORONAVIRUS: ICD-10-CM

## 2021-12-01 PROCEDURE — 99000 SPECIMEN HANDLING OFFICE-LAB: CPT

## 2021-12-01 PROCEDURE — U0003 INFECTIOUS AGENT DETECTION BY NUCLEIC ACID (DNA OR RNA); SEVERE ACUTE RESPIRATORY SYNDROME CORONAVIRUS 2 (SARS-COV-2) (CORONAVIRUS DISEASE [COVID-19]), AMPLIFIED PROBE TECHNIQUE, MAKING USE OF HIGH THROUGHPUT TECHNOLOGIES AS DESCRIBED BY CMS-2020-01-R: HCPCS | Mod: 90

## 2021-12-01 PROCEDURE — U0005 INFEC AGEN DETEC AMPLI PROBE: HCPCS | Mod: 90

## 2021-12-02 ENCOUNTER — VIRTUAL VISIT (OUTPATIENT)
Dept: BEHAVIORAL HEALTH | Facility: CLINIC | Age: 26
End: 2021-12-02
Payer: COMMERCIAL

## 2021-12-02 DIAGNOSIS — F33.0 MILD EPISODE OF RECURRENT MAJOR DEPRESSIVE DISORDER (H): ICD-10-CM

## 2021-12-02 DIAGNOSIS — F90.8 ATTENTION DEFICIT HYPERACTIVITY DISORDER (ADHD), OTHER TYPE: ICD-10-CM

## 2021-12-02 DIAGNOSIS — F84.0 AUTISM SPECTRUM DISORDER: Primary | ICD-10-CM

## 2021-12-02 DIAGNOSIS — F41.1 GAD (GENERALIZED ANXIETY DISORDER): ICD-10-CM

## 2021-12-02 LAB
SARS-COV-2 RNA RESP QL NAA+PROBE: NORMAL
SARS-COV-2 RNA RESP QL NAA+PROBE: NOT DETECTED

## 2021-12-02 PROCEDURE — 90834 PSYTX W PT 45 MINUTES: CPT | Mod: 95 | Performed by: SOCIAL WORKER

## 2021-12-02 NOTE — PROGRESS NOTES
"                                           Progress Note    Patient Name: Angela Jones  Date: 12/2/2021         Service Type: Individual      Session Start Time: 3:00pm  Session End Time: 3:45pm     Session Length: 45 minutes    Session #: 15    Attendees: Client attended alone     Service Modality:  Video Visit:      Provider verified identity through the following two step process.  Patient provided:  Patient is known previously to provider    Telemedicine Visit: The patient's condition can be safely assessed and treated via synchronous audio and visual telemedicine encounter.      Reason for Telemedicine Visit: Patient has requested telehealth visit    Originating Site (Patient Location): Patient's home    Distant Site (Provider Location): Provider Remote Setting- Home Office    Consent:  The patient/guardian has verbally consented to: the potential risks and benefits of telemedicine (video visit) versus in person care; bill my insurance or make self-payment for services provided; and responsibility for payment of non-covered services.     Patient would like the video invitation sent by:  My Chart    Mode of Communication:  Video Conference via Amwell    As the provider I attest to compliance with applicable laws and regulations related to telemedicine.     Treatment Plan Last Reviewed: 9/16/2021  PHQ-9 = 6 / JOSHUA-7 = 5 completed on 9/16/2021    DATA  Interactive Complexity: No  Crisis: No       Progress Since Last Session (Related to Symptoms / Goals / Homework):   Symptoms: No change : seasonal depression (crying spells, intrusive thoughts), moderate anxiety, dizzy and tired \"all the time,\" low energy      Homework: Achieved / completed to satisfaction - maintaining employment       Episode of Care Goals: Minimal progress - PREPARATION (Decided to change - considering how); Intervened by negotiating a change plan and determining options / strategies for behavior change, identifying triggers, exploring social " supports, and working towards setting a date to begin behavior change     Current / Ongoing Stressors and Concerns:  Current: had a nice time on their [place] vacation, she has been working a lot, she was transferred to a different store and she finds the environment stressful, she finds it hard to tell what she's doing right or wrong, she is not sure what the expectations are and how to meet them, is currently waiting on a COVID test result, she has had a low grade fever for the past 3 days, struggling with working full-time but gets benefits through work,  quit old job and found new one - started this week        Ongoing: employment struggles, health problems      Treatment Objective(s) Addressed in This Session:   use daily planner % of the time  identify the fears / thoughts that contribute to feeling anxious  Identify negative self-talk and behaviors: challenge core beliefs, myths, and actions  Identify barriers to change  Identify intrusive thoughts       Intervention:  Discussed mindfulness as being aware of what we are experiencing while we are experiencing it.  Contrasted this with avoidance and rumination.  Explored the role of mindfulness as an overall coping strategy for managing depression, anxiety, and strong emotions.  Explained concept of state of mind using SIFT (sensations, images, feelings, thoughts) pneumonic. Discussed mindfulness as a tool to intentionally shift our awareness and focus as needed.           ASSESSMENT: Current Emotional / Mental Status (status of significant symptoms):   Risk status (Self / Other harm or suicidal ideation)   Patient denies current fears or concerns for personal safety.   Patient denies current or recent suicidal ideation or behaviors.   Patient denies current or recent homicidal ideation or behaviors.   Patient denies current or recent self injurious behavior or ideation.   Patient denies other safety concerns.   Patient reports there has been no  change in risk factors since their last session.     Patient reports there has been no change in protective factors since their last session.     Recommended that patient call 911 or go to the local ED should there be a change in any of these risk factors.     Appearance:   Appropriate    Eye Contact:   Fair    Psychomotor Behavior: Hyperactive    Attitude:   Cooperative    Orientation:   All   Speech    Rate / Production: Normal/ Responsive Normal     Volume:  Normal    Mood:    Anxious  Depressed    Affect:    Restricted    Thought Content:  Clear    Thought Form:  Coherent  Logical    Insight:    Good    Mental status reviewed and no changes noted as of 12/2/21     Medication Review:   No changes to current psychiatric medication(s)     Medication Compliance:   Yes     Changes in Health Issues:   None reported      Chemical Use Review:   Substance Use: Chemical use reviewed, no active concerns identified      Tobacco Use: No current tobacco use.      Diagnosis:  1. Autism spectrum disorder    2. JOSHUA (generalized anxiety disorder)    3. Mild episode of recurrent major depressive disorder (H)    4. Attention deficit hyperactivity disorder (ADHD), other type        Collateral Reports Completed:   Not Applicable    PLAN: (Patient Tasks / Therapist Tasks / Other)  Patient task: to practice stress management techniques     Return in 3 weeks    Fanny Cobb, [facility]                                                         ______________________________________________________________________    Treatment Plan    Patient's Name: Angela Jones  YOB: 1995    Date: 9/16/2021    DSM5 Diagnoses: Autism Spectrum Disorder 299.00(F84.0)  Associated with another neurodevelopmental, mental or behavioral disorder, 296.31 (F33.0) Major Depressive Disorder, Recurrent Episode, Mild _ or 300.02 (F41.1) Generalized Anxiety Disorder  Psychosocial / Contextual Factors: 26 year old  female, , no  kids  WHODAS: No flowsheet data found.    Referral / Collaboration:  Referral to another professional/service is not indicated at this time..    Anticipated number of session or this episode of care: ongoing      MeasurableTreatment Goal(s) related to diagnosis / functional impairment(s)  Goal 1: Patient will manage symptoms of anxiety    I will know I've met my goal when I feel confident about managing my anxiety.      Objective #A (Patient Action)    Patient will identify the initial signs or symptoms of anxiety.  Status: New - Date: 9/16/2021     Intervention(s)  Therapist will help patient gain awareness.    Objective #B  Patient will practice deep breathing at least twice a day.  Status: New - Date: 9/16/2021     Intervention(s)  Therapist will teach diaphragmatic breathing skills.      Goal 2: Patient will manage symptoms of depression    I will know I've met my goal when I feel that I am managing symptoms of depression.      Objective #A (Patient Action)    Status: New - Date: 9/16/2021     Patient will Increase interest, engagement, and pleasure in doing things.    Intervention(s)  Therapist will help patient gain awareness of joyful activities.    Objective #B  Patient will Identify negative self-talk and behaviors: challenge core beliefs, myths, and actions.    Status: New - Date: 9/16/2021     Intervention(s)  Therapist will help patient identify patterns of negative self-talk    Goal 3: Patient will identify and manage ASD related symptoms and problems    I will know I've met my goal when I increase functioning in different areas of my life.      Objective #A (Patient Action)    Status: New - Date: 9/16/2021     Patient will continue working    Intervention(s)  Therapist will support patient in maintaining employment.        Patient has reviewed and agreed to the above plan.      Fanny Cobb, [facility]  September 16, 2021

## 2021-12-30 ENCOUNTER — VIRTUAL VISIT (OUTPATIENT)
Dept: BEHAVIORAL HEALTH | Facility: CLINIC | Age: 26
End: 2021-12-30

## 2021-12-30 DIAGNOSIS — F33.0 MILD EPISODE OF RECURRENT MAJOR DEPRESSIVE DISORDER (H): ICD-10-CM

## 2021-12-30 DIAGNOSIS — F41.1 GAD (GENERALIZED ANXIETY DISORDER): ICD-10-CM

## 2021-12-30 DIAGNOSIS — F90.8 ATTENTION DEFICIT HYPERACTIVITY DISORDER (ADHD), OTHER TYPE: ICD-10-CM

## 2021-12-30 DIAGNOSIS — F84.0 AUTISM SPECTRUM DISORDER: Primary | ICD-10-CM

## 2021-12-30 PROCEDURE — 90834 PSYTX W PT 45 MINUTES: CPT | Mod: 95 | Performed by: SOCIAL WORKER

## 2021-12-30 NOTE — PROGRESS NOTES
Progress Note    Patient Name: Angela Jones  Date: 12/30/2021         Service Type: Individual      Session Start Time: 4:00pm  Session End Time: 4:50pm     Session Length: 50 minutes    Session #: 16    Attendees: Client attended alone     Service Modality:  Video Visit:      Provider verified identity through the following two step process.  Patient provided:  Patient is known previously to provider    Telemedicine Visit: The patient's condition can be safely assessed and treated via synchronous audio and visual telemedicine encounter.      Reason for Telemedicine Visit: Patient has requested telehealth visit    Originating Site (Patient Location): Patient's home    Distant Site (Provider Location): Provider Remote Setting- Home Office    Consent:  The patient/guardian has verbally consented to: the potential risks and benefits of telemedicine (video visit) versus in person care; bill my insurance or make self-payment for services provided; and responsibility for payment of non-covered services.     Patient would like the video invitation sent by:  My Chart    Mode of Communication:  Video Conference via Amwell    As the provider I attest to compliance with applicable laws and regulations related to telemedicine.     Treatment Plan Last Reviewed: 9/16/2021  PHQ-9 = 6 / JOSHUA-7 = 5 completed on 9/16/2021    DATA  Interactive Complexity: No  Crisis: No       Progress Since Last Session (Related to Symptoms / Goals / Homework):   Symptoms: No change : seasonal depression (crying spells, intrusive thoughts), moderate anxiety, fatigue and symptoms related to POTS      Homework: Achieved / completed to satisfaction - maintaining employment       Episode of Care Goals: Minimal progress - PREPARATION (Decided to change - considering how); Intervened by negotiating a change plan and determining options / strategies for behavior change, identifying triggers, exploring social  supports, and working towards setting a date to begin behavior change     Current / Ongoing Stressors and Concerns:  Current: she worked on [employer], spent time with 's family on [place] - was overstimulating, going to see how work goes after the holidays, body has trouble regulating temperature, fatigued, trouble sleeping (symptoms related to POTS syndrome), obtained a new  and is still working on getting MA, identifies some minor relationship concerns regarding communication and completion of household tasks, friend moving in mid January            Ongoing: employment struggles, health problems      Treatment Objective(s) Addressed in This Session:   use daily planner % of the time  identify the fears / thoughts that contribute to feeling anxious  Identify negative self-talk and behaviors: challenge core beliefs, myths, and actions  Identify barriers to change  Identify intrusive thoughts       Intervention:  Discussed mindfulness as being aware of what we are experiencing while we are experiencing it.  Contrasted this with avoidance and rumination.  Explored the role of mindfulness as an overall coping strategy for managing depression, anxiety, and strong emotions.  Explained concept of state of mind using SIFT (sensations, images, feelings, thoughts) pneumonic. Discussed mindfulness as a tool to intentionally shift our awareness and focus as needed.  Reviewed expectations of self and others.          ASSESSMENT: Current Emotional / Mental Status (status of significant symptoms):   Risk status (Self / Other harm or suicidal ideation)   Patient denies current fears or concerns for personal safety.   Patient denies current or recent suicidal ideation or behaviors.   Patient denies current or recent homicidal ideation or behaviors.   Patient denies current or recent self injurious behavior or ideation.   Patient denies other safety concerns.   Patient reports there has been no change  in risk factors since their last session.     Patient reports there has been no change in protective factors since their last session.     Recommended that patient call 911 or go to the local ED should there be a change in any of these risk factors.     Appearance:   Appropriate    Eye Contact:   Fair    Psychomotor Behavior: Hyperactive    Attitude:   Cooperative    Orientation:   All   Speech    Rate / Production: Normal/ Responsive Normal     Volume:  Normal    Mood:    Anxious  Depressed    Affect:    Restricted    Thought Content:  Clear    Thought Form:  Coherent  Logical    Insight:    Good    Mental status reviewed and no changes noted as of 12/30/21     Medication Review:   No changes to current psychiatric medication(s)     Medication Compliance:   Yes     Changes in Health Issues:   None reported      Chemical Use Review:   Substance Use: Chemical use reviewed, no active concerns identified      Tobacco Use: No current tobacco use.      Diagnosis:  1. Autism spectrum disorder    2. JOSHUA (generalized anxiety disorder)    3. Mild episode of recurrent major depressive disorder (H)    4. Attention deficit hyperactivity disorder (ADHD), other type        Collateral Reports Completed:   Not Applicable    PLAN: (Patient Tasks / Therapist Tasks / Other)  Patient task: to practice stress management techniques     Return in 3 weeks    Fanny Cobb, [facility]                                                         ______________________________________________________________________    Treatment Plan    Patient's Name: Angela Jones  YOB: 1995    Date: 9/16/2021    DSM5 Diagnoses: Autism Spectrum Disorder 299.00(F84.0)  Associated with another neurodevelopmental, mental or behavioral disorder, 296.31 (F33.0) Major Depressive Disorder, Recurrent Episode, Mild _ or 300.02 (F41.1) Generalized Anxiety Disorder  Psychosocial / Contextual Factors: 26 year old  female, , no kids  WHODAS:  No flowsheet data found.    Referral / Collaboration:  Referral to another professional/service is not indicated at this time..    Anticipated number of session or this episode of care: ongoing      MeasurableTreatment Goal(s) related to diagnosis / functional impairment(s)  Goal 1: Patient will manage symptoms of anxiety    I will know I've met my goal when I feel confident about managing my anxiety.      Objective #A (Patient Action)    Patient will identify the initial signs or symptoms of anxiety.  Status: New - Date: 9/16/2021     Intervention(s)  Therapist will help patient gain awareness.    Objective #B  Patient will practice deep breathing at least twice a day.  Status: New - Date: 9/16/2021     Intervention(s)  Therapist will teach diaphragmatic breathing skills.      Goal 2: Patient will manage symptoms of depression    I will know I've met my goal when I feel that I am managing symptoms of depression.      Objective #A (Patient Action)    Status: New - Date: 9/16/2021     Patient will Increase interest, engagement, and pleasure in doing things.    Intervention(s)  Therapist will help patient gain awareness of joyful activities.    Objective #B  Patient will Identify negative self-talk and behaviors: challenge core beliefs, myths, and actions.    Status: New - Date: 9/16/2021     Intervention(s)  Therapist will help patient identify patterns of negative self-talk    Goal 3: Patient will identify and manage ASD related symptoms and problems    I will know I've met my goal when I increase functioning in different areas of my life.      Objective #A (Patient Action)    Status: New - Date: 9/16/2021     Patient will continue working    Intervention(s)  Therapist will support patient in maintaining employment.        Patient has reviewed and agreed to the above plan.      Fanny Cobb, [facility]  September 16, 2021

## 2022-01-19 ENCOUNTER — VIRTUAL VISIT (OUTPATIENT)
Dept: BEHAVIORAL HEALTH | Facility: CLINIC | Age: 27
End: 2022-01-19

## 2022-01-19 DIAGNOSIS — F90.8 ATTENTION DEFICIT HYPERACTIVITY DISORDER (ADHD), OTHER TYPE: ICD-10-CM

## 2022-01-19 DIAGNOSIS — F41.1 GAD (GENERALIZED ANXIETY DISORDER): ICD-10-CM

## 2022-01-19 DIAGNOSIS — F84.0 AUTISM SPECTRUM DISORDER: Primary | ICD-10-CM

## 2022-01-19 DIAGNOSIS — F33.0 MILD EPISODE OF RECURRENT MAJOR DEPRESSIVE DISORDER (H): ICD-10-CM

## 2022-01-19 PROCEDURE — 90834 PSYTX W PT 45 MINUTES: CPT | Mod: 95 | Performed by: SOCIAL WORKER

## 2022-01-19 ASSESSMENT — ANXIETY QUESTIONNAIRES
7. FEELING AFRAID AS IF SOMETHING AWFUL MIGHT HAPPEN: NOT AT ALL
2. NOT BEING ABLE TO STOP OR CONTROL WORRYING: SEVERAL DAYS
4. TROUBLE RELAXING: SEVERAL DAYS
GAD7 TOTAL SCORE: 5
3. WORRYING TOO MUCH ABOUT DIFFERENT THINGS: SEVERAL DAYS
GAD7 TOTAL SCORE: 5
5. BEING SO RESTLESS THAT IT IS HARD TO SIT STILL: NOT AT ALL
GAD7 TOTAL SCORE: 5
1. FEELING NERVOUS, ANXIOUS, OR ON EDGE: SEVERAL DAYS
6. BECOMING EASILY ANNOYED OR IRRITABLE: SEVERAL DAYS
7. FEELING AFRAID AS IF SOMETHING AWFUL MIGHT HAPPEN: NOT AT ALL

## 2022-01-19 ASSESSMENT — PATIENT HEALTH QUESTIONNAIRE - PHQ9
SUM OF ALL RESPONSES TO PHQ QUESTIONS 1-9: 8
10. IF YOU CHECKED OFF ANY PROBLEMS, HOW DIFFICULT HAVE THESE PROBLEMS MADE IT FOR YOU TO DO YOUR WORK, TAKE CARE OF THINGS AT HOME, OR GET ALONG WITH OTHER PEOPLE: SOMEWHAT DIFFICULT
SUM OF ALL RESPONSES TO PHQ QUESTIONS 1-9: 8

## 2022-01-19 NOTE — PROGRESS NOTES
Progress Note    Patient Name: Angela Jones  Date: 1/19/2022         Service Type: Individual      Session Start Time: 3:00pm  Session End Time: 3:50pm     Session Length: 50 minutes    Session #: 17    Attendees: Client attended alone     Service Modality:  Video Visit:      Provider verified identity through the following two step process.  Patient provided:  Patient is known previously to provider    Telemedicine Visit: The patient's condition can be safely assessed and treated via synchronous audio and visual telemedicine encounter.      Reason for Telemedicine Visit: Patient has requested telehealth visit    Originating Site (Patient Location): Patient's home    Distant Site (Provider Location): Provider Remote Setting- Home Office    Consent:  The patient/guardian has verbally consented to: the potential risks and benefits of telemedicine (video visit) versus in person care; bill my insurance or make self-payment for services provided; and responsibility for payment of non-covered services.     Patient would like the video invitation sent by:  My Chart    Mode of Communication:  Video Conference via Amwell    As the provider I attest to compliance with applicable laws and regulations related to telemedicine.     Treatment Plan Last Reviewed: 1/19/2022  PHQ-9  / JOSHUA-7   PHQ 9/16/2021 12/23/2021 1/19/2022   PHQ-9 Total Score 6 12 8   Q9: Thoughts of better off dead/self-harm past 2 weeks Not at all Not at all Not at all     JOSHUA-7 SCORE 4/16/2021 9/16/2021 1/19/2022   Total Score - - -   Total Score - - 5 (mild anxiety)   Total Score 9 5 5   Total Score - - -       DATA  Interactive Complexity: No  Crisis: No       Progress Since Last Session (Related to Symptoms / Goals / Homework):   Symptoms: No change : seasonal depression (crying spells, intrusive thoughts), moderate anxiety, fatigue and symptoms related to POTS      Homework: Achieved / completed to  satisfaction - maintaining employment       Episode of Care Goals: Minimal progress - PREPARATION (Decided to change - considering how); Intervened by negotiating a change plan and determining options / strategies for behavior change, identifying triggers, exploring social supports, and working towards setting a date to begin behavior change     Current / Ongoing Stressors and Concerns:  Current: work is still busy although they are cutting payroll so she is working less hours, not too worried about finances, the reduced hours does mess with her routine, was sick last week which exacerbates other symptoms especially POTS, friend is moving in with them on Sunday              Ongoing: minor employment struggles, health problems      Treatment Objective(s) Addressed in This Session:   use daily planner % of the time  identify the fears / thoughts that contribute to feeling anxious  Identify negative self-talk and behaviors: challenge core beliefs, myths, and actions  Identify barriers to change  Identify intrusive thoughts       Intervention:  Discussed mindfulness as being aware of what we are experiencing while we are experiencing it.  Contrasted this with avoidance and rumination.  Explored the role of mindfulness as an overall coping strategy for managing depression, anxiety, and strong emotions.  Explained concept of state of mind using SIFT (sensations, images, feelings, thoughts) pneumonic. Discussed mindfulness as a tool to intentionally shift our awareness and focus as needed.  Reviewed expectations of self and others.          ASSESSMENT: Current Emotional / Mental Status (status of significant symptoms):   Risk status (Self / Other harm or suicidal ideation)   Patient denies current fears or concerns for personal safety.   Patient denies current or recent suicidal ideation or behaviors.   Patient denies current or recent homicidal ideation or behaviors.   Patient denies current or recent self injurious  behavior or ideation.   Patient denies other safety concerns.   Patient reports there has been no change in risk factors since their last session.     Patient reports there has been no change in protective factors since their last session.     Recommended that patient call 911 or go to the local ED should there be a change in any of these risk factors.     Appearance:   Appropriate    Eye Contact:   Fair    Psychomotor Behavior: Hyperactive    Attitude:   Cooperative    Orientation:   All   Speech    Rate / Production: Normal/ Responsive Normal     Volume:  Normal    Mood:    Anxious  Depressed    Affect:    Restricted    Thought Content:  Clear    Thought Form:  Coherent  Logical    Insight:    Good    Mental status reviewed and no changes noted as of 1/19/2022     Medication Review:   No changes to current psychiatric medication(s)     Medication Compliance:   Yes     Changes in Health Issues:   None reported      Chemical Use Review:   Substance Use: Chemical use reviewed, no active concerns identified      Tobacco Use: No current tobacco use.      Diagnosis:  1. Autism spectrum disorder    2. JOSHUA (generalized anxiety disorder)    3. Mild episode of recurrent major depressive disorder (H)    4. Attention deficit hyperactivity disorder (ADHD), other type        Collateral Reports Completed:   Not Applicable    PLAN: (Patient Tasks / Therapist Tasks / Other)  Patient task: to practice stress management techniques   Patient will look into community resources for assistance obtaining drivers license     Return in 2 weeks    Fanny Cobb, [facility]                                                         ______________________________________________________________________    Treatment Plan    Patient's Name: Angela Jones  YOB: 1995    Date: 1/19/2022    DSM5 Diagnoses: Autism Spectrum Disorder 299.00(F84.0)  Associated with another neurodevelopmental, mental or behavioral disorder, 296.31 (F33.0)  Major Depressive Disorder, Recurrent Episode, Mild _ or 300.02 (F41.1) Generalized Anxiety Disorder  Psychosocial / Contextual Factors: 26 year old  female, , no kids    Referral / Collaboration:  Referral to another professional/service is not indicated at this time..    Anticipated number of session or this episode of care: ongoing      MeasurableTreatment Goal(s) related to diagnosis / functional impairment(s)  Goal 1: Patient will manage symptoms of anxiety    I will know I've met my goal when I feel confident about managing my anxiety.      Objective #A (Patient Action)    Patient will identify the initial signs or symptoms of anxiety.  Status: Continued - Date(s): 1/19/2022     Intervention(s)  Therapist will help patient gain awareness.    Objective #B  Patient will practice deep breathing at least twice a day.  Status: Continued - Date(s): 1/19/2022     Intervention(s)  Therapist will teach diaphragmatic breathing skills.      Goal 2: Patient will manage symptoms of depression    I will know I've met my goal when I feel that I am managing symptoms of depression.      Objective #A (Patient Action)    Status: Continued - Date(s): 1/19/2022     Patient will Increase interest, engagement, and pleasure in doing things.    Intervention(s)  Therapist will help patient gain awareness of joyful activities.    Objective #B  Patient will Identify negative self-talk and behaviors: challenge core beliefs, myths, and actions.    Status: Continued - Date(s): 1/22/2022     Intervention(s)  Therapist will help patient identify patterns of negative self-talk    Goal 3: Patient will identify and manage ASD related symptoms and problems    I will know I've met my goal when I increase functioning in different areas of my life.      Objective #A (Patient Action)    Status: Continued - Date(s):1/19/2022     Patient will continue working    Intervention(s)  Therapist will support patient in maintaining  employment.      Patient has reviewed and agreed to the above plan.      Fanny Cobb, [facility]  January 19, 2022

## 2022-01-20 ASSESSMENT — PATIENT HEALTH QUESTIONNAIRE - PHQ9: SUM OF ALL RESPONSES TO PHQ QUESTIONS 1-9: 8

## 2022-01-20 ASSESSMENT — ANXIETY QUESTIONNAIRES: GAD7 TOTAL SCORE: 5

## 2022-01-30 DIAGNOSIS — Z76.0 ENCOUNTER FOR MEDICATION REFILL: Primary | ICD-10-CM

## 2022-01-31 RX ORDER — FAMOTIDINE 20 MG/1
TABLET, FILM COATED ORAL
Qty: 180 TABLET | Refills: 3 | Status: SHIPPED | OUTPATIENT
Start: 2022-01-31 | End: 2022-05-17

## 2022-02-02 DIAGNOSIS — Z76.0 ENCOUNTER FOR MEDICATION REFILL: Primary | ICD-10-CM

## 2022-02-02 DIAGNOSIS — F33.1 MAJOR DEPRESSIVE DISORDER, RECURRENT EPISODE, MODERATE (H): ICD-10-CM

## 2022-02-02 RX ORDER — VORTIOXETINE 20 MG/1
TABLET, FILM COATED ORAL
Qty: 90 TABLET | Refills: 3 | Status: SHIPPED | OUTPATIENT
Start: 2022-02-02 | End: 2023-02-07

## 2022-02-03 ENCOUNTER — MYC MEDICAL ADVICE (OUTPATIENT)
Dept: FAMILY MEDICINE | Facility: CLINIC | Age: 27
End: 2022-02-03
Payer: COMMERCIAL

## 2022-02-03 ENCOUNTER — VIRTUAL VISIT (OUTPATIENT)
Dept: BEHAVIORAL HEALTH | Facility: CLINIC | Age: 27
End: 2022-02-03
Payer: COMMERCIAL

## 2022-02-03 DIAGNOSIS — K21.9 GASTROESOPHAGEAL REFLUX DISEASE WITHOUT ESOPHAGITIS: ICD-10-CM

## 2022-02-03 DIAGNOSIS — F41.1 GAD (GENERALIZED ANXIETY DISORDER): ICD-10-CM

## 2022-02-03 DIAGNOSIS — F33.0 MILD EPISODE OF RECURRENT MAJOR DEPRESSIVE DISORDER (H): ICD-10-CM

## 2022-02-03 DIAGNOSIS — F84.0 AUTISM SPECTRUM DISORDER: Primary | ICD-10-CM

## 2022-02-03 DIAGNOSIS — F90.8 ATTENTION DEFICIT HYPERACTIVITY DISORDER (ADHD), OTHER TYPE: ICD-10-CM

## 2022-02-03 PROCEDURE — 90834 PSYTX W PT 45 MINUTES: CPT | Mod: 95 | Performed by: SOCIAL WORKER

## 2022-02-03 ASSESSMENT — ANXIETY QUESTIONNAIRES
3. WORRYING TOO MUCH ABOUT DIFFERENT THINGS: SEVERAL DAYS
2. NOT BEING ABLE TO STOP OR CONTROL WORRYING: SEVERAL DAYS
6. BECOMING EASILY ANNOYED OR IRRITABLE: SEVERAL DAYS
GAD7 TOTAL SCORE: 7
5. BEING SO RESTLESS THAT IT IS HARD TO SIT STILL: SEVERAL DAYS
7. FEELING AFRAID AS IF SOMETHING AWFUL MIGHT HAPPEN: NOT AT ALL
4. TROUBLE RELAXING: SEVERAL DAYS
1. FEELING NERVOUS, ANXIOUS, OR ON EDGE: MORE THAN HALF THE DAYS
7. FEELING AFRAID AS IF SOMETHING AWFUL MIGHT HAPPEN: NOT AT ALL
GAD7 TOTAL SCORE: 7
GAD7 TOTAL SCORE: 7

## 2022-02-03 NOTE — TELEPHONE ENCOUNTER
Writer responder via [employer] in regards to RX.    Sandra GONSALEZ RN  [facility]

## 2022-02-03 NOTE — TELEPHONE ENCOUNTER
Patient writing in message that she does not have any refills, but she should have many left. Someone from care team will call to follow up with pharmacy.    Umm ARANDA RN      Ordered Status Priority Ordering User Department   10/13/21 Sent (none) Marissa Walton MD [facility]/OB       Order History  Outpatient  Date/Time Action Taken User Additional Information   10/12/21 1624 Pend Abarno, Carolyn    10/13/21 1043 Sign Marissa Walton MD Reorder from Order:[id number]   10/13/21 1043 Taking Flag Checked Marissa Walton MD [id number]     Outpatient Medication Detail     Disp Refills Start End STARR   esomeprazole (NEXIUM) 20 MG DR capsule 30 capsule 11 10/13/2021  No   Sig - Route: Take 1 capsule (20 mg) by mouth every morning (before breakfast) - Oral   Class: Local Print   Order: [id number]

## 2022-02-03 NOTE — TELEPHONE ENCOUNTER
Author tried to call patient to ask clarifying questions.  1. What pharmacy is she calling to get the medication refilled at?  2. Has she ever picked up this prescription (the one back in October that is the original one written)?    Clarify with the patient that this prescription was sent to the [facility] on [address], so that's where her refills are.     If she's calling a different pharmacy, she needs to call [facility] to have her prescription switched over to her new pharmacy. Dr. Walton wrote her prescription back in October with 11 refills, so there should be enough on file.    Umm ARANDA RN

## 2022-02-03 NOTE — TELEPHONE ENCOUNTER
Pt returned call.     1. Refill to go to [id number] - [place], [place] - [address] AT [address].    2. She doesn't recall if she ever picked it up.    TC update pharmacies in chart and relayed RN's msg. Pt said she contacted [facility] but was told they do not have any record in their system. Can RN call pharmacy to give them verbal and contact pt back with update?    Thank you.

## 2022-02-03 NOTE — PROGRESS NOTES
Progress Note    Patient Name: Angela Jones  Date: 2/3/2022         Service Type: Individual      Session Start Time: 2:00pm  Session End Time: 2:50pm     Session Length: 50 minutes    Session #: 18    Attendees: Client attended alone     Service Modality:  Video Visit:      Provider verified identity through the following two step process.  Patient provided:  Patient is known previously to provider    Telemedicine Visit: The patient's condition can be safely assessed and treated via synchronous audio and visual telemedicine encounter.      Reason for Telemedicine Visit: Patient has requested telehealth visit    Originating Site (Patient Location): Patient's home    Distant Site (Provider Location): Provider Remote Setting- Home Office    Consent:  The patient/guardian has verbally consented to: the potential risks and benefits of telemedicine (video visit) versus in person care; bill my insurance or make self-payment for services provided; and responsibility for payment of non-covered services.     Patient would like the video invitation sent by:  My Chart    Mode of Communication:  Video Conference via [employer]    As the provider I attest to compliance with applicable laws and regulations related to telemedicine.     Treatment Plan Last Reviewed: 1/19/2022  PHQ-9  / JOSHUA-7   PHQ 9/16/2021 12/23/2021 1/19/2022   PHQ-9 Total Score 6 12 8   Q9: Thoughts of better off dead/self-harm past 2 weeks Not at all Not at all Not at all     JOSHUA-7 SCORE 9/16/2021 1/19/2022 2/3/2022   Total Score - - -   Total Score - 5 (mild anxiety) 7 (mild anxiety)   Total Score 5 5 7   Total Score - - -       DATA  Interactive Complexity: No  Crisis: No       Progress Since Last Session (Related to Symptoms / Goals / Homework):   Symptoms: No change : low energy    Homework: Achieved / completed to satisfaction - maintaining employment       Episode of Care Goals: Minimal progress - PREPARATION  (Decided to change - considering how); Intervened by negotiating a change plan and determining options / strategies for behavior change, identifying triggers, exploring social supports, and working towards setting a date to begin behavior change     Current / Ongoing Stressors and Concerns:  Current: therapist provided some resources to assist patient in obtaining her 's license, she is currently on leave from work due to excessive absences, she expresses confusion around the situation, friend is not fully moved in yet, mom is staying over tonight, some concerns around cleanliness of house and not having enough energy to clean,               Ongoing: minor employment struggles, health problems      Treatment Objective(s) Addressed in This Session:   use daily planner % of the time  identify the fears / thoughts that contribute to feeling anxious  Identify negative self-talk and behaviors: challenge core beliefs, myths, and actions  Identify barriers to change  Identify intrusive thoughts       Intervention:  Discussed mindfulness as being aware of what we are experiencing while we are experiencing it.  Contrasted this with avoidance and rumination.  Explored the role of mindfulness as an overall coping strategy for managing depression, anxiety, and strong emotions.  Explained concept of state of mind using SIFT (sensations, images, feelings, thoughts) pneumonic. Discussed mindfulness as a tool to intentionally shift our awareness and focus as needed.  Reviewed expectations of self and others.          ASSESSMENT: Current Emotional / Mental Status (status of significant symptoms):   Risk status (Self / Other harm or suicidal ideation)   Patient denies current fears or concerns for personal safety.   Patient denies current or recent suicidal ideation or behaviors.   Patient denies current or recent homicidal ideation or behaviors.   Patient denies current or recent self injurious behavior or  ideation.   Patient denies other safety concerns.   Patient reports there has been no change in risk factors since their last session.     Patient reports there has been no change in protective factors since their last session.     Recommended that patient call 911 or go to the local ED should there be a change in any of these risk factors.     Appearance:   Appropriate    Eye Contact:   Fair    Psychomotor Behavior: Hyperactive    Attitude:   Cooperative    Orientation:   All   Speech    Rate / Production: Normal/ Responsive Normal     Volume:  Normal    Mood:    Anxious  Depressed    Affect:    Restricted    Thought Content:  Clear    Thought Form:  Coherent  Logical    Insight:    Good    Mental status reviewed and no changes noted as of 1/19/2022     Medication Review:   No changes to current psychiatric medication(s)     Medication Compliance:   Yes     Changes in Health Issues:   None reported      Chemical Use Review:   Substance Use: Chemical use reviewed, no active concerns identified      Tobacco Use: No current tobacco use.      Diagnosis:  1. Autism spectrum disorder    2. JOSHUA (generalized anxiety disorder)    3. Mild episode of recurrent major depressive disorder (H)    4. Attention deficit hyperactivity disorder (ADHD), other type        Collateral Reports Completed:   Not Applicable    PLAN: (Patient Tasks / Therapist Tasks / Other)  Patient task: to inquire about work accommodations     Return in 2 weeks - need to update ZELDA Cobb, LASTSW                                                         ______________________________________________________________________    Treatment Plan    Patient's Name: Angela Jones  YOB: 1995    Date: 1/19/2022    DSM5 Diagnoses: Autism Spectrum Disorder 299.00(F84.0)  Associated with another neurodevelopmental, mental or behavioral disorder, 296.31 (F33.0) Major Depressive Disorder, Recurrent Episode, Mild _ or 300.02 (F41.1) Generalized  Anxiety Disorder  Psychosocial / Contextual Factors: 26 year old  female, , no kids    Referral / Collaboration:  Referral to another professional/service is not indicated at this time..    Anticipated number of session or this episode of care: ongoing      MeasurableTreatment Goal(s) related to diagnosis / functional impairment(s)  Goal 1: Patient will manage symptoms of anxiety    I will know I've met my goal when I feel confident about managing my anxiety.      Objective #A (Patient Action)    Patient will identify the initial signs or symptoms of anxiety.  Status: Continued - Date(s): 1/19/2022     Intervention(s)  Therapist will help patient gain awareness.    Objective #B  Patient will practice deep breathing at least twice a day.  Status: Continued - Date(s): 1/19/2022     Intervention(s)  Therapist will teach diaphragmatic breathing skills.      Goal 2: Patient will manage symptoms of depression    I will know I've met my goal when I feel that I am managing symptoms of depression.      Objective #A (Patient Action)    Status: Continued - Date(s): 1/19/2022     Patient will Increase interest, engagement, and pleasure in doing things.    Intervention(s)  Therapist will help patient gain awareness of joyful activities.    Objective #B  Patient will Identify negative self-talk and behaviors: challenge core beliefs, myths, and actions.    Status: Continued - Date(s): 1/22/2022     Intervention(s)  Therapist will help patient identify patterns of negative self-talk    Goal 3: Patient will identify and manage ASD related symptoms and problems    I will know I've met my goal when I increase functioning in different areas of my life.      Objective #A (Patient Action)    Status: Continued - Date(s):1/19/2022     Patient will continue working    Intervention(s)  Therapist will support patient in maintaining employment.      Patient has reviewed and agreed to the above plan.      Fanny Cobb,  LIC  January 19, 2022

## 2022-02-03 NOTE — TELEPHONE ENCOUNTER
Dr. Walton- Writer called [facility] as listed on patient's Nexium prescription for verification on if they received this prescription.  Pt stated when she called them and they that they did not receive it.    Did speak with the Pharmacist at [facility] listed on prescription and they stated that they have not received a prescription for this medication in a year and a half.  Pharmacist requested that the RX be resent.    RX pended.    Sandra GONSALEZ RN  [facility]

## 2022-02-04 ASSESSMENT — ANXIETY QUESTIONNAIRES: GAD7 TOTAL SCORE: 7

## 2022-02-15 ASSESSMENT — ANXIETY QUESTIONNAIRES
7. FEELING AFRAID AS IF SOMETHING AWFUL MIGHT HAPPEN: NOT AT ALL
1. FEELING NERVOUS, ANXIOUS, OR ON EDGE: MORE THAN HALF THE DAYS
GAD7 TOTAL SCORE: 7
GAD7 TOTAL SCORE: 7
5. BEING SO RESTLESS THAT IT IS HARD TO SIT STILL: SEVERAL DAYS
4. TROUBLE RELAXING: SEVERAL DAYS
2. NOT BEING ABLE TO STOP OR CONTROL WORRYING: SEVERAL DAYS
GAD7 TOTAL SCORE: 10
3. WORRYING TOO MUCH ABOUT DIFFERENT THINGS: SEVERAL DAYS
GAD7 TOTAL SCORE: 7
8. IF YOU CHECKED OFF ANY PROBLEMS, HOW DIFFICULT HAVE THESE MADE IT FOR YOU TO DO YOUR WORK, TAKE CARE OF THINGS AT HOME, OR GET ALONG WITH OTHER PEOPLE?: VERY DIFFICULT
6. BECOMING EASILY ANNOYED OR IRRITABLE: SEVERAL DAYS
7. FEELING AFRAID AS IF SOMETHING AWFUL MIGHT HAPPEN: NOT AT ALL

## 2022-02-15 ASSESSMENT — PATIENT HEALTH QUESTIONNAIRE - PHQ9
SUM OF ALL RESPONSES TO PHQ QUESTIONS 1-9: 10
4. FEELING TIRED OR HAVING LITTLE ENERGY: NEARLY EVERY DAY
3. TROUBLE FALLING OR STAYING ASLEEP OR SLEEPING TOO MUCH: NOT AT ALL
7. TROUBLE CONCENTRATING ON THINGS, SUCH AS READING THE NEWSPAPER OR WATCHING TELEVISION: NEARLY EVERY DAY
SUM OF ALL RESPONSES TO PHQ QUESTIONS 1-9: 10
5. POOR APPETITE OR OVEREATING: SEVERAL DAYS
10. IF YOU CHECKED OFF ANY PROBLEMS, HOW DIFFICULT HAVE THESE PROBLEMS MADE IT FOR YOU TO DO YOUR WORK, TAKE CARE OF THINGS AT HOME, OR GET ALONG WITH OTHER PEOPLE: VERY DIFFICULT
9. THOUGHTS THAT YOU WOULD BE BETTER OFF DEAD, OR OF HURTING YOURSELF: NOT AT ALL
1. LITTLE INTEREST OR PLEASURE IN DOING THINGS: SEVERAL DAYS
2. FEELING DOWN, DEPRESSED, IRRITABLE, OR HOPELESS: SEVERAL DAYS
SUM OF ALL RESPONSES TO PHQ QUESTIONS 1-9: 10
10. IF YOU CHECKED OFF ANY PROBLEMS, HOW DIFFICULT HAVE THESE PROBLEMS MADE IT FOR YOU TO DO YOUR WORK, TAKE CARE OF THINGS AT HOME, OR GET ALONG WITH OTHER PEOPLE: VERY DIFFICULT

## 2022-02-16 ASSESSMENT — ANXIETY QUESTIONNAIRES: GAD7 TOTAL SCORE: 7

## 2022-02-16 ASSESSMENT — PATIENT HEALTH QUESTIONNAIRE - PHQ9: SUM OF ALL RESPONSES TO PHQ QUESTIONS 1-9: 10

## 2022-02-17 ENCOUNTER — VIRTUAL VISIT (OUTPATIENT)
Dept: BEHAVIORAL HEALTH | Facility: CLINIC | Age: 27
End: 2022-02-17
Payer: COMMERCIAL

## 2022-02-17 DIAGNOSIS — F41.1 GAD (GENERALIZED ANXIETY DISORDER): ICD-10-CM

## 2022-02-17 DIAGNOSIS — F33.0 MILD EPISODE OF RECURRENT MAJOR DEPRESSIVE DISORDER (H): ICD-10-CM

## 2022-02-17 DIAGNOSIS — F90.8 ATTENTION DEFICIT HYPERACTIVITY DISORDER (ADHD), OTHER TYPE: ICD-10-CM

## 2022-02-17 DIAGNOSIS — F84.0 AUTISM SPECTRUM DISORDER: Primary | ICD-10-CM

## 2022-02-17 PROCEDURE — 90791 PSYCH DIAGNOSTIC EVALUATION: CPT | Mod: 95 | Performed by: SOCIAL WORKER

## 2022-02-17 ASSESSMENT — COLUMBIA-SUICIDE SEVERITY RATING SCALE - C-SSRS
1. IN THE PAST MONTH, HAVE YOU WISHED YOU WERE DEAD OR WISHED YOU COULD GO TO SLEEP AND NOT WAKE UP?: NO
6. HAVE YOU EVER DONE ANYTHING, STARTED TO DO ANYTHING, OR PREPARED TO DO ANYTHING TO END YOUR LIFE?: YES
2. HAVE YOU ACTUALLY HAD ANY THOUGHTS OF KILLING YOURSELF IN THE PAST MONTH?: NO
4. HAVE YOU HAD THESE THOUGHTS AND HAD SOME INTENTION OF ACTING ON THEM?: NO
3. HAVE YOU BEEN THINKING ABOUT HOW YOU MIGHT KILL YOURSELF?: NO
5. HAVE YOU STARTED TO WORK OUT OR WORKED OUT THE DETAILS OF HOW TO KILL YOURSELF? DO YOU INTEND TO CARRY OUT THIS PLAN?: NO
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
## 2022-02-18 ENCOUNTER — OFFICE VISIT (OUTPATIENT)
Dept: FAMILY MEDICINE | Facility: CLINIC | Age: 27
End: 2022-02-18
Payer: COMMERCIAL

## 2022-02-18 VITALS
WEIGHT: 113.75 LBS | BODY MASS INDEX: 19.42 KG/M2 | SYSTOLIC BLOOD PRESSURE: 118 MMHG | TEMPERATURE: 98.9 F | DIASTOLIC BLOOD PRESSURE: 60 MMHG | RESPIRATION RATE: 16 BRPM | HEIGHT: 64 IN | OXYGEN SATURATION: 98 % | HEART RATE: 90 BPM

## 2022-02-18 DIAGNOSIS — G90.A POTS (POSTURAL ORTHOSTATIC TACHYCARDIA SYNDROME): Primary | ICD-10-CM

## 2022-02-18 DIAGNOSIS — F84.0 AUTISM SPECTRUM DISORDER: ICD-10-CM

## 2022-02-18 DIAGNOSIS — R53.83 LOW ENERGY: ICD-10-CM

## 2022-02-18 DIAGNOSIS — F41.1 GENERALIZED ANXIETY DISORDER: ICD-10-CM

## 2022-02-18 DIAGNOSIS — F33.41 RECURRENT MAJOR DEPRESSIVE DISORDER, IN PARTIAL REMISSION (H): ICD-10-CM

## 2022-02-18 DIAGNOSIS — M25.50 MULTIPLE JOINT PAIN: ICD-10-CM

## 2022-02-18 DIAGNOSIS — Z97.5 IUD (INTRAUTERINE DEVICE) IN PLACE: ICD-10-CM

## 2022-02-18 DIAGNOSIS — F42.9 OBSESSIVE-COMPULSIVE DISORDER, UNSPECIFIED TYPE: ICD-10-CM

## 2022-02-18 LAB
ANION GAP SERPL CALCULATED.3IONS-SCNC: 8 MMOL/L (ref 5–18)
BUN SERPL-MCNC: 12 MG/DL (ref 8–22)
CALCIUM SERPL-MCNC: 9.3 MG/DL (ref 8.5–10.5)
CHLORIDE BLD-SCNC: 108 MMOL/L (ref 98–107)
CO2 SERPL-SCNC: 24 MMOL/L (ref 22–31)
CREAT SERPL-MCNC: 0.75 MG/DL (ref 0.6–1.1)
ERYTHROCYTE [DISTWIDTH] IN BLOOD BY AUTOMATED COUNT: 12.3 % (ref 10–15)
ERYTHROCYTE [SEDIMENTATION RATE] IN BLOOD BY WESTERGREN METHOD: 7 MM/HR (ref 0–20)
GFR SERPL CREATININE-BSD FRML MDRD: >90 ML/MIN/1.73M2
GLUCOSE BLD-MCNC: 82 MG/DL (ref 70–125)
HCT VFR BLD AUTO: 38.2 % (ref 35–47)
HGB BLD-MCNC: 12.4 G/DL (ref 11.7–15.7)
MCH RBC QN AUTO: 31.3 PG (ref 26.5–33)
MCHC RBC AUTO-ENTMCNC: 32.5 G/DL (ref 31.5–36.5)
MCV RBC AUTO: 97 FL (ref 78–100)
PLATELET # BLD AUTO: 220 10E3/UL (ref 150–450)
POTASSIUM BLD-SCNC: 3.9 MMOL/L (ref 3.5–5)
RBC # BLD AUTO: 3.96 10E6/UL (ref 3.8–5.2)
RHEUMATOID FACT SER NEPH-ACNC: <15 IU/ML (ref 0–30)
SODIUM SERPL-SCNC: 140 MMOL/L (ref 136–145)
TSH SERPL DL<=0.005 MIU/L-ACNC: 2.06 UIU/ML (ref 0.3–5)
WBC # BLD AUTO: 4.3 10E3/UL (ref 4–11)

## 2022-02-18 PROCEDURE — 86431 RHEUMATOID FACTOR QUANT: CPT | Performed by: FAMILY MEDICINE

## 2022-02-18 PROCEDURE — 80048 BASIC METABOLIC PNL TOTAL CA: CPT | Performed by: FAMILY MEDICINE

## 2022-02-18 PROCEDURE — 82306 VITAMIN D 25 HYDROXY: CPT | Performed by: FAMILY MEDICINE

## 2022-02-18 PROCEDURE — 84443 ASSAY THYROID STIM HORMONE: CPT | Performed by: FAMILY MEDICINE

## 2022-02-18 PROCEDURE — 86200 CCP ANTIBODY: CPT | Performed by: FAMILY MEDICINE

## 2022-02-18 PROCEDURE — 85027 COMPLETE CBC AUTOMATED: CPT | Performed by: FAMILY MEDICINE

## 2022-02-18 PROCEDURE — 99214 OFFICE O/P EST MOD 30 MIN: CPT | Performed by: FAMILY MEDICINE

## 2022-02-18 PROCEDURE — 85652 RBC SED RATE AUTOMATED: CPT | Performed by: FAMILY MEDICINE

## 2022-02-18 PROCEDURE — 86039 ANTINUCLEAR ANTIBODIES (ANA): CPT | Performed by: FAMILY MEDICINE

## 2022-02-18 PROCEDURE — 86038 ANTINUCLEAR ANTIBODIES: CPT | Performed by: FAMILY MEDICINE

## 2022-02-18 PROCEDURE — 36415 COLL VENOUS BLD VENIPUNCTURE: CPT | Performed by: FAMILY MEDICINE

## 2022-02-18 RX ORDER — GUANFACINE 2 MG/1
1 TABLET ORAL EVERY 24 HOURS
COMMUNITY
Start: 2020-10-27 | End: 2023-11-14

## 2022-02-18 RX ORDER — GABAPENTIN 600 MG/1
600 TABLET ORAL 2 TIMES DAILY
Qty: 180 TABLET | Refills: 3 | Status: SHIPPED | OUTPATIENT
Start: 2022-02-18 | End: 2023-03-01

## 2022-02-18 ASSESSMENT — ASTHMA QUESTIONNAIRES
ACT_TOTALSCORE: 25
QUESTION_5 LAST FOUR WEEKS HOW WOULD YOU RATE YOUR ASTHMA CONTROL: COMPLETELY CONTROLLED
ACT_TOTALSCORE: 25
QUESTION_2 LAST FOUR WEEKS HOW OFTEN HAVE YOU HAD SHORTNESS OF BREATH: NOT AT ALL
QUESTION_1 LAST FOUR WEEKS HOW MUCH OF THE TIME DID YOUR ASTHMA KEEP YOU FROM GETTING AS MUCH DONE AT WORK, SCHOOL OR AT HOME: NONE OF THE TIME
QUESTION_4 LAST FOUR WEEKS HOW OFTEN HAVE YOU USED YOUR RESCUE INHALER OR NEBULIZER MEDICATION (SUCH AS ALBUTEROL): NOT AT ALL
QUESTION_3 LAST FOUR WEEKS HOW OFTEN DID YOUR ASTHMA SYMPTOMS (WHEEZING, COUGHING, SHORTNESS OF BREATH, CHEST TIGHTNESS OR PAIN) WAKE YOU UP AT NIGHT OR EARLIER THAN USUAL IN THE MORNING: NOT AT ALL

## 2022-02-18 NOTE — PATIENT INSTRUCTIONS
After you get your blood tests back, we'll decide if we want to try a medication for fibromyalgia, or possibly refer to a Rheumatologist.

## 2022-02-18 NOTE — PROGRESS NOTES
"Answers for HPI/ROS submitted by the patient on 2/15/2022  How many servings of fruits and vegetables do you eat daily?: 2-3  On average, how many sweetened beverages do you drink each day (Examples: soda, juice, sweet tea, etc.  Do NOT count diet or artificially sweetened beverages)?: 0  How many minutes a day do you exercise enough to make your heart beat faster?: 30 to 60  How many days a week do you exercise enough to make your heart beat faster?: 5  How many days per week do you miss taking your medication?: 3  What makes it hard for you to take your medication every day?: remembering to take    S  Angela Jones is a 26 year old female here for follow up on POTS and body pain and mental health.     Trying to get POTS under control- drinking lots of water, eating high sodium, wearing compression stockings. All of that seems to help but she has days where she \"can't do anything\" because she's very dizzy. She never did do the OT program through [place]- too hard to find accommodations in [place]. She did see a POTS specialist via Cardiology and they considered putting her on a beta blocker but didn't want to lower her BP too much.     She has been working full time since July as a  and she loves it, but feels like she's \"hurting all over,\" and doesn't have energy in the evenings. Initially, had trouble with muscle soreness and strength. Had gotten past that as she's gotten used to the work, but still feels achy \"All over\" most days, Elbows, knees, neck, back. It doesn't get any better even at times when she's been off for 5 days. Has tried ibuprofen, it helps a little bit. Today, thumb side of her hands are hurting and so is her upper back.      Taking vitamin D.      Had PFTs and found out she does NOT have asthma.    Gabapentin- taking 600 mg BID. helps with autism symptoms, sensory overload. Doesn't make her tired. On bad days, will take It 3 times.     Mirena- wondering when it has to come out. Was " "placed in 3/2017.  got vasectomy so they are no longer relying on Mirena for BC.     Psychiatrist- hasn't seen in awhile. Would like a referral to a new one    O  /60   Pulse 90   Temp 98.9  F (37.2  C) (Oral)   Resp 16   Ht 1.613 m (5' 3.5\")   Wt 51.6 kg (113 lb 12 oz)   SpO2 98%   Breastfeeding No   BMI 19.83 kg/m     Vitals reviewed. Nursing note reviewed.  General Appearance: Pleasant and alert, in no acute distress  HEENT: mucous membranes moist  CV: RRR, no murmur, rubs, gallops  Resp: No respiratory distress. Clear to auscultation bilaterally. No wheezes, rales, rhonchi  Abd: Soft, nontender, nondistended, bowel sounds present. No masses.  Ext: No peripheral edema, good distal perfusion  MSK: tension over left trapezius muscle. No swelling of wrists or fingers observed.   Skin: warm, dry, intact, no rash noted  Neuro: no focal deficits, CNs II-XII normal.   Psych: mood and affect are normal.    A/P   was seen today for recheck, medication request and musculoskeletal problem.    Diagnoses and all orders for this visit:    POTS: has cardiology follow up in May. Stable for now, better with lifestyle modifications.     Low energy: checking the following labs. Also checking some rheumatologic labs (see below).   -     Basic metabolic panel  (Ca, Cl, CO2, Creat, Gluc, K, Na, BUN); Future  -     CBC with platelets; Future  -     TSH with free T4 reflex; Future  -     Vitamin D Deficiency; Future  -     ESR: Erythrocyte sedimentation rate; Future    Multiple joint pain  -     Anti Nuclear Na IgG by IFA with Reflex; Future  -     Rheumatoid factor; Future  -     Cyclic Citrullinated Peptide Antibody IgG; Future  -     ESR: Erythrocyte sedimentation rate; Future    Recurrent major depressive disorder, in partial remission (H): will try to get her re-established with psychiatry.   -     Adult Mental Health  Referral; Future    Generalized anxiety disorder  -     gabapentin " (NEURONTIN) 600 MG tablet; Take 1 tablet (600 mg) by mouth 2 times daily  -     Adult Mental Health  Referral; Future    Obsessive-compulsive disorder, unspecified type  -     Adult Mental Health  Referral; Future    Autism spectrum disorder  -     gabapentin (NEURONTIN) 600 MG tablet; Take 1 tablet (600 mg) by mouth 2 times daily  -     Adult Mental Health  Referral; Future      IUD (intrauterine device) in place: was placed 3/2017 and I explained that Mirena has now been approved for 7 years, so she can wait til 2024 to have it removed. As above, she is not relying on it for contraception.          No follow-ups on file.      Options for treatment and follow-up care were reviewed with the patient and/or guardian. Angela Jones and/or guardian engaged in the decision making process and verbalized understanding of the options discussed and agreed with the final plan.    Marissa Walton MD

## 2022-02-19 DIAGNOSIS — G43.809 OTHER MIGRAINE, NOT INTRACTABLE, WITHOUT STATUS MIGRAINOSUS: ICD-10-CM

## 2022-02-21 LAB
ANA PAT SER IF-IMP: ABNORMAL
ANA SER QL IF: POSITIVE
ANA TITR SER IF: ABNORMAL {TITER}
DEPRECATED CALCIDIOL+CALCIFEROL SERPL-MC: 20 UG/L (ref 30–80)

## 2022-02-22 ENCOUNTER — MYC MEDICAL ADVICE (OUTPATIENT)
Dept: FAMILY MEDICINE | Facility: CLINIC | Age: 27
End: 2022-02-22
Payer: COMMERCIAL

## 2022-02-22 DIAGNOSIS — F33.41 RECURRENT MAJOR DEPRESSIVE DISORDER, IN PARTIAL REMISSION (H): Primary | ICD-10-CM

## 2022-02-22 DIAGNOSIS — K21.9 GASTROESOPHAGEAL REFLUX DISEASE WITHOUT ESOPHAGITIS: ICD-10-CM

## 2022-02-22 LAB — CCP AB SER IA-ACNC: <0.5 U/ML

## 2022-02-22 RX ORDER — MIRTAZAPINE 15 MG/1
15 TABLET, FILM COATED ORAL DAILY
Qty: 90 TABLET | Refills: 3 | Status: SHIPPED | OUTPATIENT
Start: 2022-02-22 | End: 2023-03-03

## 2022-02-23 DIAGNOSIS — R52 GENERALIZED BODY ACHES: Primary | ICD-10-CM

## 2022-02-24 ENCOUNTER — TELEPHONE (OUTPATIENT)
Dept: RHEUMATOLOGY | Facility: CLINIC | Age: 27
End: 2022-02-24
Payer: COMMERCIAL

## 2022-02-24 NOTE — TELEPHONE ENCOUNTER
Health Call Center    Phone Message    May a detailed message be left on voicemail: yes     Reason for Call: Appointment Intake    Referring Provider Name: Marissa Walton MD in [facility]  Diagnosis and/or Symptoms: Generalized body aches [R52, positive RISHABH  Additional comments from referral: Patient has chronic aches and pains in many places in her body, new since the last few months. Her father has fibromyalgia. RISHABH titer was 1:160, speckled.     Patient would like to be seen at the [facility] location, please review and advise if ok to schedule?       Action Taken: Message routed to:  [facility]    Travel Screening: Not Applicable

## 2022-02-25 NOTE — TELEPHONE ENCOUNTER
Component      Latest Ref Rng & Units 2/18/2022   RISHABH interpretation      Negative Positive (A)   RISHABH pattern 1       Speckled   RISHABH titer 1       1:160   Rheumatoid Factor      0 - 30 IU/mL <15   Cyclic Citrullinated Peptide Antibody, IgG      <=4.9 U/mL <0.5   Sed Rate      0 - 20 mm/hr 7

## 2022-03-09 ENCOUNTER — VIRTUAL VISIT (OUTPATIENT)
Dept: BEHAVIORAL HEALTH | Facility: CLINIC | Age: 27
End: 2022-03-09
Payer: COMMERCIAL

## 2022-03-09 DIAGNOSIS — F90.8 ATTENTION DEFICIT HYPERACTIVITY DISORDER (ADHD), OTHER TYPE: ICD-10-CM

## 2022-03-09 DIAGNOSIS — F33.0 MILD EPISODE OF RECURRENT MAJOR DEPRESSIVE DISORDER (H): ICD-10-CM

## 2022-03-09 DIAGNOSIS — F41.1 GAD (GENERALIZED ANXIETY DISORDER): ICD-10-CM

## 2022-03-09 DIAGNOSIS — F84.0 AUTISM SPECTRUM DISORDER: Primary | ICD-10-CM

## 2022-03-09 PROCEDURE — 90834 PSYTX W PT 45 MINUTES: CPT | Mod: 95 | Performed by: SOCIAL WORKER

## 2022-03-09 ASSESSMENT — PATIENT HEALTH QUESTIONNAIRE - PHQ9
SUM OF ALL RESPONSES TO PHQ QUESTIONS 1-9: 8
10. IF YOU CHECKED OFF ANY PROBLEMS, HOW DIFFICULT HAVE THESE PROBLEMS MADE IT FOR YOU TO DO YOUR WORK, TAKE CARE OF THINGS AT HOME, OR GET ALONG WITH OTHER PEOPLE: SOMEWHAT DIFFICULT
1. LITTLE INTEREST OR PLEASURE IN DOING THINGS: SEVERAL DAYS
7. TROUBLE CONCENTRATING ON THINGS, SUCH AS READING THE NEWSPAPER OR WATCHING TELEVISION: MORE THAN HALF THE DAYS
3. TROUBLE FALLING OR STAYING ASLEEP OR SLEEPING TOO MUCH: SEVERAL DAYS
10. IF YOU CHECKED OFF ANY PROBLEMS, HOW DIFFICULT HAVE THESE PROBLEMS MADE IT FOR YOU TO DO YOUR WORK, TAKE CARE OF THINGS AT HOME, OR GET ALONG WITH OTHER PEOPLE: SOMEWHAT DIFFICULT
SUM OF ALL RESPONSES TO PHQ QUESTIONS 1-9: 8
SUM OF ALL RESPONSES TO PHQ QUESTIONS 1-9: 8
4. FEELING TIRED OR HAVING LITTLE ENERGY: SEVERAL DAYS
9. THOUGHTS THAT YOU WOULD BE BETTER OFF DEAD, OR OF HURTING YOURSELF: NOT AT ALL
2. FEELING DOWN, DEPRESSED, IRRITABLE, OR HOPELESS: SEVERAL DAYS
5. POOR APPETITE OR OVEREATING: SEVERAL DAYS

## 2022-03-09 ASSESSMENT — ANXIETY QUESTIONNAIRES
2. NOT BEING ABLE TO STOP OR CONTROL WORRYING: SEVERAL DAYS
GAD7 TOTAL SCORE: 7
1. FEELING NERVOUS, ANXIOUS, OR ON EDGE: MORE THAN HALF THE DAYS
5. BEING SO RESTLESS THAT IT IS HARD TO SIT STILL: NOT AT ALL
7. FEELING AFRAID AS IF SOMETHING AWFUL MIGHT HAPPEN: NOT AT ALL
3. WORRYING TOO MUCH ABOUT DIFFERENT THINGS: SEVERAL DAYS
4. TROUBLE RELAXING: SEVERAL DAYS
7. FEELING AFRAID AS IF SOMETHING AWFUL MIGHT HAPPEN: NOT AT ALL
4. TROUBLE RELAXING: SEVERAL DAYS
3. WORRYING TOO MUCH ABOUT DIFFERENT THINGS: SEVERAL DAYS
8. IF YOU CHECKED OFF ANY PROBLEMS, HOW DIFFICULT HAVE THESE MADE IT FOR YOU TO DO YOUR WORK, TAKE CARE OF THINGS AT HOME, OR GET ALONG WITH OTHER PEOPLE?: SOMEWHAT DIFFICULT
2. NOT BEING ABLE TO STOP OR CONTROL WORRYING: SEVERAL DAYS
1. FEELING NERVOUS, ANXIOUS, OR ON EDGE: MORE THAN HALF THE DAYS
GAD7 TOTAL SCORE: 6
6. BECOMING EASILY ANNOYED OR IRRITABLE: SEVERAL DAYS
GAD7 TOTAL SCORE: 7
5. BEING SO RESTLESS THAT IT IS HARD TO SIT STILL: SEVERAL DAYS
7. FEELING AFRAID AS IF SOMETHING AWFUL MIGHT HAPPEN: NOT AT ALL
GAD7 TOTAL SCORE: 7
6. BECOMING EASILY ANNOYED OR IRRITABLE: SEVERAL DAYS

## 2022-03-09 NOTE — PROGRESS NOTES
[facility]                                     Progress Note    Patient Name: Angela Jones  Date: 3/9/2022         Service Type: Individual      Session Start Time: 11:00am  Session End Time: 11:50am     Session Length: 50 minutes    Session #: 1    Attendees: Client attended alone    Service Modality:  Video Visit:      Provider verified identity through the following two step process.  Patient provided:  Patient is known previously to provider    Telemedicine Visit: The patient's condition can be safely assessed and treated via synchronous audio and visual telemedicine encounter.      Reason for Telemedicine Visit: Patient has requested telehealth visit    Originating Site (Patient Location): Patient's home    Distant Site (Provider Location): Provider Remote Setting- Home Office    Consent:  The patient/guardian has verbally consented to: the potential risks and benefits of telemedicine (video visit) versus in person care; bill my insurance or make self-payment for services provided; and responsibility for payment of non-covered services.     Patient would like the video invitation sent by:  My Chart    Mode of Communication:  Video Conference via [facility]    As the provider I attest to compliance with applicable laws and regulations related to telemedicine.    DATA  Interactive Complexity: No  Crisis: No        Progress Since Last Session (Related to Symptoms / Goals / Homework):   Symptoms: No change :    Homework: Partially completed      Episode of Care Goals: Minimal progress - PREPARATION (Decided to change - considering how); Intervened by negotiating a change plan and determining options / strategies for behavior change, identifying triggers, exploring social supports, and working towards setting a date to begin behavior change     Current / Ongoing Stressors and Concerns:   Has been working a lot, shares that she does not have clear goals at work and feels nervous to initiate a  "conversation with her manager around accommodations, agrees to initiate conversation with manager before her next therapy visit, is currently working 4 days per week and still experiences a lot of fatigue and body pain, shares that an anxiety trigger is \"people I don't know sitting on my furniture\"      Treatment Objective(s) Addressed in This Session:   identify the fears / thoughts that contribute to feeling anxious  state understanding of stressors and relationship to physical symptoms  Increase interest, engagement, and pleasure in doing things  Decrease frequency and intensity of feeling down, depressed, hopeless  Identify negative self-talk and behaviors: challenge core beliefs, myths, and actions  Gain insight     Intervention:   Discussed mindfulness as being aware of what we are experiencing while we are experiencing it.  Contrasted this with avoidance and rumination.  Explored the role of mindfulness as an overall coping strategy for managing depression, anxiety, and strong emotions.  Explained concept of state of mind using SIFT (sensations, images, feelings, thoughts) pneumonic.  Led client in a guided mindfulness exercise focusing on sensations, images, feelings, and thoughts.  Discussed mindfulness as a tool to intentionally shift our awareness and focus as needed.    Assessments completed prior to visit:   The following assessments were completed by patient for this visit:  PHQ9:   PHQ-9 SCORE 1/25/2021 4/16/2021 9/16/2021 12/23/2021 1/19/2022 2/15/2022 3/9/2022   PHQ-9 Total Score - - - - - - -   PHQ-9 Total Score MyChart - - - 12 (Moderate depression) 8 (Mild depression) 10 (Moderate depression) 8 (Mild depression)   PHQ-9 Total Score 15 10 6 12 8 10 8     GAD7:   JOSHUA-7 SCORE 9/16/2021 1/19/2022 2/3/2022 2/15/2022 2/15/2022 3/9/2022 3/9/2022   Total Score - - - - - - -   Total Score - 5 (mild anxiety) 7 (mild anxiety) 7 (mild anxiety) 10 (moderate anxiety) 7 (mild anxiety) 6 (mild anxiety)   Total " Score 5 5 7 - 7 6 7   Total Score - - - - - - -     PROMIS 10-Global Health (only subscores and total score):   PROMIS-10 Scores Only 12/30/2021 1/19/2022 2/15/2022 3/9/2022   Global Mental Health Score 11 11 9 12   Global Physical Health Score 11 11 11 11   PROMIS TOTAL - SUBSCORES 22 22 20 23         ASSESSMENT: Current Emotional / Mental Status (status of significant symptoms):   Risk status (Self / Other harm or suicidal ideation)   Patient denies current fears or concerns for personal safety.   Patient denies current or recent suicidal ideation or behaviors.   Patient denies current or recent homicidal ideation or behaviors.   Patient denies current or recent self injurious behavior or ideation.   Patient denies other safety concerns.   Patient reports there has been no change in risk factors since their last session.     Patient reports there has been no change in protective factors since their last session.     Recommended that patient call 911 or go to the local ED should there be a change in any of these risk factors.     Appearance:   Appropriate    Eye Contact:   Good    Psychomotor Behavior: Hyperactive  Restless    Attitude:   Cooperative    Orientation:   All   Speech    Rate / Production: Pressured  Stutters    Volume:  Normal    Mood:    Anxious    Affect:    Constricted    Thought Content:  Clear    Thought Form:  Coherent    Insight:    Good      Medication Review:   No changes to current psychiatric medication(s)     Medication Compliance:   Yes     Changes in Health Issues:   None reported     Chemical Use Review:   Substance Use: Chemical use reviewed, no active concerns identified      Tobacco Use: No current tobacco use.      Diagnosis:  1. Autism spectrum disorder    2. JOSHUA (generalized anxiety disorder)    3. Mild episode of recurrent major depressive disorder (H)    4. Attention deficit hyperactivity disorder (ADHD), other type        Collateral Reports Completed:   Not Applicable    PLAN:  (Patient Tasks / Therapist Tasks / Other)  Patient will return for a virtual visit in 2 weeks  Patient will pursue referral for couples counseling  Patient will initiate a conversation with her manager regarding accommodations         Fanny Cobb, [facility]                                                         ______________________________________________________________________    Individual Treatment Plan    Patient's Name: Angela Jones  YOB: 1995    Date of Creation: 3/9/2022  Date Treatment Plan Last Reviewed/Revised: 1/19/2022    DSM5 Diagnoses: Autism Spectrum Disorder 299.00(F84.0)  Associated with another neurodevelopmental, mental or behavioral disorder and Attention-Deficit/Hyperactivity Disorder  314.01 (F90.9) Unspecified Attention -Deficit / Hyperactivity Disorder, 296.31 (F33.0) Major Depressive Disorder, Recurrent Episode, Mild _ or 300.02 (F41.1) Generalized Anxiety Disorder  Psychosocial / Contextual Factors: 26 year old  female, , no children   PROMIS (reviewed every 90 days):   PROMIS-10 Scores  Global Mental Health Score: (P) 12  Global Physical Health Score: (P) 11   PROMIS TOTAL - SUBSCORES: (P) 23       Referral / Collaboration:  Was/were discussed and patient will pursue.    Anticipated number of session for this episode of care: ongoing  Anticipation frequency of session: every 2-3 weeks  Anticipated Duration of each session: 38-52 minutes  Treatment plan will be reviewed in 90 days or when goals have been changed.       MeasurableTreatment Goal(s) related to diagnosis / functional impairment(s)  Goal 1: Patient will manage symptoms of anxiety     I will know I've met my goal when I feel more confident in my ability to cope with stress with fewer meltdowns.      Objective #A (Patient Action)    Patient will identify the initial signs or symptoms of anxiety.  Status: New - Date: 3/9/2022     Intervention(s)  Therapist will teach help patient gain insight  into signs of stress (physically and emotionally).    Objective #B  Patient will use relaxation strategies multiple times per day to reduce the physical symptoms of anxiety.  Status: New - Date: 3/9/2022     Intervention(s)  Therapist will teach diaphragmatic breathing and other grounding skills.    Objective #C  Patient will use thought-stopping strategy daily to reduce intrusive thoughts.  Status: New - Date: 3/9/2022     Intervention(s)  Therapist will teach thought stopping techniques.      Goal 2: Patient will manage symptoms of depression    I will know I've met my goal when I feel more confident in my ability to express my emotions in a healthy way.      Objective #A (Patient Action)    Status: New - Date: 3/9/2022     Patient will Increase interest, engagement, and pleasure in doing things.    Intervention(s)  Therapist will teach emotional recognition/identification. *.    Objective #B  Patient will Identify negative self-talk and behaviors: challenge core beliefs, myths, and actions.    Status: New - Date: 3/9/2022     Intervention(s)  Therapist will help patient practice positive self-talk and thought re-framing.      Goal 3: Patient will improve communication patterns in the relationship    I will know I've met my goal when we attend couples counseling and prioritize our relationship.      Objective #A (Patient Action)    Status: New - Date: 3/9/2022     Patient will get established with a couples counselor through [facility] (referral made on 3/9/22).    Intervention(s)  Therapist will monitor referral process and follow-up on their relationship problems and improvements      Patient has reviewed and agreed to the above plan.      Fanny Cobb, [facility]  March 9, 2022

## 2022-03-10 ASSESSMENT — ANXIETY QUESTIONNAIRES: GAD7 TOTAL SCORE: 7

## 2022-03-10 ASSESSMENT — PATIENT HEALTH QUESTIONNAIRE - PHQ9: SUM OF ALL RESPONSES TO PHQ QUESTIONS 1-9: 8

## 2022-03-17 NOTE — TELEPHONE ENCOUNTER
Chart reviewed, does not meet criteria for scheduling, recommend an E-Consult.  Forwarded note to scheduling.    LAUREN PengN, RN  Rheumatology Care Coordinator  [facility]

## 2022-03-23 ENCOUNTER — VIRTUAL VISIT (OUTPATIENT)
Dept: BEHAVIORAL HEALTH | Facility: CLINIC | Age: 27
End: 2022-03-23
Payer: COMMERCIAL

## 2022-03-23 DIAGNOSIS — F33.0 MILD EPISODE OF RECURRENT MAJOR DEPRESSIVE DISORDER (H): ICD-10-CM

## 2022-03-23 DIAGNOSIS — F84.0 AUTISM SPECTRUM DISORDER: Primary | ICD-10-CM

## 2022-03-23 DIAGNOSIS — F90.8 ATTENTION DEFICIT HYPERACTIVITY DISORDER (ADHD), OTHER TYPE: ICD-10-CM

## 2022-03-23 DIAGNOSIS — F41.1 GAD (GENERALIZED ANXIETY DISORDER): ICD-10-CM

## 2022-03-23 PROCEDURE — 90834 PSYTX W PT 45 MINUTES: CPT | Mod: 95 | Performed by: SOCIAL WORKER

## 2022-04-06 ENCOUNTER — VIRTUAL VISIT (OUTPATIENT)
Dept: BEHAVIORAL HEALTH | Facility: CLINIC | Age: 27
End: 2022-04-06
Payer: COMMERCIAL

## 2022-04-06 ENCOUNTER — E-CONSULT (OUTPATIENT)
Dept: RHEUMATOLOGY | Facility: CLINIC | Age: 27
End: 2022-04-06
Payer: COMMERCIAL

## 2022-04-06 ENCOUNTER — MYC MEDICAL ADVICE (OUTPATIENT)
Dept: FAMILY MEDICINE | Facility: CLINIC | Age: 27
End: 2022-04-06

## 2022-04-06 DIAGNOSIS — R52 GENERALIZED BODY ACHES: Primary | ICD-10-CM

## 2022-04-06 DIAGNOSIS — F90.8 ATTENTION DEFICIT HYPERACTIVITY DISORDER (ADHD), OTHER TYPE: ICD-10-CM

## 2022-04-06 DIAGNOSIS — F41.1 GAD (GENERALIZED ANXIETY DISORDER): ICD-10-CM

## 2022-04-06 DIAGNOSIS — F84.0 AUTISM SPECTRUM DISORDER: Primary | ICD-10-CM

## 2022-04-06 DIAGNOSIS — F33.0 MILD EPISODE OF RECURRENT MAJOR DEPRESSIVE DISORDER (H): ICD-10-CM

## 2022-04-06 PROCEDURE — 90834 PSYTX W PT 45 MINUTES: CPT | Mod: 95 | Performed by: SOCIAL WORKER

## 2022-04-06 PROCEDURE — 99451 NTRPROF PH1/NTRNET/EHR 5/>: CPT | Performed by: INTERNAL MEDICINE

## 2022-04-06 NOTE — PROGRESS NOTES
[facility]                                     Progress Note    Patient Name: Angela Jones  Date: 4/6/2022         Service Type: Individual      Session Start Time: 11:05am  Session End Time: 11:50am     Session Length: 45 minutes    Session #: 3    Attendees: Client attended alone    Service Modality:  Video Visit:      Provider verified identity through the following two step process.  Patient provided:  Patient is known previously to provider    Telemedicine Visit: The patient's condition can be safely assessed and treated via synchronous audio and visual telemedicine encounter.      Reason for Telemedicine Visit: Patient has requested telehealth visit    Originating Site (Patient Location): Patient's home    Distant Site (Provider Location): Provider Remote Setting- Home Office    Consent:  The patient/guardian has verbally consented to: the potential risks and benefits of telemedicine (video visit) versus in person care; bill my insurance or make self-payment for services provided; and responsibility for payment of non-covered services.     Patient would like the video invitation sent by:  My Chart    Mode of Communication:  Video Conference via Amwell    As the provider I attest to compliance with applicable laws and regulations related to telemedicine.    DATA  Interactive Complexity: No  Crisis: No        Progress Since Last Session (Related to Symptoms / Goals / Homework):   Symptoms: No change :    Homework: Partially completed      Episode of Care Goals: Minimal progress - PREPARATION (Decided to change - considering how); Intervened by negotiating a change plan and determining options / strategies for behavior change, identifying triggers, exploring social supports, and working towards setting a date to begin behavior change     Current / Ongoing Stressors and Concerns:   Stomach has been kind of upset, overall feeling physically okay, work has been okay, brother-in-law had an  accident and has been in the ICU, kind of confused around how to support,       Treatment Objective(s) Addressed in This Session:   identify the fears / thoughts that contribute to feeling anxious  state understanding of stressors and relationship to physical symptoms  Increase interest, engagement, and pleasure in doing things  Decrease frequency and intensity of feeling down, depressed, hopeless  Identify negative self-talk and behaviors: challenge core beliefs, myths, and actions  Gain insight     Intervention:   Discussed mindfulness as being aware of what we are experiencing while we are experiencing it.  Contrasted this with avoidance and rumination.  Explored the role of mindfulness as an overall coping strategy for managing depression, anxiety, and strong emotions.  Explained concept of state of mind using SIFT (sensations, images, feelings, thoughts) pneumonic.  Led client in a guided mindfulness exercise focusing on sensations, images, feelings, and thoughts.  Discussed mindfulness as a tool to intentionally shift our awareness and focus as needed.    Assessments completed prior to visit:   The following assessments were completed by patient for this visit:  PHQ9:   PHQ-9 SCORE 1/25/2021 4/16/2021 9/16/2021 12/23/2021 1/19/2022 2/15/2022 3/9/2022   PHQ-9 Total Score - - - - - - -   PHQ-9 Total Score MyChart - - - 12 (Moderate depression) 8 (Mild depression) 10 (Moderate depression) 8 (Mild depression)   PHQ-9 Total Score 15 10 6 12 8 10 8     GAD7:   JOSHUA-7 SCORE 9/16/2021 1/19/2022 2/3/2022 2/15/2022 2/15/2022 3/9/2022 3/9/2022   Total Score - - - - - - -   Total Score - 5 (mild anxiety) 7 (mild anxiety) 7 (mild anxiety) 10 (moderate anxiety) 7 (mild anxiety) 6 (mild anxiety)   Total Score 5 5 7 - 7 6 7   Total Score - - - - - - -     PROMIS 10-Global Health (only subscores and total score):   PROMIS-10 Scores Only 12/30/2021 1/19/2022 2/15/2022 3/9/2022   Global Mental Health Score 11 11 9 12   Global  Physical Health Score 11 11 11 11   PROMIS TOTAL - SUBSCORES 22 22 20 23         ASSESSMENT: Current Emotional / Mental Status (status of significant symptoms):   Risk status (Self / Other harm or suicidal ideation)   Patient denies current fears or concerns for personal safety.   Patient denies current or recent suicidal ideation or behaviors.   Patient denies current or recent homicidal ideation or behaviors.   Patient denies current or recent self injurious behavior or ideation.   Patient denies other safety concerns.   Patient reports there has been no change in risk factors since their last session.     Patient reports there has been no change in protective factors since their last session.     Recommended that patient call 911 or go to the local ED should there be a change in any of these risk factors.     Appearance:   Appropriate    Eye Contact:   Good    Psychomotor Behavior: Hyperactive  Restless    Attitude:   Cooperative    Orientation:   All   Speech    Rate / Production: Pressured  Stutters    Volume:  Normal    Mood:    Anxious    Affect:    Constricted    Thought Content:  Clear    Thought Form:  Coherent    Insight:    Good      Medication Review:   No changes to current psychiatric medication(s)     Medication Compliance:   Yes     Changes in Health Issues:   None reported     Chemical Use Review:   Substance Use: Chemical use reviewed, no active concerns identified      Tobacco Use: No current tobacco use.      Diagnosis:  1. Autism spectrum disorder    2. JOSHUA (generalized anxiety disorder)    3. Mild episode of recurrent major depressive disorder (H)    4. Attention deficit hyperactivity disorder (ADHD), other type        Collateral Reports Completed:   Not Applicable    PLAN: (Patient Tasks / Therapist Tasks / Other)  Patient will return for a virtual visit in 4 weeks (patient had to cancel in 2 weeks due to schedule conflict)  Patient will consider opportunities to reach out to demonstrate  support for her sister     There has been demonstrated improvement in functioning while patient has been engaged in psychotherapy/psychological service- if withdrawn the patient would deteriorate and/or relapse.       Fanny Cobb, [facility]                                                         ______________________________________________________________________    Individual Treatment Plan    Patient's Name: Angela Jones  YOB: 1995    Date of Creation: 3/9/2022  Date Treatment Plan Last Reviewed/Revised: 1/19/2022    DSM5 Diagnoses: Autism Spectrum Disorder 299.00(F84.0)  Associated with another neurodevelopmental, mental or behavioral disorder and Attention-Deficit/Hyperactivity Disorder  314.01 (F90.9) Unspecified Attention -Deficit / Hyperactivity Disorder, 296.31 (F33.0) Major Depressive Disorder, Recurrent Episode, Mild _ or 300.02 (F41.1) Generalized Anxiety Disorder  Psychosocial / Contextual Factors: 27 year old  female, , no children   PROMIS (reviewed every 90 days):   PROMIS 10-Global Health (only subscores and total score):   PROMIS-10 Scores Only 12/30/2021 1/19/2022 2/15/2022 3/9/2022   Global Mental Health Score 11 11 9 12   Global Physical Health Score 11 11 11 11   PROMIS TOTAL - SUBSCORES 22 22 20 23       Referral / Collaboration:  Was/were discussed and patient will pursue.    Anticipated number of session for this episode of care: 9  Anticipation frequency of session: every 2-3 weeks  Anticipated Duration of each session: 38-52 minutes  Treatment plan will be reviewed in 90 days or when goals have been changed.       MeasurableTreatment Goal(s) related to diagnosis / functional impairment(s)  Goal 1: Patient will manage symptoms of anxiety     I will know I've met my goal when I feel more confident in my ability to cope with stress with fewer meltdowns.      Objective #A (Patient Action)    Patient will identify the initial signs or symptoms of  anxiety.  Status: New - Date: 3/9/2022     Intervention(s)  Therapist will teach help patient gain insight into signs of stress (physically and emotionally).    Objective #B  Patient will use relaxation strategies multiple times per day to reduce the physical symptoms of anxiety.  Status: New - Date: 3/9/2022     Intervention(s)  Therapist will teach diaphragmatic breathing and other grounding skills.    Objective #C  Patient will use thought-stopping strategy daily to reduce intrusive thoughts.  Status: New - Date: 3/9/2022     Intervention(s)  Therapist will teach thought stopping techniques.      Goal 2: Patient will manage symptoms of depression    I will know I've met my goal when I feel more confident in my ability to express my emotions in a healthy way.      Objective #A (Patient Action)    Status: New - Date: 3/9/2022     Patient will Increase interest, engagement, and pleasure in doing things.    Intervention(s)  Therapist will teach emotional recognition/identification. *.    Objective #B  Patient will Identify negative self-talk and behaviors: challenge core beliefs, myths, and actions.    Status: New - Date: 3/9/2022     Intervention(s)  Therapist will help patient practice positive self-talk and thought re-framing.      Goal 3: Patient will improve communication patterns in the relationship    I will know I've met my goal when we attend couples counseling and prioritize our relationship.      Objective #A (Patient Action)    Status: New - Date: 3/9/2022     Patient will get established with a couples counselor through [facility] (referral made on 3/9/22).    Intervention(s)  Therapist will monitor referral process and follow-up on their relationship problems and improvements      Patient has reviewed and agreed to the above plan.      Fanny Cobb, [facility]  March 9, 2022

## 2022-04-06 NOTE — PROGRESS NOTES
ALL SMARTFIELDS MUST BE COMPLETED FOR PATIENT CARE AND BILLING    4/6/2022     E-Consult has been accepted.    Interprofessional consultation requested by:  Marissa Walton MD      Clinical Question/Purpose: For E-Consult questions regarding Fibromyalgia diagnosis and management please    Patient assessment and information reviewed: 1) reviewed notes from primary care provider dating from February 2022, as well as laboratory data in 2022.  Impression: Chronic, diffuse myalgia/arthralgia, partially related to joint and muscle use, fatigue, chronic dizziness associated with POTS, and isolated low positive antinuclear antibody with a speckled pattern (lowest risk pattern for associated systemic autoimmunity).  Inflammatory markers, rheumatoid factor, cyclic citrullinated peptide antibodies are negative, and report of physical exam description does not raise particular concern for active inflammatory joint disease.  No symptoms suggesting onset of RISHABH associated autoimmunity (systemic lupus, scleroderma, myositis) are described.  A priori concern for a systemic rheumatic syndrome strongly contributing to current symptom complex is low.  Nevertheless additional screening is warranted.  2) referring provider dissatisfaction with handling of earlier request for rheumatology consultation.  Provider reports that rheumatology denied request for full rheumatology consultation, but provider was not contacted or given feedback about the fate of the consultation request.    Recommendations:  1..  Obtain antidouble-stranded DNA, PATRICIA panel, and urinalysis.  If these tests are normal or negative, I recommend watchful waiting for emergence of autoimmune disease, and proceeding with symptomatic treatment of probable noninflammatory muscle and joint pain.  With the positive RISHABH, there is mildly elevated risk of emerging autoimmunity over time.  I recommend checking CBC with platelets, creatinine, and urinalysis annually, along with  annual history and physical with full review of systems to detect oncoming clinically relevant autoimmune disease.  2.  I apologize for the breakdown in communication and lack of responsiveness to providers initial request for rheumatology consultation.  I will forward to Domenica Lux for advice about how to investigate the cause of the communication breakdown and to ensure that the experience is not repeated for the referring provider.      The recommendations provided in this E-Consult are based on a review of clinical data pertinent to the clinical question presented, without a review of the patient's complete medical record or, the benefit of a comprehensive in-person or virtual patient evaluation. This consultation should not replace the clinical judgement and evaluation of the provider ordering this E-Consult. Any new clinical issues, or changes in patient status since the filing of this E-Consult will need to be taken into account when assessing these recommendations. Please contact me if you have further questions.    My total time spent reviewing clinical information and formulating assessment was 15 minutes.    Report sent automatically to requesting provider once signed.     Glen Godoy MD

## 2022-04-07 ENCOUNTER — LAB (OUTPATIENT)
Dept: LAB | Facility: CLINIC | Age: 27
End: 2022-04-07
Payer: COMMERCIAL

## 2022-04-07 DIAGNOSIS — R52 GENERALIZED BODY ACHES: ICD-10-CM

## 2022-04-07 LAB
ALBUMIN UR-MCNC: NEGATIVE MG/DL
APPEARANCE UR: ABNORMAL
BACTERIA #/AREA URNS HPF: ABNORMAL /HPF
BILIRUB UR QL STRIP: NEGATIVE
COLOR UR AUTO: YELLOW
GLUCOSE UR STRIP-MCNC: NEGATIVE MG/DL
HGB UR QL STRIP: NEGATIVE
KETONES UR STRIP-MCNC: NEGATIVE MG/DL
LEUKOCYTE ESTERASE UR QL STRIP: NEGATIVE
MUCOUS THREADS #/AREA URNS LPF: PRESENT /LPF
NITRATE UR QL: NEGATIVE
PH UR STRIP: 5 [PH] (ref 5–7)
RBC #/AREA URNS AUTO: ABNORMAL /HPF
SP GR UR STRIP: >=1.03 (ref 1–1.03)
SQUAMOUS #/AREA URNS AUTO: ABNORMAL /LPF
UROBILINOGEN UR STRIP-ACNC: 0.2 E.U./DL
WBC #/AREA URNS AUTO: ABNORMAL /HPF

## 2022-04-07 PROCEDURE — 81001 URINALYSIS AUTO W/SCOPE: CPT

## 2022-04-07 PROCEDURE — 36415 COLL VENOUS BLD VENIPUNCTURE: CPT

## 2022-04-07 PROCEDURE — 86235 NUCLEAR ANTIGEN ANTIBODY: CPT | Mod: 59

## 2022-04-07 PROCEDURE — 86225 DNA ANTIBODY NATIVE: CPT

## 2022-04-09 LAB
DSDNA AB SER-ACNC: 0.7 IU/ML
ENA SM IGG SER IA-ACNC: 2.2 U/ML
ENA SM IGG SER IA-ACNC: NEGATIVE
ENA SS-A AB SER IA-ACNC: <0.5 U/ML
ENA SS-A AB SER IA-ACNC: NEGATIVE
ENA SS-B IGG SER IA-ACNC: <0.6 U/ML
ENA SS-B IGG SER IA-ACNC: NEGATIVE
U1 SNRNP IGG SER IA-ACNC: <1.1 U/ML
U1 SNRNP IGG SER IA-ACNC: NEGATIVE

## 2022-05-04 ENCOUNTER — VIRTUAL VISIT (OUTPATIENT)
Dept: BEHAVIORAL HEALTH | Facility: CLINIC | Age: 27
End: 2022-05-04
Payer: COMMERCIAL

## 2022-05-04 DIAGNOSIS — F90.8 ATTENTION DEFICIT HYPERACTIVITY DISORDER (ADHD), OTHER TYPE: ICD-10-CM

## 2022-05-04 DIAGNOSIS — F84.0 AUTISM SPECTRUM DISORDER: Primary | ICD-10-CM

## 2022-05-04 DIAGNOSIS — F41.1 GAD (GENERALIZED ANXIETY DISORDER): ICD-10-CM

## 2022-05-04 DIAGNOSIS — F33.0 MILD EPISODE OF RECURRENT MAJOR DEPRESSIVE DISORDER (H): ICD-10-CM

## 2022-05-04 PROCEDURE — 90834 PSYTX W PT 45 MINUTES: CPT | Mod: 95 | Performed by: SOCIAL WORKER

## 2022-05-04 ASSESSMENT — ANXIETY QUESTIONNAIRES
1. FEELING NERVOUS, ANXIOUS, OR ON EDGE: MORE THAN HALF THE DAYS
7. FEELING AFRAID AS IF SOMETHING AWFUL MIGHT HAPPEN: NOT AT ALL
8. IF YOU CHECKED OFF ANY PROBLEMS, HOW DIFFICULT HAVE THESE MADE IT FOR YOU TO DO YOUR WORK, TAKE CARE OF THINGS AT HOME, OR GET ALONG WITH OTHER PEOPLE?: VERY DIFFICULT
GAD7 TOTAL SCORE: 5
GAD7 TOTAL SCORE: 5
2. NOT BEING ABLE TO STOP OR CONTROL WORRYING: SEVERAL DAYS
5. BEING SO RESTLESS THAT IT IS HARD TO SIT STILL: NOT AT ALL
4. TROUBLE RELAXING: NOT AT ALL
3. WORRYING TOO MUCH ABOUT DIFFERENT THINGS: SEVERAL DAYS
7. FEELING AFRAID AS IF SOMETHING AWFUL MIGHT HAPPEN: NOT AT ALL
GAD7 TOTAL SCORE: 5
6. BECOMING EASILY ANNOYED OR IRRITABLE: SEVERAL DAYS

## 2022-05-04 ASSESSMENT — PATIENT HEALTH QUESTIONNAIRE - PHQ9
10. IF YOU CHECKED OFF ANY PROBLEMS, HOW DIFFICULT HAVE THESE PROBLEMS MADE IT FOR YOU TO DO YOUR WORK, TAKE CARE OF THINGS AT HOME, OR GET ALONG WITH OTHER PEOPLE: SOMEWHAT DIFFICULT
SUM OF ALL RESPONSES TO PHQ QUESTIONS 1-9: 11
SUM OF ALL RESPONSES TO PHQ QUESTIONS 1-9: 11

## 2022-05-04 NOTE — PROGRESS NOTES
[facility]                                     Progress Note    Patient Name: Angela Jones  Date: 5/4/2022         Service Type: Individual      Session Start Time: 1:00pm  Session End Time: 1:50pm     Session Length: 50 minutes    Session #: 4    Attendees: Client attended alone    Service Modality:  Video Visit:      Provider verified identity through the following two step process.  Patient provided:  Patient is known previously to provider    Telemedicine Visit: The patient's condition can be safely assessed and treated via synchronous audio and visual telemedicine encounter.      Reason for Telemedicine Visit: Patient has requested telehealth visit    Originating Site (Patient Location): Patient's home    Distant Site (Provider Location): Provider Remote Setting- Home Office    Consent:  The patient/guardian has verbally consented to: the potential risks and benefits of telemedicine (video visit) versus in person care; bill my insurance or make self-payment for services provided; and responsibility for payment of non-covered services.     Patient would like the video invitation sent by:  My Chart    Mode of Communication:  Video Conference via Amwell    As the provider I attest to compliance with applicable laws and regulations related to telemedicine.    DATA  Interactive Complexity: No  Crisis: No        Progress Since Last Session (Related to Symptoms / Goals / Homework):   Symptoms: No change :    Homework: Partially completed      Episode of Care Goals: Minimal progress - PREPARATION (Decided to change - considering how); Intervened by negotiating a change plan and determining options / strategies for behavior change, identifying triggers, exploring social supports, and working towards setting a date to begin behavior change     Current / Ongoing Stressors and Concerns:   Doing okay, taking breaks in order to get tasks complete, weather does impact her physical health, brother in   passed away almost one month ago, attended the , work has been a little bit worse, mood has been a bit up and down, apathetic, trouble experiencing michelle out of activities, going well having a roommate - getting along better      Treatment Objective(s) Addressed in This Session:   identify the fears / thoughts that contribute to feeling anxious  state understanding of stressors and relationship to physical symptoms  Increase interest, engagement, and pleasure in doing things  Decrease frequency and intensity of feeling down, depressed, hopeless  Identify negative self-talk and behaviors: challenge core beliefs, myths, and actions  Gain insight     Intervention:   Discussed mindfulness as being aware of what we are experiencing while we are experiencing it.  Contrasted this with avoidance and rumination.  Explored the role of mindfulness as an overall coping strategy for managing depression, anxiety, and strong emotions.  Explained concept of state of mind using SIFT (sensations, images, feelings, thoughts) pneumonic.  Led client in a guided mindfulness exercise focusing on sensations, images, feelings, and thoughts.  Discussed mindfulness as a tool to intentionally shift our awareness and focus as needed.    Assessments completed prior to visit:   The following assessments were completed by patient for this visit:  PHQ9:   PHQ-9 SCORE 2021 2021 2021 2022 2/15/2022 3/9/2022 2022   PHQ-9 Total Score - - - - - - -   PHQ-9 Total Score MyChart - - 12 (Moderate depression) 8 (Mild depression) 10 (Moderate depression) 8 (Mild depression) 11 (Moderate depression)   PHQ-9 Total Score 10 6 12 8 10 8 11     GAD7:   JOSHUA-7 SCORE 2022 2/3/2022 2/15/2022 2/15/2022 3/9/2022 3/9/2022 2022   Total Score - - - - - - -   Total Score 5 (mild anxiety) 7 (mild anxiety) 7 (mild anxiety) 10 (moderate anxiety) 7 (mild anxiety) 6 (mild anxiety) 5 (mild anxiety)   Total Score 5 7 - 7 6 7 5   Total  Score - - - - - - -     PROMIS 10-Global Health (only subscores and total score):   PROMIS-10 Scores Only 12/30/2021 1/19/2022 2/15/2022 3/9/2022   Global Mental Health Score 11 11 9 12   Global Physical Health Score 11 11 11 11   PROMIS TOTAL - SUBSCORES 22 22 20 23         ASSESSMENT: Current Emotional / Mental Status (status of significant symptoms):   Risk status (Self / Other harm or suicidal ideation)   Patient denies current fears or concerns for personal safety.   Patient denies current or recent suicidal ideation or behaviors.   Patient denies current or recent homicidal ideation or behaviors.   Patient denies current or recent self injurious behavior or ideation.   Patient denies other safety concerns.   Patient reports there has been no change in risk factors since their last session.     Patient reports there has been no change in protective factors since their last session.     Recommended that patient call 911 or go to the local ED should there be a change in any of these risk factors.     Appearance:   Appropriate    Eye Contact:   Good    Psychomotor Behavior: Hyperactive  Restless    Attitude:   Cooperative    Orientation:   All   Speech    Rate / Production: Pressured  Stutters    Volume:  Normal    Mood:    Anxious  Anhedonia   Affect:    Constricted  Flat    Thought Content:  Clear    Thought Form:  Coherent    Insight:    Good      Medication Review:   No changes to current psychiatric medication(s)     Medication Compliance:   Yes     Changes in Health Issues:   None reported     Chemical Use Review:   Substance Use: Chemical use reviewed, no active concerns identified      Tobacco Use: No current tobacco use.      Diagnosis:  1. Autism spectrum disorder    2. JOSHUA (generalized anxiety disorder)    3. Mild episode of recurrent major depressive disorder (H)    4. Attention deficit hyperactivity disorder (ADHD), other type        Collateral Reports Completed:   Not Applicable    PLAN: (Patient  Tasks / Therapist Tasks / Other)  Patient will return for a virtual visit in 2 weeks  Patient will continue stress management techniques      There has been demonstrated improvement in functioning while patient has been engaged in psychotherapy/psychological service- if withdrawn the patient would deteriorate and/or relapse.       Fanny Cobb, LICSW                                                         ______________________________________________________________________    Individual Treatment Plan    Patient's Name: Angela Jones  YOB: 1995    Date of Creation: 3/9/2022  Date Treatment Plan Last Reviewed/Revised: 1/19/2022    DSM5 Diagnoses: Autism Spectrum Disorder 299.00(F84.0)  Associated with another neurodevelopmental, mental or behavioral disorder and Attention-Deficit/Hyperactivity Disorder  314.01 (F90.9) Unspecified Attention -Deficit / Hyperactivity Disorder, 296.31 (F33.0) Major Depressive Disorder, Recurrent Episode, Mild _ or 300.02 (F41.1) Generalized Anxiety Disorder  Psychosocial / Contextual Factors: 27 year old  female, , no children   PROMIS (reviewed every 90 days):   PROMIS 10-Global Health (only subscores and total score):   PROMIS-10 Scores Only 12/30/2021 1/19/2022 2/15/2022 3/9/2022   Global Mental Health Score 11 11 9 12   Global Physical Health Score 11 11 11 11   PROMIS TOTAL - SUBSCORES 22 22 20 23       Referral / Collaboration:  Was/were discussed and patient will pursue.    Anticipated number of session for this episode of care: 9  Anticipation frequency of session: every 2-3 weeks  Anticipated Duration of each session: 38-52 minutes  Treatment plan will be reviewed in 90 days or when goals have been changed.       MeasurableTreatment Goal(s) related to diagnosis / functional impairment(s)  Goal 1: Patient will manage symptoms of anxiety     I will know I've met my goal when I feel more confident in my ability to cope with stress with fewer  meltdowns.      Objective #A (Patient Action)    Patient will identify the initial signs or symptoms of anxiety.  Status: New - Date: 3/9/2022     Intervention(s)  Therapist will teach help patient gain insight into signs of stress (physically and emotionally).    Objective #B  Patient will use relaxation strategies multiple times per day to reduce the physical symptoms of anxiety.  Status: New - Date: 3/9/2022     Intervention(s)  Therapist will teach diaphragmatic breathing and other grounding skills.    Objective #C  Patient will use thought-stopping strategy daily to reduce intrusive thoughts.  Status: New - Date: 3/9/2022     Intervention(s)  Therapist will teach thought stopping techniques.      Goal 2: Patient will manage symptoms of depression    I will know I've met my goal when I feel more confident in my ability to express my emotions in a healthy way.      Objective #A (Patient Action)    Status: New - Date: 3/9/2022     Patient will Increase interest, engagement, and pleasure in doing things.    Intervention(s)  Therapist will teach emotional recognition/identification. *.    Objective #B  Patient will Identify negative self-talk and behaviors: challenge core beliefs, myths, and actions.    Status: New - Date: 3/9/2022     Intervention(s)  Therapist will help patient practice positive self-talk and thought re-framing.      Goal 3: Patient will improve communication patterns in the relationship    I will know I've met my goal when we attend couples counseling and prioritize our relationship.      Objective #A (Patient Action)    Status: New - Date: 3/9/2022     Patient will get established with a couples counselor through [facility] (referral made on 3/9/22).    Intervention(s)  Therapist will monitor referral process and follow-up on their relationship problems and improvements      Patient has reviewed and agreed to the above plan.      Fanny Cobb, [facility]  March 9, 2022

## 2022-05-05 ASSESSMENT — ANXIETY QUESTIONNAIRES: GAD7 TOTAL SCORE: 5

## 2022-05-05 ASSESSMENT — PATIENT HEALTH QUESTIONNAIRE - PHQ9: SUM OF ALL RESPONSES TO PHQ QUESTIONS 1-9: 11

## 2022-05-14 ENCOUNTER — HEALTH MAINTENANCE LETTER (OUTPATIENT)
Age: 27
End: 2022-05-14

## 2022-05-17 ENCOUNTER — OFFICE VISIT (OUTPATIENT)
Dept: CARDIOLOGY | Facility: CLINIC | Age: 27
End: 2022-05-17
Attending: INTERNAL MEDICINE
Payer: COMMERCIAL

## 2022-05-17 VITALS — WEIGHT: 119 LBS | BODY MASS INDEX: 21.9 KG/M2 | HEIGHT: 62 IN | OXYGEN SATURATION: 100 %

## 2022-05-17 DIAGNOSIS — G90.A POTS (POSTURAL ORTHOSTATIC TACHYCARDIA SYNDROME): Primary | ICD-10-CM

## 2022-05-17 PROCEDURE — 99215 OFFICE O/P EST HI 40 MIN: CPT | Performed by: INTERNAL MEDICINE

## 2022-05-17 PROCEDURE — 93005 ELECTROCARDIOGRAM TRACING: CPT

## 2022-05-17 PROCEDURE — G0463 HOSPITAL OUTPT CLINIC VISIT: HCPCS | Mod: 25

## 2022-05-17 RX ORDER — FLUDROCORTISONE ACETATE 0.1 MG/1
0.2 TABLET ORAL DAILY
Qty: 180 TABLET | Refills: 3 | Status: SHIPPED | OUTPATIENT
Start: 2022-05-17 | End: 2023-06-03

## 2022-05-17 ASSESSMENT — PAIN SCALES - GENERAL: PAINLEVEL: NO PAIN (0)

## 2022-05-17 NOTE — NURSING NOTE
Chief Complaint   Patient presents with     Follow Up     9 mo f/u for POTS     Vitals were taken and medications were reconciled. EKG was performed   LINNEA Worthington  11:05 AM

## 2022-05-17 NOTE — PROGRESS NOTES
HPI:     is a 27-year-old woman who was last seen in this clinic in August 2021.  At that time she had already undergone evaluation at [facility] and had been given advice regarding management of postural orthostatic tachycardia syndrome.  She is here today with her spouse.      At our initial visit  indicated that she did not fully followed the mail recommendations.  At that visit I did discuss the physiology of lower body isometric training and resistance training as well as emphasizing the importance of increased salt and electrolyte intake.  She returns today for follow-up.     works as a  and consequently is on her feet substantial amount of the time during the day.  I told her we could take advantage of that in terms of improving her lower body isometric tolerance.  Additionally she notes that she has used constrictive undergarments but mostly uses abdominal binder.  I indicated that SPANX undergarments may be helpful for her and that she can give that a try as it covers a larger area of the lower part of the body.    Apparently  has periods in which she gets very weak and will slumped to the ground.  Her  notes these.  Apparently she will be very fatigued afterwards suggesting that there may be a vasovagal component.  I note that her resting blood pressures tend to be on the low side as documented during orthostatics today:   Supine blood pressure 103/66 heart rate 84  Sitting blood pressure 103/68 heart rate 88  Standing immediately blood pressure 97/67 heart rate 92  Standing 1 minute blood pressure 100/67 heart rate 104 complains of feeling dizzy  Standing 3 minutes blood pressure 104/69 heart rate 104 still dizzy    At today's visit I reviewed the basic treatment options as described in our initial visit and by the [facility] group prior to that.  We will add fludrocortisone 0.2 mg daily and I pointed out that it may aggravate her migraines and that this drug is  not known to be safe during pregnancy.  Both  and her  voiced understanding.    We will follow-up in about 6 months time.    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    cut self with pencil sharpener blade, left arm       CURRENT MEDICATIONS:  Current Outpatient Medications   Medication Sig Dispense Refill     amitriptyline (ELAVIL) 25 MG tablet TAKE 2 TABLETS(50 MG) BY MOUTH AT BEDTIME 60 tablet 3     diphenhydrAMINE (BENADRYL) 50 MG capsule Take 50 mg by mouth as needed       esomeprazole (NEXIUM) 20 MG DR capsule Take 1 capsule (20 mg) by mouth every morning (before breakfast) 90 capsule 3     gabapentin (NEURONTIN) 600 MG tablet Take 1 tablet (600 mg) by mouth 2 times daily 180 tablet 3     guanFACINE (TENEX) 2 MG tablet Take 1 tablet by mouth every 24 hours       hyoscyamine (LEVSIN/SL) 0.125 MG sublingual tablet Take 1 tablet (0.125 mg) by mouth every 4 hours as needed 120 tablet 5     ipratropium (ATROVENT HFA) 17 MCG/ACT inhaler Inhale 2 puffs into the lungs every 6 hours       ipratropium (ATROVENT) 0.02 % nebulizer solution Take 0.5 mg by nebulization daily as needed for wheezing       Melatonin 10 MG TABS Take 10 mg by mouth nightly as needed       metoclopramide (REGLAN) 5 MG tablet Take 1 tablet (5 mg total) by mouth 3 (three) times a day as needed for nausea. 90 tablet 0     mirtazapine (REMERON) 15 MG tablet Take 1 tablet (15 mg) by mouth daily 90 tablet 3     ondansetron (ZOFRAN-ODT) 4 MG ODT tab Take 4 mg by mouth as needed       TRINTELLIX 20 MG tablet TAKE 1 TABLET BY MOUTH DAILY 90 tablet 3     VITAMIN D, CHOLECALCIFEROL, PO Take  by  mouth daily.       famotidine (PEPCID) 20 MG tablet TAKE 1 TABLET(20 MG) BY MOUTH TWICE DAILY (Patient not taking: Reported on 5/17/2022) 180 tablet 3     levonorgestrel (MIRENA, 52 MG,) 20 MCG/24HR IUD 1 each (20 mcg) by Intrauterine route once for 1 dose 1 each 0       PAST SURGICAL HISTORY:  Past Surgical History:   Procedure Laterality Date     NO PAST SURGERIES       UPPER GI ENDOSCOPY  6/7/12       ALLERGIES:     Allergies   Allergen Reactions     Adhesive Tape Hives     EKG Patches; any adhesives including band aides; burns      EKG Patches; any adhesives including band aides; burns  EKG Patches; any adhesives including band aides; burns  EKG Patches; any adhesives including band aides; burns       Glucose Other (See Comments)     Other reaction(s): GI intolerance  GI intolerance       Azithromycin Nausea and Vomiting     Fructose Cramps, Diarrhea, GI Disturbance and Nausea and Vomiting     Lactose Nausea       FAMILY HISTORY:  Family History   Problem Relation Age of Onset     Depression Mother         depression and anxiety      Anxiety Disorder Mother      Mental Illness Mother      Neurologic Disorder Father         neuropathy      Depression Father         depression/anxiety      Bipolar Disorder Father      Anxiety Disorder Father      Heart Disease Father      Cerebrovascular Disease Maternal Grandmother      Cancer Paternal Grandfather         Lung     Cerebrovascular Disease Paternal Grandfather      Substance Abuse Other      Mental Illness Other      Skin Cancer No family hx of      Neuropathy Father      Diabetes Type 2  Father      Migraines Father      Breast Cancer Maternal Grandmother      Alzheimer Disease Maternal Grandmother         SOCIAL HISTORY:  Social History     Tobacco Use     Smoking status: Never Smoker     Smokeless tobacco: Never Used   Substance Use Topics     Alcohol use: Yes     Alcohol/week: 1.0 standard drink     Comment: Alcoholic Drinks/day: socially      Drug use: No        ROS:   Constitutional: No fever, chills, or sweats. Weight stable.   ENT: No visual disturbance, ear ache, epistaxis, sore throat.   Cardiovascular: As per HPI.   Respiratory: No cough, hemoptysis.    GI: No nausea, vomiting, Integument: Negative.   Psychiatric: Negative.   Hematologic:  Easy bruising, no easy bleeding.  Neuro: Negative.   Endocrinology: No significant heat or cold intolerance   Musculoskeletal: No myalgia.    Exam:  Wt 54 kg (119 lb)   BMI 20.75 kg/m    GENERAL APPEARANCE: Thin, healthy, alert and no distress  HEENT: no icterus, t, no central cyanosis  NECK: no adenopathy, no asymmetry, masses, or scars, thyroid normal to palpation and no bruits, JVP not elevated  RESPIRATORY:  no rales, rhonchi or wheezes, no use of accessory muscles, no retractions, respirations are unlabored, normal respiratory rate  CARDIOVASCULAR: regular rhythm,ABDOMEN: soft, non tender, without hepatosplenomegaly, no masses palpable, bowel sounds normal, aorta not enlarged by palpation, no abdominal bruits  NEURO: alert and oriented to person/place/time, normal speech, gait and affect  SKIN: no ecchymoses, no rashes    Labs:  CBC RESULTS:   Lab Results   Component Value Date    WBC 4.3 02/18/2022    WBC 8.3 04/28/2016    RBC 3.96 02/18/2022    RBC 4.13 04/28/2016    HGB 12.4 02/18/2022    HGB 13.2 04/28/2016    HCT 38.2 02/18/2022    HCT 39.1 04/28/2016    MCV 97 02/18/2022    MCV 95 04/28/2016    MCH 31.3 02/18/2022    MCH 32.0 04/28/2016    MCHC 32.5 02/18/2022    MCHC 33.8 04/28/2016    RDW 12.3 02/18/2022    RDW 11.3 04/28/2016     02/18/2022     04/28/2016       BMP RESULTS:  Lab Results   Component Value Date     02/18/2022     04/28/2016    POTASSIUM 3.9 02/18/2022    POTASSIUM 4.2 04/28/2016    CHLORIDE 108 (H) 02/18/2022    CHLORIDE 108 04/28/2016    CO2 24 02/18/2022    CO2 23 04/28/2016    ANIONGAP 8 02/18/2022    ANIONGAP 11 04/28/2016    GLC 82 02/18/2022     (H)  04/28/2016    BUN 12 02/18/2022    BUN 9 04/28/2016    CR 0.75 02/18/2022    CR 0.68 04/28/2016    GFRESTIMATED >90 02/18/2022    GFRESTIMATED >60 01/25/2021    GFRESTIMATED >90  Non  GFR Calc   04/28/2016    GFRESTBLACK >60 01/25/2021    GFRESTBLACK >90   GFR Calc   04/28/2016    CHHAYA 9.3 02/18/2022    CHHAYA 9.0 04/28/2016        INR RESULTS:  No results found for: INR    Procedures:  PULMONARY FUNCTION TESTS:   No flowsheet data found.      ECHOCARDIOGRAM:   No results found for this or any previous visit (from the past 8760 hour(s)).      Assessment and Plan:   1.  Autonomic dysfunction-primarily manifest as orthostatic hypotension with vasovagal-like features  2.  Hypotension phenotype that may be benefited by the addition of fludrocortisone -I discussed the need for potassium supplementation through diet voiced understanding    Plan  1.  Prescription for fludrocortisone 0.2 mg daily to patient's pharmacy  2.  Follow-up in approximately 6 months or earlier if needed    Total elapsed time today for chart review, clinic visit and documentation 45 minutes    I very much appreciated the opportunity to see and assess Angela Jones in the clinic today. Please do not hesitate to contact my office if you have any questions or concerns.      Rashawn Lim MD  Cardiac Arrhythmia Service  [facility]  [phone]  CC  SELF, F=REFERRED

## 2022-05-17 NOTE — LETTER
5/17/2022      RE: Angela Jones  [address] [place] [address]       Dear Colleague,    Thank you for the opportunity to participate in the care of your patient, Angela Jones, at the [facility] HEART [facility] at [facility]. Please see a copy of my visit note below.    HPI:     is a 27-year-old woman who was last seen in this clinic in August 2021.  At that time she had already undergone evaluation at [facility] and had been given advice regarding management of postural orthostatic tachycardia syndrome.  She is here today with her spouse.      At our initial visit  indicated that she did not fully followed the mail recommendations.  At that visit I did discuss the physiology of lower body isometric training and resistance training as well as emphasizing the importance of increased salt and electrolyte intake.  She returns today for follow-up.     works as a  and consequently is on her feet substantial amount of the time during the day.  I told her we could take advantage of that in terms of improving her lower body isometric tolerance.  Additionally she notes that she has used constrictive undergarments but mostly uses abdominal binder.  I indicated that SPANX undergarments may be helpful for her and that she can give that a try as it covers a larger area of the lower part of the body.    Apparently  has periods in which she gets very weak and will slumped to the ground.  Her  notes these.  Apparently she will be very fatigued afterwards suggesting that there may be a vasovagal component.  I note that her resting blood pressures tend to be on the low side as documented during orthostatics today:   Supine blood pressure 103/66 heart rate 84  Sitting blood pressure 103/68 heart rate 88  Standing immediately blood pressure 97/67 heart rate 92  Standing 1 minute blood pressure 100/67 heart rate 104 complains  of feeling dizzy  Standing 3 minutes blood pressure 104/69 heart rate 104 still dizzy    At today's visit I reviewed the basic treatment options as described in our initial visit and by the [facility] group prior to that.  We will add fludrocortisone 0.2 mg daily and I pointed out that it may aggravate her migraines and that this drug is not known to be safe during pregnancy.  Both  and her  voiced understanding.    We will follow-up in about 6 months time.    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    cut self with pencil sharpener blade, left arm       CURRENT MEDICATIONS:  Current Outpatient Medications   Medication Sig Dispense Refill     amitriptyline (ELAVIL) 25 MG tablet TAKE 2 TABLETS(50 MG) BY MOUTH AT BEDTIME 60 tablet 3     diphenhydrAMINE (BENADRYL) 50 MG capsule Take 50 mg by mouth as needed       esomeprazole (NEXIUM) 20 MG DR capsule Take 1 capsule (20 mg) by mouth every morning (before breakfast) 90 capsule 3     gabapentin (NEURONTIN) 600 MG tablet Take 1 tablet (600 mg) by mouth 2 times daily 180 tablet 3     guanFACINE (TENEX) 2 MG tablet Take 1 tablet by mouth every 24 hours       hyoscyamine (LEVSIN/SL) 0.125 MG sublingual tablet Take 1 tablet (0.125 mg) by mouth every 4 hours as needed 120 tablet 5     ipratropium (ATROVENT HFA) 17 MCG/ACT inhaler Inhale 2 puffs into the lungs every 6 hours       ipratropium (ATROVENT) 0.02 % nebulizer solution Take 0.5 mg by nebulization daily as needed for wheezing       Melatonin 10 MG TABS Take 10 mg by mouth nightly as needed       metoclopramide (REGLAN) 5 MG tablet Take 1 tablet (5 mg total)  by mouth 3 (three) times a day as needed for nausea. 90 tablet 0     mirtazapine (REMERON) 15 MG tablet Take 1 tablet (15 mg) by mouth daily 90 tablet 3     ondansetron (ZOFRAN-ODT) 4 MG ODT tab Take 4 mg by mouth as needed       TRINTELLIX 20 MG tablet TAKE 1 TABLET BY MOUTH DAILY 90 tablet 3     VITAMIN D, CHOLECALCIFEROL, PO Take  by mouth daily.       famotidine (PEPCID) 20 MG tablet TAKE 1 TABLET(20 MG) BY MOUTH TWICE DAILY (Patient not taking: Reported on 5/17/2022) 180 tablet 3     levonorgestrel (MIRENA, 52 MG,) 20 MCG/24HR IUD 1 each (20 mcg) by Intrauterine route once for 1 dose 1 each 0       PAST SURGICAL HISTORY:  Past Surgical History:   Procedure Laterality Date     NO PAST SURGERIES       UPPER GI ENDOSCOPY  6/7/12       ALLERGIES:     Allergies   Allergen Reactions     Adhesive Tape Hives     EKG Patches; any adhesives including band aides; burns      EKG Patches; any adhesives including band aides; burns  EKG Patches; any adhesives including band aides; burns  EKG Patches; any adhesives including band aides; burns       Glucose Other (See Comments)     Other reaction(s): GI intolerance  GI intolerance       Azithromycin Nausea and Vomiting     Fructose Cramps, Diarrhea, GI Disturbance and Nausea and Vomiting     Lactose Nausea       FAMILY HISTORY:  Family History   Problem Relation Age of Onset     Depression Mother         depression and anxiety      Anxiety Disorder Mother      Mental Illness Mother      Neurologic Disorder Father         neuropathy      Depression Father         depression/anxiety      Bipolar Disorder Father      Anxiety Disorder Father      Heart Disease Father      Cerebrovascular Disease Maternal Grandmother      Cancer Paternal Grandfather         Lung     Cerebrovascular Disease Paternal Grandfather      Substance Abuse Other      Mental Illness Other      Skin Cancer No family hx of      Neuropathy Father      Diabetes Type 2  Father      Migraines Father      Breast  Cancer Maternal Grandmother      Alzheimer Disease Maternal Grandmother         SOCIAL HISTORY:  Social History     Tobacco Use     Smoking status: Never Smoker     Smokeless tobacco: Never Used   Substance Use Topics     Alcohol use: Yes     Alcohol/week: 1.0 standard drink     Comment: Alcoholic Drinks/day: socially      Drug use: No       ROS:   Constitutional: No fever, chills, or sweats. Weight stable.   ENT: No visual disturbance, ear ache, epistaxis, sore throat.   Cardiovascular: As per HPI.   Respiratory: No cough, hemoptysis.    GI: No nausea, vomiting, Integument: Negative.   Psychiatric: Negative.   Hematologic:  Easy bruising, no easy bleeding.  Neuro: Negative.   Endocrinology: No significant heat or cold intolerance   Musculoskeletal: No myalgia.    Exam:  Wt 54 kg (119 lb)   BMI 20.75 kg/m    GENERAL APPEARANCE: Thin, healthy, alert and no distress  HEENT: no icterus, t, no central cyanosis  NECK: no adenopathy, no asymmetry, masses, or scars, thyroid normal to palpation and no bruits, JVP not elevated  RESPIRATORY:  no rales, rhonchi or wheezes, no use of accessory muscles, no retractions, respirations are unlabored, normal respiratory rate  CARDIOVASCULAR: regular rhythm,ABDOMEN: soft, non tender, without hepatosplenomegaly, no masses palpable, bowel sounds normal, aorta not enlarged by palpation, no abdominal bruits  NEURO: alert and oriented to person/place/time, normal speech, gait and affect  SKIN: no ecchymoses, no rashes    Labs:  CBC RESULTS:   Lab Results   Component Value Date    WBC 4.3 02/18/2022    WBC 8.3 04/28/2016    RBC 3.96 02/18/2022    RBC 4.13 04/28/2016    HGB 12.4 02/18/2022    HGB 13.2 04/28/2016    HCT 38.2 02/18/2022    HCT 39.1 04/28/2016    MCV 97 02/18/2022    MCV 95 04/28/2016    MCH 31.3 02/18/2022    MCH 32.0 04/28/2016    MCHC 32.5 02/18/2022    MCHC 33.8 04/28/2016    RDW 12.3 02/18/2022    RDW 11.3 04/28/2016     02/18/2022     04/28/2016       BMP  RESULTS:  Lab Results   Component Value Date     02/18/2022     04/28/2016    POTASSIUM 3.9 02/18/2022    POTASSIUM 4.2 04/28/2016    CHLORIDE 108 (H) 02/18/2022    CHLORIDE 108 04/28/2016    CO2 24 02/18/2022    CO2 23 04/28/2016    ANIONGAP 8 02/18/2022    ANIONGAP 11 04/28/2016    GLC 82 02/18/2022     (H) 04/28/2016    BUN 12 02/18/2022    BUN 9 04/28/2016    CR 0.75 02/18/2022    CR 0.68 04/28/2016    GFRESTIMATED >90 02/18/2022    GFRESTIMATED >60 01/25/2021    GFRESTIMATED >90  Non  GFR Calc   04/28/2016    GFRESTBLACK >60 01/25/2021    GFRESTBLACK >90   GFR Calc   04/28/2016    CHHAYA 9.3 02/18/2022    CHHAYA 9.0 04/28/2016        INR RESULTS:  No results found for: INR    Procedures:  PULMONARY FUNCTION TESTS:   No flowsheet data found.      ECHOCARDIOGRAM:   No results found for this or any previous visit (from the past 8760 hour(s)).      Assessment and Plan:   1.  Autonomic dysfunction-primarily manifest as orthostatic hypotension with vasovagal-like features  2.  Hypotension phenotype that may be benefited by the addition of fludrocortisone -I discussed the need for potassium supplementation through diet voiced understanding    Plan  1.  Prescription for fludrocortisone 0.2 mg daily to patient's pharmacy  2.  Follow-up in approximately 6 months or earlier if needed    Total elapsed time today for chart review, clinic visit and documentation 45 minutes    I very much appreciated the opportunity to see and assess Angela Jones in the clinic today. Please do not hesitate to contact my office if you have any questions or concerns.      Rashawn Lim MD  Cardiac Arrhythmia Service  [facility]  [phone]

## 2022-05-17 NOTE — PATIENT INSTRUCTIONS
You were seen in the Electrophysiology Clinic today by: Dr Lim    Plan:   Medication Changes:   START- Fludrocortisone 0.2mg daily    Labs/Tests Needed:    Follow up visit:  6 months with Dr Lim    Further Instructions:      Your Care Team:  [facility] Cardiology   Telephone Number     Nurse Line  Amelia Lacy RN  [phone]     For scheduling appts or procedures:    Bri Lawler   [phone]   For the Device Clinic (Pacemakers, ICDs, Loop Recorders)    During business hours: [phone]  After business hours:   [phone]- select option 4 and ask for job code [id number].     On-call cardiologist for after hours or on weekends: [phone], option #4, and ask to speak to the on-call cardiologist.     Cardiovascular Clinic:   [address] [place] [address]. [place] [address]      As always, Thank you for trusting us with your health care needs!

## 2022-05-17 NOTE — NURSING NOTE
Chief Complaint   Patient presents with     Follow Up     9 mo f/u for POTS   Vitals were taken and medications reconciled.    Juan Bang, EMT  10:44 AM

## 2022-05-18 LAB
ATRIAL RATE - MUSE: 88 BPM
DIASTOLIC BLOOD PRESSURE - MUSE: NORMAL MMHG
INTERPRETATION ECG - MUSE: NORMAL
P AXIS - MUSE: 63 DEGREES
PR INTERVAL - MUSE: 148 MS
QRS DURATION - MUSE: 80 MS
QT - MUSE: 374 MS
QTC - MUSE: 452 MS
R AXIS - MUSE: 50 DEGREES
SYSTOLIC BLOOD PRESSURE - MUSE: NORMAL MMHG
T AXIS - MUSE: 23 DEGREES
VENTRICULAR RATE- MUSE: 88 BPM

## 2022-06-01 ENCOUNTER — VIRTUAL VISIT (OUTPATIENT)
Dept: BEHAVIORAL HEALTH | Facility: CLINIC | Age: 27
End: 2022-06-01
Payer: COMMERCIAL

## 2022-06-01 DIAGNOSIS — F84.0 AUTISM SPECTRUM DISORDER: Primary | ICD-10-CM

## 2022-06-01 DIAGNOSIS — F90.8 ATTENTION DEFICIT HYPERACTIVITY DISORDER (ADHD), OTHER TYPE: ICD-10-CM

## 2022-06-01 DIAGNOSIS — F41.1 GAD (GENERALIZED ANXIETY DISORDER): ICD-10-CM

## 2022-06-01 DIAGNOSIS — F33.0 MILD EPISODE OF RECURRENT MAJOR DEPRESSIVE DISORDER (H): ICD-10-CM

## 2022-06-01 PROCEDURE — 90834 PSYTX W PT 45 MINUTES: CPT | Mod: 95 | Performed by: SOCIAL WORKER

## 2022-06-01 NOTE — PROGRESS NOTES
[facility]                                     Progress Note    Patient Name: Angela Jones  Date: 6/1/2022         Service Type: Individual      Session Start Time: 11:00am  Session End Time: 11:50am     Session Length: 50 minutes    Session #: 5    Attendees: Client attended alone    Service Modality:  Video Visit:      Provider verified identity through the following two step process.  Patient provided:  Patient is known previously to provider    Telemedicine Visit: The patient's condition can be safely assessed and treated via synchronous audio and visual telemedicine encounter.      Reason for Telemedicine Visit: Patient has requested telehealth visit    Originating Site (Patient Location): Patient's home    Distant Site (Provider Location): Provider Remote Setting- Home Office    Consent:  The patient/guardian has verbally consented to: the potential risks and benefits of telemedicine (video visit) versus in person care; bill my insurance or make self-payment for services provided; and responsibility for payment of non-covered services.     Patient would like the video invitation sent by:  My Chart    Mode of Communication:  Video Conference via Amwell    As the provider I attest to compliance with applicable laws and regulations related to telemedicine.    DATA  Interactive Complexity: No  Crisis: No        Progress Since Last Session (Related to Symptoms / Goals / Homework):   Symptoms: No change :    Homework: Partially completed      Episode of Care Goals: Minimal progress - PREPARATION (Decided to change - considering how); Intervened by negotiating a change plan and determining options / strategies for behavior change, identifying triggers, exploring social supports, and working towards setting a date to begin behavior change     Current / Ongoing Stressors and Concerns:  Dog getting older and needs to wear a diaper, work has been hard, 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 and she  worries about what others might think, feels very frustrated about the work situation, feels left out and doesn't get invited to the social events with coworkers, having a lot of social anxiety      Treatment Objective(s) Addressed in This Session:   identify the fears / thoughts that contribute to feeling anxious  state understanding of stressors and relationship to physical symptoms  Increase interest, engagement, and pleasure in doing things  Decrease frequency and intensity of feeling down, depressed, hopeless  Identify negative self-talk and behaviors: challenge core beliefs, myths, and actions  Gain insight     Intervention:   Discussed mindfulness as being aware of what we are experiencing while we are experiencing it.  Contrasted this with avoidance and rumination.  Explored the role of mindfulness as an overall coping strategy for managing depression, anxiety, and strong emotions.  Explained concept of state of mind using SIFT (sensations, images, feelings, thoughts) pneumonic.  Led client in a guided mindfulness exercise focusing on sensations, images, feelings, and thoughts.  Discussed mindfulness as a tool to intentionally shift our awareness and focus as needed.    Assessments completed prior to visit:   The following assessments were completed by patient for this visit:  PHQ9:   PHQ-9 SCORE 4/16/2021 9/16/2021 12/23/2021 1/19/2022 2/15/2022 3/9/2022 5/4/2022   PHQ-9 Total Score - - - - - - -   PHQ-9 Total Score MyChart - - 12 (Moderate depression) 8 (Mild depression) 10 (Moderate depression) 8 (Mild depression) 11 (Moderate depression)   PHQ-9 Total Score 10 6 12 8 10 8 11     GAD7:   JOSHUA-7 SCORE 1/19/2022 2/3/2022 2/15/2022 2/15/2022 3/9/2022 3/9/2022 5/4/2022   Total Score - - - - - - -   Total Score 5 (mild anxiety) 7 (mild anxiety) 7 (mild anxiety) 10 (moderate anxiety) 7 (mild anxiety) 6 (mild anxiety) 5 (mild anxiety)   Total Score 5 7 - 7 6 7 5   Total Score - - - - - - -     PROMIS 10-Global  Health (only subscores and total score):   PROMIS-10 Scores Only 12/30/2021 1/19/2022 2/15/2022 3/9/2022   Global Mental Health Score 11 11 9 12   Global Physical Health Score 11 11 11 11   PROMIS TOTAL - SUBSCORES 22 22 20 23         ASSESSMENT: Current Emotional / Mental Status (status of significant symptoms):   Risk status (Self / Other harm or suicidal ideation)   Patient denies current fears or concerns for personal safety.   Patient denies current or recent suicidal ideation or behaviors.   Patient denies current or recent homicidal ideation or behaviors.   Patient denies current or recent self injurious behavior or ideation.   Patient denies other safety concerns.   Patient reports there has been no change in risk factors since their last session.     Patient reports there has been no change in protective factors since their last session.     Recommended that patient call 911 or go to the local ED should there be a change in any of these risk factors.     Appearance:   Appropriate    Eye Contact:   Good    Psychomotor Behavior: Hyperactive  Restless    Attitude:   Cooperative    Orientation:   All   Speech    Rate / Production: Pressured  Stutters    Volume:  Normal    Mood:    Anxious  Anhedonia   Affect:    Constricted  Flat    Thought Content:  Clear    Thought Form:  Coherent    Insight:    Good      Medication Review:   No changes to current psychiatric medication(s)     Medication Compliance:   Yes     Changes in Health Issues:   None reported     Chemical Use Review:   Substance Use: Chemical use reviewed, no active concerns identified      Tobacco Use: No current tobacco use.      Diagnosis:  1. Autism spectrum disorder    2. JOSHUA (generalized anxiety disorder)    3. Mild episode of recurrent major depressive disorder (H)    4. Attention deficit hyperactivity disorder (ADHD), other type        Collateral Reports Completed:   Not Applicable    PLAN: (Patient Tasks / Therapist Tasks / Other)  Patient  will return for a virtual visit in 2 weeks  Patient will continue stress management techniques    Therapist will help patient look into additional support for work-place accommodations     There has been demonstrated improvement in functioning while patient has been engaged in psychotherapy/psychological service- if withdrawn the patient would deteriorate and/or relapse.       Fanny Cobb, [facility]                                                         ______________________________________________________________________    Individual Treatment Plan    Patient's Name: Angela Jones  YOB: 1995    Date of Creation: 3/9/2022  Date Treatment Plan Last Reviewed/Revised: 1/19/2022    DSM5 Diagnoses: Autism Spectrum Disorder 299.00(F84.0)  Associated with another neurodevelopmental, mental or behavioral disorder and Attention-Deficit/Hyperactivity Disorder  314.01 (F90.9) Unspecified Attention -Deficit / Hyperactivity Disorder, 296.31 (F33.0) Major Depressive Disorder, Recurrent Episode, Mild _ or 300.02 (F41.1) Generalized Anxiety Disorder  Psychosocial / Contextual Factors: 27 year old  female, , no children   PROMIS (reviewed every 90 days):   PROMIS 10-Global Health (only subscores and total score):   PROMIS-10 Scores Only 12/30/2021 1/19/2022 2/15/2022 3/9/2022   Global Mental Health Score 11 11 9 12   Global Physical Health Score 11 11 11 11   PROMIS TOTAL - SUBSCORES 22 22 20 23       Referral / Collaboration:  Was/were discussed and patient will pursue.    Anticipated number of session for this episode of care: 9  Anticipation frequency of session: every 2-3 weeks  Anticipated Duration of each session: 38-52 minutes  Treatment plan will be reviewed in 90 days or when goals have been changed.       MeasurableTreatment Goal(s) related to diagnosis / functional impairment(s)  Goal 1: Patient will manage symptoms of anxiety     I will know I've met my goal when I feel more  confident in my ability to cope with stress with fewer meltdowns.      Objective #A (Patient Action)    Patient will identify the initial signs or symptoms of anxiety.  Status: New - Date: 3/9/2022     Intervention(s)  Therapist will teach help patient gain insight into signs of stress (physically and emotionally).    Objective #B  Patient will use relaxation strategies multiple times per day to reduce the physical symptoms of anxiety.  Status: New - Date: 3/9/2022     Intervention(s)  Therapist will teach diaphragmatic breathing and other grounding skills.    Objective #C  Patient will use thought-stopping strategy daily to reduce intrusive thoughts.  Status: New - Date: 3/9/2022     Intervention(s)  Therapist will teach thought stopping techniques.      Goal 2: Patient will manage symptoms of depression    I will know I've met my goal when I feel more confident in my ability to express my emotions in a healthy way.      Objective #A (Patient Action)    Status: New - Date: 3/9/2022     Patient will Increase interest, engagement, and pleasure in doing things.    Intervention(s)  Therapist will teach emotional recognition/identification. *.    Objective #B  Patient will Identify negative self-talk and behaviors: challenge core beliefs, myths, and actions.    Status: New - Date: 3/9/2022     Intervention(s)  Therapist will help patient practice positive self-talk and thought re-framing.      Goal 3: Patient will improve communication patterns in the relationship    I will know I've met my goal when we attend couples counseling and prioritize our relationship.      Objective #A (Patient Action)    Status: New - Date: 3/9/2022     Patient will get established with a couples counselor through [facility] (referral made on 3/9/22).    Intervention(s)  Therapist will monitor referral process and follow-up on their relationship problems and improvements      Patient has reviewed and agreed to the above plan.      Fanny CRYSTAL  Reza, [facility]  March 9, 2022

## 2022-06-16 ENCOUNTER — VIRTUAL VISIT (OUTPATIENT)
Dept: BEHAVIORAL HEALTH | Facility: CLINIC | Age: 27
End: 2022-06-16
Payer: COMMERCIAL

## 2022-06-16 DIAGNOSIS — F84.0 AUTISM SPECTRUM DISORDER: Primary | ICD-10-CM

## 2022-06-16 DIAGNOSIS — F33.0 MILD EPISODE OF RECURRENT MAJOR DEPRESSIVE DISORDER (H): ICD-10-CM

## 2022-06-16 DIAGNOSIS — F41.1 GAD (GENERALIZED ANXIETY DISORDER): ICD-10-CM

## 2022-06-16 DIAGNOSIS — F90.8 ATTENTION DEFICIT HYPERACTIVITY DISORDER (ADHD), OTHER TYPE: ICD-10-CM

## 2022-06-16 PROCEDURE — 90837 PSYTX W PT 60 MINUTES: CPT | Mod: 95 | Performed by: SOCIAL WORKER

## 2022-06-16 ASSESSMENT — PATIENT HEALTH QUESTIONNAIRE - PHQ9
10. IF YOU CHECKED OFF ANY PROBLEMS, HOW DIFFICULT HAVE THESE PROBLEMS MADE IT FOR YOU TO DO YOUR WORK, TAKE CARE OF THINGS AT HOME, OR GET ALONG WITH OTHER PEOPLE: VERY DIFFICULT
SUM OF ALL RESPONSES TO PHQ QUESTIONS 1-9: 10
SUM OF ALL RESPONSES TO PHQ QUESTIONS 1-9: 10

## 2022-06-16 NOTE — PROGRESS NOTES
[facility]                                     Progress Note    Patient Name: Angela Jones  Date: 6/16/2022         Service Type: Individual      Session Start Time: 10:00am  Session End Time: 10:54am     Session Length: 54 minutes    Session #: 6    Attendees: Client attended alone    Service Modality:  Video Visit:      Provider verified identity through the following two step process.  Patient provided:  Patient is known previously to provider    Telemedicine Visit: The patient's condition can be safely assessed and treated via synchronous audio and visual telemedicine encounter.      Reason for Telemedicine Visit: Patient has requested telehealth visit    Originating Site (Patient Location): Patient's home    Distant Site (Provider Location): Provider Remote Setting- Home Office    Consent:  The patient/guardian has verbally consented to: the potential risks and benefits of telemedicine (video visit) versus in person care; bill my insurance or make self-payment for services provided; and responsibility for payment of non-covered services.     Patient would like the video invitation sent by:  My Chart    Mode of Communication:  Video Conference via Amwell    As the provider I attest to compliance with applicable laws and regulations related to telemedicine.    DATA  Extended Session (53+ minutes): PROLONGED SERVICE IN THE OUTPATIENT SETTING REQUIRING DIRECT (FACE-TO-FACE) PATIENT CONTACT BEYOND THE USUAL SERVICE:    - Treatment protocol required additional time to complete, due to the nature of diagnosis being treated.  See Interventions section for details    Interactive Complexity: No  Crisis: No        Progress Since Last Session (Related to Symptoms / Goals / Homework):   Symptoms: No change :anxiety    Homework: Partially completed      Episode of Care Goals: Minimal progress - PREPARATION (Decided to change - considering how); Intervened by negotiating a change plan and determining  options / strategies for behavior change, identifying triggers, exploring social supports, and working towards setting a date to begin behavior change     Current / Ongoing Stressors and Concerns:  Physical health has been okay, had some 'gut stuff' act up and switched her diet, on a new medication for her POTS, feeling more anxious, they are moving again, fears around [place] being [place] access [place] public [place], very overwhelmed by not being able to organize her belongings, lots of resentment and frustration towards partner in regards to completion of household tasks     Treatment Objective(s) Addressed in This Session:   identify the fears / thoughts that contribute to feeling anxious  state understanding of stressors and relationship to physical symptoms  Increase interest, engagement, and pleasure in doing things  Decrease frequency and intensity of feeling down, depressed, hopeless  Identify negative self-talk and behaviors: challenge core beliefs, myths, and actions  Gain insight     Intervention:   Discussed mindfulness as being aware of what we are experiencing while we are experiencing it.  Contrasted this with avoidance and rumination.  Explored the role of mindfulness as an overall coping strategy for managing depression, anxiety, and strong emotions.  Explained concept of state of mind using SIFT (sensations, images, feelings, thoughts) pneumonic.  Led client in a guided mindfulness exercise focusing on sensations, images, feelings, and thoughts.  Discussed mindfulness as a tool to intentionally shift our awareness and focus as needed.    Assessments completed prior to visit:   The following assessments were completed by patient for this visit:  PHQ9:   PHQ-9 SCORE 9/16/2021 12/23/2021 1/19/2022 2/15/2022 3/9/2022 5/4/2022 6/16/2022   PHQ-9 Total Score - - - - - - -   PHQ-9 Total Score MyChart - 12 (Moderate depression) 8 (Mild depression) 10 (Moderate  depression) 8 (Mild depression) 11 (Moderate depression) 10 (Moderate depression)   PHQ-9 Total Score 6 12 8 10 8 11 10     GAD7:   JOSHUA-7 SCORE 1/19/2022 2/3/2022 2/15/2022 2/15/2022 3/9/2022 3/9/2022 5/4/2022   Total Score - - - - - - -   Total Score 5 (mild anxiety) 7 (mild anxiety) 7 (mild anxiety) 10 (moderate anxiety) 7 (mild anxiety) 6 (mild anxiety) 5 (mild anxiety)   Total Score 5 7 - 7 6 7 5   Total Score - - - - - - -     PROMIS 10-Global Health (only subscores and total score):   PROMIS-10 Scores Only 12/30/2021 1/19/2022 2/15/2022 3/9/2022 6/16/2022   Global Mental Health Score 11 11 9 12 10   Global Physical Health Score 11 11 11 11 9   PROMIS TOTAL - SUBSCORES 22 22 20 23 19         ASSESSMENT: Current Emotional / Mental Status (status of significant symptoms):   Risk status (Self / Other harm or suicidal ideation)   Patient denies current fears or concerns for personal safety.   Patient denies current or recent suicidal ideation or behaviors.   Patient denies current or recent homicidal ideation or behaviors.   Patient denies current or recent self injurious behavior or ideation.   Patient denies other safety concerns.   Patient reports there has been no change in risk factors since their last session.     Patient reports there has been no change in protective factors since their last session.     Recommended that patient call 911 or go to the local ED should there be a change in any of these risk factors.     Appearance:   Appropriate    Eye Contact:   Good    Psychomotor Behavior: Hyperactive  Restless    Attitude:   Cooperative    Orientation:   All   Speech    Rate / Production: Pressured  Stutters    Volume:  Normal    Mood:    Anxious  Anhedonia   Affect:    Constricted  Flat    Thought Content:  Clear    Thought Form:  Coherent    Insight:    Good      Medication Review:   No changes to current psychiatric medication(s)     Medication Compliance:   Yes     Changes in Health Issues:   None  reported     Chemical Use Review:   Substance Use: Chemical use reviewed, no active concerns identified      Tobacco Use: No current tobacco use.      Diagnosis:  1. Autism spectrum disorder    2. JOSHUA (generalized anxiety disorder)    3. Mild episode of recurrent major depressive disorder (H)    4. Attention deficit hyperactivity disorder (ADHD), other type        Collateral Reports Completed:   Not Applicable    PLAN: (Patient Tasks / Therapist Tasks / Other)  Patient will return for a virtual visit in 2 weeks  Patient will continue stress management techniques    Therapist will help patient look into additional support for work-place accommodations  Put in a referral for couples counseling today for patient to see Tayla Rodrigues     There has been demonstrated improvement in functioning while patient has been engaged in psychotherapy/psychological service- if withdrawn the patient would deteriorate and/or relapse.       Fanny Cobb, [facility]                                                         ______________________________________________________________________    Individual Treatment Plan    Patient's Name: Angela Jones  YOB: 1995    Date of Creation: 3/9/2022  Date Treatment Plan Last Reviewed/Revised: 1/19/2022    DSM5 Diagnoses: Autism Spectrum Disorder 299.00(F84.0)  Associated with another neurodevelopmental, mental or behavioral disorder and Attention-Deficit/Hyperactivity Disorder  314.01 (F90.9) Unspecified Attention -Deficit / Hyperactivity Disorder, 296.31 (F33.0) Major Depressive Disorder, Recurrent Episode, Mild _ or 300.02 (F41.1) Generalized Anxiety Disorder  Psychosocial / Contextual Factors: 27 year old  female, , no children   PROMIS (reviewed every 90 days):   PROMIS 10-Global Health (only subscores and total score):   PROMIS-10 Scores Only 12/30/2021 1/19/2022 2/15/2022 3/9/2022   Global Mental Health Score 11 11 9 12   Global Physical Health Score 11 11  11 11   PROMIS TOTAL - SUBSCORES 22 22 20 23       Referral / Collaboration:  Was/were discussed and patient will pursue.    Anticipated number of session for this episode of care: 9  Anticipation frequency of session: every 2-3 weeks  Anticipated Duration of each session: 38-52 minutes  Treatment plan will be reviewed in 90 days or when goals have been changed.       MeasurableTreatment Goal(s) related to diagnosis / functional impairment(s)  Goal 1: Patient will manage symptoms of anxiety     I will know I've met my goal when I feel more confident in my ability to cope with stress with fewer meltdowns.      Objective #A (Patient Action)    Patient will identify the initial signs or symptoms of anxiety.  Status: New - Date: 3/9/2022     Intervention(s)  Therapist will teach help patient gain insight into signs of stress (physically and emotionally).    Objective #B  Patient will use relaxation strategies multiple times per day to reduce the physical symptoms of anxiety.  Status: New - Date: 3/9/2022     Intervention(s)  Therapist will teach diaphragmatic breathing and other grounding skills.    Objective #C  Patient will use thought-stopping strategy daily to reduce intrusive thoughts.  Status: New - Date: 3/9/2022     Intervention(s)  Therapist will teach thought stopping techniques.      Goal 2: Patient will manage symptoms of depression    I will know I've met my goal when I feel more confident in my ability to express my emotions in a healthy way.      Objective #A (Patient Action)    Status: New - Date: 3/9/2022     Patient will Increase interest, engagement, and pleasure in doing things.    Intervention(s)  Therapist will teach emotional recognition/identification. *.    Objective #B  Patient will Identify negative self-talk and behaviors: challenge core beliefs, myths, and actions.    Status: New - Date: 3/9/2022     Intervention(s)  Therapist will help patient practice positive self-talk and thought  re-framing.      Goal 3: Patient will improve communication patterns in the relationship    I will know I've met my goal when we attend couples counseling and prioritize our relationship.      Objective #A (Patient Action)    Status: New - Date: 3/9/2022     Patient will get established with a couples counselor through [facility] (referral made on 3/9/22).    Intervention(s)  Therapist will monitor referral process and follow-up on their relationship problems and improvements      Patient has reviewed and agreed to the above plan.      Fanny Cobb, [facility]  March 9, 2022

## 2022-06-29 ENCOUNTER — VIRTUAL VISIT (OUTPATIENT)
Dept: BEHAVIORAL HEALTH | Facility: CLINIC | Age: 27
End: 2022-06-29
Payer: COMMERCIAL

## 2022-06-29 DIAGNOSIS — F33.0 MILD EPISODE OF RECURRENT MAJOR DEPRESSIVE DISORDER (H): ICD-10-CM

## 2022-06-29 DIAGNOSIS — F41.1 GAD (GENERALIZED ANXIETY DISORDER): ICD-10-CM

## 2022-06-29 DIAGNOSIS — F84.0 AUTISM SPECTRUM DISORDER: Primary | ICD-10-CM

## 2022-06-29 DIAGNOSIS — F90.8 ATTENTION DEFICIT HYPERACTIVITY DISORDER (ADHD), OTHER TYPE: ICD-10-CM

## 2022-06-29 PROCEDURE — 90837 PSYTX W PT 60 MINUTES: CPT | Mod: 95 | Performed by: SOCIAL WORKER

## 2022-06-29 ASSESSMENT — ANXIETY QUESTIONNAIRES
GAD7 TOTAL SCORE: 6
GAD7 TOTAL SCORE: 6
6. BECOMING EASILY ANNOYED OR IRRITABLE: MORE THAN HALF THE DAYS
7. FEELING AFRAID AS IF SOMETHING AWFUL MIGHT HAPPEN: NOT AT ALL
4. TROUBLE RELAXING: NOT AT ALL
3. WORRYING TOO MUCH ABOUT DIFFERENT THINGS: SEVERAL DAYS
8. IF YOU CHECKED OFF ANY PROBLEMS, HOW DIFFICULT HAVE THESE MADE IT FOR YOU TO DO YOUR WORK, TAKE CARE OF THINGS AT HOME, OR GET ALONG WITH OTHER PEOPLE?: VERY DIFFICULT
7. FEELING AFRAID AS IF SOMETHING AWFUL MIGHT HAPPEN: NOT AT ALL
5. BEING SO RESTLESS THAT IT IS HARD TO SIT STILL: NOT AT ALL
1. FEELING NERVOUS, ANXIOUS, OR ON EDGE: MORE THAN HALF THE DAYS
2. NOT BEING ABLE TO STOP OR CONTROL WORRYING: SEVERAL DAYS
GAD7 TOTAL SCORE: 6

## 2022-06-29 ASSESSMENT — PATIENT HEALTH QUESTIONNAIRE - PHQ9
SUM OF ALL RESPONSES TO PHQ QUESTIONS 1-9: 9
SUM OF ALL RESPONSES TO PHQ QUESTIONS 1-9: 9
10. IF YOU CHECKED OFF ANY PROBLEMS, HOW DIFFICULT HAVE THESE PROBLEMS MADE IT FOR YOU TO DO YOUR WORK, TAKE CARE OF THINGS AT HOME, OR GET ALONG WITH OTHER PEOPLE: SOMEWHAT DIFFICULT

## 2022-06-29 NOTE — PROGRESS NOTES
[facility]                                     Progress Note    Patient Name: Angela Jones  Date: 6/29/2022         Service Type: Individual      Session Start Time: 11:00am  Session End Time: 11:53am     Session Length: 53 minutes    Session #: 7    Attendees: Client attended alone    Service Modality:  Video Visit:      Provider verified identity through the following two step process.  Patient provided:  Patient is known previously to provider    Telemedicine Visit: The patient's condition can be safely assessed and treated via synchronous audio and visual telemedicine encounter.      Reason for Telemedicine Visit: Patient has requested telehealth visit    Originating Site (Patient Location): Patient's home    Distant Site (Provider Location): Provider Remote Setting- Home Office    Consent:  The patient/guardian has verbally consented to: the potential risks and benefits of telemedicine (video visit) versus in person care; bill my insurance or make self-payment for services provided; and responsibility for payment of non-covered services.     Patient would like the video invitation sent by:  My Chart    Mode of Communication:  Video Conference via Amwell    As the provider I attest to compliance with applicable laws and regulations related to telemedicine.    DATA  Extended Session (53+ minutes): PROLONGED SERVICE IN THE OUTPATIENT SETTING REQUIRING DIRECT (FACE-TO-FACE) PATIENT CONTACT BEYOND THE USUAL SERVICE:    - Treatment protocol required additional time to complete, due to the nature of diagnosis being treated.  See Interventions section for details    Interactive Complexity: No  Crisis: No        Progress Since Last Session (Related to Symptoms / Goals / Homework):   Symptoms: No change :anxiety    Homework: Partially completed      Episode of Care Goals: Minimal progress - PREPARATION (Decided to change - considering how); Intervened by negotiating a change plan and determining  options / strategies for behavior change, identifying triggers, exploring social supports, and working towards setting a date to begin behavior change     Current / Ongoing Stressors and Concerns:  Doesn't get notifications for [employer] or email, did not receive messages about scheduling for couples counseling, feeling pretty anxious, put in an offer on a house and got accepted but will need a cosign due to credit scores, air conditioning has been out, it has been a frustrating week, work has been fine lately due to her feeling more confident in her role although she hasn't received much feedback     Treatment Objective(s) Addressed in This Session:   identify the fears / thoughts that contribute to feeling anxious  state understanding of stressors and relationship to physical symptoms  Increase interest, engagement, and pleasure in doing things  Decrease frequency and intensity of feeling down, depressed, hopeless  Identify negative self-talk and behaviors: challenge core beliefs, myths, and actions  Gain insight     Intervention:   Discussed mindfulness as being aware of what we are experiencing while we are experiencing it.  Contrasted this with avoidance and rumination.  Explored the role of mindfulness as an overall coping strategy for managing depression, anxiety, and strong emotions.  Explained concept of state of mind using SIFT (sensations, images, feelings, thoughts) pneumonic.  Led client in a guided mindfulness exercise focusing on sensations, images, feelings, and thoughts.  Discussed mindfulness as a tool to intentionally shift our awareness and focus as needed.    Assessments completed prior to visit:   The following assessments were completed by patient for this visit:  PHQ9:   PHQ-9 SCORE 12/23/2021 1/19/2022 2/15/2022 3/9/2022 5/4/2022 6/16/2022 6/29/2022   PHQ-9 Total Score - - - - - - -   PHQ-9 Total Score My[place] 12 (Moderate depression) 8 (Mild depression) 10 (Moderate depression) 8 (Mild  depression) 11 (Moderate depression) 10 (Moderate depression) 9 (Mild depression)   PHQ-9 Total Score 12 8 10 8 11 10 9     GAD7:   JOSHUA-7 SCORE 2/3/2022 2/15/2022 2/15/2022 3/9/2022 3/9/2022 5/4/2022 6/29/2022   Total Score - - - - - - -   Total Score 7 (mild anxiety) 7 (mild anxiety) 10 (moderate anxiety) 7 (mild anxiety) 6 (mild anxiety) 5 (mild anxiety) 6 (mild anxiety)   Total Score 7 - 7 6 7 5 6   Total Score - - - - - - -     PROMIS 10-Global Health (only subscores and total score):   PROMIS-10 Scores Only 12/30/2021 1/19/2022 2/15/2022 3/9/2022 6/16/2022 6/29/2022   Global Mental Health Score 11 11 9 12 10 10   Global Physical Health Score 11 11 11 11 9 11   PROMIS TOTAL - SUBSCORES 22 22 20 23 19 21         ASSESSMENT: Current Emotional / Mental Status (status of significant symptoms):   Risk status (Self / Other harm or suicidal ideation)   Patient denies current fears or concerns for personal safety.   Patient denies current or recent suicidal ideation or behaviors.   Patient denies current or recent homicidal ideation or behaviors.   Patient denies current or recent self injurious behavior or ideation.   Patient denies other safety concerns.   Patient reports there has been no change in risk factors since their last session.     Patient reports there has been no change in protective factors since their last session.     Recommended that patient call 911 or go to the local ED should there be a change in any of these risk factors.     Appearance:   Appropriate    Eye Contact:   Good    Psychomotor Behavior: Hyperactive  Restless    Attitude:   Cooperative    Orientation:   All   Speech    Rate / Production: Pressured  Stutters    Volume:  Normal    Mood:    Anxious  Anhedonia   Affect:    Constricted  Flat    Thought Content:  Clear    Thought Form:  Coherent    Insight:    Good      Medication Review:   No changes to current psychiatric medication(s)     Medication Compliance:   Yes     Changes in Health  Issues:   None reported     Chemical Use Review:   Substance Use: Chemical use reviewed, no active concerns identified      Tobacco Use: No current tobacco use.      Diagnosis:  1. Autism spectrum disorder    2. JOSHUA (generalized anxiety disorder)    3. Mild episode of recurrent major depressive disorder (H)    4. Attention deficit hyperactivity disorder (ADHD), other type        Collateral Reports Completed:   Not Applicable    PLAN: (Patient Tasks / Therapist Tasks / Other)  Patient will return for a virtual visit in 2 weeks  Patient will continue stress management techniques    Therapist will help patient look into additional support for work-place accommodations  Put in a referral for couples counseling today for patient to see Tayla Rodrigues - patient has information and needs to call intake to get an appointment scheduled    There has been demonstrated improvement in functioning while patient has been engaged in psychotherapy/psychological service- if withdrawn the patient would deteriorate and/or relapse.       Fanny Cobb, [facility]                                                         ______________________________________________________________________    Individual Treatment Plan    Patient's Name: Angela Jones  YOB: 1995    Date of Creation: 1/19/2022  Date Treatment Plan Last Reviewed/Revised: 6/29/2022    DSM5 Diagnoses: Autism Spectrum Disorder 299.00(F84.0)  Associated with another neurodevelopmental, mental or behavioral disorder and Attention-Deficit/Hyperactivity Disorder  314.01 (F90.9) Unspecified Attention -Deficit / Hyperactivity Disorder, 296.31 (F33.0) Major Depressive Disorder, Recurrent Episode, Mild _ or 300.02 (F41.1) Generalized Anxiety Disorder  Psychosocial / Contextual Factors: 27 year old  female, , no children   PROMIS (reviewed every 90 days):   PROMIS 10-Global Health (only subscores and total score):   PROMIS-10 Scores Only 12/30/2021  1/19/2022 2/15/2022 3/9/2022   Global Mental Health Score 11 11 9 12   Global Physical Health Score 11 11 11 11   PROMIS TOTAL - SUBSCORES 22 22 20 23       Referral / Collaboration:  Was/were discussed and patient will pursue.    Anticipated number of session for this episode of care: 9  Anticipation frequency of session: every 2-3 weeks  Anticipated Duration of each session: 38-52 minutes  Treatment plan will be reviewed in 90 days or when goals have been changed.       MeasurableTreatment Goal(s) related to diagnosis / functional impairment(s)  Goal 1: Patient will manage symptoms of anxiety     I will know I've met my goal when I feel more confident in my ability to cope with stress with fewer meltdowns.      Objective #A (Patient Action)    Patient will identify the initial signs or symptoms of anxiety.  Status: Continued - Date(s): 6/29/2022     Intervention(s)  Therapist will teach help patient gain insight into signs of stress (physically and emotionally).    Objective #B  Patient will use relaxation strategies multiple times per day to reduce the physical symptoms of anxiety.  Status: Continued - Date(s): 6/29/2022      Intervention(s)  Therapist will teach diaphragmatic breathing and other grounding skills.    Objective #C  Patient will use thought-stopping strategy daily to reduce intrusive thoughts.  Status: Continued - Date(s): 6/29/2022      Intervention(s)  Therapist will teach thought stopping techniques.      Goal 2: Patient will manage symptoms of depression    I will know I've met my goal when I feel more confident in my ability to express my emotions in a healthy way.      Objective #A (Patient Action)    Status: Continued - Date(s): 6/29/2022      Patient will Increase interest, engagement, and pleasure in doing things.    Intervention(s)  Therapist will teach emotional recognition/identification. *.    Objective #B  Patient will Identify negative self-talk and behaviors: challenge core beliefs,  myths, and actions.    Status: Continued - Date(s): 6/29/2022      Intervention(s)  Therapist will help patient practice positive self-talk and thought re-framing.      Goal 3: Patient will improve communication patterns in the relationship    I will know I've met my goal when we attend couples counseling and prioritize our relationship.      Objective #A (Patient Action)    Status: Continued - Date(s): 6/29/2022      Patient will get established with a couples counselor through [facility] (referral made on 3/9/22). Referral made again in June. Patient has to call to set up appointment.     Intervention(s)  Therapist will monitor referral process and follow-up on their relationship problems and improvements      Patient has reviewed and agreed to the above plan.      Fanny Cobb, [facility]  June 29, 2022

## 2022-07-21 DIAGNOSIS — G43.809 OTHER MIGRAINE, NOT INTRACTABLE, WITHOUT STATUS MIGRAINOSUS: ICD-10-CM

## 2022-07-21 DIAGNOSIS — Z76.0 ENCOUNTER FOR MEDICATION REFILL: Primary | ICD-10-CM

## 2022-07-25 ENCOUNTER — TELEPHONE (OUTPATIENT)
Dept: PSYCHIATRY | Facility: CLINIC | Age: 27
End: 2022-07-25

## 2022-07-25 NOTE — TELEPHONE ENCOUNTER
Left message for patient to return call, also sent [email or web address] message. Due to our recent move,  our new location- the [facility] hub is not yet authorized under [facility], until credentialing on several of our providers has been completed.  Patient would need to pay out of pocket for office visit or reschedule appointment.  Please assist patient with making an appointment  Due to being a new patient, she will also be placed on our list to get an appointment sooner if possible.   Juju Pepper CMA on 7/25/2022 at 1:00 PM

## 2022-07-27 ENCOUNTER — TELEPHONE (OUTPATIENT)
Dept: CARDIOLOGY | Facility: CLINIC | Age: 27
End: 2022-07-27

## 2022-07-27 ENCOUNTER — VIRTUAL VISIT (OUTPATIENT)
Dept: BEHAVIORAL HEALTH | Facility: CLINIC | Age: 27
End: 2022-07-27
Payer: COMMERCIAL

## 2022-07-27 DIAGNOSIS — F33.0 MILD EPISODE OF RECURRENT MAJOR DEPRESSIVE DISORDER (H): ICD-10-CM

## 2022-07-27 DIAGNOSIS — F41.1 GAD (GENERALIZED ANXIETY DISORDER): ICD-10-CM

## 2022-07-27 DIAGNOSIS — F90.8 ATTENTION DEFICIT HYPERACTIVITY DISORDER (ADHD), OTHER TYPE: ICD-10-CM

## 2022-07-27 DIAGNOSIS — F84.0 AUTISM SPECTRUM DISORDER: Primary | ICD-10-CM

## 2022-07-27 PROCEDURE — 90837 PSYTX W PT 60 MINUTES: CPT | Mod: 95 | Performed by: SOCIAL WORKER

## 2022-07-27 NOTE — PROGRESS NOTES
[facility]                                     Progress Note    Patient Name: Angela Jones  Date: 7/27/2022         Service Type: Individual      Session Start Time: 11:00am  Session End Time: 11:53am     Session Length: 53 minutes    Session #: 8    Attendees: Client attended alone    Service Modality:  Video Visit:      Provider verified identity through the following two step process.  Patient provided:  Patient is known previously to provider    Telemedicine Visit: The patient's condition can be safely assessed and treated via synchronous audio and visual telemedicine encounter.      Reason for Telemedicine Visit: Patient has requested telehealth visit    Originating Site (Patient Location): Patient's home    Distant Site (Provider Location): Provider Remote Setting- Home Office    Consent:  The patient/guardian has verbally consented to: the potential risks and benefits of telemedicine (video visit) versus in person care; bill my insurance or make self-payment for services provided; and responsibility for payment of non-covered services.     Patient would like the video invitation sent by:  My Chart    Mode of Communication:  Video Conference via Amwell    As the provider I attest to compliance with applicable laws and regulations related to telemedicine.    DATA  Extended Session (53+ minutes): PROLONGED SERVICE IN THE OUTPATIENT SETTING REQUIRING DIRECT (FACE-TO-FACE) PATIENT CONTACT BEYOND THE USUAL SERVICE:    - Treatment protocol required additional time to complete, due to the nature of diagnosis being treated.  See Interventions section for details    Interactive Complexity: No  Crisis: No        Progress Since Last Session (Related to Symptoms / Goals / Homework):   Symptoms: No change :    Homework: Partially completed      Episode of Care Goals: Minimal progress - PREPARATION (Decided to change - considering how); Intervened by negotiating a change plan and determining  options / strategies for behavior change, identifying triggers, exploring social supports, and working towards setting a date to begin behavior change     Current / Ongoing Stressors and Concerns:  Had to withdraw their offer on a house, waiting for credit scores to go up, closing date on current house isn't until October,  had elbow surgery and in a lot of pain, had to put dog Alvin down 2 weeks ago, interested in more training opportunities at work      Treatment Objective(s) Addressed in This Session:   identify the fears / thoughts that contribute to feeling anxious  state understanding of stressors and relationship to physical symptoms  Increase interest, engagement, and pleasure in doing things  Decrease frequency and intensity of feeling down, depressed, hopeless  Identify negative self-talk and behaviors: challenge core beliefs, myths, and actions  Gain insight     Intervention:   Discussed mindfulness as being aware of what we are experiencing while we are experiencing it.  Contrasted this with avoidance and rumination.  Explored the role of mindfulness as an overall coping strategy for managing depression, anxiety, and strong emotions.  Explained concept of state of mind using SIFT (sensations, images, feelings, thoughts) pneumonic.  Led client in a guided mindfulness exercise focusing on sensations, images, feelings, and thoughts.  Discussed mindfulness as a tool to intentionally shift our awareness and focus as needed.    Assessments completed prior to visit:   The following assessments were completed by patient for this visit:  PHQ9:   PHQ-9 SCORE 12/23/2021 1/19/2022 2/15/2022 3/9/2022 5/4/2022 6/16/2022 6/29/2022   PHQ-9 Total Score - - - - - - -   PHQ-9 Total Score [place]hart 12 (Moderate depression) 8 (Mild depression) 10 (Moderate depression) 8 (Mild depression) 11 (Moderate depression) 10 (Moderate depression) 9 (Mild depression)   PHQ-9 Total Score 12 8 10 8 11 10 9     GAD7:   JOSHUA-7 SCORE  2/3/2022 2/15/2022 2/15/2022 3/9/2022 3/9/2022 5/4/2022 6/29/2022   Total Score - - - - - - -   Total Score 7 (mild anxiety) 7 (mild anxiety) 10 (moderate anxiety) 7 (mild anxiety) 6 (mild anxiety) 5 (mild anxiety) 6 (mild anxiety)   Total Score 7 - 7 6 7 5 6   Total Score - - - - - - -     PROMIS 10-Global Health (only subscores and total score):   PROMIS-10 Scores Only 12/30/2021 1/19/2022 2/15/2022 3/9/2022 6/16/2022 6/29/2022   Global Mental Health Score 11 11 9 12 10 10   Global Physical Health Score 11 11 11 11 9 11   PROMIS TOTAL - SUBSCORES 22 22 20 23 19 21         ASSESSMENT: Current Emotional / Mental Status (status of significant symptoms):   Risk status (Self / Other harm or suicidal ideation)   Patient denies current fears or concerns for personal safety.   Patient denies current or recent suicidal ideation or behaviors.   Patient denies current or recent homicidal ideation or behaviors.   Patient denies current or recent self injurious behavior or ideation.   Patient denies other safety concerns.   Patient reports there has been no change in risk factors since their last session.     Patient reports there has been no change in protective factors since their last session.     Recommended that patient call 911 or go to the local ED should there be a change in any of these risk factors.     Appearance:   Appropriate    Eye Contact:   Good    Psychomotor Behavior: Hyperactive  Restless    Attitude:   Cooperative    Orientation:   All   Speech    Rate / Production: Pressured  Stutters    Volume:  Normal    Mood:    Anxious  Anhedonia   Affect:    Constricted  Flat    Thought Content:  Clear    Thought Form:  Coherent    Insight:    Good      Medication Review:   No changes to current psychiatric medication(s)     Medication Compliance:   Yes     Changes in Health Issues:   None reported     Chemical Use Review:   Substance Use: Chemical use reviewed, no active concerns identified      Tobacco Use: No  current tobacco use.      Diagnosis:  1. Autism spectrum disorder    2. JOSHUA (generalized anxiety disorder)    3. Mild episode of recurrent major depressive disorder (H)    4. Attention deficit hyperactivity disorder (ADHD), other type        Collateral Reports Completed:   Not Applicable    PLAN: (Patient Tasks / Therapist Tasks / Other)  Patient will return for a virtual visit in 3 weeks  Patient will continue stress management techniques    Patient has information to see Tayla Rodrigues for couples counseling and needs to call intake to get an appointment scheduled  Patient also needs to call and reschedule psychiatry appointment     There has been demonstrated improvement in functioning while patient has been engaged in psychotherapy/psychological service- if withdrawn the patient would deteriorate and/or relapse.       Fanny Cobb, [facility]                                                         ______________________________________________________________________    Individual Treatment Plan    Patient's Name: Angela Jones  YOB: 1995    Date of Creation: 1/19/2022  Date Treatment Plan Last Reviewed/Revised: 6/29/2022    DSM5 Diagnoses: Autism Spectrum Disorder 299.00(F84.0)  Associated with another neurodevelopmental, mental or behavioral disorder and Attention-Deficit/Hyperactivity Disorder  314.01 (F90.9) Unspecified Attention -Deficit / Hyperactivity Disorder, 296.31 (F33.0) Major Depressive Disorder, Recurrent Episode, Mild _ or 300.02 (F41.1) Generalized Anxiety Disorder  Psychosocial / Contextual Factors: 27 year old  female, , no children   PROMIS (reviewed every 90 days):   PROMIS 10-Global Health (only subscores and total score):   PROMIS-10 Scores Only 12/30/2021 1/19/2022 2/15/2022 3/9/2022   Global Mental Health Score 11 11 9 12   Global Physical Health Score 11 11 11 11   PROMIS TOTAL - SUBSCORES 22 22 20 23       Referral / Collaboration:  Was/were discussed and  patient will pursue.    Anticipated number of session for this episode of care: 9  Anticipation frequency of session: every 2-3 weeks  Anticipated Duration of each session: 38-52 minutes  Treatment plan will be reviewed in 90 days or when goals have been changed.       MeasurableTreatment Goal(s) related to diagnosis / functional impairment(s)  Goal 1: Patient will manage symptoms of anxiety     I will know I've met my goal when I feel more confident in my ability to cope with stress with fewer meltdowns.      Objective #A (Patient Action)    Patient will identify the initial signs or symptoms of anxiety.  Status: Continued - Date(s): 6/29/2022     Intervention(s)  Therapist will teach help patient gain insight into signs of stress (physically and emotionally).    Objective #B  Patient will use relaxation strategies multiple times per day to reduce the physical symptoms of anxiety.  Status: Continued - Date(s): 6/29/2022      Intervention(s)  Therapist will teach diaphragmatic breathing and other grounding skills.    Objective #C  Patient will use thought-stopping strategy daily to reduce intrusive thoughts.  Status: Continued - Date(s): 6/29/2022      Intervention(s)  Therapist will teach thought stopping techniques.      Goal 2: Patient will manage symptoms of depression    I will know I've met my goal when I feel more confident in my ability to express my emotions in a healthy way.      Objective #A (Patient Action)    Status: Continued - Date(s): 6/29/2022      Patient will Increase interest, engagement, and pleasure in doing things.    Intervention(s)  Therapist will teach emotional recognition/identification. *.    Objective #B  Patient will Identify negative self-talk and behaviors: challenge core beliefs, myths, and actions.    Status: Continued - Date(s): 6/29/2022      Intervention(s)  Therapist will help patient practice positive self-talk and thought re-framing.      Goal 3: Patient will improve  communication patterns in the relationship    I will know I've met my goal when we attend couples counseling and prioritize our relationship.      Objective #A (Patient Action)    Status: Continued - Date(s): 6/29/2022      Patient will get established with a couples counselor through [place] [facility] (referral made on 3/9/22). Referral made again in June. Patient has to call to set up appointment.     Intervention(s)  Therapist will monitor referral process and follow-up on their relationship problems and improvements      Patient has reviewed and agreed to the above plan.      Fanny Cobb, [facility]  June 29, 2022

## 2022-08-03 ENCOUNTER — TELEPHONE (OUTPATIENT)
Dept: CARDIOLOGY | Facility: CLINIC | Age: 27
End: 2022-08-03

## 2022-08-10 ENCOUNTER — VIRTUAL VISIT (OUTPATIENT)
Dept: PSYCHOLOGY | Facility: OTHER | Age: 27
End: 2022-08-10
Payer: COMMERCIAL

## 2022-08-10 DIAGNOSIS — F33.41 RECURRENT MAJOR DEPRESSIVE DISORDER, IN PARTIAL REMISSION (H): ICD-10-CM

## 2022-08-10 DIAGNOSIS — F84.0 AUTISM SPECTRUM DISORDER: Primary | ICD-10-CM

## 2022-08-10 DIAGNOSIS — F41.1 GENERALIZED ANXIETY DISORDER: ICD-10-CM

## 2022-08-10 PROCEDURE — 90847 FAMILY PSYTX W/PT 50 MIN: CPT | Mod: 95 | Performed by: MARRIAGE & FAMILY THERAPIST

## 2022-08-11 NOTE — PATIENT INSTRUCTIONS
Patient and partner report they would like to work on communicating more effectively, find ways to complete weekly tasks even while dealing with chronic health issues, how to effectively deal with financial stress, and how to cope with extended family system issues.  Will meet again in two weeks.

## 2022-08-11 NOTE — PROGRESS NOTES
[facility]   Mental Health & Addiction Services     Progress Note - Initial Visit    Patient  Name:  Angela Jones Date: 8/10/22         Service Type: Couple     Visit Start Time: 3:03 p.m.  Visit End Time: 3:56 p.m.    Visit #: 1 (with this provider)    Attendees: Client and Spouse / Significant Other    Service Modality:  Video Visit:      Provider verified identity through the following two step process.  Patient provided:  Patient was verified at admission/transfer    Telemedicine Visit: The patient's condition can be safely assessed and treated via synchronous audio and visual telemedicine encounter.      Reason for Telemedicine Visit: Patient convenience (e.g. access to timely appointments / distance to available provider)    Originating Site (Patient Location): Patient's home    Distant Site (Provider Location): [facility]: [place]    Consent:  The patient/guardian has verbally consented to: the potential risks and benefits of telemedicine (video visit) versus in person care; bill my insurance or make self-payment for services provided; and responsibility for payment of non-covered services.     Patient would like the video invitation sent by:  My Chart    Mode of Communication:  Video Conference via [facility]    As the provider I attest to compliance with applicable laws and regulations related to telemedicine.       DATA:   Interactive Complexity: No   Crisis: No     Presenting Concerns/  Current Stressors:   Difficulties in relationship related to each partner's mental and physical health; communication struggles; executive functioning and health concerns leading to problems completing weekly household chores      ASSESSMENT:  Mental Status Assessment:  Appearance:   Appropriate   Eye Contact:   Fair   Psychomotor Behavior: Normal  a little slowed  Attitude:   Cooperative   Orientation:   All  Speech   Rate / Production: Slow somewhat; needs additional processing  time   Volume:  Normal   Mood:    Euthymic  Affect:    Appropriate   Thought Content:  Rumination  Perseverative at times  Thought Form:  Coherent   Insight:    Good  and Fair       Safety Issues and Plan for Safety and Risk Management:   Patient denies current fears or concerns for personal safety.  Patient denies current or recent suicidal ideation or behaviors.  Patient denies current or recent homicidal ideation or behaviors.  Patient denies current or recent self injurious behavior or ideation.  Patient denies other safety concerns.  Recommended that patient call 911 or go to the local ED should there be a change in any of these risk factors.  Patient reports there are no firearms in the house.     Diagnostic Criteria:  Generalized Anxiety Disorder  A. Excessive anxiety and worry about a number of events or activities (such as work or school performance).   B. The person finds it difficult to control the worry.  C. Select 3 or more symptoms (required for diagnosis). Only one item is required in children.   - Restlessness or feeling keyed up or on edge.    - Being easily fatigued.    - Difficulty concentrating or mind going blank.    - Irritability.    - Muscle tension.    - Sleep disturbance (difficulty falling or staying asleep, or restless unsatisfying sleep).   D. The focus of the anxiety and worry is not confined to features of an Axis I disorder.  E. The anxiety, worry, or physical symptoms cause clinically significant distress or impairment in social, occupational, or other important areas of functioning.     Major Depressive Disorder  CRITERIA (A-C) REPRESENT A MAJOR DEPRESSIVE EPISODE - SELECT THESE CRITERIA  A) Recurrent: 5 or more symptoms (required for diagnosis)   - Depressed mood. Note: In children and adolescents, can be irritable mood.     - Diminished interest or pleasure in all, or almost all, activities.    - Significant weight loss when not dieting decrease in appetite.    - Decreased sleep.     - Psychomotor activity agitation and retardation.    - Fatigue or loss of energy.    - Feelings of worthlessness or excessive guilt.    - Diminished ability to think or concentrate, or indecisiveness.    - Recurrent thoughts of death (not just fear of dying), recurrent suicidal ideation without a specific plan, or a suicide attempt or a specific plan for committing suicide.   B) The symptoms cause clinically significant distress or impairment in social, occupational, or other important areas of functioning  C) The episode is not attributable to the physiological effects of a substance or to another medical condition  D) The occurence of major depressive episode is not better explained by other thought / psychotic disorders  E) There has never been a manic episode or hypomanic episode  in partial remission  (not included in DSM criteria).    Autism Spectrum Disorder Without accompanying intellectual impairment. , A.  Persistent deficits in social communication and social interaction across multiple contexts as manifested by the following, currently or by history:, 1.  Deficits in social emotional reciprocity, ranging for example, from abnormal social approach and failure of normal back and forth conversation; to reduced sharing of interests, emotions, or affect; to failure to initiate or respond to social interactions. , 2.  Deficits in nonverbal communication behaviors used for social interaction, ranging, for example, from poorly integrated verbal and nonverbal communication; to abnormalities in eye contact and body language or deficits in understanding and use of gestures; to a total lack of facial expressions and non-verbal communication. , 3.  Deficits in developing, maintaining, and understanding relationships, ranging for example, from difficulties adjusting behavior to suit various social contexts; to difficulties in sharing imaginative play or in making friends; to absence of interest in peers. , B.   Restricted, repetitive patterns of behavior, interests, or activities, as manifested by at least two of the following, currently or by history:, 1.  Stereotypes or repetitive motor movements, use of objects, or speech (e.g., simple motor stereotypes, lining up of toys or flipping objects, echolalia, idiosyncratic phrases)., 2.  Insistence on sameness, inflexible adherence to routines, or ritualized patterns of verbal or nonverbal behavior, 3.  Highly restricted, fixated interests that are abnormal in intensity or focus. , 4.  Hyper- or hypo-reactivity to sensory input or unusual interest in sensory aspects of the environment., C.  Symptoms are/were present in the early developmental period., D.  Symptoms cause clinically significant impairment in social, occupational, or other important areas of current functioning. , E.  These disturbances are not better explained by intellectual disability (Intellectual developmental disorder) or global developmental delay. With historical catatonia.  (DA completed by individual therapist Fanny Cobb [facility]).      DSM5 Diagnoses: (Sustained by DSM5 Criteria Listed Above)  Diagnoses: Autism Spectrum Disorder 299.00(F84.0)  Associated Current severity for Criterion A and Criterion B: 299.00(F84.0) Without accompanying intellectual impairment  296.35 (F33.41)  Major Depressive Disorder, Recurrent Episode, In partial remission _  300.02 (F41.1) Generalized Anxiety Disorder  Psychosocial & Contextual Factors: health stress, financial stress, relationship challenges, autism and communication difficulties between partners  WHODAS 2.0 (12 item): No flowsheet data found.  Intervention:   CBT (connect thoughts, feelings, and actions) and family systems issues and patterns  Collateral Reports Completed:  Communicated with: individual therapist to confirm appointment attendance      PLAN: (Homework, other):    Patient and partner report they would like to work on communicating more  effectively, find ways to complete weekly tasks even while dealing with chronic health issues, how to effectively deal with financial stress, and how to cope with extended family system issues.  Will meet again in two weeks.      Tayla Rodrigues  August 10, 2022        Answers for HPI/ROS submitted by the patient on 8/10/2022  If you checked off any problems, how difficult have these problems made it for you to do your work, take care of things at home, or get along with other people?: Somewhat difficult  PHQ9 TOTAL SCORE: 8

## 2022-08-17 ENCOUNTER — VIRTUAL VISIT (OUTPATIENT)
Dept: BEHAVIORAL HEALTH | Facility: CLINIC | Age: 27
End: 2022-08-17
Payer: COMMERCIAL

## 2022-08-17 DIAGNOSIS — F90.8 ATTENTION DEFICIT HYPERACTIVITY DISORDER (ADHD), OTHER TYPE: ICD-10-CM

## 2022-08-17 DIAGNOSIS — F33.0 MILD EPISODE OF RECURRENT MAJOR DEPRESSIVE DISORDER (H): ICD-10-CM

## 2022-08-17 DIAGNOSIS — F84.0 AUTISM SPECTRUM DISORDER: Primary | ICD-10-CM

## 2022-08-17 DIAGNOSIS — F41.1 GAD (GENERALIZED ANXIETY DISORDER): ICD-10-CM

## 2022-08-17 PROCEDURE — 90837 PSYTX W PT 60 MINUTES: CPT | Mod: 95 | Performed by: SOCIAL WORKER

## 2022-08-17 ASSESSMENT — PATIENT HEALTH QUESTIONNAIRE - PHQ9
SUM OF ALL RESPONSES TO PHQ QUESTIONS 1-9: 7
10. IF YOU CHECKED OFF ANY PROBLEMS, HOW DIFFICULT HAVE THESE PROBLEMS MADE IT FOR YOU TO DO YOUR WORK, TAKE CARE OF THINGS AT HOME, OR GET ALONG WITH OTHER PEOPLE: SOMEWHAT DIFFICULT
SUM OF ALL RESPONSES TO PHQ QUESTIONS 1-9: 7

## 2022-08-17 NOTE — PROGRESS NOTES
[facility]                                     Progress Note    Patient Name: Angela Jones  Date: 8/17/2022         Service Type: Individual      Session Start Time: 11:00am  Session End Time: 11:53am     Session Length: 53 minutes    Session #: 9    Attendees: Client attended alone    Service Modality:  Video Visit:      Provider verified identity through the following two step process.  Patient provided:  Patient is known previously to provider    Telemedicine Visit: The patient's condition can be safely assessed and treated via synchronous audio and visual telemedicine encounter.      Reason for Telemedicine Visit: Patient has requested telehealth visit    Originating Site (Patient Location): Patient's home    Distant Site (Provider Location): Provider Remote Setting- Home Office    Consent:  The patient/guardian has verbally consented to: the potential risks and benefits of telemedicine (video visit) versus in person care; bill my insurance or make self-payment for services provided; and responsibility for payment of non-covered services.     Patient would like the video invitation sent by:  My Chart    Mode of Communication:  Video Conference via [facility]    As the provider I attest to compliance with applicable laws and regulations related to telemedicine.    DATA  Extended Session (53+ minutes): PROLONGED SERVICE IN THE OUTPATIENT SETTING REQUIRING DIRECT (FACE-TO-FACE) PATIENT CONTACT BEYOND THE USUAL SERVICE:    - Treatment protocol required additional time to complete, due to the nature of diagnosis being treated.  See Interventions section for details    Interactive Complexity: No  Crisis: No        Progress Since Last Session (Related to Symptoms / Goals / Homework):   Symptoms: No change :    Homework: Partially completed      Episode of Care Goals: Minimal progress - PREPARATION (Decided to change - considering how); Intervened by negotiating a change plan and determining  options / strategies for behavior change, identifying triggers, exploring social supports, and working towards setting a date to begin behavior change     Current / Ongoing Stressors and Concerns:  Irritable some days, overall mood is okay but could be related to situational stressors, wants to work on managing her anger and emotional reactions, had a couples counseling appointment and it went well, she thinks it will be helpful, a lot of waiting on things currently, work has been stressful, would like to find a different job but has to wait on mortgage process, 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, discussion around plan to obtain better access to resources, hoping to get more support for basic house chores, wants to get [facility] and OT     Treatment Objective(s) Addressed in This Session:   identify the fears / thoughts that contribute to feeling anxious  state understanding of stressors and relationship to physical symptoms  Increase interest, engagement, and pleasure in doing things  Decrease frequency and intensity of feeling down, depressed, hopeless  Identify negative self-talk and behaviors: challenge core beliefs, myths, and actions  Gain insight     Intervention:   Discussed mindfulness as being aware of what we are experiencing while we are experiencing it.  Contrasted this with avoidance and rumination.  Explored the role of mindfulness as an overall coping strategy for managing depression, anxiety, and strong emotions.  Explained concept of state of mind using SIFT (sensations, images, feelings, thoughts) pneumonic.  Led client in a guided mindfulness exercise focusing on sensations, images, feelings, and thoughts.  Discussed mindfulness as a tool to intentionally shift our awareness and focus as needed.    Assessments completed prior to visit:   The following assessments were completed by patient for this visit:  PHQ9:   PHQ-9 SCORE 2/15/2022 3/9/2022 5/4/2022 6/16/2022 6/29/2022  8/10/2022 8/17/2022   PHQ-9 Total Score - - - - - - -   PHQ-9 Total Score MyChart 10 (Moderate depression) 8 (Mild depression) 11 (Moderate depression) 10 (Moderate depression) 9 (Mild depression) 8 (Mild depression) 7 (Mild depression)   PHQ-9 Total Score 10 8 11 10 9 8 7     GAD7:   JOSHUA-7 SCORE 2/3/2022 2/15/2022 2/15/2022 3/9/2022 3/9/2022 5/4/2022 6/29/2022   Total Score - - - - - - -   Total Score 7 (mild anxiety) 7 (mild anxiety) 10 (moderate anxiety) 7 (mild anxiety) 6 (mild anxiety) 5 (mild anxiety) 6 (mild anxiety)   Total Score 7 - 7 6 7 5 6   Total Score - - - - - - -     PROMIS 10-Global Health (only subscores and total score):   PROMIS-10 Scores Only 12/30/2021 1/19/2022 2/15/2022 3/9/2022 6/16/2022 6/29/2022   Global Mental Health Score 11 11 9 12 10 10   Global Physical Health Score 11 11 11 11 9 11   PROMIS TOTAL - SUBSCORES 22 22 20 23 19 21         ASSESSMENT: Current Emotional / Mental Status (status of significant symptoms):   Risk status (Self / Other harm or suicidal ideation)   Patient denies current fears or concerns for personal safety.   Patient denies current or recent suicidal ideation or behaviors.   Patient denies current or recent homicidal ideation or behaviors.   Patient denies current or recent self injurious behavior or ideation.   Patient denies other safety concerns.   Patient reports there has been no change in risk factors since their last session.     Patient reports there has been no change in protective factors since their last session.     Recommended that patient call 911 or go to the local ED should there be a change in any of these risk factors.     Appearance:   Appropriate    Eye Contact:   Good    Psychomotor Behavior: Hyperactive  Restless    Attitude:   Cooperative    Orientation:   All   Speech    Rate / Production: Pressured  Stutters    Volume:  Normal    Mood:    Anxious  Anhedonia   Affect:    Constricted  Flat    Thought Content:  Clear    Thought  Form:  Coherent    Insight:    Good      Medication Review:   No changes to current psychiatric medication(s)     Medication Compliance:   Yes     Changes in Health Issues:   None reported     Chemical Use Review:   Substance Use: Chemical use reviewed, no active concerns identified      Tobacco Use: No current tobacco use.      Diagnosis:  1. Autism spectrum disorder    2. JOSHUA (generalized anxiety disorder)    3. Mild episode of recurrent major depressive disorder (H)    4. Attention deficit hyperactivity disorder (ADHD), other type        Collateral Reports Completed:   Not Applicable    PLAN: (Patient Tasks / Therapist Tasks / Other)  Patient will return for a virtual visit in 2 weeks on 9/1 and will see couples counselor on 8/31  Patient will continue stress management techniques and discussion around specific options based upon diagnoses   Patient agrees to call and reschedule psychiatry appointment     There has been demonstrated improvement in functioning while patient has been engaged in psychotherapy/psychological service- if withdrawn the patient would deteriorate and/or relapse.       Fanny Cobb, [facility]                                                         ______________________________________________________________________    Individual Treatment Plan    Patient's Name: Angela Jones  YOB: 1995    Date of Creation: 1/19/2022  Date Treatment Plan Last Reviewed/Revised: 6/29/2022    DSM5 Diagnoses: Autism Spectrum Disorder 299.00(F84.0)  Associated with another neurodevelopmental, mental or behavioral disorder and Attention-Deficit/Hyperactivity Disorder  314.01 (F90.9) Unspecified Attention -Deficit / Hyperactivity Disorder, 296.31 (F33.0) Major Depressive Disorder, Recurrent Episode, Mild _ or 300.02 (F41.1) Generalized Anxiety Disorder  Psychosocial / Contextual Factors: 27 year old  female, , no children   PROMIS (reviewed every 90 days):   PROMIS 10-Global  Health (only subscores and total score):   PROMIS-10 Scores Only 12/30/2021 1/19/2022 2/15/2022 3/9/2022   Global Mental Health Score 11 11 9 12   Global Physical Health Score 11 11 11 11   PROMIS TOTAL - SUBSCORES 22 22 20 23       Referral / Collaboration:  Was/were discussed and patient will pursue.    Anticipated number of session for this episode of care: 9  Anticipation frequency of session: every 2-3 weeks  Anticipated Duration of each session: 38-52 minutes  Treatment plan will be reviewed in 90 days or when goals have been changed.       MeasurableTreatment Goal(s) related to diagnosis / functional impairment(s)  Goal 1: Patient will manage symptoms of anxiety     I will know I've met my goal when I feel more confident in my ability to cope with stress with fewer meltdowns.      Objective #A (Patient Action)    Patient will identify the initial signs or symptoms of anxiety.  Status: Continued - Date(s): 6/29/2022     Intervention(s)  Therapist will teach help patient gain insight into signs of stress (physically and emotionally).    Objective #B  Patient will use relaxation strategies multiple times per day to reduce the physical symptoms of anxiety.  Status: Continued - Date(s): 6/29/2022      Intervention(s)  Therapist will teach diaphragmatic breathing and other grounding skills.    Objective #C  Patient will use thought-stopping strategy daily to reduce intrusive thoughts.  Status: Continued - Date(s): 6/29/2022      Intervention(s)  Therapist will teach thought stopping techniques.      Goal 2: Patient will manage symptoms of depression    I will know I've met my goal when I feel more confident in my ability to express my emotions in a healthy way.      Objective #A (Patient Action)    Status: Continued - Date(s): 6/29/2022      Patient will Increase interest, engagement, and pleasure in doing things.    Intervention(s)  Therapist will teach emotional recognition/identification. *.    Objective  #B  Patient will Identify negative self-talk and behaviors: challenge core beliefs, myths, and actions.    Status: Continued - Date(s): 6/29/2022      Intervention(s)  Therapist will help patient practice positive self-talk and thought re-framing.      Goal 3: Patient will improve communication patterns in the relationship    I will know I've met my goal when we attend couples counseling and prioritize our relationship.      Objective #A (Patient Action)    Status: Continued - Date(s): 6/29/2022      Patient will get established with a couples counselor through [facility] (referral made on 3/9/22). Referral made again in June. Patient has to call to set up appointment.     Intervention(s)  Therapist will monitor referral process and follow-up on their relationship problems and improvements      Patient has reviewed and agreed to the above plan.      Fanny Cobb, [facility]  June 29, 2022

## 2022-08-30 ENCOUNTER — MYC MEDICAL ADVICE (OUTPATIENT)
Dept: FAMILY MEDICINE | Facility: CLINIC | Age: 27
End: 2022-08-30

## 2022-08-30 DIAGNOSIS — G43.109 MIGRAINE WITH AURA AND WITHOUT STATUS MIGRAINOSUS, NOT INTRACTABLE: Primary | ICD-10-CM

## 2022-08-30 DIAGNOSIS — F84.0 AUTISM SPECTRUM DISORDER: ICD-10-CM

## 2022-08-30 DIAGNOSIS — F42.9 OBSESSIVE-COMPULSIVE DISORDER, UNSPECIFIED TYPE: ICD-10-CM

## 2022-08-30 DIAGNOSIS — F33.41 RECURRENT MAJOR DEPRESSIVE DISORDER, IN PARTIAL REMISSION (H): ICD-10-CM

## 2022-09-01 ENCOUNTER — VIRTUAL VISIT (OUTPATIENT)
Dept: BEHAVIORAL HEALTH | Facility: CLINIC | Age: 27
End: 2022-09-01
Payer: COMMERCIAL

## 2022-09-01 DIAGNOSIS — F90.8 ATTENTION DEFICIT HYPERACTIVITY DISORDER (ADHD), OTHER TYPE: ICD-10-CM

## 2022-09-01 DIAGNOSIS — F33.0 MILD EPISODE OF RECURRENT MAJOR DEPRESSIVE DISORDER (H): ICD-10-CM

## 2022-09-01 DIAGNOSIS — F41.1 GAD (GENERALIZED ANXIETY DISORDER): ICD-10-CM

## 2022-09-01 DIAGNOSIS — F84.0 AUTISM SPECTRUM DISORDER: Primary | ICD-10-CM

## 2022-09-01 PROCEDURE — 90834 PSYTX W PT 45 MINUTES: CPT | Mod: 95 | Performed by: SOCIAL WORKER

## 2022-09-01 NOTE — PROGRESS NOTES
[facility]                                     Progress Note    Patient Name: Angela Jones  Date: 9/1/2022         Service Type: Individual      Session Start Time: 2:00pm  Session End Time: 2:40pm     Session Length: 40 minutes    Session #: 10    Attendees: Client attended alone    Service Modality:  Video Visit:      Provider verified identity through the following two step process.  Patient provided:  Patient is known previously to provider    Telemedicine Visit: The patient's condition can be safely assessed and treated via synchronous audio and visual telemedicine encounter.      Reason for Telemedicine Visit: Patient has requested telehealth visit    Originating Site (Patient Location): Patient's home    Distant Site (Provider Location): Provider Remote Setting- Home Office    Consent:  The patient/guardian has verbally consented to: the potential risks and benefits of telemedicine (video visit) versus in person care; bill my insurance or make self-payment for services provided; and responsibility for payment of non-covered services.     Patient would like the video invitation sent by:  My Chart    Mode of Communication:  Video Conference via [employer]    As the provider I attest to compliance with applicable laws and regulations related to telemedicine.    DATA  Interactive Complexity: No  Crisis: No        Progress Since Last Session (Related to Symptoms / Goals / Homework):   Symptoms: No change : anxiety and depression    Homework: Partially completed      Episode of Care Goals: Minimal progress - PREPARATION (Decided to change - considering how); Intervened by negotiating a change plan and determining options / strategies for behavior change, identifying triggers, exploring social supports, and working towards setting a date to begin behavior change     Current / Ongoing Stressors and Concerns:  Not feeling great today with some type of stomach bug, feeling kind of weak with low  "energy and \"it messes with my POTS\" resulting in dizziness, just planning to rest a lot  Gets overwhelmed easily and is more anxious more often, doesn't notice mood changes or what triggers her;  Work continues to be stressful, believes it's going to get worse through the holidays because it's busy season, planning to take time off from work first week of November,  is back in school part-time, getting along okay with , patient was referred to see a pharmacist for medication management, no updates with insurance change, no updates on mortgage process;       Treatment Objective(s) Addressed in This Session:   identify the fears / thoughts that contribute to feeling anxious  state understanding of stressors and relationship to physical symptoms  Increase interest, engagement, and pleasure in doing things  Decrease frequency and intensity of feeling down, depressed, hopeless  Identify negative self-talk and behaviors: challenge core beliefs, myths, and actions  Gain insight     Intervention:   Discussed mindfulness as being aware of what we are experiencing while we are experiencing it.  Contrasted this with avoidance and rumination.  Explored the role of mindfulness as an overall coping strategy for managing depression, anxiety, and strong emotions.  Explained concept of state of mind using SIFT (sensations, images, feelings, thoughts) pneumonic.  Led client in a guided mindfulness exercise focusing on sensations, images, feelings, and thoughts.  Discussed mindfulness as a tool to intentionally shift our awareness and focus as needed.    Assessments completed prior to visit:   The following assessments were completed by patient for this visit:  PHQ9:   PHQ-9 SCORE 2/15/2022 3/9/2022 5/4/2022 6/16/2022 6/29/2022 8/10/2022 8/17/2022   PHQ-9 Total Score - - - - - - -   PHQ-9 Total Score MyChart 10 (Moderate depression) 8 (Mild depression) 11 (Moderate depression) 10 (Moderate depression) 9 (Mild depression) " 8 (Mild depression) 7 (Mild depression)   PHQ-9 Total Score 10 8 11 10 9 8 7     GAD7:   JOSHUA-7 SCORE 2/3/2022 2/15/2022 2/15/2022 3/9/2022 3/9/2022 5/4/2022 6/29/2022   Total Score - - - - - - -   Total Score 7 (mild anxiety) 7 (mild anxiety) 10 (moderate anxiety) 7 (mild anxiety) 6 (mild anxiety) 5 (mild anxiety) 6 (mild anxiety)   Total Score 7 - 7 6 7 5 6   Total Score - - - - - - -     PROMIS 10-Global Health (only subscores and total score):   PROMIS-10 Scores Only 12/30/2021 1/19/2022 2/15/2022 3/9/2022 6/16/2022 6/29/2022   Global Mental Health Score 11 11 9 12 10 10   Global Physical Health Score 11 11 11 11 9 11   PROMIS TOTAL - SUBSCORES 22 22 20 23 19 21         ASSESSMENT: Current Emotional / Mental Status (status of significant symptoms):   Risk status (Self / Other harm or suicidal ideation)   Patient denies current fears or concerns for personal safety.   Patient denies current or recent suicidal ideation or behaviors.   Patient denies current or recent homicidal ideation or behaviors.   Patient denies current or recent self injurious behavior or ideation.   Patient denies other safety concerns.   Patient reports there has been no change in risk factors since their last session.     Patient reports there has been no change in protective factors since their last session.     Recommended that patient call 911 or go to the local ED should there be a change in any of these risk factors.     Appearance:   Appropriate    Eye Contact:   Good    Psychomotor Behavior: Hyperactive  Restless    Attitude:   Cooperative    Orientation:   All   Speech    Rate / Production: Pressured  Stutters    Volume:  Normal    Mood:    Anxious  Anhedonia   Affect:    Constricted  Flat    Thought Content:  Clear    Thought Form:  Coherent    Insight:    Good      Medication Review:   No changes to current psychiatric medication(s)     Medication Compliance:   Yes     Changes in Health Issues:   None reported     Chemical Use  Review:   Substance Use: Chemical use reviewed, no active concerns identified      Tobacco Use: No current tobacco use.      Diagnosis:  1. Autism spectrum disorder    2. JOSHUA (generalized anxiety disorder)    3. Mild episode of recurrent major depressive disorder (H)    4. Attention deficit hyperactivity disorder (ADHD), other type        Collateral Reports Completed:   Not Applicable    PLAN: (Patient Tasks / Therapist Tasks / Other)  Patient will return for a virtual visit in 2 weeks  Patient will continue stress management techniques and discussion around specific options based upon diagnoses     There has been demonstrated improvement in functioning while patient has been engaged in psychotherapy/psychological service- if withdrawn the patient would deteriorate and/or relapse.       Fanny Cobb, [facility]                                                         ______________________________________________________________________    Individual Treatment Plan    Patient's Name: Angela Jones  YOB: 1995    Date of Creation: 1/19/2022  Date Treatment Plan Last Reviewed/Revised: 6/29/2022    DSM5 Diagnoses: Autism Spectrum Disorder 299.00(F84.0)  Associated with another neurodevelopmental, mental or behavioral disorder and Attention-Deficit/Hyperactivity Disorder  314.01 (F90.9) Unspecified Attention -Deficit / Hyperactivity Disorder, 296.31 (F33.0) Major Depressive Disorder, Recurrent Episode, Mild _ or 300.02 (F41.1) Generalized Anxiety Disorder  Psychosocial / Contextual Factors: 27 year old  female, , no children   PROMIS (reviewed every 90 days):   PROMIS 10-Global Health (only subscores and total score):   PROMIS-10 Scores Only 12/30/2021 1/19/2022 2/15/2022 3/9/2022   Global Mental Health Score 11 11 9 12   Global Physical Health Score 11 11 11 11   PROMIS TOTAL - SUBSCORES 22 22 20 23       Referral / Collaboration:  Was/were discussed and patient will  pursue.    Anticipated number of session for this episode of care: 9  Anticipation frequency of session: every 2-3 weeks  Anticipated Duration of each session: 38-52 minutes  Treatment plan will be reviewed in 90 days or when goals have been changed.       MeasurableTreatment Goal(s) related to diagnosis / functional impairment(s)  Goal 1: Patient will manage symptoms of anxiety     I will know I've met my goal when I feel more confident in my ability to cope with stress with fewer meltdowns.      Objective #A (Patient Action)    Patient will identify the initial signs or symptoms of anxiety.  Status: Continued - Date(s): 6/29/2022     Intervention(s)  Therapist will teach help patient gain insight into signs of stress (physically and emotionally).    Objective #B  Patient will use relaxation strategies multiple times per day to reduce the physical symptoms of anxiety.  Status: Continued - Date(s): 6/29/2022      Intervention(s)  Therapist will teach diaphragmatic breathing and other grounding skills.    Objective #C  Patient will use thought-stopping strategy daily to reduce intrusive thoughts.  Status: Continued - Date(s): 6/29/2022      Intervention(s)  Therapist will teach thought stopping techniques.      Goal 2: Patient will manage symptoms of depression    I will know I've met my goal when I feel more confident in my ability to express my emotions in a healthy way.      Objective #A (Patient Action)    Status: Continued - Date(s): 6/29/2022      Patient will Increase interest, engagement, and pleasure in doing things.    Intervention(s)  Therapist will teach emotional recognition/identification. *.    Objective #B  Patient will Identify negative self-talk and behaviors: challenge core beliefs, myths, and actions.    Status: Continued - Date(s): 6/29/2022      Intervention(s)  Therapist will help patient practice positive self-talk and thought re-framing.      Goal 3: Patient will improve communication  patterns in the relationship    I will know I've met my goal when we attend couples counseling and prioritize our relationship.      Objective #A (Patient Action)    Status: Continued - Date(s): 6/29/2022      Patient will get established with a couples counselor through [facility] (referral made on 3/9/22). Referral made again in June. Patient has to call to set up appointment.     Intervention(s)  Therapist will monitor referral process and follow-up on their relationship problems and improvements      Patient has reviewed and agreed to the above plan.      Fanny Cobb, [facility]  June 29, 2022

## 2022-09-02 ENCOUNTER — TELEPHONE (OUTPATIENT)
Dept: FAMILY MEDICINE | Facility: CLINIC | Age: 27
End: 2022-09-02

## 2022-09-02 NOTE — TELEPHONE ENCOUNTER
MTM referral from: [facility] visit (referral by provider)    MTM referral outreach attempt #2 on September 2, 2022 at 8:55 AM      Outcome: Patient not reachable after several attempts, will route to [facility] Pharmacist/Provider as an FYI.  [facility] scheduling number is [phone].  Thank you for the referral.    Liana Mora [facility]

## 2022-09-04 ENCOUNTER — HEALTH MAINTENANCE LETTER (OUTPATIENT)
Age: 27
End: 2022-09-04

## 2022-09-13 ENCOUNTER — VIRTUAL VISIT (OUTPATIENT)
Dept: PSYCHOLOGY | Facility: CLINIC | Age: 27
End: 2022-09-13
Payer: COMMERCIAL

## 2022-09-13 DIAGNOSIS — F41.1 GENERALIZED ANXIETY DISORDER: ICD-10-CM

## 2022-09-13 DIAGNOSIS — F84.0 AUTISM SPECTRUM DISORDER: Primary | ICD-10-CM

## 2022-09-13 DIAGNOSIS — F33.41 RECURRENT MAJOR DEPRESSIVE DISORDER, IN PARTIAL REMISSION (H): ICD-10-CM

## 2022-09-13 PROCEDURE — 90847 FAMILY PSYTX W/PT 50 MIN: CPT | Mod: 95 | Performed by: MARRIAGE & FAMILY THERAPIST

## 2022-09-13 NOTE — PATIENT INSTRUCTIONS
"Patient and partner report they would like to work:    Patient: \"Try to be able to clean before the mess gets overwhelming\"  Spouse: \"Try to be more present when [spouse] has meltdowns\"    Homework: compromise on a date night; if this does not work, elect one person to be in charge of planning  "

## 2022-09-13 NOTE — PROGRESS NOTES
[facility]                                     Progress Note    Patient Name: Angela Jones  Date: 9/13/2022         Service Type: Couple      Session Start Time: 8:33 a.m.  Session End Time: 9:37 a.m.     Session Length: 64 minutes    Session #: 2 (with this provider - both have individual therapists)    Attendees: Client and Spouse / Significant Other    Service Modality:  Video Visit:      Provider verified identity through the following two step process.  Patient provided:  Patient was verified at admission/transfer    Telemedicine Visit: The patient's condition can be safely assessed and treated via synchronous audio and visual telemedicine encounter.      Reason for Telemedicine Visit: Patient convenience (e.g. access to timely appointments / distance to available provider)    Originating Site (Patient Location): Patient's home    Distant Site (Provider Location): [facility]: [place]    Consent:  The patient/guardian has verbally consented to: the potential risks and benefits of telemedicine (video visit) versus in person care; bill my insurance or make self-payment for services provided; and responsibility for payment of non-covered services.     Patient would like the video invitation sent by:  My Chart    Mode of Communication:  Video Conference via Amwell    As the provider I attest to compliance with applicable laws and regulations related to telemedicine.    DATA  Interactive Complexity: No  Crisis: No        Progress Since Last Session (Related to Symptoms / Goals / Homework):   Symptoms: No change as patient and spouse report ongoing struggles completing household tasks and frequent arguments about these; some miscommunication    Homework: Partially completed      Episode of Care Goals: Minimal progress - PREPARATION (Decided to change - considering how); Intervened by negotiating a change plan and determining options / strategies for behavior change,  "identifying triggers, exploring social supports, and working towards setting a date to begin behavior change     Current / Ongoing Stressors and Concerns:   Difficulties in relationship related to each partner's mental and physical health; communication struggles; executive functioning and health concerns leading to problems completing weekly household chores     Treatment Objective(s) Addressed in This Session:   learn about and utilize bids for connection and turning towards partner instead of turning away  identify barriers to completing household tasks  identify a time in the relationship when the couple successfully completed household tasks and will determine whether past strategies will be effective (and will develop new strategies if needed)  validate their partner's experiences and feelings and show appreciation for their partner  use cognitive strategies identified in therapy to challenge anxious thoughts  identify primary triggers for anxiety related to finances and extended family  fully discuss family of origin patterns related to communication and finances and will determine what worked and what didn't, and what they would like to implement and avoid in their relationship    Past/future:  learn about Gwen's Four Horsemen of the Apocalypse and will identify these within the relationship  identify components of the [facility] and will work to strengthen friendship, adaptively handle conflict, and create shared meaning/dreams     Intervention:   CBT: Gwen - connection between thoughts, feelings, and actions  Solution Focused: identifying solvable problems and generating possible solutions; if problem is not solvable, identifying compromise and boundaries/deal-breakers  Narrative: \"the story I'm making up,\"  problem from person, and finding bright spots   Family systems: patterns, learned behaviors, communication    Past/future:    Assessments completed prior to visit:  The " following assessments were completed by patient for this visit:  PHQ9:   PHQ-9 SCORE 2/15/2022 3/9/2022 5/4/2022 6/16/2022 6/29/2022 8/10/2022 8/17/2022   PHQ-9 Total Score - - - - - - -   PHQ-9 Total Score MyChart 10 (Moderate depression) 8 (Mild depression) 11 (Moderate depression) 10 (Moderate depression) 9 (Mild depression) 8 (Mild depression) 7 (Mild depression)   PHQ-9 Total Score 10 8 11 10 9 8 7     GAD7:   JOSHUA-7 SCORE 2/3/2022 2/15/2022 2/15/2022 3/9/2022 3/9/2022 5/4/2022 6/29/2022   Total Score - - - - - - -   Total Score 7 (mild anxiety) 7 (mild anxiety) 10 (moderate anxiety) 7 (mild anxiety) 6 (mild anxiety) 5 (mild anxiety) 6 (mild anxiety)   Total Score 7 - 7 6 7 5 6   Total Score - - - - - - -     PROMIS 10-Global Health (all questions and answers displayed):   PROMIS 10 12/30/2021 1/19/2022 2/15/2022 3/9/2022 6/16/2022 6/29/2022   In general, would you say your health is: Fair Fair Fair Fair Fair Fair   In general, would you say your quality of life is: Good Good Good Good Good Good   In general, how would you rate your physical health? Fair Fair Fair Fair Fair Fair   In general, how would you rate your mental health, including your mood and your ability to think? Fair Fair Fair Good Fair Fair   In general, how would you rate your satisfaction with your social activities and relationships? Very good Good Good Very good Good Good   In general, please rate how well you carry out your usual social activities and roles Good Good Fair Good Poor Fair   To what extent are you able to carry out your everyday physical activities such as walking, climbing stairs, carrying groceries, or moving a chair? Moderately Moderately Moderately Moderately A little Moderately   How often have you been bothered by emotional problems such as feeling anxious, depressed or irritable? Often Sometimes Always Often Often Often   How would you rate your fatigue on average? Moderate Moderate Moderate Moderate Severe Moderate    How would you rate your pain on average?   0 = No Pain  to  10 = Worst Imaginable Pain 4 4 4 4 4 4   In general, would you say your health is: 2 2 2 2 2 2   In general, would you say your quality of life is: 3 3 3 3 3 3   In general, how would you rate your physical health? 2 2 2 2 2 2   In general, how would you rate your mental health, including your mood and your ability to think? 2 2 2 3 2 2   In general, how would you rate your satisfaction with your social activities and relationships? 4 3 3 4 3 3   In general, please rate how well you carry out your usual social activities and roles. (This includes activities at home, at work and in your community, and responsibilities as a parent, child, spouse, employee, friend, etc.) 3 3 2 3 1 2   To what extent are you able to carry out your everyday physical activities such as walking, climbing stairs, carrying groceries, or moving a chair? 3 3 3 3 2 3   In the past 7 days, how often have you been bothered by emotional problems such as feeling anxious, depressed, or irritable? 4 3 5 4 4 4   In the past 7 days, how would you rate your fatigue on average? 3 3 3 3 4 3   In the past 7 days, how would you rate your pain on average, where 0 means no pain, and 10 means worst imaginable pain? 4 4 4 4 4 4   Global Mental Health Score 11 11 9 12 10 10   Global Physical Health Score 11 11 11 11 9 11   PROMIS TOTAL - SUBSCORES 22 22 20 23 19 21   Some recent data might be hidden         ASSESSMENT: Current Emotional / Mental Status (status of significant symptoms):   Risk status (Self / Other harm or suicidal ideation)   Patient denies current fears or concerns for personal safety.   Patient denies current or recent suicidal ideation or behaviors.   Patient denies current or recent homicidal ideation or behaviors.   Patient denies current or recent self injurious behavior or ideation.   Patient denies other safety concerns.   Patient reports there has been no change in risk  "factors since their last session.     Patient reports there has been no change in protective factors since their last session.     Recommended that patient call 911 or go to the local ED should there be a change in any of these risk factors.     Appearance:   Appropriate    Eye Contact:   Fair    Psychomotor Behavior: Normal    Attitude:   Cooperative    Orientation:   All   Speech    Rate / Production: Sometimes needs additional processing time; Normal     Volume:  Normal    Mood:    Anxious    Affect:    Appropriate  Subdued    Thought Content:  Rumination  sometimes perseverative   Thought Form:  Coherent  Circumstantial   Insight:    Good      Medication Review:   No changes to current psychiatric medication(s)     Medication Compliance:   Yes     Changes in Health Issues:   None reported - chronic health issues for both patients (fatigue, pain, POTS)     Chemical Use Review:   Substance Use: Chemical use reviewed, no active concerns identified      Tobacco Use: No current tobacco use.      Diagnosis:  1. Autism spectrum disorder    2. Generalized anxiety disorder    3. Recurrent major depressive disorder, in partial remission (H)        Collateral Reports Completed:   Not Applicable    PLAN: (Patient Tasks / Therapist Tasks / Other)    Patient and partner report they would like to work:    Patient: \"Try to be able to clean before the mess gets overwhelming\"  Spouse: \"Try to be more present when [spouse] has meltdowns\"    Homework: compromise on a date night; if this does not work, elect one person to be in charge of planning      Tayla Rodrigues                                                                                                      Couple Treatment Plan    Patient's Name: Angela Jones  YOB: 1995    Date of Creation: 9/13/2022  Date Treatment Plan Last Reviewed/Revised: 9/13/2022    DSM5 Diagnoses: Autism Spectrum Disorder 299.00(F84.0)  Associated Current severity for Criterion " A and Criterion B: 299.00(F84.0) Without accompanying intellectual impairment; 296.35 (F33.41)  Major Depressive Disorder, Recurrent Episode, In partial remission _; 300.02 (F41.1) Generalized Anxiety Disorder    Psychosocial / Contextual Factors: Difficulties in relationship related to each partner's mental and physical health; communication struggles; executive functioning and health concerns leading to problems completing weekly household chores    PROMIS (reviewed every 90 days): PROMIS-10 Scores           Referral / Collaboration:  Referral to another professional/service is not indicated at this time.    Anticipated number of sessions for this episode of care: 9-12 sessions  Anticipation frequency of session: Every other week  Anticipated duration of each session: 38-52 minutes  Treatment plan will be reviewed in 90 days or when goals have been changed.     Measurable Treatment Goal(s) related to diagnosis / functional impairment(s)  Goal 1: Patient and spouse will learn about Dr. Diallo Hidalgo's successful relationship skills and will work to improve their communication patterns.    Objective #A (Patient Action)    Patient (and spouse) will learn about and utilize bids for connection and turning towards partner instead of turning away.  Status: New - Date: 9/13/2022     Intervention(s)  Therapist will teach teach about bids and turning towards.    Objective #B  Patient (and spouse) will learn about Gwen's Four Horsemen of the Apocalypse and will identify these within the relationship.  Status: New - Date: 9/13/2022     Intervention(s)  Therapist will teach about Gwen's research on what leads to relationships ending.    Objective #C  Patient (and spouse) will identify components of the [facility] and will work to strengthen friendship, adaptively handle conflict, and create shared meaning/dreams.  Status: New - Date: 9/13/2022     Intervention(s)  Therapist will teach about the Sound  "Relationship House and managing conflict with a soft start-up.      Goal 2: Patient and spouse will discuss and identify ways to complete weekly household tasks while taking into account each partner's chronic health issues.    Objective #A (Patient Action)    Status: New - Date: 9/13/2022     Patient (and spouse) will identify barriers to completing household tasks.    Intervention(s)  Therapist will collaborate with the couple in session to determine these.    Objective #B  Patient (and spouse) will identify a time in the relationship when the couple successfully completed household tasks and will determine whether past strategies will be effective (and will develop new strategies if needed).   Status: New - Date: 9/13/2022     Intervention(s)  Therapist will collaborate with patients in session to determine these and to identify past family of origin patterns contributing to these perpetual struggles.    Objective #C  Patient (and spouse) will validate their partner's experiences and feelings and show appreciation for their partner.  Status: New - Date: 9/13/2022     Intervention(s)  Therapist will role-play effective communication skills.      Goal 3: Patient and spouse will identify reasons for and adaptive ways to cope with financial and extended family anxiety/stressors.    Objective #A (Patient Action)    Status: New - Date: 9/13/2022     Patient (and spouse) will use cognitive strategies identified in therapy to challenge anxious thoughts.     Intervention(s)  Therapist will teach Claus Leal's \"the story I'm making up\" and building trust.    Objective #B  Patient (and spouse) will identify primary triggers for anxiety related to finances and extended family.    Status: New - Date: 9/13/2022     Intervention(s)  Therapist will collaborate with the couple in session to determine these.    Objective #C  Patient (and spouse) will fully discuss family of origin patterns related to communication and finances and " will determine what worked and what didn't, and what they would like to implement and avoid in their relationship.  Status: New - Date: 9/13/2022     Intervention(s)  Therapist will teach about family systems and learned patterns of behavior stemming from family of origin.      Patient and significant other/spouse agreed to the above plan.      Tayla Rodrigues  September 13, 2022

## 2022-09-14 ENCOUNTER — VIRTUAL VISIT (OUTPATIENT)
Dept: BEHAVIORAL HEALTH | Facility: CLINIC | Age: 27
End: 2022-09-14
Payer: COMMERCIAL

## 2022-09-14 DIAGNOSIS — F41.1 GAD (GENERALIZED ANXIETY DISORDER): ICD-10-CM

## 2022-09-14 DIAGNOSIS — F90.8 ATTENTION DEFICIT HYPERACTIVITY DISORDER (ADHD), OTHER TYPE: ICD-10-CM

## 2022-09-14 DIAGNOSIS — F33.0 MILD EPISODE OF RECURRENT MAJOR DEPRESSIVE DISORDER (H): ICD-10-CM

## 2022-09-14 DIAGNOSIS — F84.0 AUTISM SPECTRUM DISORDER: Primary | ICD-10-CM

## 2022-09-14 PROCEDURE — 90837 PSYTX W PT 60 MINUTES: CPT | Mod: 95 | Performed by: SOCIAL WORKER

## 2022-09-14 NOTE — PROGRESS NOTES
[facility]                                     Progress Note    Patient Name: Angela Jones  Date: 9/14/2022         Service Type: Individual      Session Start Time: 11:00am  Session End Time: 11:53am     Session Length: 53 minutes    Session #: 11    Attendees: Client attended alone    Service Modality:  Video Visit:      Provider verified identity through the following two step process.  Patient provided:  Patient is known previously to provider    Telemedicine Visit: The patient's condition can be safely assessed and treated via synchronous audio and visual telemedicine encounter.      Reason for Telemedicine Visit: Patient has requested telehealth visit    Originating Site (Patient Location): Patient's home    Distant Site (Provider Location): Provider Remote Setting- Home Office    Consent:  The patient/guardian has verbally consented to: the potential risks and benefits of telemedicine (video visit) versus in person care; bill my insurance or make self-payment for services provided; and responsibility for payment of non-covered services.     Patient would like the video invitation sent by:  My Chart    Mode of Communication:  Video Conference via [employer]    As the provider I attest to compliance with applicable laws and regulations related to telemedicine.    DATA  Extended Session (53+ minutes): PROLONGED SERVICE IN THE OUTPATIENT SETTING REQUIRING DIRECT (FACE-TO-FACE) PATIENT CONTACT BEYOND THE USUAL SERVICE:    - Patient's presenting concerns require more intensive intervention than could be completed within the usual service  Interactive Complexity: No  Crisis: No        Progress Since Last Session (Related to Symptoms / Goals / Homework):   Symptoms: No change : anxiety and depression    Homework: Partially completed      Episode of Care Goals: Minimal progress - PREPARATION (Decided to change - considering how); Intervened by negotiating a change plan and determining options /  strategies for behavior change, identifying triggers, exploring social supports, and working towards setting a date to begin behavior change     Current / Ongoing Stressors and Concerns:  Informed patient that therapist completed paperwork to renew her case management services, still working on getting MA process completed, feeling better today physically, got over a stomach bug from 2 weeks ago, some increase in energy lately, completing some household tasks, had couples therapy yesterday, cleaning and household chores generally fall on her to complete, does admittedly become very overwhelmed when trying to clean - reports having limited energy and having trouble asking for help because people get mad, working hard to manage her emotions instead of just getting mad or yelling, reviewed strategies for managing emotions in a healthy way       Treatment Objective(s) Addressed in This Session:   identify the fears / thoughts that contribute to feeling anxious  state understanding of stressors and relationship to physical symptoms  Increase interest, engagement, and pleasure in doing things  Decrease frequency and intensity of feeling down, depressed, hopeless  Identify negative self-talk and behaviors: challenge core beliefs, myths, and actions  Gain insight     Intervention:   Discussed mindfulness as being aware of what we are experiencing while we are experiencing it.  Contrasted this with avoidance and rumination.  Explored the role of mindfulness as an overall coping strategy for managing depression, anxiety, and strong emotions.  Explained concept of state of mind using SIFT (sensations, images, feelings, thoughts) pneumonic.  Led client in a guided mindfulness exercise focusing on sensations, images, feelings, and thoughts.  Discussed mindfulness as a tool to intentionally shift our awareness and focus as needed.    Assessments completed prior to visit:   The following assessments were completed by patient for  this visit:  PHQ9:   PHQ-9 SCORE 2/15/2022 3/9/2022 5/4/2022 6/16/2022 6/29/2022 8/10/2022 8/17/2022   PHQ-9 Total Score - - - - - - -   PHQ-9 Total Score MyChart 10 (Moderate depression) 8 (Mild depression) 11 (Moderate depression) 10 (Moderate depression) 9 (Mild depression) 8 (Mild depression) 7 (Mild depression)   PHQ-9 Total Score 10 8 11 10 9 8 7     GAD7:   JOSHUA-7 SCORE 2/3/2022 2/15/2022 2/15/2022 3/9/2022 3/9/2022 5/4/2022 6/29/2022   Total Score - - - - - - -   Total Score 7 (mild anxiety) 7 (mild anxiety) 10 (moderate anxiety) 7 (mild anxiety) 6 (mild anxiety) 5 (mild anxiety) 6 (mild anxiety)   Total Score 7 - 7 6 7 5 6   Total Score - - - - - - -     PROMIS 10-Global Health (only subscores and total score):   PROMIS-10 Scores Only 12/30/2021 1/19/2022 2/15/2022 3/9/2022 6/16/2022 6/29/2022   Global Mental Health Score 11 11 9 12 10 10   Global Physical Health Score 11 11 11 11 9 11   PROMIS TOTAL - SUBSCORES 22 22 20 23 19 21         ASSESSMENT: Current Emotional / Mental Status (status of significant symptoms):   Risk status (Self / Other harm or suicidal ideation)   Patient denies current fears or concerns for personal safety.   Patient denies current or recent suicidal ideation or behaviors.   Patient denies current or recent homicidal ideation or behaviors.   Patient denies current or recent self injurious behavior or ideation.   Patient denies other safety concerns.   Patient reports there has been no change in risk factors since their last session.     Patient reports there has been no change in protective factors since their last session.     Recommended that patient call 911 or go to the local ED should there be a change in any of these risk factors.     Appearance:   Appropriate    Eye Contact:   Good    Psychomotor Behavior: Hyperactive  Restless    Attitude:   Cooperative    Orientation:   All   Speech    Rate / Production: Pressured  Stutters    Volume:  Normal    Mood:    Anxious   Anhedonia   Affect:    Constricted  Flat    Thought Content:  Clear    Thought Form:  Coherent    Insight:    Good      Medication Review:   No changes to current psychiatric medication(s)     Medication Compliance:   Yes     Changes in Health Issues:   None reported     Chemical Use Review:   Substance Use: Chemical use reviewed, no active concerns identified      Tobacco Use: No current tobacco use.      Diagnosis:  1. Autism spectrum disorder    2. JOSHUA (generalized anxiety disorder)    3. Mild episode of recurrent major depressive disorder (H)    4. Attention deficit hyperactivity disorder (ADHD), other type        Collateral Reports Completed:   Not Applicable    PLAN: (Patient Tasks / Therapist Tasks / Other)  Patient will return for a virtual visit in 2 weeks  Patient will continue stress management techniques and discussion around specific options based upon diagnoses   Discussion around starting to use daily planner again     There has been demonstrated improvement in functioning while patient has been engaged in psychotherapy/psychological service- if withdrawn the patient would deteriorate and/or relapse.       Fanny Cobb, [facility]                                                         ______________________________________________________________________    Individual Treatment Plan    Patient's Name: Angela Jones  YOB: 1995    Date of Creation: 1/19/2022  Date Treatment Plan Last Reviewed/Revised: 6/29/2022    DSM5 Diagnoses: Autism Spectrum Disorder 299.00(F84.0)  Associated with another neurodevelopmental, mental or behavioral disorder and Attention-Deficit/Hyperactivity Disorder  314.01 (F90.9) Unspecified Attention -Deficit / Hyperactivity Disorder, 296.31 (F33.0) Major Depressive Disorder, Recurrent Episode, Mild _ or 300.02 (F41.1) Generalized Anxiety Disorder  Psychosocial / Contextual Factors: 27 year old  female, , no children   PROMIS (reviewed every 90  days):   PROMIS 10-Global Health (only subscores and total score):   PROMIS-10 Scores Only 12/30/2021 1/19/2022 2/15/2022 3/9/2022   Global Mental Health Score 11 11 9 12   Global Physical Health Score 11 11 11 11   PROMIS TOTAL - SUBSCORES 22 22 20 23       Referral / Collaboration:  Was/were discussed and patient will pursue.    Anticipated number of session for this episode of care: 9  Anticipation frequency of session: every 2-3 weeks  Anticipated Duration of each session: 38-52 minutes  Treatment plan will be reviewed in 90 days or when goals have been changed.       MeasurableTreatment Goal(s) related to diagnosis / functional impairment(s)  Goal 1: Patient will manage symptoms of anxiety     I will know I've met my goal when I feel more confident in my ability to cope with stress with fewer meltdowns.      Objective #A (Patient Action)    Patient will identify the initial signs or symptoms of anxiety.  Status: Continued - Date(s): 6/29/2022     Intervention(s)  Therapist will teach help patient gain insight into signs of stress (physically and emotionally).    Objective #B  Patient will use relaxation strategies multiple times per day to reduce the physical symptoms of anxiety.  Status: Continued - Date(s): 6/29/2022      Intervention(s)  Therapist will teach diaphragmatic breathing and other grounding skills.    Objective #C  Patient will use thought-stopping strategy daily to reduce intrusive thoughts.  Status: Continued - Date(s): 6/29/2022      Intervention(s)  Therapist will teach thought stopping techniques.      Goal 2: Patient will manage symptoms of depression    I will know I've met my goal when I feel more confident in my ability to express my emotions in a healthy way.      Objective #A (Patient Action)    Status: Continued - Date(s): 6/29/2022      Patient will Increase interest, engagement, and pleasure in doing things.    Intervention(s)  Therapist will teach emotional  recognition/identification. *.    Objective #B  Patient will Identify negative self-talk and behaviors: challenge core beliefs, myths, and actions.    Status: Continued - Date(s): 6/29/2022      Intervention(s)  Therapist will help patient practice positive self-talk and thought re-framing.      Goal 3: Patient will improve communication patterns in the relationship    I will know I've met my goal when we attend couples counseling and prioritize our relationship.      Objective #A (Patient Action)    Status: Continued - Date(s): 6/29/2022      Patient will get established with a couples counselor through [facility] (referral made on 3/9/22). Referral made again in June. Patient has to call to set up appointment.     Intervention(s)  Therapist will monitor referral process and follow-up on their relationship problems and improvements      Patient has reviewed and agreed to the above plan.      Fanny Cobb, [facility]  June 29, 2022

## 2022-09-26 NOTE — PROGRESS NOTES
"    [facility]                                     Progress Note    Patient Name: Angela Jones  Date: 9/27/2022         Service Type: Couple      Session Start Time: 9:36 a.m.  Session End Time: 10:34 a.m.     Session Length: 58 minutes    Session #: 3 (with this provider - both have individual therapists)    Attendees: Client and Spouse / Significant Other    Service Modality:  Video Visit:      Provider verified identity through the following two step process.  Patient provided:  Patient was verified at admission/transfer    Telemedicine Visit: The patient's condition can be safely assessed and treated via synchronous audio and visual telemedicine encounter.      Reason for Telemedicine Visit: Patient convenience (e.g. access to timely appointments / distance to available provider)    Originating Site (Patient Location): Patient's home    Distant Site (Provider Location): [facility]: [place]    Consent:  The patient/guardian has verbally consented to: the potential risks and benefits of telemedicine (video visit) versus in person care; bill my insurance or make self-payment for services provided; and responsibility for payment of non-covered services.     Patient would like the video invitation sent by:  My Chart    Mode of Communication:  Video Conference via [employer]    As the provider I attest to compliance with applicable laws and regulations related to telemedicine.    DATA  Interactive Complexity: No  Crisis: No        Progress Since Last Session (Related to Symptoms / Goals / Homework):   Symptoms: Slight improvement in communication and patient reports \"we are making each other laugh more.\"  Spouse admits that sometimes he feels like patient's caretaker, and he grieves the loss of the opportunity to live on his own prior to their relationship, which showed vulnerability for spouse.  Patient notes that she has made positive changes during the past six months and has been " supporting partner.    Homework: Partially completed - will go to [employer] on Sunday      Episode of Care Goals: Minimal progress - PREPARATION (Decided to change - considering how); Intervened by negotiating a change plan and determining options / strategies for behavior change, identifying triggers, exploring social supports, and working towards setting a date to begin behavior change     Current / Ongoing Stressors and Concerns:   Difficulties in relationship related to each partner's mental and physical health; communication struggles; executive functioning and health concerns leading to problems completing weekly household chores     Treatment Objective(s) Addressed in This Session:   learn about and utilize bids for connection and turning towards partner instead of turning away  validate their partner's experiences and feelings and show appreciation for their partner  use cognitive strategies identified in therapy to challenge anxious thoughts  identify primary triggers for anxiety related to finances and extended family  fully discuss family of origin patterns related to communication and finances and will determine what worked and what didn't, and what they would like to implement and avoid in their relationship    Past/future:  identify barriers to completing household tasks  identify a time in the relationship when the couple successfully completed household tasks and will determine whether past strategies will be effective (and will develop new strategies if needed)  learn about Gwen's Four Horsemen of the Apocalypse and will identify these within the relationship  identify components of the [facility] and will work to strengthen friendship, adaptively handle conflict, and create shared meaning/dreams     Intervention:   CBT: Gwen - connection between thoughts, feelings, and actions  Solution Focused: identifying solvable problems and generating possible solutions; if problem is not  "solvable, identifying compromise and boundaries/deal-breakers  Narrative: \"the story I'm making up,\"  problem from person, and finding bright spots   Family systems: patterns, learned behaviors, communication    Past/future:    Assessments completed prior to visit:  The following assessments were completed by patient for this visit:  PHQ9:   PHQ-9 SCORE 5/4/2022 6/16/2022 6/29/2022 8/10/2022 8/17/2022 9/27/2022 9/27/2022   PHQ-9 Total Score - - - - - - -   PHQ-9 Total Score MyChart 11 (Moderate depression) 10 (Moderate depression) 9 (Mild depression) 8 (Mild depression) 7 (Mild depression) - 11 (Moderate depression)   PHQ-9 Total Score 11 10 9 8 7 11 11     GAD7:   JOSHUA-7 SCORE 2/15/2022 3/9/2022 3/9/2022 5/4/2022 6/29/2022 9/27/2022 9/27/2022   Total Score - - - - - - -   Total Score 10 (moderate anxiety) 7 (mild anxiety) 6 (mild anxiety) 5 (mild anxiety) 6 (mild anxiety) - 8 (mild anxiety)   Total Score 7 6 7 5 6 8 8   Total Score - - - - - - -     PROMIS 10-Global Health (all questions and answers displayed):   PROMIS 10 12/30/2021 1/19/2022 2/15/2022 3/9/2022 6/16/2022 6/29/2022   In general, would you say your health is: Fair Fair Fair Fair Fair Fair   In general, would you say your quality of life is: Good Good Good Good Good Good   In general, how would you rate your physical health? Fair Fair Fair Fair Fair Fair   In general, how would you rate your mental health, including your mood and your ability to think? Fair Fair Fair Good Fair Fair   In general, how would you rate your satisfaction with your social activities and relationships? Very good Good Good Very good Good Good   In general, please rate how well you carry out your usual social activities and roles Good Good Fair Good Poor Fair   To what extent are you able to carry out your everyday physical activities such as walking, climbing stairs, carrying groceries, or moving a chair? Moderately Moderately Moderately Moderately A little " Moderately   How often have you been bothered by emotional problems such as feeling anxious, depressed or irritable? Often Sometimes Always Often Often Often   How would you rate your fatigue on average? Moderate Moderate Moderate Moderate Severe Moderate   How would you rate your pain on average?   0 = No Pain  to  10 = Worst Imaginable Pain 4 4 4 4 4 4   In general, would you say your health is: 2 2 2 2 2 2   In general, would you say your quality of life is: 3 3 3 3 3 3   In general, how would you rate your physical health? 2 2 2 2 2 2   In general, how would you rate your mental health, including your mood and your ability to think? 2 2 2 3 2 2   In general, how would you rate your satisfaction with your social activities and relationships? 4 3 3 4 3 3   In general, please rate how well you carry out your usual social activities and roles. (This includes activities at home, at work and in your community, and responsibilities as a parent, child, spouse, employee, friend, etc.) 3 3 2 3 1 2   To what extent are you able to carry out your everyday physical activities such as walking, climbing stairs, carrying groceries, or moving a chair? 3 3 3 3 2 3   In the past 7 days, how often have you been bothered by emotional problems such as feeling anxious, depressed, or irritable? 4 3 5 4 4 4   In the past 7 days, how would you rate your fatigue on average? 3 3 3 3 4 3   In the past 7 days, how would you rate your pain on average, where 0 means no pain, and 10 means worst imaginable pain? 4 4 4 4 4 4   Global Mental Health Score 11 11 9 12 10 10   Global Physical Health Score 11 11 11 11 9 11   PROMIS TOTAL - SUBSCORES 22 22 20 23 19 21   Some recent data might be hidden         ASSESSMENT: Current Emotional / Mental Status (status of significant symptoms):   Risk status (Self / Other harm or suicidal ideation)   Patient denies current fears or concerns for personal safety.   Patient denies current or recent suicidal  "ideation or behaviors.   Patient denies current or recent homicidal ideation or behaviors.   Patient denies current or recent self injurious behavior or ideation.   Patient denies other safety concerns.   Patient reports there has been no change in risk factors since their last session.     Patient reports there has been no change in protective factors since their last session.     Recommended that patient call 911 or go to the local ED should there be a change in any of these risk factors.     Appearance:   Appropriate    Eye Contact:   Fair    Psychomotor Behavior: Normal    Attitude:   Cooperative    Orientation:   All   Speech    Rate / Production: Sometimes needs additional processing time; Normal     Volume:  Normal    Mood:    Anxious  Sad    Affect:    Appropriate  Subdued    Thought Content:  Rumination  sometimes perseverative   Thought Form:  Coherent  Circumstantial   Insight:    Good      Medication Review:   No changes to current psychiatric medication(s)     Medication Compliance:   Yes     Changes in Health Issues:   None reported - ongoing chronic health issues for both patients (fatigue, pain, POTS)     Chemical Use Review:   Substance Use: Chemical use reviewed, no active concerns identified      Tobacco Use: No current tobacco use.      Diagnosis:  1. Recurrent major depressive disorder, in partial remission (H)    2. Autism spectrum disorder    3. Generalized anxiety disorder      Collateral Reports Completed:   Not Applicable    PLAN: (Patient Tasks / Therapist Tasks / Other)    Patient and partner report the following goals:    Patient: work on \"building up\" spouse more  Spouse: to not \"shy away from important conversations\"      Tayla Rodrigues                                                                                                      Couple Treatment Plan    Patient's Name: Angela Jones  YOB: 1995    Date of Creation: 9/13/2022  Date Treatment Plan Last " Reviewed/Revised: 9/13/2022    DSM5 Diagnoses: Autism Spectrum Disorder 299.00(F84.0)  Associated Current severity for Criterion A and Criterion B: 299.00(F84.0) Without accompanying intellectual impairment; 296.35 (F33.41)  Major Depressive Disorder, Recurrent Episode, In partial remission _; 300.02 (F41.1) Generalized Anxiety Disorder    Psychosocial / Contextual Factors: Difficulties in relationship related to each partner's mental and physical health; communication struggles; executive functioning and health concerns leading to problems completing weekly household chores    PROMIS (reviewed every 90 days): PROMIS-10 Scores           Referral / Collaboration:  Referral to another professional/service is not indicated at this time.    Anticipated number of sessions for this episode of care: 9-12 sessions  Anticipation frequency of session: Every other week  Anticipated duration of each session: 38-52 minutes  Treatment plan will be reviewed in 90 days or when goals have been changed.     Measurable Treatment Goal(s) related to diagnosis / functional impairment(s)  Goal 1: Patient and spouse will learn about Dr. Diallo Hidalgo's successful relationship skills and will work to improve their communication patterns.    Objective #A (Patient Action)    Patient (and spouse) will learn about and utilize bids for connection and turning towards partner instead of turning away.  Status: New - Date: 9/13/2022     Intervention(s)  Therapist will teach teach about bids and turning towards.    Objective #B  Patient (and spouse) will learn about Gwen's Four Horsemen of the Apocalypse and will identify these within the relationship.  Status: New - Date: 9/13/2022     Intervention(s)  Therapist will teach about Gwen's research on what leads to relationships ending.    Objective #C  Patient (and spouse) will identify components of the [facility] Relationship [facility] and will work to strengthen friendship, adaptively handle conflict,  "and create shared meaning/dreams.  Status: New - Date: 9/13/2022     Intervention(s)  Therapist will teach about the [facility] and managing conflict with a soft start-up.      Goal 2: Patient and spouse will discuss and identify ways to complete weekly household tasks while taking into account each partner's chronic health issues.    Objective #A (Patient Action)    Status: New - Date: 9/13/2022     Patient (and spouse) will identify barriers to completing household tasks.    Intervention(s)  Therapist will collaborate with the couple in session to determine these.    Objective #B  Patient (and spouse) will identify a time in the relationship when the couple successfully completed household tasks and will determine whether past strategies will be effective (and will develop new strategies if needed).   Status: New - Date: 9/13/2022     Intervention(s)  Therapist will collaborate with patients in session to determine these and to identify past family of origin patterns contributing to these perpetual struggles.    Objective #C  Patient (and spouse) will validate their partner's experiences and feelings and show appreciation for their partner.  Status: New - Date: 9/13/2022     Intervention(s)  Therapist will role-play effective communication skills.      Goal 3: Patient and spouse will identify reasons for and adaptive ways to cope with financial and extended family anxiety/stressors.    Objective #A (Patient Action)    Status: New - Date: 9/13/2022     Patient (and spouse) will use cognitive strategies identified in therapy to challenge anxious thoughts.     Intervention(s)  Therapist will teach Claus Leal's \"the story I'm making up\" and building trust.    Objective #B  Patient (and spouse) will identify primary triggers for anxiety related to finances and extended family.    Status: New - Date: 9/13/2022     Intervention(s)  Therapist will collaborate with the couple in session to determine " these.    Objective #C  Patient (and spouse) will fully discuss family of origin patterns related to communication and finances and will determine what worked and what didn't, and what they would like to implement and avoid in their relationship.  Status: New - Date: 9/13/2022     Intervention(s)  Therapist will teach about family systems and learned patterns of behavior stemming from family of origin.      Patient and significant other/spouse agreed to the above plan.      Tayla Rodrigues  September 13, 2022

## 2022-09-27 ENCOUNTER — VIRTUAL VISIT (OUTPATIENT)
Dept: PSYCHOLOGY | Facility: CLINIC | Age: 27
End: 2022-09-27
Payer: COMMERCIAL

## 2022-09-27 DIAGNOSIS — F84.0 AUTISM SPECTRUM DISORDER: ICD-10-CM

## 2022-09-27 DIAGNOSIS — F33.41 RECURRENT MAJOR DEPRESSIVE DISORDER, IN PARTIAL REMISSION (H): Primary | ICD-10-CM

## 2022-09-27 DIAGNOSIS — F41.1 GENERALIZED ANXIETY DISORDER: ICD-10-CM

## 2022-09-27 PROCEDURE — 90847 FAMILY PSYTX W/PT 50 MIN: CPT | Mod: 95 | Performed by: MARRIAGE & FAMILY THERAPIST

## 2022-09-27 ASSESSMENT — ANXIETY QUESTIONNAIRES
8. IF YOU CHECKED OFF ANY PROBLEMS, HOW DIFFICULT HAVE THESE MADE IT FOR YOU TO DO YOUR WORK, TAKE CARE OF THINGS AT HOME, OR GET ALONG WITH OTHER PEOPLE?: SOMEWHAT DIFFICULT
2. NOT BEING ABLE TO STOP OR CONTROL WORRYING: SEVERAL DAYS
GAD7 TOTAL SCORE: 8
1. FEELING NERVOUS, ANXIOUS, OR ON EDGE: MORE THAN HALF THE DAYS
6. BECOMING EASILY ANNOYED OR IRRITABLE: MORE THAN HALF THE DAYS
GAD7 TOTAL SCORE: 8
4. TROUBLE RELAXING: SEVERAL DAYS
7. FEELING AFRAID AS IF SOMETHING AWFUL MIGHT HAPPEN: NOT AT ALL
GAD7 TOTAL SCORE: 8
7. FEELING AFRAID AS IF SOMETHING AWFUL MIGHT HAPPEN: NOT AT ALL
5. BEING SO RESTLESS THAT IT IS HARD TO SIT STILL: SEVERAL DAYS
IF YOU CHECKED OFF ANY PROBLEMS ON THIS QUESTIONNAIRE, HOW DIFFICULT HAVE THESE PROBLEMS MADE IT FOR YOU TO DO YOUR WORK, TAKE CARE OF THINGS AT HOME, OR GET ALONG WITH OTHER PEOPLE: SOMEWHAT DIFFICULT
3. WORRYING TOO MUCH ABOUT DIFFERENT THINGS: SEVERAL DAYS

## 2022-09-27 ASSESSMENT — PATIENT HEALTH QUESTIONNAIRE - PHQ9
SUM OF ALL RESPONSES TO PHQ QUESTIONS 1-9: 11
10. IF YOU CHECKED OFF ANY PROBLEMS, HOW DIFFICULT HAVE THESE PROBLEMS MADE IT FOR YOU TO DO YOUR WORK, TAKE CARE OF THINGS AT HOME, OR GET ALONG WITH OTHER PEOPLE: SOMEWHAT DIFFICULT
SUM OF ALL RESPONSES TO PHQ QUESTIONS 1-9: 11

## 2022-09-27 NOTE — PATIENT INSTRUCTIONS
"Patient and partner report the following goals:    Patient: work on \"building up\" spouse more  Spouse: to not \"shy away from important conversations\"  "

## 2022-09-28 ENCOUNTER — TELEPHONE (OUTPATIENT)
Dept: PSYCHOLOGY | Facility: CLINIC | Age: 27
End: 2022-09-28

## 2022-09-28 NOTE — TELEPHONE ENCOUNTER
Patient did not appear for scheduled video visit. Therapist called and left patient voice message, requesting patient call intake or send therapist a mychart message. Patient is scheduled for a follow-up visit in 2 weeks.

## 2022-10-09 ENCOUNTER — MYC MEDICAL ADVICE (OUTPATIENT)
Dept: FAMILY MEDICINE | Facility: CLINIC | Age: 27
End: 2022-10-09

## 2022-10-10 NOTE — PROGRESS NOTES
[facility]                                     Progress Note    Patient Name: Angela Jones  Date: 10/11/2022         Service Type: Couple      Session Start Time: 9:35 a.m.  Session End Time: 10:27 a.m.     Session Length: 52 minutes    Session #: 4 (with this provider - both have individual therapists)    Attendees: Client and Spouse / Significant Other    Service Modality:  Video Visit:      Provider verified identity through the following two step process.  Patient provided:  Patient was verified at admission/transfer    Telemedicine Visit: The patient's condition can be safely assessed and treated via synchronous audio and visual telemedicine encounter.      Reason for Telemedicine Visit: Patient convenience (e.g. access to timely appointments / distance to available provider)    Originating Site (Patient Location): Patient's home    Distant Site (Provider Location): [facility]: [place]    Consent:  The patient/guardian has verbally consented to: the potential risks and benefits of telemedicine (video visit) versus in person care; bill my insurance or make self-payment for services provided; and responsibility for payment of non-covered services.     Patient would like the video invitation sent by:  My Chart    Mode of Communication:  Video Conference via [employer]    As the provider I attest to compliance with applicable laws and regulations related to telemedicine.    DATA  Interactive Complexity: No  Crisis: No        Progress Since Last Session (Related to Symptoms / Goals / Homework):   Symptoms: Slight improvement in communication (continued) and patient's spouse states that he has been working on managing his emotional reactivity more.  On-going difficulties with household management tasks; patient notes that they do have a laundry service booked but can not yet afford a  every other week.    Homework: Achieved / completed to satisfaction      Episode of  Care Goals: Minimal progress - PREPARATION (Decided to change - considering how); Intervened by negotiating a change plan and determining options / strategies for behavior change, identifying triggers, exploring social supports, and working towards setting a date to begin behavior change     Current / Ongoing Stressors and Concerns:   Difficulties in relationship related to each partner's mental and physical health; communication struggles; executive functioning and health concerns leading to problems completing weekly household chores     Treatment Objective(s) Addressed in This Session:   identify barriers to completing household tasks  identify a time in the relationship when the couple successfully completed household tasks and will determine whether past strategies will be effective (and will develop new strategies if needed)  learn about Gwen's Four Horsemen of the Apocalypse and will identify these within the relationship  learn about and utilize bids for connection and turning towards partner instead of turning away  validate their partner's experiences and feelings and show appreciation for their partner  fully discuss family of origin patterns related to communication and finances and will determine what worked and what didn't, and what they would like to implement and avoid in their relationship    Past/future:  use cognitive strategies identified in therapy to challenge anxious thoughts  identify primary triggers for anxiety related to finances and extended family  identify components of the [facility] and will work to strengthen friendship, adaptively handle conflict, and create shared meaning/dreams     Intervention:   CBT: Gwen - connection between thoughts, feelings, and actions  Solution Focused: identifying solvable problems and generating possible solutions; if problem is not solvable, identifying compromise and boundaries/deal-breakers  Family systems: patterns, learned  "behaviors, communication    Past/future:  Narrative: \"the story I'm making up,\"  problem from person, and finding bright spots     Assessments completed prior to visit:  The following assessments were completed by patient for this visit:  PHQ9:   PHQ-9 SCORE 5/4/2022 6/16/2022 6/29/2022 8/10/2022 8/17/2022 9/27/2022 9/27/2022   PHQ-9 Total Score - - - - - - -   PHQ-9 Total Score MyChart 11 (Moderate depression) 10 (Moderate depression) 9 (Mild depression) 8 (Mild depression) 7 (Mild depression) - 11 (Moderate depression)   PHQ-9 Total Score 11 10 9 8 7 11 11     GAD7:   JOSHUA-7 SCORE 2/15/2022 3/9/2022 3/9/2022 5/4/2022 6/29/2022 9/27/2022 9/27/2022   Total Score - - - - - - -   Total Score 10 (moderate anxiety) 7 (mild anxiety) 6 (mild anxiety) 5 (mild anxiety) 6 (mild anxiety) - 8 (mild anxiety)   Total Score 7 6 7 5 6 8 8   Total Score - - - - - - -     PROMIS 10-Global Health (all questions and answers displayed):   PROMIS 10 12/30/2021 1/19/2022 2/15/2022 3/9/2022 6/16/2022 6/29/2022   In general, would you say your health is: Fair Fair Fair Fair Fair Fair   In general, would you say your quality of life is: Good Good Good Good Good Good   In general, how would you rate your physical health? Fair Fair Fair Fair Fair Fair   In general, how would you rate your mental health, including your mood and your ability to think? Fair Fair Fair Good Fair Fair   In general, how would you rate your satisfaction with your social activities and relationships? Very good Good Good Very good Good Good   In general, please rate how well you carry out your usual social activities and roles Good Good Fair Good Poor Fair   To what extent are you able to carry out your everyday physical activities such as walking, climbing stairs, carrying groceries, or moving a chair? Moderately Moderately Moderately Moderately A little Moderately   How often have you been bothered by emotional problems such as feeling anxious, depressed or " irritable? Often Sometimes Always Often Often Often   How would you rate your fatigue on average? Moderate Moderate Moderate Moderate Severe Moderate   How would you rate your pain on average?   0 = No Pain  to  10 = Worst Imaginable Pain 4 4 4 4 4 4   In general, would you say your health is: 2 2 2 2 2 2   In general, would you say your quality of life is: 3 3 3 3 3 3   In general, how would you rate your physical health? 2 2 2 2 2 2   In general, how would you rate your mental health, including your mood and your ability to think? 2 2 2 3 2 2   In general, how would you rate your satisfaction with your social activities and relationships? 4 3 3 4 3 3   In general, please rate how well you carry out your usual social activities and roles. (This includes activities at home, at work and in your community, and responsibilities as a parent, child, spouse, employee, friend, etc.) 3 3 2 3 1 2   To what extent are you able to carry out your everyday physical activities such as walking, climbing stairs, carrying groceries, or moving a chair? 3 3 3 3 2 3   In the past 7 days, how often have you been bothered by emotional problems such as feeling anxious, depressed, or irritable? 4 3 5 4 4 4   In the past 7 days, how would you rate your fatigue on average? 3 3 3 3 4 3   In the past 7 days, how would you rate your pain on average, where 0 means no pain, and 10 means worst imaginable pain? 4 4 4 4 4 4   Global Mental Health Score 11 11 9 12 10 10   Global Physical Health Score 11 11 11 11 9 11   PROMIS TOTAL - SUBSCORES 22 22 20 23 19 21   Some recent data might be hidden         ASSESSMENT: Current Emotional / Mental Status (status of significant symptoms):   Risk status (Self / Other harm or suicidal ideation)   Patient denies current fears or concerns for personal safety.   Patient denies current or recent suicidal ideation or behaviors.   Patient denies current or recent homicidal ideation or behaviors.   Patient denies  current or recent self injurious behavior or ideation.   Patient denies other safety concerns.   Patient reports there has been no change in risk factors since their last session.     Patient reports there has been no change in protective factors since their last session.     Recommended that patient call 911 or go to the local ED should there be a change in any of these risk factors.     Appearance:   Appropriate    Eye Contact:   Fair    Psychomotor Behavior: Normal  Restless    Attitude:   Cooperative    Orientation:   All   Speech    Rate / Production: Sometimes needs additional processing time; Normal     Volume:  Normal    Mood:    Anxious  Sad    Affect:    Restricted    Thought Content:  Rumination  sometimes perseverative   Thought Form:  Coherent  Circumstantial   Insight:    Good      Medication Review:   No changes to current psychiatric medication(s)     Medication Compliance:   Yes     Changes in Health Issues:   None reported - ongoing chronic health issues for both patients (fatigue, pain, POTS)     Chemical Use Review:   Substance Use: Chemical use reviewed, no active concerns identified      Tobacco Use: No current tobacco use.      Diagnosis:  1. Recurrent major depressive disorder, in partial remission (H)    2. Autism spectrum disorder    3. Generalized anxiety disorder      Collateral Reports Completed:   Not Applicable    PLAN: (Patient Tasks / Therapist Tasks / Other)    Patient and partner report the following goals:    1. Work together to make sure laundry gets to the laundry service tomorrow.  2. Continue to turn towards each other and to focus on having more positive interactions  3. Continue to work on communication and reactivity      Tayla Rodrigues                                                                                                      Couple Treatment Plan    Patient's Name: Angela Jones  YOB: 1995    Date of Creation: 9/13/2022  Date Treatment Plan  Last Reviewed/Revised: 9/13/2022    DSM5 Diagnoses: Autism Spectrum Disorder 299.00(F84.0)  Associated Current severity for Criterion A and Criterion B: 299.00(F84.0) Without accompanying intellectual impairment; 296.35 (F33.41)  Major Depressive Disorder, Recurrent Episode, In partial remission _; 300.02 (F41.1) Generalized Anxiety Disorder    Psychosocial / Contextual Factors: Difficulties in relationship related to each partner's mental and physical health; communication struggles; executive functioning and health concerns leading to problems completing weekly household chores    PROMIS (reviewed every 90 days): PROMIS-10 Scores           Referral / Collaboration:  Referral to another professional/service is not indicated at this time.    Anticipated number of sessions for this episode of care: 9-12 sessions  Anticipation frequency of session: Every other week  Anticipated duration of each session: 38-52 minutes  Treatment plan will be reviewed in 90 days or when goals have been changed.     Measurable Treatment Goal(s) related to diagnosis / functional impairment(s)  Goal 1: Patient and spouse will learn about Dr. Diallo Hidalgo's successful relationship skills and will work to improve their communication patterns.    Objective #A (Patient Action)    Patient (and spouse) will learn about and utilize bids for connection and turning towards partner instead of turning away.  Status: New - Date: 9/13/2022     Intervention(s)  Therapist will teach teach about bids and turning towards.    Objective #B  Patient (and spouse) will learn about Gwen's Four Horsemen of the Apocalypse and will identify these within the relationship.  Status: New - Date: 9/13/2022     Intervention(s)  Therapist will teach about Gwen's research on what leads to relationships ending.    Objective #C  Patient (and spouse) will identify components of the [facility] and will work to strengthen friendship, adaptively handle  "conflict, and create shared meaning/dreams.  Status: New - Date: 9/13/2022     Intervention(s)  Therapist will teach about the [facility] and managing conflict with a soft start-up.      Goal 2: Patient and spouse will discuss and identify ways to complete weekly household tasks while taking into account each partner's chronic health issues.    Objective #A (Patient Action)    Status: New - Date: 9/13/2022     Patient (and spouse) will identify barriers to completing household tasks.    Intervention(s)  Therapist will collaborate with the couple in session to determine these.    Objective #B  Patient (and spouse) will identify a time in the relationship when the couple successfully completed household tasks and will determine whether past strategies will be effective (and will develop new strategies if needed).   Status: New - Date: 9/13/2022     Intervention(s)  Therapist will collaborate with patients in session to determine these and to identify past family of origin patterns contributing to these perpetual struggles.    Objective #C  Patient (and spouse) will validate their partner's experiences and feelings and show appreciation for their partner.  Status: New - Date: 9/13/2022     Intervention(s)  Therapist will role-play effective communication skills.      Goal 3: Patient and spouse will identify reasons for and adaptive ways to cope with financial and extended family anxiety/stressors.    Objective #A (Patient Action)    Status: New - Date: 9/13/2022     Patient (and spouse) will use cognitive strategies identified in therapy to challenge anxious thoughts.     Intervention(s)  Therapist will teach Claus Leal's \"the story I'm making up\" and building trust.    Objective #B  Patient (and spouse) will identify primary triggers for anxiety related to finances and extended family.    Status: New - Date: 9/13/2022     Intervention(s)  Therapist will collaborate with the couple in session to " determine these.    Objective #C  Patient (and spouse) will fully discuss family of origin patterns related to communication and finances and will determine what worked and what didn't, and what they would like to implement and avoid in their relationship.  Status: New - Date: 9/13/2022     Intervention(s)  Therapist will teach about family systems and learned patterns of behavior stemming from family of origin.      Patient and significant other/spouse agreed to the above plan.      Tayla Rodrigues  September 13, 2022

## 2022-10-10 NOTE — TELEPHONE ENCOUNTER
Responded to patient via [employer]  Recommended an in person or virtual visit.    LAUREN MatosN, RN  [facility]

## 2022-10-11 ENCOUNTER — VIRTUAL VISIT (OUTPATIENT)
Dept: PSYCHOLOGY | Facility: CLINIC | Age: 27
End: 2022-10-11
Payer: COMMERCIAL

## 2022-10-11 DIAGNOSIS — F33.41 RECURRENT MAJOR DEPRESSIVE DISORDER, IN PARTIAL REMISSION (H): Primary | ICD-10-CM

## 2022-10-11 DIAGNOSIS — F84.0 AUTISM SPECTRUM DISORDER: ICD-10-CM

## 2022-10-11 DIAGNOSIS — F41.1 GENERALIZED ANXIETY DISORDER: ICD-10-CM

## 2022-10-11 PROCEDURE — 90847 FAMILY PSYTX W/PT 50 MIN: CPT | Mod: 95 | Performed by: MARRIAGE & FAMILY THERAPIST

## 2022-10-11 NOTE — PATIENT INSTRUCTIONS
Patient and partner report the following goals:    Work together to make sure laundry gets to the laundry service tomorrow.  2. Continue to turn towards each other and to focus on having more positive interactions  3. Continue to work on communication and reactivity

## 2022-10-12 ENCOUNTER — VIRTUAL VISIT (OUTPATIENT)
Dept: BEHAVIORAL HEALTH | Facility: CLINIC | Age: 27
End: 2022-10-12
Payer: COMMERCIAL

## 2022-10-12 DIAGNOSIS — F41.1 GAD (GENERALIZED ANXIETY DISORDER): ICD-10-CM

## 2022-10-12 DIAGNOSIS — F33.0 MILD EPISODE OF RECURRENT MAJOR DEPRESSIVE DISORDER (H): ICD-10-CM

## 2022-10-12 DIAGNOSIS — F90.8 ATTENTION DEFICIT HYPERACTIVITY DISORDER (ADHD), OTHER TYPE: ICD-10-CM

## 2022-10-12 DIAGNOSIS — F84.0 AUTISM SPECTRUM DISORDER: Primary | ICD-10-CM

## 2022-10-12 PROCEDURE — 90837 PSYTX W PT 60 MINUTES: CPT | Mod: 95 | Performed by: SOCIAL WORKER

## 2022-10-12 ASSESSMENT — ANXIETY QUESTIONNAIRES
IF YOU CHECKED OFF ANY PROBLEMS ON THIS QUESTIONNAIRE, HOW DIFFICULT HAVE THESE PROBLEMS MADE IT FOR YOU TO DO YOUR WORK, TAKE CARE OF THINGS AT HOME, OR GET ALONG WITH OTHER PEOPLE: SOMEWHAT DIFFICULT
1. FEELING NERVOUS, ANXIOUS, OR ON EDGE: MORE THAN HALF THE DAYS
3. WORRYING TOO MUCH ABOUT DIFFERENT THINGS: SEVERAL DAYS
GAD7 TOTAL SCORE: 8
5. BEING SO RESTLESS THAT IT IS HARD TO SIT STILL: NOT AT ALL
7. FEELING AFRAID AS IF SOMETHING AWFUL MIGHT HAPPEN: NOT AT ALL
GAD7 TOTAL SCORE: 8
6. BECOMING EASILY ANNOYED OR IRRITABLE: MORE THAN HALF THE DAYS
2. NOT BEING ABLE TO STOP OR CONTROL WORRYING: MORE THAN HALF THE DAYS
4. TROUBLE RELAXING: SEVERAL DAYS
8. IF YOU CHECKED OFF ANY PROBLEMS, HOW DIFFICULT HAVE THESE MADE IT FOR YOU TO DO YOUR WORK, TAKE CARE OF THINGS AT HOME, OR GET ALONG WITH OTHER PEOPLE?: SOMEWHAT DIFFICULT
GAD7 TOTAL SCORE: 8
7. FEELING AFRAID AS IF SOMETHING AWFUL MIGHT HAPPEN: NOT AT ALL

## 2022-10-12 ASSESSMENT — PATIENT HEALTH QUESTIONNAIRE - PHQ9
10. IF YOU CHECKED OFF ANY PROBLEMS, HOW DIFFICULT HAVE THESE PROBLEMS MADE IT FOR YOU TO DO YOUR WORK, TAKE CARE OF THINGS AT HOME, OR GET ALONG WITH OTHER PEOPLE: SOMEWHAT DIFFICULT
SUM OF ALL RESPONSES TO PHQ QUESTIONS 1-9: 7
SUM OF ALL RESPONSES TO PHQ QUESTIONS 1-9: 7

## 2022-10-12 NOTE — PROGRESS NOTES
[facility]                                     Progress Note    Patient Name: Angela Jones  Date: 10/12/2022         Service Type: Individual      Session Start Time: 1:00pm  Session End Time: 1:53pm     Session Length: 53 minutes    Session #: 12    Attendees: Client attended alone    Service Modality:  Video Visit:      Provider verified identity through the following two step process.  Patient provided:  Patient is known previously to provider    Telemedicine Visit: The patient's condition can be safely assessed and treated via synchronous audio and visual telemedicine encounter.      Reason for Telemedicine Visit: Patient has requested telehealth visit    Originating Site (Patient Location): Patient's home    Distant Site (Provider Location): Provider Remote Setting- Home Office    Consent:  The patient/guardian has verbally consented to: the potential risks and benefits of telemedicine (video visit) versus in person care; bill my insurance or make self-payment for services provided; and responsibility for payment of non-covered services.     Patient would like the video invitation sent by:  My Chart    Mode of Communication:  Video Conference via Amwell    As the provider I attest to compliance with applicable laws and regulations related to telemedicine.    DATA  Extended Session (53+ minutes): PROLONGED SERVICE IN THE OUTPATIENT SETTING REQUIRING DIRECT (FACE-TO-FACE) PATIENT CONTACT BEYOND THE USUAL SERVICE:    - Patient's presenting concerns require more intensive intervention than could be completed within the usual service  Interactive Complexity: No  Crisis: No        Progress Since Last Session (Related to Symptoms / Goals / Homework):   Symptoms: No change : anxiety and depression, relationship struggles    Homework: Partially completed      Episode of Care Goals: Minimal progress - PREPARATION (Decided to change - considering how); Intervened by negotiating a change plan and  "determining options / strategies for behavior change, identifying triggers, exploring social supports, and working towards setting a date to begin behavior change     Current / Ongoing Stressors and Concerns:  Feeling emotionally raw, fighting a lot with , they are in couples therapy which she believes is helpful but  is really struggling, they are trying to figure out how to adjust roles (caregiver versus partner), identifies as being co-dependent, ongoing struggles with household management (\"there's a lot of stuff I can't do\"), reflects upon changes in their foundation (ie stepping away from fundamentalist Alevism beliefs), did find a solution for medication management ([facility] system), no updates with MA process       Treatment Objective(s) Addressed in This Session:   identify the fears / thoughts that contribute to feeling anxious  state understanding of stressors and relationship to physical symptoms  Increase interest, engagement, and pleasure in doing things  Decrease frequency and intensity of feeling down, depressed, hopeless  Identify negative self-talk and behaviors: challenge core beliefs, myths, and actions  Gain insight     Intervention:   Discussed mindfulness as being aware of what we are experiencing while we are experiencing it.  Contrasted this with avoidance and rumination.  Explored the role of mindfulness as an overall coping strategy for managing depression, anxiety, and strong emotions.  Explained concept of state of mind using SIFT (sensations, images, feelings, thoughts) pneumonic.  Led client in a guided mindfulness exercise focusing on sensations, images, feelings, and thoughts.  Discussed mindfulness as a tool to intentionally shift our awareness and focus as needed.    Assessments completed prior to visit:   The following assessments were completed by patient for this visit:  PHQ9:   PHQ-9 SCORE 6/16/2022 6/29/2022 8/10/2022 8/17/2022 9/27/2022 9/27/2022 10/12/2022   PHQ-9 " Total Score - - - - - - -   PHQ-9 Total Score MyChart 10 (Moderate depression) 9 (Mild depression) 8 (Mild depression) 7 (Mild depression) - 11 (Moderate depression) 7 (Mild depression)   PHQ-9 Total Score 10 9 8 7 11 11 7     GAD7:   JOSHUA-7 SCORE 3/9/2022 3/9/2022 5/4/2022 6/29/2022 9/27/2022 9/27/2022 10/12/2022   Total Score - - - - - - -   Total Score 7 (mild anxiety) 6 (mild anxiety) 5 (mild anxiety) 6 (mild anxiety) - 8 (mild anxiety) 8 (mild anxiety)   Total Score 6 7 5 6 8 8 8   Total Score - - - - - - -     PROMIS 10-Global Health (only subscores and total score):   PROMIS-10 Scores Only 1/19/2022 2/15/2022 3/9/2022 6/16/2022 6/29/2022 10/11/2022 10/11/2022   Global Mental Health Score 11 9 12 10 10 13 13   Global Physical Health Score 11 11 11 9 11 11 11   PROMIS TOTAL - SUBSCORES 22 20 23 19 21 24 24         ASSESSMENT: Current Emotional / Mental Status (status of significant symptoms):   Risk status (Self / Other harm or suicidal ideation)   Patient denies current fears or concerns for personal safety.   Patient denies current or recent suicidal ideation or behaviors.   Patient denies current or recent homicidal ideation or behaviors.   Patient denies current or recent self injurious behavior or ideation.   Patient denies other safety concerns.   Patient reports there has been no change in risk factors since their last session.     Patient reports there has been no change in protective factors since their last session.     Recommended that patient call 911 or go to the local ED should there be a change in any of these risk factors.     Appearance:   Appropriate    Eye Contact:   Good    Psychomotor Behavior: Hyperactive  Restless    Attitude:   Cooperative    Orientation:   All   Speech    Rate / Production: Pressured  Stutters    Volume:  Normal    Mood:    Anxious  Anhedonia   Affect:    Constricted  Flat    Thought Content:  Clear    Thought Form:  Coherent    Insight:    Good      Medication  Review:   No changes to current psychiatric medication(s)     Medication Compliance:   Yes     Changes in Health Issues:   None reported     Chemical Use Review:   Substance Use: Chemical use reviewed, no active concerns identified      Tobacco Use: No current tobacco use.      Diagnosis:  1. Autism spectrum disorder    2. JOSHUA (generalized anxiety disorder)    3. Mild episode of recurrent major depressive disorder (H)    4. Attention deficit hyperactivity disorder (ADHD), other type        Collateral Reports Completed:   Not Applicable    PLAN: (Patient Tasks / Therapist Tasks / Other)  Patient will return for a virtual visit in 2 weeks  Patient will continue stress management techniques and discussion around specific options based upon diagnoses   Discussion around starting to use daily planner again  Encouraged to talk to couples therapist about shifts in belief system and changes in foundation of relationship - may need some support in building a new foundation of shared values     There has been demonstrated improvement in functioning while patient has been engaged in psychotherapy/psychological service- if withdrawn the patient would deteriorate and/or relapse.       Fanny Cobb, [facility]                                                         ______________________________________________________________________    Individual Treatment Plan    Patient's Name: Angela Jones  YOB: 1995    Date of Creation: 1/19/2022  Date Treatment Plan Last Reviewed/Revised: 10/12/2022      DSM5 Diagnoses: Autism Spectrum Disorder 299.00(F84.0)  Associated with another neurodevelopmental, mental or behavioral disorder and Attention-Deficit/Hyperactivity Disorder  314.01 (F90.9) Unspecified Attention -Deficit / Hyperactivity Disorder, 296.31 (F33.0) Major Depressive Disorder, Recurrent Episode, Mild _ or 300.02 (F41.1) Generalized Anxiety Disorder  Psychosocial / Contextual Factors: 27 year old   female, , no children   PROMIS (reviewed every 90 days):   PROMIS 10-Global Health (only subscores and total score):   PROMIS-10 Scores Only 12/30/2021 1/19/2022 2/15/2022 3/9/2022   Global Mental Health Score 11 11 9 12   Global Physical Health Score 11 11 11 11   PROMIS TOTAL - SUBSCORES 22 22 20 23       Referral / Collaboration:  Was/were discussed and patient will pursue.    Anticipated number of session for this episode of care: 9  Anticipation frequency of session: every 2-3 weeks  Anticipated Duration of each session: 38-52 minutes  Treatment plan will be reviewed in 90 days or when goals have been changed.       MeasurableTreatment Goal(s) related to diagnosis / functional impairment(s)  Goal 1: Patient will manage symptoms of anxiety     I will know I've met my goal when I feel more confident in my ability to cope with stress with fewer meltdowns.      Objective #A (Patient Action)    Patient will identify the initial signs or symptoms of anxiety.  Status: Continued - Date(s): 10/12/2022      Intervention(s)  Therapist will teach help patient gain insight into signs of stress (physically and emotionally).    Objective #B  Patient will use relaxation strategies multiple times per day to reduce the physical symptoms of anxiety.  Status: Continued - Date(s): 10/12/2022      Intervention(s)  Therapist will teach diaphragmatic breathing and other grounding skills.    Objective #C  Patient will use thought-stopping strategy daily to reduce intrusive thoughts.  Status: Continued - Date(s): 10/12/2022      Intervention(s)  Therapist will teach thought stopping techniques.      Goal 2: Patient will manage symptoms of depression    I will know I've met my goal when I feel more confident in my ability to express my emotions in a healthy way.      Objective #A (Patient Action)    Status: Continued - Date(s): 10/12/2022      Patient will Increase interest, engagement, and pleasure in doing  things.    Intervention(s)  Therapist will teach emotional recognition/identification. *.    Objective #B  Patient will Identify negative self-talk and behaviors: challenge core beliefs, myths, and actions.    Status: Continued - Date(s): 10/12/2022      Intervention(s)  Therapist will help patient practice positive self-talk and thought re-framing.      Goal 3: Patient will improve communication patterns in the relationship    I will know I've met my goal when we continue to attend couples counseling and see improvements.      Objective #A (Patient Action)    Status: Continued - Date(s): 10/12/2022      Patient will continue participating in couples therapy with Tayla.     Intervention(s)  Therapist will monitor referral process and follow-up on their relationship problems and improvements      Patient has reviewed and agreed to the above plan.      Fanny Cobb, [facility]  October 12, 2022

## 2022-10-26 ENCOUNTER — TELEPHONE (OUTPATIENT)
Dept: PSYCHOLOGY | Facility: CLINIC | Age: 27
End: 2022-10-26

## 2022-10-26 ENCOUNTER — VIRTUAL VISIT (OUTPATIENT)
Dept: BEHAVIORAL HEALTH | Facility: CLINIC | Age: 27
End: 2022-10-26
Payer: COMMERCIAL

## 2022-10-26 DIAGNOSIS — F41.1 GAD (GENERALIZED ANXIETY DISORDER): ICD-10-CM

## 2022-10-26 DIAGNOSIS — F90.8 ATTENTION DEFICIT HYPERACTIVITY DISORDER (ADHD), OTHER TYPE: ICD-10-CM

## 2022-10-26 DIAGNOSIS — F84.0 AUTISM SPECTRUM DISORDER: Primary | ICD-10-CM

## 2022-10-26 DIAGNOSIS — F33.0 MILD EPISODE OF RECURRENT MAJOR DEPRESSIVE DISORDER (H): ICD-10-CM

## 2022-10-26 PROCEDURE — 90834 PSYTX W PT 45 MINUTES: CPT | Mod: 95 | Performed by: SOCIAL WORKER

## 2022-10-26 NOTE — PROGRESS NOTES
[facility]                                     Progress Note    Patient Name: Angela Jones  Date: 10/26/2022         Service Type: Individual      Session Start Time: 11:15am  Session End Time: 11:55am     Session Length: 40 minutes    Session #: 13    Attendees: Client attended alone    Service Modality:  Video Visit:      Provider verified identity through the following two step process.  Patient provided:  Patient is known previously to provider    Telemedicine Visit: The patient's condition can be safely assessed and treated via synchronous audio and visual telemedicine encounter.      Reason for Telemedicine Visit: Patient has requested telehealth visit    Originating Site (Patient Location): Patient's home    Distant Site (Provider Location): Provider Remote Setting- Home Office    Consent:  The patient/guardian has verbally consented to: the potential risks and benefits of telemedicine (video visit) versus in person care; bill my insurance or make self-payment for services provided; and responsibility for payment of non-covered services.     Patient would like the video invitation sent by:  My Chart    Mode of Communication:  Video Conference via Amwell    As the provider I attest to compliance with applicable laws and regulations related to telemedicine.    DATA  Interactive Complexity: No  Crisis: No        Progress Since Last Session (Related to Symptoms / Goals / Homework):   Symptoms: No change : anxiety and depression, relationship struggles    Homework: Partially completed      Episode of Care Goals: Minimal progress - PREPARATION (Decided to change - considering how); Intervened by negotiating a change plan and determining options / strategies for behavior change, identifying triggers, exploring social supports, and working towards setting a date to begin behavior change     Current / Ongoing Stressors and Concerns:  Patient did present late for scheduled visit so today will  not count as a no-show visit, did discuss attendance policy, patient did not have appointment on calendar but made sure to put future appointments on calendar to remember,  Has been feeling tired, busy at work - 9-10 hour days, shares that no one takes breaks, not allowed to move locations until done training so basically through the holidays,  is out of town this week for his great Aunt's  - she left them her cat, feeling pretty anxious about this, having some time alone is proving to be beneficial       Treatment Objective(s) Addressed in This Session:   identify the fears / thoughts that contribute to feeling anxious  state understanding of stressors and relationship to physical symptoms  Increase interest, engagement, and pleasure in doing things  Decrease frequency and intensity of feeling down, depressed, hopeless  Identify negative self-talk and behaviors: challenge core beliefs, myths, and actions  Gain insight     Intervention:   Discussed mindfulness as being aware of what we are experiencing while we are experiencing it.  Contrasted this with avoidance and rumination.  Explored the role of mindfulness as an overall coping strategy for managing depression, anxiety, and strong emotions.  Explained concept of state of mind using SIFT (sensations, images, feelings, thoughts) pneumonic.  Led client in a guided mindfulness exercise focusing on sensations, images, feelings, and thoughts.  Discussed mindfulness as a tool to intentionally shift our awareness and focus as needed.    Assessments completed prior to visit:   The following assessments were completed by patient for this visit:  PHQ9:   PHQ-9 SCORE 2022 2022 8/10/2022 2022 2022 2022 10/12/2022   PHQ-9 Total Score - - - - - - -   PHQ-9 Total Score MyChart 10 (Moderate depression) 9 (Mild depression) 8 (Mild depression) 7 (Mild depression) - 11 (Moderate depression) 7 (Mild depression)   PHQ-9 Total Score 10 9 8 7 11  11 7     GAD7:   JOSHUA-7 SCORE 3/9/2022 3/9/2022 5/4/2022 6/29/2022 9/27/2022 9/27/2022 10/12/2022   Total Score - - - - - - -   Total Score 7 (mild anxiety) 6 (mild anxiety) 5 (mild anxiety) 6 (mild anxiety) - 8 (mild anxiety) 8 (mild anxiety)   Total Score 6 7 5 6 8 8 8   Total Score - - - - - - -     PROMIS 10-Global Health (only subscores and total score):   PROMIS-10 Scores Only 1/19/2022 2/15/2022 3/9/2022 6/16/2022 6/29/2022 10/11/2022 10/11/2022   Global Mental Health Score 11 9 12 10 10 13 13   Global Physical Health Score 11 11 11 9 11 11 11   PROMIS TOTAL - SUBSCORES 22 20 23 19 21 24 24         ASSESSMENT: Current Emotional / Mental Status (status of significant symptoms):   Risk status (Self / Other harm or suicidal ideation)   Patient denies current fears or concerns for personal safety.   Patient denies current or recent suicidal ideation or behaviors.   Patient denies current or recent homicidal ideation or behaviors.   Patient denies current or recent self injurious behavior or ideation.   Patient denies other safety concerns.   Patient reports there has been no change in risk factors since their last session.     Patient reports there has been no change in protective factors since their last session.     Recommended that patient call 911 or go to the local ED should there be a change in any of these risk factors.     Appearance:   Appropriate    Eye Contact:   Good    Psychomotor Behavior: Hyperactive  Restless    Attitude:   Cooperative    Orientation:   All   Speech    Rate / Production: Pressured  Stutters    Volume:  Normal    Mood:    Anxious  Anhedonia   Affect:    Constricted  Flat    Thought Content:  Clear    Thought Form:  Coherent    Insight:    Good      Medication Review:   No changes to current psychiatric medication(s)     Medication Compliance:   Yes     Changes in Health Issues:   None reported     Chemical Use Review:   Substance Use: Chemical use reviewed, no active concerns  identified      Tobacco Use: No current tobacco use.      Diagnosis:  1. Autism spectrum disorder    2. JOSHUA (generalized anxiety disorder)    3. Mild episode of recurrent major depressive disorder (H)    4. Attention deficit hyperactivity disorder (ADHD), other type        Collateral Reports Completed:   Not Applicable    PLAN: (Patient Tasks / Therapist Tasks / Other)  Patient will return for a virtual visit in 2 weeks  Patient will continue stress management techniques and discussion around specific options based upon diagnoses   Discussion around starting to use daily planner again    There has been demonstrated improvement in functioning while patient has been engaged in psychotherapy/psychological service- if withdrawn the patient would deteriorate and/or relapse.       Fanny Cobb, [facility]                                                         ______________________________________________________________________    Individual Treatment Plan    Patient's Name: Angela Jones  YOB: 1995    Date of Creation: 1/19/2022  Date Treatment Plan Last Reviewed/Revised: 10/12/2022      DSM5 Diagnoses: Autism Spectrum Disorder 299.00(F84.0)  Associated with another neurodevelopmental, mental or behavioral disorder and Attention-Deficit/Hyperactivity Disorder  314.01 (F90.9) Unspecified Attention -Deficit / Hyperactivity Disorder, 296.31 (F33.0) Major Depressive Disorder, Recurrent Episode, Mild _ or 300.02 (F41.1) Generalized Anxiety Disorder  Psychosocial / Contextual Factors: 27 year old  female, , no children   PROMIS (reviewed every 90 days):   PROMIS 10-Global Health (only subscores and total score):   PROMIS-10 Scores Only 12/30/2021 1/19/2022 2/15/2022 3/9/2022   Global Mental Health Score 11 11 9 12   Global Physical Health Score 11 11 11 11   PROMIS TOTAL - SUBSCORES 22 22 20 23       Referral / Collaboration:  Was/were discussed and patient will pursue.    Anticipated number  of session for this episode of care: 9  Anticipation frequency of session: every 2-3 weeks  Anticipated Duration of each session: 38-52 minutes  Treatment plan will be reviewed in 90 days or when goals have been changed.       MeasurableTreatment Goal(s) related to diagnosis / functional impairment(s)  Goal 1: Patient will manage symptoms of anxiety     I will know I've met my goal when I feel more confident in my ability to cope with stress with fewer meltdowns.      Objective #A (Patient Action)    Patient will identify the initial signs or symptoms of anxiety.  Status: Continued - Date(s): 10/12/2022      Intervention(s)  Therapist will teach help patient gain insight into signs of stress (physically and emotionally).    Objective #B  Patient will use relaxation strategies multiple times per day to reduce the physical symptoms of anxiety.  Status: Continued - Date(s): 10/12/2022      Intervention(s)  Therapist will teach diaphragmatic breathing and other grounding skills.    Objective #C  Patient will use thought-stopping strategy daily to reduce intrusive thoughts.  Status: Continued - Date(s): 10/12/2022      Intervention(s)  Therapist will teach thought stopping techniques.      Goal 2: Patient will manage symptoms of depression    I will know I've met my goal when I feel more confident in my ability to express my emotions in a healthy way.      Objective #A (Patient Action)    Status: Continued - Date(s): 10/12/2022      Patient will Increase interest, engagement, and pleasure in doing things.    Intervention(s)  Therapist will teach emotional recognition/identification. *.    Objective #B  Patient will Identify negative self-talk and behaviors: challenge core beliefs, myths, and actions.    Status: Continued - Date(s): 10/12/2022      Intervention(s)  Therapist will help patient practice positive self-talk and thought re-framing.      Goal 3: Patient will improve communication patterns in the relationship     I will know I've met my goal when we continue to attend couples counseling and see improvements.      Objective #A (Patient Action)    Status: Continued - Date(s): 10/12/2022      Patient will continue participating in couples therapy with Tayla.     Intervention(s)  Therapist will monitor referral process and follow-up on their relationship problems and improvements      Patient has reviewed and agreed to the above plan.      Fanny Cobb, [facility]  October 12, 2022

## 2022-10-26 NOTE — TELEPHONE ENCOUNTER
Patient did not appear for scheduled video visit. Therapist called and left patient voice message. Therapist also sent mychart message regarding [place] attendance policy. Patient is scheduled for a follow-up visit on 11/9 at which time there will be further discussion around attendance.

## 2022-11-09 ENCOUNTER — VIRTUAL VISIT (OUTPATIENT)
Dept: BEHAVIORAL HEALTH | Facility: CLINIC | Age: 27
End: 2022-11-09
Payer: COMMERCIAL

## 2022-11-09 DIAGNOSIS — F84.0 AUTISM SPECTRUM DISORDER: Primary | ICD-10-CM

## 2022-11-09 DIAGNOSIS — F90.8 ATTENTION DEFICIT HYPERACTIVITY DISORDER (ADHD), OTHER TYPE: ICD-10-CM

## 2022-11-09 DIAGNOSIS — F33.0 MILD EPISODE OF RECURRENT MAJOR DEPRESSIVE DISORDER (H): ICD-10-CM

## 2022-11-09 DIAGNOSIS — F41.1 GAD (GENERALIZED ANXIETY DISORDER): ICD-10-CM

## 2022-11-09 PROCEDURE — 90834 PSYTX W PT 45 MINUTES: CPT | Mod: 95 | Performed by: SOCIAL WORKER

## 2022-11-09 NOTE — PROGRESS NOTES
[facility]                                     Progress Note    Patient Name: Angela Jones  Date: 11/9/2022         Service Type: Individual      Session Start Time: 12:00pm  Session End Time: 12:45pm     Session Length: 45 minutes    Session #: 14    Attendees: Client attended alone    Service Modality:  Video Visit:      Provider verified identity through the following two step process.  Patient provided:  Patient is known previously to provider    Telemedicine Visit: The patient's condition can be safely assessed and treated via synchronous audio and visual telemedicine encounter.      Reason for Telemedicine Visit: Patient has requested telehealth visit    Originating Site (Patient Location): Patient's home    Distant Site (Provider Location): Provider Remote Setting- Home Office    Consent:  The patient/guardian has verbally consented to: the potential risks and benefits of telemedicine (video visit) versus in person care; bill my insurance or make self-payment for services provided; and responsibility for payment of non-covered services.     Patient would like the video invitation sent by:  My Chart    Mode of Communication:  Video Conference via [employer]      Distant Location (Provider):  Off-site    As the provider I attest to compliance with applicable laws and regulations related to telemedicine.    DATA  Interactive Complexity: No  Crisis: No        Progress Since Last Session (Related to Symptoms / Goals / Homework):   Symptoms: No change : anxiety and depression    Homework: Partially completed      Episode of Care Goals: Minimal progress - PREPARATION (Decided to change - considering how); Intervened by negotiating a change plan and determining options / strategies for behavior change, identifying triggers, exploring social supports, and working towards setting a date to begin behavior change     Current / Ongoing Stressors and Concerns:  POTS symptoms today making it difficult  "to do much, visiting her family this week so has been busy, not able to take any time off from work from before Thanksgiving until after [place],  is very tired and \"doing too much\" between working and going to school, she is feeling pretty stressed out particularly around finances and budgeting,        Treatment Objective(s) Addressed in This Session:   identify the fears / thoughts that contribute to feeling anxious  state understanding of stressors and relationship to physical symptoms  Increase interest, engagement, and pleasure in doing things  Decrease frequency and intensity of feeling down, depressed, hopeless  Identify negative self-talk and behaviors: challenge core beliefs, myths, and actions  Gain insight     Intervention:   Discussed mindfulness as being aware of what we are experiencing while we are experiencing it.  Contrasted this with avoidance and rumination.  Explored the role of mindfulness as an overall coping strategy for managing depression, anxiety, and strong emotions.  Explained concept of state of mind using SIFT (sensations, images, feelings, thoughts) pneumonic.  Led client in a guided mindfulness exercise focusing on sensations, images, feelings, and thoughts.  Discussed mindfulness as a tool to intentionally shift our awareness and focus as needed.    Assessments completed prior to visit:   The following assessments were completed by patient for this visit:  PHQ9:   PHQ-9 SCORE 6/16/2022 6/29/2022 8/10/2022 8/17/2022 9/27/2022 9/27/2022 10/12/2022   PHQ-9 Total Score - - - - - - -   PHQ-9 Total Score MyChart 10 (Moderate depression) 9 (Mild depression) 8 (Mild depression) 7 (Mild depression) - 11 (Moderate depression) 7 (Mild depression)   PHQ-9 Total Score 10 9 8 7 11 11 7     GAD7:   JOSHUA-7 SCORE 3/9/2022 3/9/2022 5/4/2022 6/29/2022 9/27/2022 9/27/2022 10/12/2022   Total Score - - - - - - -   Total Score 7 (mild anxiety) 6 (mild anxiety) 5 (mild anxiety) 6 (mild anxiety) - 8 " (mild anxiety) 8 (mild anxiety)   Total Score 6 7 5 6 8 8 8   Total Score - - - - - - -     PROMIS 10-Global Health (only subscores and total score):   PROMIS-10 Scores Only 1/19/2022 2/15/2022 3/9/2022 6/16/2022 6/29/2022 10/11/2022 10/11/2022   Global Mental Health Score 11 9 12 10 10 13 13   Global Physical Health Score 11 11 11 9 11 11 11   PROMIS TOTAL - SUBSCORES 22 20 23 19 21 24 24         ASSESSMENT: Current Emotional / Mental Status (status of significant symptoms):   Risk status (Self / Other harm or suicidal ideation)   Patient denies current fears or concerns for personal safety.   Patient denies current or recent suicidal ideation or behaviors.   Patient denies current or recent homicidal ideation or behaviors.   Patient denies current or recent self injurious behavior or ideation.   Patient denies other safety concerns.   Patient reports there has been no change in risk factors since their last session.     Patient reports there has been no change in protective factors since their last session.     Recommended that patient call 911 or go to the local ED should there be a change in any of these risk factors.     Appearance:   Appropriate    Eye Contact:   Good    Psychomotor Behavior: Hyperactive  Restless    Attitude:   Cooperative    Orientation:   All   Speech    Rate / Production: Pressured  Stutters    Volume:  Normal    Mood:    Anxious  Anhedonia   Affect:    Constricted  Flat    Thought Content:  Clear    Thought Form:  Coherent    Insight:    Good      Medication Review:   No changes to current psychiatric medication(s)     Medication Compliance:   Yes     Changes in Health Issues:   None reported     Chemical Use Review:   Substance Use: Chemical use reviewed, no active concerns identified      Tobacco Use: No current tobacco use.      Diagnosis:  1. Autism spectrum disorder    2. JOSHUA (generalized anxiety disorder)    3. Mild episode of recurrent major depressive disorder (H)    4. Attention  deficit hyperactivity disorder (ADHD), other type        Collateral Reports Completed:   Not Applicable    PLAN: (Patient Tasks / Therapist Tasks / Other)  Patient will return for a virtual visit in 2 weeks  Patient will continue to utilize stress management techniques  Discussion around starting to use daily planner again    There has been demonstrated improvement in functioning while patient has been engaged in psychotherapy/psychological service- if withdrawn the patient would deteriorate and/or relapse.       Fanny Cobb, [facility]                                                         ______________________________________________________________________    Individual Treatment Plan    Patient's Name: Angela Jones  YOB: 1995    Date of Creation: 1/19/2022  Date Treatment Plan Last Reviewed/Revised: 10/12/2022      DSM5 Diagnoses: Autism Spectrum Disorder 299.00(F84.0)  Associated with another neurodevelopmental, mental or behavioral disorder and Attention-Deficit/Hyperactivity Disorder  314.01 (F90.9) Unspecified Attention -Deficit / Hyperactivity Disorder, 296.31 (F33.0) Major Depressive Disorder, Recurrent Episode, Mild _ or 300.02 (F41.1) Generalized Anxiety Disorder  Psychosocial / Contextual Factors: 27 year old  female, , no children   PROMIS (reviewed every 90 days):   PROMIS 10-Global Health (only subscores and total score):   PROMIS-10 Scores Only 12/30/2021 1/19/2022 2/15/2022 3/9/2022   Global Mental Health Score 11 11 9 12   Global Physical Health Score 11 11 11 11   PROMIS TOTAL - SUBSCORES 22 22 20 23       Referral / Collaboration:  Was/were discussed and patient will pursue.    Anticipated number of session for this episode of care: 9  Anticipation frequency of session: every 2-3 weeks  Anticipated Duration of each session: 38-52 minutes  Treatment plan will be reviewed in 90 days or when goals have been changed.       MeasurableTreatment Goal(s) related to  diagnosis / functional impairment(s)  Goal 1: Patient will manage symptoms of anxiety     I will know I've met my goal when I feel more confident in my ability to cope with stress with fewer meltdowns.      Objective #A (Patient Action)    Patient will identify the initial signs or symptoms of anxiety.  Status: Continued - Date(s): 10/12/2022      Intervention(s)  Therapist will teach help patient gain insight into signs of stress (physically and emotionally).    Objective #B  Patient will use relaxation strategies multiple times per day to reduce the physical symptoms of anxiety.  Status: Continued - Date(s): 10/12/2022      Intervention(s)  Therapist will teach diaphragmatic breathing and other grounding skills.    Objective #C  Patient will use thought-stopping strategy daily to reduce intrusive thoughts.  Status: Continued - Date(s): 10/12/2022      Intervention(s)  Therapist will teach thought stopping techniques.      Goal 2: Patient will manage symptoms of depression    I will know I've met my goal when I feel more confident in my ability to express my emotions in a healthy way.      Objective #A (Patient Action)    Status: Continued - Date(s): 10/12/2022      Patient will Increase interest, engagement, and pleasure in doing things.    Intervention(s)  Therapist will teach emotional recognition/identification. *.    Objective #B  Patient will Identify negative self-talk and behaviors: challenge core beliefs, myths, and actions.    Status: Continued - Date(s): 10/12/2022      Intervention(s)  Therapist will help patient practice positive self-talk and thought re-framing.      Goal 3: Patient will improve communication patterns in the relationship    I will know I've met my goal when we continue to attend couples counseling and see improvements.      Objective #A (Patient Action)    Status: Continued - Date(s): 10/12/2022      Patient will continue participating in couples therapy with Tayla.      Intervention(s)  Therapist will monitor referral process and follow-up on their relationship problems and improvements      Patient has reviewed and agreed to the above plan.      Fanny Cobb, [facility]  October 12, 2022

## 2022-11-15 ENCOUNTER — VIRTUAL VISIT (OUTPATIENT)
Dept: PSYCHOLOGY | Facility: CLINIC | Age: 27
End: 2022-11-15
Payer: COMMERCIAL

## 2022-11-15 DIAGNOSIS — F41.1 GENERALIZED ANXIETY DISORDER: Primary | ICD-10-CM

## 2022-11-15 DIAGNOSIS — F84.0 AUTISM SPECTRUM DISORDER: ICD-10-CM

## 2022-11-15 DIAGNOSIS — F33.41 RECURRENT MAJOR DEPRESSIVE DISORDER, IN PARTIAL REMISSION (H): ICD-10-CM

## 2022-11-15 PROCEDURE — 90847 FAMILY PSYTX W/PT 50 MIN: CPT | Mod: 95 | Performed by: MARRIAGE & FAMILY THERAPIST

## 2022-11-15 ASSESSMENT — ANXIETY QUESTIONNAIRES
IF YOU CHECKED OFF ANY PROBLEMS ON THIS QUESTIONNAIRE, HOW DIFFICULT HAVE THESE PROBLEMS MADE IT FOR YOU TO DO YOUR WORK, TAKE CARE OF THINGS AT HOME, OR GET ALONG WITH OTHER PEOPLE: SOMEWHAT DIFFICULT
8. IF YOU CHECKED OFF ANY PROBLEMS, HOW DIFFICULT HAVE THESE MADE IT FOR YOU TO DO YOUR WORK, TAKE CARE OF THINGS AT HOME, OR GET ALONG WITH OTHER PEOPLE?: SOMEWHAT DIFFICULT
GAD7 TOTAL SCORE: 8
6. BECOMING EASILY ANNOYED OR IRRITABLE: SEVERAL DAYS
1. FEELING NERVOUS, ANXIOUS, OR ON EDGE: MORE THAN HALF THE DAYS
4. TROUBLE RELAXING: NOT AT ALL
GAD7 TOTAL SCORE: 8
3. WORRYING TOO MUCH ABOUT DIFFERENT THINGS: MORE THAN HALF THE DAYS
7. FEELING AFRAID AS IF SOMETHING AWFUL MIGHT HAPPEN: NOT AT ALL
5. BEING SO RESTLESS THAT IT IS HARD TO SIT STILL: SEVERAL DAYS
7. FEELING AFRAID AS IF SOMETHING AWFUL MIGHT HAPPEN: NOT AT ALL
2. NOT BEING ABLE TO STOP OR CONTROL WORRYING: MORE THAN HALF THE DAYS
GAD7 TOTAL SCORE: 8

## 2022-11-15 ASSESSMENT — PATIENT HEALTH QUESTIONNAIRE - PHQ9
SUM OF ALL RESPONSES TO PHQ QUESTIONS 1-9: 12
SUM OF ALL RESPONSES TO PHQ QUESTIONS 1-9: 12
10. IF YOU CHECKED OFF ANY PROBLEMS, HOW DIFFICULT HAVE THESE PROBLEMS MADE IT FOR YOU TO DO YOUR WORK, TAKE CARE OF THINGS AT HOME, OR GET ALONG WITH OTHER PEOPLE: SOMEWHAT DIFFICULT

## 2022-11-15 NOTE — PROGRESS NOTES
[facility]                                     Progress Note    Patient Name: Angela Jones  Date: 11/15/2022         Service Type: Couple      Session Start Time: 9:33 a.m.  Session End Time: 10:28 a.m.     Session Length: 55 minutes    Session #: 5 (with this provider - both have individual therapists)    Attendees: Client and Spouse / Significant Other    Service Modality:  Video Visit:      Provider verified identity through the following two step process.  Patient provided:  Patient was verified at admission/transfer    Telemedicine Visit: The patient's condition can be safely assessed and treated via synchronous audio and visual telemedicine encounter.      Reason for Telemedicine Visit: Patient convenience (e.g. access to timely appointments / distance to available provider)    Originating Site (Patient Location): Patient's home    Distant Site (Provider Location): [facility]: [facility] / [facility] on-site    Consent:  The patient/guardian has verbally consented to: the potential risks and benefits of telemedicine (video visit) versus in person care; bill my insurance or make self-payment for services provided; and responsibility for payment of non-covered services.     Patient would like the video invitation sent by:  My Chart    Mode of Communication:  Video Conference via well    As the provider I attest to compliance with applicable laws and regulations related to telemedicine.    DATA  Interactive Complexity: No  Crisis: No        Progress Since Last Session (Related to Symptoms / Goals / Homework):   Symptoms: ongoing difficulties with task completion and overall communication.  Patient and spouse did celebrate their anniversary and identified ways they made the day special for their partner.  Laundry and cleaning maintenance are perpetual difficulties, as is budgeting.    Homework: Partially completed      Episode of Care Goals: Minimal progress - PREPARATION (Decided  "to change - considering how); Intervened by negotiating a change plan and determining options / strategies for behavior change, identifying triggers, exploring social supports, and working towards setting a date to begin behavior change     Current / Ongoing Stressors and Concerns:   Difficulties in relationship related to each partner's mental and physical health; communication struggles; executive functioning and health concerns leading to problems completing weekly household chores     Treatment Objective(s) Addressed in This Session:   identify barriers to completing household tasks  identify a time in the relationship when the couple successfully completed household tasks and will determine whether past strategies will be effective (and will develop new strategies if needed)  learn about and utilize bids for connection and turning towards partner instead of turning away  validate their partner's experiences and feelings and show appreciation for their partner  fully discuss family of origin patterns related to communication and finances and will determine what worked and what didn't, and what they would like to implement and avoid in their relationship    Past/future:  learn about Gwen's Four Horsemen of the Apocalypse and will identify these within the relationship  use cognitive strategies identified in therapy to challenge anxious thoughts  identify primary triggers for anxiety related to finances and extended family  identify components of the [facility] and will work to strengthen friendship, adaptively handle conflict, and create shared meaning/dreams     Intervention:   CBT: Gwen - connection between thoughts, feelings, and actions  Solution Focused: identifying solvable problems and generating possible solutions; if problem is not solvable, identifying compromise and boundaries/deal-breakers  Family systems: patterns, learned behaviors, communication    Past/future:  Narrative: \"the " "story I'm making up,\"  problem from person, and finding bright spots     Assessments completed prior to visit:  The following assessments were completed by patient for this visit:  PHQ9:   PHQ-9 SCORE 6/29/2022 8/10/2022 8/17/2022 9/27/2022 9/27/2022 10/12/2022 11/15/2022   PHQ-9 Total Score - - - - - - -   PHQ-9 Total Score MyChart 9 (Mild depression) 8 (Mild depression) 7 (Mild depression) - 11 (Moderate depression) 7 (Mild depression) 12 (Moderate depression)   PHQ-9 Total Score 9 8 7 11 11 7 12     GAD7:   JOSHUA-7 SCORE 3/9/2022 5/4/2022 6/29/2022 9/27/2022 9/27/2022 10/12/2022 11/15/2022   Total Score - - - - - - -   Total Score 6 (mild anxiety) 5 (mild anxiety) 6 (mild anxiety) - 8 (mild anxiety) 8 (mild anxiety) 8 (mild anxiety)   Total Score 7 5 6 8 8 8 8   Total Score - - - - - - -     PROMIS 10-Global Health (all questions and answers displayed):   PROMIS 10 1/19/2022 2/15/2022 3/9/2022 6/16/2022 6/29/2022 10/11/2022 10/11/2022   In general, would you say your health is: Fair Fair Fair Fair Fair - Fair   In general, would you say your quality of life is: Good Good Good Good Good - Good   In general, how would you rate your physical health? Fair Fair Fair Fair Fair - Fair   In general, how would you rate your mental health, including your mood and your ability to think? Fair Fair Good Fair Fair - Good   In general, how would you rate your satisfaction with your social activities and relationships? Good Good Very good Good Good - Very good   In general, please rate how well you carry out your usual social activities and roles Good Fair Good Poor Fair - Very good   To what extent are you able to carry out your everyday physical activities such as walking, climbing stairs, carrying groceries, or moving a chair? Moderately Moderately Moderately A little Moderately - Moderately   How often have you been bothered by emotional problems such as feeling anxious, depressed or irritable? Sometimes Always Often " Often Often - Sometimes   How would you rate your fatigue on average? Moderate Moderate Moderate Severe Moderate - Moderate   How would you rate your pain on average?   0 = No Pain  to  10 = Worst Imaginable Pain 4 4 4 4 4 - 4   In general, would you say your health is: 2 2 2 2 2 2 2   In general, would you say your quality of life is: 3 3 3 3 3 3 3   In general, how would you rate your physical health? 2 2 2 2 2 2 2   In general, how would you rate your mental health, including your mood and your ability to think? 2 2 3 2 2 3 3   In general, how would you rate your satisfaction with your social activities and relationships? 3 3 4 3 3 4 4   In general, please rate how well you carry out your usual social activities and roles. (This includes activities at home, at work and in your community, and responsibilities as a parent, child, spouse, employee, friend, etc.) 3 2 3 1 2 4 4   To what extent are you able to carry out your everyday physical activities such as walking, climbing stairs, carrying groceries, or moving a chair? 3 3 3 2 3 3 3   In the past 7 days, how often have you been bothered by emotional problems such as feeling anxious, depressed, or irritable? 3 5 4 4 4 3 3   In the past 7 days, how would you rate your fatigue on average? 3 3 3 4 3 3 3   In the past 7 days, how would you rate your pain on average, where 0 means no pain, and 10 means worst imaginable pain? 4 4 4 4 4 4 4   Global Mental Health Score 11 9 12 10 10 13 13   Global Physical Health Score 11 11 11 9 11 11 11   PROMIS TOTAL - SUBSCORES 22 20 23 19 21 24 24   Some recent data might be hidden         ASSESSMENT: Current Emotional / Mental Status (status of significant symptoms):   Risk status (Self / Other harm or suicidal ideation)   Patient denies current fears or concerns for personal safety.   Patient denies current or recent suicidal ideation or behaviors.   Patient denies current or recent homicidal ideation or behaviors.   Patient  denies current or recent self injurious behavior or ideation.   Patient denies other safety concerns.   Patient reports there has been no change in risk factors since their last session.     Patient reports there has been no change in protective factors since their last session.     Recommended that patient call 911 or go to the local ED should there be a change in any of these risk factors.     Appearance:   Appropriate    Eye Contact:   Fair    Psychomotor Behavior: Normal  Restless    Attitude:   Cooperative    Orientation:   All   Speech    Rate / Production: Sometimes needs additional processing time    Volume:  Normal    Mood:    Anxious  Panicked   Affect:    Restricted    Thought Content:  Rumination  sometimes perseverative   Thought Form:  Coherent  Circumstantial   Insight:    Good      Medication Review:   No changes to current psychiatric medication(s)     Medication Compliance:   Yes     Changes in Health Issues:   None reported - ongoing chronic health issues for both patients (fatigue, pain, POTS)     Chemical Use Review:   Substance Use: Chemical use reviewed, no active concerns identified      Tobacco Use: No current tobacco use.      Diagnosis:  1. Generalized anxiety disorder    2. Recurrent major depressive disorder, in partial remission (H)    3. Autism spectrum disorder      Collateral Reports Completed:   Not Applicable    PLAN: (Patient Tasks / Therapist Tasks / Other)    Patient and partner report the following goals:    Homework: focus on positive connection during the next three weeks.  Plan a one-hour time together to play games/watch a show/do some other enjoyable activity.      Tayla Rodrigues                                                                                                      Couple Treatment Plan    Patient's Name: Angela Jones  YOB: 1995    Date of Creation: 9/13/2022  Date Treatment Plan Last Reviewed/Revised: 9/13/2022    DSM5 Diagnoses: Autism  Spectrum Disorder 299.00(F84.0)  Associated Current severity for Criterion A and Criterion B: 299.00(F84.0) Without accompanying intellectual impairment; 296.35 (F33.41)  Major Depressive Disorder, Recurrent Episode, In partial remission _; 300.02 (F41.1) Generalized Anxiety Disorder    Psychosocial / Contextual Factors: Difficulties in relationship related to each partner's mental and physical health; communication struggles; executive functioning and health concerns leading to problems completing weekly household chores    PROMIS (reviewed every 90 days): PROMIS-10 Scores           Referral / Collaboration:  Referral to another professional/service is not indicated at this time.    Anticipated number of sessions for this episode of care: 9-12 sessions  Anticipation frequency of session: Every other week  Anticipated duration of each session: 38-52 minutes  Treatment plan will be reviewed in 90 days or when goals have been changed.     Measurable Treatment Goal(s) related to diagnosis / functional impairment(s)  Goal 1: Patient and spouse will learn about Dr. Diallo Hidalgo's successful relationship skills and will work to improve their communication patterns.    Objective #A (Patient Action)    Patient (and spouse) will learn about and utilize bids for connection and turning towards partner instead of turning away.  Status: New - Date: 9/13/2022     Intervention(s)  Therapist will teach teach about bids and turning towards.    Objective #B  Patient (and spouse) will learn about Gwen's Four Horsemen of the Apocalypse and will identify these within the relationship.  Status: New - Date: 9/13/2022     Intervention(s)  Therapist will teach about Gwen's research on what leads to relationships ending.    Objective #C  Patient (and spouse) will identify components of the [facility] and will work to strengthen friendship, adaptively handle conflict, and create shared meaning/dreams.  Status: New - Date:  "9/13/2022     Intervention(s)  Therapist will teach about the [facility] and managing conflict with a soft start-up.      Goal 2: Patient and spouse will discuss and identify ways to complete weekly household tasks while taking into account each partner's chronic health issues.    Objective #A (Patient Action)    Status: New - Date: 9/13/2022     Patient (and spouse) will identify barriers to completing household tasks.    Intervention(s)  Therapist will collaborate with the couple in session to determine these.    Objective #B  Patient (and spouse) will identify a time in the relationship when the couple successfully completed household tasks and will determine whether past strategies will be effective (and will develop new strategies if needed).   Status: New - Date: 9/13/2022     Intervention(s)  Therapist will collaborate with patients in session to determine these and to identify past family of origin patterns contributing to these perpetual struggles.    Objective #C  Patient (and spouse) will validate their partner's experiences and feelings and show appreciation for their partner.  Status: New - Date: 9/13/2022     Intervention(s)  Therapist will role-play effective communication skills.      Goal 3: Patient and spouse will identify reasons for and adaptive ways to cope with financial and extended family anxiety/stressors.    Objective #A (Patient Action)    Status: New - Date: 9/13/2022     Patient (and spouse) will use cognitive strategies identified in therapy to challenge anxious thoughts.     Intervention(s)  Therapist will teach Claus Leal's \"the story I'm making up\" and building trust.    Objective #B  Patient (and spouse) will identify primary triggers for anxiety related to finances and extended family.    Status: New - Date: 9/13/2022     Intervention(s)  Therapist will collaborate with the couple in session to determine these.    Objective #C  Patient (and spouse) will fully discuss " family of origin patterns related to communication and finances and will determine what worked and what didn't, and what they would like to implement and avoid in their relationship.  Status: New - Date: 9/13/2022     Intervention(s)  Therapist will teach about family systems and learned patterns of behavior stemming from family of origin.      Patient and significant other/spouse agreed to the above plan.      Tayla Rodrigues  September 13, 2022

## 2022-11-15 NOTE — PATIENT INSTRUCTIONS
Patient and partner report the following goals:    Homework: focus on positive connection during the next three weeks.  Plan a one-hour time together to play games/watch a show/do some other enjoyable activity.

## 2022-11-23 ENCOUNTER — VIRTUAL VISIT (OUTPATIENT)
Dept: BEHAVIORAL HEALTH | Facility: CLINIC | Age: 27
End: 2022-11-23
Payer: COMMERCIAL

## 2022-11-23 DIAGNOSIS — F90.8 ATTENTION DEFICIT HYPERACTIVITY DISORDER (ADHD), OTHER TYPE: ICD-10-CM

## 2022-11-23 DIAGNOSIS — F41.1 GAD (GENERALIZED ANXIETY DISORDER): ICD-10-CM

## 2022-11-23 DIAGNOSIS — F84.0 AUTISM SPECTRUM DISORDER: Primary | ICD-10-CM

## 2022-11-23 DIAGNOSIS — F33.0 MILD EPISODE OF RECURRENT MAJOR DEPRESSIVE DISORDER (H): ICD-10-CM

## 2022-11-23 PROCEDURE — 90834 PSYTX W PT 45 MINUTES: CPT | Mod: 95 | Performed by: SOCIAL WORKER

## 2022-11-23 ASSESSMENT — PATIENT HEALTH QUESTIONNAIRE - PHQ9
SUM OF ALL RESPONSES TO PHQ QUESTIONS 1-9: 11
SUM OF ALL RESPONSES TO PHQ QUESTIONS 1-9: 11
10. IF YOU CHECKED OFF ANY PROBLEMS, HOW DIFFICULT HAVE THESE PROBLEMS MADE IT FOR YOU TO DO YOUR WORK, TAKE CARE OF THINGS AT HOME, OR GET ALONG WITH OTHER PEOPLE: SOMEWHAT DIFFICULT

## 2022-11-23 NOTE — PROGRESS NOTES
[facility]                                     Progress Note    Patient Name: Angela Jones  Date: 11/23/2022         Service Type: Individual      Session Start Time: 1:00pm  Session End Time: 1:50pm     Session Length: 50 minutes    Session #: 15    Attendees: Client attended alone    Service Modality:  Video Visit:      Provider verified identity through the following two step process.  Patient provided:  Patient is known previously to provider    Telemedicine Visit: The patient's condition can be safely assessed and treated via synchronous audio and visual telemedicine encounter.      Reason for Telemedicine Visit: Patient has requested telehealth visit    Originating Site (Patient Location): Patient's home    Distant Site (Provider Location): Provider Remote Setting- Home Office    Consent:  The patient/guardian has verbally consented to: the potential risks and benefits of telemedicine (video visit) versus in person care; bill my insurance or make self-payment for services provided; and responsibility for payment of non-covered services.     Patient would like the video invitation sent by:  My Chart    Mode of Communication:  Video Conference via [employer]      Distant Location (Provider):  Off-site    As the provider I attest to compliance with applicable laws and regulations related to telemedicine.    DATA  Interactive Complexity: No  Crisis: No        Progress Since Last Session (Related to Symptoms / Goals / Homework):   Symptoms: No change : anxiety and depression    Homework: Partially completed      Episode of Care Goals: Minimal progress - PREPARATION (Decided to change - considering how); Intervened by negotiating a change plan and determining options / strategies for behavior change, identifying triggers, exploring social supports, and working towards setting a date to begin behavior change     Current / Ongoing Stressors and Concerns:  Feeling better, going to 's  family's Thanksgiving and feels fine about this, work has been really busy, identifies similar stressors, sleeping worse       Treatment Objective(s) Addressed in This Session:   identify the fears / thoughts that contribute to feeling anxious  state understanding of stressors and relationship to physical symptoms  Increase interest, engagement, and pleasure in doing things  Decrease frequency and intensity of feeling down, depressed, hopeless  Identify negative self-talk and behaviors: challenge core beliefs, myths, and actions  Gain insight     Intervention:   Discussed mindfulness as being aware of what we are experiencing while we are experiencing it.  Contrasted this with avoidance and rumination.  Explored the role of mindfulness as an overall coping strategy for managing depression, anxiety, and strong emotions.  Explained concept of state of mind using SIFT (sensations, images, feelings, thoughts) pneumonic.  Led client in a guided mindfulness exercise focusing on sensations, images, feelings, and thoughts.  Discussed mindfulness as a tool to intentionally shift our awareness and focus as needed.    Assessments completed prior to visit:   The following assessments were completed by patient for this visit:  PHQ9:   PHQ-9 SCORE 8/10/2022 8/17/2022 9/27/2022 9/27/2022 10/12/2022 11/15/2022 11/23/2022   PHQ-9 Total Score - - - - - - -   PHQ-9 Total Score MyChart 8 (Mild depression) 7 (Mild depression) - 11 (Moderate depression) 7 (Mild depression) 12 (Moderate depression) 11 (Moderate depression)   PHQ-9 Total Score 8 7 11 11 7 12 11     GAD7:   JOSHUA-7 SCORE 3/9/2022 5/4/2022 6/29/2022 9/27/2022 9/27/2022 10/12/2022 11/15/2022   Total Score - - - - - - -   Total Score 6 (mild anxiety) 5 (mild anxiety) 6 (mild anxiety) - 8 (mild anxiety) 8 (mild anxiety) 8 (mild anxiety)   Total Score 7 5 6 8 8 8 8   Total Score - - - - - - -     PROMIS 10-Global Health (only subscores and total score):   PROMIS-10 Scores Only  1/19/2022 2/15/2022 3/9/2022 6/16/2022 6/29/2022 10/11/2022 10/11/2022   Global Mental Health Score 11 9 12 10 10 13 13   Global Physical Health Score 11 11 11 9 11 11 11   PROMIS TOTAL - SUBSCORES 22 20 23 19 21 24 24         ASSESSMENT: Current Emotional / Mental Status (status of significant symptoms):   Risk status (Self / Other harm or suicidal ideation)   Patient denies current fears or concerns for personal safety.   Patient denies current or recent suicidal ideation or behaviors.   Patient denies current or recent homicidal ideation or behaviors.   Patient denies current or recent self injurious behavior or ideation.   Patient denies other safety concerns.   Patient reports there has been no change in risk factors since their last session.     Patient reports there has been no change in protective factors since their last session.     Recommended that patient call 911 or go to the local ED should there be a change in any of these risk factors.     Appearance:   Appropriate    Eye Contact:   Good    Psychomotor Behavior: Hyperactive  Restless    Attitude:   Cooperative    Orientation:   All   Speech    Rate / Production: Pressured  Stutters    Volume:  Normal    Mood:    Anxious  Anhedonia   Affect:    Constricted  Flat    Thought Content:  Clear    Thought Form:  Coherent    Insight:    Good      Medication Review:   No changes to current psychiatric medication(s)     Medication Compliance:   Yes     Changes in Health Issues:   None reported     Chemical Use Review:   Substance Use: Chemical use reviewed, no active concerns identified      Tobacco Use: No current tobacco use.      Diagnosis:  1. Autism spectrum disorder    2. JOSHUA (generalized anxiety disorder)    3. Mild episode of recurrent major depressive disorder (H)    4. Attention deficit hyperactivity disorder (ADHD), other type        Collateral Reports Completed:   Not Applicable    PLAN: (Patient Tasks / Therapist Tasks / Other)  Patient will  return for a virtual visit in 2 weeks  Patient will continue to utilize stress management techniques  Discussion around starting to use daily planner again  Revisit discussion around sleep     There has been demonstrated improvement in functioning while patient has been engaged in psychotherapy/psychological service- if withdrawn the patient would deteriorate and/or relapse.       Fanny Cobb, [facility]                                                         ______________________________________________________________________    Individual Treatment Plan    Patient's Name: Angela Jones  YOB: 1995    Date of Creation: 1/19/2022  Date Treatment Plan Last Reviewed/Revised: 10/12/2022      DSM5 Diagnoses: Autism Spectrum Disorder 299.00(F84.0)  Associated with another neurodevelopmental, mental or behavioral disorder and Attention-Deficit/Hyperactivity Disorder  314.01 (F90.9) Unspecified Attention -Deficit / Hyperactivity Disorder, 296.31 (F33.0) Major Depressive Disorder, Recurrent Episode, Mild _ or 300.02 (F41.1) Generalized Anxiety Disorder  Psychosocial / Contextual Factors: 27 year old  female, , no children   PROMIS (reviewed every 90 days):   PROMIS 10-Global Health (only subscores and total score):   PROMIS-10 Scores Only 12/30/2021 1/19/2022 2/15/2022 3/9/2022   Global Mental Health Score 11 11 9 12   Global Physical Health Score 11 11 11 11   PROMIS TOTAL - SUBSCORES 22 22 20 23       Referral / Collaboration:  Was/were discussed and patient will pursue.    Anticipated number of session for this episode of care: 9  Anticipation frequency of session: every 2-3 weeks  Anticipated Duration of each session: 38-52 minutes  Treatment plan will be reviewed in 90 days or when goals have been changed.       MeasurableTreatment Goal(s) related to diagnosis / functional impairment(s)  Goal 1: Patient will manage symptoms of anxiety     I will know I've met my goal when I feel more  confident in my ability to cope with stress with fewer meltdowns.      Objective #A (Patient Action)    Patient will identify the initial signs or symptoms of anxiety.  Status: Continued - Date(s): 10/12/2022      Intervention(s)  Therapist will teach help patient gain insight into signs of stress (physically and emotionally).    Objective #B  Patient will use relaxation strategies multiple times per day to reduce the physical symptoms of anxiety.  Status: Continued - Date(s): 10/12/2022      Intervention(s)  Therapist will teach diaphragmatic breathing and other grounding skills.    Objective #C  Patient will use thought-stopping strategy daily to reduce intrusive thoughts.  Status: Continued - Date(s): 10/12/2022      Intervention(s)  Therapist will teach thought stopping techniques.      Goal 2: Patient will manage symptoms of depression    I will know I've met my goal when I feel more confident in my ability to express my emotions in a healthy way.      Objective #A (Patient Action)    Status: Continued - Date(s): 10/12/2022      Patient will Increase interest, engagement, and pleasure in doing things.    Intervention(s)  Therapist will teach emotional recognition/identification. *.    Objective #B  Patient will Identify negative self-talk and behaviors: challenge core beliefs, myths, and actions.    Status: Continued - Date(s): 10/12/2022      Intervention(s)  Therapist will help patient practice positive self-talk and thought re-framing.      Goal 3: Patient will improve communication patterns in the relationship    I will know I've met my goal when we continue to attend couples counseling and see improvements.      Objective #A (Patient Action)    Status: Continued - Date(s): 10/12/2022      Patient will continue participating in couples therapy with Tayla.     Intervention(s)  Therapist will monitor referral process and follow-up on their relationship problems and improvements      Patient has reviewed and  agreed to the above plan.      Fanny Cobb, [facility]  October 12, 2022

## 2022-12-06 ENCOUNTER — TELEPHONE (OUTPATIENT)
Dept: PSYCHOLOGY | Facility: CLINIC | Age: 27
End: 2022-12-06

## 2022-12-06 NOTE — PROGRESS NOTES
[facility]                                     Progress Note    Patient Name: Angela Jones  Date: 12/7/2022         Service Type: Couple      Session Start Time: 9:00 a.m.  Session End Time: 9:57 a.m.     Session Length: 57 minutes    Session #: 6 (with this provider - both have individual therapists)    Attendees: Client and Spouse / Significant Other    Service Modality:  Video Visit:      Provider verified identity through the following two step process.  Patient provided:  Patient was verified at admission/transfer    Telemedicine Visit: The patient's condition can be safely assessed and treated via synchronous audio and visual telemedicine encounter.      Reason for Telemedicine Visit: Patient convenience (e.g. access to timely appointments / distance to available provider)    Originating Site (Patient Location): Patient's home    On-Site (Provider Location): [facility]: [place]    Consent:  The patient/guardian has verbally consented to: the potential risks and benefits of telemedicine (video visit) versus in person care; bill my insurance or make self-payment for services provided; and responsibility for payment of non-covered services.     Patient would like the video invitation sent by:  My Chart    Mode of Communication:  Video Conference via [employer]    As the provider I attest to compliance with applicable laws and regulations related to telemedicine.    DATA  Interactive Complexity: No  Crisis: No        Progress Since Last Session (Related to Symptoms / Goals / Homework):   Symptoms: patient states that she and her  have not spent much time together recently as he was rehearsing for a [employer] concert; ongoing difficulties remembering things and with task completion.    Homework: Did not complete      Episode of Care Goals: Minimal progress - PREPARATION (Decided to change - considering how); Intervened by negotiating a change plan and determining  "options / strategies for behavior change, identifying triggers, exploring social supports, and working towards setting a date to begin behavior change     Current / Ongoing Stressors and Concerns:   Difficulties in relationship related to each partner's mental and physical health; communication struggles; executive functioning and health concerns leading to problems completing weekly household chores     Treatment Objective(s) Addressed in This Session:   Develop Love Map - completed assessment for knowing partner  identify barriers to completing household tasks  learn about and utilize bids for connection and turning towards partner instead of turning away  validate their partner's experiences and feelings and show appreciation for their partner  fully discuss family of origin patterns related to communication and finances and will determine what worked and what didn't, and what they would like to implement and avoid in their relationship    Past/future:  identify a time in the relationship when the couple successfully completed household tasks and will determine whether past strategies will be effective (and will develop new strategies if needed)  learn about Gwen's Four Horsemen of the Apocalypse and will identify these within the relationship  use cognitive strategies identified in therapy to challenge anxious thoughts  identify primary triggers for anxiety related to finances and extended family  identify components of the [facility] and will work to strengthen friendship, adaptively handle conflict, and create shared meaning/dreams     Intervention:   CBT: Gwne - connection between thoughts, feelings, and actions  Solution Focused: identifying solvable problems and generating possible solutions; if problem is not solvable, identifying compromise and boundaries/deal-breakers    Past/future:  Family systems: patterns, learned behaviors, communication  Narrative: \"the story I'm making up,\" "  problem from person, and finding bright spots     Assessments completed prior to visit:  The following assessments were completed by patient for this visit:  PHQ9:   PHQ-9 SCORE 8/10/2022 8/17/2022 9/27/2022 9/27/2022 10/12/2022 11/15/2022 11/23/2022   PHQ-9 Total Score - - - - - - -   PHQ-9 Total Score MyChart 8 (Mild depression) 7 (Mild depression) - 11 (Moderate depression) 7 (Mild depression) 12 (Moderate depression) 11 (Moderate depression)   PHQ-9 Total Score 8 7 11 11 7 12 11     GAD7:   JOSHUA-7 SCORE 3/9/2022 5/4/2022 6/29/2022 9/27/2022 9/27/2022 10/12/2022 11/15/2022   Total Score - - - - - - -   Total Score 6 (mild anxiety) 5 (mild anxiety) 6 (mild anxiety) - 8 (mild anxiety) 8 (mild anxiety) 8 (mild anxiety)   Total Score 7 5 6 8 8 8 8   Total Score - - - - - - -     PROMIS 10-Global Health (all questions and answers displayed):   PROMIS 10 1/19/2022 2/15/2022 3/9/2022 6/16/2022 6/29/2022 10/11/2022 10/11/2022   In general, would you say your health is: Fair Fair Fair Fair Fair - Fair   In general, would you say your quality of life is: Good Good Good Good Good - Good   In general, how would you rate your physical health? Fair Fair Fair Fair Fair - Fair   In general, how would you rate your mental health, including your mood and your ability to think? Fair Fair Good Fair Fair - Good   In general, how would you rate your satisfaction with your social activities and relationships? Good Good Very good Good Good - Very good   In general, please rate how well you carry out your usual social activities and roles Good Fair Good Poor Fair - Very good   To what extent are you able to carry out your everyday physical activities such as walking, climbing stairs, carrying groceries, or moving a chair? Moderately Moderately Moderately A little Moderately - Moderately   How often have you been bothered by emotional problems such as feeling anxious, depressed or irritable? Sometimes Always Often Often Often -  Sometimes   How would you rate your fatigue on average? Moderate Moderate Moderate Severe Moderate - Moderate   How would you rate your pain on average?   0 = No Pain  to  10 = Worst Imaginable Pain 4 4 4 4 4 - 4   In general, would you say your health is: 2 2 2 2 2 2 2   In general, would you say your quality of life is: 3 3 3 3 3 3 3   In general, how would you rate your physical health? 2 2 2 2 2 2 2   In general, how would you rate your mental health, including your mood and your ability to think? 2 2 3 2 2 3 3   In general, how would you rate your satisfaction with your social activities and relationships? 3 3 4 3 3 4 4   In general, please rate how well you carry out your usual social activities and roles. (This includes activities at home, at work and in your community, and responsibilities as a parent, child, spouse, employee, friend, etc.) 3 2 3 1 2 4 4   To what extent are you able to carry out your everyday physical activities such as walking, climbing stairs, carrying groceries, or moving a chair? 3 3 3 2 3 3 3   In the past 7 days, how often have you been bothered by emotional problems such as feeling anxious, depressed, or irritable? 3 5 4 4 4 3 3   In the past 7 days, how would you rate your fatigue on average? 3 3 3 4 3 3 3   In the past 7 days, how would you rate your pain on average, where 0 means no pain, and 10 means worst imaginable pain? 4 4 4 4 4 4 4   Global Mental Health Score 11 9 12 10 10 13 13   Global Physical Health Score 11 11 11 9 11 11 11   PROMIS TOTAL - SUBSCORES 22 20 23 19 21 24 24   Some recent data might be hidden         ASSESSMENT: Current Emotional / Mental Status (status of significant symptoms):   Risk status (Self / Other harm or suicidal ideation)   Patient denies current fears or concerns for personal safety.   Patient denies current or recent suicidal ideation or behaviors.   Patient denies current or recent homicidal ideation or behaviors.   Patient denies current or  recent self injurious behavior or ideation.   Patient denies other safety concerns.   Patient reports there has been no change in risk factors since their last session.     Patient reports there has been no change in protective factors since their last session.     Recommended that patient call 911 or go to the local ED should there be a change in any of these risk factors.     Appearance:   Appropriate    Eye Contact:   Good    Psychomotor Behavior: Normal  Restless    Attitude:   Cooperative    Orientation:   All   Speech    Rate / Production: Each sometimes needs additional processing time    Volume:  Normal    Mood:    Anxious    Affect:    Appropriate    Thought Content:  Rumination  sometimes perseverative   Thought Form:  Coherent  Circumstantial Girard   Insight:    Good      Medication Review:   No changes to current psychiatric medication(s)     Medication Compliance:   Yes     Changes in Health Issues:   None reported - ongoing chronic health issues for both patients (fatigue, pain, POTS)     Chemical Use Review:   Substance Use: Chemical use reviewed, no active concerns identified      Tobacco Use: No current tobacco use.      Diagnosis:  1. Generalized anxiety disorder    2. Recurrent major depressive disorder, in partial remission (H)    3. Autism spectrum disorder      Collateral Reports Completed:   Not Applicable    PLAN: (Patient Tasks / Therapist Tasks / Other)    Patient and partner report the following goal (continued):    Homework: focus on positive connection during the next three weeks.  Plan a one-hour time together to play games/watch a show/do some other enjoyable activity.      Tayla Rodrigues                                                                                                      Couple Treatment Plan    Patient's Name: Angela Jones  YOB: 1995    Date of Creation: 9/13/2022  Date Treatment Plan Last Reviewed/Revised: 9/13/2022    DSM5 Diagnoses: Autism  Spectrum Disorder 299.00(F84.0)  Associated Current severity for Criterion A and Criterion B: 299.00(F84.0) Without accompanying intellectual impairment; 296.35 (F33.41)  Major Depressive Disorder, Recurrent Episode, In partial remission _; 300.02 (F41.1) Generalized Anxiety Disorder    Psychosocial / Contextual Factors: Difficulties in relationship related to each partner's mental and physical health; communication struggles; executive functioning and health concerns leading to problems completing weekly household chores    PROMIS (reviewed every 90 days): PROMIS-10 Scores           Referral / Collaboration:  Referral to another professional/service is not indicated at this time.    Anticipated number of sessions for this episode of care: 9-12 sessions  Anticipation frequency of session: Every other week  Anticipated duration of each session: 38-52 minutes  Treatment plan will be reviewed in 90 days or when goals have been changed.     Measurable Treatment Goal(s) related to diagnosis / functional impairment(s)  Goal 1: Patient and spouse will learn about Dr. Diallo Hidalgo's successful relationship skills and will work to improve their communication patterns.    Objective #A (Patient Action)    Patient (and spouse) will learn about and utilize bids for connection and turning towards partner instead of turning away.  Status: New - Date: 9/13/2022     Intervention(s)  Therapist will teach teach about bids and turning towards.    Objective #B  Patient (and spouse) will learn about Gwen's Four Horsemen of the Apocalypse and will identify these within the relationship.  Status: New - Date: 9/13/2022     Intervention(s)  Therapist will teach about Gwen's research on what leads to relationships ending.    Objective #C  Patient (and spouse) will identify components of the Sound Relationship House and will work to strengthen friendship, adaptively handle conflict, and create shared meaning/dreams.  Status: New - Date:  "9/13/2022     Intervention(s)  Therapist will teach about the Sound Relationship [facility] and managing conflict with a soft start-up.      Goal 2: Patient and spouse will discuss and identify ways to complete weekly household tasks while taking into account each partner's chronic health issues.    Objective #A (Patient Action)    Status: New - Date: 9/13/2022     Patient (and spouse) will identify barriers to completing household tasks.    Intervention(s)  Therapist will collaborate with the couple in session to determine these.    Objective #B  Patient (and spouse) will identify a time in the relationship when the couple successfully completed household tasks and will determine whether past strategies will be effective (and will develop new strategies if needed).   Status: New - Date: 9/13/2022     Intervention(s)  Therapist will collaborate with patients in session to determine these and to identify past family of origin patterns contributing to these perpetual struggles.    Objective #C  Patient (and spouse) will validate their partner's experiences and feelings and show appreciation for their partner.  Status: New - Date: 9/13/2022     Intervention(s)  Therapist will role-play effective communication skills.      Goal 3: Patient and spouse will identify reasons for and adaptive ways to cope with financial and extended family anxiety/stressors.    Objective #A (Patient Action)    Status: New - Date: 9/13/2022     Patient (and spouse) will use cognitive strategies identified in therapy to challenge anxious thoughts.     Intervention(s)  Therapist will teach Claus Leal's \"the story I'm making up\" and building trust.    Objective #B  Patient (and spouse) will identify primary triggers for anxiety related to finances and extended family.    Status: New - Date: 9/13/2022     Intervention(s)  Therapist will collaborate with the couple in session to determine these.    Objective #C  Patient (and spouse) will fully discuss " family of origin patterns related to communication and finances and will determine what worked and what didn't, and what they would like to implement and avoid in their relationship.  Status: New - Date: 9/13/2022     Intervention(s)  Therapist will teach about family systems and learned patterns of behavior stemming from family of origin.      Patient and significant other/spouse agreed to the above plan.      Tayla Rodrigues  September 13, 2022

## 2022-12-06 NOTE — TELEPHONE ENCOUNTER
Patient did not appear for scheduled video visit. Therapist called and left a voice message. Today's visit will count as a no-show.

## 2022-12-07 ENCOUNTER — VIRTUAL VISIT (OUTPATIENT)
Dept: PSYCHOLOGY | Facility: OTHER | Age: 27
End: 2022-12-07
Payer: COMMERCIAL

## 2022-12-07 DIAGNOSIS — F33.41 RECURRENT MAJOR DEPRESSIVE DISORDER, IN PARTIAL REMISSION (H): ICD-10-CM

## 2022-12-07 DIAGNOSIS — F41.1 GENERALIZED ANXIETY DISORDER: Primary | ICD-10-CM

## 2022-12-07 DIAGNOSIS — F84.0 AUTISM SPECTRUM DISORDER: ICD-10-CM

## 2022-12-07 PROCEDURE — 90847 FAMILY PSYTX W/PT 50 MIN: CPT | Mod: 95 | Performed by: MARRIAGE & FAMILY THERAPIST

## 2022-12-07 NOTE — PATIENT INSTRUCTIONS
Patient and partner report the following goal (continued):    Homework: focus on positive connection during the next three weeks.  Plan a one-hour time together to play games/watch a show/do some other enjoyable activity.

## 2022-12-21 ENCOUNTER — VIRTUAL VISIT (OUTPATIENT)
Dept: BEHAVIORAL HEALTH | Facility: CLINIC | Age: 27
End: 2022-12-21
Payer: COMMERCIAL

## 2022-12-21 DIAGNOSIS — F84.0 AUTISM SPECTRUM DISORDER: Primary | ICD-10-CM

## 2022-12-21 DIAGNOSIS — F33.0 MILD EPISODE OF RECURRENT MAJOR DEPRESSIVE DISORDER (H): ICD-10-CM

## 2022-12-21 DIAGNOSIS — F90.8 ATTENTION DEFICIT HYPERACTIVITY DISORDER (ADHD), OTHER TYPE: ICD-10-CM

## 2022-12-21 DIAGNOSIS — F41.1 GAD (GENERALIZED ANXIETY DISORDER): ICD-10-CM

## 2022-12-21 PROCEDURE — 90834 PSYTX W PT 45 MINUTES: CPT | Mod: 95 | Performed by: SOCIAL WORKER

## 2022-12-21 ASSESSMENT — PATIENT HEALTH QUESTIONNAIRE - PHQ9
SUM OF ALL RESPONSES TO PHQ QUESTIONS 1-9: 7
SUM OF ALL RESPONSES TO PHQ QUESTIONS 1-9: 7
10. IF YOU CHECKED OFF ANY PROBLEMS, HOW DIFFICULT HAVE THESE PROBLEMS MADE IT FOR YOU TO DO YOUR WORK, TAKE CARE OF THINGS AT HOME, OR GET ALONG WITH OTHER PEOPLE: SOMEWHAT DIFFICULT

## 2022-12-21 NOTE — PROGRESS NOTES
[facility]                                     Progress Note    Patient Name: Angela Jones  Date: 12/21/2022         Service Type: Individual      Session Start Time: 1:00pm  Session End Time: 1:50pm     Session Length: 50 minutes    Session #: 16    Attendees: Client attended alone    Service Modality:  Video Visit:      Provider verified identity through the following two step process.  Patient provided:  Patient is known previously to provider    Telemedicine Visit: The patient's condition can be safely assessed and treated via synchronous audio and visual telemedicine encounter.      Reason for Telemedicine Visit: Patient has requested telehealth visit    Originating Site (Patient Location): Patient's home    Distant Site (Provider Location): Provider Remote Setting- Home Office    Consent:  The patient/guardian has verbally consented to: the potential risks and benefits of telemedicine (video visit) versus in person care; bill my insurance or make self-payment for services provided; and responsibility for payment of non-covered services.     Patient would like the video invitation sent by:  My Chart    Mode of Communication:  Video Conference via Amwell      Distant Location (Provider):  Off-site    As the provider I attest to compliance with applicable laws and regulations related to telemedicine.    DATA  Interactive Complexity: No  Crisis: No        Progress Since Last Session (Related to Symptoms / Goals / Homework):   Symptoms: No change : anxiety and depression    Homework: Partially completed      Episode of Care Goals: Minimal progress - PREPARATION (Decided to change - considering how); Intervened by negotiating a change plan and determining options / strategies for behavior change, identifying triggers, exploring social supports, and working towards setting a date to begin behavior change     Current / Ongoing Stressors and Concerns:  Discussion around attendance impacted by  being busier than she has been in the past, it's harder for her to keep track of things, variety of other factors impacting her ability to organize tasks and follow through with commitments (dyscalculia), work has been very stressful       Treatment Objective(s) Addressed in This Session:   identify the fears / thoughts that contribute to feeling anxious  state understanding of stressors and relationship to physical symptoms  Increase interest, engagement, and pleasure in doing things  Decrease frequency and intensity of feeling down, depressed, hopeless  Identify negative self-talk and behaviors: challenge core beliefs, myths, and actions  Gain insight     Intervention:   Discussed mindfulness as being aware of what we are experiencing while we are experiencing it.  Contrasted this with avoidance and rumination.  Explored the role of mindfulness as an overall coping strategy for managing depression, anxiety, and strong emotions.  Explained concept of state of mind using SIFT (sensations, images, feelings, thoughts) pneumonic.  Led client in a guided mindfulness exercise focusing on sensations, images, feelings, and thoughts.  Discussed mindfulness as a tool to intentionally shift our awareness and focus as needed.    Assessments completed prior to visit:   The following assessments were completed by patient for this visit:  PHQ9:   PHQ-9 SCORE 8/17/2022 9/27/2022 9/27/2022 10/12/2022 11/15/2022 11/23/2022 12/21/2022   PHQ-9 Total Score - - - - - - -   PHQ-9 Total Score MyChart 7 (Mild depression) - 11 (Moderate depression) 7 (Mild depression) 12 (Moderate depression) 11 (Moderate depression) 7 (Mild depression)   PHQ-9 Total Score 7 11 11 7 12 11 7     GAD7:   JOSHUA-7 SCORE 3/9/2022 5/4/2022 6/29/2022 9/27/2022 9/27/2022 10/12/2022 11/15/2022   Total Score - - - - - - -   Total Score 6 (mild anxiety) 5 (mild anxiety) 6 (mild anxiety) - 8 (mild anxiety) 8 (mild anxiety) 8 (mild anxiety)   Total Score 7 5 6 8 8 8 8    Total Score - - - - - - -     PROMIS 10-Global Health (only subscores and total score):   PROMIS-10 Scores Only 1/19/2022 2/15/2022 3/9/2022 6/16/2022 6/29/2022 10/11/2022 10/11/2022   Global Mental Health Score 11 9 12 10 10 13 13   Global Physical Health Score 11 11 11 9 11 11 11   PROMIS TOTAL - SUBSCORES 22 20 23 19 21 24 24         ASSESSMENT: Current Emotional / Mental Status (status of significant symptoms):   Risk status (Self / Other harm or suicidal ideation)   Patient denies current fears or concerns for personal safety.   Patient denies current or recent suicidal ideation or behaviors.   Patient denies current or recent homicidal ideation or behaviors.   Patient denies current or recent self injurious behavior or ideation.   Patient denies other safety concerns.   Patient reports there has been no change in risk factors since their last session.     Patient reports there has been no change in protective factors since their last session.     Recommended that patient call 911 or go to the local ED should there be a change in any of these risk factors.     Appearance:   Appropriate    Eye Contact:   Good    Psychomotor Behavior: Hyperactive  Restless    Attitude:   Cooperative    Orientation:   All   Speech    Rate / Production: Pressured  Stutters    Volume:  Normal    Mood:    Anxious  Anhedonia   Affect:    Constricted  Flat    Thought Content:  Clear    Thought Form:  Coherent    Insight:    Good      Medication Review:   No changes to current psychiatric medication(s)     Medication Compliance:   Yes     Changes in Health Issues:   None reported     Chemical Use Review:   Substance Use: Chemical use reviewed, no active concerns identified      Tobacco Use: No current tobacco use.      Diagnosis:  1. Autism spectrum disorder    2. JOSHUA (generalized anxiety disorder)    3. Mild episode of recurrent major depressive disorder (H)    4. Attention deficit hyperactivity disorder (ADHD), other type         Collateral Reports Completed:   Not Applicable    PLAN: (Patient Tasks / Therapist Tasks / Other)  Patient will return for a virtual visit in 1 month  Patient will continue to utilize stress management techniques  Discussion around starting to use daily planner again  Revisit discussion around sleep     There has been demonstrated improvement in functioning while patient has been engaged in psychotherapy/psychological service- if withdrawn the patient would deteriorate and/or relapse.       Fanny Cobb, [facility]                                                         ______________________________________________________________________    Individual Treatment Plan    Patient's Name: Angela Jones  YOB: 1995    Date of Creation: 1/19/2022  Date Treatment Plan Last Reviewed/Revised: 10/12/2022      DSM5 Diagnoses: Autism Spectrum Disorder 299.00(F84.0)  Associated with another neurodevelopmental, mental or behavioral disorder and Attention-Deficit/Hyperactivity Disorder  314.01 (F90.9) Unspecified Attention -Deficit / Hyperactivity Disorder, 296.31 (F33.0) Major Depressive Disorder, Recurrent Episode, Mild _ or 300.02 (F41.1) Generalized Anxiety Disorder  Psychosocial / Contextual Factors: 27 year old  female, , no children   PROMIS (reviewed every 90 days):   PROMIS 10-Global Health (only subscores and total score):   PROMIS-10 Scores Only 12/30/2021 1/19/2022 2/15/2022 3/9/2022   Global Mental Health Score 11 11 9 12   Global Physical Health Score 11 11 11 11   PROMIS TOTAL - SUBSCORES 22 22 20 23       Referral / Collaboration:  Was/were discussed and patient will pursue.    Anticipated number of session for this episode of care: 9  Anticipation frequency of session: every 2-3 weeks  Anticipated Duration of each session: 38-52 minutes  Treatment plan will be reviewed in 90 days or when goals have been changed.       MeasurableTreatment Goal(s) related to diagnosis /  functional impairment(s)  Goal 1: Patient will manage symptoms of anxiety     I will know I've met my goal when I feel more confident in my ability to cope with stress with fewer meltdowns.      Objective #A (Patient Action)    Patient will identify the initial signs or symptoms of anxiety.  Status: Continued - Date(s): 10/12/2022      Intervention(s)  Therapist will teach help patient gain insight into signs of stress (physically and emotionally).    Objective #B  Patient will use relaxation strategies multiple times per day to reduce the physical symptoms of anxiety.  Status: Continued - Date(s): 10/12/2022      Intervention(s)  Therapist will teach diaphragmatic breathing and other grounding skills.    Objective #C  Patient will use thought-stopping strategy daily to reduce intrusive thoughts.  Status: Continued - Date(s): 10/12/2022      Intervention(s)  Therapist will teach thought stopping techniques.      Goal 2: Patient will manage symptoms of depression    I will know I've met my goal when I feel more confident in my ability to express my emotions in a healthy way.      Objective #A (Patient Action)    Status: Continued - Date(s): 10/12/2022      Patient will Increase interest, engagement, and pleasure in doing things.    Intervention(s)  Therapist will teach emotional recognition/identification. *.    Objective #B  Patient will Identify negative self-talk and behaviors: challenge core beliefs, myths, and actions.    Status: Continued - Date(s): 10/12/2022      Intervention(s)  Therapist will help patient practice positive self-talk and thought re-framing.      Goal 3: Patient will improve communication patterns in the relationship    I will know I've met my goal when we continue to attend couples counseling and see improvements.      Objective #A (Patient Action)    Status: Continued - Date(s): 10/12/2022      Patient will continue participating in couples therapy with Tayla.      Intervention(s)  Therapist will monitor referral process and follow-up on their relationship problems and improvements      Patient has reviewed and agreed to the above plan.      Fanny Cobb, [facility]  October 12, 2022

## 2023-01-04 ENCOUNTER — VIRTUAL VISIT (OUTPATIENT)
Dept: CARDIOLOGY | Facility: CLINIC | Age: 28
End: 2023-01-04
Attending: NURSE PRACTITIONER
Payer: COMMERCIAL

## 2023-01-04 DIAGNOSIS — G90.A POTS (POSTURAL ORTHOSTATIC TACHYCARDIA SYNDROME): ICD-10-CM

## 2023-01-04 DIAGNOSIS — R00.2 PALPITATIONS: Primary | ICD-10-CM

## 2023-01-04 PROCEDURE — G0463 HOSPITAL OUTPT CLINIC VISIT: HCPCS | Mod: GT,PN | Performed by: NURSE PRACTITIONER

## 2023-01-04 PROCEDURE — 99214 OFFICE O/P EST MOD 30 MIN: CPT | Mod: 95 | Performed by: NURSE PRACTITIONER

## 2023-01-04 RX ORDER — PINDOLOL 5 MG/1
5 TABLET ORAL 2 TIMES DAILY PRN
Qty: 60 TABLET | Refills: 0 | Status: SHIPPED | OUTPATIENT
Start: 2023-01-04 | End: 2024-06-27

## 2023-01-04 NOTE — PROGRESS NOTES
ELECTROPHYSIOLOGY CLINIC VISIT    Assessment/Recommendations   Assessment/Plan:    Ms. Jones is a 27 year old female who has a past medical history significant for POTS , JOSHUA, OCD, and depression.       POTS:  - Florinef 0.2 mg daily  - Start Pindolol 5 mg PRN for palpitations/racing heart  - Use compression shorts/gaurments  - Increase hydration with goal to take in 64 oz of water/electrolyte fluids per day. Recommend drinking 8-10oz. Mix 5 oz water with 5 oz pedialyte upon waking prior to rising out of bed each morning or after long naps.   - Eat six small meals per day with focus on foods low in carbohydrates and low in gluten.  - Liberalize salt/electrolytes intake  - Change positions carefully and slowly and maintain safe environment.   -Standing training and supine isometric exercises.       Follow up with Dr. Lim in 6 months.        History of Present Illness/Subjective    Ms. Angela Jones is a 27 year old female who comes in today for EP follow-up of POTS.    Ms. Jones is a 27 year old female who has a past medical history significant for POTS , JOSHUA, OCD, and depression.     She has followed with Dr. Lim for management of POTS. She has also previously undergone evaluations at [facility]. She works as a  and spends a lot of time on her feet. She has periods where she will get very weak and slump to the ground. She will feel very fatigued afterwards. Her resting BPs tend to run on lower side. She was advised to do isometric lower body exercises and use compression garments. She was also started on Florinef 0.2 mg daily.      She reports feeling well. She is not fainting as often since last visit. She does not more racing pulse rate recently which is fairly symptomatic to her. She denies chest discomfort, abdominal fullness/bloating or peripheral edema, shortness of breath, paroxysmal nocturnal dyspnea, or orthopnea. Current cardiac medications include: Florinef.       I have  reviewed and updated the patient's Past Medical History, Social History, Family History and Medication List.     Cardiographics (Personally Reviewed) :   2015 Echo:   Interpretation Summary  The study was technically difficult.  The visual ejection fraction is estimated at 55%. The right ventricle is   normal in size and function. Trivial pericardial effusion  PatientHeight: 63 in  PatientWeight: 105 lbs  SystolicPressure: 108 mmHg  DiastolicPressure: 68 mmHg  HeartRate: 74 bpm  BSA 1.5 m^2       Physical Examination   There were no vitals taken for this visit.  Wt Readings from Last 3 Encounters:   05/17/22 54 kg (119 lb)   02/18/22 51.6 kg (113 lb 12 oz)   08/12/21 52.3 kg (115 lb 6 oz)     The rest of a comprehensive physical examination is deferred due to nature of video visit restrictions.  CONSITUTIONAL: no acute distress  HEENT: no icterus, no redness or discharge, neck supple  CV: no visible edema of visualized extremities. No JVD.   RESPIRATORY: respirations nonlabored, no cough  NEURO: AA&Ox3, speech fluent/appropriate, motor grossly nonfocal  PSYCH: cooperative, affect appropriate  DERM: no rashes on visualized face/neck/upper extremities         Medications  Allergies   Current Outpatient Medications   Medication Sig Dispense Refill     amitriptyline (ELAVIL) 25 MG tablet TAKE 2 TABLETS(50 MG) BY MOUTH AT BEDTIME 180 tablet 3     diphenhydrAMINE (BENADRYL) 50 MG capsule Take 50 mg by mouth as needed       esomeprazole (NEXIUM) 20 MG DR capsule Take 1 capsule (20 mg) by mouth every morning (before breakfast) 90 capsule 3     fludrocortisone (FLORINEF) 0.1 MG tablet Take 2 tablets (0.2 mg) by mouth daily 180 tablet 3     gabapentin (NEURONTIN) 600 MG tablet Take 1 tablet (600 mg) by mouth 2 times daily 180 tablet 3     guanFACINE (TENEX) 2 MG tablet Take 1 tablet by mouth every 24 hours       hyoscyamine (LEVSIN/SL) 0.125 MG sublingual tablet Take 1 tablet (0.125 mg) by mouth every 4 hours as needed 120  tablet 5     ipratropium (ATROVENT HFA) 17 MCG/ACT inhaler Inhale 2 puffs into the lungs every 6 hours       ipratropium (ATROVENT) 0.02 % nebulizer solution Take 0.5 mg by nebulization daily as needed for wheezing       levonorgestrel (MIRENA, 52 MG,) 20 MCG/24HR IUD 1 each (20 mcg) by Intrauterine route once for 1 dose 1 each 0     Melatonin 10 MG TABS Take 10 mg by mouth nightly as needed       metoclopramide (REGLAN) 5 MG tablet Take 1 tablet (5 mg total) by mouth 3 (three) times a day as needed for nausea. 90 tablet 0     mirtazapine (REMERON) 15 MG tablet Take 1 tablet (15 mg) by mouth daily 90 tablet 3     ondansetron (ZOFRAN-ODT) 4 MG ODT tab Take 4 mg by mouth as needed       TRINTELLIX 20 MG tablet TAKE 1 TABLET BY MOUTH DAILY 90 tablet 3     VITAMIN D, CHOLECALCIFEROL, PO Take  by mouth daily.      Allergies   Allergen Reactions     Adhesive Tape Hives     EKG Patches; any adhesives including band aides; burns      EKG Patches; any adhesives including band aides; burns  EKG Patches; any adhesives including band aides; burns  EKG Patches; any adhesives including band aides; burns       Glucose Other (See Comments)     Other reaction(s): GI intolerance  GI intolerance       Azithromycin Nausea and Vomiting     Fructose Cramps, Diarrhea, GI Disturbance and Nausea and Vomiting     Lactose Nausea         Lab Results (Personally Reviewed)    Chemistry/lipid CBC Cardiac Enzymes/BNP/TSH/INR   Lab Results   Component Value Date    BUN 12 02/18/2022     02/18/2022    CO2 24 02/18/2022     Creatinine   Date Value Ref Range Status   02/18/2022 0.75 0.60 - 1.10 mg/dL Final   04/28/2016 0.68 0.52 - 1.04 mg/dL Final       No results found for: CHOL, HDL, LDL, CHOLHDL   Lab Results   Component Value Date    WBC 4.3 02/18/2022    HGB 12.4 02/18/2022    HCT 38.2 02/18/2022    MCV 97 02/18/2022     02/18/2022    Lab Results   Component Value Date    TSH 2.06 02/18/2022          Video-Visit Details    Type of  service:  Video Visit   Video Start Time: 1045am  Video End Time:1055am    Originating Location (pt. Location): Home  Distant Location (provider location):  On-site  Platform used for Video Visit: Sidney    I have reviewed the note as documented above.  This accurately captures the substance of my virtual visit with the patient. The patient states understanding and is agreeable with the plan.   GISELLE Cloud CNP  Electrophysiology Consult Service  Pager: [phone]

## 2023-01-04 NOTE — PROGRESS NOTES
is a 27 year old who is being evaluated via a billable video visit.      How would you like to obtain your AVS? MyChart  If the video visit is dropped, the invitation should be resent by: Text to cell phone: [phone]  Will anyone else be joining your video visit? No   Patient states is currently in the [facility]: Yes

## 2023-01-04 NOTE — LETTER
1/4/2023      RE: Angela Jones  [address] [place] [address]       Dear Colleague,    Thank you for the opportunity to participate in the care of your patient, Angela Jones, at the [facility] [place] [facility]. Please see a copy of my visit note below.        ELECTROPHYSIOLOGY CLINIC VISIT    Assessment/Recommendations   Assessment/Plan:    Ms. Jones is a 27 year old female who has a past medical history significant for POTS , JOSHUA, OCD, and depression.       POTS:  - Florinef 0.2 mg daily  - Start Pindolol 5 mg PRN for palpitations/racing heart  - Use compression shorts/gaurments  - Increase hydration with goal to take in 64 oz of water/electrolyte fluids per day. Recommend drinking 8-10oz. Mix 5 oz water with 5 oz pedialyte upon waking prior to rising out of bed each morning or after long naps.   - Eat six small meals per day with focus on foods low in carbohydrates and low in gluten.  - Liberalize salt/electrolytes intake  - Change positions carefully and slowly and maintain safe environment.   -Standing training and supine isometric exercises.       Follow up with Dr. Lim in 6 months.        History of Present Illness/Subjective    Ms. Angela Jones is a 27 year old female who comes in today for EP follow-up of POTS.    Ms. Jones is a 27 year old female who has a past medical history significant for POTS , JOSHUA, OCD, and depression.     She has followed with Dr. Lim for management of POTS. She has also previously undergone evaluations at [facility]. She works as a  and spends a lot of time on her feet. She has periods where she will get very weak and slump to the ground. She will feel very fatigued afterwards. Her resting BPs tend to run on lower side. She was advised to do isometric lower body exercises and use compression garments. She was also started on Florinef 0.2 mg daily.      She reports feeling  well. She is not fainting as often since last visit. She does not more racing pulse rate recently which is fairly symptomatic to her. She denies chest discomfort, abdominal fullness/bloating or peripheral edema, shortness of breath, paroxysmal nocturnal dyspnea, or orthopnea. Current cardiac medications include: Florinef.       I have reviewed and updated the patient's Past Medical History, Social History, Family History and Medication List.     Cardiographics (Personally Reviewed) :   2015 Echo:   Interpretation Summary  The study was technically difficult.  The visual ejection fraction is estimated at 55%. The right ventricle is   normal in size and function. Trivial pericardial effusion  PatientHeight: 63 in  PatientWeight: 105 lbs  SystolicPressure: 108 mmHg  DiastolicPressure: 68 mmHg  HeartRate: 74 bpm  BSA 1.5 m^2       Physical Examination   There were no vitals taken for this visit.  Wt Readings from Last 3 Encounters:   05/17/22 54 kg (119 lb)   02/18/22 51.6 kg (113 lb 12 oz)   08/12/21 52.3 kg (115 lb 6 oz)     The rest of a comprehensive physical examination is deferred due to nature of video visit restrictions.  CONSITUTIONAL: no acute distress  HEENT: no icterus, no redness or discharge, neck supple  CV: no visible edema of visualized extremities. No JVD.   RESPIRATORY: respirations nonlabored, no cough  NEURO: AA&Ox3, speech fluent/appropriate, motor grossly nonfocal  PSYCH: cooperative, affect appropriate  DERM: no rashes on visualized face/neck/upper extremities         Medications  Allergies   Current Outpatient Medications   Medication Sig Dispense Refill     amitriptyline (ELAVIL) 25 MG tablet TAKE 2 TABLETS(50 MG) BY MOUTH AT BEDTIME 180 tablet 3     diphenhydrAMINE (BENADRYL) 50 MG capsule Take 50 mg by mouth as needed       esomeprazole (NEXIUM) 20 MG DR capsule Take 1 capsule (20 mg) by mouth every morning (before breakfast) 90 capsule 3     fludrocortisone (FLORINEF) 0.1 MG tablet Take 2  tablets (0.2 mg) by mouth daily 180 tablet 3     gabapentin (NEURONTIN) 600 MG tablet Take 1 tablet (600 mg) by mouth 2 times daily 180 tablet 3     guanFACINE (TENEX) 2 MG tablet Take 1 tablet by mouth every 24 hours       hyoscyamine (LEVSIN/SL) 0.125 MG sublingual tablet Take 1 tablet (0.125 mg) by mouth every 4 hours as needed 120 tablet 5     ipratropium (ATROVENT HFA) 17 MCG/ACT inhaler Inhale 2 puffs into the lungs every 6 hours       ipratropium (ATROVENT) 0.02 % nebulizer solution Take 0.5 mg by nebulization daily as needed for wheezing       levonorgestrel (MIRENA, 52 MG,) 20 MCG/24HR IUD 1 each (20 mcg) by Intrauterine route once for 1 dose 1 each 0     Melatonin 10 MG TABS Take 10 mg by mouth nightly as needed       metoclopramide (REGLAN) 5 MG tablet Take 1 tablet (5 mg total) by mouth 3 (three) times a day as needed for nausea. 90 tablet 0     mirtazapine (REMERON) 15 MG tablet Take 1 tablet (15 mg) by mouth daily 90 tablet 3     ondansetron (ZOFRAN-ODT) 4 MG ODT tab Take 4 mg by mouth as needed       TRINTELLIX 20 MG tablet TAKE 1 TABLET BY MOUTH DAILY 90 tablet 3     VITAMIN D, CHOLECALCIFEROL, PO Take  by mouth daily.      Allergies   Allergen Reactions     Adhesive Tape Hives     EKG Patches; any adhesives including band aides; burns      EKG Patches; any adhesives including band aides; burns  EKG Patches; any adhesives including band aides; burns  EKG Patches; any adhesives including band aides; burns       Glucose Other (See Comments)     Other reaction(s): GI intolerance  GI intolerance       Azithromycin Nausea and Vomiting     Fructose Cramps, Diarrhea, GI Disturbance and Nausea and Vomiting     Lactose Nausea         Lab Results (Personally Reviewed)    Chemistry/lipid CBC Cardiac Enzymes/BNP/TSH/INR   Lab Results   Component Value Date    BUN 12 02/18/2022     02/18/2022    CO2 24 02/18/2022     Creatinine   Date Value Ref Range Status   02/18/2022 0.75 0.60 - 1.10 mg/dL Final    04/28/2016 0.68 0.52 - 1.04 mg/dL Final       No results found for: CHOL, HDL, LDL, CHOLHDL   Lab Results   Component Value Date    WBC 4.3 02/18/2022    HGB 12.4 02/18/2022    HCT 38.2 02/18/2022    MCV 97 02/18/2022     02/18/2022    Lab Results   Component Value Date    TSH 2.06 02/18/2022          Video-Visit Details    Type of service:  Video Visit   Video Start Time: 1045am  Video End Time:1055am    Originating Location (pt. Location): Home  Distant Location (provider location):  On-site  Platform used for Video Visit: Sidney    I have reviewed the note as documented above.  This accurately captures the substance of my virtual visit with the patient. The patient states understanding and is agreeable with the plan.   GISELLE Cloud CNP  Electrophysiology Consult Service  Pager: [phone]

## 2023-01-04 NOTE — NURSING NOTE
Chief Complaint   Patient presents with     Follow Up       Patient confirms medications and allergies are accurate via patients echeck in completion, and or denies any changes since last reviewed/verified.     Estrellita Szymanski, Virtual Facilitator

## 2023-01-10 NOTE — PROGRESS NOTES
[facility]                                     Progress Note    Patient Name: Angela Jones  Date: 1/11/2023         Service Type: Couple      Session Start Time: 10:10 a.m.  Session End Time: 11:06 a.m.     Session Length: 56 minutes    Session #: 7 (with this provider - both have individual therapists)    Attendees: Client and Spouse / Significant Other    Service Modality:  Video Visit:      Provider verified identity through the following two step process.  Patient provided:  Patient was verified at admission/transfer    Telemedicine Visit: The patient's condition can be safely assessed and treated via synchronous audio and visual telemedicine encounter.      Reason for Telemedicine Visit: Patient convenience (e.g. access to timely appointments / distance to available provider)    Originating Site (Patient Location): Patient's home    On-Site (Provider Location): [facility]: [place]    Consent:  The patient/guardian has verbally consented to: the potential risks and benefits of telemedicine (video visit) versus in person care; bill my insurance or make self-payment for services provided; and responsibility for payment of non-covered services.     Patient would like the video invitation sent by:  My Chart    Mode of Communication:  Video Conference via [employer]    As the provider I attest to compliance with applicable laws and regulations related to telemedicine.    DATA  Interactive Complexity: No  Crisis: No        Progress Since Last Session (Related to Symptoms / Goals / Homework):   Symptoms: patient states that she and her  have continued to be busy or tired and have not spent much time together.  Continue dissatisfaction with communication and teamwork aspect of tackling household chores.    Homework: Attempted to complete, but patient's POTS symptoms flared up      Episode of Care Goals: Minimal progress - PREPARATION (Decided to change - considering how);  "Intervened by negotiating a change plan and determining options / strategies for behavior change, identifying triggers, exploring social supports, and working towards setting a date to begin behavior change     Current / Ongoing Stressors and Concerns:   Difficulties in relationship related to each partner's mental and physical health; communication struggles; executive functioning and health concerns leading to problems completing weekly household chores     Treatment Objective(s) Addressed in This Session:   Coping with meltdowns (and identifying triggers for these) and staying emotionally connected  identify barriers to completing household tasks  learn about and utilize bids for connection and turning towards partner instead of turning away  validate their partner's experiences and feelings and show appreciation for their partner  fully discuss family of origin patterns related to communication and finances and will determine what worked and what didn't, and what they would like to implement and avoid in their relationship    Past/future:  Develop Love Map - completed assessment for knowing partner  identify a time in the relationship when the couple successfully completed household tasks and will determine whether past strategies will be effective (and will develop new strategies if needed)  learn about Gwen's Four Horsemen of the Apocalypse and will identify these within the relationship  use cognitive strategies identified in therapy to challenge anxious thoughts  identify primary triggers for anxiety related to finances and extended family  identify components of the [facility] and will work to strengthen friendship, adaptively handle conflict, and create shared meaning/dreams     Intervention:   CBT: Gwen - connection between thoughts, feelings, and actions  Narrative: \"the story I'm making up,\"  problem from person, and finding bright spots   Solution Focused: identifying " solvable problems and generating possible solutions; if problem is not solvable, identifying compromise and boundaries/deal-breakers    Past/future:  Family systems: patterns, learned behaviors, communication    Assessments completed prior to visit:    The following assessments were completed by patient for this visit:    PHQ9:   PHQ-9 SCORE 9/27/2022 9/27/2022 10/12/2022 11/15/2022 11/23/2022 12/21/2022 1/11/2023   PHQ-9 Total Score - - - - - - -   PHQ-9 Total Score MyChart - 11 (Moderate depression) 7 (Mild depression) 12 (Moderate depression) 11 (Moderate depression) 7 (Mild depression) 12 (Moderate depression)   PHQ-9 Total Score 11 11 7 12 11 7 12     GAD7:   JOSHUA-7 SCORE 5/4/2022 6/29/2022 9/27/2022 9/27/2022 10/12/2022 11/15/2022 1/11/2023   Total Score - - - - - - -   Total Score 5 (mild anxiety) 6 (mild anxiety) - 8 (mild anxiety) 8 (mild anxiety) 8 (mild anxiety) 10 (moderate anxiety)   Total Score 5 6 8 8 8 8 10   Total Score - - - - - - -     PROMIS 10-Global Health (all questions and answers displayed):   PROMIS 10 2/15/2022 3/9/2022 6/16/2022 6/29/2022 10/11/2022 10/11/2022 1/11/2023   In general, would you say your health is: Fair Fair Fair Fair - Fair Fair   In general, would you say your quality of life is: Good Good Good Good - Good Good   In general, how would you rate your physical health? Fair Fair Fair Fair - Fair Fair   In general, how would you rate your mental health, including your mood and your ability to think? Fair Good Fair Fair - Good Good   In general, how would you rate your satisfaction with your social activities and relationships? Good Very good Good Good - Very good Very good   In general, please rate how well you carry out your usual social activities and roles Fair Good Poor Fair - Very good Fair   To what extent are you able to carry out your everyday physical activities such as walking, climbing stairs, carrying groceries, or moving a chair? Moderately Moderately A little  Moderately - Moderately Moderately   How often have you been bothered by emotional problems such as feeling anxious, depressed or irritable? Always Often Often Often - Sometimes Sometimes   How would you rate your fatigue on average? Moderate Moderate Severe Moderate - Moderate Moderate   How would you rate your pain on average?   0 = No Pain  to  10 = Worst Imaginable Pain 4 4 4 4 - 4 4   In general, would you say your health is: 2 2 2 2 2 2 2   In general, would you say your quality of life is: 3 3 3 3 3 3 3   In general, how would you rate your physical health? 2 2 2 2 2 2 2   In general, how would you rate your mental health, including your mood and your ability to think? 2 3 2 2 3 3 3   In general, how would you rate your satisfaction with your social activities and relationships? 3 4 3 3 4 4 4   In general, please rate how well you carry out your usual social activities and roles. (This includes activities at home, at work and in your community, and responsibilities as a parent, child, spouse, employee, friend, etc.) 2 3 1 2 4 4 2   To what extent are you able to carry out your everyday physical activities such as walking, climbing stairs, carrying groceries, or moving a chair? 3 3 2 3 3 3 3   In the past 7 days, how often have you been bothered by emotional problems such as feeling anxious, depressed, or irritable? 5 4 4 4 3 3 3   In the past 7 days, how would you rate your fatigue on average? 3 3 4 3 3 3 3   In the past 7 days, how would you rate your pain on average, where 0 means no pain, and 10 means worst imaginable pain? 4 4 4 4 4 4 4   Global Mental Health Score 9 12 10 10 13 13 13   Global Physical Health Score 11 11 9 11 11 11 11   PROMIS TOTAL - SUBSCORES 20 23 19 21 24 24 24   Some recent data might be hidden         ASSESSMENT: Current Emotional / Mental Status (status of significant symptoms):   Risk status (Self / Other harm or suicidal ideation)   Patient denies current fears or concerns for  personal safety.   Patient denies current or recent suicidal ideation or behaviors.   Patient denies current or recent homicidal ideation or behaviors.   Patient denies current or recent self injurious behavior or ideation.   Patient denies other safety concerns.   Patient reports there has been no change in risk factors since their last session.     Patient reports there has been no change in protective factors since their last session.     Recommended that patient call 911 or go to the local ED should there be a change in any of these risk factors.     Appearance:   Appropriate    Eye Contact:   Good    Psychomotor Behavior: Normal  Restless    Attitude:   Cooperative    Orientation:   All   Speech    Rate / Production: Each sometimes needs additional processing time    Volume:  Normal    Mood:    Anxious  Agitated   Affect:    Appropriate    Thought Content:  Rumination  sometimes perseverative   Thought Form:  Coherent  Circumstantial Vowinckel   Insight:    Good      Medication Review:   No changes to current psychiatric medication(s)     Medication Compliance:   Yes     Changes in Health Issues:   None reported - ongoing chronic health issues for both patients (fatigue, pain, POTS)     Chemical Use Review:   Substance Use: Chemical use reviewed, no active concerns identified      Tobacco Use: No current tobacco use.      Diagnosis:  1. Generalized anxiety disorder    2. Recurrent major depressive disorder, in partial remission (H)      Collateral Reports Completed:   Not Applicable    PLAN: (Patient Tasks / Therapist Tasks / Other)    Patient and partner homework:    When one partner is in emotional distress, figure out ways to stay connected and present.      Tayla Rodrigues                                                                                                      Couple Treatment Plan    Patient's Name: Angela Jones  YOB: 1995    Date of Creation: 9/13/2022  Date Treatment Plan  Last Reviewed/Revised: 1/11/2023    DSM5 Diagnoses: Autism Spectrum Disorder 299.00(F84.0)  Associated Current severity for Criterion A and Criterion B: 299.00(F84.0) Without accompanying intellectual impairment; 296.35 (F33.41)  Major Depressive Disorder, Recurrent Episode, In partial remission _; 300.02 (F41.1) Generalized Anxiety Disorder    Psychosocial / Contextual Factors: Difficulties in relationship related to each partner's mental and physical health; communication struggles; executive functioning and health concerns leading to problems completing weekly household chores    PROMIS (reviewed every 90 days): PROMIS-10 Scores           Referral / Collaboration:  Referral to another professional/service is not indicated at this time.    Anticipated number of sessions for this episode of care: 9-12 sessions  Anticipated frequency of session: Every other week  Anticipated duration of each session: 38-52 minutes  Treatment plan will be reviewed in 90 days or when goals have been changed.     Measurable Treatment Goal(s) related to diagnosis / functional impairment(s)  Goal 1: Patient and spouse will learn about Dr. Diallo Hidalgo's successful relationship skills and will work to improve their communication patterns.    Objective #A (Patient Action)    Patient (and spouse) will learn about and utilize bids for connection and turning towards partner instead of turning away.  Status: Continued - Date(s): 1/11/2023     Intervention(s)  Therapist will teach teach about bids and turning towards.    Objective #B  Patient (and spouse) will learn about Gwen's Four Horsemen of the Apocalypse and will identify these within the relationship.  Status: Continued - Date(s): 1/11/2023     Intervention(s)  Therapist will teach about Gwen's research on what leads to relationships ending.    Objective #C  Patient (and spouse) will identify components of the [facility] and will work to strengthen friendship,  "adaptively handle conflict, and create shared meaning/dreams.  Status: Continued - Date(s): 1/11/2023     Intervention(s)  Therapist will teach about the [facility] and managing conflict with a soft start-up.      Goal 2: Patient and spouse will discuss and identify ways to complete weekly household tasks while taking into account each partner's chronic health issues.    Objective #A (Patient Action)    Status: Completed - Date: 1/11/2023     Patient (and spouse) will identify barriers to completing household tasks.    Intervention(s)  Therapist will collaborate with the couple in session to determine these.    Objective #B  Patient (and spouse) will identify a time in the relationship when the couple successfully completed household tasks and will determine whether past strategies will be effective (and will develop new strategies if needed).   Status: Completed - Date: 1/11/2023     Intervention(s)  Therapist will collaborate with patients in session to determine these and to identify past family of origin patterns contributing to these perpetual struggles.    Objective #C  Patient (and spouse) will validate their partner's experiences and feelings and show appreciation for their partner.  Status: Continued - Date(s): 1/11/2023     Intervention(s)  Therapist will role-play effective communication skills.      Goal 3: Patient and spouse will identify reasons for and adaptive ways to cope with financial and extended family anxiety/stressors.    Objective #A (Patient Action)    Status: Continued - Date(s): 1/11/2023     Patient (and spouse) will use cognitive strategies identified in therapy to challenge anxious thoughts.     Intervention(s)  Therapist will teach Claus Leal's \"the story I'm making up\" and building trust.    Objective #B  Patient (and spouse) will identify primary triggers for anxiety related to finances and extended family.    Status: Completed - Date: 1/11/2023 "     Intervention(s)  Therapist will collaborate with the couple in session to determine these.    Objective #C  Patient (and spouse) will fully discuss family of origin patterns related to communication and finances and will determine what worked and what didn't, and what they would like to implement and avoid in their relationship.  Status: Partially completed: 1/11/2023     Intervention(s)  Therapist will teach about family systems and learned patterns of behavior stemming from family of origin.      Patient and significant other/spouse agreed to the above plan.      Tayla Rodrigues

## 2023-01-11 ENCOUNTER — VIRTUAL VISIT (OUTPATIENT)
Dept: PSYCHOLOGY | Facility: OTHER | Age: 28
End: 2023-01-11
Payer: COMMERCIAL

## 2023-01-11 DIAGNOSIS — F41.1 GENERALIZED ANXIETY DISORDER: Primary | ICD-10-CM

## 2023-01-11 DIAGNOSIS — F33.41 RECURRENT MAJOR DEPRESSIVE DISORDER, IN PARTIAL REMISSION (H): ICD-10-CM

## 2023-01-11 PROCEDURE — 90847 FAMILY PSYTX W/PT 50 MIN: CPT | Mod: 95 | Performed by: MARRIAGE & FAMILY THERAPIST

## 2023-01-11 ASSESSMENT — ANXIETY QUESTIONNAIRES
7. FEELING AFRAID AS IF SOMETHING AWFUL MIGHT HAPPEN: NOT AT ALL
7. FEELING AFRAID AS IF SOMETHING AWFUL MIGHT HAPPEN: NOT AT ALL
6. BECOMING EASILY ANNOYED OR IRRITABLE: MORE THAN HALF THE DAYS
GAD7 TOTAL SCORE: 10
2. NOT BEING ABLE TO STOP OR CONTROL WORRYING: MORE THAN HALF THE DAYS
3. WORRYING TOO MUCH ABOUT DIFFERENT THINGS: MORE THAN HALF THE DAYS
IF YOU CHECKED OFF ANY PROBLEMS ON THIS QUESTIONNAIRE, HOW DIFFICULT HAVE THESE PROBLEMS MADE IT FOR YOU TO DO YOUR WORK, TAKE CARE OF THINGS AT HOME, OR GET ALONG WITH OTHER PEOPLE: VERY DIFFICULT
5. BEING SO RESTLESS THAT IT IS HARD TO SIT STILL: NOT AT ALL
1. FEELING NERVOUS, ANXIOUS, OR ON EDGE: MORE THAN HALF THE DAYS
GAD7 TOTAL SCORE: 10
GAD7 TOTAL SCORE: 10
4. TROUBLE RELAXING: MORE THAN HALF THE DAYS
8. IF YOU CHECKED OFF ANY PROBLEMS, HOW DIFFICULT HAVE THESE MADE IT FOR YOU TO DO YOUR WORK, TAKE CARE OF THINGS AT HOME, OR GET ALONG WITH OTHER PEOPLE?: VERY DIFFICULT

## 2023-01-11 ASSESSMENT — PATIENT HEALTH QUESTIONNAIRE - PHQ9
10. IF YOU CHECKED OFF ANY PROBLEMS, HOW DIFFICULT HAVE THESE PROBLEMS MADE IT FOR YOU TO DO YOUR WORK, TAKE CARE OF THINGS AT HOME, OR GET ALONG WITH OTHER PEOPLE: SOMEWHAT DIFFICULT
SUM OF ALL RESPONSES TO PHQ QUESTIONS 1-9: 12
SUM OF ALL RESPONSES TO PHQ QUESTIONS 1-9: 12

## 2023-01-11 NOTE — PATIENT INSTRUCTIONS
Patient and partner homework:    When one partner is in emotional distress, figure out ways to stay connected and present.

## 2023-01-24 ENCOUNTER — VIRTUAL VISIT (OUTPATIENT)
Dept: BEHAVIORAL HEALTH | Facility: CLINIC | Age: 28
End: 2023-01-24
Payer: COMMERCIAL

## 2023-01-24 DIAGNOSIS — F90.8 ATTENTION DEFICIT HYPERACTIVITY DISORDER (ADHD), OTHER TYPE: ICD-10-CM

## 2023-01-24 DIAGNOSIS — F41.1 GAD (GENERALIZED ANXIETY DISORDER): ICD-10-CM

## 2023-01-24 DIAGNOSIS — F33.0 MILD EPISODE OF RECURRENT MAJOR DEPRESSIVE DISORDER (H): ICD-10-CM

## 2023-01-24 DIAGNOSIS — F84.0 AUTISM SPECTRUM DISORDER: Primary | ICD-10-CM

## 2023-01-24 PROCEDURE — 90837 PSYTX W PT 60 MINUTES: CPT | Mod: 95 | Performed by: SOCIAL WORKER

## 2023-01-24 NOTE — PROGRESS NOTES
[facility]                                     Progress Note    Patient Name: Angela Jones  Date: 1/24/2023         Service Type: Individual      Session Start Time: 9:00am  Session End Time: 9:53am     Session Length: 53 minutes    Session #: 17    Attendees: Client attended alone    Service Modality:  Video Visit:      Provider verified identity through the following two step process.  Patient provided:  Patient is known previously to provider    Telemedicine Visit: The patient's condition can be safely assessed and treated via synchronous audio and visual telemedicine encounter.      Reason for Telemedicine Visit: Patient has requested telehealth visit    Originating Site (Patient Location): Patient's home    Distant Site (Provider Location): Provider Remote Setting- Home Office    Consent:  The patient/guardian has verbally consented to: the potential risks and benefits of telemedicine (video visit) versus in person care; bill my insurance or make self-payment for services provided; and responsibility for payment of non-covered services.     Patient would like the video invitation sent by:  My Chart    Mode of Communication:  Video Conference via Amwell      Distant Location (Provider):  Off-site    As the provider I attest to compliance with applicable laws and regulations related to telemedicine.    DATA  Extended Session (53+ minutes): PROLONGED SERVICE IN THE OUTPATIENT SETTING REQUIRING DIRECT (FACE-TO-FACE) PATIENT CONTACT BEYOND THE USUAL SERVICE:    - Treatment protocol required additional time to complete, due to the nature of diagnosis being treated.  See Interventions section for details  Interactive Complexity: No  Crisis: No        Progress Since Last Session (Related to Symptoms / Goals / Homework):   Symptoms: No change : anxiety and depression    Homework: Partially completed      Episode of Care Goals: Minimal progress - PREPARATION (Decided to change - considering  how); Intervened by negotiating a change plan and determining options / strategies for behavior change, identifying triggers, exploring social supports, and working towards setting a date to begin behavior change     Current / Ongoing Stressors and Concerns:  Acknowledges benefits of doing a Tuesday morning appointment (start of time off from work and first thing in the morning), discusses benefits of couples therapy, identifies ongoing issues with communication in the relationship, still working on obtaining MA and working with  on this, work has been going okay, hard to keep the house clean       Treatment Objective(s) Addressed in This Session:   identify the fears / thoughts that contribute to feeling anxious  state understanding of stressors and relationship to physical symptoms  Increase interest, engagement, and pleasure in doing things  Decrease frequency and intensity of feeling down, depressed, hopeless  Identify negative self-talk and behaviors: challenge core beliefs, myths, and actions  Gain insight     Intervention:   Discussed mindfulness as being aware of what we are experiencing while we are experiencing it.  Contrasted this with avoidance and rumination.  Explored the role of mindfulness as an overall coping strategy for managing depression, anxiety, and strong emotions.  Explained concept of state of mind using SIFT (sensations, images, feelings, thoughts) pneumonic.  Led client in a guided mindfulness exercise focusing on sensations, images, feelings, and thoughts.  Discussed mindfulness as a tool to intentionally shift our awareness and focus as needed.    Assessments completed prior to visit:   The following assessments were completed by patient for this visit:  PHQ9:   PHQ-9 SCORE 9/27/2022 9/27/2022 10/12/2022 11/15/2022 11/23/2022 12/21/2022 1/11/2023   PHQ-9 Total Score - - - - - - -   PHQ-9 Total Score MyChart - 11 (Moderate depression) 7 (Mild depression) 12 (Moderate  depression) 11 (Moderate depression) 7 (Mild depression) 12 (Moderate depression)   PHQ-9 Total Score 11 11 7 12 11 7 12     GAD7:   JOSHUA-7 SCORE 5/4/2022 6/29/2022 9/27/2022 9/27/2022 10/12/2022 11/15/2022 1/11/2023   Total Score - - - - - - -   Total Score 5 (mild anxiety) 6 (mild anxiety) - 8 (mild anxiety) 8 (mild anxiety) 8 (mild anxiety) 10 (moderate anxiety)   Total Score 5 6 8 8 8 8 10   Total Score - - - - - - -     PROMIS 10-Global Health (only subscores and total score):   PROMIS-10 Scores Only 2/15/2022 3/9/2022 6/16/2022 6/29/2022 10/11/2022 10/11/2022 1/11/2023   Global Mental Health Score 9 12 10 10 13 13 13   Global Physical Health Score 11 11 9 11 11 11 11   PROMIS TOTAL - SUBSCORES 20 23 19 21 24 24 24         ASSESSMENT: Current Emotional / Mental Status (status of significant symptoms):   Risk status (Self / Other harm or suicidal ideation)   Patient denies current fears or concerns for personal safety.   Patient denies current or recent suicidal ideation or behaviors.   Patient denies current or recent homicidal ideation or behaviors.   Patient denies current or recent self injurious behavior or ideation.   Patient denies other safety concerns.   Patient reports there has been no change in risk factors since their last session.     Patient reports there has been no change in protective factors since their last session.     Recommended that patient call 911 or go to the local ED should there be a change in any of these risk factors.     Appearance:   Appropriate    Eye Contact:   Good    Psychomotor Behavior: Hyperactive  Restless    Attitude:   Cooperative    Orientation:   All   Speech    Rate / Production: Pressured  Stutters    Volume:  Normal    Mood:    Anxious  Anhedonia   Affect:    Constricted  Flat    Thought Content:  Clear    Thought Form:  Coherent    Insight:    Good      Medication Review:   No changes to current psychiatric medication(s)     Medication Compliance:   Yes     Changes  in Health Issues:   None reported     Chemical Use Review:   Substance Use: Chemical use reviewed, no active concerns identified      Tobacco Use: No current tobacco use.      Diagnosis:  1. Autism spectrum disorder    2. JOSHUA (generalized anxiety disorder)    3. Mild episode of recurrent major depressive disorder (H)    4. Attention deficit hyperactivity disorder (ADHD), other type        Collateral Reports Completed:   Not Applicable    PLAN: (Patient Tasks / Therapist Tasks / Other)  Patient will return for a virtual visit in 2 weeks  Patient will continue to utilize stress management techniques  Discussion around starting to use daily planner again    *maternity leave plans: preference of female therapist, wants to meet every other week on Tuesday mornings, start in April     There has been demonstrated improvement in functioning while patient has been engaged in psychotherapy/psychological service- if withdrawn the patient would deteriorate and/or relapse.       Fanny Cobb, [facility]                                                         ______________________________________________________________________    Individual Treatment Plan    Patient's Name: Angela Jones  YOB: 1995    Date of Creation: 1/19/2022  Date Treatment Plan Last Reviewed/Revised: 1/24/2023    DSM5 Diagnoses: Autism Spectrum Disorder 299.00(F84.0)  Associated with another neurodevelopmental, mental or behavioral disorder and Attention-Deficit/Hyperactivity Disorder  314.01 (F90.9) Unspecified Attention -Deficit / Hyperactivity Disorder, 296.31 (F33.0) Major Depressive Disorder, Recurrent Episode, Mild _ or 300.02 (F41.1) Generalized Anxiety Disorder  Psychosocial / Contextual Factors: 27 year old  female, , no children   PROMIS (reviewed every 90 days):   PROMIS 10-Global Health (only subscores and total score):   PROMIS-10 Scores Only 12/30/2021 1/19/2022 2/15/2022 3/9/2022   Global Mental Health Score  11 11 9 12   Global Physical Health Score 11 11 11 11   PROMIS TOTAL - SUBSCORES 22 22 20 23       Referral / Collaboration:  Was/were discussed and patient will pursue.    Anticipated number of session for this episode of care: 4-6  Anticipation frequency of session: every 2-3 weeks  Anticipated Duration of each session: 38-52 minutes  Treatment plan will be reviewed in 90 days or when goals have been changed.       MeasurableTreatment Goal(s) related to diagnosis / functional impairment(s)  Goal 1: Patient will manage symptoms of anxiety     I will know I've met my goal when I feel more confident in my ability to cope with stress with fewer meltdowns.      Objective #A (Patient Action)    Patient will identify the initial signs or symptoms of anxiety.  Status: Continued - Date(s): 1/24/2023    Intervention(s)  Therapist will teach help patient gain insight into signs of stress (physically and emotionally).    Objective #B  Patient will use relaxation strategies multiple times per day to reduce the physical symptoms of anxiety.  Status: Continued - Date(s): 1/24/2023    Intervention(s)  Therapist will teach diaphragmatic breathing and other grounding skills.    Objective #C  Patient will use thought-stopping strategy daily to reduce intrusive thoughts.  Status: Continued - Date(s): 1/24/2023    Intervention(s)  Therapist will teach thought stopping techniques.      Goal 2: Patient will manage symptoms of depression    I will know I've met my goal when I feel more confident in my ability to express my emotions in a healthy way.      Objective #A (Patient Action)    Status: Continued - Date(s): 1/24/2023    Patient will Increase interest, engagement, and pleasure in doing things.    Intervention(s)  Therapist will teach emotional recognition/identification. *.    Objective #B  Patient will Identify negative self-talk and behaviors: challenge core beliefs, myths, and actions.    Status: Continued - Date(s):  1/24/2023    Intervention(s)  Therapist will help patient practice positive self-talk and thought re-framing.      Goal 3: Patient will improve communication patterns in the relationship    I will know I've met my goal when we continue to attend couples counseling and see improvements.      Objective #A (Patient Action)    Status: Continued - Date(s): 1/24/2023    Patient will continue participating in couples therapy with Tayla.     Intervention(s)  Therapist will monitor referral process and follow-up on their relationship problems and improvements      Patient has reviewed and agreed to the above plan.      Fanny Cobb, [facility]  January 24, 2023

## 2023-02-04 DIAGNOSIS — R00.2 PALPITATIONS: ICD-10-CM

## 2023-02-06 DIAGNOSIS — F33.1 MAJOR DEPRESSIVE DISORDER, RECURRENT EPISODE, MODERATE (H): ICD-10-CM

## 2023-02-06 DIAGNOSIS — Z76.0 ENCOUNTER FOR MEDICATION REFILL: ICD-10-CM

## 2023-02-07 RX ORDER — VORTIOXETINE 20 MG/1
TABLET, FILM COATED ORAL
Qty: 90 TABLET | Refills: 0 | Status: SHIPPED | OUTPATIENT
Start: 2023-02-07 | End: 2023-05-15

## 2023-02-07 NOTE — TELEPHONE ENCOUNTER
"Routing refill request to provider for review/approval because:  PHQ-9 score  Patient needs to be seen    Last Written Prescription Date:  2/2/2022  Last Fill Quantity: 90,  # refills: 3   Last office visit provider:  2/18/2022     Requested Prescriptions   Pending Prescriptions Disp Refills     TRINTELLIX 20 MG tablet [Pharmacy Med Name: Trintellix Oral Tablet 20 MG] 90 tablet 0     Sig: TAKE 1 TABLET BY MOUTH DAILY       SSRIs Protocol Failed - 2/6/2023  2:01 AM        Failed - PHQ-9 score less than 5 in past 6 months     Please review last PHQ-9 score.           Failed - Recent (6 mo) or future (30 days) visit within the authorizing provider's specialty     Patient had office visit in the last 6 months or has a visit in the next 30 days with authorizing provider or within the authorizing provider's specialty.  See \"Patient Info\" tab in inbasket, or \"Choose Columns\" in Meds & Orders section of the refill encounter.            Passed - Medication is active on med list        Passed - Patient is age 18 or older        Passed - No active pregnancy on record        Passed - No positive pregnancy test in last 12 months             Gricelda Junior RN 02/07/23 12:00 AM  "

## 2023-02-08 NOTE — TELEPHONE ENCOUNTER
PINDOLOL 5 MG TABLET      Last Written Prescription Date:  1-4-23  Last Fill Quantity: 60,   # refills: 0  Last Office Visit : 1-4-23  Future Office visit:  6-6-23    Routing refill request to provider for review/approval because:  Med not on protocol

## 2023-02-10 NOTE — TELEPHONE ENCOUNTER
3/10/23  Patient read Instilling Values message sent in Feb, did not reply. Denied pindolol refill.     2/10/23  Pindolol prescribed to take as needed and can repeat dose x1 in 60 minutes. Last filled on 1/4/23 with 60 tabs. This suggests patient is taking it twice a day regularly. Instilling Valueshart message sent to patient to confirm.

## 2023-02-20 ENCOUNTER — TELEPHONE (OUTPATIENT)
Dept: PSYCHOLOGY | Facility: CLINIC | Age: 28
End: 2023-02-20
Payer: COMMERCIAL

## 2023-02-20 NOTE — TELEPHONE ENCOUNTER
EDDI - emailed to [facility] -  sent to scanning.

## 2023-02-21 ENCOUNTER — VIRTUAL VISIT (OUTPATIENT)
Dept: BEHAVIORAL HEALTH | Facility: CLINIC | Age: 28
End: 2023-02-21
Payer: COMMERCIAL

## 2023-02-21 DIAGNOSIS — F90.8 ATTENTION DEFICIT HYPERACTIVITY DISORDER (ADHD), OTHER TYPE: ICD-10-CM

## 2023-02-21 DIAGNOSIS — F33.0 MILD EPISODE OF RECURRENT MAJOR DEPRESSIVE DISORDER (H): ICD-10-CM

## 2023-02-21 DIAGNOSIS — F41.1 GAD (GENERALIZED ANXIETY DISORDER): ICD-10-CM

## 2023-02-21 DIAGNOSIS — F84.0 AUTISM SPECTRUM DISORDER: Primary | ICD-10-CM

## 2023-02-21 PROCEDURE — 90837 PSYTX W PT 60 MINUTES: CPT | Mod: VID | Performed by: SOCIAL WORKER

## 2023-02-21 ASSESSMENT — PATIENT HEALTH QUESTIONNAIRE - PHQ9
SUM OF ALL RESPONSES TO PHQ QUESTIONS 1-9: 12
10. IF YOU CHECKED OFF ANY PROBLEMS, HOW DIFFICULT HAVE THESE PROBLEMS MADE IT FOR YOU TO DO YOUR WORK, TAKE CARE OF THINGS AT HOME, OR GET ALONG WITH OTHER PEOPLE: VERY DIFFICULT
SUM OF ALL RESPONSES TO PHQ QUESTIONS 1-9: 12

## 2023-02-21 NOTE — PROGRESS NOTES
[facility]                                     Progress Note    Patient Name: Angela Jones  Date: 2/21/2023         Service Type: Individual      Session Start Time: 9:00am  Session End Time: 9:53am     Session Length: 53 minutes    Session #: 18    Attendees: Client attended alone    Service Modality:  Video Visit:      Provider verified identity through the following two step process.  Patient provided:  Patient is known previously to provider    Telemedicine Visit: The patient's condition can be safely assessed and treated via synchronous audio and visual telemedicine encounter.      Reason for Telemedicine Visit: Patient has requested telehealth visit    Originating Site (Patient Location): Patient's home    Distant Site (Provider Location): Provider Remote Setting- Home Office    Consent:  The patient/guardian has verbally consented to: the potential risks and benefits of telemedicine (video visit) versus in person care; bill my insurance or make self-payment for services provided; and responsibility for payment of non-covered services.     Patient would like the video invitation sent by:  My Chart    Mode of Communication:  Video Conference via Amwell      Distant Location (Provider):  Off-site    As the provider I attest to compliance with applicable laws and regulations related to telemedicine.    DATA  Extended Session (53+ minutes): PROLONGED SERVICE IN THE OUTPATIENT SETTING REQUIRING DIRECT (FACE-TO-FACE) PATIENT CONTACT BEYOND THE USUAL SERVICE:    - Treatment protocol required additional time to complete, due to the nature of diagnosis being treated.  See Interventions section for details  Interactive Complexity: No  Crisis: No        Progress Since Last Session (Related to Symptoms / Goals / Homework):   Symptoms: No change : anxiety and depression    Homework: Partially completed      Episode of Care Goals: Minimal progress - PREPARATION (Decided to change - considering  "how); Intervened by negotiating a change plan and determining options / strategies for behavior change, identifying triggers, exploring social supports, and working towards setting a date to begin behavior change     Current / Ongoing Stressors and Concerns:   still struggling with health issues (kidney stone) and will have surgery on Friday, reports that \"house is a disaster\" because they are struggling to keep it clean and \"I can't do it by myself,\" she is feeling very frustrated with the situation and cleaning issues, sister is coming to visit for 2 weeks (3/3),  is going away for a cruise with friends for a week (leaves 3/1)       Treatment Objective(s) Addressed in This Session:   identify the fears / thoughts that contribute to feeling anxious  state understanding of stressors and relationship to physical symptoms  Increase interest, engagement, and pleasure in doing things  Decrease frequency and intensity of feeling down, depressed, hopeless  Identify negative self-talk and behaviors: challenge core beliefs, myths, and actions  Gain insight     Intervention:   Discussed mindfulness as being aware of what we are experiencing while we are experiencing it.  Contrasted this with avoidance and rumination.  Explored the role of mindfulness as an overall coping strategy for managing depression, anxiety, and strong emotions.  Explained concept of state of mind using SIFT (sensations, images, feelings, thoughts) pneumonic.  Led client in a guided mindfulness exercise focusing on sensations, images, feelings, and thoughts.  Discussed mindfulness as a tool to intentionally shift our awareness and focus as needed.    Assessments completed prior to visit:   The following assessments were completed by patient for this visit:  PHQ9:   PHQ-9 SCORE 9/27/2022 10/12/2022 11/15/2022 11/23/2022 12/21/2022 1/11/2023 2/21/2023   PHQ-9 Total Score - - - - - - -   PHQ-9 Total Score MyChart 11 (Moderate depression) 7 " (Mild depression) 12 (Moderate depression) 11 (Moderate depression) 7 (Mild depression) 12 (Moderate depression) 12 (Moderate depression)   PHQ-9 Total Score 11 7 12 11 7 12 12     GAD7:   JOSHUA-7 SCORE 5/4/2022 6/29/2022 9/27/2022 9/27/2022 10/12/2022 11/15/2022 1/11/2023   Total Score - - - - - - -   Total Score 5 (mild anxiety) 6 (mild anxiety) - 8 (mild anxiety) 8 (mild anxiety) 8 (mild anxiety) 10 (moderate anxiety)   Total Score 5 6 8 8 8 8 10   Total Score - - - - - - -     PROMIS 10-Global Health (only subscores and total score):   PROMIS-10 Scores Only 2/15/2022 3/9/2022 6/16/2022 6/29/2022 10/11/2022 10/11/2022 1/11/2023   Global Mental Health Score 9 12 10 10 13 13 13   Global Physical Health Score 11 11 9 11 11 11 11   PROMIS TOTAL - SUBSCORES 20 23 19 21 24 24 24         ASSESSMENT: Current Emotional / Mental Status (status of significant symptoms):   Risk status (Self / Other harm or suicidal ideation)   Patient denies current fears or concerns for personal safety.   Patient denies current or recent suicidal ideation or behaviors.   Patient denies current or recent homicidal ideation or behaviors.   Patient denies current or recent self injurious behavior or ideation.   Patient denies other safety concerns.   Patient reports there has been no change in risk factors since their last session.     Patient reports there has been no change in protective factors since their last session.     Recommended that patient call 911 or go to the local ED should there be a change in any of these risk factors.     Appearance:   Appropriate    Eye Contact:   Good    Psychomotor Behavior: Hyperactive  Restless    Attitude:   Cooperative    Orientation:   All   Speech    Rate / Production: Pressured  Stutters    Volume:  Normal    Mood:    Anxious  Anhedonia   Affect:    Constricted  Flat    Thought Content:  Clear    Thought Form:  Coherent    Insight:    Good      Medication Review:   No changes to current psychiatric  medication(s)     Medication Compliance:   Yes     Changes in Health Issues:   None reported     Chemical Use Review:   Substance Use: Chemical use reviewed, no active concerns identified      Tobacco Use: No current tobacco use.      Diagnosis:  1. Autism spectrum disorder    2. JOSHUA (generalized anxiety disorder)    3. Mild episode of recurrent major depressive disorder (H)    4. Attention deficit hyperactivity disorder (ADHD), other type        Collateral Reports Completed:   Not Applicable    PLAN: (Patient Tasks / Therapist Tasks / Other)  Patient will return for a virtual visit in 2 weeks  Patient will continue to utilize stress management techniques  Discussion around starting to use daily planner again    *maternity leave plans: preference of female therapist, wants to meet every other week on Tuesday mornings, start in April     There has been demonstrated improvement in functioning while patient has been engaged in psychotherapy/psychological service- if withdrawn the patient would deteriorate and/or relapse.       Fanny Cobb, [facility]                                                         ______________________________________________________________________    Individual Treatment Plan    Patient's Name: Angela Jones  YOB: 1995    Date of Creation: 1/19/2022  Date Treatment Plan Last Reviewed/Revised: 1/24/2023    DSM5 Diagnoses: Autism Spectrum Disorder 299.00(F84.0)  Associated with another neurodevelopmental, mental or behavioral disorder and Attention-Deficit/Hyperactivity Disorder  314.01 (F90.9) Unspecified Attention -Deficit / Hyperactivity Disorder, 296.31 (F33.0) Major Depressive Disorder, Recurrent Episode, Mild _ or 300.02 (F41.1) Generalized Anxiety Disorder  Psychosocial / Contextual Factors: 27 year old  female, , no children   PROMIS (reviewed every 90 days):   PROMIS 10-Global Health (only subscores and total score):   PROMIS-10 Scores Only 12/30/2021  1/19/2022 2/15/2022 3/9/2022   Global Mental Health Score 11 11 9 12   Global Physical Health Score 11 11 11 11   PROMIS TOTAL - SUBSCORES 22 22 20 23       Referral / Collaboration:  Was/were discussed and patient will pursue.    Anticipated number of session for this episode of care: 4-6  Anticipation frequency of session: every 2-3 weeks  Anticipated Duration of each session: 38-52 minutes  Treatment plan will be reviewed in 90 days or when goals have been changed.       MeasurableTreatment Goal(s) related to diagnosis / functional impairment(s)  Goal 1: Patient will manage symptoms of anxiety     I will know I've met my goal when I feel more confident in my ability to cope with stress with fewer meltdowns.      Objective #A (Patient Action)    Patient will identify the initial signs or symptoms of anxiety.  Status: Continued - Date(s): 1/24/2023    Intervention(s)  Therapist will teach help patient gain insight into signs of stress (physically and emotionally).    Objective #B  Patient will use relaxation strategies multiple times per day to reduce the physical symptoms of anxiety.  Status: Continued - Date(s): 1/24/2023    Intervention(s)  Therapist will teach diaphragmatic breathing and other grounding skills.    Objective #C  Patient will use thought-stopping strategy daily to reduce intrusive thoughts.  Status: Continued - Date(s): 1/24/2023    Intervention(s)  Therapist will teach thought stopping techniques.      Goal 2: Patient will manage symptoms of depression    I will know I've met my goal when I feel more confident in my ability to express my emotions in a healthy way.      Objective #A (Patient Action)    Status: Continued - Date(s): 1/24/2023    Patient will Increase interest, engagement, and pleasure in doing things.    Intervention(s)  Therapist will teach emotional recognition/identification. *.    Objective #B  Patient will Identify negative self-talk and behaviors: challenge core beliefs,  myths, and actions.    Status: Continued - Date(s): 1/24/2023    Intervention(s)  Therapist will help patient practice positive self-talk and thought re-framing.      Goal 3: Patient will improve communication patterns in the relationship    I will know I've met my goal when we continue to attend couples counseling and see improvements.      Objective #A (Patient Action)    Status: Continued - Date(s): 1/24/2023    Patient will continue participating in couples therapy with Tayla.     Intervention(s)  Therapist will monitor referral process and follow-up on their relationship problems and improvements      Patient has reviewed and agreed to the above plan.      Fanny Cobb, [facility]  January 24, 2023

## 2023-02-28 DIAGNOSIS — F84.0 AUTISM SPECTRUM DISORDER: ICD-10-CM

## 2023-02-28 DIAGNOSIS — F41.1 GENERALIZED ANXIETY DISORDER: ICD-10-CM

## 2023-03-01 RX ORDER — GABAPENTIN 600 MG/1
TABLET ORAL
Qty: 180 TABLET | Refills: 3 | Status: SHIPPED | OUTPATIENT
Start: 2023-03-01 | End: 2023-11-22 | Stop reason: DRUGHIGH

## 2023-03-02 DIAGNOSIS — F33.41 RECURRENT MAJOR DEPRESSIVE DISORDER, IN PARTIAL REMISSION (H): ICD-10-CM

## 2023-03-02 NOTE — TELEPHONE ENCOUNTER
"Routing refill request to provider for review/approval because:  Last OV with PCP > one year ago, note says pt would be referred to psychiatry    Last Written Prescription Date: 2/2/22  Last Fill Quantity: 90,  # refills: 3   Last office visit provider: 2/18/22     Requested Prescriptions   Pending Prescriptions Disp Refills     mirtazapine (REMERON) 15 MG tablet [Pharmacy Med Name: MIRTAZAPINE 15MG TABLETS] 90 tablet 3     Sig: TAKE 1 TABLET(15 MG) BY MOUTH DAILY       Atypical Antidepressants Protocol Failed - 3/2/2023  3:55 AM        Failed - Patient has PHQ-9 score less than 5 in past 6 months.     Please review last PHQ-9 score.           Failed - Recent (6 mo) or future (30 days) visit within the authorizing provider's specialty     Patient had office visit in the last 6 months or has a visit in the next 30 days with authorizing provider or within the authorizing provider's specialty.  See \"Patient Info\" tab in inbasket, or \"Choose Columns\" in Meds & Orders section of the refill encounter.            Passed - Medication active on med list        Passed - Patient is age 18 or older        Passed - No active pregnancy on record        Passed - No positive pregnancy test in past 12 mos             Patt Simon RN 03/02/23 4:07 PM  "

## 2023-03-03 RX ORDER — MIRTAZAPINE 15 MG/1
TABLET, FILM COATED ORAL
Qty: 90 TABLET | Refills: 3 | Status: SHIPPED | OUTPATIENT
Start: 2023-03-03 | End: 2024-08-02

## 2023-03-10 RX ORDER — PINDOLOL 5 MG/1
5 TABLET ORAL 2 TIMES DAILY PRN
Qty: 60 TABLET | Refills: 0 | OUTPATIENT
Start: 2023-03-10

## 2023-04-04 ENCOUNTER — VIRTUAL VISIT (OUTPATIENT)
Dept: PSYCHOLOGY | Facility: CLINIC | Age: 28
End: 2023-04-04
Payer: COMMERCIAL

## 2023-04-04 DIAGNOSIS — F84.0 AUTISM SPECTRUM DISORDER: ICD-10-CM

## 2023-04-04 DIAGNOSIS — F41.1 GENERALIZED ANXIETY DISORDER: Primary | ICD-10-CM

## 2023-04-04 DIAGNOSIS — F33.41 RECURRENT MAJOR DEPRESSIVE DISORDER, IN PARTIAL REMISSION (H): ICD-10-CM

## 2023-04-04 PROCEDURE — 90834 PSYTX W PT 45 MINUTES: CPT | Mod: VID

## 2023-04-04 ASSESSMENT — PATIENT HEALTH QUESTIONNAIRE - PHQ9
SUM OF ALL RESPONSES TO PHQ QUESTIONS 1-9: 10
10. IF YOU CHECKED OFF ANY PROBLEMS, HOW DIFFICULT HAVE THESE PROBLEMS MADE IT FOR YOU TO DO YOUR WORK, TAKE CARE OF THINGS AT HOME, OR GET ALONG WITH OTHER PEOPLE: SOMEWHAT DIFFICULT
SUM OF ALL RESPONSES TO PHQ QUESTIONS 1-9: 10

## 2023-04-04 NOTE — PROGRESS NOTES
[facility]                                     Progress Note    Patient Name: Angela Jones  Date: 23         Service Type: Individual      Session Start Time: 10:00am  Session End Time: 10:50am     Session Length: 50 minutes    Session #: 19 with [facility], 1st with new provider due to leave    Attendees: Client attended alone    Service Modality:  Video Visit:      Provider verified identity through the following two step process.  Patient provided:  Patient photo and Patient     Telemedicine Visit: The patient's condition can be safely assessed and treated via synchronous audio and visual telemedicine encounter.      Reason for Telemedicine Visit: Patient has requested telehealth visit    Originating Site (Patient Location): Patient's home    Distant Site (Provider Location): [facility] MENT[facility]    Consent:  The patient/guardian has verbally consented to: the potential risks and benefits of telemedicine (video visit) versus in person care; bill my insurance or make self-payment for services provided; and responsibility for payment of non-covered services.     Patient would like the video invitation sent by:  My Chart    Mode of Communication:  Video Conference via [facility]      Distant Location (Provider):  On-site    As the provider I attest to compliance with applicable laws and regulations related to telemedicine.    DATA  Extended Session (53+ minutes): No  Interactive Complexity: No  Crisis: No        Progress Since Last Session (Related to Symptoms / Goals / Homework):   Symptoms: Improving anxiety due to direction of current marriage, ongoing stress within other important areas of life anxiety and depression    Homework: Partially completed      Episode of Care Goals: Minimal progress - PREPARATION (Decided to change - considering how); Intervened by negotiating a change plan and determining options / strategies for  behavior change, identifying triggers, exploring social supports, and working towards setting a date to begin behavior change     Current / Ongoing Stressors and Concerns:   Pt reports her biggest stressor at this time in her life is the pending divorce with her  which has been causing significant anxiety and resentment within her life. Pt and provider spent the session discussing goals for treatment as well as pt sharing feelings about the different situations going on in life. Pt and provider discussed short-term solutions to the issues she is facing at this time. Pt appears to have significant amount of insight into the feelings she has as well as effective coping mechanisms. See below for plan of care until next session.      Treatment Objective(s) Addressed in This Session:   use relaxation strategies 2 times per day to reduce the physical symptoms of anxiety  Increase interest, engagement, and pleasure in doing things  Decrease frequency and intensity of feeling down, depressed, hopeless  Identify negative self-talk and behaviors: challenge core beliefs, myths, and actions  Improve concentration, focus, and mindfulness in daily activities   self talk  gratitude toward self     Intervention:   DBT - HALT acronym   Emotion focused therapy - feeling identification and insight into feelings within body     Assessments completed prior to visit:   The following assessments were completed by patient for this visit:  PHQ9:       10/12/2022     1:00 PM 11/15/2022     9:26 AM 11/23/2022    12:54 PM 12/21/2022    12:55 PM 1/11/2023    10:00 AM 2/21/2023     8:55 AM 4/4/2023     9:13 AM   PHQ-9 SCORE   PHQ-9 Total Score MyChart 7 (Mild depression) 12 (Moderate depression) 11 (Moderate depression) 7 (Mild depression) 12 (Moderate depression) 12 (Moderate depression) 10 (Moderate depression)   PHQ-9 Total Score 7 12 11 7 12 12 10     GAD7:       3/9/2022    11:04 AM 5/4/2022     1:00 PM 6/29/2022    10:55 AM  9/27/2022     9:35 AM 10/12/2022     1:00 PM 11/15/2022     9:27 AM 1/11/2023    10:00 AM   JOSHUA-7 SCORE   Total Score    8 (mild anxiety) 8 (mild anxiety) 8 (mild anxiety) 10 (moderate anxiety)   Total Score    8    8 8 8 10       Information is confidential and restricted. Go to Review Flowsheets to unlock data.    Multiple values from one day are sorted in reverse-chronological order     PROMIS 10-Global Health (only subscores and total score):       1/19/2022     3:02 PM 2/15/2022     8:38 AM 3/9/2022    11:02 AM 6/16/2022     9:59 AM 6/29/2022    10:56 AM 10/11/2022     9:32 AM 1/11/2023    10:01 AM   PROMIS-10 Scores Only   Global Mental Health Score      13    13 13   Global Physical Health Score      11    11 11   PROMIS TOTAL - SUBSCORES      24    24 24       Information is confidential and restricted. Go to Review Flowsheets to unlock data.    Multiple values from one day are sorted in reverse-chronological order         ASSESSMENT: Current Emotional / Mental Status (status of significant symptoms):   Risk status (Self / Other harm or suicidal ideation)   Patient denies current fears or concerns for personal safety.   Patient denies current or recent suicidal ideation or behaviors.   Patient denies current or recent homicidal ideation or behaviors.   Patient denies current or recent self injurious behavior or ideation.   Patient denies other safety concerns.   Patient reports there has been no change in risk factors since their last session.     Patient reports there has been no change in protective factors since their last session.     Recommended that patient call 911 or go to the local ED should there be a change in any of these risk factors.     Appearance:   Appropriate    Eye Contact:   Good    Psychomotor Behavior: Hyperactive  Restless    Attitude:   Cooperative  Attentive   Orientation:   All   Speech    Rate / Production: Normal/ Responsive    Volume:  Normal    Mood:    Anxious   "Anhedonia   Affect:    Appropriate    Thought Content:  Clear    Thought Form:  Coherent    Insight:    Good      Medication Review:   No changes to current psychiatric medication(s)     Medication Compliance:   Yes     Changes in Health Issues:   None reported     Chemical Use Review:   Substance Use: Chemical use reviewed, no active concerns identified      Tobacco Use: No current tobacco use.      Diagnosis:  1. Autism Spectrum Disorder  2. JOSHUA (Generalized Anxiety Disorder)  3. Mild episode of recurrent major depressive disorder (H)  4. Attention deficit hyperactivity disorder (ADHD), other type    Collateral Reports Completed:   Not Applicable    PLAN: (Patient Tasks / Therapist Tasks / Other):  1. Pt is to use HALT acronym to work on identifying what is causing \"high emotions\" and to give her a chance to breath throughout the day.   2. Pt is to continue current gratitude practice as well as start naming one thing each day she is grateful for within herself.  3. Pt is to begin knitting to get anxious energy out when she has a chance to breath for a few minutes and follow up with provider on effectiveness.       Coby Valdez, Psychotherapist Trainee, [facility]   4/4/23    This note has been reviewed and I agree with the plan of care. This note is co-signed by Maryuri Rosa MA, [facility] Supervisor, on: 4/4/2023.                                                         ______________________________________________________________________    Individual Treatment Plan    Patient's Name: Angela Jones  YOB: 1995    Date of Creation: 1/19/2022  Date Treatment Plan Last Reviewed/Revised: 1/24/2023    DSM5 Diagnoses: Autism Spectrum Disorder 299.00(F84.0)  Associated with another neurodevelopmental, mental or behavioral disorder and Attention-Deficit/Hyperactivity Disorder  314.01 (F90.9) Unspecified Attention -Deficit / Hyperactivity Disorder, 296.31 (F33.0) Major Depressive Disorder, Recurrent Episode, " Mild _ or 300.02 (F41.1) Generalized Anxiety Disorder  Psychosocial / Contextual Factors: 27 year old  female, , no children   PROMIS (reviewed every 90 days):   PROMIS 10-Global Health (only subscores and total score):   PROMIS-10 Scores Only 12/30/2021 1/19/2022 2/15/2022 3/9/2022   Global Mental Health Score 11 11 9 12   Global Physical Health Score 11 11 11 11   PROMIS TOTAL - SUBSCORES 22 22 20 23       Referral / Collaboration:  Was/were discussed and patient will pursue.    Anticipated number of session for this episode of care: 4-6  Anticipation frequency of session: every 2-3 weeks  Anticipated Duration of each session: 38-52 minutes  Treatment plan will be reviewed in 90 days or when goals have been changed.       MeasurableTreatment Goal(s) related to diagnosis / functional impairment(s)  Goal 1: Patient will manage symptoms of anxiety     I will know I've met my goal when I feel more confident in my ability to cope with stress with fewer meltdowns.      Objective #A (Patient Action)    Patient will identify the initial signs or symptoms of anxiety.  Status: Continued - Date(s): 1/24/2023    Intervention(s)  Therapist will teach help patient gain insight into signs of stress (physically and emotionally).    Objective #B  Patient will use relaxation strategies multiple times per day to reduce the physical symptoms of anxiety.  Status: Continued - Date(s): 1/24/2023    Intervention(s)  Therapist will teach diaphragmatic breathing and other grounding skills.    Objective #C  Patient will use thought-stopping strategy daily to reduce intrusive thoughts.  Status: Continued - Date(s): 1/24/2023    Intervention(s)  Therapist will teach thought stopping techniques.      Goal 2: Patient will manage symptoms of depression    I will know I've met my goal when I feel more confident in my ability to express my emotions in a healthy way.      Objective #A (Patient Action)    Status: Continued - Date(s):  1/24/2023    Patient will Increase interest, engagement, and pleasure in doing things.    Intervention(s)  Therapist will teach emotional recognition/identification. *.    Objective #B  Patient will Identify negative self-talk and behaviors: challenge core beliefs, myths, and actions.    Status: Continued - Date(s): 1/24/2023    Intervention(s)  Therapist will help patient practice positive self-talk and thought re-framing.      Goal 3: Patient will improve communication patterns in the relationship    I will know I've met my goal when we continue to attend couples counseling and see improvements.      Objective #A (Patient Action)    Status: Continued - Date(s): 1/24/2023    Patient will continue participating in couples therapy with Tayla.     Intervention(s)  Therapist will monitor referral process and follow-up on their relationship problems and improvements      Patient has reviewed and agreed to the above plan.      Coby Valdez  January 24, 2023

## 2023-04-19 ENCOUNTER — VIRTUAL VISIT (OUTPATIENT)
Dept: PSYCHOLOGY | Facility: CLINIC | Age: 28
End: 2023-04-19
Payer: COMMERCIAL

## 2023-04-19 DIAGNOSIS — F33.41 RECURRENT MAJOR DEPRESSIVE DISORDER, IN PARTIAL REMISSION (H): ICD-10-CM

## 2023-04-19 DIAGNOSIS — F84.0 AUTISM SPECTRUM DISORDER: ICD-10-CM

## 2023-04-19 DIAGNOSIS — F41.1 GENERALIZED ANXIETY DISORDER: Primary | ICD-10-CM

## 2023-04-19 PROCEDURE — 90834 PSYTX W PT 45 MINUTES: CPT | Mod: VID

## 2023-04-19 NOTE — PROGRESS NOTES
[facility]                                     Progress Note    Patient Name: Angela Jones  Date: 23         Service Type: Individual      Session Start Time: 9:00am  Session End Time: 9:45am     Session Length: 45 minutes    Session #: [facility], 2nd with new provider due to leave    Attendees: Client attended alone    Service Modality:  Video Visit:      Provider verified identity through the following two step process.  Patient provided:  Patient photo and Patient     Telemedicine Visit: The patient's condition can be safely assessed and treated via synchronous audio and visual telemedicine encounter.      Reason for Telemedicine Visit: Patient has requested telehealth visit    Originating Site (Patient Location): Patient's home    Distant Site (Provider Location): Provider Remote Setting- Home Office    Consent:  The patient/guardian has verbally consented to: the potential risks and benefits of telemedicine (video visit) versus in person care; bill my insurance or make self-payment for services provided; and responsibility for payment of non-covered services.     Patient would like the video invitation sent by:  My Chart    Mode of Communication:  Video Conference via [facility]      Distant Location (Provider):  On-site    As the provider I attest to compliance with applicable laws and regulations related to telemedicine.    DATA  Extended Session (53+ minutes): No  Interactive Complexity: No  Crisis: No        Progress Since Last Session (Related to Symptoms / Goals / Homework):   Symptoms: Improving anxiety due to knowing direction of life, ongoing depression and feelings of overwhelm due to ongoing stressors within life at this time anxiety and depression    Homework: Achieved / completed to satisfaction      Episode of Care Goals: Satisfactory progress - ACTION (Actively working towards change); Intervened by reinforcing change plan / affirming steps  "taken     Current / Ongoing Stressors and Concerns:   Pt reports  having kidney infection, still struggling to work through the [facility] program, and feelings of overwhelm have been ongoing stressors within the last two weeks. Pt reports her job is going \"fine\" and has been more exhausted than usual due to all of the things she has to get done around the house. Pt and provider discussed mental health symptoms related to depression: irritability, feeling tired. Pt discussed coping skills used to help manage depressive symptoms and provider validated emotions with everything going on. Pt denies any SI, SIB, or HI since last session. See below for plan of care until next appointment.     Treatment Objective(s) Addressed in This Session:   use cognitive strategies identified in therapy to challenge anxious thoughts  Increase interest, engagement, and pleasure in doing things  Decrease frequency and intensity of feeling down, depressed, hopeless  Identify negative self-talk and behaviors: challenge core beliefs, myths, and actions  Improve concentration, focus, and mindfulness in daily activities   gratitude toward self     Intervention:   DBT - distress tolerance skills and communication skills   CBT - challenging automatic thoughts   Humanistic Therapy - unconditional positive regard and emotion validation    Assessments completed prior to visit:   The following assessments were completed by patient for this visit:  PHQ9:       10/12/2022     1:00 PM 11/15/2022     9:26 AM 11/23/2022    12:54 PM 12/21/2022    12:55 PM 1/11/2023    10:00 AM 2/21/2023     8:55 AM 4/4/2023     9:13 AM   PHQ-9 SCORE   PHQ-9 Total Score MyChart 7 (Mild depression) 12 (Moderate depression) 11 (Moderate depression) 7 (Mild depression) 12 (Moderate depression) 12 (Moderate depression) 10 (Moderate depression)   PHQ-9 Total Score 7 12 11 7 12 12 10     GAD7:       3/9/2022    11:04 AM 5/4/2022     1:00 PM 6/29/2022    10:55 AM 9/27/2022 "     9:35 AM 10/12/2022     1:00 PM 11/15/2022     9:27 AM 1/11/2023    10:00 AM   JOSHUA-7 SCORE   Total Score 7 (mild anxiety) 5 (mild anxiety) 6 (mild anxiety) 8 (mild anxiety) 8 (mild anxiety) 8 (mild anxiety) 10 (moderate anxiety)   Total Score 7 5 6 8    8 8 8 10     PROMIS 10-Global Health (only subscores and total score):       2/15/2022     8:38 AM 3/9/2022    11:02 AM 6/16/2022     9:59 AM 6/29/2022    10:56 AM 10/11/2022     9:32 AM 1/11/2023    10:01 AM 4/19/2023     8:53 AM   PROMIS-10 Scores Only   Global Mental Health Score     13    13 13 12   Global Physical Health Score     11    11 11 11   PROMIS TOTAL - SUBSCORES     24    24 24 23       Information is confidential and restricted. Go to Review Flowsheets to unlock data.    Multiple values from one day are sorted in reverse-chronological order         ASSESSMENT: Current Emotional / Mental Status (status of significant symptoms):   Risk status (Self / Other harm or suicidal ideation)   Patient denies current fears or concerns for personal safety.   Patient denies current or recent suicidal ideation or behaviors.   Patient denies current or recent homicidal ideation or behaviors.   Patient denies current or recent self injurious behavior or ideation.   Patient denies other safety concerns.   Patient reports there has been no change in risk factors since their last session.     Patient reports there has been no change in protective factors since their last session.     Recommended that patient call 911 or go to the local ED should there be a change in any of these risk factors.     Appearance:   Appropriate    Eye Contact:   Good    Psychomotor Behavior: Hyperactive  Restless    Attitude:   Cooperative  Attentive   Orientation:   All   Speech    Rate / Production: Normal/ Responsive    Volume:  Normal    Mood:    Anxious  Anhedonia   Affect:    Appropriate    Thought Content:  Clear    Thought Form:  Coherent    Insight:    Good      Medication  "Review:   No changes to current psychiatric medication(s)     Medication Compliance:   Yes     Changes in Health Issues:   None reported     Chemical Use Review:   Substance Use: Chemical use reviewed, no active concerns identified      Tobacco Use: No current tobacco use.      Diagnosis:  1. JOSHUA (Generalized Anxiety Disorder)  2. Mild episode of recurrent major depressive disorder (H)  3.  Autism Spectrum Disorder    Collateral Reports Completed:   Not Applicable    PLAN: (Patient Tasks / Therapist Tasks / Other):  1. Pt is to continue using coping skills developed to cope with depressive feelings and ongoing mental health symptoms.   2. Pt is to shower before next session and take one day to complete necessary self-care hygiene.  3. Pt is to use mantra of \"I am doing the best I can\" to challenge ongoing automatic thoughts related to self worth and judgement toward ability to get things done around her house.      Coby Valdez, Psychotherapist Trainee, [facility]   4/19/23    This note has been reviewed and I agree with the plan of care. This note is co-signed by Maryuri Rosa MA, [facility] Supervisor, on: 4/24/2023                                                         ______________________________________________________________________    Individual Treatment Plan    Patient's Name: Angela Jones  YOB: 1995    Date of Creation: 1/19/2022  Date Treatment Plan Last Reviewed/Revised: 4/19/23    DSM5 Diagnoses: Autism Spectrum Disorder 299.00(F84.0)  Associated with another neurodevelopmental, mental or behavioral disorder and Attention-Deficit/Hyperactivity Disorder  314.01 (F90.9) Unspecified Attention -Deficit / Hyperactivity Disorder, 296.31 (F33.0) Major Depressive Disorder, Recurrent Episode, Mild _ or 300.02 (F41.1) Generalized Anxiety Disorder  Psychosocial / Contextual Factors: 27 year old  female, , no children   PROMIS (reviewed every 90 days):   PROMIS 10-Global Health " (only subscores and total score):   PROMIS-10 Scores Only 12/30/2021 1/19/2022 2/15/2022 3/9/2022   Global Mental Health Score 11 11 9 12   Global Physical Health Score 11 11 11 11   PROMIS TOTAL - SUBSCORES 22 22 20 23       Referral / Collaboration:  Was/were discussed and patient will pursue.    Anticipated number of session for this episode of care: 4-6  Anticipation frequency of session: every 2-3 weeks  Anticipated Duration of each session: 38-52 minutes  Treatment plan will be reviewed in 90 days or when goals have been changed.       MeasurableTreatment Goal(s) related to diagnosis / functional impairment(s)  Goal 1: Patient will manage symptoms of anxiety     I will know I've met my goal when I feel more confident in my ability to cope with stress with fewer meltdowns.      Objective #A (Patient Action)    Patient will identify the initial signs or symptoms of anxiety.  Status: Continued - Date(s): 4/19/23    Intervention(s)  Therapist will teach help patient gain insight into signs of stress (physically and emotionally).    Objective #B  Patient will use relaxation strategies multiple times per day to reduce the physical symptoms of anxiety.  Status: Continued - Date(s): 4/19/23    Intervention(s)  Therapist will teach diaphragmatic breathing and other grounding skills.    Objective #C  Patient will use thought-stopping strategy daily to reduce intrusive thoughts.  Status: Continued - Date(s): 4/19/23    Intervention(s)  Therapist will teach thought stopping techniques.      Goal 2: Patient will manage symptoms of depression    I will know I've met my goal when I feel more confident in my ability to express my emotions in a healthy way.      Objective #A     Status: Continued - Date(s): 4/19/23    Patient will Increase interest, engagement, and pleasure in doing things.    Intervention(s)  Therapist will teach emotional recognition/identification. *.    Objective #B  Patient will Identify negative  self-talk and behaviors: challenge core beliefs, myths, and actions.    Status: Continued - Date(s): 4/19/23    Intervention(s)  Therapist will help patient practice positive self-talk and thought re-framing.      Goal 3: Patient will improve communication patterns in the relationship    I will know I've met my goal when we continue to attend couples counseling and see improvements.      Objective #A (Patient Action)    Status: Continued - Date(s): 4/19/23    Patient will continue participating in couples therapy with Tayla.     Intervention(s)  Therapist will monitor referral process and follow-up on their relationship problems and improvements      Patient has reviewed and agreed to the above plan.      Coby Valdez  April 19, 2023

## 2023-05-03 ENCOUNTER — VIRTUAL VISIT (OUTPATIENT)
Dept: PSYCHOLOGY | Facility: CLINIC | Age: 28
End: 2023-05-03
Payer: COMMERCIAL

## 2023-05-03 DIAGNOSIS — F33.41 RECURRENT MAJOR DEPRESSIVE DISORDER, IN PARTIAL REMISSION (H): ICD-10-CM

## 2023-05-03 DIAGNOSIS — F41.1 GENERALIZED ANXIETY DISORDER: Primary | ICD-10-CM

## 2023-05-03 DIAGNOSIS — F84.0 AUTISM SPECTRUM DISORDER: ICD-10-CM

## 2023-05-03 PROCEDURE — 90834 PSYTX W PT 45 MINUTES: CPT | Mod: VID

## 2023-05-03 NOTE — PROGRESS NOTES
[facility]                                     Progress Note    Patient Name: Angela Jones  Date: 5/3/23         Service Type: Individual      Session Start Time: 9:00am  Session End Time: 9:47am     Session Length: 47 minutes    Session #: [facility], [facility]rd with new provider due to leave    Attendees: Client attended alone    Service Modality:  Video Visit:      Provider verified identity through the following two step process.  Patient provided:  Patient photo and Patient     Telemedicine Visit: The patient's condition can be safely assessed and treated via synchronous audio and visual telemedicine encounter.      Reason for Telemedicine Visit: Patient has requested telehealth visit    Originating Site (Patient Location): Patient's home    Distant Site (Provider Location): Provider Remote Setting- Home Office    Consent:  The patient/guardian has verbally consented to: the potential risks and benefits of telemedicine (video visit) versus in person care; bill my insurance or make self-payment for services provided; and responsibility for payment of non-covered services.     Patient would like the video invitation sent by:  My Chart    Mode of Communication:  Video Conference via Amwell      Distant Location (Provider):  On-site    As the provider I attest to compliance with applicable laws and regulations related to telemedicine.    DATA  Extended Session (53+ minutes): No  Interactive Complexity: No  Crisis: No        Progress Since Last Session (Related to Symptoms / Goals / Homework):   Symptoms: Improving ability to recognize and cope with anxiety symptoms, ongoing anxiety and feelings of being overwhlemed due to life circumstances and change     Homework: Achieved / completed to satisfaction      Episode of Care Goals: Satisfactory progress - ACTION (Actively working towards change); Intervened by reinforcing change plan / affirming steps taken     Current / Ongoing  "Stressors and Concerns:   Pt reports \"not a lot happening\" out of the normal over the last two weeks. Pt reports mood has continued to be ongoing anxiety and feelings related to overwhelm with all the change in life. Pt reports she has been able to get a  to write up the divorce decree for her and her . Pt reports they have been uncovering more financial issues  got them into and has been frustrated with that. Pt reports feeling overwhelmed and uncertain when it comes to navigating the MA system and has been able to find resources with that. Pt shared her family dynamics with provider and discussed feelings related to sharing what has been going on in her life with her family. Pt reports she is worried about her family's reaction. Pt has continued to utilize coping skills that work well for her and appears to be gaining insight into the positive things she has been doing within her life. Pt reports she has been having more pain in her body and would like to take some steps to help midi-gate the pain a bit. Pt denies any SI, SIB, or HI since last session. See below for plan of care until next appointment.      Treatment Objective(s) Addressed in This Session:   use cognitive strategies identified in therapy to challenge anxious thoughts  Increase interest, engagement, and pleasure in doing things  Improve diet, appetite, mindful eating, and / or meal planning  Identify negative self-talk and behaviors: challenge core beliefs, myths, and actions  Improve concentration, focus, and mindfulness in daily activities   learn & utilize at least 1 assertive communication skills weekly  accept compliment with a \"thank you\" and no self deprecating comments     Intervention:   DBT - coping skills and distress tolerance skill review, communication skill review   CBT - challenging automatic anxious thoughts, behavioral activation   Humanistic Therapy - unconditional positive regard and emotion " validation    Assessments completed prior to visit:   The following assessments were completed by patient for this visit:  PHQ9:       10/12/2022     1:00 PM 11/15/2022     9:26 AM 11/23/2022    12:54 PM 12/21/2022    12:55 PM 1/11/2023    10:00 AM 2/21/2023     8:55 AM 4/4/2023     9:13 AM   PHQ-9 SCORE   PHQ-9 Total Score MyChart 7 (Mild depression) 12 (Moderate depression) 11 (Moderate depression) 7 (Mild depression) 12 (Moderate depression) 12 (Moderate depression) 10 (Moderate depression)   PHQ-9 Total Score 7 12 11 7 12 12 10     GAD7:       3/9/2022    11:04 AM 5/4/2022     1:00 PM 6/29/2022    10:55 AM 9/27/2022     9:35 AM 10/12/2022     1:00 PM 11/15/2022     9:27 AM 1/11/2023    10:00 AM   JOSHUA-7 SCORE   Total Score 7 (mild anxiety) 5 (mild anxiety) 6 (mild anxiety) 8 (mild anxiety) 8 (mild anxiety) 8 (mild anxiety) 10 (moderate anxiety)   Total Score 7 5 6 8    8 8 8 10     PROMIS 10-Global Health (only subscores and total score):       2/15/2022     8:38 AM 3/9/2022    11:02 AM 6/16/2022     9:59 AM 6/29/2022    10:56 AM 10/11/2022     9:32 AM 1/11/2023    10:01 AM 4/19/2023     8:53 AM   PROMIS-10 Scores Only   Global Mental Health Score     13    13 13 12   Global Physical Health Score     11    11 11 11   PROMIS TOTAL - SUBSCORES     24    24 24 23       Information is confidential and restricted. Go to Review Flowsheets to unlock data.    Multiple values from one day are sorted in reverse-chronological order         ASSESSMENT: Current Emotional / Mental Status (status of significant symptoms):   Risk status (Self / Other harm or suicidal ideation)   Patient denies current fears or concerns for personal safety.   Patient denies current or recent suicidal ideation or behaviors.   Patient denies current or recent homicidal ideation or behaviors.   Patient denies current or recent self injurious behavior or ideation.   Patient denies other safety concerns.   Patient reports there has been no change in  risk factors since their last session.     Patient reports there has been no change in protective factors since their last session.     Recommended that patient call 911 or go to the local ED should there be a change in any of these risk factors.     Appearance:   Appropriate    Eye Contact:   Good    Psychomotor Behavior: Hyperactive  Restless    Attitude:   Cooperative  Attentive   Orientation:   All   Speech    Rate / Production: Normal/ Responsive    Volume:  Normal    Mood:    Anxious  Normal   Affect:    Appropriate    Thought Content:  Clear    Thought Form:  Coherent    Insight:    Good      Medication Review:   No changes to current psychiatric medication(s)     Medication Compliance:   Yes     Changes in Health Issues:   Yes: Pain, Associated Psychological Distress     Chemical Use Review:   Substance Use: Chemical use reviewed, no active concerns identified      Tobacco Use: No current tobacco use.      Diagnosis:  1. JOSHUA (Generalized Anxiety Disorder)  2. Mild episode of recurrent major depressive disorder (H)  3.  Autism Spectrum Disorder    Collateral Reports Completed:   Not Applicable    PLAN: (Patient Tasks / Therapist Tasks / Other):  1. Pt is to continue using her coping skills at work and outside of work to cope with the feelings of being overwhelmed. Pt is to recognize one thing each day before going to bed that she did well.   2. Pt is to shower before next session and complete necessary daily hygiene.  3. Pt is to eat one more service of fruits and vegetables at least 4 times per week until next session.         Coby Valdez, Psychotherapist Trainee, [facility]   5/3/23    This note has been reviewed and I agree with the plan of care. This note is co-signed by Maryuri Rosa MA, [facility] Supervisor, on: 5/4/2023                                                         ______________________________________________________________________    Individual Treatment Plan    Patient's Name: Angela VELASCO  Jones  YOB: 1995    Date of Creation: 1/19/2022  Date Treatment Plan Last Reviewed/Revised: 4/19/23    DSM5 Diagnoses: Autism Spectrum Disorder 299.00(F84.0)  Associated with another neurodevelopmental, mental or behavioral disorder and Attention-Deficit/Hyperactivity Disorder  314.01 (F90.9) Unspecified Attention -Deficit / Hyperactivity Disorder, 296.31 (F33.0) Major Depressive Disorder, Recurrent Episode, Mild _ or 300.02 (F41.1) Generalized Anxiety Disorder  Psychosocial / Contextual Factors: 27 year old  female, , no children   PROMIS (reviewed every 90 days):   PROMIS 10-Global Health (only subscores and total score):   PROMIS-10 Scores Only 12/30/2021 1/19/2022 2/15/2022 3/9/2022   Global Mental Health Score 11 11 9 12   Global Physical Health Score 11 11 11 11   PROMIS TOTAL - SUBSCORES 22 22 20 23       Referral / Collaboration:  Was/were discussed and patient will pursue.    Anticipated number of session for this episode of care: 4-6  Anticipation frequency of session: every 2-3 weeks  Anticipated Duration of each session: 38-52 minutes  Treatment plan will be reviewed in 90 days or when goals have been changed.       MeasurableTreatment Goal(s) related to diagnosis / functional impairment(s)  Goal 1: Patient will manage symptoms of anxiety     I will know I've met my goal when I feel more confident in my ability to cope with stress with fewer meltdowns.      Objective #A (Patient Action)    Patient will identify the initial signs or symptoms of anxiety.  Status: Continued - Date(s): 4/19/23    Intervention(s)  Therapist will teach help patient gain insight into signs of stress (physically and emotionally).    Objective #B  Patient will use relaxation strategies multiple times per day to reduce the physical symptoms of anxiety.  Status: Continued - Date(s): 4/19/23    Intervention(s)  Therapist will teach diaphragmatic breathing and other grounding skills.    Objective  #C  Patient will use thought-stopping strategy daily to reduce intrusive thoughts.  Status: Continued - Date(s): 4/19/23    Intervention(s)  Therapist will teach thought stopping techniques.      Goal 2: Patient will manage symptoms of depression    I will know I've met my goal when I feel more confident in my ability to express my emotions in a healthy way.      Objective #A     Status: Continued - Date(s): 4/19/23    Patient will Increase interest, engagement, and pleasure in doing things.    Intervention(s)  Therapist will teach emotional recognition/identification. *.    Objective #B  Patient will Identify negative self-talk and behaviors: challenge core beliefs, myths, and actions.    Status: Continued - Date(s): 4/19/23    Intervention(s)  Therapist will help patient practice positive self-talk and thought re-framing.      Goal 3: Patient will improve communication patterns in the relationship    I will know I've met my goal when we continue to attend couples counseling and see improvements.      Objective #A (Patient Action)    Status: Continued - Date(s): 4/19/23    Patient will continue participating in couples therapy with Tayla.     Intervention(s)  Therapist will monitor referral process and follow-up on their relationship problems and improvements      Patient has reviewed and agreed to the above plan.      Coby Valdez  April 19, 2023

## 2023-05-17 ENCOUNTER — VIRTUAL VISIT (OUTPATIENT)
Dept: PSYCHOLOGY | Facility: CLINIC | Age: 28
End: 2023-05-17
Payer: COMMERCIAL

## 2023-05-17 DIAGNOSIS — F33.41 RECURRENT MAJOR DEPRESSIVE DISORDER, IN PARTIAL REMISSION (H): ICD-10-CM

## 2023-05-17 DIAGNOSIS — F41.1 GENERALIZED ANXIETY DISORDER: Primary | ICD-10-CM

## 2023-05-17 PROCEDURE — 90834 PSYTX W PT 45 MINUTES: CPT | Mod: VID

## 2023-05-17 NOTE — PROGRESS NOTES
[facility]                                     Progress Note    Patient Name: Angela Jones  Date: 5/17/23         Service Type: Individual      Session Start Time: 9:00am  Session End Time: 9:46am     Session Length: 46 minutes    Session #: [facility], 4th with new provider due to leave    Attendees: Client attended alone    Service Modality:  Video Visit:      Provider verified identity through the following two step process.  Patient provided:  Patient is known previously to provider    Telemedicine Visit: The patient's condition can be safely assessed and treated via synchronous audio and visual telemedicine encounter.      Reason for Telemedicine Visit: Patient has requested telehealth visit    Originating Site (Patient Location): Patient's home    Distant Site (Provider Location): Provider Remote Setting- Home Office    Consent:  The patient/guardian has verbally consented to: the potential risks and benefits of telemedicine (video visit) versus in person care; bill my insurance or make self-payment for services provided; and responsibility for payment of non-covered services.     Patient would like the video invitation sent by:  My Chart    Mode of Communication:  Video Conference via Amwell      Distant Location (Provider):  On-site    As the provider I attest to compliance with applicable laws and regulations related to telemedicine.    DATA  Extended Session (53+ minutes): No  Interactive Complexity: No  Crisis: No        Progress Since Last Session (Related to Symptoms / Goals / Homework):   Symptoms: Improving ability to recognize and cope with anxiety symptoms, ongoing anxiety and feelings of being overwhlemed due to life circumstances and change     Homework: Achieved / completed to satisfaction      Episode of Care Goals: Satisfactory progress - ACTION (Actively working towards change); Intervened by reinforcing change plan / affirming steps taken     Current  "/ Ongoing Stressors and Concerns:   Pt reports ongoing stressors as struggles in the relationship with her  as well as figuring out the process to get on disability. Pt reports mood since last session has mostly been feeling overwhelmed due to everything she has to do. Pt reports ongoing issues engaging with her  and communication issues with the professionals she needs to support her in the divorce. Pt and provider discussed her dx of autism and how it changed her \"entire world view\". Pt appears to have gained insight into her need to take care of herself as well as begin learning new ways of identifying herself outside of her autism dx. Pt appears to be working toward self acceptance and figuring out who she desires to be in the future. Pt denies any SI, SIB, or HI since last session. See below for plan of care until next appointment.        Treatment Objective(s) Addressed in This Session:   use cognitive strategies identified in therapy to challenge anxious thoughts  Increase interest, engagement, and pleasure in doing things  Identify negative self-talk and behaviors: challenge core beliefs, myths, and actions  Improve concentration, focus, and mindfulness in daily activities   learn & utilize at least 1 assertive communication skills weekly  identify 5 traits or charcteristics you like about yourself     Intervention:    DBT - distress tolerance review   ACT: self as context, identity discussion   Humanistic Therapy - unconditional positive regard and emotion validation    Assessments completed prior to visit:   The following assessments were completed by patient for this visit:  PHQ9:       10/12/2022     1:00 PM 11/15/2022     9:26 AM 11/23/2022    12:54 PM 12/21/2022    12:55 PM 1/11/2023    10:00 AM 2/21/2023     8:55 AM 4/4/2023     9:13 AM   PHQ-9 SCORE   PHQ-9 Total Score Bashir 7 (Mild depression) 12 (Moderate depression) 11 (Moderate depression) 7 (Mild depression) 12 (Moderate " depression) 12 (Moderate depression) 10 (Moderate depression)   PHQ-9 Total Score 7 12 11 7 12 12 10     GAD7:       3/9/2022    11:04 AM 5/4/2022     1:00 PM 6/29/2022    10:55 AM 9/27/2022     9:35 AM 10/12/2022     1:00 PM 11/15/2022     9:27 AM 1/11/2023    10:00 AM   JOSHUA-7 SCORE   Total Score 7 (mild anxiety) 5 (mild anxiety) 6 (mild anxiety) 8 (mild anxiety) 8 (mild anxiety) 8 (mild anxiety) 10 (moderate anxiety)   Total Score 7 5 6 8    8 8 8 10     PROMIS 10-Global Health (only subscores and total score):       2/15/2022     8:38 AM 3/9/2022    11:02 AM 6/16/2022     9:59 AM 6/29/2022    10:56 AM 10/11/2022     9:32 AM 1/11/2023    10:01 AM 4/19/2023     8:53 AM   PROMIS-10 Scores Only   Global Mental Health Score     13    13 13 12   Global Physical Health Score     11    11 11 11   PROMIS TOTAL - SUBSCORES     24    24 24 23       Information is confidential and restricted. Go to Review Flowsheets to unlock data.    Multiple values from one day are sorted in reverse-chronological order         ASSESSMENT: Current Emotional / Mental Status (status of significant symptoms):   Risk status (Self / Other harm or suicidal ideation)   Patient denies current fears or concerns for personal safety.   Patient denies current or recent suicidal ideation or behaviors.   Patient denies current or recent homicidal ideation or behaviors.   Patient denies current or recent self injurious behavior or ideation.   Patient denies other safety concerns.   Patient reports there has been no change in risk factors since their last session.     Patient reports there has been no change in protective factors since their last session.     Recommended that patient call 911 or go to the local ED should there be a change in any of these risk factors.     Appearance:   Appropriate    Eye Contact:   Good    Psychomotor Behavior: Hyperactive  Restless    Attitude:   Cooperative  Attentive   Orientation:   All   Speech    Rate /  Production: Normal/ Responsive    Volume:  Normal    Mood:    Anxious  Normal   Affect:    Appropriate    Thought Content:  Clear    Thought Form:  Coherent    Insight:    Good      Medication Review:   No changes to current psychiatric medication(s)     Medication Compliance:   Yes     Changes in Health Issues:   Yes: Pain, Associated Psychological Distress     Chemical Use Review:   Substance Use: Chemical use reviewed, no active concerns identified      Tobacco Use: No current tobacco use.      Diagnosis:  1. JOSHUA (Generalized Anxiety Disorder)  2. Mild episode of recurrent major depressive disorder (H)      Collateral Reports Completed:   Not Applicable    PLAN: (Patient Tasks / Therapist Tasks / Other):  1. Pt is to continue using coping skills as appropriate to deal with mental health symptoms.   2. Pt is to think of 10 ways she views herself outside of the problems going on in her life and a few things she values.   3. Pt is to practice self compassion daily using mantra created by her.            Coby Valdez, Psychotherapist Trainee, [facility]   5/17/23    This note has been reviewed and I agree with the plan of care. This note is co-signed by Maryuri Rosa MA, [facility] Supervisor, on: 5/18/2023                                                         ______________________________________________________________________    Individual Treatment Plan    Patient's Name: Angela Jones  YOB: 1995    Date of Creation: 1/19/2022  Date Treatment Plan Last Reviewed/Revised: 4/19/23    DSM5 Diagnoses: Autism Spectrum Disorder 299.00(F84.0)  Associated with another neurodevelopmental, mental or behavioral disorder and Attention-Deficit/Hyperactivity Disorder  314.01 (F90.9) Unspecified Attention -Deficit / Hyperactivity Disorder, 296.31 (F33.0) Major Depressive Disorder, Recurrent Episode, Mild _ or 300.02 (F41.1) Generalized Anxiety Disorder  Psychosocial / Contextual Factors: 27 year old   female, , no children   PROMIS (reviewed every 90 days):   PROMIS 10-Global Health (only subscores and total score):   PROMIS-10 Scores Only 12/30/2021 1/19/2022 2/15/2022 3/9/2022   Global Mental Health Score 11 11 9 12   Global Physical Health Score 11 11 11 11   PROMIS TOTAL - SUBSCORES 22 22 20 23       Referral / Collaboration:  Was/were discussed and patient will pursue.    Anticipated number of session for this episode of care: 4-6  Anticipation frequency of session: every 2-3 weeks  Anticipated Duration of each session: 38-52 minutes  Treatment plan will be reviewed in 90 days or when goals have been changed.       MeasurableTreatment Goal(s) related to diagnosis / functional impairment(s)  Goal 1: Patient will manage symptoms of anxiety     I will know I've met my goal when I feel more confident in my ability to cope with stress with fewer meltdowns.      Objective #A (Patient Action)    Patient will identify the initial signs or symptoms of anxiety.  Status: Continued - Date(s): 4/19/23    Intervention(s)  Therapist will teach help patient gain insight into signs of stress (physically and emotionally).    Objective #B  Patient will use relaxation strategies multiple times per day to reduce the physical symptoms of anxiety.  Status: Continued - Date(s): 4/19/23    Intervention(s)  Therapist will teach diaphragmatic breathing and other grounding skills.    Objective #C  Patient will use thought-stopping strategy daily to reduce intrusive thoughts.  Status: Continued - Date(s): 4/19/23    Intervention(s)  Therapist will teach thought stopping techniques.      Goal 2: Patient will manage symptoms of depression    I will know I've met my goal when I feel more confident in my ability to express my emotions in a healthy way.      Objective #A     Status: Continued - Date(s): 4/19/23    Patient will Increase interest, engagement, and pleasure in doing things.    Intervention(s)  Therapist will teach  emotional recognition/identification. *.    Objective #B  Patient will Identify negative self-talk and behaviors: challenge core beliefs, myths, and actions.    Status: Continued - Date(s): 4/19/23    Intervention(s)  Therapist will help patient practice positive self-talk and thought re-framing.      Goal 3: Patient will improve communication patterns in the relationship    I will know I've met my goal when we continue to attend couples counseling and see improvements.      Objective #A (Patient Action)    Status: Continued - Date(s): 4/19/23    Patient will continue participating in couples therapy with Tayla.     Intervention(s)  Therapist will monitor referral process and follow-up on their relationship problems and improvements      Patient has reviewed and agreed to the above plan.      Coby Valdez  April 19, 2023

## 2023-05-31 ENCOUNTER — VIRTUAL VISIT (OUTPATIENT)
Dept: PSYCHOLOGY | Facility: CLINIC | Age: 28
End: 2023-05-31
Payer: COMMERCIAL

## 2023-05-31 DIAGNOSIS — F33.0 MILD EPISODE OF RECURRENT MAJOR DEPRESSIVE DISORDER (H): ICD-10-CM

## 2023-05-31 DIAGNOSIS — F41.1 GENERALIZED ANXIETY DISORDER: Primary | ICD-10-CM

## 2023-05-31 DIAGNOSIS — G90.A POTS (POSTURAL ORTHOSTATIC TACHYCARDIA SYNDROME): ICD-10-CM

## 2023-05-31 DIAGNOSIS — K21.9 GASTROESOPHAGEAL REFLUX DISEASE WITHOUT ESOPHAGITIS: ICD-10-CM

## 2023-05-31 PROCEDURE — 90834 PSYTX W PT 45 MINUTES: CPT | Mod: VID

## 2023-05-31 NOTE — PROGRESS NOTES
[facility]                                     Progress Note    Patient Name: Angela Jones  Date: 5/31/23         Service Type: Individual      Session Start Time: 9:00am  Session End Time: 9:45am     Session Length: 45 minutes    Session #: 23 with [facility], 5th with new provider due to leave    Attendees: Client attended alone    Service Modality:  Video Visit:      Provider verified identity through the following two step process.  Patient provided:  Patient is known previously to provider    Telemedicine Visit: The patient's condition can be safely assessed and treated via synchronous audio and visual telemedicine encounter.      Reason for Telemedicine Visit: Patient has requested telehealth visit    Originating Site (Patient Location): Patient's home    Distant Site (Provider Location): Provider Remote Setting- Home Office    Consent:  The patient/guardian has verbally consented to: the potential risks and benefits of telemedicine (video visit) versus in person care; bill my insurance or make self-payment for services provided; and responsibility for payment of non-covered services.     Patient would like the video invitation sent by:  My Chart    Mode of Communication:  Video Conference via Amwell      Distant Location (Provider):  On-site    As the provider I attest to compliance with applicable laws and regulations related to telemedicine.    DATA  Extended Session (53+ minutes): No  Interactive Complexity: No  Crisis: No        Progress Since Last Session (Related to Symptoms / Goals / Homework):   Symptoms: Improving ability to recognize and cope with anxiety symptoms, ongoing anxiety and feelings of being overwhlemed due to life circumstances and change     Homework: Did not complete      Episode of Care Goals: Minimal progress - ACTION (Actively working towards change); Intervened by reinforcing change plan / affirming steps taken     Current / Ongoing Stressors and  "Concerns:   Pt reports ongoing stressors as training at work and home life with \"everything going on\". Pt reports mood since last session has been \"low and irritable\" stating she has been struggling to get through the days due to exhaustion and overwhelm. Pt reports no movement in things in her life since last session and shared some of the circumstances that have been contributing to feelings of irritability. Pt and provider discussed coping skills and the relationship with her mom. Pt shared the divorce happenings with her mom and sister for the first time and discussed those feelings in session. Pt appears to have gained insight into her need for social support as well as self care/self compassion at this time. Pt appears to be working toward creating a routine that will work for her own health and stability as well as just navigate this hard time in her life. Pt denies any SI, SIB, or HI since last session. See below for plan of care until next appointment.        Treatment Objective(s) Addressed in This Session:   use cognitive strategies identified in therapy to challenge anxious thoughts  Increase interest, engagement, and pleasure in doing things  Identify negative self-talk and behaviors: challenge core beliefs, myths, and actions  Improve concentration, focus, and mindfulness in daily activities   learn & utilize at least 1 assertive communication skills weekly     Intervention:    CBT - automatic thought challenging   DBT - distress tolerance skill review   Humanistic Therapy - unconditional positive regard    Assessments completed prior to visit:   The following assessments were completed by patient for this visit:  PHQ9:       10/12/2022     1:00 PM 11/15/2022     9:26 AM 11/23/2022    12:54 PM 12/21/2022    12:55 PM 1/11/2023    10:00 AM 2/21/2023     8:55 AM 4/4/2023     9:13 AM   PHQ-9 SCORE   PHQ-9 Total Score Jairhart 7 (Mild depression) 12 (Moderate depression) 11 (Moderate depression) 7 (Mild " depression) 12 (Moderate depression) 12 (Moderate depression) 10 (Moderate depression)   PHQ-9 Total Score 7 12 11 7 12 12 10     GAD7:       3/9/2022    11:04 AM 5/4/2022     1:00 PM 6/29/2022    10:55 AM 9/27/2022     9:35 AM 10/12/2022     1:00 PM 11/15/2022     9:27 AM 1/11/2023    10:00 AM   JOSHUA-7 SCORE   Total Score 7 (mild anxiety) 5 (mild anxiety) 6 (mild anxiety) 8 (mild anxiety) 8 (mild anxiety) 8 (mild anxiety) 10 (moderate anxiety)   Total Score 7 5 6 8    8 8 8 10     PROMIS 10-Global Health (only subscores and total score):       2/15/2022     8:38 AM 3/9/2022    11:02 AM 6/16/2022     9:59 AM 6/29/2022    10:56 AM 10/11/2022     9:32 AM 1/11/2023    10:01 AM 4/19/2023     8:53 AM   PROMIS-10 Scores Only   Global Mental Health Score     13    13 13 12   Global Physical Health Score     11    11 11 11   PROMIS TOTAL - SUBSCORES     24    24 24 23       Information is confidential and restricted. Go to Review Flowsheets to unlock data.    Multiple values from one day are sorted in reverse-chronological order         ASSESSMENT: Current Emotional / Mental Status (status of significant symptoms):   Risk status (Self / Other harm or suicidal ideation)   Patient denies current fears or concerns for personal safety.   Patient denies current or recent suicidal ideation or behaviors.   Patient denies current or recent homicidal ideation or behaviors.   Patient denies current or recent self injurious behavior or ideation.   Patient denies other safety concerns.   Patient reports there has been no change in risk factors since their last session.     Patient reports there has been no change in protective factors since their last session.     Recommended that patient call 911 or go to the local ED should there be a change in any of these risk factors.     Appearance:   Appropriate    Eye Contact:   Good    Psychomotor Behavior: Hyperactive  Restless    Attitude:   Cooperative   Attentive   Orientation:   All   Speech    Rate / Production: Normal/ Responsive    Volume:  Normal    Mood:    Anxious  Normal   Affect:    Appropriate    Thought Content:  Clear    Thought Form:  Coherent    Insight:    Good      Medication Review:   No changes to current psychiatric medication(s)     Medication Compliance:   Yes     Changes in Health Issues:   Yes: Pain, Associated Psychological Distress     Chemical Use Review:   Substance Use: Chemical use reviewed, no active concerns identified      Tobacco Use: No current tobacco use.      Diagnosis:  1. JOSHUA (Generalized Anxiety Disorder)  2. Mild episode of recurrent major depressive disorder (H)      Collateral Reports Completed:   Not Applicable    PLAN: (Patient Tasks / Therapist Tasks / Other):  1.Pt is to continue checking one thing off of the to-do list each day.   2. Pt is to continue practicing self care and when gets frustrated with others, bring the focus back to herself.   3. Pt is to continue setting and attending appointments with support professionals in her life as dictated by her circumstances.  4. Pt is to call behavioral access and schedule her appointment with her old therapist that is returning.           Coby Valdez, Psychotherapist Trainee, [facility]   5/31/23    This note has been reviewed and I agree with the plan of care. This note is co-signed by Maryuri Rosa MA, [facility] Supervisor, on: 5/31/2023                                                       ______________________________________________________________________    Individual Treatment Plan    Patient's Name: Angela Jones  YOB: 1995    Date of Creation: 1/19/2022  Date Treatment Plan Last Reviewed/Revised: 4/19/23    DSM5 Diagnoses: Autism Spectrum Disorder 299.00(F84.0)  Associated with another neurodevelopmental, mental or behavioral disorder and Attention-Deficit/Hyperactivity Disorder  314.01 (F90.9) Unspecified Attention -Deficit / Hyperactivity  Disorder, 296.31 (F33.0) Major Depressive Disorder, Recurrent Episode, Mild _ or 300.02 (F41.1) Generalized Anxiety Disorder  Psychosocial / Contextual Factors: 27 year old  female, , no children   PROMIS (reviewed every 90 days):   PROMIS 10-Global Health (only subscores and total score):   PROMIS-10 Scores Only 12/30/2021 1/19/2022 2/15/2022 3/9/2022   Global Mental Health Score 11 11 9 12   Global Physical Health Score 11 11 11 11   PROMIS TOTAL - SUBSCORES 22 22 20 23       Referral / Collaboration:  Was/were discussed and patient will pursue.    Anticipated number of session for this episode of care: 4-6  Anticipation frequency of session: every 2-3 weeks  Anticipated Duration of each session: 38-52 minutes  Treatment plan will be reviewed in 90 days or when goals have been changed.       MeasurableTreatment Goal(s) related to diagnosis / functional impairment(s)  Goal 1: Patient will manage symptoms of anxiety     I will know I've met my goal when I feel more confident in my ability to cope with stress with fewer meltdowns.      Objective #A (Patient Action)    Patient will identify the initial signs or symptoms of anxiety.  Status: Continued - Date(s): 4/19/23    Intervention(s)  Therapist will teach help patient gain insight into signs of stress (physically and emotionally).    Objective #B  Patient will use relaxation strategies multiple times per day to reduce the physical symptoms of anxiety.  Status: Continued - Date(s): 4/19/23    Intervention(s)  Therapist will teach diaphragmatic breathing and other grounding skills.    Objective #C  Patient will use thought-stopping strategy daily to reduce intrusive thoughts.  Status: Continued - Date(s): 4/19/23    Intervention(s)  Therapist will teach thought stopping techniques.      Goal 2: Patient will manage symptoms of depression    I will know I've met my goal when I feel more confident in my ability to express my emotions in a healthy way.       Objective #A     Status: Continued - Date(s): 4/19/23    Patient will Increase interest, engagement, and pleasure in doing things.    Intervention(s)  Therapist will teach emotional recognition/identification. *.    Objective #B  Patient will Identify negative self-talk and behaviors: challenge core beliefs, myths, and actions.    Status: Continued - Date(s): 4/19/23    Intervention(s)  Therapist will help patient practice positive self-talk and thought re-framing.      Goal 3: Patient will improve communication patterns in the relationship    I will know I've met my goal when we continue to attend couples counseling and see improvements.      Objective #A (Patient Action)    Status: Continued - Date(s): 4/19/23    Patient will continue participating in couples therapy with Tayla.     Intervention(s)  Therapist will monitor referral process and follow-up on their relationship problems and improvements      Patient has reviewed and agreed to the above plan.      Coby Valdez  April 19, 2023

## 2023-06-01 NOTE — TELEPHONE ENCOUNTER
"Routing refill request to provider for review/approval because:  Patient needs to be seen because it has been more than 1 year since last office visit.  Break in medication    Last Written Prescription Date:  2/22/22  Last Fill Quantity: 90,  # refills: 3   Last office visit provider:  2/18/22     Requested Prescriptions   Pending Prescriptions Disp Refills     esomeprazole (NEXIUM) 20 MG DR capsule [Pharmacy Med Name: ESOMEPRAZOLE MAG DR 20 MG CAP] 90 capsule 3     Sig: TAKE 1 CAPSULE BY MOUTH EVERY MORNING BEFORE BREAKFAST       PPI Protocol Failed - 5/31/2023 12:20 AM        Failed - Recent (12 mo) or future (30 days) visit within the authorizing provider's specialty     Patient has had an office visit with the authorizing provider or a provider within the authorizing providers department within the previous 12 mos or has a future within next 30 days. See \"Patient Info\" tab in inbasket, or \"Choose Columns\" in Meds & Orders section of the refill encounter.              Passed - Not on Clopidogrel (unless Pantoprazole ordered)        Passed - No diagnosis of osteoporosis on record        Passed - Medication is active on med list        Passed - Patient is age 18 or older        Passed - No active pregnacy on record        Passed - No positive pregnancy test in past 12 months             Susanne Pool RN 06/01/23 11:16 AM  "

## 2023-06-03 ENCOUNTER — HEALTH MAINTENANCE LETTER (OUTPATIENT)
Age: 28
End: 2023-06-03

## 2023-06-03 NOTE — TELEPHONE ENCOUNTER
FLUDROCORTISONE 0.1 MG TABLET      Last Written Prescription Date:  5-17-22  Last Fill Quantity: 180,   # refills: 3  Last Office Visit : 1-4-23  Future Office visit:  6-6-23    Routing refill request to provider for review/approval because:  Med not on cards protocol

## 2023-06-07 RX ORDER — FLUDROCORTISONE ACETATE 0.1 MG/1
0.2 TABLET ORAL DAILY
Qty: 180 TABLET | Refills: 1 | Status: SHIPPED | OUTPATIENT
Start: 2023-06-07

## 2023-06-14 ENCOUNTER — TELEPHONE (OUTPATIENT)
Dept: CARDIOLOGY | Facility: CLINIC | Age: 28
End: 2023-06-14

## 2023-07-06 DIAGNOSIS — G43.809 OTHER MIGRAINE, NOT INTRACTABLE, WITHOUT STATUS MIGRAINOSUS: Primary | ICD-10-CM

## 2023-07-06 DIAGNOSIS — Z76.0 ENCOUNTER FOR MEDICATION REFILL: ICD-10-CM

## 2023-07-07 NOTE — TELEPHONE ENCOUNTER
Covering provider.  Orders placed as below.    Woody Vanessa MD  [facility]  7/7/2023  12:12 PM     was seen today for medication refill.    Diagnoses and all orders for this visit:    Other migraine, not intractable, without status migrainosus  -     amitriptyline (ELAVIL) 25 MG tablet; TAKE 2 TABLETS(50 MG) BY MOUTH AT BEDTIME. SEE YOUR DOCTOR FOR FURTHER REFILLS.    Encounter for medication refill  -     amitriptyline (ELAVIL) 25 MG tablet; TAKE 2 TABLETS(50 MG) BY MOUTH AT BEDTIME. SEE YOUR DOCTOR FOR FURTHER REFILLS.

## 2023-07-25 ENCOUNTER — VIRTUAL VISIT (OUTPATIENT)
Dept: BEHAVIORAL HEALTH | Facility: CLINIC | Age: 28
End: 2023-07-25
Payer: COMMERCIAL

## 2023-07-25 DIAGNOSIS — F90.8 ATTENTION DEFICIT HYPERACTIVITY DISORDER (ADHD), OTHER TYPE: ICD-10-CM

## 2023-07-25 DIAGNOSIS — F41.1 GAD (GENERALIZED ANXIETY DISORDER): ICD-10-CM

## 2023-07-25 DIAGNOSIS — F33.0 MILD EPISODE OF RECURRENT MAJOR DEPRESSIVE DISORDER (H): ICD-10-CM

## 2023-07-25 DIAGNOSIS — F84.0 AUTISM SPECTRUM DISORDER: Primary | ICD-10-CM

## 2023-07-25 PROCEDURE — 90837 PSYTX W PT 60 MINUTES: CPT | Mod: VID | Performed by: SOCIAL WORKER

## 2023-07-25 ASSESSMENT — ANXIETY QUESTIONNAIRES
4. TROUBLE RELAXING: MORE THAN HALF THE DAYS
GAD7 TOTAL SCORE: 10
2. NOT BEING ABLE TO STOP OR CONTROL WORRYING: SEVERAL DAYS
6. BECOMING EASILY ANNOYED OR IRRITABLE: NEARLY EVERY DAY
1. FEELING NERVOUS, ANXIOUS, OR ON EDGE: MORE THAN HALF THE DAYS
GAD7 TOTAL SCORE: 10
IF YOU CHECKED OFF ANY PROBLEMS ON THIS QUESTIONNAIRE, HOW DIFFICULT HAVE THESE PROBLEMS MADE IT FOR YOU TO DO YOUR WORK, TAKE CARE OF THINGS AT HOME, OR GET ALONG WITH OTHER PEOPLE: SOMEWHAT DIFFICULT
5. BEING SO RESTLESS THAT IT IS HARD TO SIT STILL: NOT AT ALL
7. FEELING AFRAID AS IF SOMETHING AWFUL MIGHT HAPPEN: NOT AT ALL
3. WORRYING TOO MUCH ABOUT DIFFERENT THINGS: MORE THAN HALF THE DAYS

## 2023-07-25 NOTE — PROGRESS NOTES
[facility]                                     Progress Note    Patient Name: Angela Jones  Date: 7/25/2023         Service Type: Individual      Session Start Time: 12:00pm  Session End Time: 12:54pm     Session Length: 54 minutes    Session #: 19    Attendees: Client attended alone    Service Modality:  Video Visit:      Provider verified identity through the following two step process.  Patient provided:  Patient is known previously to provider    Telemedicine Visit: The patient's condition can be safely assessed and treated via synchronous audio and visual telemedicine encounter.      Reason for Telemedicine Visit: Patient has requested telehealth visit    Originating Site (Patient Location): Patient's home    Distant Site (Provider Location): Provider Remote Setting- Home Office    Consent:  The patient/guardian has verbally consented to: the potential risks and benefits of telemedicine (video visit) versus in person care; bill my insurance or make self-payment for services provided; and responsibility for payment of non-covered services.     Patient would like the video invitation sent by:  My Chart    Mode of Communication:  Video Conference via Amwell      Distant Location (Provider):  Off-site    As the provider I attest to compliance with applicable laws and regulations related to telemedicine.    DATA  Extended Session (53+ minutes): PROLONGED SERVICE IN THE OUTPATIENT SETTING REQUIRING DIRECT (FACE-TO-FACE) PATIENT CONTACT BEYOND THE USUAL SERVICE:    - Treatment protocol required additional time to complete, due to the nature of diagnosis being treated.  See Interventions section for details  Interactive Complexity: No  Crisis: No        Progress Since Last Session (Related to Symptoms / Goals / Homework):   Symptoms: No change :  anxiety and depression    Homework: Partially completed      Episode of Care Goals: Minimal progress - PREPARATION (Decided to change - considering  how); Intervened by negotiating a change plan and determining options / strategies for behavior change, identifying triggers, exploring social supports, and working towards setting a date to begin behavior change     Current / Ongoing Stressors and Concerns:  Patient shares that she and her  are getting . She shares about precipitating events. They are still living in the same house together and she is not sure what she will do next. 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        Treatment Objective(s) Addressed in This Session:   identify the fears / thoughts that contribute to feeling anxious  state understanding of stressors and relationship to physical symptoms  Increase interest, engagement, and pleasure in doing things  Decrease frequency and intensity of feeling down, depressed, hopeless  Identify negative self-talk and behaviors: challenge core beliefs, myths, and actions  Gain insight     Intervention:   Discussed mindfulness as being aware of what we are experiencing while we are experiencing it.  Contrasted this with avoidance and rumination.  Explored the role of mindfulness as an overall coping strategy for managing depression, anxiety, and strong emotions.  Explained concept of state of mind using SIFT (sensations, images, feelings, thoughts) pneumonic.  Led client in a guided mindfulness exercise focusing on sensations, images, feelings, and thoughts.  Discussed mindfulness as a tool to intentionally shift our awareness and focus as needed.    Assessments completed prior to visit:   The following assessments were completed by patient for this visit:  PHQ9:       11/15/2022     9:26 AM 11/23/2022    12:54 PM 12/21/2022    12:55 PM 1/11/2023    10:00 AM 2/21/2023     8:55 AM 4/4/2023     9:13 AM 7/25/2023    12:00 PM   PHQ-9 SCORE   PHQ-9 Total Score MyChart 12 (Moderate depression) 11 (Moderate depression) 7 (Mild depression) 12 (Moderate depression) 12  (Moderate depression) 10 (Moderate depression) 9 (Mild depression)   PHQ-9 Total Score 12 11 7 12 12 10 9     GAD7:       5/4/2022     1:00 PM 6/29/2022    10:55 AM 9/27/2022     9:35 AM 10/12/2022     1:00 PM 11/15/2022     9:27 AM 1/11/2023    10:00 AM 7/25/2023    12:01 PM   JOSHUA-7 SCORE   Total Score 5 (mild anxiety) 6 (mild anxiety) 8 (mild anxiety) 8 (mild anxiety) 8 (mild anxiety) 10 (moderate anxiety) 10 (moderate anxiety)   Total Score 5 6 8    8 8 8 10 10     PROMIS 10-Global Health (only subscores and total score):       3/9/2022    11:02 AM 6/16/2022     9:59 AM 6/29/2022    10:56 AM 10/11/2022     9:32 AM 1/11/2023    10:01 AM 4/19/2023     8:53 AM 7/25/2023    12:02 PM   PROMIS-10 Scores Only   Global Mental Health Score 12 10 10 13    13 13 12 10   Global Physical Health Score 11 9 11 11    11 11 11 11   PROMIS TOTAL - SUBSCORES 23 19 21 24    24 24 23 21         ASSESSMENT: Current Emotional / Mental Status (status of significant symptoms):   Risk status (Self / Other harm or suicidal ideation)   Patient denies current fears or concerns for personal safety.   Patient denies current or recent suicidal ideation or behaviors.   Patient denies current or recent homicidal ideation or behaviors.   Patient denies current or recent self injurious behavior or ideation.   Patient denies other safety concerns.   Patient reports there has been no change in risk factors since their last session.     Patient reports there has been no change in protective factors since their last session.     Recommended that patient call 911 or go to the local ED should there be a change in any of these risk factors.     Appearance:   Appropriate    Eye Contact:   Good    Psychomotor Behavior: Hyperactive  Restless    Attitude:   Cooperative    Orientation:   All   Speech    Rate / Production: Pressured  Stutters    Volume:  Normal    Mood:    Anxious  Anhedonia   Affect:    Constricted  Flat    Thought Content:  Clear    Thought  Form:  Coherent    Insight:    Good      Medication Review:   No changes to current psychiatric medication(s)     Medication Compliance:   Yes     Changes in Health Issues:   None reported     Chemical Use Review:   Substance Use: Chemical use reviewed, no active concerns identified      Tobacco Use: No current tobacco use.      Diagnosis:  1. Autism spectrum disorder    2. JOSHUA (generalized anxiety disorder)    3. Mild episode of recurrent major depressive disorder (H)    4. Attention deficit hyperactivity disorder (ADHD), other type        Collateral Reports Completed:   Not Applicable    PLAN: (Patient Tasks / Therapist Tasks / Other)  Patient will return for a virtual visit in 1 month  Patient will continue to utilize stress management techniques  Discussion around starting to use daily planner again      There has been demonstrated improvement in functioning while patient has been engaged in psychotherapy/psychological service- if withdrawn the patient would deteriorate and/or relapse.       Fanny Cobb, [facility]                                                         ______________________________________________________________________    Individual Treatment Plan    Patient's Name: Angela Jones  YOB: 1995    Date of Creation: 1/19/2022  Date Treatment Plan Last Reviewed/Revised: 7/25/2023    DSM5 Diagnoses: Autism Spectrum Disorder 299.00(F84.0)  Associated with another neurodevelopmental, mental or behavioral disorder and Attention-Deficit/Hyperactivity Disorder  314.01 (F90.9) Unspecified Attention -Deficit / Hyperactivity Disorder, 296.31 (F33.0) Major Depressive Disorder, Recurrent Episode, Mild _ or 300.02 (F41.1) Generalized Anxiety Disorder  Psychosocial / Contextual Factors: 27 year old  female, , no children   PROMIS (reviewed every 90 days):   PROMIS 10-Global Health (only subscores and total score):   PROMIS-10 Scores Only 12/30/2021 1/19/2022 2/15/2022 3/9/2022    Global Mental Health Score 11 11 9 12   Global Physical Health Score 11 11 11 11   PROMIS TOTAL - SUBSCORES 22 22 20 23       Referral / Collaboration:  Was/were discussed and patient will pursue.    Anticipated number of session for this episode of care: 4-6  Anticipation frequency of session:  every 2-3 weeks  Anticipated Duration of each session: 38-52 minutes  Treatment plan will be reviewed in 90 days or when goals have been changed.       MeasurableTreatment Goal(s) related to diagnosis / functional impairment(s)  Goal 1: Patient will manage symptoms of anxiety     I will know I've met my goal when I feel more confident in my ability to cope with stress with fewer meltdowns.      Objective #A (Patient Action)    Patient will identify the initial signs or symptoms of anxiety.  Status: Continued - Date(s): 7/25/2023    Intervention(s)  Therapist will teach  help patient gain insight into signs of stress (physically and emotionally) .    Objective #B  Patient will use relaxation strategies multiple times per day to reduce the physical symptoms of anxiety.  Status: Continued - Date(s): 7/25/2023    Intervention(s)  Therapist will  teach diaphragmatic breathing and other grounding skills .    Objective #C  Patient will use thought-stopping strategy daily to reduce intrusive thoughts.  Status: Continued - Date(s): 7/25/2023    Intervention(s)  Therapist will  teach thought stopping techniques .      Goal 2: Patient will manage symptoms of depression    I will know I've met my goal when I feel more confident in my ability to express my emotions in a healthy way.      Objective #A (Patient Action)    Status: Continued - Date(s): 7/25/2023    Patient will Increase interest, engagement, and pleasure in doing things.    Intervention(s)  Therapist will teach emotional recognition/identification. * .    Objective #B  Patient will Identify negative self-talk and behaviors: challenge core beliefs, myths, and  actions.    Status: Continued - Date(s): 7/25/2023    Intervention(s)  Therapist will  help patient practice positive self-talk and thought re-framing .        Patient has reviewed and agreed to the above plan.      Fanny Cobb, [facility]  July 25, 2023

## 2023-08-22 ENCOUNTER — VIRTUAL VISIT (OUTPATIENT)
Dept: BEHAVIORAL HEALTH | Facility: CLINIC | Age: 28
End: 2023-08-22
Payer: COMMERCIAL

## 2023-08-22 DIAGNOSIS — F33.0 MILD EPISODE OF RECURRENT MAJOR DEPRESSIVE DISORDER (H): ICD-10-CM

## 2023-08-22 DIAGNOSIS — F41.1 GAD (GENERALIZED ANXIETY DISORDER): ICD-10-CM

## 2023-08-22 DIAGNOSIS — F90.8 ATTENTION DEFICIT HYPERACTIVITY DISORDER (ADHD), OTHER TYPE: ICD-10-CM

## 2023-08-22 DIAGNOSIS — F84.0 AUTISM SPECTRUM DISORDER: Primary | ICD-10-CM

## 2023-08-22 PROCEDURE — 90837 PSYTX W PT 60 MINUTES: CPT | Mod: VID | Performed by: SOCIAL WORKER

## 2023-08-22 NOTE — PROGRESS NOTES
[facility]                                     Progress Note    Patient Name: Angela Jones  Date: 8/22/2023         Service Type: Individual      Session Start Time: 12:00pm  Session End Time: 12:53pm     Session Length: 53 minutes    Session #: 20    Attendees: Client attended alone    Service Modality:  Video Visit:      Provider verified identity through the following two step process.  Patient provided:  Patient is known previously to provider    Telemedicine Visit: The patient's condition can be safely assessed and treated via synchronous audio and visual telemedicine encounter.      Reason for Telemedicine Visit: Patient has requested telehealth visit    Originating Site (Patient Location): Patient's home    Distant Site (Provider Location): Provider Remote Setting- Home Office    Consent:  The patient/guardian has verbally consented to: the potential risks and benefits of telemedicine (video visit) versus in person care; bill my insurance or make self-payment for services provided; and responsibility for payment of non-covered services.     Patient would like the video invitation sent by:  My Chart    Mode of Communication:  Video Conference via Amwell      Distant Location (Provider):  Off-site    As the provider I attest to compliance with applicable laws and regulations related to telemedicine.    DATA  Extended Session (53+ minutes): PROLONGED SERVICE IN THE OUTPATIENT SETTING REQUIRING DIRECT (FACE-TO-FACE) PATIENT CONTACT BEYOND THE USUAL SERVICE:    - Treatment protocol required additional time to complete, due to the nature of diagnosis being treated.  See Interventions section for details  Interactive Complexity: No  Crisis: No        Progress Since Last Session (Related to Symptoms / Goals / Homework):   Symptoms: No change :  anxiety and depression    Homework: Partially completed      Episode of Care Goals: Minimal progress - PREPARATION (Decided to change - considering  how); Intervened by negotiating a change plan and determining options / strategies for behavior change, identifying triggers, exploring social supports, and working towards setting a date to begin behavior change     Current / Ongoing Stressors and Concerns:  Patient reports being accepted for an apartment in [place] and discusses challenges in packing and moving. She is going to call for help with packing and moving. She can move as early as September 1. She has never lived on her own before. She is working on obtaining a  for the divorce. Her soon-to-be ex- will plan to drive her to work.        Treatment Objective(s) Addressed in This Session:   identify the fears / thoughts that contribute to feeling anxious  state understanding of stressors and relationship to physical symptoms  Increase interest, engagement, and pleasure in doing things  Decrease frequency and intensity of feeling down, depressed, hopeless  Identify negative self-talk and behaviors: challenge core beliefs, myths, and actions  Gain insight     Intervention:   Discussed mindfulness as being aware of what we are experiencing while we are experiencing it.  Contrasted this with avoidance and rumination.  Explored the role of mindfulness as an overall coping strategy for managing depression, anxiety, and strong emotions.  Explained concept of state of mind using SIFT (sensations, images, feelings, thoughts) pneumonic.  Led client in a guided mindfulness exercise focusing on sensations, images, feelings, and thoughts.  Discussed mindfulness as a tool to intentionally shift our awareness and focus as needed.    Assessments completed prior to visit:   The following assessments were completed by patient for this visit:  PHQ9:       11/15/2022     9:26 AM 11/23/2022    12:54 PM 12/21/2022    12:55 PM 1/11/2023    10:00 AM 2/21/2023     8:55 AM 4/4/2023     9:13 AM 7/25/2023    12:00 PM   PHQ-9 SCORE   PHQ-9 Total Score MyChart 12  (Moderate depression) 11 (Moderate depression) 7 (Mild depression) 12 (Moderate depression) 12 (Moderate depression) 10 (Moderate depression) 9 (Mild depression)   PHQ-9 Total Score 12 11 7 12 12 10 9     GAD7:       5/4/2022     1:00 PM 6/29/2022    10:55 AM 9/27/2022     9:35 AM 10/12/2022     1:00 PM 11/15/2022     9:27 AM 1/11/2023    10:00 AM 7/25/2023    12:01 PM   JOSHUA-7 SCORE   Total Score 5 (mild anxiety) 6 (mild anxiety) 8 (mild anxiety) 8 (mild anxiety) 8 (mild anxiety) 10 (moderate anxiety) 10 (moderate anxiety)   Total Score 5 6 8    8 8 8 10 10     PROMIS 10-Global Health (only subscores and total score):       3/9/2022    11:02 AM 6/16/2022     9:59 AM 6/29/2022    10:56 AM 10/11/2022     9:32 AM 1/11/2023    10:01 AM 4/19/2023     8:53 AM 7/25/2023    12:02 PM   PROMIS-10 Scores Only   Global Mental Health Score 12 10 10 13    13 13 12 10   Global Physical Health Score 11 9 11 11    11 11 11 11   PROMIS TOTAL - SUBSCORES 23 19 21 24    24 24 23 21         ASSESSMENT: Current Emotional / Mental Status (status of significant symptoms):   Risk status (Self / Other harm or suicidal ideation)   Patient denies current fears or concerns for personal safety.   Patient denies current or recent suicidal ideation or behaviors.   Patient denies current or recent homicidal ideation or behaviors.   Patient denies current or recent self injurious behavior or ideation.   Patient denies other safety concerns.   Patient reports there has been no change in risk factors since their last session.     Patient reports there has been no change in protective factors since their last session.     Recommended that patient call 911 or go to the local ED should there be a change in any of these risk factors.     Appearance:   Appropriate    Eye Contact:   Good    Psychomotor Behavior: Hyperactive  Restless    Attitude:   Cooperative    Orientation:   All   Speech    Rate / Production: Pressured  Stutters    Volume:  Normal     Mood:    Anxious  Anhedonia   Affect:    Constricted  Flat    Thought Content:  Clear    Thought Form:  Coherent    Insight:    Good      Medication Review:   No changes to current psychiatric medication(s)     Medication Compliance:   Yes     Changes in Health Issues:   None reported     Chemical Use Review:   Substance Use: Chemical use reviewed, no active concerns identified      Tobacco Use: No current tobacco use.      Diagnosis:  1. Autism spectrum disorder    2. JOSHUA (generalized anxiety disorder)    3. Mild episode of recurrent major depressive disorder (H)    4. Attention deficit hyperactivity disorder (ADHD), other type        Collateral Reports Completed:   Not Applicable    PLAN: (Patient Tasks / Therapist Tasks / Other)  Patient will return for a virtual visit in 2 weeks  Patient will continue to utilize stress management techniques  Discussion around starting to use daily planner again      There has been demonstrated improvement in functioning while patient has been engaged in psychotherapy/psychological service- if withdrawn the patient would deteriorate and/or relapse.       Fanny Cobb, [facility]                                                         ______________________________________________________________________    Individual Treatment Plan    Patient's Name: Angela Jones  YOB: 1995    Date of Creation: 1/19/2022  Date Treatment Plan Last Reviewed/Revised: 7/25/2023    DSM5 Diagnoses: Autism Spectrum Disorder 299.00(F84.0)  Associated with another neurodevelopmental, mental or behavioral disorder and Attention-Deficit/Hyperactivity Disorder  314.01 (F90.9) Unspecified Attention -Deficit / Hyperactivity Disorder, 296.31 (F33.0) Major Depressive Disorder, Recurrent Episode, Mild _ or 300.02 (F41.1) Generalized Anxiety Disorder  Psychosocial / Contextual Factors: 27 year old  female, , no children   PROMIS (reviewed every 90 days):   PROMIS 10-Global  Health (only subscores and total score):   PROMIS-10 Scores Only 12/30/2021 1/19/2022 2/15/2022 3/9/2022   Global Mental Health Score 11 11 9 12   Global Physical Health Score 11 11 11 11   PROMIS TOTAL - SUBSCORES 22 22 20 23       Referral / Collaboration:  Was/were discussed and patient will pursue.    Anticipated number of session for this episode of care: 4-6  Anticipation frequency of session:  every 2-3 weeks  Anticipated Duration of each session: 38-52 minutes  Treatment plan will be reviewed in 90 days or when goals have been changed.       MeasurableTreatment Goal(s) related to diagnosis / functional impairment(s)  Goal 1: Patient will manage symptoms of anxiety     I will know I've met my goal when I feel more confident in my ability to cope with stress with fewer meltdowns.      Objective #A (Patient Action)    Patient will identify the initial signs or symptoms of anxiety.  Status: Continued - Date(s): 7/25/2023    Intervention(s)  Therapist will teach  help patient gain insight into signs of stress (physically and emotionally) .    Objective #B  Patient will use relaxation strategies multiple times per day to reduce the physical symptoms of anxiety.  Status: Continued - Date(s): 7/25/2023    Intervention(s)  Therapist will  teach diaphragmatic breathing and other grounding skills .    Objective #C  Patient will use thought-stopping strategy daily to reduce intrusive thoughts.  Status: Continued - Date(s): 7/25/2023    Intervention(s)  Therapist will  teach thought stopping techniques .      Goal 2: Patient will manage symptoms of depression    I will know I've met my goal when I feel more confident in my ability to express my emotions in a healthy way.      Objective #A (Patient Action)    Status: Continued - Date(s): 7/25/2023    Patient will Increase interest, engagement, and pleasure in doing things.    Intervention(s)  Therapist will teach emotional recognition/identification. * .    Objective  #B  Patient will Identify negative self-talk and behaviors: challenge core beliefs, myths, and actions.    Status: Continued - Date(s): 7/25/2023    Intervention(s)  Therapist will  help patient practice positive self-talk and thought re-framing .        Patient has reviewed and agreed to the above plan.      Fanny Cobb, [facility]  July 25, 2023

## 2023-08-24 ENCOUNTER — FCC EXTENDED DOCUMENTATION (OUTPATIENT)
Dept: PSYCHOLOGY | Facility: CLINIC | Age: 28
End: 2023-08-24
Payer: COMMERCIAL

## 2023-08-24 NOTE — PROGRESS NOTES
Discharge Summary  Multiple Sessions    Client Name: Angela Jones MRN#: [id number] YOB: 1995      Intake / Discharge Date: 8/10/2022 - 1/11/2023; discharged 8/24/2023      DSM5 Diagnoses: (Sustained by DSM5 Criteria Listed Above)  Diagnoses: Autism Spectrum Disorder 299.00(F84.0), without accompanying intellectual impairment  296.35 (F33.41)  Major Depressive Disorder, Recurrent Episode, In partial remission  300.02 (F41.1) Generalized Anxiety Disorder  Psychosocial & Contextual Factors: Difficulties in relationship related to each partner's mental and physical health; communication struggles; executive functioning and health concerns leading to problems completing weekly household chores  PROMIS-10 Scores        1/11/2023    10:01 AM 4/19/2023     8:53 AM 7/25/2023    12:02 PM   PROMIS-10 Total Score w/o Sub Scores   PROMIS TOTAL - SUBSCORES 24 23 21           Presenting Concern:  Relationship stress      Reason for Discharge:  Client and  did not return for couple's therapy      Disposition at Time of Last Encounter:   Comments:   Patient and  were uncertain whether couple's therapy was helpful to them and each had an individual therapist. Ongoing executive functioning deficits and anxiety for each person.     Risk Management:   Client has had a history of suicidal ideation: passive SI  Recommended that patient call 911 or go to the local ED should there be a change in any of these risk factors.      Referred To:  Individual therapist and PCP        Tayla Rodrigues   8/24/2023

## 2023-09-05 ENCOUNTER — VIRTUAL VISIT (OUTPATIENT)
Dept: BEHAVIORAL HEALTH | Facility: CLINIC | Age: 28
End: 2023-09-05
Payer: COMMERCIAL

## 2023-09-05 DIAGNOSIS — F90.8 ATTENTION DEFICIT HYPERACTIVITY DISORDER (ADHD), OTHER TYPE: ICD-10-CM

## 2023-09-05 DIAGNOSIS — F33.0 MILD EPISODE OF RECURRENT MAJOR DEPRESSIVE DISORDER (H): ICD-10-CM

## 2023-09-05 DIAGNOSIS — F41.1 GAD (GENERALIZED ANXIETY DISORDER): ICD-10-CM

## 2023-09-05 DIAGNOSIS — F84.0 AUTISM SPECTRUM DISORDER: Primary | ICD-10-CM

## 2023-09-05 PROCEDURE — 90837 PSYTX W PT 60 MINUTES: CPT | Mod: VID | Performed by: SOCIAL WORKER

## 2023-09-05 ASSESSMENT — ANXIETY QUESTIONNAIRES
5. BEING SO RESTLESS THAT IT IS HARD TO SIT STILL: NOT AT ALL
2. NOT BEING ABLE TO STOP OR CONTROL WORRYING: MORE THAN HALF THE DAYS
IF YOU CHECKED OFF ANY PROBLEMS ON THIS QUESTIONNAIRE, HOW DIFFICULT HAVE THESE PROBLEMS MADE IT FOR YOU TO DO YOUR WORK, TAKE CARE OF THINGS AT HOME, OR GET ALONG WITH OTHER PEOPLE: SOMEWHAT DIFFICULT
7. FEELING AFRAID AS IF SOMETHING AWFUL MIGHT HAPPEN: NOT AT ALL
6. BECOMING EASILY ANNOYED OR IRRITABLE: NEARLY EVERY DAY
4. TROUBLE RELAXING: SEVERAL DAYS
1. FEELING NERVOUS, ANXIOUS, OR ON EDGE: MORE THAN HALF THE DAYS
3. WORRYING TOO MUCH ABOUT DIFFERENT THINGS: SEVERAL DAYS
GAD7 TOTAL SCORE: 9
GAD7 TOTAL SCORE: 9

## 2023-09-05 NOTE — PROGRESS NOTES
[facility]                                     Progress Note    Patient Name: Angela Jones  Date: 9/5/2023         Service Type: Individual      Session Start Time: 12:00pm  Session End Time: 12:53pm     Session Length: 53 minutes    Session #: 21    Attendees: Client attended alone    Service Modality:  Video Visit:      Provider verified identity through the following two step process.  Patient provided:  Patient is known previously to provider    Telemedicine Visit: The patient's condition can be safely assessed and treated via synchronous audio and visual telemedicine encounter.      Reason for Telemedicine Visit: Patient has requested telehealth visit    Originating Site (Patient Location): Patient's home    Distant Site (Provider Location): Provider Remote Setting- Home Office    Consent:  The patient/guardian has verbally consented to: the potential risks and benefits of telemedicine (video visit) versus in person care; bill my insurance or make self-payment for services provided; and responsibility for payment of non-covered services.     Patient would like the video invitation sent by:  My Chart    Mode of Communication:  Video Conference via Amwell      Distant Location (Provider):  Off-site    As the provider I attest to compliance with applicable laws and regulations related to telemedicine.    DATA  Extended Session (53+ minutes): PROLONGED SERVICE IN THE OUTPATIENT SETTING REQUIRING DIRECT (FACE-TO-FACE) PATIENT CONTACT BEYOND THE USUAL SERVICE:    - Treatment protocol required additional time to complete, due to the nature of diagnosis being treated.  See Interventions section for details  Interactive Complexity: No  Crisis: No        Progress Since Last Session (Related to Symptoms / Goals / Homework):   Symptoms: No change :  anxiety and depression    Homework: Partially completed      Episode of Care Goals: Minimal progress - PREPARATION (Decided to change - considering  "how); Intervened by negotiating a change plan and determining options / strategies for behavior change, identifying triggers, exploring social supports, and working towards setting a date to begin behavior change     Current / Ongoing Stressors and Concerns:  Patient reports that she will be picking up her apartment keys today. She has been busy packing and her ex- is not helping. She is under a lot of stress. She does report that she is \"doing okay.\" Patient reports that she is dating someone new.        Treatment Objective(s) Addressed in This Session:   identify the fears / thoughts that contribute to feeling anxious  state understanding of stressors and relationship to physical symptoms  Increase interest, engagement, and pleasure in doing things  Decrease frequency and intensity of feeling down, depressed, hopeless  Identify negative self-talk and behaviors: challenge core beliefs, myths, and actions  Gain insight     Intervention:   Discussed mindfulness as being aware of what we are experiencing while we are experiencing it.  Contrasted this with avoidance and rumination.  Explored the role of mindfulness as an overall coping strategy for managing depression, anxiety, and strong emotions.  Explained concept of state of mind using SIFT (sensations, images, feelings, thoughts) pneumonic.  Led client in a guided mindfulness exercise focusing on sensations, images, feelings, and thoughts.  Discussed mindfulness as a tool to intentionally shift our awareness and focus as needed.    Assessments completed prior to visit:   The following assessments were completed by patient for this visit:  PHQ9:       11/23/2022    12:54 PM 12/21/2022    12:55 PM 1/11/2023    10:00 AM 2/21/2023     8:55 AM 4/4/2023     9:13 AM 7/25/2023    12:00 PM 9/5/2023    11:44 AM   PHQ-9 SCORE   PHQ-9 Total Score Bashir 11 (Moderate depression) 7 (Mild depression) 12 (Moderate depression) 12 (Moderate depression) 10 (Moderate " depression) 9 (Mild depression) 12 (Moderate depression)   PHQ-9 Total Score 11 7 12 12 10 9 12     GAD7:       6/29/2022    10:55 AM 9/27/2022     9:35 AM 10/12/2022     1:00 PM 11/15/2022     9:27 AM 1/11/2023    10:00 AM 7/25/2023    12:01 PM 9/5/2023    11:44 AM   JOSHUA-7 SCORE   Total Score 6 (mild anxiety) 8 (mild anxiety) 8 (mild anxiety) 8 (mild anxiety) 10 (moderate anxiety) 10 (moderate anxiety) 9 (mild anxiety)   Total Score 6 8    8 8 8 10 10 9     PROMIS 10-Global Health (only subscores and total score):       3/9/2022    11:02 AM 6/16/2022     9:59 AM 6/29/2022    10:56 AM 10/11/2022     9:32 AM 1/11/2023    10:01 AM 4/19/2023     8:53 AM 7/25/2023    12:02 PM   PROMIS-10 Scores Only   Global Mental Health Score 12 10 10 13    13 13 12 10   Global Physical Health Score 11 9 11 11    11 11 11 11   PROMIS TOTAL - SUBSCORES 23 19 21 24    24 24 23 21         ASSESSMENT: Current Emotional / Mental Status (status of significant symptoms):   Risk status (Self / Other harm or suicidal ideation)   Patient denies current fears or concerns for personal safety.   Patient denies current or recent suicidal ideation or behaviors.   Patient denies current or recent homicidal ideation or behaviors.   Patient denies current or recent self injurious behavior or ideation.   Patient denies other safety concerns.   Patient reports there has been no change in risk factors since their last session.     Patient reports there has been no change in protective factors since their last session.     Recommended that patient call 911 or go to the local ED should there be a change in any of these risk factors.     Appearance:   Appropriate    Eye Contact:   Good    Psychomotor Behavior: Hyperactive  Restless    Attitude:   Cooperative    Orientation:   All   Speech    Rate / Production: Pressured  Stutters    Volume:  Normal    Mood:    Anxious  Anhedonia   Affect:    Constricted  Flat    Thought Content:  Clear    Thought  Form:  Coherent    Insight:    Good      Medication Review:   No changes to current psychiatric medication(s)     Medication Compliance:   Yes     Changes in Health Issues:   None reported     Chemical Use Review:   Substance Use: Chemical use reviewed, no active concerns identified      Tobacco Use: No current tobacco use.      Diagnosis:  1. Autism spectrum disorder    2. JOSHUA (generalized anxiety disorder)    3. Mild episode of recurrent major depressive disorder (H)    4. Attention deficit hyperactivity disorder (ADHD), other type        Collateral Reports Completed:   Not Applicable    PLAN: (Patient Tasks / Therapist Tasks / Other)  Patient will return for a virtual visit in 2 weeks  Patient will continue to utilize stress management techniques  Discussion around starting to use daily planner again      There has been demonstrated improvement in functioning while patient has been engaged in psychotherapy/psychological service- if withdrawn the patient would deteriorate and/or relapse.       Fanny Cobb, [facility]                                                         ______________________________________________________________________    Individual Treatment Plan    Patient's Name: Angela Jones  YOB: 1995    Date of Creation: 1/19/2022  Date Treatment Plan Last Reviewed/Revised: 7/25/2023    DSM5 Diagnoses: Autism Spectrum Disorder 299.00(F84.0)  Associated with another neurodevelopmental, mental or behavioral disorder and Attention-Deficit/Hyperactivity Disorder  314.01 (F90.9) Unspecified Attention -Deficit / Hyperactivity Disorder, 296.31 (F33.0) Major Depressive Disorder, Recurrent Episode, Mild _ or 300.02 (F41.1) Generalized Anxiety Disorder  Psychosocial / Contextual Factors: 27 year old  female, , no children   PROMIS (reviewed every 90 days):   PROMIS 10-Global Health (only subscores and total score):   PROMIS-10 Scores Only 12/30/2021 1/19/2022 2/15/2022 3/9/2022    Global Mental Health Score 11 11 9 12   Global Physical Health Score 11 11 11 11   PROMIS TOTAL - SUBSCORES 22 22 20 23       Referral / Collaboration:  Was/were discussed and patient will pursue.    Anticipated number of session for this episode of care: 4-6  Anticipation frequency of session:  every 2-3 weeks  Anticipated Duration of each session: 38-52 minutes  Treatment plan will be reviewed in 90 days or when goals have been changed.       MeasurableTreatment Goal(s) related to diagnosis / functional impairment(s)  Goal 1: Patient will manage symptoms of anxiety     I will know I've met my goal when I feel more confident in my ability to cope with stress with fewer meltdowns.      Objective #A (Patient Action)    Patient will identify the initial signs or symptoms of anxiety.  Status: Continued - Date(s): 7/25/2023    Intervention(s)  Therapist will teach  help patient gain insight into signs of stress (physically and emotionally) .    Objective #B  Patient will use relaxation strategies multiple times per day to reduce the physical symptoms of anxiety.  Status: Continued - Date(s): 7/25/2023    Intervention(s)  Therapist will  teach diaphragmatic breathing and other grounding skills .    Objective #C  Patient will use thought-stopping strategy daily to reduce intrusive thoughts.  Status: Continued - Date(s): 7/25/2023    Intervention(s)  Therapist will  teach thought stopping techniques .      Goal 2: Patient will manage symptoms of depression    I will know I've met my goal when I feel more confident in my ability to express my emotions in a healthy way.      Objective #A (Patient Action)    Status: Continued - Date(s): 7/25/2023    Patient will Increase interest, engagement, and pleasure in doing things.    Intervention(s)  Therapist will teach emotional recognition/identification. * .    Objective #B  Patient will Identify negative self-talk and behaviors: challenge core beliefs, myths, and  actions.    Status: Continued - Date(s): 7/25/2023    Intervention(s)  Therapist will  help patient practice positive self-talk and thought re-framing .        Patient has reviewed and agreed to the above plan.      Fanny Cobb, [facility]  July 25, 2023

## 2023-09-20 ENCOUNTER — VIRTUAL VISIT (OUTPATIENT)
Dept: BEHAVIORAL HEALTH | Facility: CLINIC | Age: 28
End: 2023-09-20
Payer: COMMERCIAL

## 2023-09-20 DIAGNOSIS — F41.1 GAD (GENERALIZED ANXIETY DISORDER): ICD-10-CM

## 2023-09-20 DIAGNOSIS — F90.8 ATTENTION DEFICIT HYPERACTIVITY DISORDER (ADHD), OTHER TYPE: ICD-10-CM

## 2023-09-20 DIAGNOSIS — F33.0 MILD EPISODE OF RECURRENT MAJOR DEPRESSIVE DISORDER (H): ICD-10-CM

## 2023-09-20 DIAGNOSIS — F84.0 AUTISM SPECTRUM DISORDER: Primary | ICD-10-CM

## 2023-09-20 PROCEDURE — 90837 PSYTX W PT 60 MINUTES: CPT | Mod: VID | Performed by: SOCIAL WORKER

## 2023-09-20 ASSESSMENT — PATIENT HEALTH QUESTIONNAIRE - PHQ9
SUM OF ALL RESPONSES TO PHQ QUESTIONS 1-9: 14
SUM OF ALL RESPONSES TO PHQ QUESTIONS 1-9: 14
10. IF YOU CHECKED OFF ANY PROBLEMS, HOW DIFFICULT HAVE THESE PROBLEMS MADE IT FOR YOU TO DO YOUR WORK, TAKE CARE OF THINGS AT HOME, OR GET ALONG WITH OTHER PEOPLE: VERY DIFFICULT

## 2023-09-20 NOTE — PROGRESS NOTES
[facility]                                     Progress Note    Patient Name: Angela Jones  Date: 9/20/2023         Service Type: Individual      Session Start Time: 12:00pm  Session End Time: 12:53pm     Session Length: 53 minutes    Session #: 22    Attendees: Client attended alone    Service Modality:  Video Visit:      Provider verified identity through the following two step process.  Patient provided:  Patient is known previously to provider    Telemedicine Visit: The patient's condition can be safely assessed and treated via synchronous audio and visual telemedicine encounter.      Reason for Telemedicine Visit: Patient has requested telehealth visit    Originating Site (Patient Location): Patient's home    Distant Site (Provider Location): Provider Remote Setting- Home Office    Consent:  The patient/guardian has verbally consented to: the potential risks and benefits of telemedicine (video visit) versus in person care; bill my insurance or make self-payment for services provided; and responsibility for payment of non-covered services.     Patient would like the video invitation sent by:  My Chart    Mode of Communication:  Video Conference via Amwell      Distant Location (Provider):  Off-site    As the provider I attest to compliance with applicable laws and regulations related to telemedicine.    DATA  Extended Session (53+ minutes): PROLONGED SERVICE IN THE OUTPATIENT SETTING REQUIRING DIRECT (FACE-TO-FACE) PATIENT CONTACT BEYOND THE USUAL SERVICE:    - Treatment protocol required additional time to complete, due to the nature of diagnosis being treated.  See Interventions section for details  Interactive Complexity: No  Crisis: No        Progress Since Last Session (Related to Symptoms / Goals / Homework):   Symptoms: No change :  anxiety and depression    Homework: Partially completed      Episode of Care Goals: Minimal progress - PREPARATION (Decided to change - considering  how); Intervened by negotiating a change plan and determining options / strategies for behavior change, identifying triggers, exploring social supports, and working towards setting a date to begin behavior change     Current / Ongoing Stressors and Concerns:  Patient reports that she did move into her new apartment and it was quite stressful. She had a recent fibromyalgia flare-up and is experiencing a lot of pain. She is back to a more strenuous work schedule. She explains about her relationship status (polyamorous). Patient reports that she does feel lonely, as this is the first time she is living alone.        Treatment Objective(s) Addressed in This Session:   identify the fears / thoughts that contribute to feeling anxious  state understanding of stressors and relationship to physical symptoms  Increase interest, engagement, and pleasure in doing things  Decrease frequency and intensity of feeling down, depressed, hopeless  Identify negative self-talk and behaviors: challenge core beliefs, myths, and actions  Gain insight     Intervention:   Discussed mindfulness as being aware of what we are experiencing while we are experiencing it.  Contrasted this with avoidance and rumination.  Explored the role of mindfulness as an overall coping strategy for managing depression, anxiety, and strong emotions.  Explained concept of state of mind using SIFT (sensations, images, feelings, thoughts) pneumonic.  Led client in a guided mindfulness exercise focusing on sensations, images, feelings, and thoughts.  Discussed mindfulness as a tool to intentionally shift our awareness and focus as needed.    Assessments completed prior to visit:   The following assessments were completed by patient for this visit:  PHQ9:       12/21/2022    12:55 PM 1/11/2023    10:00 AM 2/21/2023     8:55 AM 4/4/2023     9:13 AM 7/25/2023    12:00 PM 9/5/2023    11:44 AM 9/20/2023    11:59 AM   PHQ-9 SCORE   PHQ-9 Total Score MyChart 7 (Mild  depression) 12 (Moderate depression) 12 (Moderate depression) 10 (Moderate depression) 9 (Mild depression) 12 (Moderate depression) 14 (Moderate depression)   PHQ-9 Total Score 7 12 12 10 9 12 14     GAD7:       6/29/2022    10:55 AM 9/27/2022     9:35 AM 10/12/2022     1:00 PM 11/15/2022     9:27 AM 1/11/2023    10:00 AM 7/25/2023    12:01 PM 9/5/2023    11:44 AM   JOSHUA-7 SCORE   Total Score 6 (mild anxiety) 8 (mild anxiety) 8 (mild anxiety) 8 (mild anxiety) 10 (moderate anxiety) 10 (moderate anxiety) 9 (mild anxiety)   Total Score 6 8    8 8 8 10 10 9     PROMIS 10-Global Health (only subscores and total score):       3/9/2022    11:02 AM 6/16/2022     9:59 AM 6/29/2022    10:56 AM 10/11/2022     9:32 AM 1/11/2023    10:01 AM 4/19/2023     8:53 AM 7/25/2023    12:02 PM   PROMIS-10 Scores Only   Global Mental Health Score 12 10 10 13    13 13 12 10   Global Physical Health Score 11 9 11 11    11 11 11 11   PROMIS TOTAL - SUBSCORES 23 19 21 24    24 24 23 21         ASSESSMENT: Current Emotional / Mental Status (status of significant symptoms):   Risk status (Self / Other harm or suicidal ideation)   Patient denies current fears or concerns for personal safety.   Patient denies current or recent suicidal ideation or behaviors.   Patient denies current or recent homicidal ideation or behaviors.   Patient denies current or recent self injurious behavior or ideation.   Patient denies other safety concerns.   Patient reports there has been no change in risk factors since their last session.     Patient reports there has been no change in protective factors since their last session.     Recommended that patient call 911 or go to the local ED should there be a change in any of these risk factors.     Appearance:   Appropriate    Eye Contact:   Good    Psychomotor Behavior: Hyperactive  Restless    Attitude:   Cooperative    Orientation:   All   Speech    Rate / Production: Pressured  Stutters    Volume:  Normal     Mood:    Anxious  Anhedonia   Affect:    Constricted  Flat    Thought Content:  Clear    Thought Form:  Coherent    Insight:    Good      Medication Review:   No changes to current psychiatric medication(s)     Medication Compliance:   Yes     Changes in Health Issues:   None reported     Chemical Use Review:   Substance Use: Chemical use reviewed, no active concerns identified      Tobacco Use: No current tobacco use.      Diagnosis:  1. Autism spectrum disorder    2. JOSHUA (generalized anxiety disorder)    3. Mild episode of recurrent major depressive disorder (H)    4. Attention deficit hyperactivity disorder (ADHD), other type        Collateral Reports Completed:   Not Applicable    PLAN: (Patient Tasks / Therapist Tasks / Other)  Patient will return for a virtual visit in 2 weeks  Patient will continue to utilize stress management techniques  Discussion around starting to use daily planner again      There has been demonstrated improvement in functioning while patient has been engaged in psychotherapy/psychological service- if withdrawn the patient would deteriorate and/or relapse.       Fanny Cobb, [facility]                                                         ______________________________________________________________________    Individual Treatment Plan    Patient's Name: Angela Jones  YOB: 1995    Date of Creation: 1/19/2022  Date Treatment Plan Last Reviewed/Revised: 7/25/2023    DSM5 Diagnoses: Autism Spectrum Disorder 299.00(F84.0)  Associated with another neurodevelopmental, mental or behavioral disorder and Attention-Deficit/Hyperactivity Disorder  314.01 (F90.9) Unspecified Attention -Deficit / Hyperactivity Disorder, 296.31 (F33.0) Major Depressive Disorder, Recurrent Episode, Mild _ or 300.02 (F41.1) Generalized Anxiety Disorder  Psychosocial / Contextual Factors: 27 year old  female, , no children   PROMIS (reviewed every 90 days):   PROMIS 10-Global  Health (only subscores and total score):   PROMIS-10 Scores Only 12/30/2021 1/19/2022 2/15/2022 3/9/2022   Global Mental Health Score 11 11 9 12   Global Physical Health Score 11 11 11 11   PROMIS TOTAL - SUBSCORES 22 22 20 23       Referral / Collaboration:  Was/were discussed and patient will pursue.    Anticipated number of session for this episode of care: 4-6  Anticipation frequency of session:  every 2-3 weeks  Anticipated Duration of each session: 38-52 minutes  Treatment plan will be reviewed in 90 days or when goals have been changed.       MeasurableTreatment Goal(s) related to diagnosis / functional impairment(s)  Goal 1: Patient will manage symptoms of anxiety     I will know I've met my goal when I feel more confident in my ability to cope with stress with fewer meltdowns.      Objective #A (Patient Action)    Patient will identify the initial signs or symptoms of anxiety.  Status: Continued - Date(s): 7/25/2023    Intervention(s)  Therapist will teach  help patient gain insight into signs of stress (physically and emotionally) .    Objective #B  Patient will use relaxation strategies multiple times per day to reduce the physical symptoms of anxiety.  Status: Continued - Date(s): 7/25/2023    Intervention(s)  Therapist will  teach diaphragmatic breathing and other grounding skills .    Objective #C  Patient will use thought-stopping strategy daily to reduce intrusive thoughts.  Status: Continued - Date(s): 7/25/2023    Intervention(s)  Therapist will  teach thought stopping techniques .      Goal 2: Patient will manage symptoms of depression    I will know I've met my goal when I feel more confident in my ability to express my emotions in a healthy way.      Objective #A (Patient Action)    Status: Continued - Date(s): 7/25/2023    Patient will Increase interest, engagement, and pleasure in doing things.    Intervention(s)  Therapist will teach emotional recognition/identification. * .    Objective  #B  Patient will Identify negative self-talk and behaviors: challenge core beliefs, myths, and actions.    Status: Continued - Date(s): 7/25/2023    Intervention(s)  Therapist will  help patient practice positive self-talk and thought re-framing .        Patient has reviewed and agreed to the above plan.      Fanny Cobb, [facility]  July 25, 2023

## 2023-10-09 ASSESSMENT — ANXIETY QUESTIONNAIRES
7. FEELING AFRAID AS IF SOMETHING AWFUL MIGHT HAPPEN: SEVERAL DAYS
6. BECOMING EASILY ANNOYED OR IRRITABLE: MORE THAN HALF THE DAYS
IF YOU CHECKED OFF ANY PROBLEMS ON THIS QUESTIONNAIRE, HOW DIFFICULT HAVE THESE PROBLEMS MADE IT FOR YOU TO DO YOUR WORK, TAKE CARE OF THINGS AT HOME, OR GET ALONG WITH OTHER PEOPLE: VERY DIFFICULT
4. TROUBLE RELAXING: MORE THAN HALF THE DAYS
5. BEING SO RESTLESS THAT IT IS HARD TO SIT STILL: MORE THAN HALF THE DAYS
GAD7 TOTAL SCORE: 13
2. NOT BEING ABLE TO STOP OR CONTROL WORRYING: MORE THAN HALF THE DAYS
GAD7 TOTAL SCORE: 13
3. WORRYING TOO MUCH ABOUT DIFFERENT THINGS: MORE THAN HALF THE DAYS
1. FEELING NERVOUS, ANXIOUS, OR ON EDGE: MORE THAN HALF THE DAYS

## 2023-10-10 ENCOUNTER — VIRTUAL VISIT (OUTPATIENT)
Dept: BEHAVIORAL HEALTH | Facility: CLINIC | Age: 28
End: 2023-10-10
Payer: COMMERCIAL

## 2023-10-10 DIAGNOSIS — F84.0 AUTISM SPECTRUM DISORDER: Primary | ICD-10-CM

## 2023-10-10 DIAGNOSIS — F41.1 GAD (GENERALIZED ANXIETY DISORDER): ICD-10-CM

## 2023-10-10 DIAGNOSIS — F90.8 ATTENTION DEFICIT HYPERACTIVITY DISORDER (ADHD), OTHER TYPE: ICD-10-CM

## 2023-10-10 DIAGNOSIS — F33.0 MILD EPISODE OF RECURRENT MAJOR DEPRESSIVE DISORDER (H): ICD-10-CM

## 2023-10-10 PROCEDURE — 90837 PSYTX W PT 60 MINUTES: CPT | Mod: VID | Performed by: SOCIAL WORKER

## 2023-10-10 NOTE — PROGRESS NOTES
[facility]                                     Progress Note    Patient Name: Angela Jones  Date: 10/10/2023         Service Type: Individual      Session Start Time: 12:00pm  Session End Time: 12:53pm     Session Length: 53 minutes    Session #: 23    Attendees: Client attended alone    Service Modality:  Video Visit:      Provider verified identity through the following two step process.  Patient provided:  Patient is known previously to provider    Telemedicine Visit: The patient's condition can be safely assessed and treated via synchronous audio and visual telemedicine encounter.      Reason for Telemedicine Visit: Patient has requested telehealth visit    Originating Site (Patient Location): Patient's home    Distant Site (Provider Location): Provider Remote Setting- Home Office    Consent:  The patient/guardian has verbally consented to: the potential risks and benefits of telemedicine (video visit) versus in person care; bill my insurance or make self-payment for services provided; and responsibility for payment of non-covered services.     Patient would like the video invitation sent by:  My Chart    Mode of Communication:  Video Conference via Amwell      Distant Location (Provider):  Off-site    As the provider I attest to compliance with applicable laws and regulations related to telemedicine.    DATA  Extended Session (53+ minutes): PROLONGED SERVICE IN THE OUTPATIENT SETTING REQUIRING DIRECT (FACE-TO-FACE) PATIENT CONTACT BEYOND THE USUAL SERVICE:    - Treatment protocol required additional time to complete, due to the nature of diagnosis being treated.  See Interventions section for details  Interactive Complexity: No  Crisis: No        Progress Since Last Session (Related to Symptoms / Goals / Homework):   Symptoms: No change :  anxiety and depression    Homework: Partially completed      Episode of Care Goals: Minimal progress - PREPARATION (Decided to change - considering  how); Intervened by negotiating a change plan and determining options / strategies for behavior change, identifying triggers, exploring social supports, and working towards setting a date to begin behavior change     Current / Ongoing Stressors and Concerns:  Patient reports that it has been a rough week. She processes through an email from her soon-to-be ex-. She is attempting to manage and maintain healthy boundaries. She worries about losing her job and is not sure what to do about this.      Treatment Objective(s) Addressed in This Session:   identify the fears / thoughts that contribute to feeling anxious  state understanding of stressors and relationship to physical symptoms  Increase interest, engagement, and pleasure in doing things  Decrease frequency and intensity of feeling down, depressed, hopeless  Identify negative self-talk and behaviors: challenge core beliefs, myths, and actions  Gain insight     Intervention:   Discussed mindfulness as being aware of what we are experiencing while we are experiencing it.  Contrasted this with avoidance and rumination.  Explored the role of mindfulness as an overall coping strategy for managing depression, anxiety, and strong emotions.  Explained concept of state of mind using SIFT (sensations, images, feelings, thoughts) pneumonic.  Led client in a guided mindfulness exercise focusing on sensations, images, feelings, and thoughts.  Discussed mindfulness as a tool to intentionally shift our awareness and focus as needed.    Assessments completed prior to visit:   The following assessments were completed by patient for this visit:  PHQ9:       1/11/2023    10:00 AM 2/21/2023     8:55 AM 4/4/2023     9:13 AM 7/25/2023    12:00 PM 9/5/2023    11:44 AM 9/20/2023    11:59 AM 10/9/2023     6:37 PM   PHQ-9 SCORE   PHQ-9 Total Score MyChart 12 (Moderate depression) 12 (Moderate depression) 10 (Moderate depression) 9 (Mild depression) 12 (Moderate depression) 14  (Moderate depression) 11 (Moderate depression)   PHQ-9 Total Score 12 12 10 9 12 14 11     GAD7:       9/27/2022     9:35 AM 10/12/2022     1:00 PM 11/15/2022     9:27 AM 1/11/2023    10:00 AM 7/25/2023    12:01 PM 9/5/2023    11:44 AM 10/9/2023     6:37 PM   JOSHUA-7 SCORE   Total Score 8 (mild anxiety) 8 (mild anxiety) 8 (mild anxiety) 10 (moderate anxiety) 10 (moderate anxiety) 9 (mild anxiety) 13 (moderate anxiety)   Total Score 8    8 8 8 10 10 9 13     PROMIS 10-Global Health (only subscores and total score):       3/9/2022    11:02 AM 6/16/2022     9:59 AM 6/29/2022    10:56 AM 10/11/2022     9:32 AM 1/11/2023    10:01 AM 4/19/2023     8:53 AM 7/25/2023    12:02 PM   PROMIS-10 Scores Only   Global Mental Health Score 12 10 10 13    13 13 12 10   Global Physical Health Score 11 9 11 11    11 11 11 11   PROMIS TOTAL - SUBSCORES 23 19 21 24    24 24 23 21         ASSESSMENT: Current Emotional / Mental Status (status of significant symptoms):   Risk status (Self / Other harm or suicidal ideation)   Patient denies current fears or concerns for personal safety.   Patient denies current or recent suicidal ideation or behaviors.   Patient denies current or recent homicidal ideation or behaviors.   Patient denies current or recent self injurious behavior or ideation.   Patient denies other safety concerns.   Patient reports there has been no change in risk factors since their last session.     Patient reports there has been no change in protective factors since their last session.     Recommended that patient call 911 or go to the local ED should there be a change in any of these risk factors.     Appearance:   Appropriate    Eye Contact:   Good    Psychomotor Behavior: Hyperactive  Restless    Attitude:   Cooperative    Orientation:   All   Speech    Rate / Production: Pressured  Stutters    Volume:  Normal    Mood:    Anxious  Anhedonia   Affect:    Constricted  Flat    Thought Content:  Clear    Thought  Form:  Coherent    Insight:    Good      Medication Review:   No changes to current psychiatric medication(s)     Medication Compliance:   Yes     Changes in Health Issues:   None reported     Chemical Use Review:   Substance Use: Chemical use reviewed, no active concerns identified      Tobacco Use: No current tobacco use.      Diagnosis:  1. Autism spectrum disorder    2. JOSHUA (generalized anxiety disorder)    3. Mild episode of recurrent major depressive disorder (H24)    4. Attention deficit hyperactivity disorder (ADHD), other type        Collateral Reports Completed:   Not Applicable    PLAN: (Patient Tasks / Therapist Tasks / Other)  Patient will return for a virtual visit in 2 weeks  Patient will continue to utilize stress management techniques  Discussion around starting to use daily planner again      There has been demonstrated improvement in functioning while patient has been engaged in psychotherapy/psychological service- if withdrawn the patient would deteriorate and/or relapse.       Fanny Cobb, [facility]                                                         ______________________________________________________________________    Individual Treatment Plan    Patient's Name: Angela Jones  YOB: 1995    Date of Creation: 1/19/2022  Date Treatment Plan Last Reviewed/Revised: 7/25/2023    DSM5 Diagnoses: Autism Spectrum Disorder 299.00(F84.0)  Associated with another neurodevelopmental, mental or behavioral disorder and Attention-Deficit/Hyperactivity Disorder  314.01 (F90.9) Unspecified Attention -Deficit / Hyperactivity Disorder, 296.31 (F33.0) Major Depressive Disorder, Recurrent Episode, Mild _ or 300.02 (F41.1) Generalized Anxiety Disorder  Psychosocial / Contextual Factors: 28 year old  female, , no children   PROMIS (reviewed every 90 days):   Completed on 7/25/2023      Referral / Collaboration:  Was/were discussed and patient will pursue.    Anticipated  number of session for this episode of care: 6-9  Anticipation frequency of session: Every other week  Anticipated Duration of each session: 53 or more minutes  Treatment plan will be reviewed in 90 days or when goals have been changed.       MeasurableTreatment Goal(s) related to diagnosis / functional impairment(s)  Goal 1: Patient will manage symptoms of anxiety     I will know I've met my goal when I feel more confident in my ability to cope with stress with fewer meltdowns.      Objective #A (Patient Action)    Patient will identify the initial signs or symptoms of anxiety.  Status: Continued - Date(s): 7/25/2023    Intervention(s)  Therapist will teach  help patient gain insight into signs of stress (physically and emotionally) .    Objective #B  Patient will use relaxation strategies multiple times per day to reduce the physical symptoms of anxiety.  Status: Continued - Date(s): 7/25/2023    Intervention(s)  Therapist will  teach diaphragmatic breathing and other grounding skills .    Objective #C  Patient will use thought-stopping strategy daily to reduce intrusive thoughts.  Status: Continued - Date(s): 7/25/2023    Intervention(s)  Therapist will  teach thought stopping techniques .      Goal 2: Patient will manage symptoms of depression    I will know I've met my goal when I feel more confident in my ability to express my emotions in a healthy way.      Objective #A (Patient Action)    Status: Continued - Date(s): 7/25/2023    Patient will Increase interest, engagement, and pleasure in doing things.    Intervention(s)  Therapist will teach emotional recognition/identification. * .    Objective #B  Patient will Identify negative self-talk and behaviors: challenge core beliefs, myths, and actions.    Status: Continued - Date(s): 7/25/2023    Intervention(s)  Therapist will  help patient practice positive self-talk and thought re-framing .        Patient has reviewed and agreed to the above plan.      Fanny  BONILLA Cobb, [facility]  July 25, 2023

## 2023-10-24 ENCOUNTER — VIRTUAL VISIT (OUTPATIENT)
Dept: BEHAVIORAL HEALTH | Facility: CLINIC | Age: 28
End: 2023-10-24
Payer: COMMERCIAL

## 2023-10-24 DIAGNOSIS — F84.0 AUTISM SPECTRUM DISORDER: Primary | ICD-10-CM

## 2023-10-24 DIAGNOSIS — F41.1 GAD (GENERALIZED ANXIETY DISORDER): ICD-10-CM

## 2023-10-24 DIAGNOSIS — F90.8 ATTENTION DEFICIT HYPERACTIVITY DISORDER (ADHD), OTHER TYPE: ICD-10-CM

## 2023-10-24 DIAGNOSIS — F33.0 MILD EPISODE OF RECURRENT MAJOR DEPRESSIVE DISORDER (H): ICD-10-CM

## 2023-10-24 PROCEDURE — 90837 PSYTX W PT 60 MINUTES: CPT | Mod: VID | Performed by: SOCIAL WORKER

## 2023-10-24 NOTE — PROGRESS NOTES
[facility]                                     Progress Note    Patient Name: Angela Jones  Date: 10/24/2023         Service Type: Individual      Session Start Time: 9:00am  Session End Time: 9:53am     Session Length: 53 minutes    Session #: 24    Attendees: Client attended alone    Service Modality:  Video Visit:      Provider verified identity through the following two step process.  Patient provided:  Patient is known previously to provider    Telemedicine Visit: The patient's condition can be safely assessed and treated via synchronous audio and visual telemedicine encounter.      Reason for Telemedicine Visit: Patient has requested telehealth visit    Originating Site (Patient Location): Patient's home    Distant Site (Provider Location): Provider Remote Setting- Home Office    Consent:  The patient/guardian has verbally consented to: the potential risks and benefits of telemedicine (video visit) versus in person care; bill my insurance or make self-payment for services provided; and responsibility for payment of non-covered services.     Patient would like the video invitation sent by:  My Chart    Mode of Communication:  Video Conference via Amwell      Distant Location (Provider):  Off-site    As the provider I attest to compliance with applicable laws and regulations related to telemedicine.    DATA  Extended Session (53+ minutes): PROLONGED SERVICE IN THE OUTPATIENT SETTING REQUIRING DIRECT (FACE-TO-FACE) PATIENT CONTACT BEYOND THE USUAL SERVICE:    - Treatment protocol required additional time to complete, due to the nature of diagnosis being treated.  See Interventions section for details  Interactive Complexity: No  Crisis: No        Progress Since Last Session (Related to Symptoms / Goals / Homework):   Symptoms: No change :  anxiety and depression    Homework: Partially completed      Episode of Care Goals: Minimal progress - PREPARATION (Decided to change - considering  how); Intervened by negotiating a change plan and determining options / strategies for behavior change, identifying triggers, exploring social supports, and working towards setting a date to begin behavior change     Current / Ongoing Stressors and Concerns:  Patient reports some concerns that her ex is not taking care of their dog. She hopes she might take full custody. She is maintaining strong boundaries with her ex. She shares about anxiety triggers. She reports that her mood has been okay. She reports feeling tired most days. Patient identifies anxiety symptoms. She is doing well living alone but does struggle with transportation limitations (no car).   She is having a consultation with a  tomorrow.      Treatment Objective(s) Addressed in This Session:   identify the fears / thoughts that contribute to feeling anxious  state understanding of stressors and relationship to physical symptoms  Increase interest, engagement, and pleasure in doing things  Decrease frequency and intensity of feeling down, depressed, hopeless  Identify negative self-talk and behaviors: challenge core beliefs, myths, and actions  Gain insight     Intervention:   Discussed mindfulness as being aware of what we are experiencing while we are experiencing it.  Contrasted this with avoidance and rumination.  Explored the role of mindfulness as an overall coping strategy for managing depression, anxiety, and strong emotions.  Explained concept of state of mind using SIFT (sensations, images, feelings, thoughts) pneumonic.  Led client in a guided mindfulness exercise focusing on sensations, images, feelings, and thoughts.  Discussed mindfulness as a tool to intentionally shift our awareness and focus as needed.    Assessments completed prior to visit:   The following assessments were completed by patient for this visit:  PHQ9:       1/11/2023    10:00 AM 2/21/2023     8:55 AM 4/4/2023     9:13 AM 7/25/2023    12:00 PM 9/5/2023     11:44 AM 9/20/2023    11:59 AM 10/9/2023     6:37 PM   PHQ-9 SCORE   PHQ-9 Total Score MyChart 12 (Moderate depression) 12 (Moderate depression) 10 (Moderate depression) 9 (Mild depression) 12 (Moderate depression) 14 (Moderate depression) 11 (Moderate depression)   PHQ-9 Total Score 12 12 10 9 12 14 11     GAD7:       9/27/2022     9:35 AM 10/12/2022     1:00 PM 11/15/2022     9:27 AM 1/11/2023    10:00 AM 7/25/2023    12:01 PM 9/5/2023    11:44 AM 10/9/2023     6:37 PM   JOSHUA-7 SCORE   Total Score 8 (mild anxiety) 8 (mild anxiety) 8 (mild anxiety) 10 (moderate anxiety) 10 (moderate anxiety) 9 (mild anxiety) 13 (moderate anxiety)   Total Score 8    8 8 8 10 10 9 13     PROMIS 10-Global Health (only subscores and total score):       6/16/2022     9:59 AM 6/29/2022    10:56 AM 10/11/2022     9:32 AM 1/11/2023    10:01 AM 4/19/2023     8:53 AM 7/25/2023    12:02 PM 10/24/2023     9:01 AM   PROMIS-10 Scores Only   Global Mental Health Score 10 10 13    13 13 12 10 11   Global Physical Health Score 9 11 11    11 11 11 11 12   PROMIS TOTAL - SUBSCORES 19 21 24    24 24 23 21 23         ASSESSMENT: Current Emotional / Mental Status (status of significant symptoms):   Risk status (Self / Other harm or suicidal ideation)   Patient denies current fears or concerns for personal safety.   Patient denies current or recent suicidal ideation or behaviors.   Patient denies current or recent homicidal ideation or behaviors.   Patient denies current or recent self injurious behavior or ideation.   Patient denies other safety concerns.   Patient reports there has been no change in risk factors since their last session.     Patient reports there has been no change in protective factors since their last session.     Recommended that patient call 911 or go to the local ED should there be a change in any of these risk factors.     Appearance:   Appropriate    Eye Contact:   Good    Psychomotor Behavior: Hyperactive  Restless     Attitude:   Cooperative    Orientation:   All   Speech    Rate / Production: Pressured  Stutters    Volume:  Normal    Mood:    Anxious  Anhedonia   Affect:    Constricted  Flat    Thought Content:  Clear    Thought Form:  Coherent    Insight:    Good      Medication Review:   No changes to current psychiatric medication(s)     Medication Compliance:   Yes     Changes in Health Issues:   None reported     Chemical Use Review:   Substance Use: Chemical use reviewed, no active concerns identified      Tobacco Use: No current tobacco use.      Diagnosis:  1. Autism spectrum disorder    2. JOSHUA (generalized anxiety disorder)    3. Mild episode of recurrent major depressive disorder (H24)    4. Attention deficit hyperactivity disorder (ADHD), other type        Collateral Reports Completed:   Not Applicable    PLAN: (Patient Tasks / Therapist Tasks / Other)  Patient will return for a virtual visit in 2 weeks  Patient will continue to utilize stress management techniques  Discussion around starting to use daily planner again      There has been demonstrated improvement in functioning while patient has been engaged in psychotherapy/psychological service- if withdrawn the patient would deteriorate and/or relapse.       Fanny Cobb, [facility]                                                         ______________________________________________________________________    Individual Treatment Plan    Patient's Name: Angela Jones  YOB: 1995    Date of Creation: 1/19/2022  Date Treatment Plan Last Reviewed/Revised: 10/24/2023    DSM5 Diagnoses: Autism Spectrum Disorder 299.00(F84.0)  Associated with another neurodevelopmental, mental or behavioral disorder and Attention-Deficit/Hyperactivity Disorder  314.01 (F90.9) Unspecified Attention -Deficit / Hyperactivity Disorder, 296.31 (F33.0) Major Depressive Disorder, Recurrent Episode, Mild _ or 300.02 (F41.1) Generalized Anxiety Disorder  Psychosocial /  Contextual Factors: 28 year old  female, , no children   PROMIS (reviewed every 90 days):   Completed on 10/24/2023      Referral / Collaboration:  Was/were discussed and patient will pursue.    Anticipated number of session for this episode of care: 6-9  Anticipation frequency of session: Every other week  Anticipated Duration of each session: 53 or more minutes  Treatment plan will be reviewed in 90 days or when goals have been changed.       MeasurableTreatment Goal(s) related to diagnosis / functional impairment(s)  Goal 1: Patient will manage symptoms of anxiety     I will know I've met my goal when I feel more confident in my ability to cope with stress with fewer meltdowns.      Objective #A (Patient Action)    Patient will identify the initial signs or symptoms of anxiety.  Status: Continued - Date(s): 10/24/2023    Intervention(s)  Therapist will teach  help patient gain insight into signs of stress (physically and emotionally) .    Objective #B  Patient will use relaxation strategies multiple times per day to reduce the physical symptoms of anxiety.  Status: Continued - Date(s): 10/24/2023    Intervention(s)  Therapist will  teach diaphragmatic breathing and other grounding skills .    Objective #C  Patient will use thought-stopping strategy daily to reduce intrusive thoughts.  Status: Continued - Date(s): 10/24/2023    Intervention(s)  Therapist will  teach thought stopping techniques .      Goal 2: Patient will manage symptoms of depression    I will know I've met my goal when I feel more confident in my ability to express my emotions in a healthy way.      Objective #A (Patient Action)    Status: Continued - Date(s): 10/24/2023    Patient will Increase interest, engagement, and pleasure in doing things.    Intervention(s)  Therapist will teach emotional recognition/identification. * .    Objective #B  Patient will Identify negative self-talk and behaviors: challenge core beliefs,  myths, and actions.    Status: Continued - Date(s): 10/24/2023    Intervention(s)  Therapist will  help patient practice positive self-talk and thought re-framing .        Patient has reviewed and agreed to the above plan.      Fanny Cobb, [facility]  October 24, 2023

## 2023-10-25 ENCOUNTER — OFFICE VISIT (OUTPATIENT)
Dept: FAMILY MEDICINE | Facility: CLINIC | Age: 28
End: 2023-10-25
Payer: COMMERCIAL

## 2023-10-25 VITALS
TEMPERATURE: 97.3 F | HEIGHT: 62 IN | WEIGHT: 128.04 LBS | OXYGEN SATURATION: 98 % | DIASTOLIC BLOOD PRESSURE: 79 MMHG | SYSTOLIC BLOOD PRESSURE: 116 MMHG | BODY MASS INDEX: 23.56 KG/M2 | RESPIRATION RATE: 16 BRPM | HEART RATE: 82 BPM

## 2023-10-25 DIAGNOSIS — F33.41 RECURRENT MAJOR DEPRESSIVE DISORDER, IN PARTIAL REMISSION (H): ICD-10-CM

## 2023-10-25 DIAGNOSIS — Z83.3 FAMILY HISTORY OF DIABETES MELLITUS: ICD-10-CM

## 2023-10-25 DIAGNOSIS — G90.A POTS (POSTURAL ORTHOSTATIC TACHYCARDIA SYNDROME): ICD-10-CM

## 2023-10-25 DIAGNOSIS — G43.809 OTHER MIGRAINE, NOT INTRACTABLE, WITHOUT STATUS MIGRAINOSUS: ICD-10-CM

## 2023-10-25 DIAGNOSIS — Z00.00 ROUTINE GENERAL MEDICAL EXAMINATION AT A HEALTH CARE FACILITY: Primary | ICD-10-CM

## 2023-10-25 DIAGNOSIS — Z11.3 SCREEN FOR STD (SEXUALLY TRANSMITTED DISEASE): ICD-10-CM

## 2023-10-25 DIAGNOSIS — K21.9 GASTROESOPHAGEAL REFLUX DISEASE WITHOUT ESOPHAGITIS: ICD-10-CM

## 2023-10-25 DIAGNOSIS — E55.9 VITAMIN D DEFICIENCY: ICD-10-CM

## 2023-10-25 DIAGNOSIS — F84.0 AUTISM SPECTRUM DISORDER: ICD-10-CM

## 2023-10-25 DIAGNOSIS — M79.7 FIBROMYALGIA: ICD-10-CM

## 2023-10-25 DIAGNOSIS — F41.1 GENERALIZED ANXIETY DISORDER: ICD-10-CM

## 2023-10-25 LAB — HBA1C MFR BLD: 5.2 % (ref 0–5.6)

## 2023-10-25 PROCEDURE — 83036 HEMOGLOBIN GLYCOSYLATED A1C: CPT | Performed by: FAMILY MEDICINE

## 2023-10-25 PROCEDURE — 87591 N.GONORRHOEAE DNA AMP PROB: CPT | Performed by: FAMILY MEDICINE

## 2023-10-25 PROCEDURE — 80061 LIPID PANEL: CPT | Performed by: FAMILY MEDICINE

## 2023-10-25 PROCEDURE — 87491 CHLMYD TRACH DNA AMP PROBE: CPT | Performed by: FAMILY MEDICINE

## 2023-10-25 PROCEDURE — 99214 OFFICE O/P EST MOD 30 MIN: CPT | Mod: 25 | Performed by: FAMILY MEDICINE

## 2023-10-25 PROCEDURE — 82306 VITAMIN D 25 HYDROXY: CPT | Performed by: FAMILY MEDICINE

## 2023-10-25 PROCEDURE — 36415 COLL VENOUS BLD VENIPUNCTURE: CPT | Performed by: FAMILY MEDICINE

## 2023-10-25 PROCEDURE — 99395 PREV VISIT EST AGE 18-39: CPT | Performed by: FAMILY MEDICINE

## 2023-10-25 RX ORDER — FAMOTIDINE 40 MG/1
40 TABLET, FILM COATED ORAL DAILY
Qty: 30 TABLET | Refills: 1 | Status: SHIPPED | OUTPATIENT
Start: 2023-10-25 | End: 2024-05-20

## 2023-10-25 ASSESSMENT — ENCOUNTER SYMPTOMS
HEMATOCHEZIA: 0
SORE THROAT: 0
CHILLS: 0
CONSTIPATION: 0
PARESTHESIAS: 0
ARTHRALGIAS: 1
HEADACHES: 1
NERVOUS/ANXIOUS: 1
FEVER: 0
FREQUENCY: 0
EYE PAIN: 0
ABDOMINAL PAIN: 0
COUGH: 0
PALPITATIONS: 0
SHORTNESS OF BREATH: 0
DYSURIA: 0
WEAKNESS: 0
DIARRHEA: 0
NAUSEA: 0
HEMATURIA: 0
DIZZINESS: 1
MYALGIAS: 1
JOINT SWELLING: 0
HEARTBURN: 0

## 2023-10-25 ASSESSMENT — ASTHMA QUESTIONNAIRES: ACT_TOTALSCORE: 22

## 2023-10-25 NOTE — COMMUNITY RESOURCES LIST (ENGLISH)
10/25/2023   [facility]  N/A  For questions about this resource list or additional care needs, please contact your primary care clinic or care manager.  Phone: [phone]   Email: N/A   Address: [address] [place] [address]   Hours: N/A        Transportation       Free or low-cost transportation  1  Small [facility] Distance: 0.98 miles      In-Person   [address] [place] [address]  Language: English, Citizen of the [place]  Hours: Mon 9:00 AM - 5:00 PM , Tue 9:30 AM - 7:00 PM , Wed 9:00 AM - 5:00 PM , Thu 9:30 AM - 7:00 PM , Fri 9:00 AM - 5:00 PM  Fees: Free   Phone: (836) 941-7270 Email: contactus@[employer] Website: http://www.[employer]     2  [facility] Distance: 3.69 miles      In-Person   [address] [place] [address]  Language: English  Hours: Mon - Fri 8:00 AM - 3:00 PM  Fees: Free   Phone: (734) 395-3813 Ext.14 Email: neighborhood@[facility] Website: http://[email or web address].AutoShag     Transportation to medical appointments  3  [facility] Non-Emergency Medical Transportation Distance: 3.96 miles      In-Person   [address] [place] [address]  Language: English  Hours: Mon - Fri 8:00 AM - 4:00 PM Appt. Only  Fees: Self Pay   Phone: (377) 166-4530 Website: http://www.[facility].org/Medical-Services/Emergency-medical-services/Non-emergency-transportation/     4  [employer] [facility] Distance: 5.71 miles      In-Person   [address] [address]  Language: English  Hours: Mon - Thu 6:00 AM - 6:00 PM , Fri 6:00 AM - 5:00 PM  Fees: Insurance, Self Pay   Phone: (819) 945-8173 Email: office@[employer] Website: https://www.[employer]/          Important Numbers & Websites       Emergency Services   911  City Services   311  Poison Control   (699) 903-4718  Suicide Prevention Lifeline   (388) 145-2288 (TALK)  Child Abuse Hotline   (396) 550-9015 (4-A-Child)  Sexual Assault Hotline   (953)  159-4197 (HOPE)  National Runaway Safeline   (632) 731-6484 (RUNAWAY)  All-Options Talkline   (456) 994-5440  Substance Abuse Referral   (157) 813-3925 (HELP)

## 2023-10-25 NOTE — PROGRESS NOTES
SUBJECTIVE:   CC:  is an 28 year old who presents for preventive health visit.       10/25/2023     2:35 PM   Additional Questions   Roomed by Danielle LEONARDO MA       Healthy Habits:     Getting at least 3 servings of Calcium per day:  Yes    Bi-annual eye exam:  Yes    Dental care twice a year:  Yes    Sleep apnea or symptoms of sleep apnea:  None    Diet:  Other    Frequency of exercise:  4-5 days/week    Duration of exercise:  15-30 minutes    Taking medications regularly:  No    Barriers to taking medications:  Problems remembering to take them    Medication side effects:  Not applicable    Additional concerns today:  No    Getting . She is feeling good about that, though still needs to find a .     Was doing pool therapy previously for her fibromyalgia. Not currently.     Doesn't have a psychiatrist right now but would like one in order to see if med regimencould be changed at all.  But her mental health is pretty good overall.  She continues to do therapy every other week.  She has a boyfriend who lives in [place] who she met 2 and half months ago.  She is working as a  and enjoys this though it has been stressful with being short staffed.    Parents are in [place].     2 pets- Francy, standard poodle. Jonel is the cat.     When she doesn't take nexium, feels really nauseous.     Social History     Tobacco Use    Smoking status: Never    Smokeless tobacco: Never   Substance Use Topics    Alcohol use: Yes     Alcohol/week: 1.0 standard drink of alcohol     Comment: Alcoholic Drinks/day: socially              10/25/2023     2:33 PM   Alcohol Use   Prescreen: >3 drinks/day or >7 drinks/week? No     Reviewed orders with patient.  Reviewed health maintenance and updated orders accordingly - Yes      Breast Cancer Screening:    FHS-7:       10/25/2023     2:34 PM   Breast CA Risk Assessment (FHS-7)   Did any of your first-degree relatives have breast or ovarian cancer? No   Did  any of your relatives have bilateral breast cancer? Yes   Did any man in your family have breast cancer? No   Did any woman in your family have breast and ovarian cancer? Yes   Did any woman in your family have breast cancer before age 50 y? No   Do you have 2 or more relatives with breast and/or ovarian cancer? No   Do you have 2 or more relatives with breast and/or bowel cancer? No       Patient under 40 years of age: Routine Mammogram Screening not recommended.   Pertinent mammograms are reviewed under the imaging tab.    History of abnormal Pap smear: NO - age 21-29 PAP every 3 years recommended      Latest Ref Rng & Units 11/1/2019     5:42 PM 10/4/2016    12:00 AM   PAP / HPV   PAP Negative for squamous intraepithelial lesion or malignancy. Negative for squamous intraepithelial lesion or malignancy  Electronically signed by Terri Bajwa CT (ASCP) on 11/5/2019 at 11:58 AM       PAP (Historical)   NIL      Reviewed and updated as needed this visit by clinical staff   Tobacco  Allergies  Meds              Reviewed and updated as needed this visit by Provider                     Review of Systems   Constitutional:  Negative for chills and fever.   HENT:  Positive for hearing loss. Negative for congestion, ear pain and sore throat.    Eyes:  Negative for pain and visual disturbance.   Respiratory:  Negative for cough and shortness of breath.    Cardiovascular:  Negative for chest pain, palpitations and peripheral edema.   Gastrointestinal:  Negative for abdominal pain, constipation, diarrhea, heartburn, hematochezia and nausea.   Genitourinary:  Negative for dysuria, frequency, genital sores, hematuria and urgency.   Musculoskeletal:  Positive for arthralgias and myalgias. Negative for joint swelling.   Skin:  Negative for rash.   Neurological:  Positive for dizziness and headaches. Negative for weakness and paresthesias.   Psychiatric/Behavioral:  Negative for mood changes. The patient is  "nervous/anxious.         OBJECTIVE:   /79   Pulse 82   Temp 97.3  F (36.3  C) (Temporal)   Resp 16   Ht 1.575 m (5' 2\")   Wt 58.1 kg (128 lb 0.6 oz)   SpO2 98%   BMI 23.42 kg/m    Physical Exam  GENERAL: healthy, alert and no distress  EYES: Eyes grossly normal to inspection, PERRL and conjunctivae and sclerae normal  HENT: ear canals and TM's normal, nose and mouth without ulcers or lesions  NECK: no adenopathy, no asymmetry, masses, or scars and thyroid normal to palpation  RESP: lungs clear to auscultation - no rales, rhonchi or wheezes  CV: regular rate and rhythm, normal S1 S2, no S3 or S4, no murmur, click or rub, no peripheral edema and peripheral pulses strong  ABDOMEN: soft, nontender, no hepatosplenomegaly, no masses and bowel sounds normal  MS: no gross musculoskeletal defects noted, no edema  SKIN: no suspicious lesions or rashes  NEURO: Normal strength and tone, mentation intact and speech normal  PSYCH: mentation appears normal, affect normal/bright    Diagnostic Test Results:  Labs reviewed in Epic    ASSESSMENT/PLAN:    was seen today for physical.    Diagnoses and all orders for this visit:    Routine general medical examination at a health care facility  -     Lipid Profile; Future  -     Lipid Profile    Autism spectrum disorder: has somewhat complex mental health with multiple diagnoses. Placed psychiatry referral to review medications and give recommendations.   -     Adult Mental Health  Referral; Future    Generalized anxiety disorder  -     Adult Mental Health  Referral; Future    Recurrent major depressive disorder, in partial remission (H24): recommended trying to incorporate more exercise into her routine  -     Adult Mental Health  Referral; Future    Screen for STD (sexually transmitted disease)  -     Chlamydia & Gonorrhea by PCR, [facility] - Clinic Collect    Fibromyalgia: doing ok right now, trying her best to stay active.     Family " history of diabetes mellitus: dad has type 2 diabetes, will check today  -     Hemoglobin A1c    Vitamin D deficiency: has been low in the past.   -     Vitamin D deficiency screening    Gastroesophageal reflux disease without esophagitis: We will try switching to Pepcid from Nexium.  Discussed how long-term PPIs can cause malabsorption of nutrients.  If she cannot tolerate this, may need GI referral.  -     famotidine (PEPCID) 40 MG tablet; Take 1 tablet (40 mg) by mouth daily    Other migraine, not intractable, without status migrainosus  -     amitriptyline (ELAVIL) 25 MG tablet; TAKE 2 TABLETS(50 MG) BY MOUTH AT BEDTIME. SEE YOUR DOCTOR FOR FURTHER REFILLS.    POTS (postural orthostatic tachycardia syndrome): taking fludrocortisone and PRN pindolol for racing heart. Symptoms much better controlled than in the past.     Other orders  -     REVIEW OF HEALTH MAINTENANCE PROTOCOL ORDERS  -     Cancel: INFLUENZA VACCINE IM > 6 MONTHS VALENT IIV4 (AFLURIA/FLUZONE)  -     Cancel: COVID-19 12+ (2023-24) (PFIZER)  -     PRIMARY CARE FOLLOW-UP SCHEDULING; Future          COUNSELING:  Reviewed preventive health counseling, as reflected in patient instructions       Regular exercise       Healthy diet/nutrition        She reports that she has never smoked. She has never used smokeless tobacco.        Marissa Walton MD  [facility]

## 2023-10-25 NOTE — COMMUNITY RESOURCES LIST (ENGLISH)
10/25/2023   [facility]  N/A  For questions about this resource list or additional care needs, please contact your primary care clinic or care manager.  Phone: [phone]   Email: N/A   Address: [address] [place] [address]   Hours: N/A        Transportation       Free or low-cost transportation  1  [facility] Distance: 0.98 miles      In-Person   [address] [place] [address]  Language: English, Finnish  Hours: Mon 9:00 AM - 5:00 PM , Tue 9:30 AM - 7:00 PM , Wed 9:00 AM - 5:00 PM , Thu 9:30 AM - 7:00 PM , Fri 9:00 AM - 5:00 PM  Fees: Free   Phone: (999) 533-8040 Email: contactus@[employer] Website: http://www.[employer]     2  [facility] Distance: 3.69 miles      In-Person   [address] [place] [address]  Language: English  Hours: Mon - Fri 8:00 AM - 3:00 PM  Fees: Free   Phone: (380) 850-5637 Ext.14 Email: neighborhood@[facility] Website: http://[email or web address].[facility].relocality     Transportation to medical appointments  3  [facility] - Non-Emergency Medical Transportation Distance: 3.96 miles      In-Person   [address] [place] [address]  Language: English  Hours: Mon - Fri 8:00 AM - 4:00 PM Appt. Only  Fees: Self Pay   Phone: (568) 819-9944 Website: http://www.[facility].org/Medical-Services/Emergency-medical-services/Non-emergency-transportation/     4  [employer] Ride Distance: 5.71 miles      In-Person   [address] [address]  Language: English  Hours: Mon - Thu 6:00 AM - 6:00 PM , Fri 6:00 AM - 5:00 PM  Fees: Insurance, Self Pay   Phone: (121) 390-4869 Email: office@[employer] Website: https://www.[employer]where/          Important Numbers & Websites       Emergency Services   911  City Services   311  Poison Control   (779) 858-6269  Suicide Prevention Lifeline   (221) 563-7726 (TALK)  Child Abuse Hotline   (169) 353-8851 (4-A-Child)  Sexual Assault Hotline   (360)  918-1085 (HOPE)  National Runaway Safeline   (246) 706-9129 (RUNAWAY)  All-Options Talkline   (474) 221-1535  Substance Abuse Referral   (543) 317-1593 (HELP)

## 2023-10-26 LAB
C TRACH DNA SPEC QL PROBE+SIG AMP: NEGATIVE
CHOLEST SERPL-MCNC: 202 MG/DL
HDLC SERPL-MCNC: 70 MG/DL
LDLC SERPL CALC-MCNC: 109 MG/DL
N GONORRHOEA DNA SPEC QL NAA+PROBE: NEGATIVE
NONHDLC SERPL-MCNC: 132 MG/DL
TRIGL SERPL-MCNC: 115 MG/DL
VIT D+METAB SERPL-MCNC: 29 NG/ML (ref 20–50)

## 2023-11-07 ASSESSMENT — PATIENT HEALTH QUESTIONNAIRE - PHQ9
SUM OF ALL RESPONSES TO PHQ QUESTIONS 1-9: 17
SUM OF ALL RESPONSES TO PHQ QUESTIONS 1-9: 17
10. IF YOU CHECKED OFF ANY PROBLEMS, HOW DIFFICULT HAVE THESE PROBLEMS MADE IT FOR YOU TO DO YOUR WORK, TAKE CARE OF THINGS AT HOME, OR GET ALONG WITH OTHER PEOPLE: VERY DIFFICULT

## 2023-11-08 ENCOUNTER — VIRTUAL VISIT (OUTPATIENT)
Dept: BEHAVIORAL HEALTH | Facility: CLINIC | Age: 28
End: 2023-11-08
Payer: COMMERCIAL

## 2023-11-08 DIAGNOSIS — F41.1 GAD (GENERALIZED ANXIETY DISORDER): ICD-10-CM

## 2023-11-08 DIAGNOSIS — F90.8 ATTENTION DEFICIT HYPERACTIVITY DISORDER (ADHD), OTHER TYPE: ICD-10-CM

## 2023-11-08 DIAGNOSIS — F84.0 AUTISM SPECTRUM DISORDER: Primary | ICD-10-CM

## 2023-11-08 DIAGNOSIS — F33.0 MILD EPISODE OF RECURRENT MAJOR DEPRESSIVE DISORDER (H): ICD-10-CM

## 2023-11-08 PROCEDURE — 90837 PSYTX W PT 60 MINUTES: CPT | Mod: VID | Performed by: SOCIAL WORKER

## 2023-11-08 NOTE — PROGRESS NOTES
[facility]                                     Progress Note    Patient Name: Angela Jones  Date: 11/8/2023         Service Type: Individual      Session Start Time: 10:00am  Session End Time: 10:53am     Session Length: 53 minutes    Session #: 26    Attendees: Client attended alone    Service Modality:  Video Visit:      Provider verified identity through the following two step process.  Patient provided:  Patient is known previously to provider    Telemedicine Visit: The patient's condition can be safely assessed and treated via synchronous audio and visual telemedicine encounter.      Reason for Telemedicine Visit: Patient has requested telehealth visit    Originating Site (Patient Location): Patient's home    Distant Site (Provider Location): Provider Remote Setting- Home Office    Consent:  The patient/guardian has verbally consented to: the potential risks and benefits of telemedicine (video visit) versus in person care; bill my insurance or make self-payment for services provided; and responsibility for payment of non-covered services.     Patient would like the video invitation sent by:  My Chart    Mode of Communication:  Video Conference via Amwell      Distant Location (Provider):  Off-site    As the provider I attest to compliance with applicable laws and regulations related to telemedicine.    DATA  Extended Session (53+ minutes): PROLONGED SERVICE IN THE OUTPATIENT SETTING REQUIRING DIRECT (FACE-TO-FACE) PATIENT CONTACT BEYOND THE USUAL SERVICE:    - Treatment protocol required additional time to complete, due to the nature of diagnosis being treated.  See Interventions section for details  Interactive Complexity: No  Crisis: No        Progress Since Last Session (Related to Symptoms / Goals / Homework):   Symptoms: No change :  anxiety and depression    Homework: Partially completed      Episode of Care Goals: Minimal progress - PREPARATION (Decided to change - considering  how); Intervened by negotiating a change plan and determining options / strategies for behavior change, identifying triggers, exploring social supports, and working towards setting a date to begin behavior change     Current / Ongoing Stressors and Concerns:  Patient shares that her grandpa is on hospice and patient is now up in [facility] to be with family. Patient processes through the loss of her cat (put down last week). Patient and therapist discuss the importance of self-care and meeting basic needs while going through an emotionally distressing time. She does have an interview set up for a different dog grooming location closer to home.      Treatment Objective(s) Addressed in This Session:   identify the fears / thoughts that contribute to feeling anxious  state understanding of stressors and relationship to physical symptoms  Increase interest, engagement, and pleasure in doing things  Decrease frequency and intensity of feeling down, depressed, hopeless  Identify negative self-talk and behaviors: challenge core beliefs, myths, and actions  Gain insight     Intervention:   Discussed mindfulness as being aware of what we are experiencing while we are experiencing it.  Contrasted this with avoidance and rumination.  Explored the role of mindfulness as an overall coping strategy for managing depression, anxiety, and strong emotions.  Explained concept of state of mind using SIFT (sensations, images, feelings, thoughts) pneumonic.  Led client in a guided mindfulness exercise focusing on sensations, images, feelings, and thoughts.  Discussed mindfulness as a tool to intentionally shift our awareness and focus as needed.    Assessments completed prior to visit:   The following assessments were completed by patient for this visit:  PHQ9:       2/21/2023     8:55 AM 4/4/2023     9:13 AM 7/25/2023    12:00 PM 9/5/2023    11:44 AM 9/20/2023    11:59 AM 10/9/2023     6:37 PM 11/7/2023    11:55 AM   PHQ-9 SCORE   PHQ-9  Total Score MyChart 12 (Moderate depression) 10 (Moderate depression) 9 (Mild depression) 12 (Moderate depression) 14 (Moderate depression) 11 (Moderate depression) 17 (Moderately severe depression)   PHQ-9 Total Score 12 10 9 12 14 11 17    17     GAD7:       9/27/2022     9:35 AM 10/12/2022     1:00 PM 11/15/2022     9:27 AM 1/11/2023    10:00 AM 7/25/2023    12:01 PM 9/5/2023    11:44 AM 10/9/2023     6:37 PM   JSOHUA-7 SCORE   Total Score 8 (mild anxiety) 8 (mild anxiety) 8 (mild anxiety) 10 (moderate anxiety) 10 (moderate anxiety) 9 (mild anxiety) 13 (moderate anxiety)   Total Score 8    8 8 8 10 10 9 13     PROMIS 10-Global Health (only subscores and total score):       6/29/2022    10:56 AM 10/11/2022     9:32 AM 1/11/2023    10:01 AM 4/19/2023     8:53 AM 7/25/2023    12:02 PM 10/24/2023     9:01 AM 11/7/2023    11:56 AM   PROMIS-10 Scores Only   Global Mental Health Score 10 13    13 13 12 10 11 10    10   Global Physical Health Score 11 11    11 11 11 11 12 12    12   PROMIS TOTAL - SUBSCORES 21 24    24 24 23 21 23 22    22         ASSESSMENT: Current Emotional / Mental Status (status of significant symptoms):   Risk status (Self / Other harm or suicidal ideation)   Patient denies current fears or concerns for personal safety.   Patient denies current or recent suicidal ideation or behaviors.   Patient denies current or recent homicidal ideation or behaviors.   Patient denies current or recent self injurious behavior or ideation.   Patient denies other safety concerns.   Patient reports there has been no change in risk factors since their last session.     Patient reports there has been no change in protective factors since their last session.     Recommended that patient call 911 or go to the local ED should there be a change in any of these risk factors.     Appearance:   Appropriate    Eye Contact:   Good    Psychomotor Behavior: Hyperactive  Restless    Attitude:   Cooperative     Orientation:   All   Speech    Rate / Production: Pressured  Stutters    Volume:  Normal    Mood:    Anxious  Anhedonia   Affect:    Constricted  Flat    Thought Content:  Clear    Thought Form:  Coherent    Insight:    Good      Medication Review:   No changes to current psychiatric medication(s)     Medication Compliance:   Yes     Changes in Health Issues:   None reported     Chemical Use Review:   Substance Use: Chemical use reviewed, no active concerns identified      Tobacco Use: No current tobacco use.      Diagnosis:  1. Autism spectrum disorder    2. JOSHUA (generalized anxiety disorder)    3. Mild episode of recurrent major depressive disorder (H24)    4. Attention deficit hyperactivity disorder (ADHD), other type        Collateral Reports Completed:   Not Applicable    PLAN: (Patient Tasks / Therapist Tasks / Other)  Patient will return for a virtual visit in 2 weeks  Patient will continue to utilize stress management techniques      There has been demonstrated improvement in functioning while patient has been engaged in psychotherapy/psychological service- if withdrawn the patient would deteriorate and/or relapse.       Fanny Cobb, [facility]                                                         ______________________________________________________________________    Individual Treatment Plan    Patient's Name: Angela Jones  YOB: 1995    Date of Creation: 1/19/2022  Date Treatment Plan Last Reviewed/Revised: 10/24/2023    DSM5 Diagnoses: Autism Spectrum Disorder 299.00(F84.0)  Associated with another neurodevelopmental, mental or behavioral disorder and Attention-Deficit/Hyperactivity Disorder  314.01 (F90.9) Unspecified Attention -Deficit / Hyperactivity Disorder, 296.31 (F33.0) Major Depressive Disorder, Recurrent Episode, Mild _ or 300.02 (F41.1) Generalized Anxiety Disorder  Psychosocial / Contextual Factors: 28 year old  female, , no children   PROMIS  (reviewed every 90 days):   Completed on 10/24/2023      Referral / Collaboration:  Was/were discussed and patient will pursue.    Anticipated number of session for this episode of care: 6-9  Anticipation frequency of session: Every other week  Anticipated Duration of each session: 53 or more minutes  Treatment plan will be reviewed in 90 days or when goals have been changed.       MeasurableTreatment Goal(s) related to diagnosis / functional impairment(s)  Goal 1: Patient will manage symptoms of anxiety     I will know I've met my goal when I feel more confident in my ability to cope with stress with fewer meltdowns.      Objective #A (Patient Action)    Patient will identify the initial signs or symptoms of anxiety.  Status: Continued - Date(s): 10/24/2023    Intervention(s)  Therapist will teach  help patient gain insight into signs of stress (physically and emotionally) .    Objective #B  Patient will use relaxation strategies multiple times per day to reduce the physical symptoms of anxiety.  Status: Continued - Date(s): 10/24/2023    Intervention(s)  Therapist will  teach diaphragmatic breathing and other grounding skills .    Objective #C  Patient will use thought-stopping strategy daily to reduce intrusive thoughts.  Status: Continued - Date(s): 10/24/2023    Intervention(s)  Therapist will  teach thought stopping techniques .      Goal 2: Patient will manage symptoms of depression    I will know I've met my goal when I feel more confident in my ability to express my emotions in a healthy way.      Objective #A (Patient Action)    Status: Continued - Date(s): 10/24/2023    Patient will Increase interest, engagement, and pleasure in doing things.    Intervention(s)  Therapist will teach emotional recognition/identification. * .    Objective #B  Patient will Identify negative self-talk and behaviors: challenge core beliefs, myths, and actions.    Status: Continued - Date(s):  10/24/2023    Intervention(s)  Therapist will  help patient practice positive self-talk and thought re-framing .        Patient has reviewed and agreed to the above plan.      Fanny Cobb, [facility]  October 24, 2023

## 2023-11-14 ENCOUNTER — MYC REFILL (OUTPATIENT)
Dept: FAMILY MEDICINE | Facility: CLINIC | Age: 28
End: 2023-11-14
Payer: COMMERCIAL

## 2023-11-14 DIAGNOSIS — F41.1 GENERALIZED ANXIETY DISORDER: Primary | ICD-10-CM

## 2023-11-15 ENCOUNTER — MYC MEDICAL ADVICE (OUTPATIENT)
Dept: FAMILY MEDICINE | Facility: CLINIC | Age: 28
End: 2023-11-15
Payer: COMMERCIAL

## 2023-11-15 RX ORDER — GUANFACINE 2 MG/1
2 TABLET ORAL EVERY 24 HOURS
Qty: 90 TABLET | Refills: 3 | Status: SHIPPED | OUTPATIENT
Start: 2023-11-15 | End: 2024-05-20

## 2023-11-15 NOTE — TELEPHONE ENCOUNTER
Please forward form(s) to CMT once received. [facility] will prepare for MD review, completion, and signature. Thank you.

## 2023-11-20 ENCOUNTER — MYC MEDICAL ADVICE (OUTPATIENT)
Dept: FAMILY MEDICINE | Facility: CLINIC | Age: 28
End: 2023-11-20
Payer: COMMERCIAL

## 2023-11-21 ENCOUNTER — VIRTUAL VISIT (OUTPATIENT)
Dept: BEHAVIORAL HEALTH | Facility: CLINIC | Age: 28
End: 2023-11-21
Payer: COMMERCIAL

## 2023-11-21 ENCOUNTER — MYC MEDICAL ADVICE (OUTPATIENT)
Dept: FAMILY MEDICINE | Facility: CLINIC | Age: 28
End: 2023-11-21
Payer: COMMERCIAL

## 2023-11-21 DIAGNOSIS — F33.0 MILD EPISODE OF RECURRENT MAJOR DEPRESSIVE DISORDER (H): ICD-10-CM

## 2023-11-21 DIAGNOSIS — F41.1 GAD (GENERALIZED ANXIETY DISORDER): ICD-10-CM

## 2023-11-21 DIAGNOSIS — F41.1 GENERALIZED ANXIETY DISORDER: Primary | ICD-10-CM

## 2023-11-21 DIAGNOSIS — F90.8 ATTENTION DEFICIT HYPERACTIVITY DISORDER (ADHD), OTHER TYPE: ICD-10-CM

## 2023-11-21 DIAGNOSIS — F84.0 AUTISM SPECTRUM DISORDER: Primary | ICD-10-CM

## 2023-11-21 PROCEDURE — 90837 PSYTX W PT 60 MINUTES: CPT | Mod: VID | Performed by: SOCIAL WORKER

## 2023-11-21 ASSESSMENT — ANXIETY QUESTIONNAIRES
7. FEELING AFRAID AS IF SOMETHING AWFUL MIGHT HAPPEN: SEVERAL DAYS
1. FEELING NERVOUS, ANXIOUS, OR ON EDGE: MORE THAN HALF THE DAYS
GAD7 TOTAL SCORE: 11
6. BECOMING EASILY ANNOYED OR IRRITABLE: SEVERAL DAYS
5. BEING SO RESTLESS THAT IT IS HARD TO SIT STILL: NOT AT ALL
IF YOU CHECKED OFF ANY PROBLEMS ON THIS QUESTIONNAIRE, HOW DIFFICULT HAVE THESE PROBLEMS MADE IT FOR YOU TO DO YOUR WORK, TAKE CARE OF THINGS AT HOME, OR GET ALONG WITH OTHER PEOPLE: EXTREMELY DIFFICULT
3. WORRYING TOO MUCH ABOUT DIFFERENT THINGS: MORE THAN HALF THE DAYS
2. NOT BEING ABLE TO STOP OR CONTROL WORRYING: MORE THAN HALF THE DAYS
GAD7 TOTAL SCORE: 11
4. TROUBLE RELAXING: NEARLY EVERY DAY

## 2023-11-21 ASSESSMENT — PATIENT HEALTH QUESTIONNAIRE - PHQ9
SUM OF ALL RESPONSES TO PHQ QUESTIONS 1-9: 12
10. IF YOU CHECKED OFF ANY PROBLEMS, HOW DIFFICULT HAVE THESE PROBLEMS MADE IT FOR YOU TO DO YOUR WORK, TAKE CARE OF THINGS AT HOME, OR GET ALONG WITH OTHER PEOPLE: SOMEWHAT DIFFICULT
SUM OF ALL RESPONSES TO PHQ QUESTIONS 1-9: 12

## 2023-11-21 NOTE — PROGRESS NOTES
[facility]                                     Progress Note    Patient Name: Angela Jones  Date: 11/21/2023         Service Type: Individual      Session Start Time: 9:00am  Session End Time: 9:53am     Session Length: 53 minutes    Session #: 27    Attendees: Client attended alone    Service Modality:  Video Visit:      Provider verified identity through the following two step process.  Patient provided:  Patient is known previously to provider    Telemedicine Visit: The patient's condition can be safely assessed and treated via synchronous audio and visual telemedicine encounter.      Reason for Telemedicine Visit: Patient has requested telehealth visit    Originating Site (Patient Location): Patient's home    Distant Site (Provider Location): Provider Remote Setting- Home Office    Consent:  The patient/guardian has verbally consented to: the potential risks and benefits of telemedicine (video visit) versus in person care; bill my insurance or make self-payment for services provided; and responsibility for payment of non-covered services.     Patient would like the video invitation sent by:  My Chart    Mode of Communication:  Video Conference via Amwell      Distant Location (Provider):  Off-site    As the provider I attest to compliance with applicable laws and regulations related to telemedicine.    DATA  Extended Session (53+ minutes): PROLONGED SERVICE IN THE OUTPATIENT SETTING REQUIRING DIRECT (FACE-TO-FACE) PATIENT CONTACT BEYOND THE USUAL SERVICE:    - Treatment protocol required additional time to complete, due to the nature of diagnosis being treated.  See Interventions section for details  Interactive Complexity: No  Crisis: No        Progress Since Last Session (Related to Symptoms / Goals / Homework):   Symptoms: No change :  anxiety and depression    Homework: Partially completed      Episode of Care Goals: Minimal progress - PREPARATION (Decided to change - considering  how); Intervened by negotiating a change plan and determining options / strategies for behavior change, identifying triggers, exploring social supports, and working towards setting a date to begin behavior change     Current / Ongoing Stressors and Concerns:  Patient provides a status update while therapist utilizes reflective listening skills. Patient processes the loss of her grandfather. She will be traveling back up to [facility] for the  this coming Monday. Patient processes through her break-up with  including boundary issues. Patient processes through a recent panic attack. Patient identifies concerns with medications and is attempting to contact her provider.      Treatment Objective(s) Addressed in This Session:   identify the fears / thoughts that contribute to feeling anxious  state understanding of stressors and relationship to physical symptoms  Increase interest, engagement, and pleasure in doing things  Decrease frequency and intensity of feeling down, depressed, hopeless  Identify negative self-talk and behaviors: challenge core beliefs, myths, and actions  Gain insight     Intervention:   Discussed mindfulness as being aware of what we are experiencing while we are experiencing it.  Contrasted this with avoidance and rumination.  Explored the role of mindfulness as an overall coping strategy for managing depression, anxiety, and strong emotions.  Explained concept of state of mind using SIFT (sensations, images, feelings, thoughts) pneumonic.  Led client in a guided mindfulness exercise focusing on sensations, images, feelings, and thoughts.  Discussed mindfulness as a tool to intentionally shift our awareness and focus as needed.    Assessments completed prior to visit:   The following assessments were completed by patient for this visit:  PHQ9:       2023     9:13 AM 2023    12:00 PM 2023    11:44 AM 2023    11:59 AM 10/9/2023     6:37 PM 2023    11:55 AM  11/21/2023     8:53 AM   PHQ-9 SCORE   PHQ-9 Total Score MyChart 10 (Moderate depression) 9 (Mild depression) 12 (Moderate depression) 14 (Moderate depression) 11 (Moderate depression) 17 (Moderately severe depression) 12 (Moderate depression)   PHQ-9 Total Score 10 9 12 14 11 17    17 12     GAD7:       10/12/2022     1:00 PM 11/15/2022     9:27 AM 1/11/2023    10:00 AM 7/25/2023    12:01 PM 9/5/2023    11:44 AM 10/9/2023     6:37 PM 11/21/2023     8:54 AM   JOSHUA-7 SCORE   Total Score 8 (mild anxiety) 8 (mild anxiety) 10 (moderate anxiety) 10 (moderate anxiety) 9 (mild anxiety) 13 (moderate anxiety) 11 (moderate anxiety)   Total Score 8 8 10 10 9 13 11     PROMIS 10-Global Health (only subscores and total score):       10/11/2022     9:32 AM 1/11/2023    10:01 AM 4/19/2023     8:53 AM 7/25/2023    12:02 PM 10/24/2023     9:01 AM 11/7/2023    11:56 AM 11/21/2023     8:55 AM   PROMIS-10 Scores Only   Global Mental Health Score 13    13 13 12 10 11 10    10 11   Global Physical Health Score 11    11 11 11 11 12 12    12 12   PROMIS TOTAL - SUBSCORES 24    24 24 23 21 23 22    22 23         ASSESSMENT: Current Emotional / Mental Status (status of significant symptoms):   Risk status (Self / Other harm or suicidal ideation)   Patient denies current fears or concerns for personal safety.   Patient denies current or recent suicidal ideation or behaviors.   Patient denies current or recent homicidal ideation or behaviors.   Patient denies current or recent self injurious behavior or ideation.   Patient denies other safety concerns.   Patient reports there has been no change in risk factors since their last session.     Patient reports there has been no change in protective factors since their last session.     Recommended that patient call 911 or go to the local ED should there be a change in any of these risk factors.     Appearance:   Appropriate    Eye Contact:   Good    Psychomotor Behavior: Hyperactive  Restless     Attitude:   Cooperative    Orientation:   All   Speech    Rate / Production: Pressured  Stutters    Volume:  Normal    Mood:    Anxious  Depressed  Anhedonia   Affect:    Constricted  Flat    Thought Content:  Clear    Thought Form:  Coherent    Insight:    Good      Medication Review:   No changes to current psychiatric medication(s)     Medication Compliance:   Yes     Changes in Health Issues:   None reported     Chemical Use Review:   Substance Use: Chemical use reviewed, no active concerns identified      Tobacco Use: No current tobacco use.      Diagnosis:  1. Autism spectrum disorder    2. JOSHUA (generalized anxiety disorder)    3. Mild episode of recurrent major depressive disorder (H24)    4. Attention deficit hyperactivity disorder (ADHD), other type        Collateral Reports Completed:   Not Applicable    PLAN: (Patient Tasks / Therapist Tasks / Other)  Patient will return for a virtual visit in 2 weeks  Patient will continue to utilize stress management techniques      There has been demonstrated improvement in functioning while patient has been engaged in psychotherapy/psychological service- if withdrawn the patient would deteriorate and/or relapse.       Fanny Cobb, [facility]                                                         ______________________________________________________________________    Individual Treatment Plan    Patient's Name: Angela Jones  YOB: 1995    Date of Creation: 1/19/2022  Date Treatment Plan Last Reviewed/Revised: 10/24/2023    DSM5 Diagnoses: Autism Spectrum Disorder 299.00(F84.0)  Associated with another neurodevelopmental, mental or behavioral disorder and Attention-Deficit/Hyperactivity Disorder  314.01 (F90.9) Unspecified Attention -Deficit / Hyperactivity Disorder, 296.31 (F33.0) Major Depressive Disorder, Recurrent Episode, Mild _ or 300.02 (F41.1) Generalized Anxiety Disorder  Psychosocial / Contextual Factors: 28 year old  female,  , no children   PROMIS (reviewed every 90 days):   Completed on 10/24/2023      Referral / Collaboration:  Was/were discussed and patient will pursue.    Anticipated number of session for this episode of care: 6-9  Anticipation frequency of session: Every other week  Anticipated Duration of each session: 53 or more minutes  Treatment plan will be reviewed in 90 days or when goals have been changed.       MeasurableTreatment Goal(s) related to diagnosis / functional impairment(s)  Goal 1: Patient will manage symptoms of anxiety     I will know I've met my goal when I feel more confident in my ability to cope with stress with fewer meltdowns.      Objective #A (Patient Action)    Patient will identify the initial signs or symptoms of anxiety.  Status: Continued - Date(s): 10/24/2023    Intervention(s)  Therapist will teach  help patient gain insight into signs of stress (physically and emotionally) .    Objective #B  Patient will use relaxation strategies multiple times per day to reduce the physical symptoms of anxiety.  Status: Continued - Date(s): 10/24/2023    Intervention(s)  Therapist will  teach diaphragmatic breathing and other grounding skills .    Objective #C  Patient will use thought-stopping strategy daily to reduce intrusive thoughts.  Status: Continued - Date(s): 10/24/2023    Intervention(s)  Therapist will  teach thought stopping techniques .      Goal 2: Patient will manage symptoms of depression    I will know I've met my goal when I feel more confident in my ability to express my emotions in a healthy way.      Objective #A (Patient Action)    Status: Continued - Date(s): 10/24/2023    Patient will Increase interest, engagement, and pleasure in doing things.    Intervention(s)  Therapist will teach emotional recognition/identification. * .    Objective #B  Patient will Identify negative self-talk and behaviors: challenge core beliefs, myths, and actions.    Status: Continued - Date(s):  10/24/2023    Intervention(s)  Therapist will  help patient practice positive self-talk and thought re-framing .        Patient has reviewed and agreed to the above plan.      Fanny Cbob, [facility]  October 24, 2023

## 2023-11-22 RX ORDER — GABAPENTIN 300 MG/1
900 CAPSULE ORAL 3 TIMES DAILY
Qty: 810 CAPSULE | Refills: 3 | Status: SHIPPED | OUTPATIENT
Start: 2023-11-22 | End: 2024-07-02

## 2023-11-22 NOTE — TELEPHONE ENCOUNTER
Form completed and returned to [facility] ([facility]) as requested by patient. Patient updated/notified.     Copied to EHR scanning.

## 2023-11-22 NOTE — TELEPHONE ENCOUNTER
Form completed and returned to [facility] ([facility]) as requested by patient. Patient updated/notified.     Copied to EHR scanning.

## 2023-12-07 ENCOUNTER — VIRTUAL VISIT (OUTPATIENT)
Dept: BEHAVIORAL HEALTH | Facility: CLINIC | Age: 28
End: 2023-12-07
Payer: COMMERCIAL

## 2023-12-07 DIAGNOSIS — F33.0 MILD EPISODE OF RECURRENT MAJOR DEPRESSIVE DISORDER (H): ICD-10-CM

## 2023-12-07 DIAGNOSIS — F84.0 AUTISM SPECTRUM DISORDER: Primary | ICD-10-CM

## 2023-12-07 DIAGNOSIS — F90.8 ATTENTION DEFICIT HYPERACTIVITY DISORDER (ADHD), OTHER TYPE: ICD-10-CM

## 2023-12-07 DIAGNOSIS — F41.1 GAD (GENERALIZED ANXIETY DISORDER): ICD-10-CM

## 2023-12-07 PROCEDURE — 90834 PSYTX W PT 45 MINUTES: CPT | Mod: VID | Performed by: SOCIAL WORKER

## 2023-12-07 ASSESSMENT — PATIENT HEALTH QUESTIONNAIRE - PHQ9
SUM OF ALL RESPONSES TO PHQ QUESTIONS 1-9: 9
10. IF YOU CHECKED OFF ANY PROBLEMS, HOW DIFFICULT HAVE THESE PROBLEMS MADE IT FOR YOU TO DO YOUR WORK, TAKE CARE OF THINGS AT HOME, OR GET ALONG WITH OTHER PEOPLE: SOMEWHAT DIFFICULT
SUM OF ALL RESPONSES TO PHQ QUESTIONS 1-9: 9

## 2023-12-07 NOTE — PROGRESS NOTES
[facility]                                     Progress Note    Patient Name: Angela Jones  Date: 12/7/2023         Service Type: Individual      Session Start Time: 11:00am  Session End Time: 11:50am     Session Length: 50 minutes    Session #: 28    Attendees: Client attended alone    Service Modality:  Video Visit:      Provider verified identity through the following two step process.  Patient provided:  Patient is known previously to provider    Telemedicine Visit: The patient's condition can be safely assessed and treated via synchronous audio and visual telemedicine encounter.      Reason for Telemedicine Visit: Patient has requested telehealth visit    Originating Site (Patient Location): Patient's home    Distant Site (Provider Location): Provider Remote Setting- Home Office    Consent:  The patient/guardian has verbally consented to: the potential risks and benefits of telemedicine (video visit) versus in person care; bill my insurance or make self-payment for services provided; and responsibility for payment of non-covered services.     Patient would like the video invitation sent by:  My Chart    Mode of Communication:  Video Conference via Amwell      Distant Location (Provider):  Off-site    As the provider I attest to compliance with applicable laws and regulations related to telemedicine.    DATA  Interactive Complexity: No  Crisis: No        Progress Since Last Session (Related to Symptoms / Goals / Homework):   Symptoms: No change :  anxiety and depression    Homework: Partially completed      Episode of Care Goals: Minimal progress - PREPARATION (Decided to change - considering how); Intervened by negotiating a change plan and determining options / strategies for behavior change, identifying triggers, exploring social supports, and working towards setting a date to begin behavior change     Current / Ongoing Stressors and Concerns:  Patient provides a status update while  therapist utilizes reflective listening skills. Patient notes feeling a bit better now that things have calmed down - no bad things have happened since her last visit. She reviews details of her grandfather's  and Thanksgiving plans.  Patient processes the break-up with her  and attempts to set boundaries. Patient did get a new cat.      Treatment Objective(s) Addressed in This Session:   identify the fears / thoughts that contribute to feeling anxious  state understanding of stressors and relationship to physical symptoms  Increase interest, engagement, and pleasure in doing things  Decrease frequency and intensity of feeling down, depressed, hopeless  Identify negative self-talk and behaviors: challenge core beliefs, myths, and actions  Gain insight     Intervention:   Discussed mindfulness as being aware of what we are experiencing while we are experiencing it.  Contrasted this with avoidance and rumination.  Explored the role of mindfulness as an overall coping strategy for managing depression, anxiety, and strong emotions.  Explained concept of state of mind using SIFT (sensations, images, feelings, thoughts) pneumonic.  Led client in a guided mindfulness exercise focusing on sensations, images, feelings, and thoughts.  Discussed mindfulness as a tool to intentionally shift our awareness and focus as needed.    Assessments completed prior to visit:   The following assessments were completed by patient for this visit:  PHQ9:       2023    12:00 PM 2023    11:44 AM 2023    11:59 AM 10/9/2023     6:37 PM 2023    11:55 AM 2023     8:53 AM 2023    11:01 AM   PHQ-9 SCORE   PHQ-9 Total Score MyChart 9 (Mild depression) 12 (Moderate depression) 14 (Moderate depression) 11 (Moderate depression) 17 (Moderately severe depression) 12 (Moderate depression) 9 (Mild depression)   PHQ-9 Total Score 9 12 14 11 17    17 12 9     GAD7:       10/12/2022     1:00 PM 11/15/2022     9:27 AM  1/11/2023    10:00 AM 7/25/2023    12:01 PM 9/5/2023    11:44 AM 10/9/2023     6:37 PM 11/21/2023     8:54 AM   JOSHUA-7 SCORE   Total Score 8 (mild anxiety) 8 (mild anxiety) 10 (moderate anxiety) 10 (moderate anxiety) 9 (mild anxiety) 13 (moderate anxiety) 11 (moderate anxiety)   Total Score 8 8 10 10 9 13 11     PROMIS 10-Global Health (only subscores and total score):       10/11/2022     9:32 AM 1/11/2023    10:01 AM 4/19/2023     8:53 AM 7/25/2023    12:02 PM 10/24/2023     9:01 AM 11/7/2023    11:56 AM 11/21/2023     8:55 AM   PROMIS-10 Scores Only   Global Mental Health Score 13    13 13 12 10 11 10    10 11   Global Physical Health Score 11    11 11 11 11 12 12    12 12   PROMIS TOTAL - SUBSCORES 24    24 24 23 21 23 22    22 23         ASSESSMENT: Current Emotional / Mental Status (status of significant symptoms):   Risk status (Self / Other harm or suicidal ideation)   Patient denies current fears or concerns for personal safety.   Patient denies current or recent suicidal ideation or behaviors.   Patient denies current or recent homicidal ideation or behaviors.   Patient denies current or recent self injurious behavior or ideation.   Patient denies other safety concerns.   Patient reports there has been no change in risk factors since their last session.     Patient reports there has been no change in protective factors since their last session.     Recommended that patient call 911 or go to the local ED should there be a change in any of these risk factors.     Appearance:   Appropriate    Eye Contact:   Good    Psychomotor Behavior: Hyperactive  Restless    Attitude:   Cooperative    Orientation:   All   Speech    Rate / Production: Pressured  Stutters    Volume:  Normal    Mood:    Anxious  Depressed  Anhedonia   Affect:    Constricted  Flat    Thought Content:  Clear    Thought Form:  Coherent    Insight:    Good      Medication Review:   No changes to current psychiatric medication(s)     Medication  Compliance:   Yes     Changes in Health Issues:   None reported     Chemical Use Review:   Substance Use: Chemical use reviewed, no active concerns identified      Tobacco Use: No current tobacco use.      Diagnosis:  1. Autism spectrum disorder    2. JOSHUA (generalized anxiety disorder)    3. Mild episode of recurrent major depressive disorder (H24)    4. Attention deficit hyperactivity disorder (ADHD), other type        Collateral Reports Completed:   Not Applicable    PLAN: (Patient Tasks / Therapist Tasks / Other)  Patient will return for a virtual visit in 2 weeks  Patient will continue to utilize stress management techniques      There has been demonstrated improvement in functioning while patient has been engaged in psychotherapy/psychological service- if withdrawn the patient would deteriorate and/or relapse.       Fanny Cobb, [facility]                                                         ______________________________________________________________________    Individual Treatment Plan    Patient's Name: Angela Jones  YOB: 1995    Date of Creation: 1/19/2022  Date Treatment Plan Last Reviewed/Revised: 10/24/2023    DSM5 Diagnoses: Autism Spectrum Disorder 299.00(F84.0)  Associated with another neurodevelopmental, mental or behavioral disorder and Attention-Deficit/Hyperactivity Disorder  314.01 (F90.9) Unspecified Attention -Deficit / Hyperactivity Disorder, 296.31 (F33.0) Major Depressive Disorder, Recurrent Episode, Mild _ or 300.02 (F41.1) Generalized Anxiety Disorder  Psychosocial / Contextual Factors: 28 year old  female, , no children   PROMIS (reviewed every 90 days):   Completed on 10/24/2023      Referral / Collaboration:  Was/were discussed and patient will pursue.    Anticipated number of session for this episode of care: 6-9  Anticipation frequency of session: Every other week  Anticipated Duration of each session: 53 or more minutes  Treatment plan will  be reviewed in 90 days or when goals have been changed.       MeasurableTreatment Goal(s) related to diagnosis / functional impairment(s)  Goal 1: Patient will manage symptoms of anxiety     I will know I've met my goal when I feel more confident in my ability to cope with stress with fewer meltdowns.      Objective #A (Patient Action)    Patient will identify the initial signs or symptoms of anxiety.  Status: Continued - Date(s): 10/24/2023    Intervention(s)  Therapist will teach  help patient gain insight into signs of stress (physically and emotionally) .    Objective #B  Patient will use relaxation strategies multiple times per day to reduce the physical symptoms of anxiety.  Status: Continued - Date(s): 10/24/2023    Intervention(s)  Therapist will  teach diaphragmatic breathing and other grounding skills .    Objective #C  Patient will use thought-stopping strategy daily to reduce intrusive thoughts.  Status: Continued - Date(s): 10/24/2023    Intervention(s)  Therapist will  teach thought stopping techniques .      Goal 2: Patient will manage symptoms of depression    I will know I've met my goal when I feel more confident in my ability to express my emotions in a healthy way.      Objective #A (Patient Action)    Status: Continued - Date(s): 10/24/2023    Patient will Increase interest, engagement, and pleasure in doing things.    Intervention(s)  Therapist will teach emotional recognition/identification. * .    Objective #B  Patient will Identify negative self-talk and behaviors: challenge core beliefs, myths, and actions.    Status: Continued - Date(s): 10/24/2023    Intervention(s)  Therapist will  help patient practice positive self-talk and thought re-framing .        Patient has reviewed and agreed to the above plan.      Fanny Cobb, CHANTAL  October 24, 2023

## 2023-12-13 ENCOUNTER — OFFICE VISIT (OUTPATIENT)
Dept: FAMILY MEDICINE | Facility: CLINIC | Age: 28
End: 2023-12-13
Payer: COMMERCIAL

## 2023-12-13 VITALS
BODY MASS INDEX: 22.68 KG/M2 | SYSTOLIC BLOOD PRESSURE: 114 MMHG | DIASTOLIC BLOOD PRESSURE: 73 MMHG | WEIGHT: 128 LBS | HEART RATE: 90 BPM | RESPIRATION RATE: 14 BRPM | TEMPERATURE: 97.5 F | HEIGHT: 63 IN | OXYGEN SATURATION: 100 %

## 2023-12-13 DIAGNOSIS — Z11.3 ROUTINE SCREENING FOR STI (SEXUALLY TRANSMITTED INFECTION): ICD-10-CM

## 2023-12-13 DIAGNOSIS — Z11.51 SPECIAL SCREENING EXAMINATION FOR HUMAN PAPILLOMAVIRUS (HPV): Primary | ICD-10-CM

## 2023-12-13 LAB
C TRACH DNA SPEC QL NAA+PROBE: NEGATIVE
HBV SURFACE AG SERPL QL IA: NONREACTIVE
HCV AB SERPL QL IA: NONREACTIVE
HIV 1+2 AB+HIV1 P24 AG SERPL QL IA: NONREACTIVE
N GONORRHOEA DNA SPEC QL NAA+PROBE: NEGATIVE
T PALLIDUM AB SER QL: NONREACTIVE

## 2023-12-13 PROCEDURE — 87624 HPV HI-RISK TYP POOLED RSLT: CPT | Performed by: FAMILY MEDICINE

## 2023-12-13 PROCEDURE — 99214 OFFICE O/P EST MOD 30 MIN: CPT | Performed by: FAMILY MEDICINE

## 2023-12-13 PROCEDURE — 87340 HEPATITIS B SURFACE AG IA: CPT | Performed by: FAMILY MEDICINE

## 2023-12-13 PROCEDURE — 87491 CHLMYD TRACH DNA AMP PROBE: CPT | Performed by: FAMILY MEDICINE

## 2023-12-13 PROCEDURE — 87389 HIV-1 AG W/HIV-1&-2 AB AG IA: CPT | Performed by: FAMILY MEDICINE

## 2023-12-13 PROCEDURE — 86780 TREPONEMA PALLIDUM: CPT | Performed by: FAMILY MEDICINE

## 2023-12-13 PROCEDURE — 36415 COLL VENOUS BLD VENIPUNCTURE: CPT | Performed by: FAMILY MEDICINE

## 2023-12-13 PROCEDURE — 87591 N.GONORRHOEAE DNA AMP PROB: CPT | Performed by: FAMILY MEDICINE

## 2023-12-13 PROCEDURE — 86803 HEPATITIS C AB TEST: CPT | Performed by: FAMILY MEDICINE

## 2023-12-13 ASSESSMENT — ANXIETY QUESTIONNAIRES
3. WORRYING TOO MUCH ABOUT DIFFERENT THINGS: SEVERAL DAYS
5. BEING SO RESTLESS THAT IT IS HARD TO SIT STILL: SEVERAL DAYS
IF YOU CHECKED OFF ANY PROBLEMS ON THIS QUESTIONNAIRE, HOW DIFFICULT HAVE THESE PROBLEMS MADE IT FOR YOU TO DO YOUR WORK, TAKE CARE OF THINGS AT HOME, OR GET ALONG WITH OTHER PEOPLE: SOMEWHAT DIFFICULT
2. NOT BEING ABLE TO STOP OR CONTROL WORRYING: SEVERAL DAYS
6. BECOMING EASILY ANNOYED OR IRRITABLE: MORE THAN HALF THE DAYS
GAD7 TOTAL SCORE: 7
7. FEELING AFRAID AS IF SOMETHING AWFUL MIGHT HAPPEN: NOT AT ALL
GAD7 TOTAL SCORE: 7
1. FEELING NERVOUS, ANXIOUS, OR ON EDGE: SEVERAL DAYS
4. TROUBLE RELAXING: SEVERAL DAYS

## 2023-12-13 ASSESSMENT — PAIN SCALES - GENERAL: PAINLEVEL: NO PAIN (0)

## 2023-12-13 NOTE — COMMUNITY RESOURCES LIST (ENGLISH)
12/13/2023   [facility]  N/A  For questions about this resource list or additional care needs, please contact your primary care clinic or care manager.  Phone: [phone]   Email: N/A   Address: [address] [place] [address]   Hours: N/A        Food and Nutrition       Food pantry  1  [facility] Food Shelf Distance: 0.47 miles      In-Person, Delivery   [address] [place] [address]  Language: English, Citizen of [place]  Hours: Mon - Fri 10:00 AM - 12:00 PM , Mon - Tue 1:30 PM - 3:30 PM , Wed 5:00 PM - 7:00 PM , Thu - Fri 1:30 PM - 3:30 PM  Fees: Free   Phone: (326) 445-8619 Email: info@[facility] Website: https://[facility].org/food-shelves/     2  [facility] Distance: 0.67 miles      Pickup   [address] [place] [address]  Language: English  Hours: Mon - Fri 12:00 PM - 1:00 PM  Fees: Free   Phone: (811) 562-3176 Email: info@[facility].org Website: https://www.[facility].org/locations/_[place]_[employer]     SNAP application assistance  3  [employer] ([facility]) [facility] Distance: 1.66 miles      Phone/Virtual   [address] [place] [address]  Language: English  Hours: Mon - Fri 8:00 AM - 4:30 PM  Fees: Free   Phone: (692) 999-6147 Email: info@[employer].org Website: http://www.caprw.org/     4  [facility] Extension - SNAP Ed - SNAP Ed - SNAP application assistance Distance: 2.21 miles      In-Person   [address] [place] [address]  Language: English, Hmong, Oromo, Bermudian, Citizen of [place]  Hours: Mon - Fri 9:00 AM - 5:00 PM Appt. Only  Fees: Free   Phone: (365) 235-7905 Email: florentint@[facility] Website: https://extension.[facility]/nutrition-education/snap-ed-educational-offerings     Soup kitchen or free meals  5  [employer] [place] Distance: 0.95 miles      [address] [address]  Language: English  Hours: Mon 12:00 PM - 2:00 PM  , Wed 12:00 PM - 2:00 PM  Fees: Free   Phone: (609) 318-8901 Ext.4 Email: info@[employer] Website: http://www.[employer]     6  [facility] - Free Summer Meals Distance: 1.3 miles      In-Person   [address] [place] [address]  Language: English  Hours: Mon - Fri 12:30 PM - 1:30 PM , Mon - Fri 4:00 PM - 5:00 PM  Fees: Free   Phone: (591) 889-9717 Email: tyree@.[facility]. Website: https://www.[facility]/facilities/ahffvyd-gsykgeqdlj-rfvcpo          Transportation       Free or low-cost transportation  7  [facility] Distance: 0.98 miles      In-Person   [address] [place] [address]  Language: English, Nigerian  Hours: Mon 9:00 AM - 5:00 PM , Tue 9:30 AM - 7:00 PM , Wed 9:00 AM - 5:00 PM , Thu 9:30 AM - 7:00 PM , Fri 9:00 AM - 5:00 PM  Fees: Free   Phone: (932) 944-2929 Email: maryse@[employer] Website: http://www.[employer]     8  [facility] Distance: 3.69 miles      In-Person   [address] [place] [address]  Language: English  Hours: Mon - Fri 8:00 AM - 3:00 PM  Fees: Free   Phone: (711) 435-4135 Ext.14 Email: neighborhood@[facility].[employer] Website: http://www.[facility].org     Transportation to medical appointments  9  [facility] - Non-Emergency Medical Transportation Distance: 3.96 miles      In-Person   [address] [place] [address]  Language: English  Hours: Mon - Fri 8:00 AM - 4:00 PM Appt. Only  Fees: Self Pay   Phone: (657) 898-4735 Website: http://www.[facility].org/Medical-Services/Emergency-medical-services/Non-emergency-transportation/     10  [facility] Transportation Programs - Non-Emergency Medical Transportation Distance: 5.18 miles      In-Person   [address] [place] [address]  Language: English, Filipino, Nigerian  Hours: Mon - Fri 7:00 AM - 6:00 PM  Fees: Insurance   Phone: (591) 545-6867 Ext.3686 Email:  tali@[employer] Website: http://www.[employer].net/[place]/          Important Numbers & Websites       Emergency Services   911  [facility] Services   311  Poison Control   (874) 762-3114  Suicide Prevention Lifeline   (752) 652-5076 (TALK)  Child Abuse Hotline   (128) 108-3109 (4-A-Child)  Sexual Assault Hotline   (847) 758-9464 (HOPE)  National Runaway Safeline   (658) 126-7850 (RUNAWAY)  All-Options Talkline   (302) 897-3991  Substance Abuse Referral   (544) 775-1446 (HELP)

## 2023-12-13 NOTE — PROGRESS NOTES
Assessment & Plan      was seen today for std.    Diagnoses and all orders for this visit:    Special screening examination for human papillomavirus (HPV)  Patient's partner was diagnosed with HPV.  Desires to get screened for HPV and other sexually transmitted infections.  -     HPV High Risk Types DNA Cervical    Routine screening for STI (sexually transmitted infection)  -     HIV Antigen Antibody Combo Cascade  -     Chlamydia trachomatis PCR  -     Neisseria gonorrhoeae PCR  -     Treponema Abs w Reflex to RPR and Titer  -     Hepatitis B surface antigen  -     Hepatitis C Screen Reflex to HCV RNA Quant and Genotype        Return for Follow-up with your Primary Care Provider.           Nigel Garcia MD  [facility]    is a 28 year old, presenting for the following health issues:  STD        12/13/2023     9:44 AM   Additional Questions   Roomed by Glen NATH       History of Present Illness       Reason for visit:  Possible hpv exposure    She eats 2-3 servings of fruits and vegetables daily.She consumes 1 sweetened beverage(s) daily.She exercises with enough effort to increase her heart rate 10 to 19 minutes per day.  She exercises with enough effort to increase her heart rate 4 days per week.   She is taking medications regularly.       Vaginal Symptoms  Onset/Duration: 3 Days ago  Description:  Vaginal Discharge: none   Itching (Pruritis): No  Burning sensation:  No  Odor: No  Accompanying Signs & Symptoms:  Urinary symptoms: No  Abdominal pain: No  Fever: No  History:   Sexually active: YES  New Partner: YES  Possibility of Pregnancy:  No  Recent antibiotic use: No  Previous vaginitis issues: N/A  Precipitating or alleviating factors: None  Therapies tried and outcome: none  Patient's partner was diagnosed with HPV. Patient is unsure about the strand. Desires to get tested.       Review of Systems   Constitutional, HEENT, cardiovascular, pulmonary, gi and gu  "systems are negative, except as otherwise noted.      Objective    /73   Pulse 90   Temp 97.5  F (36.4  C) (Temporal)   Resp 14   Ht 1.6 m (5' 3\")   Wt 58.1 kg (128 lb)   LMP  (LMP Unknown)   SpO2 100%   BMI 22.67 kg/m    Body mass index is 22.67 kg/m .  Physical Exam   GENERAL: healthy, alert and no distress   (female): normal female external genitalia, normal urethral meatus, vaginal mucosa, normal cervix/adnexa/uterus without masses or discharge  MS: no gross musculoskeletal defects noted, no edema    Results for orders placed or performed in visit on 12/13/23 (from the past 24 hour(s))   Treponema Abs w Reflex to RPR and Titer   Result Value Ref Range    Treponema Antibody Total Nonreactive Nonreactive                   "

## 2023-12-18 ENCOUNTER — TELEPHONE (OUTPATIENT)
Dept: PSYCHOLOGY | Facility: CLINIC | Age: 28
End: 2023-12-18
Payer: COMMERCIAL

## 2023-12-18 NOTE — TELEPHONE ENCOUNTER
Forms Request  Name of form/paperwork: [employer]  Have you been seen for this request:  Patient will send a message to Fanny regarding this request  Do we have the form: Fanny's mailbox behind [facility]   When is form needed by: As soon as provider is able  How would you like the form returned:   Patient Notified form requests are processed in 3-5 business days: Yes    Okay to leave a detailed message? Yes please call home number when ready for  or any follow up is required     Patient requesting Fanny Cobb fill out forms.

## 2023-12-19 ENCOUNTER — VIRTUAL VISIT (OUTPATIENT)
Dept: BEHAVIORAL HEALTH | Facility: CLINIC | Age: 28
End: 2023-12-19
Payer: COMMERCIAL

## 2023-12-19 DIAGNOSIS — F90.8 ATTENTION DEFICIT HYPERACTIVITY DISORDER (ADHD), OTHER TYPE: ICD-10-CM

## 2023-12-19 DIAGNOSIS — F84.0 AUTISM SPECTRUM DISORDER: Primary | ICD-10-CM

## 2023-12-19 DIAGNOSIS — F41.1 GAD (GENERALIZED ANXIETY DISORDER): ICD-10-CM

## 2023-12-19 DIAGNOSIS — F33.0 MILD EPISODE OF RECURRENT MAJOR DEPRESSIVE DISORDER (H): ICD-10-CM

## 2023-12-19 PROCEDURE — 90834 PSYTX W PT 45 MINUTES: CPT | Mod: VID | Performed by: SOCIAL WORKER

## 2023-12-19 ASSESSMENT — PATIENT HEALTH QUESTIONNAIRE - PHQ9
10. IF YOU CHECKED OFF ANY PROBLEMS, HOW DIFFICULT HAVE THESE PROBLEMS MADE IT FOR YOU TO DO YOUR WORK, TAKE CARE OF THINGS AT HOME, OR GET ALONG WITH OTHER PEOPLE: VERY DIFFICULT
SUM OF ALL RESPONSES TO PHQ QUESTIONS 1-9: 16
SUM OF ALL RESPONSES TO PHQ QUESTIONS 1-9: 16

## 2023-12-19 NOTE — PROGRESS NOTES
[facility]                                     Progress Note    Patient Name: Angela Jones  Date: 12/19/2023         Service Type: Individual      Session Start Time: 9:00am  Session End Time: 9:50am     Session Length: 50 minutes    Session #: 29    Attendees: Client attended alone    Service Modality:  Video Visit:      Provider verified identity through the following two step process.  Patient provided:  Patient is known previously to provider    Telemedicine Visit: The patient's condition can be safely assessed and treated via synchronous audio and visual telemedicine encounter.      Reason for Telemedicine Visit: Patient has requested telehealth visit    Originating Site (Patient Location): Patient's home    Distant Site (Provider Location): Provider Remote Setting- Home Office    Consent:  The patient/guardian has verbally consented to: the potential risks and benefits of telemedicine (video visit) versus in person care; bill my insurance or make self-payment for services provided; and responsibility for payment of non-covered services.     Patient would like the video invitation sent by:  My Chart    Mode of Communication:  Video Conference via Amwell      Distant Location (Provider):  Off-site    As the provider I attest to compliance with applicable laws and regulations related to telemedicine.    DATA  Interactive Complexity: No  Crisis: No        Progress Since Last Session (Related to Symptoms / Goals / Homework):   Symptoms: No change :  anxiety and depression    Homework: Partially completed      Episode of Care Goals: Minimal progress - PREPARATION (Decided to change - considering how); Intervened by negotiating a change plan and determining options / strategies for behavior change, identifying triggers, exploring social supports, and working towards setting a date to begin behavior change     Current / Ongoing Stressors and Concerns:  Patient provides a status update while  "therapist utilizes reflective listening skills. Patient and therapist briefly discuss [employer] paperwork and plans for completion (ready for pickup at the clinic). Patient shares that she is experiencing a \"fibromyalgia flare-up\" causing stiffness and pain which worsens with activity. Patient continues to work. Patient identifies anxiety triggers. Patient processes relationship conflicts and dynamics.      Treatment Objective(s) Addressed in This Session:   identify the fears / thoughts that contribute to feeling anxious  state understanding of stressors and relationship to physical symptoms  Increase interest, engagement, and pleasure in doing things  Decrease frequency and intensity of feeling down, depressed, hopeless  Identify negative self-talk and behaviors: challenge core beliefs, myths, and actions  Gain insight     Intervention:   Discussed mindfulness as being aware of what we are experiencing while we are experiencing it.  Contrasted this with avoidance and rumination.  Explored the role of mindfulness as an overall coping strategy for managing depression, anxiety, and strong emotions.  Explained concept of state of mind using SIFT (sensations, images, feelings, thoughts) pneumonic.  Led client in a guided mindfulness exercise focusing on sensations, images, feelings, and thoughts.  Discussed mindfulness as a tool to intentionally shift our awareness and focus as needed.    Assessments completed prior to visit:   The following assessments were completed by patient for this visit:  PHQ9:       9/20/2023    11:59 AM 10/9/2023     6:37 PM 11/7/2023    11:55 AM 11/21/2023     8:53 AM 12/7/2023    11:01 AM 12/13/2023     9:31 AM 12/19/2023     9:00 AM   PHQ-9 SCORE   PHQ-9 Total Score [place] 14 (Moderate depression) 11 (Moderate depression) 17 (Moderately severe depression) 12 (Moderate depression) 9 (Mild depression) 12 (Moderate depression) 16 (Moderately severe depression)   PHQ-9 Total Score 14 11 " 17    17 12 9 12 16     GAD7:       11/15/2022     9:27 AM 1/11/2023    10:00 AM 7/25/2023    12:01 PM 9/5/2023    11:44 AM 10/9/2023     6:37 PM 11/21/2023     8:54 AM 12/13/2023     9:31 AM   JOSHUA-7 SCORE   Total Score 8 (mild anxiety) 10 (moderate anxiety) 10 (moderate anxiety) 9 (mild anxiety) 13 (moderate anxiety) 11 (moderate anxiety) 7 (mild anxiety)   Total Score 8 10 10 9 13 11 7     PROMIS 10-Global Health (only subscores and total score):       10/11/2022     9:32 AM 1/11/2023    10:01 AM 4/19/2023     8:53 AM 7/25/2023    12:02 PM 10/24/2023     9:01 AM 11/7/2023    11:56 AM 11/21/2023     8:55 AM   PROMIS-10 Scores Only   Global Mental Health Score 13    13 13 12 10 11 10    10 11   Global Physical Health Score 11    11 11 11 11 12 12    12 12   PROMIS TOTAL - SUBSCORES 24    24 24 23 21 23 22    22 23     [place] Suicide Severity Rating Scale (Lifetime/Recent)      2/17/2022     3:02 PM   [place] Suicide Severity Rating (Lifetime/Recent)   Q1 Wished to be Dead (Past Month) no   Q2 Suicidal Thoughts (Past Month) no   Q3 Suicidal Thought Method no   Q4 Suicidal Intent without Specific Plan no   Q5 Suicide Intent with Specific Plan no   Q6 Suicide Behavior (Lifetime) yes   Within the Past 3 Months? no   Level of Risk per Screen moderate risk           ASSESSMENT: Current Emotional / Mental Status (status of significant symptoms):   Risk status (Self / Other harm or suicidal ideation)   Patient denies current fears or concerns for personal safety.   Patient denies current or recent suicidal ideation or behaviors.   Patient denies current or recent homicidal ideation or behaviors.   Patient denies current or recent self injurious behavior or ideation.   Patient denies other safety concerns.   Patient reports there has been no change in risk factors since their last session.     Patient reports there has been no change in protective factors since their last session.     Recommended that patient call 911 or  go to the local ED should there be a change in any of these risk factors.     Appearance:   Appropriate    Eye Contact:   Good    Psychomotor Behavior: Hyperactive  Restless    Attitude:   Cooperative    Orientation:   All   Speech    Rate / Production: Pressured  Stutters    Volume:  Normal    Mood:    Anxious  Depressed  Anhedonia   Affect:    Constricted  Flat    Thought Content:  Clear    Thought Form:  Coherent    Insight:    Good      Medication Review:   No changes to current psychiatric medication(s)     Medication Compliance:   Yes     Changes in Health Issues:   None reported     Chemical Use Review:   Substance Use: Chemical use reviewed, no active concerns identified      Tobacco Use: No current tobacco use.      Diagnosis:  1. Autism spectrum disorder    2. JOSHUA (generalized anxiety disorder)    3. Mild episode of recurrent major depressive disorder (H24)    4. Attention deficit hyperactivity disorder (ADHD), other type        Collateral Reports Completed:   Not Applicable    PLAN: (Patient Tasks / Therapist Tasks / Other)  Patient will return for a virtual visit in 3 weeks  Patient will continue to utilize stress management techniques      There has been demonstrated improvement in functioning while patient has been engaged in psychotherapy/psychological service- if withdrawn the patient would deteriorate and/or relapse.       Fanny Cobb, [facility]                                                         ______________________________________________________________________    Individual Treatment Plan    Patient's Name: Angela Jones  YOB: 1995    Date of Creation: 1/19/2022  Date Treatment Plan Last Reviewed/Revised: 10/24/2023    DSM5 Diagnoses: Autism Spectrum Disorder 299.00(F84.0)  Associated with another neurodevelopmental, mental or behavioral disorder and Attention-Deficit/Hyperactivity Disorder  314.01 (F90.9) Unspecified Attention -Deficit / Hyperactivity Disorder, 296.31  (F33.0) Major Depressive Disorder, Recurrent Episode, Mild _ or 300.02 (F41.1) Generalized Anxiety Disorder  Psychosocial / Contextual Factors: 28 year old  female, , no children   PROMIS (reviewed every 90 days):   Completed on 10/24/2023      Referral / Collaboration:  Was/were discussed and patient will pursue.    Anticipated number of session for this episode of care: 6-9  Anticipation frequency of session: Every other week  Anticipated Duration of each session: 53 or more minutes  Treatment plan will be reviewed in 90 days or when goals have been changed.       MeasurableTreatment Goal(s) related to diagnosis / functional impairment(s)  Goal 1: Patient will manage symptoms of anxiety     I will know I've met my goal when I feel more confident in my ability to cope with stress with fewer meltdowns.      Objective #A (Patient Action)    Patient will identify the initial signs or symptoms of anxiety.  Status: Continued - Date(s): 10/24/2023    Intervention(s)  Therapist will teach  help patient gain insight into signs of stress (physically and emotionally) .    Objective #B  Patient will use relaxation strategies multiple times per day to reduce the physical symptoms of anxiety.  Status: Continued - Date(s): 10/24/2023    Intervention(s)  Therapist will  teach diaphragmatic breathing and other grounding skills .    Objective #C  Patient will use thought-stopping strategy daily to reduce intrusive thoughts.  Status: Continued - Date(s): 10/24/2023    Intervention(s)  Therapist will  teach thought stopping techniques .      Goal 2: Patient will manage symptoms of depression    I will know I've met my goal when I feel more confident in my ability to express my emotions in a healthy way.      Objective #A (Patient Action)    Status: Continued - Date(s): 10/24/2023    Patient will Increase interest, engagement, and pleasure in doing things.    Intervention(s)  Therapist will teach emotional  recognition/identification. * .    Objective #B  Patient will Identify negative self-talk and behaviors: challenge core beliefs, myths, and actions.    Status: Continued - Date(s): 10/24/2023    Intervention(s)  Therapist will  help patient practice positive self-talk and thought re-framing .        Patient has reviewed and agreed to the above plan.      Fanny Cobb, [facility]  October 24, 2023

## 2024-01-03 ENCOUNTER — MYC MEDICAL ADVICE (OUTPATIENT)
Dept: FAMILY MEDICINE | Facility: CLINIC | Age: 29
End: 2024-01-03
Payer: COMMERCIAL

## 2024-01-04 NOTE — TELEPHONE ENCOUNTER
FS,  Please see below MyChart message and advise.  I know this goes through pap nurses, but I do not see results even showing up in chart right now?  Thanks,  Loren CHAVARRIA RN

## 2024-01-05 LAB
HUMAN PAPILLOMA VIRUS 16 DNA: NEGATIVE
HUMAN PAPILLOMA VIRUS 18 DNA: NEGATIVE
HUMAN PAPILLOMA VIRUS FINAL DIAGNOSIS: NORMAL
HUMAN PAPILLOMA VIRUS OTHER HR: NEGATIVE

## 2024-01-05 NOTE — TELEPHONE ENCOUNTER
I ordered an HPV test for this patient and somehow it does not even show up in the order/result section. Can you please look into this?    MD Radha

## 2024-01-09 ENCOUNTER — VIRTUAL VISIT (OUTPATIENT)
Dept: BEHAVIORAL HEALTH | Facility: CLINIC | Age: 29
End: 2024-01-09
Payer: COMMERCIAL

## 2024-01-09 DIAGNOSIS — F90.8 ATTENTION DEFICIT HYPERACTIVITY DISORDER (ADHD), OTHER TYPE: ICD-10-CM

## 2024-01-09 DIAGNOSIS — F84.0 AUTISM SPECTRUM DISORDER: Primary | ICD-10-CM

## 2024-01-09 DIAGNOSIS — F33.0 MILD EPISODE OF RECURRENT MAJOR DEPRESSIVE DISORDER (H): ICD-10-CM

## 2024-01-09 DIAGNOSIS — F41.1 GAD (GENERALIZED ANXIETY DISORDER): ICD-10-CM

## 2024-01-09 PROCEDURE — 90834 PSYTX W PT 45 MINUTES: CPT | Mod: 95 | Performed by: SOCIAL WORKER

## 2024-01-09 ASSESSMENT — COLUMBIA-SUICIDE SEVERITY RATING SCALE - C-SSRS
6. IN YOUR LIFETIME, HAVE YOU EVER DONE ANYTHING, STARTED TO DO ANYTHING, OR PREPARED TO DO ANYTHING TO END YOUR LIFE?: NO
1. IN THE PAST MONTH, HAVE YOU WISHED YOU WERE DEAD OR WISHED YOU COULD GO TO SLEEP AND NOT WAKE UP?: NO
2. HAVE YOU ACTUALLY HAD ANY THOUGHTS OF KILLING YOURSELF?: NO

## 2024-01-09 NOTE — PROGRESS NOTES
[facility]                                     Progress Note    Patient Name: Angela Jones  Date: 1/9/2024         Service Type: Individual      Session Start Time: 11:00am  Session End Time: 11:40am     Session Length: 40 minutes    Session #: 30    Attendees: Client attended alone    Service Modality:  Video Visit:      Provider verified identity through the following two step process.  Patient provided:  Patient is known previously to provider    Telemedicine Visit: The patient's condition can be safely assessed and treated via synchronous audio and visual telemedicine encounter.      Reason for Telemedicine Visit: Patient has requested telehealth visit    Originating Site (Patient Location): Patient's home    Distant Site (Provider Location): Provider Remote Setting- Home Office    Consent:  The patient/guardian has verbally consented to: the potential risks and benefits of telemedicine (video visit) versus in person care; bill my insurance or make self-payment for services provided; and responsibility for payment of non-covered services.     Patient would like the video invitation sent by:  My Chart    Mode of Communication:  Video Conference via Amwell      Distant Location (Provider):  Off-site    As the provider I attest to compliance with applicable laws and regulations related to telemedicine.    DATA  Interactive Complexity: No  Crisis: No        Progress Since Last Session (Related to Symptoms / Goals / Homework):   Symptoms: No change :  anxiety and depression    Homework: Partially completed      Episode of Care Goals: Minimal progress - PREPARATION (Decided to change - considering how); Intervened by negotiating a change plan and determining options / strategies for behavior change, identifying triggers, exploring social supports, and working towards setting a date to begin behavior change     Current / Ongoing Stressors and Concerns:  Patient provides a status update while  therapist utilizes reflective listening skills. Patient reflects upon her holiday. Patient provides an update on her relationships after a positive conversation about expectations. Patient has made recent changes with her schedule to help her physical status (ie more days off in between work days).      Treatment Objective(s) Addressed in This Session:   identify the fears / thoughts that contribute to feeling anxious  state understanding of stressors and relationship to physical symptoms  Increase interest, engagement, and pleasure in doing things  Decrease frequency and intensity of feeling down, depressed, hopeless  Identify negative self-talk and behaviors: challenge core beliefs, myths, and actions  Gain insight     Intervention:   Discussed mindfulness as being aware of what we are experiencing while we are experiencing it.  Contrasted this with avoidance and rumination.  Explored the role of mindfulness as an overall coping strategy for managing depression, anxiety, and strong emotions.  Explained concept of state of mind using SIFT (sensations, images, feelings, thoughts) pneumonic.  Led client in a guided mindfulness exercise focusing on sensations, images, feelings, and thoughts.  Discussed mindfulness as a tool to intentionally shift our awareness and focus as needed.    Assessments completed prior to visit:   The following assessments were completed by patient for this visit:  PHQ9:       9/20/2023    11:59 AM 10/9/2023     6:37 PM 11/7/2023    11:55 AM 11/21/2023     8:53 AM 12/7/2023    11:01 AM 12/13/2023     9:31 AM 12/19/2023     9:00 AM   PHQ-9 SCORE   [facility]9 [facility] Score [facility] 14 (Moderate depression) 11 (Moderate depression) 17 (Moderately severe depression) 12 (Moderate depression) 9 (Mild depression) 12 (Moderate depression) 16 (Moderately severe depression)   PHQ-9 Total Score 14 11 17    17 12 9 12 16     GAD7:       11/15/2022     9:27 AM 1/11/2023    10:00 AM 7/25/2023    12:01 PM 9/5/2023     11:44 AM 10/9/2023     6:37 PM 11/21/2023     8:54 AM 12/13/2023     9:31 AM   JOSHUA-7 SCORE   Total Score 8 (mild anxiety) 10 (moderate anxiety) 10 (moderate anxiety) 9 (mild anxiety) 13 (moderate anxiety) 11 (moderate anxiety) 7 (mild anxiety)   Total Score 8 10 10 9 13 11 7     PROMIS 10-Global Health (only subscores and total score):       10/11/2022     9:32 AM 1/11/2023    10:01 AM 4/19/2023     8:53 AM 7/25/2023    12:02 PM 10/24/2023     9:01 AM 11/7/2023    11:56 AM 11/21/2023     8:55 AM   PROMIS-10 Scores Only   Global Mental Health Score 13    13 13 12 10 11 10    10 11   Global Physical Health Score 11    11 11 11 11 12 12    12 12   PROMIS TOTAL - SUBSCORES 24    24 24 23 21 23 22    22 23     [place] Suicide Severity Rating Scale (Lifetime/Recent)      2/17/2022     3:02 PM 1/9/2024    10:54 AM   [place] Suicide Severity Rating (Lifetime/Recent)   Q1 Wished to be Dead (Past Month) no    Q2 Suicidal Thoughts (Past Month) no    Q3 Suicidal Thought Method no    Q4 Suicidal Intent without Specific Plan no    Q5 Suicide Intent with Specific Plan no    Q6 Suicide Behavior (Lifetime) yes    Within the Past 3 Months? no    Level of Risk per Screen moderate risk    1. Wish to be Dead (Past 1 Month)  N   2. Non-Specific Active Suicidal Thoughts (Past 1 Month)  N   Calculated C-SSRS Risk Score (Lifetime/Recent)  No Risk Indicated           ASSESSMENT: Current Emotional / Mental Status (status of significant symptoms):   Risk status (Self / Other harm or suicidal ideation)   Patient denies current fears or concerns for personal safety.   Patient denies current or recent suicidal ideation or behaviors.   Patient denies current or recent homicidal ideation or behaviors.   Patient denies current or recent self injurious behavior or ideation.   Patient denies other safety concerns.   Patient reports there has been no change in risk factors since their last session.     Patient reports there has been no change in  protective factors since their last session.     Recommended that patient call 911 or go to the local ED should there be a change in any of these risk factors.     Appearance:   Appropriate    Eye Contact:   Good    Psychomotor Behavior: Hyperactive  Restless    Attitude:   Cooperative    Orientation:   All   Speech    Rate / Production: Pressured  Stutters    Volume:  Normal    Mood:    Anxious  Depressed  Anhedonia   Affect:    Constricted  Flat    Thought Content:  Clear    Thought Form:  Coherent    Insight:    Good      Medication Review:   No changes to current psychiatric medication(s)     Medication Compliance:   Yes     Changes in Health Issues:   None reported     Chemical Use Review:   Substance Use: Chemical use reviewed, no active concerns identified      Tobacco Use: No current tobacco use.      Diagnosis:  1. Autism spectrum disorder    2. JOSHUA (generalized anxiety disorder)    3. Mild episode of recurrent major depressive disorder (H24)    4. Attention deficit hyperactivity disorder (ADHD), other type        Collateral Reports Completed:   Not Applicable    PLAN: (Patient Tasks / Therapist Tasks / Other)  Patient will return for a virtual visit in 2 weeks  Patient will continue to utilize stress management techniques      There has been demonstrated improvement in functioning while patient has been engaged in psychotherapy/psychological service- if withdrawn the patient would deteriorate and/or relapse.       Fanny Cobb, [facility]                                                         ______________________________________________________________________    Individual Treatment Plan    Patient's Name: Angela Jones  YOB: 1995    Date of Creation: 1/19/2022  Date Treatment Plan Last Reviewed/Revised: 10/24/2023    DSM5 Diagnoses: Autism Spectrum Disorder 299.00(F84.0)  Associated with another neurodevelopmental, mental or behavioral disorder and Attention-Deficit/Hyperactivity  Disorder  314.01 (F90.9) Unspecified Attention -Deficit / Hyperactivity Disorder, 296.31 (F33.0) Major Depressive Disorder, Recurrent Episode, Mild _ or 300.02 (F41.1) Generalized Anxiety Disorder  Psychosocial / Contextual Factors: 28 year old  female, , no children   PROMIS (reviewed every 90 days):   Completed on 10/24/2023      Referral / Collaboration:  Was/were discussed and patient will pursue.    Anticipated number of session for this episode of care: 6-9  Anticipation frequency of session: Every other week  Anticipated Duration of each session: 53 or more minutes  Treatment plan will be reviewed in 90 days or when goals have been changed.       MeasurableTreatment Goal(s) related to diagnosis / functional impairment(s)  Goal 1: Patient will manage symptoms of anxiety     I will know I've met my goal when I feel more confident in my ability to cope with stress with fewer meltdowns.      Objective #A (Patient Action)    Patient will identify the initial signs or symptoms of anxiety.  Status: Continued - Date(s): 10/24/2023    Intervention(s)  Therapist will teach  help patient gain insight into signs of stress (physically and emotionally) .    Objective #B  Patient will use relaxation strategies multiple times per day to reduce the physical symptoms of anxiety.  Status: Continued - Date(s): 10/24/2023    Intervention(s)  Therapist will  teach diaphragmatic breathing and other grounding skills .    Objective #C  Patient will use thought-stopping strategy daily to reduce intrusive thoughts.  Status: Continued - Date(s): 10/24/2023    Intervention(s)  Therapist will  teach thought stopping techniques .      Goal 2: Patient will manage symptoms of depression    I will know I've met my goal when I feel more confident in my ability to express my emotions in a healthy way.      Objective #A (Patient Action)    Status: Continued - Date(s): 10/24/2023    Patient will Increase interest, engagement,  and pleasure in doing things.    Intervention(s)  Therapist will teach emotional recognition/identification. * .    Objective #B  Patient will Identify negative self-talk and behaviors: challenge core beliefs, myths, and actions.    Status: Continued - Date(s): 10/24/2023    Intervention(s)  Therapist will  help patient practice positive self-talk and thought re-framing .        Patient has reviewed and agreed to the above plan.      Fanny Cobb, [facility]  October 24, 2023

## 2024-01-23 ENCOUNTER — VIRTUAL VISIT (OUTPATIENT)
Dept: BEHAVIORAL HEALTH | Facility: CLINIC | Age: 29
End: 2024-01-23
Payer: COMMERCIAL

## 2024-01-23 DIAGNOSIS — F41.1 GAD (GENERALIZED ANXIETY DISORDER): ICD-10-CM

## 2024-01-23 DIAGNOSIS — F33.0 MILD EPISODE OF RECURRENT MAJOR DEPRESSIVE DISORDER (H): ICD-10-CM

## 2024-01-23 DIAGNOSIS — F90.8 ATTENTION DEFICIT HYPERACTIVITY DISORDER (ADHD), OTHER TYPE: ICD-10-CM

## 2024-01-23 DIAGNOSIS — F84.0 AUTISM SPECTRUM DISORDER: Primary | ICD-10-CM

## 2024-01-23 PROCEDURE — 90834 PSYTX W PT 45 MINUTES: CPT | Mod: 95 | Performed by: SOCIAL WORKER

## 2024-01-23 NOTE — PROGRESS NOTES
[facility]                                     Progress Note    Patient Name: Angela Jones  Date: 1/23/2024         Service Type: Individual      Session Start Time: 9:00am  Session End Time: 9:50am     Session Length: 50 minutes    Session #: 31    Attendees: Client attended alone    Service Modality:  Video Visit:      Provider verified identity through the following two step process.  Patient provided:  Patient is known previously to provider    Telemedicine Visit: The patient's condition can be safely assessed and treated via synchronous audio and visual telemedicine encounter.      Reason for Telemedicine Visit: Patient has requested telehealth visit    Originating Site (Patient Location): Patient's home    Distant Site (Provider Location): Provider Remote Setting- Home Office    Consent:  The patient/guardian has verbally consented to: the potential risks and benefits of telemedicine (video visit) versus in person care; bill my insurance or make self-payment for services provided; and responsibility for payment of non-covered services.     Patient would like the video invitation sent by:  My Chart    Mode of Communication:  Video Conference via Amwell      Distant Location (Provider):  Off-site    As the provider I attest to compliance with applicable laws and regulations related to telemedicine.    DATA  Interactive Complexity: No  Crisis: No        Progress Since Last Session (Related to Symptoms / Goals / Homework):   Symptoms: No change :  anxiety and depression    Homework: Partially completed      Episode of Care Goals: Minimal progress - PREPARATION (Decided to change - considering how); Intervened by negotiating a change plan and determining options / strategies for behavior change, identifying triggers, exploring social supports, and working towards setting a date to begin behavior change     Current / Ongoing Stressors and Concerns:  Patient provides a status update while  therapist utilizes reflective listening skills. Patient shares that she follows routines in order to complete tasks. She is able to keep her environment clean and more organized which helps reduce stress. Patient does identify some financial stressors. Patient reports that she may be transferring to a different location for work which would help reduce costs for transportation. Patient wants to process relationship dynamics today and makes a plan to communicate needs and ask about expectations.        Treatment Objective(s) Addressed in This Session:   identify the fears / thoughts that contribute to feeling anxious  state understanding of stressors and relationship to physical symptoms  Increase interest, engagement, and pleasure in doing things  Decrease frequency and intensity of feeling down, depressed, hopeless  Identify negative self-talk and behaviors: challenge core beliefs, myths, and actions  Gain insight     Intervention:   Discussed mindfulness as being aware of what we are experiencing while we are experiencing it.  Contrasted this with avoidance and rumination.  Explored the role of mindfulness as an overall coping strategy for managing depression, anxiety, and strong emotions.  Explained concept of state of mind using SIFT (sensations, images, feelings, thoughts) pneumonic.  Led client in a guided mindfulness exercise focusing on sensations, images, feelings, and thoughts.  Discussed mindfulness as a tool to intentionally shift our awareness and focus as needed.    Assessments completed prior to visit:   The following assessments were completed by patient for this visit:  PHQ9:       10/9/2023     6:37 PM 11/7/2023    11:55 AM 11/21/2023     8:53 AM 12/7/2023    11:01 AM 12/13/2023     9:31 AM 12/19/2023     9:00 AM 1/23/2024     8:54 AM   PHQ-9 SCORE   PHQ-9 Total Score Bashir 11 (Moderate depression) 17 (Moderately severe depression) 12 (Moderate depression) 9 (Mild depression) 12 (Moderate  depression) 16 (Moderately severe depression) 7 (Mild depression)   PHQ-9 Total Score 11 17    17 12 9 12 16 7     GAD7:       11/15/2022     9:27 AM 1/11/2023    10:00 AM 7/25/2023    12:01 PM 9/5/2023    11:44 AM 10/9/2023     6:37 PM 11/21/2023     8:54 AM 12/13/2023     9:31 AM   JOSHUA-7 SCORE   Total Score 8 (mild anxiety) 10 (moderate anxiety) 10 (moderate anxiety) 9 (mild anxiety) 13 (moderate anxiety) 11 (moderate anxiety) 7 (mild anxiety)   Total Score 8 10 10 9 13 11 7     PROMIS 10-Global Health (only subscores and total score):       10/11/2022     9:32 AM 1/11/2023    10:01 AM 4/19/2023     8:53 AM 7/25/2023    12:02 PM 10/24/2023     9:01 AM 11/7/2023    11:56 AM 11/21/2023     8:55 AM   PROMIS-10 Scores Only   Global Mental Health Score 13    13 13 12 10 11 10    10 11   Global Physical Health Score 11    11 11 11 11 12 12    12 12   PROMIS TOTAL - SUBSCORES 24    24 24 23 21 23 22    22 23     [place] Suicide Severity Rating Scale (Lifetime/Recent)      2/17/2022     3:02 PM 1/9/2024    10:54 AM   [place] Suicide Severity Rating (Lifetime/Recent)   Q1 Wished to be Dead (Past Month) no    Q2 Suicidal Thoughts (Past Month) no    Q3 Suicidal Thought Method no    Q4 Suicidal Intent without Specific Plan no    Q5 Suicide Intent with Specific Plan no    Q6 Suicide Behavior (Lifetime) yes    Within the Past 3 Months? no    Level of Risk per Screen moderate risk    1. Wish to be Dead (Past 1 Month)  N   2. Non-Specific Active Suicidal Thoughts (Past 1 Month)  N   Calculated C-SSRS Risk Score (Lifetime/Recent)  No Risk Indicated           ASSESSMENT: Current Emotional / Mental Status (status of significant symptoms):   Risk status (Self / Other harm or suicidal ideation)   Patient denies current fears or concerns for personal safety.   Patient denies current or recent suicidal ideation or behaviors.   Patient denies current or recent homicidal ideation or behaviors.   Patient denies current or recent self  injurious behavior or ideation.   Patient denies other safety concerns.   Patient reports there has been no change in risk factors since their last session.     Patient reports there has been no change in protective factors since their last session.     Recommended that patient call 911 or go to the local ED should there be a change in any of these risk factors.     Appearance:   Appropriate    Eye Contact:   Good    Psychomotor Behavior: Hyperactive  Restless    Attitude:   Cooperative    Orientation:   All   Speech    Rate / Production: Pressured  Stutters    Volume:  Normal    Mood:    Anxious  Depressed  Anhedonia   Affect:    Constricted  Flat    Thought Content:  Clear    Thought Form:  Coherent    Insight:    Good      Medication Review:   No changes to current psychiatric medication(s)     Medication Compliance:   Yes     Changes in Health Issues:   None reported     Chemical Use Review:   Substance Use: Chemical use reviewed, no active concerns identified      Tobacco Use: No current tobacco use.      Diagnosis:  1. Autism spectrum disorder    2. JOSHUA (generalized anxiety disorder)    3. Mild episode of recurrent major depressive disorder (H24)    4. Attention deficit hyperactivity disorder (ADHD), other type        Collateral Reports Completed:   Not Applicable    PLAN: (Patient Tasks / Therapist Tasks / Other)  Patient will return for a virtual visit in 2 weeks  Patient will continue to utilize stress management techniques  Patient will continue to follow daily routine       There has been demonstrated improvement in functioning while patient has been engaged in psychotherapy/psychological service- if withdrawn the patient would deteriorate and/or relapse.       Fanny Cobb, LASTSW                                                         ______________________________________________________________________    Individual Treatment Plan    Patient's Name: Angela Jones  Date Of  Birth: 1995    Date of Creation: 1/19/2022  Date Treatment Plan Last Reviewed/Revised: 1/23/2024    DSM5 Diagnoses: Autism Spectrum Disorder 299.00(F84.0)  Associated with another neurodevelopmental, mental or behavioral disorder and Attention-Deficit/Hyperactivity Disorder  314.01 (F90.9) Unspecified Attention -Deficit / Hyperactivity Disorder, 296.31 (F33.0) Major Depressive Disorder, Recurrent Episode, Mild _ or 300.02 (F41.1) Generalized Anxiety Disorder  Psychosocial / Contextual Factors: 28 year old  female, , no children   PROMIS (reviewed every 90 days):   Completed on 11/21/2023      Referral / Collaboration:  Was/were discussed and patient will pursue.    Anticipated number of session for this episode of care: 6-9  Anticipation frequency of session: Every other week  Anticipated Duration of each session: 53 or more minutes  Treatment plan will be reviewed in 90 days or when goals have been changed.       MeasurableTreatment Goal(s) related to diagnosis / functional impairment(s)  Goal 1: Patient will manage symptoms of anxiety     I will know I've met my goal when I feel more confident in my ability to cope with stress.      Objective #A (Patient Action)    Patient will identify the initial signs or symptoms of anxiety.  Status: Continued - Date(s): 1/23/2024    Intervention(s)  Therapist will teach  help patient gain insight into signs of stress (physically and emotionally) .    Objective #B  Patient will use relaxation strategies multiple times per day to reduce the physical symptoms of anxiety.  Status: Continued - Date(s): 1/23/2024    Intervention(s)  Therapist will  teach diaphragmatic breathing and other grounding skills .    Objective #C  Patient will use thought-stopping strategy daily to reduce intrusive thoughts.  Status: Continued - Date(s): 1/23/2024    Intervention(s)  Therapist will  teach thought stopping techniques .      Goal 2: Patient will manage symptoms of  depression    I will know I've met my goal when I feel more confident in my ability to express my emotions in a healthy way.      Objective #A (Patient Action)    Status: Continued - Date(s): 1/23/2024    Patient will Increase interest, engagement, and pleasure in doing things.    Intervention(s)  Therapist will teach emotional recognition/identification. * .    Objective #B  Patient will Identify negative self-talk and behaviors: challenge core beliefs, myths, and actions.    Status: Continued - Date(s): 1/23/2024    Intervention(s)  Therapist will  help patient practice positive self-talk and thought re-framing .        Patient has reviewed and agreed to the above plan.      Fanny Cobb, [facility]  January 23, 2024

## 2024-01-24 ENCOUNTER — MYC MEDICAL ADVICE (OUTPATIENT)
Dept: FAMILY MEDICINE | Facility: CLINIC | Age: 29
End: 2024-01-24
Payer: COMMERCIAL

## 2024-01-25 ENCOUNTER — MYC MEDICAL ADVICE (OUTPATIENT)
Dept: FAMILY MEDICINE | Facility: CLINIC | Age: 29
End: 2024-01-25
Payer: COMMERCIAL

## 2024-02-06 ENCOUNTER — VIRTUAL VISIT (OUTPATIENT)
Dept: BEHAVIORAL HEALTH | Facility: CLINIC | Age: 29
End: 2024-02-06
Payer: COMMERCIAL

## 2024-02-06 DIAGNOSIS — F84.0 AUTISM SPECTRUM DISORDER: Primary | ICD-10-CM

## 2024-02-06 DIAGNOSIS — F90.8 ATTENTION DEFICIT HYPERACTIVITY DISORDER (ADHD), OTHER TYPE: ICD-10-CM

## 2024-02-06 DIAGNOSIS — F33.0 MILD EPISODE OF RECURRENT MAJOR DEPRESSIVE DISORDER (H): ICD-10-CM

## 2024-02-06 DIAGNOSIS — F41.1 GAD (GENERALIZED ANXIETY DISORDER): ICD-10-CM

## 2024-02-06 PROCEDURE — 90837 PSYTX W PT 60 MINUTES: CPT | Mod: 95 | Performed by: SOCIAL WORKER
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
## 2024-02-27 ENCOUNTER — VIRTUAL VISIT (OUTPATIENT)
Dept: BEHAVIORAL HEALTH | Facility: CLINIC | Age: 29
End: 2024-02-27

## 2024-02-27 DIAGNOSIS — F41.1 GAD (GENERALIZED ANXIETY DISORDER): ICD-10-CM

## 2024-02-27 DIAGNOSIS — F33.0 MILD EPISODE OF RECURRENT MAJOR DEPRESSIVE DISORDER (H): ICD-10-CM

## 2024-02-27 DIAGNOSIS — F84.0 AUTISM SPECTRUM DISORDER: Primary | ICD-10-CM

## 2024-02-27 DIAGNOSIS — F90.8 ATTENTION DEFICIT HYPERACTIVITY DISORDER (ADHD), OTHER TYPE: ICD-10-CM

## 2024-02-27 PROCEDURE — 90837 PSYTX W PT 60 MINUTES: CPT | Mod: 95 | Performed by: SOCIAL WORKER

## 2024-02-27 NOTE — PROGRESS NOTES
[facility]                                     Progress Note    Patient Name: Angela Jones  Date: 2/27/2024         Service Type: Individual      Session Start Time: 11:00am  Session End Time: 11:53am     Session Length: 53 minutes    Session #: 33    Attendees: Client attended alone    Service Modality:  Video Visit:      Provider verified identity through the following two step process.  Patient provided:  Patient is known previously to provider    Telemedicine Visit: The patient's condition can be safely assessed and treated via synchronous audio and visual telemedicine encounter.      Reason for Telemedicine Visit: Patient has requested telehealth visit    Originating Site (Patient Location): Patient's home    Distant Site (Provider Location): Provider Remote Setting- Home Office    Consent:  The patient/guardian has verbally consented to: the potential risks and benefits of telemedicine (video visit) versus in person care; bill my insurance or make self-payment for services provided; and responsibility for payment of non-covered services.     Patient would like the video invitation sent by:  My Chart    Mode of Communication:  Video Conference via [employer]      Distant Location (Provider):  Off-site    As the provider I attest to compliance with applicable laws and regulations related to telemedicine.    DATA  Extended Session (53+ minutes): PROLONGED SERVICE IN THE OUTPATIENT SETTING REQUIRING DIRECT (FACE-TO-FACE) PATIENT CONTACT BEYOND THE USUAL SERVICE:    - Treatment protocol required additional time to complete, due to the nature of diagnosis being treated.  See Interventions section for details  Interactive Complexity: No  Crisis: No        Progress Since Last Session (Related to Symptoms / Goals / Homework):   Symptoms: No change :  anxiety and depression    Homework: Achieved / completed to satisfaction      Episode of Care Goals: Minimal progress - PREPARATION (Decided to  change - considering how); Intervened by negotiating a change plan and determining options / strategies for behavior change, identifying triggers, exploring social supports, and working towards setting a date to begin behavior change     Current / Ongoing Stressors and Concerns:  Patient provides a status update while therapist utilizes reflective listening skills. Patient shares about getting a new job and starting yesterday. She can get to new job via public transit and it is also a pay increase. Patient is attempting to follow through with divorce process for ex. Patient reviews expectations with partners. Patient notes reduced symptoms of panic and dissociation although she does note frequent crying episodes when emotionally vulnerable.        Treatment Objective(s) Addressed in This Session:   identify the fears / thoughts that contribute to feeling anxious  state understanding of stressors and relationship to physical symptoms  Increase interest, engagement, and pleasure in doing things  Decrease frequency and intensity of feeling down, depressed, hopeless  Identify negative self-talk and behaviors: challenge core beliefs, myths, and actions  Gain insight     Intervention:   Discussed mindfulness as being aware of what we are experiencing while we are experiencing it.  Contrasted this with avoidance and rumination.  Explored the role of mindfulness as an overall coping strategy for managing depression, anxiety, and strong emotions.  Explained concept of state of mind using SIFT (sensations, images, feelings, thoughts) pneumonic.  Led client in a guided mindfulness exercise focusing on sensations, images, feelings, and thoughts.  Discussed mindfulness as a tool to intentionally shift our awareness and focus as needed.    Assessments completed prior to visit:   The following assessments were completed by patient for this visit:  PHQ9:       11/21/2023     8:53 AM 12/7/2023    11:01 AM 12/13/2023     9:31 AM  12/19/2023     9:00 AM 1/23/2024     8:54 AM 2/6/2024     8:54 AM 2/27/2024    11:01 AM   PHQ-9 SCORE   PHQ-9 Total Score MyChart 12 (Moderate depression) 9 (Mild depression) 12 (Moderate depression) 16 (Moderately severe depression) 7 (Mild depression) 12 (Moderate depression) 11 (Moderate depression)   PHQ-9 Total Score 12 9 12 16 7 12 11     GAD7:       11/15/2022     9:27 AM 1/11/2023    10:00 AM 7/25/2023    12:01 PM 9/5/2023    11:44 AM 10/9/2023     6:37 PM 11/21/2023     8:54 AM 12/13/2023     9:31 AM   JOSHUA-7 SCORE   Total Score 8 (mild anxiety) 10 (moderate anxiety) 10 (moderate anxiety) 9 (mild anxiety) 13 (moderate anxiety) 11 (moderate anxiety) 7 (mild anxiety)   Total Score 8 10 10 9 13 11 7     PROMIS 10-Global Health (only subscores and total score):       10/11/2022     9:32 AM 1/11/2023    10:01 AM 4/19/2023     8:53 AM 7/25/2023    12:02 PM 10/24/2023     9:01 AM 11/7/2023    11:56 AM 11/21/2023     8:55 AM   PROMIS-10 Scores Only   Global Mental Health Score 13    13 13 12 10 11 10    10 11   Global Physical Health Score 11    11 11 11 11 12 12    12 12   PROMIS TOTAL - SUBSCORES 24    24 24 23 21 23 22    22 23     [place] Suicide Severity Rating Scale (Lifetime/Recent)      2/17/2022     3:02 PM 1/9/2024    10:54 AM   [place] Suicide Severity Rating (Lifetime/Recent)   Q1 Wished to be Dead (Past Month) no    Q2 Suicidal Thoughts (Past Month) no    Q3 Suicidal Thought Method no    Q4 Suicidal Intent without Specific Plan no    Q5 Suicide Intent with Specific Plan no    Q6 Suicide Behavior (Lifetime) yes    Within the Past 3 Months? no    Level of Risk per Screen moderate risk    1. Wish to be Dead (Past 1 Month)  N   2. Non-Specific Active Suicidal Thoughts (Past 1 Month)  N   Calculated C-SSRS Risk Score (Lifetime/Recent)  No Risk Indicated           ASSESSMENT: Current Emotional / Mental Status (status of significant symptoms):   Risk status (Self / Other harm or suicidal ideation)   Patient  denies current fears or concerns for personal safety.   Patient denies current or recent suicidal ideation or behaviors.   Patient denies current or recent homicidal ideation or behaviors.   Patient denies current or recent self injurious behavior or ideation.   Patient denies other safety concerns.   Patient reports there has been no change in risk factors since their last session.     Patient reports there has been no change in protective factors since their last session.     Recommended that patient call 911 or go to the local ED should there be a change in any of these risk factors.     Appearance:   Appropriate    Eye Contact:   Good    Psychomotor Behavior: Hyperactive  Restless    Attitude:   Cooperative    Orientation:   All   Speech    Rate / Production: Pressured  Stutters    Volume:  Normal    Mood:    Anxious  Depressed  Anhedonia   Affect:    Constricted  Flat    Thought Content:  Clear    Thought Form:  Coherent    Insight:    Good      Medication Review:   No changes to current psychiatric medication(s)     Medication Compliance:   Yes     Changes in Health Issues:   None reported     Chemical Use Review:   Substance Use: Chemical use reviewed, no active concerns identified      Tobacco Use: No current tobacco use.      Diagnosis:  1. Autism spectrum disorder    2. JOSHUA (generalized anxiety disorder)    3. Mild episode of recurrent major depressive disorder (H24)    4. Attention deficit hyperactivity disorder (ADHD), other type        Collateral Reports Completed:   Not Applicable    PLAN: (Patient Tasks / Therapist Tasks / Other)  Patient will return for a virtual visit in 2 weeks  Patient will maintain healthy boundaries      There has been demonstrated improvement in functioning while patient has been engaged in psychotherapy/psychological service- if withdrawn the patient would deteriorate and/or relapse.       Fanny Cobb, LASTSW                                                          ______________________________________________________________________    Individual Treatment Plan    Patient's Name: Angela Jones  YOB: 1995    Date of Creation: 1/19/2022  Date Treatment Plan Last Reviewed/Revised: 1/23/2024    DSM5 Diagnoses: Autism Spectrum Disorder 299.00(F84.0)  Associated with another neurodevelopmental, mental or behavioral disorder and Attention-Deficit/Hyperactivity Disorder  314.01 (F90.9) Unspecified Attention -Deficit / Hyperactivity Disorder, 296.31 (F33.0) Major Depressive Disorder, Recurrent Episode, Mild _ or 300.02 (F41.1) Generalized Anxiety Disorder  Psychosocial / Contextual Factors: 28 year old  female, , no children   PROMIS (reviewed every 90 days):   PROMIS 10-Global Health (only subscores and total score):       10/11/2022     9:32 AM 1/11/2023    10:01 AM 4/19/2023     8:53 AM 7/25/2023    12:02 PM 10/24/2023     9:01 AM 11/7/2023    11:56 AM 11/21/2023     8:55 AM   PROMIS-10 Scores Only   Global Mental Health Score 13    13 13 12 10 11 10    10 11   Global Physical Health Score 11    11 11 11 11 12 12    12 12   PROMIS TOTAL - SUBSCORES 24    24 24 23 21 23 22    22 23         Referral / Collaboration:  Was/were discussed and patient will pursue.    Anticipated number of session for this episode of care: 6-9  Anticipation frequency of session: Every other week  Anticipated Duration of each session: 53 or more minutes  Treatment plan will be reviewed in 90 days or when goals have been changed.       MeasurableTreatment Goal(s) related to diagnosis / functional impairment(s)  Goal 1: Patient will manage symptoms of anxiety, as evidenced by a JOSHUA-7 score of 5 or lower     I will know I've met my goal when I feel more confident in my ability to cope with stress.      Objective #A (Patient Action)    Patient will identify the initial signs or symptoms of anxiety.  Status: Continued - Date(s): 1/23/2024    Intervention(s)  Therapist  will teach  help patient gain insight into signs of stress (physically and emotionally) .    Objective #B  Patient will use relaxation strategies multiple times per day to reduce the physical symptoms of anxiety.  Status: Continued - Date(s): 1/23/2024    Intervention(s)  Therapist will  teach diaphragmatic breathing and other grounding skills .    Objective #C  Patient will use thought-stopping strategy daily to reduce intrusive thoughts.  Status: Continued - Date(s): 1/23/2024    Intervention(s)  Therapist will  teach thought stopping techniques .      Goal 2: Patient will manage symptoms of depression, as evidenced by a PHQ-9 score of 10 or lower    I will know I've met my goal when I feel more confident in my ability to express my emotions in a healthy way.      Objective #A (Patient Action)    Status: Continued - Date(s): 1/23/2024    Patient will Increase interest, engagement, and pleasure in doing things.    Intervention(s)  Therapist will teach emotional recognition/identification. * .    Objective #B  Patient will Identify negative self-talk and behaviors: challenge core beliefs, myths, and actions.    Status: Continued - Date(s): 1/23/2024    Intervention(s)  Therapist will  help patient practice positive self-talk and thought re-framing .    Goal 3: Client will manage symptoms of ADHD     I will know I've met my goal when I feel confident in completing tasks.      Objective #A (Client Action)    Client will use daily planner 75% of the time.  Status: Continued - Date(s): 1/23/2024    Intervention(s)  Therapist will  ask client to demonstrate use of planner while in session .    Objective #B  Client will identify and compliment positives at least 2 times daily to support positive self-image.    Status: Continued - Date(s): 1/23/2024    Intervention(s)  Therapist will  ask patient to demonstrate use of affirmations during session .    Goal 4: Client will manage symptoms and experiences associated with ASD      I will know I've met my goal when I feel confident in my communication in relationships.      Objective #A (Client Action)    Client will work on being assertive and setting clear expectations with healthy boundaries in relationships  Status: Continued - Date(s): 1/23/2024    Intervention(s)  Therapist will  help patient practice assertive communication skills .        Patient has reviewed and agreed to the above plan.      Fanny Cobb, [facility]  January 23, 2024

## 2024-02-29 ENCOUNTER — E-VISIT (OUTPATIENT)
Dept: URGENT CARE | Facility: CLINIC | Age: 29
End: 2024-02-29

## 2024-02-29 DIAGNOSIS — J01.90 ACUTE SINUSITIS, RECURRENCE NOT SPECIFIED, UNSPECIFIED LOCATION: Primary | ICD-10-CM

## 2024-02-29 PROCEDURE — 99421 OL DIG E/M SVC 5-10 MIN: CPT | Performed by: PREVENTIVE MEDICINE

## 2024-03-04 NOTE — PATIENT INSTRUCTIONS
Acute Sinusitis: Care Instructions  Overview     Acute sinusitis is an inflammation of the mucous membranes inside the nose and sinuses. Sinuses are the hollow spaces in your skull around the eyes and nose. Acute sinusitis often follows a cold. Acute sinusitis causes thick, discolored mucus that drains from the nose or down the back of the throat. It also can cause pain and pressure in your head and face along with a stuffy or blocked nose.  In most cases, sinusitis gets better on its own in 1 to 2 weeks. But some mild symptoms may last for several weeks. Sometimes antibiotics are needed if there is a bacterial infection.  Follow-up care is a key part of your treatment and safety. Be sure to make and go to all appointments, and call your doctor if you are having problems. It's also a good idea to know your test results and keep a list of the medicines you take.  How can you care for yourself at home?  Use saline (saltwater) nasal washes. This can help keep your nasal passages open and wash out mucus and allergens.  You can buy saline nose washes at a grocery store or drugstore. Follow the instructions on the package.  You can make your own at home. Add 1 teaspoon of non-iodized salt and 1 teaspoon of baking soda to 2 cups of distilled or boiled and cooled water. Fill a squeeze bottle or a nasal cleansing pot (such as a neti pot) with the nasal wash. Then put the tip into your nostril, and lean over the sink. With your mouth open, gently squirt the liquid. Repeat on the other side.  Try a decongestant nasal spray like oxymetazoline (Afrin). Do not use it for more than 3 days in a row. Using it for more than 3 days can make your congestion worse.  If needed, take an over-the-counter pain medicine, such as acetaminophen (Tylenol), ibuprofen (Advil, Motrin), or naproxen (Aleve). Read and follow all instructions on the label.  If the doctor prescribed antibiotics, take them as directed. Do not stop taking them just  "because you feel better. You need to take the full course of antibiotics.  Be careful when taking over-the-counter cold or flu medicines and Tylenol at the same time. Many of these medicines have acetaminophen, which is Tylenol. Read the labels to make sure that you are not taking more than the recommended dose. Too much acetaminophen (Tylenol) can be harmful.  Try a steroid nasal spray. It may help with your symptoms.  Breathe warm, moist air. You can use a steamy shower, a hot bath, or a sink filled with hot water. Avoid cold, dry air. Using a humidifier in your home may help. Follow the directions for cleaning the machine.  When should you call for help?   Call your doctor now or seek immediate medical care if:    You have new or worse swelling, redness, or pain in your face or around one or both of your eyes.     You have double vision or a change in your vision.     You have a high fever.     You have a severe headache and a stiff neck.     You have mental changes, such as feeling confused or much less alert.   Watch closely for changes in your health, and be sure to contact your doctor if:    You are not getting better as expected.   Where can you learn more?  Go to https://www.[employer].net/patiented  Enter I933 in the search box to learn more about \"Acute Sinusitis: Care Instructions.\"  Current as of: February 28, 2023               Content Version: 13.8    1015-2651 [employer].   Care instructions adapted under license by your healthcare professional. If you have questions about a medical condition or this instruction, always ask your healthcare professional. [employer] disclaims any warranty or liability for your use of this information.      You may want to try a nasal lavage (also known as nasal irrigation). You can find over-the-counter products, such as Neti-Pot, at retail locations or make your own at home. Instructions for homemade nasal lavage and more information on the " process are available online at http://www.aafp.org/afp/2009/1115/p1121.html.    Dear Angela Jones    After reviewing your responses, I've been able to diagnose you with Acute sinusitis, recurrence not specified, unspecified location.      Based on your responses and diagnosis, I have prescribed No orders of the defined types were placed in this encounter.   to treat your symptoms. I have sent this to your pharmacy.?     It is also important to stay well hydrated, get lots of rest and take over-the-counter decongestants,?tylenol?or ibuprofen if you?are able to?take those medications per your primary care provider to help relieve discomfort.?     It is important that you take?all of?your prescribed medication even if your symptoms are improving after a few doses.? Taking?all of?your medicine helps prevent the symptoms from returning.?     If your symptoms worsen, you develop severe headache, vomiting, high fever (>102), or are not improving in 7 days, please contact your primary care provider for an appointment or visit any of our convenient Walk-in Care or Urgent Care Centers to be seen which can be found on our website?here.?     Thanks again for choosing?us?as your health care partner,?   ?  Lopez Jules MD, MD?   Thank you for choosing us for your care. I have placed an order for a prescription so that you can start treatment. View your full visit summary for details by clicking on the link below. Your pharmacist will able to address any questions you may have about the medication.     If you're not feeling better within 5-7 days, please schedule an appointment.  You can schedule an appointment right here in [facility], or call [phone]  If the visit is for the same symptoms as your eVisit, we'll refund the cost of your eVisit if seen within seven days.

## 2024-03-19 ENCOUNTER — MYC REFILL (OUTPATIENT)
Dept: FAMILY MEDICINE | Facility: CLINIC | Age: 29
End: 2024-03-19

## 2024-03-19 DIAGNOSIS — Z76.0 ENCOUNTER FOR MEDICATION REFILL: ICD-10-CM

## 2024-03-19 DIAGNOSIS — F33.1 MAJOR DEPRESSIVE DISORDER, RECURRENT EPISODE, MODERATE (H): ICD-10-CM

## 2024-04-02 ENCOUNTER — MYC MEDICAL ADVICE (OUTPATIENT)
Dept: RHEUMATOLOGY | Facility: CLINIC | Age: 29
End: 2024-04-02

## 2024-04-02 ENCOUNTER — MYC MEDICAL ADVICE (OUTPATIENT)
Dept: FAMILY MEDICINE | Facility: CLINIC | Age: 29
End: 2024-04-02
Payer: COMMERCIAL

## 2024-04-02 ENCOUNTER — TELEPHONE (OUTPATIENT)
Dept: FAMILY MEDICINE | Facility: CLINIC | Age: 29
End: 2024-04-02
Payer: COMMERCIAL

## 2024-04-02 NOTE — TELEPHONE ENCOUNTER
Prior Authorization Retail Medication Request    Medication/Dose: vortioxetine (TRINTELLIX) 20 MG tablet   Diagnosis and ICD code (if different than what is on RX):    Major depressive disorder, recurrent episode, moderate (H)   [F33.1] Encounter for medication refill [Z76.0]    New/renewal/insurance change PA/secondary ins. PA:   Previously Tried and Failed:  unknown  Rationale:  unknown     Insurance   Primary: [facility], plan: [facility] CHOICE  Insurance ID:  [id number]     Secondary (if applicable): N/A  Insurance ID:  N/A    Pharmacy Information (if different than what is on RX)  Name:  [id number] [employer]   [address], [place] [address]   Phone:  [phone]  Fax:[phone]         Maddison Trevizo, MSN, RN   [facility]

## 2024-04-02 NOTE — TELEPHONE ENCOUNTER
Please make this urgent request due to patient is out of medication per Marimar at [facility].

## 2024-04-02 NOTE — TELEPHONE ENCOUNTER
Writer responded to patient message via [employer].     Maddison Trevizo, MSN, RN   [facility]

## 2024-04-03 NOTE — TELEPHONE ENCOUNTER
Called  and explained I will prescribe 2 weeks' worth (14 days). We will wait on the prior auth. She will pay for the 2 weeks' worth out of pocket.     Marissa Walton MD

## 2024-04-03 NOTE — TELEPHONE ENCOUNTER
This was sent urgent        Central Prior Authorization Team   Phone: [phone]    PA Initiation    Medication: Trintellix (formerly Brintellix) 20MG tablets    Insurance Company: [employer] ([facility]) - Phone [phone] Fax [phone]  Pharmacy Filling the Rx: [id number] [facility] [place], [place] - [address]  Filling Pharmacy Phone: [phone]  Filling Pharmacy Fax:    Start Date: 4/3/2024

## 2024-04-03 NOTE — TELEPHONE ENCOUNTER
PA in progress for Rx. Pt is out of meds and needs refill ASAP. TC was told PA Team is about 10 days behind or longer. What does PCP recommend? Please call Pt back and advise.

## 2024-04-04 NOTE — TELEPHONE ENCOUNTER
Prior Authorization Approval    Authorization Effective Date: 4/3/2024  Authorization Expiration Date: 4/3/2025  Medication: Trintellix (formerly Brintellix) 20MG tablets    Approved Dose/Quantity:   Reference #:     Insurance Company: [employer] ([facility]) - Phone [phone] Fax [phone]  Expected CoPay:       CoPay Card Available:      Foundation Assistance Needed:    Which Pharmacy is filling the prescription (Not needed for infusion/clinic administered): [id number] [facility] [place], [place] - [address]  Pharmacy Notified:  yes  Patient Notified:  yes- Pharmacy will contact patient when ready to /ship

## 2024-04-12 ENCOUNTER — OFFICE VISIT (OUTPATIENT)
Dept: FAMILY MEDICINE | Facility: CLINIC | Age: 29
End: 2024-04-12
Payer: COMMERCIAL

## 2024-04-12 ENCOUNTER — VIRTUAL VISIT (OUTPATIENT)
Dept: BEHAVIORAL HEALTH | Facility: CLINIC | Age: 29
End: 2024-04-12
Payer: COMMERCIAL

## 2024-04-12 VITALS
BODY MASS INDEX: 21.09 KG/M2 | WEIGHT: 119 LBS | RESPIRATION RATE: 15 BRPM | TEMPERATURE: 97.6 F | HEART RATE: 85 BPM | SYSTOLIC BLOOD PRESSURE: 120 MMHG | OXYGEN SATURATION: 100 % | DIASTOLIC BLOOD PRESSURE: 78 MMHG | HEIGHT: 63 IN

## 2024-04-12 DIAGNOSIS — F41.1 GAD (GENERALIZED ANXIETY DISORDER): ICD-10-CM

## 2024-04-12 DIAGNOSIS — F84.0 AUTISM SPECTRUM DISORDER: Primary | ICD-10-CM

## 2024-04-12 DIAGNOSIS — F90.8 ATTENTION DEFICIT HYPERACTIVITY DISORDER (ADHD), OTHER TYPE: ICD-10-CM

## 2024-04-12 DIAGNOSIS — J01.01 ACUTE RECURRENT MAXILLARY SINUSITIS: Primary | ICD-10-CM

## 2024-04-12 PROCEDURE — 90837 PSYTX W PT 60 MINUTES: CPT | Mod: 95 | Performed by: SOCIAL WORKER

## 2024-04-12 PROCEDURE — 99213 OFFICE O/P EST LOW 20 MIN: CPT | Performed by: PHYSICIAN ASSISTANT

## 2024-04-12 RX ORDER — LEVOFLOXACIN 500 MG/1
500 TABLET, FILM COATED ORAL DAILY
Qty: 7 TABLET | Refills: 0 | Status: SHIPPED | OUTPATIENT
Start: 2024-04-12 | End: 2024-04-19

## 2024-04-12 ASSESSMENT — PATIENT HEALTH QUESTIONNAIRE - PHQ9
SUM OF ALL RESPONSES TO PHQ QUESTIONS 1-9: 14
SUM OF ALL RESPONSES TO PHQ QUESTIONS 1-9: 14
10. IF YOU CHECKED OFF ANY PROBLEMS, HOW DIFFICULT HAVE THESE PROBLEMS MADE IT FOR YOU TO DO YOUR WORK, TAKE CARE OF THINGS AT HOME, OR GET ALONG WITH OTHER PEOPLE: SOMEWHAT DIFFICULT
SUM OF ALL RESPONSES TO PHQ QUESTIONS 1-9: 14
10. IF YOU CHECKED OFF ANY PROBLEMS, HOW DIFFICULT HAVE THESE PROBLEMS MADE IT FOR YOU TO DO YOUR WORK, TAKE CARE OF THINGS AT HOME, OR GET ALONG WITH OTHER PEOPLE: SOMEWHAT DIFFICULT
SUM OF ALL RESPONSES TO PHQ QUESTIONS 1-9: 14

## 2024-04-12 ASSESSMENT — ANXIETY QUESTIONNAIRES
2. NOT BEING ABLE TO STOP OR CONTROL WORRYING: MORE THAN HALF THE DAYS
7. FEELING AFRAID AS IF SOMETHING AWFUL MIGHT HAPPEN: NOT AT ALL
GAD7 TOTAL SCORE: 7
6. BECOMING EASILY ANNOYED OR IRRITABLE: SEVERAL DAYS
1. FEELING NERVOUS, ANXIOUS, OR ON EDGE: SEVERAL DAYS
4. TROUBLE RELAXING: SEVERAL DAYS
5. BEING SO RESTLESS THAT IT IS HARD TO SIT STILL: NOT AT ALL
GAD7 TOTAL SCORE: 7
GAD7 TOTAL SCORE: 7
3. WORRYING TOO MUCH ABOUT DIFFERENT THINGS: MORE THAN HALF THE DAYS
7. FEELING AFRAID AS IF SOMETHING AWFUL MIGHT HAPPEN: NOT AT ALL
8. IF YOU CHECKED OFF ANY PROBLEMS, HOW DIFFICULT HAVE THESE MADE IT FOR YOU TO DO YOUR WORK, TAKE CARE OF THINGS AT HOME, OR GET ALONG WITH OTHER PEOPLE?: SOMEWHAT DIFFICULT

## 2024-04-12 NOTE — PROGRESS NOTES
[facility]                                     Progress Note    Patient Name: Angela Jones  Date: 4/12/2024         Service Type: Individual      Session Start Time: 11:02am  Session End Time: 11:55am     Session Length: 53 minutes    Session #: 34    Attendees: Client attended alone    Service Modality:  Video Visit:      Provider verified identity through the following two step process.  Patient provided:  Patient is known previously to provider    Telemedicine Visit: The patient's condition can be safely assessed and treated via synchronous audio and visual telemedicine encounter.      Reason for Telemedicine Visit: Patient has requested telehealth visit    Originating Site (Patient Location): Patient's other partner's home    Distant Site (Provider Location): Provider Remote Setting- Home Office    Consent:  The patient/guardian has verbally consented to: the potential risks and benefits of telemedicine (video visit) versus in person care; bill my insurance or make self-payment for services provided; and responsibility for payment of non-covered services.     Patient would like the video invitation sent by:  My Chart    Mode of Communication:  Video Conference via [employer]well      Distant Location (Provider):  Off-site    As the provider I attest to compliance with applicable laws and regulations related to telemedicine.    DATA  Extended Session (53+ minutes): PROLONGED SERVICE IN THE OUTPATIENT SETTING REQUIRING DIRECT (FACE-TO-FACE) PATIENT CONTACT BEYOND THE USUAL SERVICE:    - Treatment protocol required additional time to complete, due to the nature of diagnosis being treated.  See Interventions section for details  Interactive Complexity: No  Crisis: No        Progress Since Last Session (Related to Symptoms / Goals / Homework):   Symptoms: No change :  anxiety and depression    Homework: Achieved / completed to satisfaction      Episode of Care Goals: Minimal progress - PREPARATION  (Decided to change - considering how); Intervened by negotiating a change plan and determining options / strategies for behavior change, identifying triggers, exploring social supports, and working towards setting a date to begin behavior change     Current / Ongoing Stressors and Concerns:  Patient provides a status update while therapist utilizes reflective listening skills. Patient shares having low energy. She has been dealing with an ongoing sinus infection and has a doctor's appointment today. Patient has started a new job and commutes by public transport (bus) - the commute takes over an hour both ways. They are in the process of selling their home and there is an offer on the table. Her ex- is in the process of filing bankruptcy.  Patient shares frustrations around having to interact with her ex.  Patient shares about some experiences with FOMO which gets in the way of alone time.        Treatment Objective(s) Addressed in This Session:   identify the fears / thoughts that contribute to feeling anxious  state understanding of stressors and relationship to physical symptoms  Increase interest, engagement, and pleasure in doing things  Decrease frequency and intensity of feeling down, depressed, hopeless  Identify negative self-talk and behaviors: challenge core beliefs, myths, and actions  Gain insight     Intervention:   Discussed mindfulness as being aware of what we are experiencing while we are experiencing it.  Contrasted this with avoidance and rumination.  Explored the role of mindfulness as an overall coping strategy for managing depression, anxiety, and strong emotions.  Explained concept of state of mind using SIFT (sensations, images, feelings, thoughts) pneumonic.  Led client in a guided mindfulness exercise focusing on sensations, images, feelings, and thoughts.  Discussed mindfulness as a tool to intentionally shift our awareness and focus as needed.  Therapist provided unconditional  positive regard and supportive counseling.     Assessments completed prior to visit:   The following assessments were completed by patient for this visit:  PHQ9:       12/7/2023    11:01 AM 12/13/2023     9:31 AM 12/19/2023     9:00 AM 1/23/2024     8:54 AM 2/6/2024     8:54 AM 2/27/2024    11:01 AM 4/12/2024    10:59 AM   PHQ-9 SCORE   PHQ-9 Total Score MyChart 9 (Mild depression) 12 (Moderate depression) 16 (Moderately severe depression) 7 (Mild depression) 12 (Moderate depression) 11 (Moderate depression) 14 (Moderate depression)   PHQ-9 Total Score 9 12 16 7 12 11 14    14     GAD7:       1/11/2023    10:00 AM 7/25/2023    12:01 PM 9/5/2023    11:44 AM 10/9/2023     6:37 PM 11/21/2023     8:54 AM 12/13/2023     9:31 AM 4/12/2024    11:02 AM   JOSHUA-7 SCORE   Total Score 10 (moderate anxiety) 10 (moderate anxiety) 9 (mild anxiety) 13 (moderate anxiety) 11 (moderate anxiety) 7 (mild anxiety) 7 (mild anxiety)   Total Score 10 10 9 13 11 7 7     PROMIS 10-Global Health (only subscores and total score):       4/19/2023     8:53 AM 7/25/2023    12:02 PM 10/24/2023     9:01 AM 11/7/2023    11:56 AM 11/21/2023     8:55 AM 2/27/2024    11:02 AM 4/12/2024    11:00 AM   PROMIS-10 Scores Only   Global Mental Health Score 12 10 11 10    10 11 12 14   Global Physical Health Score 11 11 12 12    12 12 11 12   PROMIS TOTAL - SUBSCORES 23 21 23 22    22 23 23 26     [place] Suicide Severity Rating Scale (Lifetime/Recent)      2/17/2022     3:02 PM 1/9/2024    10:54 AM   [place] Suicide Severity Rating (Lifetime/Recent)   Q1 Wished to be Dead (Past Month) no    Q2 Suicidal Thoughts (Past Month) no    Q3 Suicidal Thought Method no    Q4 Suicidal Intent without Specific Plan no    Q5 Suicide Intent with Specific Plan no    Q6 Suicide Behavior (Lifetime) yes    Within the Past 3 Months? no    Level of Risk per Screen moderate risk    1. Wish to be Dead (Past 1 Month)  N   2. Non-Specific Active Suicidal Thoughts (Past 1 Month)  N    Calculated C-SSRS Risk Score (Lifetime/Recent)  No Risk Indicated           ASSESSMENT: Current Emotional / Mental Status (status of significant symptoms):   Risk status (Self / Other harm or suicidal ideation)   Patient denies current fears or concerns for personal safety.   Patient denies current or recent suicidal ideation or behaviors.   Patient denies current or recent homicidal ideation or behaviors.   Patient denies current or recent self injurious behavior or ideation.   Patient denies other safety concerns.   Patient reports there has been no change in risk factors since their last session.     Patient reports there has been no change in protective factors since their last session.     Recommended that patient call 911 or go to the local ED should there be a change in any of these risk factors.     Appearance:   Appropriate    Eye Contact:   Good    Psychomotor Behavior: Hyperactive  Restless    Attitude:   Cooperative    Orientation:   All   Speech    Rate / Production: Pressured  Stutters    Volume:  Normal    Mood:    Anxious  Depressed  Anhedonia   Affect:    Constricted  Flat    Thought Content:  Clear    Thought Form:  Coherent    Insight:    Good      Medication Review:   No changes to current psychiatric medication(s)     Medication Compliance:   Yes     Changes in Health Issues:   None reported     Chemical Use Review:   Substance Use: Chemical use reviewed, no active concerns identified      Tobacco Use: No current tobacco use.      Diagnosis:  1. Autism spectrum disorder    2. JOSHUA (generalized anxiety disorder)    3. Attention deficit hyperactivity disorder (ADHD), other type        Collateral Reports Completed:   Not Applicable    PLAN: (Patient Tasks / Therapist Tasks / Other)  Patient will return for a virtual visit in 3 weeks      There has been demonstrated improvement in functioning while patient has been engaged in psychotherapy/psychological service- if withdrawn the patient would  deteriorate and/or relapse.       Fanny Cobb, LICSW                                                         ______________________________________________________________________    Individual Treatment Plan    Patient's Name: Angela Jones  YOB: 1995    Date of Creation: 1/19/2022  Date Treatment Plan Last Reviewed/Revised: 4/12/2024    DSM5 Diagnoses: Autism Spectrum Disorder 299.00(F84.0)  Associated with another neurodevelopmental, mental or behavioral disorder and Attention-Deficit/Hyperactivity Disorder  314.01 (F90.9) Unspecified Attention -Deficit / Hyperactivity Disorder, 296.31 (F33.0) Major Depressive Disorder, Recurrent Episode, Mild _ or 300.02 (F41.1) Generalized Anxiety Disorder  Psychosocial / Contextual Factors: 29 year old  female, , no children   PROMIS (reviewed every 90 days):   PROMIS 10-Global Health (only subscores and total score):       4/19/2023     8:53 AM 7/25/2023    12:02 PM 10/24/2023     9:01 AM 11/7/2023    11:56 AM 11/21/2023     8:55 AM 2/27/2024    11:02 AM 4/12/2024    11:00 AM   PROMIS-10 Scores Only   Global Mental Health Score 12 10 11 10    10 11 12 14   Global Physical Health Score 11 11 12 12    12 12 11 12   PROMIS TOTAL - SUBSCORES 23 21 23 22    22 23 23 26         Referral / Collaboration:  Was/were discussed and patient will pursue.    Anticipated number of session for this episode of care: 6-9  Anticipation frequency of session: Every other week  Anticipated Duration of each session: 53 or more minutes  Treatment plan will be reviewed in 90 days or when goals have been changed.       MeasurableTreatment Goal(s) related to diagnosis / functional impairment(s)  Goal 1: Patient will manage symptoms of anxiety, as evidenced by a JOSHUA-7 score of 5 or lower     I will know I've met my goal when I feel more confident in my ability to cope with stress.      Objective #A (Patient Action)    Patient will identify the initial signs or  symptoms of anxiety.  Status: Continued - Date(s): 4/12/2024    Intervention(s)  Therapist will teach  help patient gain insight into signs of stress (physically and emotionally) .    Objective #B  Patient will use relaxation strategies multiple times per day to reduce the physical symptoms of anxiety.  Status: Continued - Date(s): 4/12/2024    Intervention(s)  Therapist will  teach diaphragmatic breathing and other grounding skills .    Objective #C  Patient will use thought-stopping strategy daily to reduce intrusive thoughts.  Status: Continued - Date(s): 4/12/2024    Intervention(s)  Therapist will  teach thought stopping techniques .      Goal 2: Patient will manage symptoms of depression, as evidenced by a PHQ-9 score of 10 or lower    I will know I've met my goal when I feel more confident in my ability to express my emotions in a healthy way.      Objective #A (Patient Action)    Status: Continued - Date(s): 4/12/2024    Patient will Increase interest, engagement, and pleasure in doing things.    Intervention(s)  Therapist will teach emotional recognition/identification. * .    Objective #B  Patient will Identify negative self-talk and behaviors: challenge core beliefs, myths, and actions.    Status: Continued - Date(s): 4/12/2024    Intervention(s)  Therapist will  help patient practice positive self-talk and thought re-framing .    Goal 3: Client will manage symptoms of ADHD     I will know I've met my goal when I feel confident in completing tasks.      Objective #A (Client Action)    Client will use daily planner 75% of the time.  Status: Continued - Date(s): 4/12/2024    Intervention(s)  Therapist will  ask client to demonstrate use of planner while in session .    Objective #B  Client will identify and compliment positives at least 2 times daily to support positive self-image.    Status: Continued - Date(s): 4/12/2024    Intervention(s)  Therapist will  ask patient to demonstrate use of affirmations  during session .    Goal 4: Client will manage symptoms and experiences associated with ASD     I will know I've met my goal when I feel confident in my communication in relationships.      Objective #A (Client Action)    Client will work on being assertive and setting clear expectations with healthy boundaries in relationships  Status: Continued - Date(s): 4/12/2024    Intervention(s)  Therapist will  help patient practice assertive communication skills .        Patient has reviewed and agreed to the above plan.      Fanny Cobb, [facility]  April 12, 2024

## 2024-04-12 NOTE — PROGRESS NOTES
"  Assessment & Plan     Acute recurrent maxillary sinusitis - will treat with levaquin 500mg every day x7 days and if not resolved, would recommend sinus CT as next step.  - levofloxacin (LEVAQUIN) 500 MG tablet  Dispense: 7 tablet; Refill: 0          Subjective    is a 29 year old, presenting for the following health issues:  Sinus Problem (Sinus Issue - Has done 2 rounds of antibiotic and has been seen before for this)      4/12/2024    12:47 PM   Additional Questions   Roomed by Sandra Ferguson     Sinusitis tx 3/6/24 with doxycycline improves while on medication but restarts when she stops antibiotic  Rec'd second course of same medication and had improvement but return of symptoms after finishing course  Ongoing sinus congestion, facial pain    Sinus Problem     History of Present Illness       Reason for visit:  Recurring sinus infection    She eats 2-3 servings of fruits and vegetables daily.She consumes 3 sweetened beverage(s) daily.She exercises with enough effort to increase her heart rate 20 to 29 minutes per day.  She exercises with enough effort to increase her heart rate 4 days per week.   She is taking medications regularly.           Objective    /78 (BP Location: Right arm, Patient Position: Sitting, Cuff Size: Adult Regular)   Pulse 85   Temp 97.6  F (36.4  C) (Temporal)   Resp 15   Ht 1.6 m (5' 3\")   Wt 54 kg (119 lb)   SpO2 100%   BMI 21.08 kg/m    Body mass index is 21.08 kg/m .  Physical Exam   GENERAL: alert and no distress  EYES: Eyes grossly normal to inspection, PERRL and conjunctivae and sclerae normal  HENT: ear canals and TM's normal, nose and mouth without ulcers or lesions  NECK: cervical adenopathy  RESP: lungs clear to auscultation - no rales, rhonchi or wheezes  CV: regular rate and rhythm, normal S1 S2, no S3 or S4, no murmur, click or rub, no peripheral edema         Signed Electronically by: Sandra Ness PA-C    "

## 2024-04-13 ASSESSMENT — ANXIETY QUESTIONNAIRES: GAD7 TOTAL SCORE: 7

## 2024-04-15 DIAGNOSIS — F33.1 MAJOR DEPRESSIVE DISORDER, RECURRENT EPISODE, MODERATE (H): ICD-10-CM

## 2024-04-15 DIAGNOSIS — Z76.0 ENCOUNTER FOR MEDICATION REFILL: ICD-10-CM

## 2024-04-15 RX ORDER — VORTIOXETINE 20 MG/1
20 TABLET, FILM COATED ORAL DAILY
Qty: 14 TABLET | Refills: 0 | Status: SHIPPED | OUTPATIENT
Start: 2024-04-15 | End: 2024-04-18

## 2024-04-16 NOTE — PROGRESS NOTES
Subjective:  Angela Jones is a 29 yr old female, , who presents to clinic today for:  STD screening: desires to get gonorrhea & chlamydia tests done every 3 months. Had HIV and syphilis testing in 2023 and declines these today. No symptoms today or known exposures  Wondering if it is normal for her menses to return with the Mirena IUD. It was placed 3/23/2017. She is able to feel her strings. She has now gotten two monthly periods, bleeding lasts about 3-4 days and is moderate in amount. Denies pelvic pain.     Past Medical History:   Diagnosis Date    Flat foot 3/25/2014    JOSHUA (generalised anxiety disorder) 3/25/2014    Hypoxia in liveborn infant     born hypoxic    Migraine     Migraine with aura 2014    Migraines     Moderate major depression (H) 3/25/2014    OCD (obsessive compulsive disorder) 2013    Palpitations     Self mutilating behavior 2013    cut self with pencil sharpener blade, left arm     Past Surgical History:   Procedure Laterality Date    NO PAST SURGERIES      UPPER GI ENDOSCOPY  12     Family History   Problem Relation Age of Onset    Depression Mother         depression and anxiety     Anxiety Disorder Mother     Mental Illness Mother     Neurologic Disorder Father         neuropathy     Depression Father         depression/anxiety     Bipolar Disorder Father     Anxiety Disorder Father     Heart Disease Father     Cerebrovascular Disease Maternal Grandmother     Cancer Paternal Grandfather         Lung    Cerebrovascular Disease Paternal Grandfather     Substance Abuse Other     Mental Illness Other     Skin Cancer No family hx of     Neuropathy Father     Diabetes Type 2  Father     Migraines Father     Breast Cancer Maternal Grandmother     Alzheimer Disease Maternal Grandmother         Allergies   Allergen Reactions    Adhesive Tape Hives     EKG Patches; any adhesives including band aides; burns      EKG Patches; any adhesives including band aides; burns  EKG  Patches; any adhesives including band aides; burns  EKG Patches; any adhesives including band aides; burns      Glucose Other (See Comments)     Other reaction(s): GI intolerance  GI intolerance      Azithromycin Nausea and Vomiting    Fructose Cramps, Diarrhea, GI Disturbance and Nausea and Vomiting    Lactose Nausea     Current Outpatient Medications   Medication Sig Dispense Refill    amitriptyline (ELAVIL) 25 MG tablet TAKE 2 TABLETS(50 MG) BY MOUTH AT BEDTIME. SEE YOUR DOCTOR FOR FURTHER REFILLS. 180 tablet 3    diphenhydrAMINE (BENADRYL) 50 MG capsule Take 50 mg by mouth as needed      esomeprazole (NEXIUM) 20 MG DR capsule TAKE 1 CAPSULE BY MOUTH EVERY MORNING BEFORE BREAKFAST 90 capsule 3    famotidine (PEPCID) 40 MG tablet Take 1 tablet (40 mg) by mouth daily 30 tablet 1    fludrocortisone (FLORINEF) 0.1 MG tablet TAKE 2 TABLETS (0.2 MG) BY MOUTH DAILY 180 tablet 1    gabapentin (NEURONTIN) 300 MG capsule Take 3 capsules (900 mg) by mouth 3 times daily 810 capsule 3    guanFACINE (TENEX) 2 MG tablet Take 1 tablet (2 mg) by mouth every 24 hours 90 tablet 3    hyoscyamine (LEVSIN/SL) 0.125 MG sublingual tablet Take 1 tablet (0.125 mg) by mouth every 4 hours as needed 120 tablet 5    ipratropium (ATROVENT HFA) 17 MCG/ACT inhaler Inhale 2 puffs into the lungs every 6 hours      ipratropium (ATROVENT) 0.02 % nebulizer solution Take 0.5 mg by nebulization daily as needed for wheezing      levofloxacin (LEVAQUIN) 500 MG tablet Take 1 tablet (500 mg) by mouth daily for 7 days 7 tablet 0    Melatonin 10 MG TABS Take 10 mg by mouth nightly as needed      metoclopramide (REGLAN) 5 MG tablet Take 1 tablet (5 mg) by mouth 3 times daily as needed (for nausea) 90 tablet 0    mirtazapine (REMERON) 15 MG tablet TAKE 1 TABLET(15 MG) BY MOUTH DAILY 90 tablet 3    ondansetron (ZOFRAN-ODT) 4 MG ODT tab Take 4 mg by mouth as needed      pindolol (VISKEN) 5 MG tablet Take 1 tablet (5 mg) by mouth 2 times daily as needed  "(palpitations) 60 tablet 0    VITAMIN D, CHOLECALCIFEROL, PO Take  by mouth daily.      vortioxetine (TRINTELLIX) 20 MG tablet Take 1 tablet (20 mg) by mouth daily 30 tablet 11    levonorgestrel (MIRENA, 52 MG,) 20 MCG/24HR IUD 1 each (20 mcg) by Intrauterine route once for 1 dose 1 each 0     No current facility-administered medications for this visit.     Objective:  /80   Pulse 89   Ht 1.6 m (5' 3\")   Wt 55.6 kg (122 lb 8 oz)   LMP 04/04/2024 (Approximate)   BMI 21.70 kg/m    General: pleasant female in no acute distress  Psych: normal mentation, well oriented  Respiratory:  Unlabored breathing  Musculoskeletal: no gross deformities    Assessment/ Plan:  1. Screen for STD (sexually transmitted disease)  - Gonorrhea PCR  - Chlamydia PCR (Clinic Collect)  Results will be available on [facility].     2. Intrauterine device surveillance  - Counseled pt that her monthly moderate bleeding episodes that lasted 3-4 days each sounds like a normal menstrual period, which resumed ~7 yrs after LNG placement, which is not uncommon.  She denies any other pelvic or vaginal concerns and has been able to feel her IUD strings. The Mirena IUD is FDA approved for 8 yrs and can remain in place until 3/2025, however, if menses become bothersome to patient, she can have the IUD removed and replaced sooner.      Pt left, stable. She expressed understanding and agreement with the plan for care.  Gris Adam, DNP, APRN, WHNP        "

## 2024-04-17 ENCOUNTER — MYC MEDICAL ADVICE (OUTPATIENT)
Dept: FAMILY MEDICINE | Facility: CLINIC | Age: 29
End: 2024-04-17
Payer: COMMERCIAL

## 2024-04-17 ENCOUNTER — MYC REFILL (OUTPATIENT)
Dept: FAMILY MEDICINE | Facility: CLINIC | Age: 29
End: 2024-04-17
Payer: COMMERCIAL

## 2024-04-17 DIAGNOSIS — Z76.0 ENCOUNTER FOR MEDICATION REFILL: ICD-10-CM

## 2024-04-17 DIAGNOSIS — F33.1 MAJOR DEPRESSIVE DISORDER, RECURRENT EPISODE, MODERATE (H): ICD-10-CM

## 2024-04-18 ENCOUNTER — OFFICE VISIT (OUTPATIENT)
Dept: OBGYN | Facility: CLINIC | Age: 29
End: 2024-04-18
Attending: NURSE PRACTITIONER
Payer: COMMERCIAL

## 2024-04-18 VITALS
HEART RATE: 89 BPM | SYSTOLIC BLOOD PRESSURE: 115 MMHG | WEIGHT: 122.5 LBS | BODY MASS INDEX: 21.71 KG/M2 | DIASTOLIC BLOOD PRESSURE: 80 MMHG | HEIGHT: 63 IN

## 2024-04-18 DIAGNOSIS — Z30.431 INTRAUTERINE DEVICE SURVEILLANCE: ICD-10-CM

## 2024-04-18 DIAGNOSIS — Z11.3 SCREEN FOR STD (SEXUALLY TRANSMITTED DISEASE): Primary | ICD-10-CM

## 2024-04-18 PROCEDURE — 99213 OFFICE O/P EST LOW 20 MIN: CPT | Performed by: NURSE PRACTITIONER

## 2024-04-18 PROCEDURE — 87591 N.GONORRHOEAE DNA AMP PROB: CPT | Performed by: NURSE PRACTITIONER

## 2024-04-18 PROCEDURE — 87491 CHLMYD TRACH DNA AMP PROBE: CPT | Performed by: NURSE PRACTITIONER

## 2024-04-18 PROCEDURE — 99203 OFFICE O/P NEW LOW 30 MIN: CPT | Performed by: NURSE PRACTITIONER

## 2024-04-18 RX ORDER — METOCLOPRAMIDE 5 MG/1
5 TABLET ORAL 3 TIMES DAILY PRN
Qty: 90 TABLET | Refills: 0 | Status: SHIPPED | OUTPATIENT
Start: 2024-04-18 | End: 2024-05-20

## 2024-04-18 ASSESSMENT — ANXIETY QUESTIONNAIRES
4. TROUBLE RELAXING: SEVERAL DAYS
8. IF YOU CHECKED OFF ANY PROBLEMS, HOW DIFFICULT HAVE THESE MADE IT FOR YOU TO DO YOUR WORK, TAKE CARE OF THINGS AT HOME, OR GET ALONG WITH OTHER PEOPLE?: SOMEWHAT DIFFICULT
1. FEELING NERVOUS, ANXIOUS, OR ON EDGE: MORE THAN HALF THE DAYS
7. FEELING AFRAID AS IF SOMETHING AWFUL MIGHT HAPPEN: NOT AT ALL
GAD7 TOTAL SCORE: 8
IF YOU CHECKED OFF ANY PROBLEMS ON THIS QUESTIONNAIRE, HOW DIFFICULT HAVE THESE PROBLEMS MADE IT FOR YOU TO DO YOUR WORK, TAKE CARE OF THINGS AT HOME, OR GET ALONG WITH OTHER PEOPLE: SOMEWHAT DIFFICULT
7. FEELING AFRAID AS IF SOMETHING AWFUL MIGHT HAPPEN: NOT AT ALL
3. WORRYING TOO MUCH ABOUT DIFFERENT THINGS: MORE THAN HALF THE DAYS
5. BEING SO RESTLESS THAT IT IS HARD TO SIT STILL: SEVERAL DAYS
GAD7 TOTAL SCORE: 8
2. NOT BEING ABLE TO STOP OR CONTROL WORRYING: SEVERAL DAYS
6. BECOMING EASILY ANNOYED OR IRRITABLE: SEVERAL DAYS

## 2024-04-18 NOTE — TELEPHONE ENCOUNTER
Pt was a coupon for Rx but only for a 30 day or 90 Rx. Pt asking if PCP can send in a Rx for 30 days. Please let Pt know when it is done or if PCP is unable to.

## 2024-04-18 NOTE — LETTER
2024       RE: Angela Jones  [address]  [place] [address]     Dear Colleague,    Thank you for referring your patient, Angela Jones, to the [facility] [place] at [facility]. Please see a copy of my visit note below.    Subjective:  Angela Jones is a 29 yr old female, , who presents to clinic today for:  STD screening: desires to get gonorrhea & chlamydia tests done every 3 months. Had HIV and syphilis testing in 2023 and declines these today. No symptoms today or known exposures  Wondering if it is normal for her menses to return with the Mirena IUD. It was placed 3/23/2017. She is able to feel her strings. She has now gotten two monthly periods, bleeding lasts about 3-4 days and is moderate in amount. Denies pelvic pain.     Past Medical History:   Diagnosis Date    Flat foot 3/25/2014    JOSHUA (generalised anxiety disorder) 3/25/2014    Hypoxia in liveborn infant     born hypoxic    Migraine     Migraine with aura 2014    Migraines     Moderate major depression (H) 3/25/2014    OCD (obsessive compulsive disorder) 2013    Palpitations     Self mutilating behavior 2013    cut self with pencil sharpener blade, left arm     Past Surgical History:   Procedure Laterality Date    NO PAST SURGERIES      UPPER GI ENDOSCOPY  12     Family History   Problem Relation Age of Onset    Depression Mother         depression and anxiety     Anxiety Disorder Mother     Mental Illness Mother     Neurologic Disorder Father         neuropathy     Depression Father         depression/anxiety     Bipolar Disorder Father     Anxiety Disorder Father     Heart Disease Father     Cerebrovascular Disease Maternal Grandmother     Cancer Paternal Grandfather         Lung    Cerebrovascular Disease Paternal Grandfather     Substance Abuse Other     Mental Illness Other     Skin Cancer No family hx of     Neuropathy  Father     Diabetes Type 2  Father     Migraines Father     Breast Cancer Maternal Grandmother     Alzheimer Disease Maternal Grandmother         Allergies   Allergen Reactions    Adhesive Tape Hives     EKG Patches; any adhesives including band aides; burns      EKG Patches; any adhesives including band aides; burns  EKG Patches; any adhesives including band aides; burns  EKG Patches; any adhesives including band aides; burns      Glucose Other (See Comments)     Other reaction(s): GI intolerance  GI intolerance      Azithromycin Nausea and Vomiting    Fructose Cramps, Diarrhea, GI Disturbance and Nausea and Vomiting    Lactose Nausea     Current Outpatient Medications   Medication Sig Dispense Refill    amitriptyline (ELAVIL) 25 MG tablet TAKE 2 TABLETS(50 MG) BY MOUTH AT BEDTIME. SEE YOUR DOCTOR FOR FURTHER REFILLS. 180 tablet 3    diphenhydrAMINE (BENADRYL) 50 MG capsule Take 50 mg by mouth as needed      esomeprazole (NEXIUM) 20 MG DR capsule TAKE 1 CAPSULE BY MOUTH EVERY MORNING BEFORE BREAKFAST 90 capsule 3    famotidine (PEPCID) 40 MG tablet Take 1 tablet (40 mg) by mouth daily 30 tablet 1    fludrocortisone (FLORINEF) 0.1 MG tablet TAKE 2 TABLETS (0.2 MG) BY MOUTH DAILY 180 tablet 1    gabapentin (NEURONTIN) 300 MG capsule Take 3 capsules (900 mg) by mouth 3 times daily 810 capsule 3    guanFACINE (TENEX) 2 MG tablet Take 1 tablet (2 mg) by mouth every 24 hours 90 tablet 3    hyoscyamine (LEVSIN/SL) 0.125 MG sublingual tablet Take 1 tablet (0.125 mg) by mouth every 4 hours as needed 120 tablet 5    ipratropium (ATROVENT HFA) 17 MCG/ACT inhaler Inhale 2 puffs into the lungs every 6 hours      ipratropium (ATROVENT) 0.02 % nebulizer solution Take 0.5 mg by nebulization daily as needed for wheezing      levofloxacin (LEVAQUIN) 500 MG tablet Take 1 tablet (500 mg) by mouth daily for 7 days 7 tablet 0    Melatonin 10 MG TABS Take 10 mg by mouth nightly as needed      metoclopramide (REGLAN) 5 MG tablet Take 1  "tablet (5 mg) by mouth 3 times daily as needed (for nausea) 90 tablet 0    mirtazapine (REMERON) 15 MG tablet TAKE 1 TABLET(15 MG) BY MOUTH DAILY 90 tablet 3    ondansetron (ZOFRAN-ODT) 4 MG ODT tab Take 4 mg by mouth as needed      pindolol (VISKEN) 5 MG tablet Take 1 tablet (5 mg) by mouth 2 times daily as needed (palpitations) 60 tablet 0    VITAMIN D, CHOLECALCIFEROL, PO Take  by mouth daily.      vortioxetine (TRINTELLIX) 20 MG tablet Take 1 tablet (20 mg) by mouth daily 30 tablet 11    levonorgestrel (MIRENA, 52 MG,) 20 MCG/24HR IUD 1 each (20 mcg) by Intrauterine route once for 1 dose 1 each 0     No current facility-administered medications for this visit.     Objective:  /80   Pulse 89   Ht 1.6 m (5' 3\")   Wt 55.6 kg (122 lb 8 oz)   LMP 04/04/2024 (Approximate)   BMI 21.70 kg/m    General: pleasant female in no acute distress  Psych: normal mentation, well oriented  Respiratory:  Unlabored breathing  Musculoskeletal: no gross deformities    Assessment/ Plan:  1. Screen for STD (sexually transmitted disease)  - Gonorrhea PCR  - Chlamydia PCR (Clinic Collect)  Results will be available on [employer].     2. Intrauterine device surveillance  - Counseled pt that her monthly moderate bleeding episodes that lasted 3-4 days each sounds like a normal menstrual period, which resumed ~7 yrs after LNG placement, which is not uncommon.  She denies any other pelvic or vaginal concerns and has been able to feel her IUD strings. The Mirena IUD is FDA approved for 8 yrs and can remain in place until 3/2025, however, if menses become bothersome to patient, she can have the IUD removed and replaced sooner.      Pt left, stable. She expressed understanding and agreement with the plan for care.  Gris Adam, DNP, APRN, WHNP      "

## 2024-04-18 NOTE — TELEPHONE ENCOUNTER
Writer responded via [employer].  LAUREN ShieldsN, RN-BC  [facility]

## 2024-04-18 NOTE — PATIENT INSTRUCTIONS
Thank you for trusting us with your care!     If you need to contact us for questions about:  Symptoms, Scheduling & Medical Questions; Non-urgent (2-3 day response) Rach message, Urgent (needing response today) [phone] (if after 3:30pm next day response)   Prescriptions: Please call your Pharmacy   Billing: Joi [phone] or [facility]:[phone]

## 2024-04-19 LAB
C TRACH DNA SPEC QL NAA+PROBE: NEGATIVE
N GONORRHOEA DNA SPEC QL NAA+PROBE: NEGATIVE

## 2024-04-22 ENCOUNTER — MYC MEDICAL ADVICE (OUTPATIENT)
Dept: FAMILY MEDICINE | Facility: CLINIC | Age: 29
End: 2024-04-22
Payer: COMMERCIAL

## 2024-04-22 DIAGNOSIS — J01.01 ACUTE RECURRENT MAXILLARY SINUSITIS: ICD-10-CM

## 2024-04-22 DIAGNOSIS — J01.31 ACUTE RECURRENT SPHENOIDAL SINUSITIS: Primary | ICD-10-CM

## 2024-04-23 NOTE — TELEPHONE ENCOUNTER
Writer responded to patient message via [email or web address].     Maddison Trevizo, MSN, RN   [facility]

## 2024-04-23 NOTE — TELEPHONE ENCOUNTER
Sandra,    Patient has recurring sinus infection following 7 day course of levaquin [3rd course of antibiotics].  You noted next step is CT scan. Order pended for review.  Thank you.    Janae Palacios RN, BSN, PHN  [facility]

## 2024-05-06 ENCOUNTER — VIRTUAL VISIT (OUTPATIENT)
Dept: BEHAVIORAL HEALTH | Facility: CLINIC | Age: 29
End: 2024-05-06
Payer: COMMERCIAL

## 2024-05-06 DIAGNOSIS — F33.0 MILD EPISODE OF RECURRENT MAJOR DEPRESSIVE DISORDER (H): ICD-10-CM

## 2024-05-06 DIAGNOSIS — F41.1 GAD (GENERALIZED ANXIETY DISORDER): ICD-10-CM

## 2024-05-06 DIAGNOSIS — F84.0 AUTISM SPECTRUM DISORDER: Primary | ICD-10-CM

## 2024-05-06 DIAGNOSIS — F90.8 ATTENTION DEFICIT HYPERACTIVITY DISORDER (ADHD), OTHER TYPE: ICD-10-CM

## 2024-05-06 PROCEDURE — 90837 PSYTX W PT 60 MINUTES: CPT | Mod: 95 | Performed by: SOCIAL WORKER

## 2024-05-06 ASSESSMENT — ANXIETY QUESTIONNAIRES
7. FEELING AFRAID AS IF SOMETHING AWFUL MIGHT HAPPEN: SEVERAL DAYS
GAD7 TOTAL SCORE: 9
1. FEELING NERVOUS, ANXIOUS, OR ON EDGE: NEARLY EVERY DAY
2. NOT BEING ABLE TO STOP OR CONTROL WORRYING: SEVERAL DAYS
3. WORRYING TOO MUCH ABOUT DIFFERENT THINGS: SEVERAL DAYS
GAD7 TOTAL SCORE: 9
5. BEING SO RESTLESS THAT IT IS HARD TO SIT STILL: SEVERAL DAYS
8. IF YOU CHECKED OFF ANY PROBLEMS, HOW DIFFICULT HAVE THESE MADE IT FOR YOU TO DO YOUR WORK, TAKE CARE OF THINGS AT HOME, OR GET ALONG WITH OTHER PEOPLE?: VERY DIFFICULT
7. FEELING AFRAID AS IF SOMETHING AWFUL MIGHT HAPPEN: SEVERAL DAYS
6. BECOMING EASILY ANNOYED OR IRRITABLE: NOT AT ALL
4. TROUBLE RELAXING: MORE THAN HALF THE DAYS
GAD7 TOTAL SCORE: 9
IF YOU CHECKED OFF ANY PROBLEMS ON THIS QUESTIONNAIRE, HOW DIFFICULT HAVE THESE PROBLEMS MADE IT FOR YOU TO DO YOUR WORK, TAKE CARE OF THINGS AT HOME, OR GET ALONG WITH OTHER PEOPLE: VERY DIFFICULT

## 2024-05-06 NOTE — PROGRESS NOTES
[facility]                                     Progress Note    Patient Name: Angela Jones  Date: 5/6/2024         Service Type: Individual      Session Start Time: 11:00am  Session End Time: 11:53am     Session Length: 53 minutes    Session #: 35    Attendees: Client attended alone    Service Modality:  Video Visit:      Provider verified identity through the following two step process.  Patient provided:  Patient is known previously to provider    Telemedicine Visit: The patient's condition can be safely assessed and treated via synchronous audio and visual telemedicine encounter.      Reason for Telemedicine Visit: Patient has requested telehealth visit    Originating Site (Patient Location): Patient's home    Distant Site (Provider Location): [facility]: [place]    Consent:  The patient/guardian has verbally consented to: the potential risks and benefits of telemedicine (video visit) versus in person care; bill my insurance or make self-payment for services provided; and responsibility for payment of non-covered services.     Patient would like the video invitation sent by:  My Chart    Mode of Communication:  Video Conference via [employer]      Distant Location (Provider):  On-site    As the provider I attest to compliance with applicable laws and regulations related to telemedicine.    DATA  Extended Session (53+ minutes): PROLONGED SERVICE IN THE OUTPATIENT SETTING REQUIRING DIRECT (FACE-TO-FACE) PATIENT CONTACT BEYOND THE USUAL SERVICE:    - Treatment protocol required additional time to complete, due to the nature of diagnosis being treated.  See Interventions section for details  Interactive Complexity: No  Crisis: No        Progress Since Last Session (Related to Symptoms / Goals / Homework):   Symptoms: No change :  anxiety and depression    Homework: Achieved / completed to satisfaction      Episode of Care Goals: Minimal progress - PREPARATION (Decided to change  - considering how); Intervened by negotiating a change plan and determining options / strategies for behavior change, identifying triggers, exploring social supports, and working towards setting a date to begin behavior change     Current / Ongoing Stressors and Concerns:  Patient provides a status update while therapist utilizes reflective listening skills. Patient reports financial stressors. She is having issues paying for her medications due to insurance changes. She wants to meet with psychiatry to adjust medications. She reports that she has been getting anxiety attacks daily, worsening depressive episodes and panic attacks. She is having feelings about the feelings with thought spirals.  Patient has been battling an ongoing sinus infection despite 3 rounds of antibiotics - she did have a CT scan on Friday. She will be transferring to a new work location in [facility]. She will have to move next year. She reports ongoing issues with her ex.        Treatment Objective(s) Addressed in This Session:   identify the fears / thoughts that contribute to feeling anxious  state understanding of stressors and relationship to physical symptoms  Increase interest, engagement, and pleasure in doing things  Decrease frequency and intensity of feeling down, depressed, hopeless  Identify negative self-talk and behaviors: challenge core beliefs, myths, and actions  Gain insight     Intervention:   Discussed mindfulness as being aware of what we are experiencing while we are experiencing it.  Contrasted this with avoidance and rumination.  Explored the role of mindfulness as an overall coping strategy for managing depression, anxiety, and strong emotions.  Explained concept of state of mind using SIFT (sensations, images, feelings, thoughts) pneumonic. Discussed mindfulness as a tool to intentionally shift our awareness and focus as needed.  Discussed physical, mental, and emotional boundaries and limit-setting with others.  Explored management of boundaries through direct communication and limiting contact and communication with others.  Discussed barriers to using boundary management skills including strong emotions and physical proximity.  Therapist provided unconditional positive regard and supportive counseling.     Assessments completed prior to visit:   The following assessments were completed by patient for this visit:  PHQ9:       12/7/2023    11:01 AM 12/13/2023     9:31 AM 12/19/2023     9:00 AM 1/23/2024     8:54 AM 2/6/2024     8:54 AM 2/27/2024    11:01 AM 4/12/2024    10:59 AM   PHQ-9 SCORE   PHQ-9 Total Score MyChart 9 (Mild depression) 12 (Moderate depression) 16 (Moderately severe depression) 7 (Mild depression) 12 (Moderate depression) 11 (Moderate depression) 14 (Moderate depression)   PHQ-9 Total Score 9 12 16 7 12 11 14    14     GAD7:       9/5/2023    11:44 AM 10/9/2023     6:37 PM 11/21/2023     8:54 AM 12/13/2023     9:31 AM 4/12/2024    11:02 AM 4/18/2024     1:29 PM 5/6/2024    10:51 AM   JOSHUA-7 SCORE   Total Score 9 (mild anxiety) 13 (moderate anxiety) 11 (moderate anxiety) 7 (mild anxiety) 7 (mild anxiety) 8 (mild anxiety) 9 (mild anxiety)   Total Score 9 13 11 7 7 8 9     PROMIS 10-Global Health (only subscores and total score):       4/19/2023     8:53 AM 7/25/2023    12:02 PM 10/24/2023     9:01 AM 11/7/2023    11:56 AM 11/21/2023     8:55 AM 2/27/2024    11:02 AM 4/12/2024    11:00 AM   PROMIS-10 Scores Only   Global Mental Health Score 12 10 11 10    10 11 12 14   Global Physical Health Score 11 11 12 12    12 12 11 12   PROMIS TOTAL - SUBSCORES 23 21 23 22    22 23 23 26     Cass Suicide Severity Rating Scale (Lifetime/Recent)      2/17/2022     3:02 PM 1/9/2024    10:54 AM   Cass Suicide Severity Rating (Lifetime/Recent)   Q1 Wished to be Dead (Past Month) no    Q2 Suicidal Thoughts (Past Month) no    Q3 Suicidal Thought Method no    Q4 Suicidal Intent without Specific Plan no    Q5 Suicide  Intent with Specific Plan no    Q6 Suicide Behavior (Lifetime) yes    Within the Past 3 Months? no    Level of Risk per Screen moderate risk    1. Wish to be Dead (Past 1 Month)  N   2. Non-Specific Active Suicidal Thoughts (Past 1 Month)  N   Calculated C-SSRS Risk Score (Lifetime/Recent)  No Risk Indicated           ASSESSMENT: Current Emotional / Mental Status (status of significant symptoms):   Risk status (Self / Other harm or suicidal ideation)   Patient denies current fears or concerns for personal safety.   Patient denies current or recent suicidal ideation or behaviors.   Patient denies current or recent homicidal ideation or behaviors.   Patient denies current or recent self injurious behavior or ideation.   Patient denies other safety concerns.   Patient reports there has been no change in risk factors since their last session.     Patient reports there has been no change in protective factors since their last session.     Recommended that patient call 911 or go to the local ED should there be a change in any of these risk factors.     Appearance:   Appropriate    Eye Contact:   Good    Psychomotor Behavior: Hyperactive  Restless    Attitude:   Cooperative    Orientation:   All   Speech    Rate / Production: Pressured  Stutters    Volume:  Normal    Mood:    Anxious  Depressed  Anhedonia   Affect:    Constricted  Flat    Thought Content:  Clear    Thought Form:  Coherent    Insight:    Good      Medication Review:   No changes to current psychiatric medication(s)     Medication Compliance:   Yes     Changes in Health Issues:   None reported     Chemical Use Review:   Substance Use: Chemical use reviewed, no active concerns identified      Tobacco Use: No current tobacco use.      Diagnosis:  1. Autism spectrum disorder    2. JOSHUA (generalized anxiety disorder)    3. Attention deficit hyperactivity disorder (ADHD), other type    4. Mild episode of recurrent major depressive disorder (H24)        Collateral  Reports Completed:   Not Applicable    PLAN: (Patient Tasks / Therapist Tasks / Other)  Patient will return for a virtual visit in 1 week  Patient encouraged to maintain healthy boundaries  Patient encouraged to follow up with psychiatry     There has been demonstrated improvement in functioning while patient has been engaged in psychotherapy/psychological service- if withdrawn the patient would deteriorate and/or relapse.       Fanny Cobb, [facility]                                                         ______________________________________________________________________    Individual Treatment Plan    Patient's Name: Angela Jones  YOB: 1995    Date of Creation: 1/19/2022  Date Treatment Plan Last Reviewed/Revised: 4/12/2024    DSM5 Diagnoses: Autism Spectrum Disorder 299.00(F84.0)  Associated with another neurodevelopmental, mental or behavioral disorder and Attention-Deficit/Hyperactivity Disorder  314.01 (F90.9) Unspecified Attention -Deficit / Hyperactivity Disorder, 296.31 (F33.0) Major Depressive Disorder, Recurrent Episode, Mild _ or 300.02 (F41.1) Generalized Anxiety Disorder  Psychosocial / Contextual Factors: 29 year old  female, , no children   PROMIS (reviewed every 90 days):   PROMIS 10-Global Health (only subscores and total score):       4/19/2023     8:53 AM 7/25/2023    12:02 PM 10/24/2023     9:01 AM 11/7/2023    11:56 AM 11/21/2023     8:55 AM 2/27/2024    11:02 AM 4/12/2024    11:00 AM   PROMIS-10 Scores Only   Global Mental Health Score 12 10 11 10    10 11 12 14   Global Physical Health Score 11 11 12 12    12 12 11 12   PROMIS TOTAL - SUBSCORES 23 21 23 22    22 23 23 26         Referral / Collaboration:  Was/were discussed and patient will pursue.    Anticipated number of session for this episode of care: 6-9  Anticipation frequency of session: Every other week  Anticipated Duration of each session: 53 or more minutes  Treatment plan will be reviewed  in 90 days or when goals have been changed.       MeasurableTreatment Goal(s) related to diagnosis / functional impairment(s)  Goal 1: Patient will manage symptoms of anxiety, as evidenced by a JOSHUA-7 score of 5 or lower     I will know I've met my goal when I feel more confident in my ability to cope with stress.      Objective #A (Patient Action)    Patient will identify the initial signs or symptoms of anxiety.  Status: Continued - Date(s): 4/12/2024    Intervention(s)  Therapist will teach  help patient gain insight into signs of stress (physically and emotionally) .    Objective #B  Patient will use relaxation strategies multiple times per day to reduce the physical symptoms of anxiety.  Status: Continued - Date(s): 4/12/2024    Intervention(s)  Therapist will  teach diaphragmatic breathing and other grounding skills .    Objective #C  Patient will use thought-stopping strategy daily to reduce intrusive thoughts.  Status: Continued - Date(s): 4/12/2024    Intervention(s)  Therapist will  teach thought stopping techniques .      Goal 2: Patient will manage symptoms of depression, as evidenced by a PHQ-9 score of 10 or lower    I will know I've met my goal when I feel more confident in my ability to express my emotions in a healthy way.      Objective #A (Patient Action)    Status: Continued - Date(s): 4/12/2024    Patient will Increase interest, engagement, and pleasure in doing things.    Intervention(s)  Therapist will teach emotional recognition/identification. * .    Objective #B  Patient will Identify negative self-talk and behaviors: challenge core beliefs, myths, and actions.    Status: Continued - Date(s): 4/12/2024    Intervention(s)  Therapist will  help patient practice positive self-talk and thought re-framing .    Goal 3: Client will manage symptoms of ADHD     I will know I've met my goal when I feel confident in completing tasks.      Objective #A (Client Action)    Client will use daily planner  75% of the time.  Status: Continued - Date(s): 4/12/2024    Intervention(s)  Therapist will  ask client to demonstrate use of planner while in session .    Objective #B  Client will identify and compliment positives at least 2 times daily to support positive self-image.    Status: Continued - Date(s): 4/12/2024    Intervention(s)  Therapist will  ask patient to demonstrate use of affirmations during session .    Goal 4: Client will manage symptoms and experiences associated with ASD     I will know I've met my goal when I feel confident in my communication in relationships.      Objective #A (Client Action)    Client will work on being assertive and setting clear expectations with healthy boundaries in relationships  Status: Continued - Date(s): 4/12/2024    Intervention(s)  Therapist will  help patient practice assertive communication skills .        Patient has reviewed and agreed to the above plan.      Fanny Cobb, [facility]  April 12, 2024

## 2024-05-07 RX ORDER — LEVOFLOXACIN 750 MG/1
750 TABLET, FILM COATED ORAL DAILY
Qty: 7 TABLET | Refills: 0 | Status: SHIPPED | OUTPATIENT
Start: 2024-05-07 | End: 2024-05-14

## 2024-05-14 NOTE — TELEPHONE ENCOUNTER
"FUTURE VISIT INFORMATION      FUTURE VISIT INFORMATION:  Date: 6/10/24  Time: 12pm  Location: [facility]  REFERRAL INFORMATION:  Referring provider:  Sandra Ness PA-C   Referring providers clinic:  [facility]   Reason for visit/diagnosis  per patient recurrent sinus infections confirmed [facility]     RECORDS REQUESTED FROM:       Clinic name Comments Records Status Imaging Status   [facility]  4/12/24- OV Sandra Ness PA-C  Epic     Imaging  5/3/24- CT Sinus  Epic  PACS            \"Please notify/message CSS if patient completed outside imaging prior to scheduled appointment and/or any outside records that might have been missed at pre visit -Thank you\"  "

## 2024-05-18 DIAGNOSIS — Z76.0 ENCOUNTER FOR MEDICATION REFILL: ICD-10-CM

## 2024-05-19 ENCOUNTER — MYC MEDICAL ADVICE (OUTPATIENT)
Dept: FAMILY MEDICINE | Facility: CLINIC | Age: 29
End: 2024-05-19
Payer: COMMERCIAL

## 2024-05-20 ENCOUNTER — OFFICE VISIT (OUTPATIENT)
Dept: URGENT CARE | Facility: URGENT CARE | Age: 29
End: 2024-05-20
Payer: COMMERCIAL

## 2024-05-20 ENCOUNTER — NURSE TRIAGE (OUTPATIENT)
Dept: FAMILY MEDICINE | Facility: CLINIC | Age: 29
End: 2024-05-20
Payer: COMMERCIAL

## 2024-05-20 VITALS
DIASTOLIC BLOOD PRESSURE: 79 MMHG | OXYGEN SATURATION: 99 % | BODY MASS INDEX: 21.26 KG/M2 | WEIGHT: 120 LBS | TEMPERATURE: 98.6 F | SYSTOLIC BLOOD PRESSURE: 118 MMHG | HEART RATE: 87 BPM

## 2024-05-20 DIAGNOSIS — T50.905A MEDICATION SIDE EFFECTS, INITIAL ENCOUNTER: Primary | ICD-10-CM

## 2024-05-20 DIAGNOSIS — M67.90: ICD-10-CM

## 2024-05-20 DIAGNOSIS — M79.10 MUSCULAR ACHES: ICD-10-CM

## 2024-05-20 LAB
ALBUMIN SERPL-MCNC: 3.6 G/DL (ref 3.4–5)
ALP SERPL-CCNC: 63 U/L (ref 40–150)
ALT SERPL W P-5'-P-CCNC: 10 U/L (ref 0–50)
ANION GAP SERPL CALCULATED.3IONS-SCNC: <1 MMOL/L (ref 3–14)
AST SERPL W P-5'-P-CCNC: 17 U/L (ref 0–45)
BILIRUB SERPL-MCNC: 0.5 MG/DL (ref 0.2–1.3)
BUN SERPL-MCNC: 8 MG/DL (ref 7–30)
CALCIUM SERPL-MCNC: 9.4 MG/DL (ref 8.5–10.1)
CHLORIDE BLD-SCNC: 109 MMOL/L (ref 94–109)
CO2 SERPL-SCNC: 30 MMOL/L (ref 20–32)
CREAT SERPL-MCNC: 0.8 MG/DL (ref 0.52–1.04)
EGFRCR SERPLBLD CKD-EPI 2021: >90 ML/MIN/1.73M2
ERYTHROCYTE [DISTWIDTH] IN BLOOD BY AUTOMATED COUNT: 12.2 % (ref 10–15)
ERYTHROCYTE [SEDIMENTATION RATE] IN BLOOD BY WESTERGREN METHOD: 10 MM/HR (ref 0–20)
GLUCOSE BLD-MCNC: 122 MG/DL (ref 70–99)
HCT VFR BLD AUTO: 38.1 % (ref 35–47)
HGB BLD-MCNC: 12.5 G/DL (ref 11.7–15.7)
MCH RBC QN AUTO: 30.8 PG (ref 26.5–33)
MCHC RBC AUTO-ENTMCNC: 32.8 G/DL (ref 31.5–36.5)
MCV RBC AUTO: 94 FL (ref 78–100)
PLATELET # BLD AUTO: 230 10E3/UL (ref 150–450)
POTASSIUM BLD-SCNC: 4.1 MMOL/L (ref 3.4–5.3)
PROT SERPL-MCNC: 7.1 G/DL (ref 6.8–8.8)
RBC # BLD AUTO: 4.06 10E6/UL (ref 3.8–5.2)
SODIUM SERPL-SCNC: 139 MMOL/L (ref 135–145)
WBC # BLD AUTO: 6.6 10E3/UL (ref 4–11)

## 2024-05-20 PROCEDURE — 99215 OFFICE O/P EST HI 40 MIN: CPT | Performed by: PHYSICIAN ASSISTANT

## 2024-05-20 PROCEDURE — 82550 ASSAY OF CK (CPK): CPT | Performed by: PHYSICIAN ASSISTANT

## 2024-05-20 PROCEDURE — 36415 COLL VENOUS BLD VENIPUNCTURE: CPT | Performed by: PHYSICIAN ASSISTANT

## 2024-05-20 PROCEDURE — 85027 COMPLETE CBC AUTOMATED: CPT | Performed by: PHYSICIAN ASSISTANT

## 2024-05-20 PROCEDURE — 80053 COMPREHEN METABOLIC PANEL: CPT | Performed by: PHYSICIAN ASSISTANT

## 2024-05-20 PROCEDURE — 85652 RBC SED RATE AUTOMATED: CPT | Performed by: PHYSICIAN ASSISTANT

## 2024-05-20 RX ORDER — TIZANIDINE 2 MG/1
2 TABLET ORAL DAILY PRN
COMMUNITY
Start: 2023-10-19

## 2024-05-20 RX ORDER — METOCLOPRAMIDE 5 MG/1
5 TABLET ORAL 3 TIMES DAILY PRN
Qty: 90 TABLET | Refills: 0 | Status: SHIPPED | OUTPATIENT
Start: 2024-05-20

## 2024-05-20 RX ORDER — CETIRIZINE HYDROCHLORIDE 10 MG/1
10 TABLET ORAL DAILY
Qty: 30 TABLET | Refills: 0 | Status: SHIPPED | OUTPATIENT
Start: 2024-05-20

## 2024-05-20 RX ORDER — CELECOXIB 200 MG/1
200 CAPSULE ORAL DAILY
Qty: 10 CAPSULE | Refills: 0 | Status: SHIPPED | OUTPATIENT
Start: 2024-05-20 | End: 2024-06-27

## 2024-05-20 NOTE — LETTER
May 20, 2024      Angela Jones  [address]  [place] [address]        To Whom It May Concern:    Angela Jones was seen today for side effects due to medication.  She should avoid quick and strenuous movements, lifting, and prolonged activities.  Please allow her to sit down when possible.  Avoid lifting or carrying > 15 lbs (Dogs).          Sincerely,        STANISLAW Guthrie, PABaldevC

## 2024-05-20 NOTE — TELEPHONE ENCOUNTER
Terri Avendaño MD Physician Signed5:04 PM        Unable to fully assess with limited info and without seeing patient, but muscle and tendon side effects can be seen with levaquin. It is good that she has finished it - I would not recommend another course of this medication until further evaluated.      I would not expect muscle weakness - for this I recommend she be seen in [facility] or . If one-sided leg weakness, then ER.           Called patient with provider recommendation above.  Pt verbalized understood.  No further action needed.    Fran Power RN  [facility]

## 2024-05-20 NOTE — TELEPHONE ENCOUNTER
Unable to fully assess with limited info and without seeing patient, but muscle and tendon side effects can be seen with levaquin. It is good that she has finished it - I would not recommend another course of this medication until further evaluated.     I would not expect muscle weakness - for this I recommend she be seen in [facility] or UC. If one-sided leg weakness, then ER.

## 2024-05-20 NOTE — TELEPHONE ENCOUNTER
Nurse Triage SBAR    Is this a 2nd Level Triage? YES, LICENSED PRACTITIONER REVIEW IS REQUIRED    Situation: Worsening muscle pain in legs and hip x 3 days, but started with most recent course of abx. Decreased hip mobility and leg weakness.    Background: Levaquin was filled mid-April, then refilled 5/7 and patient reports she took last dose last night  Patient is also taking fludrocortisone and describes concerning leg pain given the quinolone class of abx    Assessment: Rates pain 8/10 at its worst  Denies fever, denies rash    Protocol Recommended Disposition:   Discuss With PCP And Callback By Nurse Within 1 Hour    Recommendation: Routing for advisement/2nd level triage given unclear disposition or recommendation    Routed to provider    YUNG Linares, RN (she/her)  [facility] RN        Reason for Disposition   Nursing judgment    Additional Information   Negative: Sounds like a life-threatening emergency to the triager   Negative: Information only call about a Well Adult (no illness or injury)   Negative: Caller can't be reached by phone   Negative: Nursing judgment   Negative: Nursing judgment   Negative: Nursing judgment   Negative: Nursing judgment    Protocols used: No Protocol [id number]

## 2024-05-20 NOTE — LETTER
May 20, 2024      Angela Jones  [address]  [place] [address]        To Whom It May Concern:    Angela Jones was seen today for side effects due to medication.  She should avoid quick and strenuous movements, lifting, and prolonged activities.  Please allow her to sit down when possible.  Avoid lifting or carrying > 15 lbs (Dogs).  Please allow for these restrictions for 2 weeks.         Sincerely,        Dano Lock, STANISLAW, PA-C

## 2024-05-21 LAB — CK SERPL-CCNC: 67 U/L (ref 26–192)

## 2024-05-21 NOTE — PROGRESS NOTES
Assessment & Plan     Medication side effects, initial encounter    Patient was taking levaquin for chronic sinusitis  She just finished up the antibiotics  Advised to stop levaquin due to tendon, muscle aches  - cetirizine (ZYRTEC) 10 MG tablet; Take 1 tablet (10 mg) by mouth daily      Generalized tendinopathy    Patient is experiencing achilles, patella, hamstring and hip tendinopathy    Rest the sore area. You may have to stop doing the activity that caused the tendon pain for a while.  Take an over-the-counter pain medicine, such as acetaminophen (Tylenol), ibuprofen (Advil, Motrin), or naproxen (Aleve). Read and follow all instructions on the label.  Do not take two or more pain medicines at the same time unless the doctor told you to. Many pain medicines have acetaminophen, which is Tylenol. Too much acetaminophen (Tylenol) can be harmful.  Put ice or a cold pack on the sore area for 10 to 20 minutes at a time. Try to do this every 1 to 2 hours for the next 3 days (when you are awake) or until any swelling goes down. Put a thin cloth between the ice and your skin.  Prop up the sore area on a pillow when you ice it or anytime you sit or lie down during the next 3 days. Try to keep it above the level of your heart. This will help reduce swelling.    ESR normal  CK neg for rhabd  CMP neg for renal or hepatic dysfunction    Stop levaquin  Increase oral fluids      Muscular aches    Patient to stop levaquin  May use celebrex for muscle aches    - celecoxib (CELEBREX) 200 MG capsule; Take 1 capsule (200 mg) by mouth daily      CONSULTATION/REFERRAL to orthopedics if symptoms persist    No follow-ups on file.    > 40 min spent on exam, reviewing charts, discussion with other providers, charting, reviewing labs      Subjective    is a 29 year old, presenting for the following health issues:  Urgent Care (Muscle and joint pain for the past 7 day with limited mobility and right leg is not that mobil as of  today. She was taking Levaquin last 7 days)    HPI     Review of Systems  Constitutional, neuro, ENT, endocrine, pulmonary, cardiac, gastrointestinal, genitourinary, musculoskeletal, integument and psychiatric systems are negative, except as otherwise noted.      Objective    /79   Pulse 87   Temp 98.6  F (37  C) (Tympanic)   Wt 54.4 kg (120 lb)   LMP 04/04/2024 (Approximate)   SpO2 99%   BMI 21.26 kg/m    Body mass index is 21.26 kg/m .  Physical Exam   GENERAL: alert and no distress  EYES: Eyes grossly normal to inspection, PERRL and conjunctivae and sclerae normal  HENT: ear canals and TM's normal, nose and mouth without ulcers or lesions  NECK: no adenopathy, no asymmetry, masses, or scars  RESP: lungs clear to auscultation - no rales, rhonchi or wheezes  CV: regular rate and rhythm, normal S1 S2, no S3 or S4, no murmur, click or rub, no peripheral edema  MS: pos for right side achilles tendon tenderness.  Back aches  KNEE: Pos for patellar tendon tenderness and hamstrings tendon tenderness  SKIN: no suspicious lesions or rashes  NEURO: Normal strength and tone, mentation intact and speech normal  PSYCH: mentation appears normal, affect normal/bright    Results for orders placed or performed in visit on 05/20/24   ESR: Erythrocyte sedimentation rate     Status: Normal   Result Value Ref Range    Erythrocyte Sedimentation Rate 10 0 - 20 mm/hr   CK total     Status: Normal   Result Value Ref Range    CK 67 26 - 192 U/L   Comprehensive metabolic panel (BMP + Alb, Alk Phos, ALT, AST, Total. Bili, TP)     Status: Abnormal   Result Value Ref Range    Sodium 139 135 - 145 mmol/L    Potassium 4.1 3.4 - 5.3 mmol/L    Chloride 109 94 - 109 mmol/L    Carbon Dioxide (CO2) 30 20 - 32 mmol/L    Anion Gap <1 (L) 3 - 14 mmol/L    Urea Nitrogen 8 7 - 30 mg/dL    Creatinine 0.80 0.52 - 1.04 mg/dL    GFR Estimate >90 >60 mL/min/1.73m2    Calcium 9.4 8.5 - 10.1 mg/dL    Glucose 122 (H) 70 - 99 mg/dL    Alkaline  Phosphatase 63 40 - 150 U/L    AST 17 0 - 45 U/L    ALT 10 0 - 50 U/L    Protein Total 7.1 6.8 - 8.8 g/dL    Albumin 3.6 3.4 - 5.0 g/dL    Bilirubin Total 0.5 0.2 - 1.3 mg/dL   CBC with platelets     Status: Normal   Result Value Ref Range    WBC Count 6.6 4.0 - 11.0 10e3/uL    RBC Count 4.06 3.80 - 5.20 10e6/uL    Hemoglobin 12.5 11.7 - 15.7 g/dL    Hematocrit 38.1 35.0 - 47.0 %    MCV 94 78 - 100 fL    MCH 30.8 26.5 - 33.0 pg    MCHC 32.8 31.5 - 36.5 g/dL    RDW 12.2 10.0 - 15.0 %    Platelet Count 230 150 - 450 10e3/uL             Signed Electronically by: Dano Lock, STANISLAW, ESTEFANIA

## 2024-06-03 ENCOUNTER — OFFICE VISIT (OUTPATIENT)
Dept: FAMILY MEDICINE | Facility: CLINIC | Age: 29
End: 2024-06-03
Payer: COMMERCIAL

## 2024-06-03 VITALS
WEIGHT: 125 LBS | OXYGEN SATURATION: 100 % | SYSTOLIC BLOOD PRESSURE: 109 MMHG | HEIGHT: 63 IN | TEMPERATURE: 98.2 F | HEART RATE: 99 BPM | DIASTOLIC BLOOD PRESSURE: 75 MMHG | BODY MASS INDEX: 22.15 KG/M2 | RESPIRATION RATE: 16 BRPM

## 2024-06-03 DIAGNOSIS — M79.7 FIBROMYALGIA: Primary | ICD-10-CM

## 2024-06-03 DIAGNOSIS — M77.9 TENDONITIS: ICD-10-CM

## 2024-06-03 DIAGNOSIS — T88.7XXA MEDICATION SIDE EFFECTS: ICD-10-CM

## 2024-06-03 DIAGNOSIS — G90.A POTS (POSTURAL ORTHOSTATIC TACHYCARDIA SYNDROME): ICD-10-CM

## 2024-06-03 PROCEDURE — 99213 OFFICE O/P EST LOW 20 MIN: CPT | Performed by: FAMILY MEDICINE

## 2024-06-03 NOTE — PROGRESS NOTES
"  Assessment & Plan     POTS  Fibromyalgia  Tendonitis  Medication side effects (levaquin)    Urgent care note and recommended restrictions reviewed, past accommodations (related to POTS) note reviewed. Disability paperwork completed today during patient encounter.                 Jelena Miller is a 29 year old, presenting for the following health issues:  Work accomodations (Forms/ accomodations)        6/3/2024    11:03 AM   Additional Questions   Roomed by Yara SAL   Accompanied by self         6/3/2024   Forms   Any forms needing to be completed Yes     Here with FMLA paperwork for accommodations due to fibromyalgia pain, POTS, recent tendinopathy pain from levaquin adverse effects    Recently tried to get accommodations for tendinopathy (?side effect from Levaquin) and was put on leave by employer (was told they could not accommodate restrictions) - needs disability paperwork completed.     Symptoms started prior to UC visit 5/20 (started sometime after starting levaquin) and first day of disability 5/22 -  UC provider gave restrictions through 6/4, returning 6/5. Had significant leg pain and unable to stand long, unable to use legs for long - felt weak. Was placed on lifting restrictions due to concern that heavier lifting would exacerbate symptoms or put as risk of tendon rupture.      - stands, physical.     Looking to have a chair (wheeled stool). Was given these restrictions/accommodations in the past  through cardiology for POTS but that was for previous employer. Due to POTS, can get lightheaded or racing heart - improves when able to sit.      [facility] (pain specialist).     POTS (has not continued to follow with cardiology since symptoms were well managed per patient), fibromyalgia    Pain clinic every 3 months                       Objective    /75   Pulse 99   Temp 98.2  F (36.8  C) (Oral)   Resp 16   Ht 1.6 m (5' 3\")   Wt 56.7 kg (125 lb)   LMP 04/04/2024 " (Approximate)   SpO2 100%   BMI 22.14 kg/m    Body mass index is 22.14 kg/m .  Physical Exam     Gen: NAD  CV: legs without edema  Psych: presents on time, well groomed. Normal speech and thought content. No abnormal movements or behaviors.             Signed Electronically by: Terri Avendaño MD

## 2024-06-03 NOTE — LETTER
Misti 3, 2024      Angela Jones  [address]  [place] [address]        To Whom It May Concern:    Angela Jones  was seen on 6/3/24.  Please provide the following accomodation due to medical reasons:   needs to be able to work while seated. Please provide a wheeled stool.       Sincerely,        Terri Avendaño MD

## 2024-06-04 ENCOUNTER — MYC MEDICAL ADVICE (OUTPATIENT)
Dept: FAMILY MEDICINE | Facility: CLINIC | Age: 29
End: 2024-06-04

## 2024-06-04 ENCOUNTER — OFFICE VISIT (OUTPATIENT)
Dept: URGENT CARE | Facility: URGENT CARE | Age: 29
End: 2024-06-04
Payer: COMMERCIAL

## 2024-06-04 VITALS
BODY MASS INDEX: 21.26 KG/M2 | OXYGEN SATURATION: 100 % | DIASTOLIC BLOOD PRESSURE: 80 MMHG | SYSTOLIC BLOOD PRESSURE: 116 MMHG | WEIGHT: 120 LBS | HEART RATE: 112 BPM | TEMPERATURE: 98.1 F | HEIGHT: 63 IN

## 2024-06-04 DIAGNOSIS — J01.01 ACUTE RECURRENT MAXILLARY SINUSITIS: Primary | ICD-10-CM

## 2024-06-04 LAB
BASOPHILS # BLD AUTO: 0.1 10E3/UL (ref 0–0.2)
BASOPHILS NFR BLD AUTO: 1 %
EOSINOPHIL # BLD AUTO: 0 10E3/UL (ref 0–0.7)
EOSINOPHIL NFR BLD AUTO: 0 %
ERYTHROCYTE [DISTWIDTH] IN BLOOD BY AUTOMATED COUNT: 12.2 % (ref 10–15)
HCT VFR BLD AUTO: 38.1 % (ref 35–47)
HGB BLD-MCNC: 12.4 G/DL (ref 11.7–15.7)
IMM GRANULOCYTES # BLD: 0 10E3/UL
IMM GRANULOCYTES NFR BLD: 0 %
LYMPHOCYTES # BLD AUTO: 1.5 10E3/UL (ref 0.8–5.3)
LYMPHOCYTES NFR BLD AUTO: 15 %
MCH RBC QN AUTO: 31.2 PG (ref 26.5–33)
MCHC RBC AUTO-ENTMCNC: 32.5 G/DL (ref 31.5–36.5)
MCV RBC AUTO: 96 FL (ref 78–100)
MONOCYTES # BLD AUTO: 0.6 10E3/UL (ref 0–1.3)
MONOCYTES NFR BLD AUTO: 6 %
NEUTROPHILS # BLD AUTO: 7.4 10E3/UL (ref 1.6–8.3)
NEUTROPHILS NFR BLD AUTO: 78 %
PLATELET # BLD AUTO: 223 10E3/UL (ref 150–450)
RBC # BLD AUTO: 3.97 10E6/UL (ref 3.8–5.2)
WBC # BLD AUTO: 9.5 10E3/UL (ref 4–11)

## 2024-06-04 PROCEDURE — 36415 COLL VENOUS BLD VENIPUNCTURE: CPT | Performed by: FAMILY MEDICINE

## 2024-06-04 PROCEDURE — 85025 COMPLETE CBC W/AUTO DIFF WBC: CPT | Performed by: FAMILY MEDICINE

## 2024-06-04 PROCEDURE — 99213 OFFICE O/P EST LOW 20 MIN: CPT | Performed by: FAMILY MEDICINE

## 2024-06-04 NOTE — PATIENT INSTRUCTIONS
Continue decongestant( sudaphed) with antihistamine medication then do saline nasal spray wait 10 minutes and then do the Flonase  If any worsening pain or high fever do follow up   Follow up with ENT     Balbina Guerra MD

## 2024-06-04 NOTE — PROGRESS NOTES
Chief Complaint   Patient presents with    Urgent Care    Sinus Problem    Headache    Nasal Congestion     Pt in clinic to have eval for recurrent sinus pain, congestion and  headaches  for 2+ months          was seen today for urgent care, sinus problem, headache and nasal congestion.    Diagnoses and all orders for this visit:    Acute recurrent maxillary sinusitis  -     CBC with platelets and differential; Future  -     CBC with platelets and differential    Reviewed results with patient today with white blood cell count within normal limits would hold on any antibiotics currently.  Encourage patient to do symptomatic treatment with antihistamine decongestant and saline nasal spray followed by Flonase follow-up with ENT as recommended.      D/d- sinusitis .allergic rhinitis, posterior nasal drip.viral uri   Results for orders placed or performed in visit on 06/04/24   CBC with platelets and differential     Status: None   Result Value Ref Range    WBC Count 9.5 4.0 - 11.0 10e3/uL    RBC Count 3.97 3.80 - 5.20 10e6/uL    Hemoglobin 12.4 11.7 - 15.7 g/dL    Hematocrit 38.1 35.0 - 47.0 %    MCV 96 78 - 100 fL    MCH 31.2 26.5 - 33.0 pg    MCHC 32.5 31.5 - 36.5 g/dL    RDW 12.2 10.0 - 15.0 %    Platelet Count 223 150 - 450 10e3/uL    % Neutrophils 78 %    % Lymphocytes 15 %    % Monocytes 6 %    % Eosinophils 0 %    % Basophils 1 %    % Immature Granulocytes 0 %    Absolute Neutrophils 7.4 1.6 - 8.3 10e3/uL    Absolute Lymphocytes 1.5 0.8 - 5.3 10e3/uL    Absolute Monocytes 0.6 0.0 - 1.3 10e3/uL    Absolute Eosinophils 0.0 0.0 - 0.7 10e3/uL    Absolute Basophils 0.1 0.0 - 0.2 10e3/uL    Absolute Immature Granulocytes 0.0 <=0.4 10e3/uL   CBC with platelets and differential     Status: None    Narrative    The following orders were created for panel order CBC with platelets and differential.  Procedure                               Abnormality         Status                     ---------                          "      -----------         ------                     CBC with platelets and d...[[id number]]                      Final result                 Please view results for these tests on the individual orders.       SUBJECTIVE:  Angela Jones is a 29 year old female with several bouts of antibiotic treatment for sinus infection here with concerns about sinus infection.  She states onset of symptoms were 2 month(s) ago.  She has had maxillary pressure. Course of illness is waxing and waning. Severity moderate  Current and Associated symptoms: nasal congestion, rhinorrhea, and facial pain/pressure  Predisposing factors include HX of recurrent sinusitis. Recent treatment has included: Antihistamine and 2 rounds of levaquin   Recent ct showed Small amount of frothy debris in the sphenoid sinus is nonspecific but  can be seen in the setting of acute sinusitis.  She did have an episode of fever yesterday.  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    OBJECTIVE:  /80   Pulse 112   Temp 98.1  F (36.7  C) (Temporal)   Ht 1.6 m (5' 3\")   Wt 54.4 kg (120 lb)   LMP 04/15/2024 (Approximate)   SpO2 100%   BMI 21.26 kg/m    Exam:GENERAL APPEARANCE: healthy, alert and no distress  EYES: EOMI,  PERRL, conjunctiva clear  HENT: ear " canals and TM's congested .  Nose and mouth without ulcers, erythema or lesions  Sinuses tender in the right maxillary sinus area   NECK: supple, nontender, no lymphadenopathy  RESP: lungs clear to auscultation - no rales, rhonchi or wheezes  PSYCH: mentation appears normal    Balbina Guerra MD

## 2024-06-05 NOTE — TELEPHONE ENCOUNTER
It looks like she's already doing this but I recommend until she is seen by ENT:    - Flonase two sprays into each nostril once daily  - Continue antihistamine like Zyrtec (cetirizine) 10 mg daily, Claritin (loratadine) 10 mg daily, or Allegra (fexofenadine) 160 mg daily- this can help dry up secretions  - Sinus irrigation (as long as you don't have a history of sinus or ear surgery) with a Neti pot.  Use distilled or boiled/cooled tap water (not water straight from the tap) and then mix with salt packets that come with the Neti pot- use at least once daily to help clear out secretions so they don't drip down the back of your throat and cause you to cough.    Technique on Flonase:    How to spray your nasal steroid spray:    Tilt your head forward. Spray the nasal spray up your nose and tilt the spray towards your ears. Spray 2 sprays per nostril. Inhale gently. Dab excess nasal spray with tissue. Remain upright for at least 1 hour. Use nasal spray once daily.    You should never taste the nasal spray dripping down your throat. If you do, rinse out your mouth well and try tilting your head more forward the next time you use your spray.    Some people find it easier to spray 1 spray per nostril twice daily.    Recommend spraying your nasal spray straight into your nose (nose to toes), angle out towards your ears to avoid hitting the septum, breathe in while you spray.    Flonase is a steroid nasal spray that works as an anti-inflammatory to open up the nasal passages and decrease the amount of drainage from your nose and sinuses.  It is only effective when used consistently.  It may take 2-3 weeks of consistent use to reach it optimal effect.      Thank you!     Madhuri Centeno, DO  Internal Medicine - Pediatrics Physician  [facility]

## 2024-06-05 NOTE — TELEPHONE ENCOUNTER
Sandra: would you recommend further abx or wait until ENT?    I finished my round of Levaquin 750mg 2 weeks ago and now my sinus symptoms are once again returning. I have an appointment with ENT on the 10th. I m just wondering what I should do in the meantime?     Chely GROVE, BSN, PHN, RN  [facility]  [phone]

## 2024-06-06 ENCOUNTER — OFFICE VISIT (OUTPATIENT)
Dept: URGENT CARE | Facility: URGENT CARE | Age: 29
End: 2024-06-06
Payer: COMMERCIAL

## 2024-06-06 VITALS
BODY MASS INDEX: 21.62 KG/M2 | TEMPERATURE: 98.5 F | HEIGHT: 63 IN | DIASTOLIC BLOOD PRESSURE: 74 MMHG | SYSTOLIC BLOOD PRESSURE: 111 MMHG | WEIGHT: 122 LBS | OXYGEN SATURATION: 99 % | HEART RATE: 92 BPM | RESPIRATION RATE: 16 BRPM

## 2024-06-06 DIAGNOSIS — J06.9 UPPER RESPIRATORY TRACT INFECTION, UNSPECIFIED TYPE: Primary | ICD-10-CM

## 2024-06-06 PROCEDURE — 99213 OFFICE O/P EST LOW 20 MIN: CPT | Performed by: FAMILY MEDICINE

## 2024-06-06 NOTE — TELEPHONE ENCOUNTER
Writer responded via [employer].  LAUREN ShieldsN, RN-BC  [facility]

## 2024-06-06 NOTE — PROGRESS NOTES
Assessment & Plan     Upper respiratory tract infection, unspecified type    Reviewing her symptoms and preious CT scan and explained to her that her  Sinus infection which was previously treated with antibiotics ( 3 courses) did clear her infection and CT showing  only residual amount of debris seen in sphenoid sinus in my opinion was not consistent with an untreated bacterial rhinosinusitis. She feels relieved knowing this and agrees today that her current symptoms are not that of maxillary/frontal sinusitis.     Presentation today is more consistent with a common cold as she has had a mild cough with right ear congestion.  Her right tympanic membrane did not demonstrate any evidence of infection.  I advised she  use Sudafed for a few days along with continued use of the recently started nasal fluticasone.  If she were to develop increased nasal congestion I have advised that she use nasal saline rinses.  Lung exam unremarkable.    Okay to keep appointment with ENT in 4 days      Flip Harris MD   [facility]    Jelena Miller is a 29 year old female who presents to clinic today for the following health issues:  Chief Complaint   Patient presents with    Otalgia     Woke up this morning with pain in right ear     Urgent Care     Was seen x2 days ago for sinus problems and told to come back if worsening      HPI    Spring time patient did 2 courses of antibiotics (uncertain on the name) from outside group/virtual visit. Twice afterwards she was placed on levaquin. At the time of her original symptoms many months ago she had primarily maxillary sinus pressure/drainage.     Patient had a CT scan done approximately 5 weeks ago which demonstrated possible inflammation or accumulation of fluid in the sphenoid sinus and was placed on another course of levaquin. Unfortunately she developed tendinopathy which she was seen for and then urgent care on May 20    Today she presents with a mild cold along  with right ear pressure without any drainage.  No recent history of ear infections.    Patient Active Problem List    Diagnosis Date Noted    Fibromyalgia 10/25/2023     Priority: Medium    POTS (postural orthostatic tachycardia syndrome) 10/25/2023     Priority: Medium    Autism spectrum disorder 02/18/2022     Priority: Medium    IUD (intrauterine device) in place 02/18/2022     Priority: Medium     Placed 3/23/2017; due for removal 3/32/2024.       Health Care Home 01/06/2015     Priority: Medium     State Tier Level:    Status:  Closed  Care Coordinator:  Idania Arias    See Letters for [facility] Care Plan  Date:  December 4, 2014          Migraine with aura 11/04/2014     Priority: Medium    Pes planus 03/25/2014     Priority: Medium     Problem list name updated by automated process. Provider to review      Moderate major depression (H) 03/25/2014     Priority: Medium    Generalized anxiety disorder 03/25/2014     Priority: Medium     Diagnosis updated by automated process. Provider to review and confirm.      OCD (obsessive compulsive disorder) 08/04/2013     Priority: Medium       Current Outpatient Medications   Medication Sig Dispense Refill    cetirizine (ZYRTEC) 10 MG tablet Take 1 tablet (10 mg) by mouth daily 30 tablet 0    diphenhydrAMINE (BENADRYL) 50 MG capsule Take 50 mg by mouth as needed      esomeprazole (NEXIUM) 20 MG DR capsule TAKE 1 CAPSULE BY MOUTH EVERY MORNING BEFORE BREAKFAST 90 capsule 3    fludrocortisone (FLORINEF) 0.1 MG tablet TAKE 2 TABLETS (0.2 MG) BY MOUTH DAILY 180 tablet 1    gabapentin (NEURONTIN) 300 MG capsule Take 3 capsules (900 mg) by mouth 3 times daily 810 capsule 3    hyoscyamine (LEVSIN/SL) 0.125 MG sublingual tablet Take 1 tablet (0.125 mg) by mouth every 4 hours as needed 120 tablet 5    ipratropium (ATROVENT HFA) 17 MCG/ACT inhaler Inhale 2 puffs into the lungs every 6 hours      ipratropium (ATROVENT) 0.02 % nebulizer solution Take 0.5 mg by nebulization daily as  "needed for wheezing      Melatonin 10 MG TABS Take 10 mg by mouth nightly as needed      metoclopramide (REGLAN) 5 MG tablet TAKE 1 TABLET (5 MG) BY MOUTH 3 TIMES DAILY AS NEEDED (FOR NAUSEA) 90 tablet 0    mirtazapine (REMERON) 15 MG tablet TAKE 1 TABLET(15 MG) BY MOUTH DAILY 90 tablet 3    pindolol (VISKEN) 5 MG tablet Take 1 tablet (5 mg) by mouth 2 times daily as needed (palpitations) 60 tablet 0    tiZANidine (ZANAFLEX) 2 MG tablet prn      VITAMIN D, CHOLECALCIFEROL, PO Take  by mouth daily.      vortioxetine (TRINTELLIX) 20 MG tablet Take 1 tablet (20 mg) by mouth daily 30 tablet 11    amitriptyline (ELAVIL) 25 MG tablet TAKE 2 TABLETS(50 MG) BY MOUTH AT BEDTIME. SEE YOUR DOCTOR FOR FURTHER REFILLS. (Patient not taking: Reported on 6/6/2024) 180 tablet 3    celecoxib (CELEBREX) 200 MG capsule Take 1 capsule (200 mg) by mouth daily (Patient not taking: Reported on 6/6/2024) 10 capsule 0    levonorgestrel (MIRENA, 52 MG,) 20 MCG/24HR IUD 1 each (20 mcg) by Intrauterine route once for 1 dose 1 each 0     No current facility-administered medications for this visit.         Objective    /74   Pulse 92   Temp 98.5  F (36.9  C) (Oral)   Resp 16   Ht 1.6 m (5' 3\")   Wt 55.3 kg (122 lb)   LMP 04/15/2024 (Approximate)   SpO2 99%   BMI 21.61 kg/m    Physical Exam         As noted above    Throat: 2+ tonsils, no trismus  CV: RRR no m/r/g  Pulm: clear bilaterally  Nose: no notable rhinorrhea    No results found for any visits on 06/06/24.          The use of Dragon/Av[employer] dictation services may have been used to construct the content in this note; any grammatical or spelling errors are non-intentional. Please contact the author of this note directly if you are in need of any clarification.   "

## 2024-06-06 NOTE — PATIENT INSTRUCTIONS
Sudafed for a few days as a decongestant       Nasal fluticasone continue once daily for both cold/sinus inflammation/allergies      If you develop new nasal congestion from your cold do the nasal saline rinses      Continue your allergy medication once daily

## 2024-06-10 ENCOUNTER — OFFICE VISIT (OUTPATIENT)
Dept: OTOLARYNGOLOGY | Facility: CLINIC | Age: 29
End: 2024-06-10
Payer: COMMERCIAL

## 2024-06-10 ENCOUNTER — PRE VISIT (OUTPATIENT)
Dept: OTOLARYNGOLOGY | Facility: CLINIC | Age: 29
End: 2024-06-10

## 2024-06-10 VITALS
OXYGEN SATURATION: 100 % | SYSTOLIC BLOOD PRESSURE: 129 MMHG | HEIGHT: 63 IN | WEIGHT: 122 LBS | BODY MASS INDEX: 21.62 KG/M2 | DIASTOLIC BLOOD PRESSURE: 84 MMHG | HEART RATE: 79 BPM

## 2024-06-10 DIAGNOSIS — J01.31 ACUTE RECURRENT SPHENOIDAL SINUSITIS: ICD-10-CM

## 2024-06-10 PROCEDURE — 99202 OFFICE O/P NEW SF 15 MIN: CPT | Performed by: OTOLARYNGOLOGY

## 2024-06-10 ASSESSMENT — PAIN SCALES - GENERAL: PAINLEVEL: MILD PAIN (3)

## 2024-06-10 NOTE — PATIENT INSTRUCTIONS
You were seen in the ENT Clinic today by Dr. Hilton. If you have any questions or concerns after your appointment, please contact us (see below)       2.   The following recommendations have been made based upon your appointment today:   -continue flonase and saline spray      3.   Plan to return to the ENT clinic as needed           How to Contact Us:  Send a [employer] message to your provider. Our team will respond to you via [employer]. Occasionally, we will need to call you to get further information.  For urgent matters (Monday-Friday), call the ENT Clinic: [phone] and speak with a call center team member - they will route your call appropriately.   If you'd like to speak directly with a nurse, please find our contact information below. We do our best to check voicemail frequently throughout the day, and will work to call you back within 1-2 days. For urgent matters, please use the general clinic phone numbers listed above.     Tabby LYON RN  Direct: [phone]  Georgie TA LPN  Direct: [phone]         [facility]  Department of Otolaryngology

## 2024-06-10 NOTE — LETTER
6/10/2024       RE: Angela Jones  [address]  [place] [address]     Dear Colleague,    Thank you for referring your patient, Angela Jones, to the [facility] [place] at [facility]. Please see a copy of my visit note below.      [facility] AND [facility]IN[facility] [address]  4TH FLOOR  [facility] [address]  Phone: [phone]  Fax: [phone]    Patient:  Angela Jones, Date of birth 1995  Date of Visit:  06/10/2024  Referring Provider Sandra Ness      Assessment & Plan     (J01.31) Acute recurrent sphenoidal sinusitis  Comment: reivew of imaging shows a likely mucocele in R sphenoid sinus, present at least since 2019, no worrisome features. Recent symptoms likely due to recurrent URIs, no further abx needed. Encouraged use of flonase, saline spray, wean use of sudafed. See me if becomes a recurring issue.       20 minutes spent by me on the date of the encounter doing chart review, history and exam, documentation and further activities per the note       Esmer Hilton MD       Otolaryngology Adult Consultation    HPI: Angela Jones is a 29 year old female seen today in the Otolaryngology Clinic in consultation from Sandra Ness for a history of recurrent sinus infections.  Symptoms include pain and pressure and foul taste. Has had pain in R ear that extended down into face and throat. Tried several antibiotics for this, starting end of March, took 2 courses of Levaquin and was also on 2 rounds of doxycyline prior at . Has been off for about 3 weeks. First time ever having issues like this. Does have fall allergies. Takes zyrtec and benedryl, also using flonase for last few days. No lung issues, but did have chest congestion when this started. Does not feel back to normal, some nasal and chest congestion, cough. R side feels like nose/ear congested. If not  taking sudafed clogs up on right. Takes 1 sudafed q 12 hours. No saline irrigatons. CT done one month ago, showed some mucous in sphenoid on the R.     Current Outpatient Medications   Medication Sig Dispense Refill     cetirizine (ZYRTEC) 10 MG tablet Take 1 tablet (10 mg) by mouth daily 30 tablet 0     amitriptyline (ELAVIL) 25 MG tablet TAKE 2 TABLETS(50 MG) BY MOUTH AT BEDTIME. SEE YOUR DOCTOR FOR FURTHER REFILLS. (Patient not taking: Reported on 6/6/2024) 180 tablet 3     celecoxib (CELEBREX) 200 MG capsule Take 1 capsule (200 mg) by mouth daily (Patient not taking: Reported on 6/6/2024) 10 capsule 0     diphenhydrAMINE (BENADRYL) 50 MG capsule Take 50 mg by mouth as needed       esomeprazole (NEXIUM) 20 MG DR capsule TAKE 1 CAPSULE BY MOUTH EVERY MORNING BEFORE BREAKFAST 90 capsule 3     fludrocortisone (FLORINEF) 0.1 MG tablet TAKE 2 TABLETS (0.2 MG) BY MOUTH DAILY 180 tablet 1     gabapentin (NEURONTIN) 300 MG capsule Take 3 capsules (900 mg) by mouth 3 times daily 810 capsule 3     hyoscyamine (LEVSIN/SL) 0.125 MG sublingual tablet Take 1 tablet (0.125 mg) by mouth every 4 hours as needed 120 tablet 5     ipratropium (ATROVENT HFA) 17 MCG/ACT inhaler Inhale 2 puffs into the lungs every 6 hours       ipratropium (ATROVENT) 0.02 % nebulizer solution Take 0.5 mg by nebulization daily as needed for wheezing       levonorgestrel (MIRENA, 52 MG,) 20 MCG/24HR IUD 1 each (20 mcg) by Intrauterine route once for 1 dose 1 each 0     Melatonin 10 MG TABS Take 10 mg by mouth nightly as needed       metoclopramide (REGLAN) 5 MG tablet TAKE 1 TABLET (5 MG) BY MOUTH 3 TIMES DAILY AS NEEDED (FOR NAUSEA) 90 tablet 0     mirtazapine (REMERON) 15 MG tablet TAKE 1 TABLET(15 MG) BY MOUTH DAILY 90 tablet 3     pindolol (VISKEN) 5 MG tablet Take 1 tablet (5 mg) by mouth 2 times daily as needed (palpitations) 60 tablet 0     tiZANidine (ZANAFLEX) 2 MG tablet prn       VITAMIN D, CHOLECALCIFEROL, PO Take  by mouth daily.        vortioxetine (TRINTELLIX) 20 MG tablet Take 1 tablet (20 mg) by mouth daily 30 tablet 11     No current facility-administered medications for this visit.          Allergies   Allergen Reactions     Adhesive Tape Hives     EKG Patches; any adhesives including band aides; burns      EKG Patches; any adhesives including band aides; burns  EKG Patches; any adhesives including band aides; burns  EKG Patches; any adhesives including band aides; burns       Glucose Other (See Comments)     Other reaction(s): GI intolerance  GI intolerance       Azithromycin Nausea and Vomiting     Fructose Cramps, Diarrhea, GI Disturbance and Nausea and Vomiting     Lactose Nausea     Levofloxacin Other (See Comments)     Tendinopathy       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     Comment:         Review of Systems      6/10/2024    11:59 AM    ENT ROS   Neurology Unexplained weakness   Psychology Frequently feeling anxious   Ears, Nose, Throat Ear pain    Ringing/noise in ears    Nasal congestion or drainage    Hoarseness   Cardiopulmonary Cough   Musculoskeletal Sore or stiff joints    Neck pain    Allergy/Immunology Allergies or hay fever   Hematologic Lymph node swelling        14 point ROS neg other than the symptoms noted above.    Physical Exam:    General Assessment   The patient is alert, oriented and in no acute distress.   Head/Face/Scalp  Grossly normal.   Ears  Normal canals, auricles and tympanic membranes.   Nose  External nose is straight, skin is normal. Intranasal exam (anterior rhinoscopy) reveals normal moist mucosa, turbinate tissue without edema, erythema or crusting.  Septum mainly in the midline.    Mouth  Oral cavity shows healthy mucosa with out ulceration, masses or other lesions involving the tongue, palate, buccal mucosa, floor of mouth or gingiva.  Dentition in good repair.  Oropharynx with normal tonsils, posterior wall and base of tongue.  Neck  No significant adenopathy, thyroid or salivary gland abnormality.     CT with minimal mucosal thickening 1 month ago in sphenoid sinus. MRI from 2019 reviewed and shows similar findings.                   Again, thank you for allowing me to participate in the care of your patient.      Sincerely,    Esmer Hilton MD

## 2024-06-10 NOTE — NURSING NOTE
"Chief Complaint   Patient presents with    Consult   Blood pressure 129/84, pulse 79, height 1.6 m (5' 3\"), weight 55.3 kg (122 lb), last menstrual period 04/15/2024, SpO2 100%, not currently breastfeeding. Trav Reveles, EMT    "

## 2024-06-10 NOTE — PROGRESS NOTES
[facility] EAR NOSE AND THROAT CLINIC [address]  [address] [facility] [address]  Phone: [phone]  Fax: [phone]    Patient:  Angela Jones, Date of birth 1995  Date of Visit:  06/10/2024  Referring Provider Sandra Ness      Assessment & Plan      (J01.31) Acute recurrent sphenoidal sinusitis  Comment: reivew of imaging shows a likely mucocele in R sphenoid sinus, present at least since 2019, no worrisome features. Recent symptoms likely due to recurrent URIs, no further abx needed. Encouraged use of flonase, saline spray, wean use of sudafed. See me if becomes a recurring issue.       20 minutes spent by me on the date of the encounter doing chart review, history and exam, documentation and further activities per the note       Esmer Hilton MD       Otolaryngology Adult Consultation    HPI: Angela Jnoes is a 29 year old female seen today in the Otolaryngology Clinic in consultation from Sandra Ness for a history of recurrent sinus infections.  Symptoms include pain and pressure and foul taste. Has had pain in R ear that extended down into face and throat. Tried several antibiotics for this, starting end of March, took 2 courses of Levaquin and was also on 2 rounds of doxycyline prior at . Has been off for about 3 weeks. First time ever having issues like this. Does have fall allergies. Takes zyrtec and benedryl, also using flonase for last few days. No lung issues, but did have chest congestion when this started. Does not feel back to normal, some nasal and chest congestion, cough. R side feels like nose/ear congested. If not taking sudafed clogs up on right. Takes 1 sudafed q 12 hours. No saline irrigatons. CT done one month ago, showed some mucous in sphenoid on the R.     Current Outpatient Medications   Medication Sig Dispense Refill    cetirizine (ZYRTEC) 10 MG tablet Take 1 tablet (10 mg) by mouth daily 30 tablet 0    amitriptyline (ELAVIL) 25 MG  tablet TAKE 2 TABLETS(50 MG) BY MOUTH AT BEDTIME. SEE YOUR DOCTOR FOR FURTHER REFILLS. (Patient not taking: Reported on 6/6/2024) 180 tablet 3    celecoxib (CELEBREX) 200 MG capsule Take 1 capsule (200 mg) by mouth daily (Patient not taking: Reported on 6/6/2024) 10 capsule 0    diphenhydrAMINE (BENADRYL) 50 MG capsule Take 50 mg by mouth as needed      esomeprazole (NEXIUM) 20 MG DR capsule TAKE 1 CAPSULE BY MOUTH EVERY MORNING BEFORE BREAKFAST 90 capsule 3    fludrocortisone (FLORINEF) 0.1 MG tablet TAKE 2 TABLETS (0.2 MG) BY MOUTH DAILY 180 tablet 1    gabapentin (NEURONTIN) 300 MG capsule Take 3 capsules (900 mg) by mouth 3 times daily 810 capsule 3    hyoscyamine (LEVSIN/SL) 0.125 MG sublingual tablet Take 1 tablet (0.125 mg) by mouth every 4 hours as needed 120 tablet 5    ipratropium (ATROVENT HFA) 17 MCG/ACT inhaler Inhale 2 puffs into the lungs every 6 hours      ipratropium (ATROVENT) 0.02 % nebulizer solution Take 0.5 mg by nebulization daily as needed for wheezing      levonorgestrel (MIRENA, 52 MG,) 20 MCG/24HR IUD 1 each (20 mcg) by Intrauterine route once for 1 dose 1 each 0    Melatonin 10 MG TABS Take 10 mg by mouth nightly as needed      metoclopramide (REGLAN) 5 MG tablet TAKE 1 TABLET (5 MG) BY MOUTH 3 TIMES DAILY AS NEEDED (FOR NAUSEA) 90 tablet 0    mirtazapine (REMERON) 15 MG tablet TAKE 1 TABLET(15 MG) BY MOUTH DAILY 90 tablet 3    pindolol (VISKEN) 5 MG tablet Take 1 tablet (5 mg) by mouth 2 times daily as needed (palpitations) 60 tablet 0    tiZANidine (ZANAFLEX) 2 MG tablet prn      VITAMIN D, CHOLECALCIFEROL, PO Take  by mouth daily.      vortioxetine (TRINTELLIX) 20 MG tablet Take 1 tablet (20 mg) by mouth daily 30 tablet 11     No current facility-administered medications for this visit.          Allergies   Allergen Reactions    Adhesive Tape Hives     EKG Patches; any adhesives including band aides; burns      EKG Patches; any adhesives including band aides; burns  EKG Patches; any  adhesives including band aides; burns  EKG Patches; any adhesives including band aides; burns      Glucose Other (See Comments)     Other reaction(s): GI intolerance  GI intolerance      Azithromycin Nausea and Vomiting    Fructose Cramps, Diarrhea, GI Disturbance and Nausea and Vomiting    Lactose Nausea    Levofloxacin Other (See Comments)     Tendinopathy       Past Medical History:   Diagnosis Date    Flat foot 3/25/2014    JOSHUA (generalised anxiety disorder) 3/25/2014    Hypoxia in liveborn infant     born hypoxic    Migraine     Migraine with aura 11/4/2014    Migraines     Moderate major depression (H) 3/25/2014    OCD (obsessive compulsive disorder) 8/4/2013    Palpitations     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     Comment:         Review of Systems      6/10/2024    11:59 AM    ENT ROS   Neurology Unexplained weakness   Psychology Frequently feeling anxious   Ears, Nose, Throat Ear pain    Ringing/noise in ears    Nasal congestion or drainage    Hoarseness   Cardiopulmonary Cough   Musculoskeletal Sore or stiff joints    Neck pain   Allergy/Immunology Allergies or hay fever   Hematologic Lymph node swelling        14 point ROS neg other than the symptoms noted above.    Physical Exam:    General Assessment   The patient is alert, oriented and in no acute distress.   Head/Face/Scalp  Grossly normal.   Ears  Normal canals, auricles and tympanic membranes.   Nose  External nose is straight, skin is normal. Intranasal exam  (anterior rhinoscopy) reveals normal moist mucosa, turbinate tissue without edema, erythema or crusting.  Septum mainly in the midline.    Mouth  Oral cavity shows healthy mucosa with out ulceration, masses or other lesions involving the tongue, palate, buccal mucosa, floor of mouth or gingiva.  Dentition in good repair.  Oropharynx with normal tonsils, posterior wall and base of tongue.  Neck  No significant adenopathy, thyroid or salivary gland abnormality.     CT with minimal mucosal thickening 1 month ago in sphenoid sinus. MRI from 2019 reviewed and shows similar findings.

## 2024-06-13 ENCOUNTER — PRE VISIT (OUTPATIENT)
Dept: PSYCHIATRY | Facility: CLINIC | Age: 29
End: 2024-06-13
Payer: COMMERCIAL

## 2024-06-13 ENCOUNTER — VIRTUAL VISIT (OUTPATIENT)
Dept: BEHAVIORAL HEALTH | Facility: CLINIC | Age: 29
End: 2024-06-13
Payer: COMMERCIAL

## 2024-06-13 DIAGNOSIS — F84.0 AUTISM SPECTRUM DISORDER: Primary | ICD-10-CM

## 2024-06-13 DIAGNOSIS — F90.8 ATTENTION DEFICIT HYPERACTIVITY DISORDER (ADHD), OTHER TYPE: ICD-10-CM

## 2024-06-13 DIAGNOSIS — F33.0 MILD EPISODE OF RECURRENT MAJOR DEPRESSIVE DISORDER (H): ICD-10-CM

## 2024-06-13 DIAGNOSIS — F41.1 GAD (GENERALIZED ANXIETY DISORDER): ICD-10-CM

## 2024-06-13 PROCEDURE — 90837 PSYTX W PT 60 MINUTES: CPT | Mod: 95 | Performed by: SOCIAL WORKER

## 2024-06-13 ASSESSMENT — PATIENT HEALTH QUESTIONNAIRE - PHQ9
10. IF YOU CHECKED OFF ANY PROBLEMS, HOW DIFFICULT HAVE THESE PROBLEMS MADE IT FOR YOU TO DO YOUR WORK, TAKE CARE OF THINGS AT HOME, OR GET ALONG WITH OTHER PEOPLE: SOMEWHAT DIFFICULT
SUM OF ALL RESPONSES TO PHQ QUESTIONS 1-9: 9
SUM OF ALL RESPONSES TO PHQ QUESTIONS 1-9: 9

## 2024-06-13 NOTE — TELEPHONE ENCOUNTER
PSYCHIATRY CLINIC PHONE INTAKE     SERVICES REQUESTED / INTERESTED IN          Med Management    Presenting Problem and Brief History                              What would you like to be seen for? (brief description):  Pt was diagnosed with anxiety and depression in 2008. She was diagnosed with ASD in 2016. Currently taking mental health medications - med list on file is up to date. Pt would like to be on less medication - its very expensive. No difficulty with sleep or appetite.     Have you received a mental health diagnosis? Yes   Which one (s): JOSHUA, MDD, and ASD  Is there any history of developmental delay?  Yes   Are you currently seeing a mental health provider?  Yes            Who / month last seen:  Fanny WARD therapist - see chart. Mental health meds are prescribed by PCP.     Do you have mental health records elsewhere?  Yes - [facility]  Will you sign a release so we can obtain them?  Yes    Have you ever been hospitalized for psychiatric reasons?  No  Describe:  NA    Do you have current thoughts of self-harm?  No    Do you currently have thoughts of harming others?  No    Do you have any safety concerns? No   If yes to these, offer to reach out to a  for follow up.      Substance Use History     Do you have any history of alcohol  or other substance use?  No  Describe:  NA  Have you ever received treatment for this?  No    Describe:  NA     Social History     Who is the patient's a guardian?  No    Name / number: NA  Have you had an ACT team in last 12 months?  No  Describe: NA   OK to leave a detailed voicemail?  Yes    Would you be interested in learning more about research opportunities for which you or your child may qualify? We can connect you with a team member for more information.   unknown   If yes, send an [employer] message to Ally Joyce    Medical/ Surgical History                                   Patient Active Problem List   Diagnosis    OCD (obsessive compulsive  disorder)    Pes planus    Moderate major depression (H)    Generalized anxiety disorder    Migraine with aura    Health Care Home    Autism spectrum disorder    IUD (intrauterine device) in place    Fibromyalgia    POTS (postural orthostatic tachycardia syndrome)          Medications             Have you taken >3 psychiatric medications in your past?  Yes - see chart  Do you currently take 5 or more medications, including prescriptions, supplements, and other over the counter products?  Yes     If YES to at least one of these questions:   As part of your evaluation in our clinic, we have specially trained pharmacists as part of your care team. Your provider would like for you to meet with one of our pharmacists to review your current and past medications, ensure your med list is up to date, and queue up any questions or concerns you have about medications. They will review all of your medications, not just for mental health, to help ensure you know what you re taking and that everything is working together.     Please schedule patient in [facility] PSYCHIATRY (Caryn Byrne or Brianna Parikh) for 60m MTM in any green space as virtual (video), telephone, or in person (designated in person days per Epic templates).  -Appt notes can say  Psych eval on xx/xx   -Route telephone encounter to the pharmacist who will be seeing the patient.  If patient has questions about insurance coverage or billing, please still schedule the visit and refer them to call the [facility] coordinators at [phone].    Current Outpatient Medications   Medication Sig Dispense Refill    amitriptyline (ELAVIL) 25 MG tablet TAKE 2 TABLETS(50 MG) BY MOUTH AT BEDTIME. SEE YOUR DOCTOR FOR FURTHER REFILLS. (Patient not taking: Reported on 6/6/2024) 180 tablet 3    celecoxib (CELEBREX) 200 MG capsule Take 1 capsule (200 mg) by mouth daily (Patient not taking: Reported on 6/6/2024) 10 capsule 0    cetirizine (ZYRTEC) 10 MG tablet Take 1 tablet (10 mg) by  mouth daily 30 tablet 0    diphenhydrAMINE (BENADRYL) 50 MG capsule Take 50 mg by mouth as needed      esomeprazole (NEXIUM) 20 MG DR capsule TAKE 1 CAPSULE BY MOUTH EVERY MORNING BEFORE BREAKFAST 90 capsule 3    fludrocortisone (FLORINEF) 0.1 MG tablet TAKE 2 TABLETS (0.2 MG) BY MOUTH DAILY 180 tablet 1    gabapentin (NEURONTIN) 300 MG capsule Take 3 capsules (900 mg) by mouth 3 times daily 810 capsule 3    hyoscyamine (LEVSIN/SL) 0.125 MG sublingual tablet Take 1 tablet (0.125 mg) by mouth every 4 hours as needed 120 tablet 5    ipratropium (ATROVENT HFA) 17 MCG/ACT inhaler Inhale 2 puffs into the lungs every 6 hours      ipratropium (ATROVENT) 0.02 % nebulizer solution Take 0.5 mg by nebulization daily as needed for wheezing      levonorgestrel (MIRENA, 52 MG,) 20 MCG/24HR IUD 1 each (20 mcg) by Intrauterine route once for 1 dose 1 each 0    Melatonin 10 MG TABS Take 10 mg by mouth nightly as needed      metoclopramide (REGLAN) 5 MG tablet TAKE 1 TABLET (5 MG) BY MOUTH 3 TIMES DAILY AS NEEDED (FOR NAUSEA) 90 tablet 0    mirtazapine (REMERON) 15 MG tablet TAKE 1 TABLET(15 MG) BY MOUTH DAILY 90 tablet 3    pindolol (VISKEN) 5 MG tablet Take 1 tablet (5 mg) by mouth 2 times daily as needed (palpitations) 60 tablet 0    tiZANidine (ZANAFLEX) 2 MG tablet prn      VITAMIN D, CHOLECALCIFEROL, PO Take  by mouth daily.      vortioxetine (TRINTELLIX) 20 MG tablet Take 1 tablet (20 mg) by mouth daily 30 tablet 11         DISPOSITION      6/13/24 Intake complete. Scheduled MTM with Caryn Byrne on 6/27/24 at 10am. Scheduled for AGE on 8/2/24 at 12:50pm with .     Delma Lucas, Lead Complex

## 2024-06-13 NOTE — PROGRESS NOTES
[facility]                                     Progress Note    Patient Name: Angela Jones  Date: 6/13/2024         Service Type: Individual      Session Start Time: 9:00am  Session End Time: 9:53am     Session Length: 53 minutes    Session #: 36    Attendees: Client attended alone    Service Modality:  Video Visit:      Provider verified identity through the following two step process.  Patient provided:  Patient is known previously to provider    Telemedicine Visit: The patient's condition can be safely assessed and treated via synchronous audio and visual telemedicine encounter.      Reason for Telemedicine Visit: Patient has requested telehealth visit    Originating Site (Patient Location): Patient's home    Distant Site (Provider Location): Provider Remote Setting- Home Office    Consent:  The patient/guardian has verbally consented to: the potential risks and benefits of telemedicine (video visit) versus in person care; bill my insurance or make self-payment for services provided; and responsibility for payment of non-covered services.     Patient would like the video invitation sent by:  My Chart    Mode of Communication:  Video Conference via [employer]      Distant Location (Provider):  Off-site    As the provider I attest to compliance with applicable laws and regulations related to telemedicine.    DATA  Extended Session (53+ minutes): PROLONGED SERVICE IN THE OUTPATIENT SETTING REQUIRING DIRECT (FACE-TO-FACE) PATIENT CONTACT BEYOND THE USUAL SERVICE:    - Treatment protocol required additional time to complete, due to the nature of diagnosis being treated.  See Interventions section for details  Interactive Complexity: No  Crisis: No        Progress Since Last Session (Related to Symptoms / Goals / Homework):   Symptoms: No change :  anxiety and depression    Homework: Achieved / completed to satisfaction      Episode of Care Goals: Minimal progress - PREPARATION (Decided to change  - considering how); Intervened by negotiating a change plan and determining options / strategies for behavior change, identifying triggers, exploring social supports, and working towards setting a date to begin behavior change     Current / Ongoing Stressors and Concerns:  Patient provides a status update while therapist utilizes reflective listening skills. She reports transferring to the [facility] location for work and it's going okay but she is not getting along with her boss due to communication issues. Wondering about talking to  about work place accommodations. Patient did call and leave a message with the [employer] board and also talked to HR.   Things are going well in her polyamorous relationship.   She is still battling an ongoing sinus infection. Her anxiety has slightly improved (since her last visit). She reports that she has 'meltdowns' about every 2 weeks (associated with Autism diagnosis). She struggles with cleaning and energy to do tasks around the house.   There has not been much progress made towards the divorce finalizing.        Treatment Objective(s) Addressed in This Session:   identify the fears / thoughts that contribute to feeling anxious  state understanding of stressors and relationship to physical symptoms  Increase interest, engagement, and pleasure in doing things  Decrease frequency and intensity of feeling down, depressed, hopeless  Identify negative self-talk and behaviors: challenge core beliefs, myths, and actions  Gain insight     Intervention:   Discussed mindfulness as being aware of what we are experiencing while we are experiencing it.  Contrasted this with avoidance and rumination.  Explored the role of mindfulness as an overall coping strategy for managing depression, anxiety, and strong emotions.  Explained concept of state of mind using SIFT (sensations, images, feelings, thoughts) pneumonic. Discussed mindfulness as a tool to intentionally shift our  awareness and focus as needed.  Discussed physical, mental, and emotional boundaries and limit-setting with others. Explored management of boundaries through direct communication and limiting contact and communication with others.  Discussed barriers to using boundary management skills including strong emotions and physical proximity.  Therapist provided unconditional positive regard and supportive counseling.     Assessments completed prior to visit:   The following assessments were completed by patient for this visit:  PHQ9:       12/19/2023     9:00 AM 1/23/2024     8:54 AM 2/6/2024     8:54 AM 2/27/2024    11:01 AM 4/12/2024    10:59 AM 6/2/2024    11:40 AM 6/13/2024     8:57 AM   PHQ-9 SCORE   PHQ-9 Total Score MyChart 16 (Moderately severe depression) 7 (Mild depression) 12 (Moderate depression) 11 (Moderate depression) 14 (Moderate depression) 12 (Moderate depression) 9 (Mild depression)   PHQ-9 Total Score 16 7 12 11 14    14 12 9     GAD7:       9/5/2023    11:44 AM 10/9/2023     6:37 PM 11/21/2023     8:54 AM 12/13/2023     9:31 AM 4/12/2024    11:02 AM 4/18/2024     1:29 PM 5/6/2024    10:51 AM   JOSHUA-7 SCORE   Total Score 9 (mild anxiety) 13 (moderate anxiety) 11 (moderate anxiety) 7 (mild anxiety) 7 (mild anxiety) 8 (mild anxiety) 9 (mild anxiety)   Total Score 9 13 11 7 7 8 9     PROMIS 10-Global Health (only subscores and total score):       4/19/2023     8:53 AM 7/25/2023    12:02 PM 10/24/2023     9:01 AM 11/7/2023    11:56 AM 11/21/2023     8:55 AM 2/27/2024    11:02 AM 4/12/2024    11:00 AM   PROMIS-10 Scores Only   Global Mental Health Score 12 10 11 10    10 11 12 14   Global Physical Health Score 11 11 12 12    12 12 11 12   PROMIS TOTAL - SUBSCORES 23 21 23 22    22 23 23 26     Mercer Suicide Severity Rating Scale (Lifetime/Recent)      2/17/2022     3:02 PM 1/9/2024    10:54 AM   Mercer Suicide Severity Rating (Lifetime/Recent)   Q1 Wished to be Dead (Past Month) no    Q2 Suicidal  Thoughts (Past Month) no    Q3 Suicidal Thought Method no    Q4 Suicidal Intent without Specific Plan no    Q5 Suicide Intent with Specific Plan no    Q6 Suicide Behavior (Lifetime) yes    Within the Past 3 Months? no    Level of Risk per Screen moderate risk    1. Wish to be Dead (Past 1 Month)  N   2. Non-Specific Active Suicidal Thoughts (Past 1 Month)  N   Calculated C-SSRS Risk Score (Lifetime/Recent)  No Risk Indicated           ASSESSMENT: Current Emotional / Mental Status (status of significant symptoms):   Risk status (Self / Other harm or suicidal ideation)   Patient denies current fears or concerns for personal safety.   Patient denies current or recent suicidal ideation or behaviors.   Patient denies current or recent homicidal ideation or behaviors.   Patient denies current or recent self injurious behavior or ideation.   Patient denies other safety concerns.   Patient reports there has been no change in risk factors since their last session.     Patient reports there has been no change in protective factors since their last session.     Recommended that patient call 911 or go to the local ED should there be a change in any of these risk factors.     Appearance:   Appropriate    Eye Contact:   Good    Psychomotor Behavior: Hyperactive  Restless    Attitude:   Cooperative    Orientation:   All   Speech    Rate / Production: Pressured  Stutters    Volume:  Normal    Mood:    Anxious  Depressed  Anhedonia   Affect:    Constricted  Flat    Thought Content:  Clear    Thought Form:  Coherent    Insight:    Good      Medication Review:   No changes to current psychiatric medication(s)     Medication Compliance:   Yes     Changes in Health Issues:   None reported     Chemical Use Review:   Substance Use: Chemical use reviewed, no active concerns identified      Tobacco Use: No current tobacco use.      Diagnosis:  1. Autism spectrum disorder    2. JOSHUA (generalized anxiety disorder)    3. Attention deficit  hyperactivity disorder (ADHD), other type    4. Mild episode of recurrent major depressive disorder (H24)        Collateral Reports Completed:   Not Applicable    PLAN: (Patient Tasks / Therapist Tasks / Other)  Patient will return for a virtual visit in 1 month  Patient encouraged to follow up with  about work accommodations  Patient encouraged to consider [facility] resource for help with divorce process    There has been demonstrated improvement in functioning while patient has been engaged in psychotherapy/psychological service- if withdrawn the patient would deteriorate and/or relapse.       Fanny Cobb, [facility]                                                         ______________________________________________________________________    Individual Treatment Plan    Patient's Name: Angela Jones  YOB: 1995    Date of Creation: 1/19/2022  Date Treatment Plan Last Reviewed/Revised: 4/12/2024    DSM5 Diagnoses: Autism Spectrum Disorder 299.00(F84.0)  Associated with another neurodevelopmental, mental or behavioral disorder and Attention-Deficit/Hyperactivity Disorder  314.01 (F90.9) Unspecified Attention -Deficit / Hyperactivity Disorder, 296.31 (F33.0) Major Depressive Disorder, Recurrent Episode, Mild _ or 300.02 (F41.1) Generalized Anxiety Disorder  Psychosocial / Contextual Factors: 29 year old  female, , no children   PROMIS (reviewed every 90 days):   PROMIS 10-Global Health (only subscores and total score):       4/19/2023     8:53 AM 7/25/2023    12:02 PM 10/24/2023     9:01 AM 11/7/2023    11:56 AM 11/21/2023     8:55 AM 2/27/2024    11:02 AM 4/12/2024    11:00 AM   PROMIS-10 Scores Only   Global Mental Health Score 12 10 11 10    10 11 12 14   Global Physical Health Score 11 11 12 12    12 12 11 12   PROMIS TOTAL - SUBSCORES 23 21 23 22    22 23 23 26         Referral / Collaboration:  Was/were discussed and patient will pursue.    Anticipated number of  session for this episode of care: 6-9  Anticipation frequency of session: Every other week  Anticipated Duration of each session: 53 or more minutes  Treatment plan will be reviewed in 90 days or when goals have been changed.       MeasurableTreatment Goal(s) related to diagnosis / functional impairment(s)  Goal 1: Patient will manage symptoms of anxiety, as evidenced by a JOSHUA-7 score of 5 or lower     I will know I've met my goal when I feel more confident in my ability to cope with stress.      Objective #A (Patient Action)    Patient will identify the initial signs or symptoms of anxiety.  Status: Continued - Date(s): 4/12/2024    Intervention(s)  Therapist will teach  help patient gain insight into signs of stress (physically and emotionally) .    Objective #B  Patient will use relaxation strategies multiple times per day to reduce the physical symptoms of anxiety.  Status: Continued - Date(s): 4/12/2024    Intervention(s)  Therapist will  teach diaphragmatic breathing and other grounding skills .    Objective #C  Patient will use thought-stopping strategy daily to reduce intrusive thoughts.  Status: Continued - Date(s): 4/12/2024    Intervention(s)  Therapist will  teach thought stopping techniques .      Goal 2: Patient will manage symptoms of depression, as evidenced by a PHQ-9 score of 10 or lower    I will know I've met my goal when I feel more confident in my ability to express my emotions in a healthy way.      Objective #A (Patient Action)    Status: Continued - Date(s): 4/12/2024    Patient will Increase interest, engagement, and pleasure in doing things.    Intervention(s)  Therapist will teach emotional recognition/identification. * .    Objective #B  Patient will Identify negative self-talk and behaviors: challenge core beliefs, myths, and actions.    Status: Continued - Date(s): 4/12/2024    Intervention(s)  Therapist will  help patient practice positive self-talk and thought re-framing .    Goal 3:  Client will manage symptoms of ADHD     I will know I've met my goal when I feel confident in completing tasks.      Objective #A (Client Action)    Client will use daily planner 75% of the time.  Status: Continued - Date(s): 4/12/2024    Intervention(s)  Therapist will  ask client to demonstrate use of planner while in session .    Objective #B  Client will identify and compliment positives at least 2 times daily to support positive self-image.    Status: Continued - Date(s): 4/12/2024    Intervention(s)  Therapist will  ask patient to demonstrate use of affirmations during session .    Goal 4: Client will manage symptoms and experiences associated with ASD     I will know I've met my goal when I feel confident in my communication in relationships.      Objective #A (Client Action)    Client will work on being assertive and setting clear expectations with healthy boundaries in relationships  Status: Continued - Date(s): 4/12/2024    Intervention(s)  Therapist will  help patient practice assertive communication skills .        Patient has reviewed and agreed to the above plan.      Fanny Cobb, [facility]  April 12, 2024

## 2024-06-27 ENCOUNTER — VIRTUAL VISIT (OUTPATIENT)
Dept: PHARMACY | Facility: CLINIC | Age: 29
End: 2024-06-27
Payer: COMMERCIAL

## 2024-06-27 DIAGNOSIS — F41.1 GENERALIZED ANXIETY DISORDER: ICD-10-CM

## 2024-06-27 DIAGNOSIS — Z97.5 IUD (INTRAUTERINE DEVICE) IN PLACE: ICD-10-CM

## 2024-06-27 DIAGNOSIS — K21.9 GASTROESOPHAGEAL REFLUX DISEASE WITHOUT ESOPHAGITIS: ICD-10-CM

## 2024-06-27 DIAGNOSIS — J30.2 SEASONAL ALLERGIC RHINITIS: ICD-10-CM

## 2024-06-27 DIAGNOSIS — F32.9 MAJOR DEPRESSION: Primary | ICD-10-CM

## 2024-06-27 DIAGNOSIS — R11.0 NAUSEA: ICD-10-CM

## 2024-06-27 DIAGNOSIS — G90.A POTS (POSTURAL ORTHOSTATIC TACHYCARDIA SYNDROME): ICD-10-CM

## 2024-06-27 DIAGNOSIS — M79.7 FIBROMYALGIA: ICD-10-CM

## 2024-06-27 DIAGNOSIS — G43.109 MIGRAINE WITH AURA: ICD-10-CM

## 2024-06-27 PROCEDURE — 99207 PR NO CHARGE LOS: CPT | Mod: 95 | Performed by: PHARMACIST

## 2024-06-27 RX ORDER — MULTIVITAMIN,THERAPEUTIC
1 TABLET ORAL DAILY
COMMUNITY

## 2024-06-27 RX ORDER — IBUPROFEN 200 MG
200 TABLET ORAL EVERY 4 HOURS PRN
COMMUNITY

## 2024-06-27 NOTE — Clinical Note
Hello,   Just sending as an FYI that I completed an MTM/med review on this new intake patient you are scheduled to see in the future.   Thank you!  Caryn Byrne, PharmD, BCPP Medication Therapy Management Pharmacist [facility]

## 2024-06-27 NOTE — PROGRESS NOTES
Medication Therapy Management (MTM) Encounter    ASSESSMENT:                            Mental Health   Anxiety, Depression, and ADHD.:   Worsening depression and anxiety symptoms, feels efficacy of current medications is wearing off.  Patient has upcoming visit with psychiatry provider for further psychiatric assessment.  No medication changes recommended at this time until additional assessment is complete.  Patient reports several medication trials in the past, however in review of chart I am only able to find details of Celexa, Lexapro, lamotrigine, Abilify, and guanfacine.  Patient believes she has tried Prozac, Wellbutrin, Strattera, Adderall, and Concerta as a child/adolescent.    Contraception:   Currently stable    Allergic Rhinitis:   Currently stable     GERD:   Currently stable    GI:  Currently stable    Pain:   Currently stable    POTS:   Currently stable    Migraine:   Well controlled with amitriptyline prophylaxis     PLAN:                            Medication list reviewed and updated today  See psychiatry provider 8-24 for additional assessment.    Follow-up: MTM 6 months or sooner if requested    SUBJECTIVE/OBJECTIVE:                          Angela Jones is a 29 year old female seen for an initial visit. She was referred to me from psychiatry intake.      Reason for visit: new intake - medication review.    Allergies/ADRs: Reviewed in chart  Past Medical History: Reviewed in chart  Tobacco: She reports that she has never smoked. She has never been exposed to tobacco smoke. She has never used smokeless tobacco.  Alcohol: ocassionally      Mental Health   Anxiety, Depression, and ADHD .  Mirtazapine 15mg nightly  Trintellix 20mg daily  Gabapentin 900mg three times daily (also takes for pain)     is seen today for MTM as part of [facility] new intake/evaluation. Patient is scheduled to see a psychiatry prescriber on 8/2/24 for psychiatry intake/diagnostic  assessment.  Psychiatry medications are currently prescribed by PCP.  reports past psychiatric diagnoses of depression, anxiety, ADHD. Chart review also indicates past dx of bipolar 2(?), ASD. Was seen by Dr. Rock through [facility] several years ago, then psychiatry outside our system, then [facility], most recently PCP. Medical comorbidities include: POTS, migraine, GI, Pain.     Today,  reports her medications have become less helpful over time.  She has been on Trintellix for at least 7 years, mirtazapine around 5 years, gabapentin around 2-3 years.  She endorses depression and anxiety symptoms which she describes as: increased appetite, low mood, crying spells, amotivation, anxiety - panic attacks, feeling on edge.  She also reports she was on medications for ADHD in the past (most recently guanfacine) and she is interested in restarting as she is now working.  In regard to medication side effects, she endorses some possible memory issues with gabapentin, otherwise is tolerating her medications well.  Trintellix is an expensive medication for her (around $250 per month) until she reaches her deductible.  She has met her deductible this year.  Has completed pharmacogenetic ([employer]) testing - results scanned in chart 8/22/2017.        Past Psychotropic Medications per chart review        Medication Max Dose (mg) Dates / Duration Helpful? DC Reason / Adverse Effects?   abilify 2mg 2013  Not helpful   Celexa 40mg 1733-5156  Not fully helpful - Changed to trintellix   Lexapro 20mg 2015     lamotrigine 200mg twice daily  [id number]  Not helpful   guanfacine 2mg 4769-8469? Y Job change - felt it was no longer needed                                       *tried other medications as a child, doesn't remember details/names - believes she took Prozac (irritability), Wellbutrin (foggy, fatigue), adderall, concerta, Strattera.          Contraception:   Mirena IUD placed about 7 years ago    Allergic  Rhinitis:   cetirizine 10 mg once daily  diphenhydramine 50 mg as needed - takes about once weekly  Patient feels that current therapy is effective.     GERD:   Esomeprazole 20 mg once daily   Patient feels that current regimen is effective.    GI:  Hyoscyamine as needed  - takes about once monthly  Reglan as needed - takes about once weekly    Pain:   Gabapentin 900mg three times daily   Tizanidine 2mg as needed - takes about  4x/week  Sees outside pain provider    POTS:   Fludrocortisone 0.2mg daily  No issues reported    Migraine:   Preventive medications  Amitriptyline 50 mg once daily  Acute medications  Ibuprofen 200 mg  Reports very rarely gets migraines since amitrip was started.       ----------------      I spent 30 minutes with this patient today. A copy of the visit note was provided to the patient's provider(s).    A summary of these recommendations was sent via [employer].    Caryn Byrne, PharmD, BCPP  Medication Therapy Management Pharmacist  [facility]      Telemedicine Visit Details  Type of service:  Video Conference via [email or web address]  Start Time:  10:05am  End Time:  10:35am     Medication Therapy Recommendations  No medication therapy recommendations to display

## 2024-06-27 NOTE — PATIENT INSTRUCTIONS
"Recommendations from today's MTM visit:                                                      Medication list reviewed and updated today  See psychiatry provider 8-24 for additional assessment.    Follow-up: MTM 6 months or sooner if requested    It was great speaking with you today.  I value your experience and would be very thankful for your time in providing feedback in our clinic survey. In the next few days, you may receive an email or text message from [facility] with a link to a survey related to your  clinical pharmacist.\"     To schedule another MTM appointment, please call the clinic directly or you may call the MTM scheduling line at [phone] or toll-free at [phone].     My Clinical Pharmacist's contact information:                                                      Please feel free to contact me with any questions or concerns you have.      Caryn Byrne, PharmD, BCPP  Medication Therapy Management Pharmacist  [facility]    "

## 2024-07-02 DIAGNOSIS — F41.1 GENERALIZED ANXIETY DISORDER: ICD-10-CM

## 2024-07-02 NOTE — TELEPHONE ENCOUNTER
Medication Question or Refill        What medication are you calling about (include dose and sig)?: gabapentin (NEURONTIN) 300 MG capsule     Preferred Pharmacy:  [place] [id number] [place] [facility] - [place], [place] - [address]  [address]  [place] [address]  Phone: [phone] Fax: [phone]      Controlled Substance Agreement on file:   CSA -- Patient Level:    CSA: None found at the patient level.       Who prescribed the medication?: Dr. Walton    Do you need a refill? Yes    When did you use the medication last? 7/2/24    Patient offered an appointment? No    Do you have any questions or concerns?  Yes, Pt requesting a week worth of medication while she waits for all her records to be transferred to [facility].       Could we send this information to you in [place] or would you prefer to receive a phone call?:   Patient would prefer a phone call   Okay to leave a detailed message?: Yes at Cell number on file:    Telephone Information:   Mobile [phone]

## 2024-07-03 RX ORDER — GABAPENTIN 300 MG/1
900 CAPSULE ORAL 3 TIMES DAILY
Qty: 810 CAPSULE | Refills: 3 | Status: SHIPPED | OUTPATIENT
Start: 2024-07-03

## 2024-07-05 ENCOUNTER — TELEPHONE (OUTPATIENT)
Dept: INTERNAL MEDICINE | Facility: CLINIC | Age: 29
End: 2024-07-05

## 2024-07-05 ENCOUNTER — VIRTUAL VISIT (OUTPATIENT)
Dept: INTERNAL MEDICINE | Facility: CLINIC | Age: 29
End: 2024-07-05
Payer: COMMERCIAL

## 2024-07-05 DIAGNOSIS — G90.A POTS (POSTURAL ORTHOSTATIC TACHYCARDIA SYNDROME): Primary | ICD-10-CM

## 2024-07-05 PROCEDURE — 99442 PR PHYSICIAN TELEPHONE EVALUATION 11-20 MIN: CPT | Mod: 95 | Performed by: INTERNAL MEDICINE

## 2024-07-05 NOTE — PROGRESS NOTES
Per patient request, can we please fax the handicap parking form that is in my inbox to the [facility] as it is closer to her location for pickup.

## 2024-07-05 NOTE — PROGRESS NOTES
Faxed parking application to [employer]ela[employer] Yang at [phone] with a note asking them to call patient to let her know to fill out first page and that it is ready to .

## 2024-07-05 NOTE — PROGRESS NOTES
is a 29 year old who is being evaluated via a billable video visit.    How would you like to obtain your AVS? MyChart  If the video visit is dropped, the invitation should be resent by: Text to cell phone: [phone]  Will anyone else be joining your video visit? No      Assessment & Plan     POTS (postural orthostatic tachycardia syndrome)  Given that she is having some increased frequency in symptoms related to her history of POTS, I do think it is worthwhile for her to have a temporary handicap parking placard available for use.  Paperwork for a 6-month parking form will be completed for her at this time.  Patient did request that the forms to be completed and submitted to a clinic closer to her location for pickup.  She did request that they be faxed to the [facility].          See Patient Instructions    Subjective    is a 29 year old, presenting for the following health issues:  Follow Up    Patient is a 29-year-old  female who presents in virtual visit to discuss a temporary parking disability placard.  She does have a history of POTS syndrome, and she has dealt with this issue for an extended period of time.  Patient does report over the past few weeks she has had an increase in frequency of flares of her POTS syndrome.  Patient reports that she tends to struggle with hotter weather, and it can result in episodes of dizziness, lightheadedness, heart racing, and feeling very weak.  She had previously had a temporary parking disability placard when her symptoms were more pronounced, and she is hopeful that she could get another 1 today.  Patient had no other questions or concerns that she wished to address.      History of Present Illness       Reason for visit:  Follow up on pots symptoms/ may need parking placard    She eats 2-3 servings of fruits and vegetables daily.She consumes 0 sweetened beverage(s) daily.She exercises with enough effort to increase her heart rate 20 to  29 minutes per day.  She exercises with enough effort to increase her heart rate 4 days per week.   She is taking medications regularly.         Review of Systems  CONSTITUTIONAL: NEGATIVE for fever, chills, change in weight  ENT/MOUTH: NEGATIVE for ear, mouth and throat problems  RESP: NEGATIVE for significant cough or SOB  CV: NEGATIVE for chest pain, palpitations or peripheral edema  GI: NEGATIVE for nausea, abdominal pain, heartburn, or change in bowel habits  MUSCULOSKELETAL: NEGATIVE for significant arthralgias or myalgia  NEURO: NEGATIVE for weakness, dizziness or paresthesias      Objective         Vitals:  No vitals were obtained today due to virtual visit.    Physical Exam   GENERAL: alert and no distress  EYES: Eyes grossly normal to inspection.  No discharge or erythema, or obvious scleral/conjunctival abnormalities.  RESP: No audible wheeze, cough, or visible cyanosis.    SKIN: Visible skin clear. No significant rash, abnormal pigmentation or lesions.  NEURO: Cranial nerves grossly intact.  Mentation and speech appropriate for age.  PSYCH: Appropriate affect, tone, and pace of words        Video-Visit Details    Type of service:  Video Visit   Originating Location (pt. Location): Home  Distant Location (provider location):  On-site  Platform used for Video Visit: Sidney  Signed Electronically by: Clarke Tate MD

## 2024-07-12 ENCOUNTER — VIRTUAL VISIT (OUTPATIENT)
Dept: BEHAVIORAL HEALTH | Facility: CLINIC | Age: 29
End: 2024-07-12
Payer: COMMERCIAL

## 2024-07-12 DIAGNOSIS — F84.0 AUTISM SPECTRUM DISORDER: Primary | ICD-10-CM

## 2024-07-12 DIAGNOSIS — F90.8 ATTENTION DEFICIT HYPERACTIVITY DISORDER (ADHD), OTHER TYPE: ICD-10-CM

## 2024-07-12 DIAGNOSIS — F41.1 GAD (GENERALIZED ANXIETY DISORDER): ICD-10-CM

## 2024-07-12 DIAGNOSIS — F33.0 MILD EPISODE OF RECURRENT MAJOR DEPRESSIVE DISORDER (H): ICD-10-CM

## 2024-07-12 PROCEDURE — 90834 PSYTX W PT 45 MINUTES: CPT | Mod: 95 | Performed by: SOCIAL WORKER

## 2024-07-12 NOTE — PROGRESS NOTES
[facility]                                     Progress Note    Patient Name: Angela Jones  Date: 7/12/2024         Service Type: Individual      Session Start Time: 8:05am  Session End Time: 8:45am     Session Length: 40 minutes    Session #: 37    Attendees: Client attended alone    Service Modality:  Video Visit:      Provider verified identity through the following two step process.  Patient provided:  Patient is known previously to provider    Telemedicine Visit: The patient's condition can be safely assessed and treated via synchronous audio and visual telemedicine encounter.      Reason for Telemedicine Visit: Patient has requested telehealth visit    Originating Site (Patient Location): Patient's home    Distant Site (Provider Location): Provider Remote Setting- Home Office    Consent:  The patient/guardian has verbally consented to: the potential risks and benefits of telemedicine (video visit) versus in person care; bill my insurance or make self-payment for services provided; and responsibility for payment of non-covered services.     Patient would like the video invitation sent by:  My Chart    Mode of Communication:  Video Conference via Amwell      Distant Location (Provider):  Off-site    As the provider I attest to compliance with applicable laws and regulations related to telemedicine.    DATA  Interactive Complexity: No  Crisis: No        Progress Since Last Session (Related to Symptoms / Goals / Homework):   Symptoms: No change :  anxiety and depression    Homework: Achieved / completed to satisfaction      Episode of Care Goals: Minimal progress - PREPARATION (Decided to change - considering how); Intervened by negotiating a change plan and determining options / strategies for behavior change, identifying triggers, exploring social supports, and working towards setting a date to begin behavior change     Current / Ongoing Stressors and Concerns:  Patient provides a  status update while therapist utilizes reflective listening skills. She reports ongoing stressors at work and problems feeling criticized and micro-managed by her boss at work. She feels that the atmosphere at work makes her anxious. She reports that she is struggling with some memory issues she believes due to side effects from medications. She reports that she struggles to communicate when feeling emotionally overwhelmed. She reports that she hasn't seen her  in some time and isn't sure what's going on with that - needs to follow up.        Treatment Objective(s) Addressed in This Session:   identify the fears / thoughts that contribute to feeling anxious  state understanding of stressors and relationship to physical symptoms  Increase interest, engagement, and pleasure in doing things  Decrease frequency and intensity of feeling down, depressed, hopeless  Identify negative self-talk and behaviors: challenge core beliefs, myths, and actions  Gain insight     Intervention:   Discussed mindfulness as being aware of what we are experiencing while we are experiencing it.  Contrasted this with avoidance and rumination.  Explored the role of mindfulness as an overall coping strategy for managing depression, anxiety, and strong emotions.  Explained concept of state of mind using SIFT (sensations, images, feelings, thoughts) pneumonic. Discussed mindfulness as a tool to intentionally shift our awareness and focus as needed.  Discussed physical, mental, and emotional boundaries and limit-setting with others. Explored management of boundaries through direct communication and limiting contact and communication with others.  Discussed barriers to using boundary management skills including strong emotions and physical proximity.  Therapist provided unconditional positive regard and supportive counseling.     Assessments completed prior to visit:   The following assessments were completed by patient for this  visit:  PHQ9:       12/19/2023     9:00 AM 1/23/2024     8:54 AM 2/6/2024     8:54 AM 2/27/2024    11:01 AM 4/12/2024    10:59 AM 6/2/2024    11:40 AM 6/13/2024     8:57 AM   PHQ-9 SCORE   PHQ-9 Total Score MyChart 16 (Moderately severe depression) 7 (Mild depression) 12 (Moderate depression) 11 (Moderate depression) 14 (Moderate depression) 12 (Moderate depression) 9 (Mild depression)   PHQ-9 Total Score 16 7 12 11 14    14 12 9     GAD7:       9/5/2023    11:44 AM 10/9/2023     6:37 PM 11/21/2023     8:54 AM 12/13/2023     9:31 AM 4/12/2024    11:02 AM 4/18/2024     1:29 PM 5/6/2024    10:51 AM   JOSHUA-7 SCORE   Total Score 9 (mild anxiety) 13 (moderate anxiety) 11 (moderate anxiety) 7 (mild anxiety) 7 (mild anxiety) 8 (mild anxiety) 9 (mild anxiety)   Total Score 9 13 11 7 7 8 9     PROMIS 10-Global Health (only subscores and total score):       7/25/2023    12:02 PM 10/24/2023     9:01 AM 11/7/2023    11:56 AM 11/21/2023     8:55 AM 2/27/2024    11:02 AM 4/12/2024    11:00 AM 7/12/2024     8:07 AM   PROMIS-10 Scores Only   Global Mental Health Score 10 11 10    10 11 12 14 14   Global Physical Health Score 11 12 12    12 12 11 12 12   PROMIS TOTAL - SUBSCORES 21 23 22    22 23 23 26 26     [place] Suicide Severity Rating Scale (Lifetime/Recent)      2/17/2022     3:02 PM 1/9/2024    10:54 AM   [place] Suicide Severity Rating (Lifetime/Recent)   Q1 Wished to be Dead (Past Month) no    Q2 Suicidal Thoughts (Past Month) no    Q3 Suicidal Thought Method no    Q4 Suicidal Intent without Specific Plan no    Q5 Suicide Intent with Specific Plan no    Q6 Suicide Behavior (Lifetime) yes    If yes to Q6, within past 3 months? no    Level of Risk per Screen moderate risk    1. Wish to be Dead (Past 1 Month)  N   2. Non-Specific Active Suicidal Thoughts (Past 1 Month)  N   Calculated C-SSRS Risk Score (Lifetime/Recent)  No Risk Indicated           ASSESSMENT: Current Emotional / Mental Status (status of significant  symptoms):   Risk status (Self / Other harm or suicidal ideation)   Patient denies current fears or concerns for personal safety.   Patient denies current or recent suicidal ideation or behaviors.   Patient denies current or recent homicidal ideation or behaviors.   Patient denies current or recent self injurious behavior or ideation.   Patient denies other safety concerns.   Patient reports there has been no change in risk factors since their last session.     Patient reports there has been no change in protective factors since their last session.     Recommended that patient call 911 or go to the local ED should there be a change in any of these risk factors.     Appearance:   Appropriate    Eye Contact:   Good    Psychomotor Behavior: Hyperactive  Restless    Attitude:   Cooperative    Orientation:   All   Speech    Rate / Production: Pressured  Stutters    Volume:  Normal    Mood:    Anxious  Depressed  Anhedonia   Affect:    Constricted  Flat    Thought Content:  Clear    Thought Form:  Coherent    Insight:    Good      Medication Review:   No changes to current psychiatric medication(s)     Medication Compliance:   Yes     Changes in Health Issues:   None reported     Chemical Use Review:   Substance Use: Chemical use reviewed, no active concerns identified      Tobacco Use: No current tobacco use.      Diagnosis:  1. Autism spectrum disorder    2. JOSHUA (generalized anxiety disorder)    3. Attention deficit hyperactivity disorder (ADHD), other type    4. Mild episode of recurrent major depressive disorder (H24)        Collateral Reports Completed:   Not Applicable    PLAN: (Patient Tasks / Therapist Tasks / Other)  Patient will return for a virtual visit in 2 weeks  Patient will work on stress management techniques to tolerate discomfort   Patient will consider creating a toolkit to bring with her including sensory items for emotion regulation    Patient encouraged to follow up with  about work  accommodations  Patient encouraged to consider [facility] resource for help with divorce process    There has been demonstrated improvement in functioning while patient has been engaged in psychotherapy/psychological service- if withdrawn the patient would deteriorate and/or relapse.       Fanny Cobb, [facility] Hospital[facility]                                                         ______________________________________________________________________    Individual Treatment Plan    Patient's Name: Angela Jones  YOB: 1995    Date of Creation: 1/19/2022  Date Treatment Plan Last Reviewed/Revised: 7/12/2024    DSM5 Diagnoses: Autism Spectrum Disorder 299.00(F84.0)  Associated with another neurodevelopmental, mental or behavioral disorder and Attention-Deficit/Hyperactivity Disorder  314.01 (F90.9) Unspecified Attention -Deficit / Hyperactivity Disorder, 296.31 (F33.0) Major Depressive Disorder, Recurrent Episode, Mild _ or 300.02 (F41.1) Generalized Anxiety Disorder  Psychosocial / Contextual Factors: 29 year old  female, , no children   PROMIS (reviewed every 90 days):   PROMIS 10-Global Health (only subscores and total score):       7/25/2023    12:02 PM 10/24/2023     9:01 AM 11/7/2023    11:56 AM 11/21/2023     8:55 AM 2/27/2024    11:02 AM 4/12/2024    11:00 AM 7/12/2024     8:07 AM   PROMIS-10 Scores Only   Global Mental Health Score 10 11 10    10 11 12 14 14   Global Physical Health Score 11 12 12    12 12 11 12 12   PROMIS TOTAL - SUBSCORES 21 23 22    22 23 23 26 26         Referral / Collaboration:  Was/were discussed and patient will pursue.    Anticipated number of session for this episode of care: 6-9  Anticipation frequency of session: Every other week  Anticipated Duration of each session: 53 or more minutes  Treatment plan will be reviewed in 90 days or when goals have been changed.       MeasurableTreatment Goal(s) related to diagnosis / functional impairment(s)  Goal 1: Patient  will manage symptoms of anxiety, as evidenced by a JOSHUA-7 score of 5 or lower     I will know I've met my goal when I feel more confident in my ability to cope with stress.      Objective #A (Patient Action)    Patient will identify the initial signs or symptoms of anxiety.  Status: Continued - Date(s): 7/12/2024    Intervention(s)  Therapist will teach  help patient gain insight into signs of stress (physically and emotionally) .    Objective #B  Patient will use relaxation strategies multiple times per day to reduce the physical symptoms of anxiety.  Status: Continued - Date(s): 7/12/2024    Intervention(s)  Therapist will  teach diaphragmatic breathing and other grounding skills .    Objective #C  Patient will use thought-stopping strategy daily to reduce intrusive thoughts.  Status: Continued - Date(s): 7/12/2024    Intervention(s)  Therapist will  teach thought stopping techniques .      Goal 2: Patient will manage symptoms of depression, as evidenced by a PHQ-9 score of 10 or lower    I will know I've met my goal when I feel more confident in my ability to express my emotions in a healthy way.      Objective #A (Patient Action)    Status: Continued - Date(s): 7/12/2024    Patient will Increase interest, engagement, and pleasure in doing things.    Intervention(s)  Therapist will teach emotional recognition/identification. * .    Objective #B  Patient will Identify negative self-talk and behaviors: challenge core beliefs, myths, and actions.    Status: Continued - Date(s): 7/12/2024    Intervention(s)  Therapist will  help patient practice positive self-talk and thought re-framing .    Goal 3: Client will manage symptoms of ADHD     I will know I've met my goal when I feel confident in completing tasks.      Objective #A (Client Action)    Client will use daily planner 75% of the time.  Status: Continued - Date(s): 7/12/2024    Intervention(s)  Therapist will  ask client to demonstrate use of planner while in  session .    Objective #B  Client will identify and compliment positives at least 2 times daily to support positive self-image.    Status: Continued - Date(s): 7/12/2024    Intervention(s)  Therapist will  ask patient to demonstrate use of affirmations during session .    Goal 4: Client will manage symptoms and experiences associated with ASD     I will know I've met my goal when I feel confident in my communication in relationships.      Objective #A (Client Action)    Client will work on being assertive and setting clear expectations with healthy boundaries in relationships  Status: Continued - Date(s): 7/12/2024    Intervention(s)  Therapist will  help patient practice assertive communication skills .        Patient has reviewed and agreed to the above plan.      Fanny Cobb, [facility]  July 12, 2024

## 2024-07-15 ENCOUNTER — MYC MEDICAL ADVICE (OUTPATIENT)
Dept: FAMILY MEDICINE | Facility: CLINIC | Age: 29
End: 2024-07-15

## 2024-07-15 ENCOUNTER — OFFICE VISIT (OUTPATIENT)
Dept: URGENT CARE | Facility: URGENT CARE | Age: 29
End: 2024-07-15
Payer: COMMERCIAL

## 2024-07-15 VITALS
HEIGHT: 63 IN | SYSTOLIC BLOOD PRESSURE: 98 MMHG | TEMPERATURE: 98.2 F | OXYGEN SATURATION: 98 % | WEIGHT: 120 LBS | DIASTOLIC BLOOD PRESSURE: 60 MMHG | BODY MASS INDEX: 21.26 KG/M2 | HEART RATE: 93 BPM

## 2024-07-15 DIAGNOSIS — Z71.1 WORRIED WELL: Primary | ICD-10-CM

## 2024-07-15 PROCEDURE — 99213 OFFICE O/P EST LOW 20 MIN: CPT | Performed by: EMERGENCY MEDICINE

## 2024-07-15 NOTE — PROGRESS NOTES
"Assessment & Plan     Diagnosis:    ICD-10-CM    1. Worried well  Z71.1           Medical Decision Making:  Angela Jones is a 29 year old female who presents to discuss whether a dog's rash could be contagious.  She is a , showing me pictures of a dog that is missing hair and has circular lesions.  She has no symptoms herself and is asymptomatic here.  It is difficult to ascertain what sort of rash this is from the pictures, I discussed that she should go discussed with the that regarding the dog's rash and come back if she develops any symptoms herself.  Questions answered.      Rusty Harris PA-C  [facility]    Subjective     Angela Jones is a 29 year old female who presents to clinic today for the following health issues:  Chief Complaint   Patient presents with    Urgent Care     Is a  and had a dog that had skin issues, wants to make sure not contagious       HPI  Patient is a , recently brushing a dog that had a lot of skin issues and losing hair.  She was to make sure that this is not contagious.  She has no symptoms or self including rashes, fevers, joint pains or other concerns.    Review of Systems    See HPI    Objective      Vitals: BP 98/60   Pulse 93   Temp 98.2  F (36.8  C) (Temporal)   Ht 1.6 m (5' 3\")   Wt 54.4 kg (120 lb)   LMP 04/15/2024 (Approximate)   SpO2 98%   BMI 21.26 kg/m      Patient Vitals for the past 24 hrs:   BP Temp Temp src Pulse SpO2 Height Weight   07/15/24 1032 98/60 98.2  F (36.8  C) Temporal 93 98 % 1.6 m (5' 3\") 54.4 kg (120 lb)       Vital signs reviewed by: Rusty Hraris PA-C    Physical Exam   Constitutional: Patient is alert and cooperative. No acute distress.  Skin: No rash noted on visualized skin.  Psychiatric:The patient has a normal mood and affect.     Rusty Harris PA-C, July 15, 2024      "

## 2024-07-15 NOTE — TELEPHONE ENCOUNTER
Duplicate message. Please see nurse reply on TE from yesterday (7/14).      Maddison Trevizo MSN, RN   [facility]

## 2024-07-25 ENCOUNTER — VIRTUAL VISIT (OUTPATIENT)
Dept: BEHAVIORAL HEALTH | Facility: CLINIC | Age: 29
End: 2024-07-25
Payer: COMMERCIAL

## 2024-07-25 DIAGNOSIS — F84.0 AUTISM SPECTRUM DISORDER: Primary | ICD-10-CM

## 2024-07-25 DIAGNOSIS — F90.8 ATTENTION DEFICIT HYPERACTIVITY DISORDER (ADHD), OTHER TYPE: ICD-10-CM

## 2024-07-25 DIAGNOSIS — F41.1 GAD (GENERALIZED ANXIETY DISORDER): ICD-10-CM

## 2024-07-25 DIAGNOSIS — F33.0 MILD EPISODE OF RECURRENT MAJOR DEPRESSIVE DISORDER (H): ICD-10-CM

## 2024-07-25 PROCEDURE — 90837 PSYTX W PT 60 MINUTES: CPT | Mod: 95 | Performed by: SOCIAL WORKER

## 2024-07-25 ASSESSMENT — ANXIETY QUESTIONNAIRES
4. TROUBLE RELAXING: MORE THAN HALF THE DAYS
7. FEELING AFRAID AS IF SOMETHING AWFUL MIGHT HAPPEN: NOT AT ALL
5. BEING SO RESTLESS THAT IT IS HARD TO SIT STILL: SEVERAL DAYS
1. FEELING NERVOUS, ANXIOUS, OR ON EDGE: SEVERAL DAYS
6. BECOMING EASILY ANNOYED OR IRRITABLE: SEVERAL DAYS
GAD7 TOTAL SCORE: 7
GAD7 TOTAL SCORE: 7
3. WORRYING TOO MUCH ABOUT DIFFERENT THINGS: SEVERAL DAYS
GAD7 TOTAL SCORE: 7
8. IF YOU CHECKED OFF ANY PROBLEMS, HOW DIFFICULT HAVE THESE MADE IT FOR YOU TO DO YOUR WORK, TAKE CARE OF THINGS AT HOME, OR GET ALONG WITH OTHER PEOPLE?: SOMEWHAT DIFFICULT
7. FEELING AFRAID AS IF SOMETHING AWFUL MIGHT HAPPEN: NOT AT ALL
2. NOT BEING ABLE TO STOP OR CONTROL WORRYING: SEVERAL DAYS

## 2024-07-25 ASSESSMENT — COLUMBIA-SUICIDE SEVERITY RATING SCALE - C-SSRS
1. IN THE PAST MONTH, HAVE YOU WISHED YOU WERE DEAD OR WISHED YOU COULD GO TO SLEEP AND NOT WAKE UP?: NO
2. HAVE YOU ACTUALLY HAD ANY THOUGHTS OF KILLING YOURSELF?: NO
6. IN YOUR LIFETIME, HAVE YOU EVER DONE ANYTHING, STARTED TO DO ANYTHING, OR PREPARED TO DO ANYTHING TO END YOUR LIFE?: NO

## 2024-07-25 ASSESSMENT — PATIENT HEALTH QUESTIONNAIRE - PHQ9
SUM OF ALL RESPONSES TO PHQ QUESTIONS 1-9: 13
10. IF YOU CHECKED OFF ANY PROBLEMS, HOW DIFFICULT HAVE THESE PROBLEMS MADE IT FOR YOU TO DO YOUR WORK, TAKE CARE OF THINGS AT HOME, OR GET ALONG WITH OTHER PEOPLE: VERY DIFFICULT
SUM OF ALL RESPONSES TO PHQ QUESTIONS 1-9: 13

## 2024-07-25 NOTE — PROGRESS NOTES
[facility]                                     Progress Note    Patient Name: Angela Jones  Date: 7/25/2024         Service Type: Individual      Session Start Time: 9:00am  Session End Time: 9:53am     Session Length: 53 minutes    Session #: 38    Attendees: Client attended alone    Service Modality:  Video Visit:      Provider verified identity through the following two step process.  Patient provided:  Patient is known previously to provider    Telemedicine Visit: The patient's condition can be safely assessed and treated via synchronous audio and visual telemedicine encounter.      Reason for Telemedicine Visit: Patient has requested telehealth visit    Originating Site (Patient Location): Patient's home    Distant Site (Provider Location): Provider Remote Setting- Home Office    Consent:  The patient/guardian has verbally consented to: the potential risks and benefits of telemedicine (video visit) versus in person care; bill my insurance or make self-payment for services provided; and responsibility for payment of non-covered services.     Patient would like the video invitation sent by:  My Chart    Mode of Communication:  Video Conference via Amwell      Distant Location (Provider):  Off-site    As the provider I attest to compliance with applicable laws and regulations related to telemedicine.    DATA  Extended Session (53+ minutes): PROLONGED SERVICE IN THE OUTPATIENT SETTING REQUIRING DIRECT (FACE-TO-FACE) PATIENT CONTACT BEYOND THE USUAL SERVICE:    - Treatment protocol required additional time to complete, due to the nature of diagnosis being treated.  See Interventions section for details  Interactive Complexity: No  Crisis: No        Progress Since Last Session (Related to Symptoms / Goals / Homework):   Symptoms: No change :  anxiety and depression    Homework: Partially completed      Episode of Care Goals: Minimal progress - PREPARATION (Decided to change - considering  "how); Intervened by negotiating a change plan and determining options / strategies for behavior change, identifying triggers, exploring social supports, and working towards setting a date to begin behavior change     Current / Ongoing Stressors and Concerns:  Patient provides a status update while therapist utilizes reflective listening skills. Patient identifies recent triggers including concerns about a partner - \"It's distressing to me to see the people I love in distress.\" She reports that she does get panic attacks. She can get overwhelmed easily and has acceptance around this - \"I'd rather be doing more with the risk of feeling overwhelmed.\" She reports that she needs to do low-impact movements to help her body when experiencing fibromyalgia flare-ups. She reports concerns around the cost of medications - $300 per month. She reports the presence of mood swings and crying spells.     Therapist assessed for risk and safety - patient denied SI but did indicate thoughts of self-harm. She denies any plans or intent to self-harm but did consider that the pain would help distract away from the feelings of overwhelm. Patient contracts for safety including removal of items that could inflict self-injury.  A safety plan has been created for future reference. Should there be an increase in symptoms or severity related to SI/SIB, patient should call 911 or go to local ED for evaluation.         Treatment Objective(s) Addressed in This Session:   identify the fears / thoughts that contribute to feeling anxious  state understanding of stressors and relationship to physical symptoms  Increase interest, engagement, and pleasure in doing things  Decrease frequency and intensity of feeling down, depressed, hopeless  Identify negative self-talk and behaviors: challenge core beliefs, myths, and actions  Gain insight     Intervention:   Discussed mindfulness as being aware of what we are experiencing while we are experiencing it.  " Contrasted this with avoidance and rumination.  Explored the role of mindfulness as an overall coping strategy for managing depression, anxiety, and strong emotions.  Explained concept of state of mind using SIFT (sensations, images, feelings, thoughts) pneumonic. Discussed mindfulness as a tool to intentionally shift our awareness and focus as needed.  Discussed physical, mental, and emotional boundaries and limit-setting with others. Explored management of boundaries through direct communication and limiting contact and communication with others.  Discussed barriers to using boundary management skills including strong emotions and physical proximity.  Therapist provided unconditional positive regard and supportive counseling.     Assessments completed prior to visit:   The following assessments were completed by patient for this visit:  PHQ9:       1/23/2024     8:54 AM 2/6/2024     8:54 AM 2/27/2024    11:01 AM 4/12/2024    10:59 AM 6/2/2024    11:40 AM 6/13/2024     8:57 AM 7/25/2024     9:01 AM   PHQ-9 SCORE   PHQ-9 Total Score MyChart 7 (Mild depression) 12 (Moderate depression) 11 (Moderate depression) 14 (Moderate depression) 12 (Moderate depression) 9 (Mild depression) 13 (Moderate depression)   PHQ-9 Total Score 7 12 11 14    14 12 9 13     GAD7:       9/5/2023    11:44 AM 10/9/2023     6:37 PM 11/21/2023     8:54 AM 12/13/2023     9:31 AM 4/12/2024    11:02 AM 4/18/2024     1:29 PM 5/6/2024    10:51 AM   JOSHUA-7 SCORE   Total Score 9 (mild anxiety) 13 (moderate anxiety) 11 (moderate anxiety) 7 (mild anxiety) 7 (mild anxiety) 8 (mild anxiety) 9 (mild anxiety)   Total Score 9 13 11 7 7 8 9     PROMIS 10-Global Health (only subscores and total score):       7/25/2023    12:02 PM 10/24/2023     9:01 AM 11/7/2023    11:56 AM 11/21/2023     8:55 AM 2/27/2024    11:02 AM 4/12/2024    11:00 AM 7/12/2024     8:07 AM   PROMIS-10 Scores Only   Global Mental Health Score 10 11 10    10 11 12 14 14   Global Physical  Health Score 11 12 12    12 12 11 12 12   PROMIS TOTAL - SUBSCORES 21 23 22    22 23 23 26 26     White Suicide Severity Rating Scale (Lifetime/Recent)      2/17/2022     3:02 PM 1/9/2024    10:54 AM 7/25/2024     9:01 AM   White Suicide Severity Rating (Lifetime/Recent)   Q1 Wished to be Dead (Past Month) no     Q2 Suicidal Thoughts (Past Month) no     Q3 Suicidal Thought Method no     Q4 Suicidal Intent without Specific Plan no     Q5 Suicide Intent with Specific Plan no     Q6 Suicide Behavior (Lifetime) yes     If yes to Q6, within past 3 months? no     Level of Risk per Screen moderate risk     1. Wish to be Dead (Past 1 Month)  N N   2. Non-Specific Active Suicidal Thoughts (Past 1 Month)  N N   Calculated C-SSRS Risk Score (Lifetime/Recent)  No Risk Indicated No Risk Indicated           ASSESSMENT: Current Emotional / Mental Status (status of significant symptoms):   Risk status (Self / Other harm or suicidal ideation)   Patient denies current fears or concerns for personal safety.   Patient denies current or recent suicidal ideation or behaviors.   Patient denies current or recent homicidal ideation or behaviors.   Patient reports current or recent self injurious behavior or ideation including thoughts of self-harm.   Patient denies other safety concerns.   Patient reports there has been no change in risk factors since their last session.     Patient reports there has been no change in protective factors since their last session.     A safety and risk management plan has been developed including: Patient consented to co-developed safety plan.  Safety and risk management plan was completed - see below.  Patient agreed to use safety plan should any safety concerns arise.  A copy was given to the patient.     Appearance:   Appropriate    Eye Contact:   Good    Psychomotor Behavior: Hyperactive  Restless    Attitude:   Cooperative    Orientation:   All   Speech    Rate / Production: Pressured   Stutters    Volume:  Normal    Mood:    Anxious  Depressed  Anhedonia   Affect:    Constricted  Flat    Thought Content:  Clear    Thought Form:  Coherent    Insight:    Good      Medication Review:   No changes to current psychiatric medication(s)     Medication Compliance:   Yes     Changes in Health Issues:   None reported     Chemical Use Review:   Substance Use: Chemical use reviewed, no active concerns identified      Tobacco Use: No current tobacco use.      Diagnosis:  1. Autism spectrum disorder    2. JOSHUA (generalized anxiety disorder)    3. Attention deficit hyperactivity disorder (ADHD), other type    4. Mild episode of recurrent major depressive disorder (H24)        Collateral Reports Completed:   Not Applicable    PLAN: (Patient Tasks / Therapist Tasks / Other)  Patient will return for a virtual visit in 3 weeks  Patient will follow her safety plan  Patient agrees to try following a schedule on days off when she is home alone to manage mood swings and to practice staying in acceptance  Patient will work on stress management techniques to tolerate discomfort   Patient will consider creating a toolkit to bring with her including sensory items for emotion regulation  Patient encouraged to follow up with  about work accommodations  Patient encouraged to consider [facility] resource for help with divorce process    There has been demonstrated improvement in functioning while patient has been engaged in psychotherapy/psychological service- if withdrawn the patient would deteriorate and/or relapse.       Fanny Cobb, LICSW                                                         ______________________________________________________________________    Individual Treatment Plan    Patient's Name: Angela Jones  YOB: 1995    Date of Creation: 1/19/2022  Date Treatment Plan Last Reviewed/Revised: 7/12/2024    DSM5 Diagnoses: Autism Spectrum Disorder 299.00(F84.0)  Associated with  another neurodevelopmental, mental or behavioral disorder and Attention-Deficit/Hyperactivity Disorder  314.01 (F90.9) Unspecified Attention -Deficit / Hyperactivity Disorder, 296.31 (F33.0) Major Depressive Disorder, Recurrent Episode, Mild _ or 300.02 (F41.1) Generalized Anxiety Disorder  Psychosocial / Contextual Factors: 29 year old  female, , no children   PROMIS (reviewed every 90 days):   PROMIS 10-Global Health (only subscores and total score):       7/25/2023    12:02 PM 10/24/2023     9:01 AM 11/7/2023    11:56 AM 11/21/2023     8:55 AM 2/27/2024    11:02 AM 4/12/2024    11:00 AM 7/12/2024     8:07 AM   PROMIS-10 Scores Only   Global Mental Health Score 10 11 10    10 11 12 14 14   Global Physical Health Score 11 12 12    12 12 11 12 12   PROMIS TOTAL - SUBSCORES 21 23 22    22 23 23 26 26         Referral / Collaboration:  Was/were discussed and patient will pursue.    Anticipated number of session for this episode of care: 6-9  Anticipation frequency of session: Every other week  Anticipated Duration of each session: 53 or more minutes  Treatment plan will be reviewed in 90 days or when goals have been changed.       MeasurableTreatment Goal(s) related to diagnosis / functional impairment(s)  Goal 1: Patient will manage symptoms of anxiety, as evidenced by a JOSHUA-7 score of 5 or lower     I will know I've met my goal when I feel more confident in my ability to cope with stress.      Objective #A (Patient Action)    Patient will identify the initial signs or symptoms of anxiety.  Status: Continued - Date(s): 7/12/2024    Intervention(s)  Therapist will teach  help patient gain insight into signs of stress (physically and emotionally) .    Objective #B  Patient will use relaxation strategies multiple times per day to reduce the physical symptoms of anxiety.  Status: Continued - Date(s): 7/12/2024    Intervention(s)  Therapist will  teach diaphragmatic breathing and other grounding skills  .    Objective #C  Patient will use thought-stopping strategy daily to reduce intrusive thoughts.  Status: Continued - Date(s): 7/12/2024    Intervention(s)  Therapist will  teach thought stopping techniques .      Goal 2: Patient will manage symptoms of depression, as evidenced by a PHQ-9 score of 10 or lower    I will know I've met my goal when I feel more confident in my ability to express my emotions in a healthy way.      Objective #A (Patient Action)    Status: Continued - Date(s): 7/12/2024    Patient will Increase interest, engagement, and pleasure in doing things.    Intervention(s)  Therapist will teach emotional recognition/identification. * .    Objective #B  Patient will Identify negative self-talk and behaviors: challenge core beliefs, myths, and actions.    Status: Continued - Date(s): 7/12/2024    Intervention(s)  Therapist will  help patient practice positive self-talk and thought re-framing .    Goal 3: Client will manage symptoms of ADHD     I will know I've met my goal when I feel confident in completing tasks.      Objective #A (Client Action)    Client will use daily planner 75% of the time.  Status: Continued - Date(s): 7/12/2024    Intervention(s)  Therapist will  ask client to demonstrate use of planner while in session .    Objective #B  Client will identify and compliment positives at least 2 times daily to support positive self-image.    Status: Continued - Date(s): 7/12/2024    Intervention(s)  Therapist will  ask patient to demonstrate use of affirmations during session .    Goal 4: Client will manage symptoms and experiences associated with ASD     I will know I've met my goal when I feel confident in my communication in relationships.      Objective #A (Client Action)    Client will work on being assertive and setting clear expectations with healthy boundaries in relationships  Status: Continued - Date(s): 7/12/2024    Intervention(s)  Therapist will  help patient practice assertive  "communication skills .        Patient has reviewed and agreed to the above plan.      Fannyrobson Cobb, Northern Light Inland HospitalSW  July 12, 2024        [facility]                                       Angela Jones     SAFETY PLAN:  Step 1: Warning signs / cues (Thoughts, images, mood, situation, behavior) that a crisis may be developing:  Thoughts: \"I can't do this anymore\" and \"Nothing makes it better\"  Images: visions of harm: self-harm  Thinking Processes: ruminations (can't stop thinking about my problems):  , racing thoughts, intrusive thoughts (bothersome, unwanted thoughts that come out of nowhere):  , highly critical and negative thoughts:  , and disorganized thinking:    Mood: worsening depression, hopelessness, helplessness, intense worry, and agitation  Behaviors: can't stop crying, not taking care of myself, and not taking care of my responsibilities  Situations: relationship problems and financial stress   Step 2: Coping strategies - Things I can do to take my mind off of my problems without contacting another person (relaxation technique, physical activity):  Distress Tolerance Strategies:  relaxation activities:  , arts and crafts:  , play with my pet , sensory based activities/self-soothe with five senses:  , change body temperature (ice pack/cold water) , and paced breathing/progressive muscle relaxation  Physical Activities: meditation, deep breathing, and stretching   Focus on helpful thoughts:  \"This is temporary\", \"I will get through this\", \"It always passes\", and self-compassion statements:    Step 3: People and social settings that provide distraction:   Name: Zander    Name: Demetri  park and work   Step 4: Remind myself of people and things that are important to me and worth living for:  my family and pets and friends  Step 5: When I am in crisis, I can ask these people to help me use my safety plan:   Name: Demetri Fermin    Step 6: Making the environment safe:   dispose of old medications , " remove things I could use to hurt myself:  , and be around others  Step 7: Professionals or agencies I can contact during a crisis:  Suicide Prevention Lifeline: Call or Text 501   [facility]: [phone]    Call 911 or go to my nearest emergency department.   I helped develop this safety plan and agree to use it when needed.  I have been given a copy of this plan.    Client signature _________________________________________________________________  Today s date:  7/25/2024  Completed by Provider Name/ Credentials:  CHANTAL Blackmon  July 25, 2024  Adapted from Safety Plan Template 2008 Yessi Prakash and Olivier Leal is reprinted with the express permission of the authors.  No portion of the Safety Plan Template may be reproduced without the express, written permission.  You can contact the authors at [email or web address] or [email or web address].

## 2024-07-26 ASSESSMENT — ANXIETY QUESTIONNAIRES: GAD7 TOTAL SCORE: 7

## 2024-07-31 ENCOUNTER — E-VISIT (OUTPATIENT)
Dept: URGENT CARE | Facility: CLINIC | Age: 29
End: 2024-07-31
Payer: COMMERCIAL

## 2024-07-31 DIAGNOSIS — B37.31 CANDIDAL VULVOVAGINITIS: Primary | ICD-10-CM

## 2024-07-31 PROCEDURE — 99421 OL DIG E/M SVC 5-10 MIN: CPT | Performed by: NURSE PRACTITIONER

## 2024-07-31 RX ORDER — FLUCONAZOLE 150 MG/1
150 TABLET ORAL ONCE
Qty: 1 TABLET | Refills: 0 | Status: SHIPPED | OUTPATIENT
Start: 2024-07-31 | End: 2024-07-31

## 2024-07-31 NOTE — PATIENT INSTRUCTIONS
Thank you for choosing us for your care. I have placed an order for a prescription so that you can start treatment. View your full visit summary for details by clicking on the link below. Your pharmacist will able to address any questions you may have about the medication.     If you re not feeling better within 2-3 days, please schedule an appointment.  You can schedule an appointment right here in [facility], or call [phone]  If the visit is for the same symptoms as your eVisit, we ll refund the cost of your eVisit if seen within seven days.    Vaginal Yeast Infection: Care Instructions  Overview     A vaginal yeast infection is the growth of too many yeast cells in the vagina. This is a common problem. Itching, vaginal discharge and irritation, and other symptoms can bother you. But yeast infections don't often cause other health problems.  Some medicines can increase your risk of getting a yeast infection. These include antibiotics, hormones, and steroids. You may also be more likely to get a yeast infection if you are pregnant, have diabetes, douche, or wear tight clothes.  With treatment, most yeast infections get better in a few days.  Follow-up care is a key part of your treatment and safety. Be sure to make and go to all appointments, and call your doctor if you are having problems. It's also a good idea to know your test results and keep a list of the medicines you take.  How can you care for yourself at home?  Take your medicines exactly as prescribed. Call your doctor if you think you are having a problem with your medicine.  Ask your doctor about over-the-counter (OTC) medicines for yeast infections. If you use an OTC treatment, read and follow all instructions on the label.  Don't use tampons while using a vaginal cream or suppository. The tampons can absorb the medicine. Use pads instead.  Wear loose cotton clothing. Don't wear nylon or other fabric that holds body heat and moisture close to the  "skin.  Try sleeping without underwear.  Don't scratch. Relieve itching with a cold pack or a cool bath.  Don't wash your vulva more than once a day. Use plain water or a mild, unscented soap. Air-dry the vulva.  Change out of wet or damp clothes as soon as possible.  If you are using a vaginal medicine, don't have sex until you have finished your treatment. But if you do have sex, don't depend on a condom or diaphragm for birth control. The oil in some vaginal medicines weakens latex.  Don't douche or use powders, sprays, or perfumes in your vagina or on your vulva. These items can change the normal balance of organisms in your vagina.  When should you call for help?   Call your doctor now or seek immediate medical care if:    You have new or increased pain in your vagina or pelvis.   Watch closely for changes in your health, and be sure to contact your doctor if:    You have unexpected vaginal bleeding.     You have a fever.     You are not getting better after 2 days.     Your symptoms come back after you finish your medicines.   Where can you learn more?  Go to https://www.[employer].net/patiented  Enter F639 in the search box to learn more about \"Vaginal Yeast Infection: Care Instructions.\"  Current as of: November 27, 2023               Content Version: 14.0    4747-6410 [employer].   Care instructions adapted under license by your healthcare professional. If you have questions about a medical condition or this instruction, always ask your healthcare professional. [employer] disclaims any warranty or liability for your use of this information.      "

## 2024-08-02 ENCOUNTER — OFFICE VISIT (OUTPATIENT)
Dept: PSYCHIATRY | Facility: CLINIC | Age: 29
End: 2024-08-02
Attending: PSYCHIATRY & NEUROLOGY
Payer: COMMERCIAL

## 2024-08-02 VITALS
WEIGHT: 123.4 LBS | HEART RATE: 98 BPM | BODY MASS INDEX: 21.86 KG/M2 | DIASTOLIC BLOOD PRESSURE: 75 MMHG | SYSTOLIC BLOOD PRESSURE: 112 MMHG

## 2024-08-02 DIAGNOSIS — Z76.0 ENCOUNTER FOR MEDICATION REFILL: ICD-10-CM

## 2024-08-02 DIAGNOSIS — F33.1 MAJOR DEPRESSIVE DISORDER, RECURRENT EPISODE, MODERATE (H): ICD-10-CM

## 2024-08-02 DIAGNOSIS — F42.2 MIXED OBSESSIONAL THOUGHTS AND ACTS: ICD-10-CM

## 2024-08-02 DIAGNOSIS — F33.41 RECURRENT MAJOR DEPRESSIVE DISORDER, IN PARTIAL REMISSION (H): Primary | ICD-10-CM

## 2024-08-02 DIAGNOSIS — F41.1 GENERALIZED ANXIETY DISORDER: ICD-10-CM

## 2024-08-02 PROCEDURE — 90792 PSYCH DIAG EVAL W/MED SRVCS: CPT | Mod: GC

## 2024-08-02 RX ORDER — DESVENLAFAXINE 50 MG/1
50 TABLET, FILM COATED, EXTENDED RELEASE ORAL DAILY
Qty: 30 TABLET | Refills: 0 | Status: SHIPPED | OUTPATIENT
Start: 2024-08-02 | End: 2024-08-16 | Stop reason: DRUGHIGH

## 2024-08-02 RX ORDER — MIRTAZAPINE 15 MG/1
15 TABLET, FILM COATED ORAL DAILY
Qty: 30 TABLET | Refills: 1 | Status: SHIPPED | OUTPATIENT
Start: 2024-08-02 | End: 2024-09-16

## 2024-08-02 RX ORDER — DEXAMETHASONE 2 MG/1
2 TABLET ORAL PRN
COMMUNITY
Start: 2024-07-17

## 2024-08-02 ASSESSMENT — ANXIETY QUESTIONNAIRES
8. IF YOU CHECKED OFF ANY PROBLEMS, HOW DIFFICULT HAVE THESE MADE IT FOR YOU TO DO YOUR WORK, TAKE CARE OF THINGS AT HOME, OR GET ALONG WITH OTHER PEOPLE?: VERY DIFFICULT
GAD7 TOTAL SCORE: 8
GAD7 TOTAL SCORE: 8
7. FEELING AFRAID AS IF SOMETHING AWFUL MIGHT HAPPEN: SEVERAL DAYS
3. WORRYING TOO MUCH ABOUT DIFFERENT THINGS: SEVERAL DAYS
5. BEING SO RESTLESS THAT IT IS HARD TO SIT STILL: SEVERAL DAYS
1. FEELING NERVOUS, ANXIOUS, OR ON EDGE: MORE THAN HALF THE DAYS
4. TROUBLE RELAXING: SEVERAL DAYS
6. BECOMING EASILY ANNOYED OR IRRITABLE: SEVERAL DAYS
7. FEELING AFRAID AS IF SOMETHING AWFUL MIGHT HAPPEN: SEVERAL DAYS
2. NOT BEING ABLE TO STOP OR CONTROL WORRYING: SEVERAL DAYS
IF YOU CHECKED OFF ANY PROBLEMS ON THIS QUESTIONNAIRE, HOW DIFFICULT HAVE THESE PROBLEMS MADE IT FOR YOU TO DO YOUR WORK, TAKE CARE OF THINGS AT HOME, OR GET ALONG WITH OTHER PEOPLE: VERY DIFFICULT
GAD7 TOTAL SCORE: 8

## 2024-08-02 ASSESSMENT — PAIN SCALES - GENERAL: PAINLEVEL: MILD PAIN (3)

## 2024-08-02 NOTE — PATIENT INSTRUCTIONS
**For crisis resources, please see the information at the end of this document**   Patient Education    Thank you for coming to the [facility] [place] [facility].     It was nice seeing you today,      Treatment Plan summary from today's visit:      1) Medications changes today:   - Decrease Trintellix  to 10mg   - Starting Desvenlafaxine (Pristiq )50mg daily      2) Please return to clinic in 4 weeks      3) Crisis numbers are below and clinic after hours number is [phone]      Lab Testing:  If you had lab testing today and your results are reassuring or normal they will be mailed to you or sent through [facility] within 7 days. If the lab tests need quick action we will call you with the results. The phone number we will call with results is # [phone]. If this is not the best number please call our clinic and change the number.     Medication Refills:  If you need any refills please call your pharmacy and they will contact us. Our fax number for refills is [phone].   Three business days of notice are needed for general medication refill requests.   Five business days of notice are needed for controlled substance refill requests.   If you need to change to a different pharmacy, please contact the new pharmacy directly. The new pharmacy will help you get your medications transferred.     Contact Us:  Please call [phone] during business hours (8-5:00 M-F).   If you have medication related questions after clinic hours, or on the weekend, please call [phone].     Financial Assistance [phone]   Medical Records [phone]       MENTAL HEALTH CRISIS RESOURCES:  For a emergency help, please call 911 or go to the nearest Emergency Department.     Emergency Walk-In Options:   Em[facility] Unit @ [facility] ([place]): [phone] - Specialized mental health emergency area designed to be calming  [facility] ([place]):  [phone]  [facility] ([place]): [phone]  [facility] ([place]): [phone]    [facility] Crisis Information:   [place]: [phone]  [place]: [phone]  [place] ([place]) - Adult: [phone]     Child: [phone]  [place] - Adult: [phone]     Child: [phone]  [place]: [phone]  List of all [facility] resources:   https://[place].gov/dhs/people-we-serve/adults/health-care/mental-health/resources/crisis-contacts.jsp    National Crisis Information:   Crisis Text Line: Text  [place]  to 441912  Suicide & Crisis Lifeline: 988  National Suicide Prevention Lifeline: 9-237-156-TALK (1-338.725.8740)       For online chat options, visit https://suicidepreventionlifeline.org/chat/  Poison Control Center: 1-721.184.4463  Trans Lifeline: 6-230-649-6557 - Hotline for transgender people of all ages  [employer]: 1-316-793-7527 - Hotline for LGBT youth     For Non-Emergency Support:   Fast Tracker: Mental Health & Substance Use Disorder Resources -   https://www.[employer]uesComn.org/

## 2024-08-02 NOTE — NURSING NOTE
Chief Complaint   Patient presents with    Recheck Medication    Eval/Assessment     Autism spectrum disorder      - Alvin Sahni, Visit Facilitator

## 2024-08-02 NOTE — PROGRESS NOTES
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    ALT 10  --    ALKPHOS 63  --    CR 0.80 0.75     Recent Labs   Lab Test 02/18/22  1020   TSH 2.06    Recent Labs   Lab Test 10/25/23  1508   CHOL 202*   TRIG 115   *   HDL 70     Recent Labs   Lab Test 10/25/23  1508   A1C 5.2     Recent Labs   Lab Test 05/20/24  1834   *    Recent Labs   Lab Test 06/04/24  1337   WBC 9.5   HGB 12.4   HCT 38.1   MCV 96              ECG 5/17/22 QTc = 452 ms      PROVIDER: Salina Lagunas MD    Patient staffed in clinic with Dr. Anderson who will sign the note.  Supervisor is Dr. Yancey.      Answers submitted by the patient for this visit:  Patient Health Questionnaire (Submitted on 8/2/2024)  If you checked off any problems, how difficult have these problems made it for you to do your work, take care of things at home, or get along with other people?: Somewhat difficult  PHQ9 TOTAL SCORE: 9

## 2024-08-08 ENCOUNTER — OFFICE VISIT (OUTPATIENT)
Dept: FAMILY MEDICINE | Facility: CLINIC | Age: 29
End: 2024-08-08
Payer: COMMERCIAL

## 2024-08-08 VITALS
BODY MASS INDEX: 22.32 KG/M2 | SYSTOLIC BLOOD PRESSURE: 117 MMHG | HEART RATE: 86 BPM | OXYGEN SATURATION: 98 % | WEIGHT: 126 LBS | DIASTOLIC BLOOD PRESSURE: 75 MMHG | TEMPERATURE: 98.3 F | HEIGHT: 63 IN | RESPIRATION RATE: 16 BRPM

## 2024-08-08 DIAGNOSIS — G90.1 DYSAUTONOMIA (H): ICD-10-CM

## 2024-08-08 DIAGNOSIS — F84.0 AUTISM SPECTRUM DISORDER: ICD-10-CM

## 2024-08-08 DIAGNOSIS — N89.8 VAGINAL ITCHING: Primary | ICD-10-CM

## 2024-08-08 DIAGNOSIS — G90.A POTS (POSTURAL ORTHOSTATIC TACHYCARDIA SYNDROME): ICD-10-CM

## 2024-08-08 LAB
CLUE CELLS: PRESENT
TRICHOMONAS, WET PREP: ABNORMAL
WBC'S/HIGH POWER FIELD, WET PREP: ABNORMAL
YEAST, WET PREP: ABNORMAL

## 2024-08-08 PROCEDURE — 87591 N.GONORRHOEAE DNA AMP PROB: CPT | Performed by: FAMILY MEDICINE

## 2024-08-08 PROCEDURE — 99213 OFFICE O/P EST LOW 20 MIN: CPT | Performed by: FAMILY MEDICINE

## 2024-08-08 PROCEDURE — 87491 CHLMYD TRACH DNA AMP PROBE: CPT | Performed by: FAMILY MEDICINE

## 2024-08-08 PROCEDURE — G2211 COMPLEX E/M VISIT ADD ON: HCPCS | Performed by: FAMILY MEDICINE

## 2024-08-08 PROCEDURE — 87210 SMEAR WET MOUNT SALINE/INK: CPT | Performed by: FAMILY MEDICINE

## 2024-08-08 NOTE — LETTER
August 8, 2024      Angela Jones  [address]  [place] [address]            To Whom It May Concern:        Angela Jones was seen on 8/8/24. She should be allowed to have her work accommodation of having a rolling stool until at least the date of 8/8/2027.        Sincerely,        Marissa Walton MD

## 2024-08-08 NOTE — PROGRESS NOTES
"  Assessment & Plan     Vaginal itching: will try OTC monistat but will check wet prep and gc/chlam.  - Wet prep - Clinic Collect  - Chlamydia & Gonorrhea by PCR, GICH/Range - Clinic Collect    POTS (postural orthostatic tachycardia syndrome): completed handicapped parking application due to conditions of POTS, dysautonomia and ASD since she cannot be outside in extreme temperatures for too long and sometimes feels panicky inside stores, needing to get outside quickly.       Dysautonomia (H)    Autism spectrum disorder:     The longitudinal plan of care for the diagnosis(es)/condition(s) as documented were addressed during this visit. Due to the added complexity in care, I will continue to support  in the subsequent management and with ongoing continuity of care.        Subjective    is a 29 year old, presenting for the following health issues:  Follow Up (Yeast infection has not resolve ), Letter for School/Work, and Disability Parking Certificate   ([employer] would like permeate )      8/8/2024     2:57 PM   Additional Questions   Roomed by ESEQUIEL Rodriguez   Accompanied by self         8/8/2024   Forms   Any forms needing to be completed Yes        HPI     Vaginal itching. Did e-visit and took fluconazole but still itching. Normal discharge.     Needs paperwork updated for work saying she can have a rolling stool. Has to have an end date. Working as a . Needs rolling stool.     Has a disability parking permit for POTS and dysautonomia. Also gets disoriented in big stores so sometimes needs to get out to her car quickly.      Has been with boyfriend almost a year, going well. Has had a second partner for 2 months, also going well.         Objective    /75 (BP Location: Left arm, Patient Position: Sitting, Cuff Size: Adult Regular)   Pulse 86   Temp 98.3  F (36.8  C) (Oral)   Resp 16   Ht 1.6 m (5' 3\")   Wt 57.2 kg (126 lb)   LMP  (LMP Unknown)   SpO2 98%   BMI 22.32 kg/m    Body " mass index is 22.32 kg/m .  Physical Exam   GENERAL: alert and no distress   (female): external genitalia slightly red.  MS: no gross musculoskeletal defects noted, no edema          Signed Electronically by: Marissa Walton MD

## 2024-08-09 DIAGNOSIS — N76.0 BV (BACTERIAL VAGINOSIS): Primary | ICD-10-CM

## 2024-08-09 DIAGNOSIS — B96.89 BV (BACTERIAL VAGINOSIS): Primary | ICD-10-CM

## 2024-08-09 LAB
C TRACH DNA SPEC QL PROBE+SIG AMP: NEGATIVE
N GONORRHOEA DNA SPEC QL NAA+PROBE: NEGATIVE

## 2024-08-09 RX ORDER — METRONIDAZOLE 500 MG/1
500 TABLET ORAL 2 TIMES DAILY
Qty: 14 TABLET | Refills: 0 | Status: SHIPPED | OUTPATIENT
Start: 2024-08-09 | End: 2024-09-19

## 2024-08-14 ENCOUNTER — MYC MEDICAL ADVICE (OUTPATIENT)
Dept: PSYCHIATRY | Facility: CLINIC | Age: 29
End: 2024-08-14
Payer: COMMERCIAL

## 2024-08-14 NOTE — TELEPHONE ENCOUNTER
Writer called [place] dispatch for a welfare check on the patient. Writer's personal number was provided if the team had any further questions and/or wanted to provide updates.    Alisha Benavides RN on 8/14/2024 at 5:54 PM

## 2024-08-14 NOTE — TELEPHONE ENCOUNTER
Tried call patient second time.  Sent [employer] message and contacted provider.  Second RN on staff is calling emergency services to send out a welfare check on the patient.

## 2024-08-15 ENCOUNTER — VIRTUAL VISIT (OUTPATIENT)
Dept: BEHAVIORAL HEALTH | Facility: CLINIC | Age: 29
End: 2024-08-15
Payer: COMMERCIAL

## 2024-08-15 DIAGNOSIS — F90.8 ATTENTION DEFICIT HYPERACTIVITY DISORDER (ADHD), OTHER TYPE: ICD-10-CM

## 2024-08-15 DIAGNOSIS — F33.0 MILD EPISODE OF RECURRENT MAJOR DEPRESSIVE DISORDER (H): ICD-10-CM

## 2024-08-15 DIAGNOSIS — F41.1 GAD (GENERALIZED ANXIETY DISORDER): ICD-10-CM

## 2024-08-15 DIAGNOSIS — F84.0 AUTISM SPECTRUM DISORDER: Primary | ICD-10-CM

## 2024-08-15 PROCEDURE — 90837 PSYTX W PT 60 MINUTES: CPT | Mod: 95 | Performed by: SOCIAL WORKER

## 2024-08-15 ASSESSMENT — ANXIETY QUESTIONNAIRES
GAD7 TOTAL SCORE: 12
GAD7 TOTAL SCORE: 12
1. FEELING NERVOUS, ANXIOUS, OR ON EDGE: MORE THAN HALF THE DAYS
4. TROUBLE RELAXING: SEVERAL DAYS
3. WORRYING TOO MUCH ABOUT DIFFERENT THINGS: MORE THAN HALF THE DAYS
6. BECOMING EASILY ANNOYED OR IRRITABLE: NEARLY EVERY DAY
7. FEELING AFRAID AS IF SOMETHING AWFUL MIGHT HAPPEN: SEVERAL DAYS
7. FEELING AFRAID AS IF SOMETHING AWFUL MIGHT HAPPEN: SEVERAL DAYS
5. BEING SO RESTLESS THAT IT IS HARD TO SIT STILL: SEVERAL DAYS
2. NOT BEING ABLE TO STOP OR CONTROL WORRYING: MORE THAN HALF THE DAYS
GAD7 TOTAL SCORE: 12
8. IF YOU CHECKED OFF ANY PROBLEMS, HOW DIFFICULT HAVE THESE MADE IT FOR YOU TO DO YOUR WORK, TAKE CARE OF THINGS AT HOME, OR GET ALONG WITH OTHER PEOPLE?: VERY DIFFICULT

## 2024-08-15 NOTE — TELEPHONE ENCOUNTER
"Caller stated she was calling clinic back and stated \"I am ok and I have an appointment with my therapist in the morning\".   "

## 2024-08-15 NOTE — PROGRESS NOTES
[facility]                                     Progress Note    Patient Name: Angela Jones  Date: 8/15/2024         Service Type: Individual      Session Start Time: 9:00am  Session End Time: 9:53am     Session Length: 53 minutes    Session #: 39    Attendees: Client attended alone    Service Modality:  Video Visit:      Provider verified identity through the following two step process.  Patient provided:  Patient is known previously to provider    Telemedicine Visit: The patient's condition can be safely assessed and treated via synchronous audio and visual telemedicine encounter.      Reason for Telemedicine Visit: Patient has requested telehealth visit    Originating Site (Patient Location): Patient's home    Distant Site (Provider Location): Provider Remote Setting- Home Office    Consent:  The patient/guardian has verbally consented to: the potential risks and benefits of telemedicine (video visit) versus in person care; bill my insurance or make self-payment for services provided; and responsibility for payment of non-covered services.     Patient would like the video invitation sent by:  My Chart    Mode of Communication:  Video Conference via [employer]      Distant Location (Provider):  Off-site    As the provider I attest to compliance with applicable laws and regulations related to telemedicine.    DATA  Extended Session (53+ minutes): PROLONGED SERVICE IN THE OUTPATIENT SETTING REQUIRING DIRECT (FACE-TO-FACE) PATIENT CONTACT BEYOND THE USUAL SERVICE:    - Treatment protocol required additional time to complete, due to the nature of diagnosis being treated.  See Interventions section for details  Interactive Complexity: No  Crisis: No        Progress Since Last Session (Related to Symptoms / Goals / Homework):   Symptoms: No change :  anxiety and depression    Homework: Partially completed      Episode of Care Goals: Minimal progress - PREPARATION (Decided to change - considering  "how); Intervened by negotiating a change plan and determining options / strategies for behavior change, identifying triggers, exploring social supports, and working towards setting a date to begin behavior change     Current / Ongoing Stressors and Concerns:  Patient provides a status update while therapist utilizes reflective listening skills. Patient shares that she sent a message to her psychiatrist provider that she was having safety concerns and needed a medication adjustment. She shares that she has had safety concerns in the past but was not currently concerned about her safety. She shares that the medication changes have been \"awful\" and reports feeling more forgetful. She shares that she was late on rent due to forgetfulness and this has never happened before. She describes feeling overwhelmed with various stressors and having difficulty processing. She reports that she is more tearful and emotionally reactive with mood swings (believes due to med change). She feels that she is asking a lot of her partners lately. \"It's hard for me to figure out where the line is about when it's okay to ask for help from my partners.\" She wants to be independent and worries about codependency.     Therapist assessed for risk and safety - patient denied SI but did indicate thoughts of self-harm. She shares about sending her psychiatrist a message that she was having thoughts of self-harm but currently she denies any plans or active thoughts. Patient contracts for safety including removal of items that could cause self-injury.  Patient reviews safety plan. Should there be an increase in symptoms or severity related to SI/SIB, patient should call 911 or go to local ED for evaluation.         Treatment Objective(s) Addressed in This Session:   identify the fears / thoughts that contribute to feeling anxious  state understanding of stressors and relationship to physical symptoms  Increase interest, engagement, and pleasure in " doing things  Decrease frequency and intensity of feeling down, depressed, hopeless  Identify negative self-talk and behaviors: challenge core beliefs, myths, and actions  Gain insight     Intervention:   Discussed mindfulness as being aware of what we are experiencing while we are experiencing it.  Contrasted this with avoidance and rumination.  Explored the role of mindfulness as an overall coping strategy for managing depression, anxiety, and strong emotions.  Explained concept of state of mind using SIFT (sensations, images, feelings, thoughts) pneumonic. Discussed mindfulness as a tool to intentionally shift our awareness and focus as needed.  Discussed physical, mental, and emotional boundaries and limit-setting with others. Explored management of boundaries through direct communication and limiting contact and communication with others.  Discussed barriers to using boundary management skills including strong emotions and physical proximity.  Therapist provided unconditional positive regard and supportive counseling.     Assessments completed prior to visit:   The following assessments were completed by patient for this visit:  PHQ9:       2/6/2024     8:54 AM 2/27/2024    11:01 AM 4/12/2024    10:59 AM 6/2/2024    11:40 AM 6/13/2024     8:57 AM 7/25/2024     9:01 AM 8/2/2024    12:42 PM   PHQ-9 SCORE   PHQ-9 Total Score MyChart 12 (Moderate depression) 11 (Moderate depression) 14 (Moderate depression) 12 (Moderate depression) 9 (Mild depression) 13 (Moderate depression) 9 (Mild depression)   PHQ-9 Total Score 12 11 14    14 12 9 13 9     GAD7:       12/13/2023     9:31 AM 4/12/2024    11:02 AM 4/18/2024     1:29 PM 5/6/2024    10:51 AM 7/25/2024     9:02 AM 8/2/2024     1:02 PM 8/15/2024     8:48 AM   JOSHUA-7 SCORE   Total Score 7 (mild anxiety) 7 (mild anxiety) 8 (mild anxiety) 9 (mild anxiety) 7 (mild anxiety) 8 (mild anxiety) 12 (moderate anxiety)   Total Score 7 7 8 9 7 8 12     PROMIS 10-Global Health  (only subscores and total score):       10/24/2023     9:01 AM 11/7/2023    11:56 AM 11/21/2023     8:55 AM 2/27/2024    11:02 AM 4/12/2024    11:00 AM 7/12/2024     8:07 AM 8/2/2024     1:03 PM   PROMIS-10 Scores Only   Global Mental Health Score 11 10    10 11 12 14 14 13   Global Physical Health Score 12 12    12 12 11 12 12 12   PROMIS TOTAL - SUBSCORES 23 22    22 23 23 26 26 25     [place] Suicide Severity Rating Scale (Lifetime/Recent)      2/17/2022     3:02 PM 1/9/2024    10:54 AM 7/25/2024     9:01 AM   [place] Suicide Severity Rating (Lifetime/Recent)   Q1 Wished to be Dead (Past Month) no     Q2 Suicidal Thoughts (Past Month) no     Q3 Suicidal Thought Method no     Q4 Suicidal Intent without Specific Plan no     Q5 Suicide Intent with Specific Plan no     Q6 Suicide Behavior (Lifetime) yes     If yes to Q6, within past 3 months? no     Level of Risk per Screen moderate risk     1. Wish to be Dead (Past 1 Month)  N N   2. Non-Specific Active Suicidal Thoughts (Past 1 Month)  N N   Calculated C-SSRS Risk Score (Lifetime/Recent)  No Risk Indicated No Risk Indicated           ASSESSMENT: Current Emotional / Mental Status (status of significant symptoms):   Risk status (Self / Other harm or suicidal ideation)   Patient denies current fears or concerns for personal safety.   Patient denies current or recent suicidal ideation or behaviors.   Patient denies current or recent homicidal ideation or behaviors.   Patient reports current or recent self injurious behavior or ideation including thoughts of self-harm.   Patient denies other safety concerns.   Patient reports there has been no change in risk factors since their last session.     Patient reports there has been no change in protective factors since their last session.     A safety and risk management plan has been developed including: Patient consented to co-developed safety plan.  Safety and risk management plan was completed - see below.  Patient  agreed to use safety plan should any safety concerns arise.  A copy was given to the patient.     Appearance:   Appropriate    Eye Contact:   Good    Psychomotor Behavior: Hyperactive  Restless    Attitude:   Cooperative    Orientation:   All   Speech    Rate / Production: Pressured  Stutters    Volume:  Normal    Mood:    Anxious  Depressed  Anhedonia   Affect:    Constricted  Flat  Worrisome    Thought Content:  Clear    Thought Form:  Coherent    Insight:    Good      Medication Review:   No changes to current psychiatric medication(s)     Medication Compliance:   Yes     Changes in Health Issues:   None reported     Chemical Use Review:   Substance Use: Chemical use reviewed, no active concerns identified      Tobacco Use: No current tobacco use.      Diagnosis:  1. Autism spectrum disorder    2. JOSHUA (generalized anxiety disorder)    3. Attention deficit hyperactivity disorder (ADHD), other type    4. Mild episode of recurrent major depressive disorder (H24)        Collateral Reports Completed:   Not Applicable    PLAN: (Patient Tasks / Therapist Tasks / Other)  Patient will return for a virtual visit in 2 weeks  Patient will access her safety plan in chart and make a copy that she can use for future reference     There has been demonstrated improvement in functioning while patient has been engaged in psychotherapy/psychological service- if withdrawn the patient would deteriorate and/or relapse.       Fanny Cobb, [facility] Center[facility]                                                         ______________________________________________________________________    Individual Treatment Plan    Patient's Name: Angela Jones  YOB: 1995    Date of Creation: 1/19/2022  Date Treatment Plan Last Reviewed/Revised: 7/12/2024    DSM5 Diagnoses: Autism Spectrum Disorder 299.00(F84.0)  Associated with another neurodevelopmental, mental or behavioral disorder and Attention-Deficit/Hyperactivity Disorder  314.01 (F90.9)  Unspecified Attention -Deficit / Hyperactivity Disorder, 296.31 (F33.0) Major Depressive Disorder, Recurrent Episode, Mild _ or 300.02 (F41.1) Generalized Anxiety Disorder  Psychosocial / Contextual Factors: 29 year old  female, , no children   PROMIS (reviewed every 90 days):   PROMIS 10-Global Health (only subscores and total score):       7/25/2023    12:02 PM 10/24/2023     9:01 AM 11/7/2023    11:56 AM 11/21/2023     8:55 AM 2/27/2024    11:02 AM 4/12/2024    11:00 AM 7/12/2024     8:07 AM   PROMIS-10 Scores Only   Global Mental Health Score 10 11 10    10 11 12 14 14   Global Physical Health Score 11 12 12    12 12 11 12 12   PROMIS TOTAL - SUBSCORES 21 23 22    22 23 23 26 26         Referral / Collaboration:  Was/were discussed and patient will pursue.    Anticipated number of session for this episode of care: 6-9  Anticipation frequency of session: Every other week  Anticipated Duration of each session: 53 or more minutes  Treatment plan will be reviewed in 90 days or when goals have been changed.       MeasurableTreatment Goal(s) related to diagnosis / functional impairment(s)  Goal 1: Patient will manage symptoms of anxiety, as evidenced by a JOSHUA-7 score of 5 or lower     I will know I've met my goal when I feel more confident in my ability to cope with stress.      Objective #A (Patient Action)    Patient will identify the initial signs or symptoms of anxiety.  Status: Continued - Date(s): 7/12/2024    Intervention(s)  Therapist will teach  help patient gain insight into signs of stress (physically and emotionally) .    Objective #B  Patient will use relaxation strategies multiple times per day to reduce the physical symptoms of anxiety.  Status: Continued - Date(s): 7/12/2024    Intervention(s)  Therapist will  teach diaphragmatic breathing and other grounding skills .    Objective #C  Patient will use thought-stopping strategy daily to reduce intrusive thoughts.  Status: Continued -  Date(s): 7/12/2024    Intervention(s)  Therapist will  teach thought stopping techniques .      Goal 2: Patient will manage symptoms of depression, as evidenced by a PHQ-9 score of 10 or lower    I will know I've met my goal when I feel more confident in my ability to express my emotions in a healthy way.      Objective #A (Patient Action)    Status: Continued - Date(s): 7/12/2024    Patient will Increase interest, engagement, and pleasure in doing things.    Intervention(s)  Therapist will teach emotional recognition/identification. * .    Objective #B  Patient will Identify negative self-talk and behaviors: challenge core beliefs, myths, and actions.    Status: Continued - Date(s): 7/12/2024    Intervention(s)  Therapist will  help patient practice positive self-talk and thought re-framing .    Goal 3: Client will manage symptoms of ADHD     I will know I've met my goal when I feel confident in completing tasks.      Objective #A (Client Action)    Client will use daily planner 75% of the time.  Status: Continued - Date(s): 7/12/2024    Intervention(s)  Therapist will  ask client to demonstrate use of planner while in session .    Objective #B  Client will identify and compliment positives at least 2 times daily to support positive self-image.    Status: Continued - Date(s): 7/12/2024    Intervention(s)  Therapist will  ask patient to demonstrate use of affirmations during session .    Goal 4: Client will manage symptoms and experiences associated with ASD     I will know I've met my goal when I feel confident in my communication in relationships.      Objective #A (Client Action)    Client will work on being assertive and setting clear expectations with healthy boundaries in relationships  Status: Continued - Date(s): 7/12/2024    Intervention(s)  Therapist will  help patient practice assertive communication skills .        Patient has reviewed and agreed to the above plan.      Fanny Cobb, [facility]  July 12,  "2024        [facility]                                       Angela Jones     SAFETY PLAN:  Step 1: Warning signs / cues (Thoughts, images, mood, situation, behavior) that a crisis may be developing:  Thoughts: \"I can't do this anymore\" and \"Nothing makes it better\" \"I'm a burden\"   Images: visions of harm: self-harm  Thinking Processes: ruminations (can't stop thinking about my problems):  , racing thoughts, intrusive thoughts (bothersome, unwanted thoughts that come out of nowhere):  , highly critical and negative thoughts:  , and disorganized thinking:    Mood: worsening depression, hopelessness, helplessness, intense worry, and agitation  Behaviors: can't stop crying, not taking care of myself, and not taking care of my responsibilities  Situations: relationship problems and financial stress   Step 2: Coping strategies - Things I can do to take my mind off of my problems without contacting another person (relaxation technique, physical activity):  Distress Tolerance Strategies:  relaxation activities:  , arts and crafts:  , play with my pet , sensory based activities/self-soothe with five senses:  , change body temperature (ice pack/cold water) , and paced breathing/progressive muscle relaxation, grounding exercise of naming objects, weighted items (sock filled with rice), play with puddy, use sensory items   Physical Activities: meditation, deep breathing, and stretching ; taking a shower,   Focus on helpful thoughts:  \"This is temporary\", \"I will get through this\", \"It always passes\", and self-compassion statements:    Step 3: People and social settings that provide distraction:   Name: Zander    Name: Demetri  park and work   Step 4: Remind myself of people and things that are important to me and worth living for:  my family and pets and friends  Step 5: When I am in crisis, I can ask these people to help me use my safety plan:   Name: Demetri Fermin    Step 6: Making the environment safe: "   dispose of old medications , remove things I could use to hurt myself:  , and be around others  Step 7: Professionals or agencies I can contact during a crisis:  Suicide Prevention Lifeline: Call or Text 308   [facility]: [phone]    Call 911 or go to my nearest emergency department.   I helped develop this safety plan and agree to use it when needed.  I have been given a copy of this plan.    Client signature _________________________________________________________________  Today s date:  7/25/2024  Completed by Provider Name/ Credentials:  CHANTAL Blackmon  July 25, 2024  Adapted from Safety Plan Template 2008 Yessi Prakash and Olivier Leal is reprinted with the express permission of the authors.  No portion of the Safety Plan Template may be reproduced without the express, written permission.  You can contact the authors at [email or web address] or [email or web address].

## 2024-08-16 ENCOUNTER — TELEPHONE (OUTPATIENT)
Dept: PSYCHIATRY | Facility: CLINIC | Age: 29
End: 2024-08-16

## 2024-08-16 DIAGNOSIS — F33.1 MAJOR DEPRESSIVE DISORDER, RECURRENT EPISODE, MODERATE (H): ICD-10-CM

## 2024-08-16 DIAGNOSIS — Z76.0 ENCOUNTER FOR MEDICATION REFILL: ICD-10-CM

## 2024-08-16 RX ORDER — DESVENLAFAXINE 25 MG/1
25 TABLET, EXTENDED RELEASE ORAL DAILY
Qty: 30 TABLET | Refills: 0 | Status: SHIPPED | OUTPATIENT
Start: 2024-08-16 | End: 2024-09-03

## 2024-08-16 ASSESSMENT — ANXIETY QUESTIONNAIRES: GAD7 TOTAL SCORE: 12

## 2024-08-16 NOTE — TELEPHONE ENCOUNTER
-----------------------------------------------------------------------------------------------------------  Brief Psychiatry Phone note      Angela Jones MRN# [id number]   Age: 29 year old   Clinic Provider:  Salina Lagunas MD  YOB: 1995   PCP: Marissa Walton            Phone note:   Writer was notified that Angela Jones has expressed safety concerns and requested to speak with a resident regarding medications. RN arranged for welfare check and patient is safe. She saw her therapist on 8/15. Writer called Angela Jones at this number : ([phone]  at 3:42 PM . She reported mood fluctuations after medication adjustment and increased self-harm frequency.            Assessment and Plan:   Angela Jones is a 29 year old female with a history of ADHD, hyperactive type, MDD, anxiety, OCD, r/o cluster B personality disorder, phobia for driving, cannabis use, and medical history pertinent for migraine with aura, fibromyalgia, IBS, POTS who contacted us regarding if Pristiq could be increased. She mentioned safety concerns with RN then didn't  the call back. Thus, welfare check was done on the night of 8/14. Morning of 8/15 patient got back stating she is safe. I communicated with her therapist whom she had appointment with on 8/15 and returned the call to patient on 8/15 PM.  vaguely reports mood fluctuations and increased self-harm thoughts. She did not harm herself. Denies suicidal ideation.  She can't recall when symptoms started. She went over safety plan with her therapist this morning. We discussed increasing her Trintellix back to 20mg and decrease Desvenlafaxine to 25mg for now until her next appointment and she agreed with plan.      is not currently suicidal or homicidal. she is aware to call 911 or go to the nearest ER if safety concern or urgent symptoms arise.    Slowering the cross taper by:   - Trintellix increased back to 20mg   - Desvenlafaxine  decreased to 25mg   - New prescriptions ordered     =============================================================================  Salina Lagunas MD   PGY-3 Psychiatry Resident  [facility]

## 2024-08-29 ENCOUNTER — VIRTUAL VISIT (OUTPATIENT)
Dept: BEHAVIORAL HEALTH | Facility: CLINIC | Age: 29
End: 2024-08-29
Payer: COMMERCIAL

## 2024-08-29 DIAGNOSIS — F33.0 MILD EPISODE OF RECURRENT MAJOR DEPRESSIVE DISORDER (H): ICD-10-CM

## 2024-08-29 DIAGNOSIS — F41.1 GAD (GENERALIZED ANXIETY DISORDER): ICD-10-CM

## 2024-08-29 DIAGNOSIS — F84.0 AUTISM SPECTRUM DISORDER: Primary | ICD-10-CM

## 2024-08-29 DIAGNOSIS — F90.8 ATTENTION DEFICIT HYPERACTIVITY DISORDER (ADHD), OTHER TYPE: ICD-10-CM

## 2024-08-29 PROCEDURE — 90837 PSYTX W PT 60 MINUTES: CPT | Mod: 95 | Performed by: SOCIAL WORKER

## 2024-08-29 ASSESSMENT — ANXIETY QUESTIONNAIRES
8. IF YOU CHECKED OFF ANY PROBLEMS, HOW DIFFICULT HAVE THESE MADE IT FOR YOU TO DO YOUR WORK, TAKE CARE OF THINGS AT HOME, OR GET ALONG WITH OTHER PEOPLE?: SOMEWHAT DIFFICULT
GAD7 TOTAL SCORE: 8
GAD7 TOTAL SCORE: 8
3. WORRYING TOO MUCH ABOUT DIFFERENT THINGS: SEVERAL DAYS
1. FEELING NERVOUS, ANXIOUS, OR ON EDGE: SEVERAL DAYS
5. BEING SO RESTLESS THAT IT IS HARD TO SIT STILL: NOT AT ALL
7. FEELING AFRAID AS IF SOMETHING AWFUL MIGHT HAPPEN: SEVERAL DAYS
IF YOU CHECKED OFF ANY PROBLEMS ON THIS QUESTIONNAIRE, HOW DIFFICULT HAVE THESE PROBLEMS MADE IT FOR YOU TO DO YOUR WORK, TAKE CARE OF THINGS AT HOME, OR GET ALONG WITH OTHER PEOPLE: SOMEWHAT DIFFICULT
6. BECOMING EASILY ANNOYED OR IRRITABLE: SEVERAL DAYS
GAD7 TOTAL SCORE: 8
2. NOT BEING ABLE TO STOP OR CONTROL WORRYING: MORE THAN HALF THE DAYS
7. FEELING AFRAID AS IF SOMETHING AWFUL MIGHT HAPPEN: SEVERAL DAYS
4. TROUBLE RELAXING: MORE THAN HALF THE DAYS

## 2024-08-29 ASSESSMENT — PATIENT HEALTH QUESTIONNAIRE - PHQ9
SUM OF ALL RESPONSES TO PHQ QUESTIONS 1-9: 18
10. IF YOU CHECKED OFF ANY PROBLEMS, HOW DIFFICULT HAVE THESE PROBLEMS MADE IT FOR YOU TO DO YOUR WORK, TAKE CARE OF THINGS AT HOME, OR GET ALONG WITH OTHER PEOPLE: VERY DIFFICULT
SUM OF ALL RESPONSES TO PHQ QUESTIONS 1-9: 18

## 2024-08-29 NOTE — PROGRESS NOTES
[facility]                                     Progress Note    Patient Name: Angela Jones  Date: 8/29/2024         Service Type: Individual      Session Start Time: 8:03am  Session End Time: 8:56am     Session Length: 53 minutes    Session #: 40    Attendees: Client attended alone    Service Modality:  Video Visit:      Provider verified identity through the following two step process.  Patient provided:  Patient is known previously to provider    Telemedicine Visit: The patient's condition can be safely assessed and treated via synchronous audio and visual telemedicine encounter.      Reason for Telemedicine Visit: Patient has requested telehealth visit    Originating Site (Patient Location): Patient's home    Distant Site (Provider Location): Provider Remote Setting- Home Office    Consent:  The patient/guardian has verbally consented to: the potential risks and benefits of telemedicine (video visit) versus in person care; bill my insurance or make self-payment for services provided; and responsibility for payment of non-covered services.     Patient would like the video invitation sent by:  My Chart    Mode of Communication:  Video Conference via [employer]      Distant Location (Provider):  Off-site    As the provider I attest to compliance with applicable laws and regulations related to telemedicine.    DATA  Extended Session (53+ minutes): PROLONGED SERVICE IN THE OUTPATIENT SETTING REQUIRING DIRECT (FACE-TO-FACE) PATIENT CONTACT BEYOND THE USUAL SERVICE:    - Treatment protocol required additional time to complete, due to the nature of diagnosis being treated.  See Interventions section for details  Interactive Complexity: No  Crisis: No        Progress Since Last Session (Related to Symptoms / Goals / Homework):   Symptoms: No change :  anxiety and depression    Homework: Partially completed      Episode of Care Goals: Minimal progress - PREPARATION (Decided to change - considering  "how); Intervened by negotiating a change plan and determining options / strategies for behavior change, identifying triggers, exploring social supports, and working towards setting a date to begin behavior change     Current / Ongoing Stressors and Concerns:  Patient provides a status update while therapist utilizes reflective listening skills. Patient identifies various triggers including work stress. She has plans to talk to HR today. Her ex- got served the [employer] and she feels sad in many ways - \"it needed to happen but it's still sad.\" She reports feeling on edge lately.     Therapist assessed for risk and safety - patient denied SI but did indicate thoughts of self-harm. Patient describes intrusive images of self-harm with anything around her. There was a self-harming event yesterday evening. She used nail clippers to self harm. Patient contracts for safety including removal of items that could cause self-injury.  Patient reviews safety plan. Should there be an increase in symptoms or severity related to SI/SIB, patient should call 911 or go to local ED for evaluation.         Treatment Objective(s) Addressed in This Session:   identify the fears / thoughts that contribute to feeling anxious  state understanding of stressors and relationship to physical symptoms  Increase interest, engagement, and pleasure in doing things  Decrease frequency and intensity of feeling down, depressed, hopeless  Identify negative self-talk and behaviors: challenge core beliefs, myths, and actions  Gain insight     Intervention:   Discussed mindfulness as being aware of what we are experiencing while we are experiencing it.  Contrasted this with avoidance and rumination.  Explored the role of mindfulness as an overall coping strategy for managing depression, anxiety, and strong emotions.  Explained concept of state of mind using SIFT (sensations, images, feelings, thoughts) pneumonic. Discussed mindfulness as a tool " to intentionally shift our awareness and focus as needed.  Discussed physical, mental, and emotional boundaries and limit-setting with others. Explored management of boundaries through direct communication and limiting contact and communication with others.  Discussed barriers to using boundary management skills including strong emotions and physical proximity.  Therapist provided unconditional positive regard and supportive counseling.     Assessments completed prior to visit:   The following assessments were completed by patient for this visit:  PHQ9:       2/27/2024    11:01 AM 4/12/2024    10:59 AM 6/2/2024    11:40 AM 6/13/2024     8:57 AM 7/25/2024     9:01 AM 8/2/2024    12:42 PM 8/29/2024     8:01 AM   PHQ-9 SCORE   PHQ-9 Total Score MyChart 11 (Moderate depression) 14 (Moderate depression) 12 (Moderate depression) 9 (Mild depression) 13 (Moderate depression) 9 (Mild depression) 18 (Moderately severe depression)   PHQ-9 Total Score 11 14    14 12 9 13 9 18     GAD7:       4/12/2024    11:02 AM 4/18/2024     1:29 PM 5/6/2024    10:51 AM 7/25/2024     9:02 AM 8/2/2024     1:02 PM 8/15/2024     8:48 AM 8/29/2024     8:02 AM   JOSHUA-7 SCORE   Total Score 7 (mild anxiety) 8 (mild anxiety) 9 (mild anxiety) 7 (mild anxiety) 8 (mild anxiety) 12 (moderate anxiety) 8 (mild anxiety)   Total Score 7 8 9 7 8 12 8     PROMIS 10-Global Health (only subscores and total score):       10/24/2023     9:01 AM 11/7/2023    11:56 AM 11/21/2023     8:55 AM 2/27/2024    11:02 AM 4/12/2024    11:00 AM 7/12/2024     8:07 AM 8/2/2024     1:03 PM   PROMIS-10 Scores Only   Global Mental Health Score 11 10    10 11 12 14 14 13   Global Physical Health Score 12 12    12 12 11 12 12 12   PROMIS TOTAL - SUBSCORES 23 22    22 23 23 26 26 25     [place] Suicide Severity Rating Scale (Lifetime/Recent)      2/17/2022     3:02 PM 1/9/2024    10:54 AM 7/25/2024     9:01 AM   [place] Suicide Severity Rating (Lifetime/Recent)   Q1 Wished to be Dead  (Past Month) no     Q2 Suicidal Thoughts (Past Month) no     Q3 Suicidal Thought Method no     Q4 Suicidal Intent without Specific Plan no     Q5 Suicide Intent with Specific Plan no     Q6 Suicide Behavior (Lifetime) yes     If yes to Q6, within past 3 months? no     Level of Risk per Screen moderate risk     1. Wish to be Dead (Past 1 Month)  N N   2. Non-Specific Active Suicidal Thoughts (Past 1 Month)  N N   Calculated C-SSRS Risk Score (Lifetime/Recent)  No Risk Indicated No Risk Indicated           ASSESSMENT: Current Emotional / Mental Status (status of significant symptoms):   Risk status (Self / Other harm or suicidal ideation)   Patient denies current fears or concerns for personal safety.   Patient denies current or recent suicidal ideation or behaviors.   Patient denies current or recent homicidal ideation or behaviors.   Patient reports current or recent self injurious behavior or ideation including thoughts and acts of self-harm.   Patient denies other safety concerns.   Patient reports there has been no change in risk factors since their last session.     Patient reports there has been no change in protective factors since their last session.     A safety and risk management plan has been developed including: Patient consented to co-developed safety plan.  Safety and risk management plan was completed - see below.  Patient agreed to use safety plan should any safety concerns arise.  A copy was given to the patient.     Appearance:   Appropriate    Eye Contact:   Good    Psychomotor Behavior: Hyperactive  Restless    Attitude:   Cooperative    Orientation:   All   Speech    Rate / Production: Pressured  Stutters    Volume:  Normal    Mood:    Anxious  Depressed  Anhedonia   Affect:    Constricted  Flat  Worrisome    Thought Content:  Clear    Thought Form:  Coherent    Insight:    Good      Medication Review:   No changes to current psychiatric medication(s)     Medication Compliance:   Yes     Changes  in Health Issues:   None reported     Chemical Use Review:   Substance Use: Chemical use reviewed, no active concerns identified      Tobacco Use: No current tobacco use.      Diagnosis:  1. Autism spectrum disorder    2. JOSHUA (generalized anxiety disorder)    3. Attention deficit hyperactivity disorder (ADHD), other type    4. Mild episode of recurrent major depressive disorder (H24)        Collateral Reports Completed:   Not Applicable    PLAN: (Patient Tasks / Therapist Tasks / Other)  Patient will return for a virtual visit in 1 week  Patient will access her safety plan in chart and make a copy that she can use for future reference   Patient will obtain a lockbox or something to secure the sharp items at her home - agrees to do this right away  Patient encouraged to journal and practice self-compassion    There has been demonstrated improvement in functioning while patient has been engaged in psychotherapy/psychological service- if withdrawn the patient would deteriorate and/or relapse.       Fanny Cobb, [facility]                                                         ______________________________________________________________________    Individual Treatment Plan    Patient's Name: Angela Jones  YOB: 1995    Date of Creation: 1/19/2022  Date Treatment Plan Last Reviewed/Revised: 7/12/2024    DSM5 Diagnoses: Autism Spectrum Disorder 299.00(F84.0)  Associated with another neurodevelopmental, mental or behavioral disorder and Attention-Deficit/Hyperactivity Disorder  314.01 (F90.9) Unspecified Attention -Deficit / Hyperactivity Disorder, 296.31 (F33.0) Major Depressive Disorder, Recurrent Episode, Mild _ or 300.02 (F41.1) Generalized Anxiety Disorder  Psychosocial / Contextual Factors: 29 year old  female, , no children   PROMIS (reviewed every 90 days):   PROMIS 10-Global Health (only subscores and total score):       7/25/2023    12:02 PM 10/24/2023     9:01 AM 11/7/2023     11:56 AM 11/21/2023     8:55 AM 2/27/2024    11:02 AM 4/12/2024    11:00 AM 7/12/2024     8:07 AM   PROMIS-10 Scores Only   Global Mental Health Score 10 11 10    10 11 12 14 14   Global Physical Health Score 11 12 12    12 12 11 12 12   PROMIS TOTAL - SUBSCORES 21 23 22    22 23 23 26 26         Referral / Collaboration:  Was/were discussed and patient will pursue.    Anticipated number of session for this episode of care: 6-9  Anticipation frequency of session: Every other week  Anticipated Duration of each session: 53 or more minutes  Treatment plan will be reviewed in 90 days or when goals have been changed.       MeasurableTreatment Goal(s) related to diagnosis / functional impairment(s)  Goal 1: Patient will manage symptoms of anxiety, as evidenced by a JOSHUA-7 score of 5 or lower     I will know I've met my goal when I feel more confident in my ability to cope with stress.      Objective #A (Patient Action)    Patient will identify the initial signs or symptoms of anxiety.  Status: Continued - Date(s): 7/12/2024    Intervention(s)  Therapist will teach  help patient gain insight into signs of stress (physically and emotionally) .    Objective #B  Patient will use relaxation strategies multiple times per day to reduce the physical symptoms of anxiety.  Status: Continued - Date(s): 7/12/2024    Intervention(s)  Therapist will  teach diaphragmatic breathing and other grounding skills .    Objective #C  Patient will use thought-stopping strategy daily to reduce intrusive thoughts.  Status: Continued - Date(s): 7/12/2024    Intervention(s)  Therapist will  teach thought stopping techniques .      Goal 2: Patient will manage symptoms of depression, as evidenced by a PHQ-9 score of 10 or lower    I will know I've met my goal when I feel more confident in my ability to express my emotions in a healthy way.      Objective #A (Patient Action)    Status: Continued - Date(s): 7/12/2024    Patient will Increase interest,  "engagement, and pleasure in doing things.    Intervention(s)  Therapist will teach emotional recognition/identification. * .    Objective #B  Patient will Identify negative self-talk and behaviors: challenge core beliefs, myths, and actions.    Status: Continued - Date(s): 7/12/2024    Intervention(s)  Therapist will  help patient practice positive self-talk and thought re-framing .    Goal 3: Client will manage symptoms of ADHD     I will know I've met my goal when I feel confident in completing tasks.      Objective #A (Client Action)    Client will use daily planner 75% of the time.  Status: Continued - Date(s): 7/12/2024    Intervention(s)  Therapist will  ask client to demonstrate use of planner while in session .    Objective #B  Client will identify and compliment positives at least 2 times daily to support positive self-image.    Status: Continued - Date(s): 7/12/2024    Intervention(s)  Therapist will  ask patient to demonstrate use of affirmations during session .    Goal 4: Client will manage symptoms and experiences associated with ASD     I will know I've met my goal when I feel confident in my communication in relationships.      Objective #A (Client Action)    Client will work on being assertive and setting clear expectations with healthy boundaries in relationships  Status: Continued - Date(s): 7/12/2024    Intervention(s)  Therapist will  help patient practice assertive communication skills .        Patient has reviewed and agreed to the above plan.      Fanny Cobb, [facility]  July 12, 2024        [facility]                                       Angela Jones     SAFETY PLAN:  Step 1: Warning signs / cues (Thoughts, images, mood, situation, behavior) that a crisis may be developing:  Thoughts: \"I can't do this anymore\" and \"Nothing makes it better\" \"I'm a burden\"   Images: visions of harm: self-harm  Thinking Processes: ruminations (can't stop thinking about my problems):  , " "racing thoughts, intrusive thoughts (bothersome, unwanted thoughts that come out of nowhere):  , highly critical and negative thoughts:  , and disorganized thinking:    Mood: worsening depression, hopelessness, helplessness, intense worry, and agitation  Behaviors: can't stop crying, not taking care of myself, and not taking care of my responsibilities  Situations: relationship problems and financial stress   Step 2: Coping strategies - Things I can do to take my mind off of my problems without contacting another person (relaxation technique, physical activity):  Distress Tolerance Strategies:  relaxation activities:  , arts and crafts:  , play with my pet , sensory based activities/self-soothe with five senses:  , change body temperature (ice pack/cold water) , and paced breathing/progressive muscle relaxation, grounding exercise of naming objects, weighted items (sock filled with rice), play with puddy, use sensory items   Physical Activities: meditation, deep breathing, and stretching ; taking a shower,   Focus on helpful thoughts:  \"This is temporary\", \"I will get through this\", \"It always passes\", and self-compassion statements:    Step 3: People and social settings that provide distraction:   Name: Zander    Name: Demetri  park and work   Step 4: Remind myself of people and things that are important to me and worth living for:  my family and pets and friends  Step 5: When I am in crisis, I can ask these people to help me use my safety plan:   Name: Demetri Fermin    Step 6: Making the environment safe:   dispose of old medications , remove things I could use to hurt myself:  , and be around others  Step 7: Professionals or agencies I can contact during a crisis:  Suicide Prevention Lifeline: Call or Text 853   [facility] Crisis Services: [phone]    Call 911 or go to my nearest emergency department.   I helped develop this safety plan and agree to use it when needed.  I have been given a copy of this plan.    Client " signature _________________________________________________________________  Today s date:  7/25/2024  Completed by Provider Name/ Credentials:  CHANTAL Blackmon  July 25, 2024  Adapted from Safety Plan Template 2008 Yessi Prakash and Olivier Leal is reprinted with the express permission of the authors.  No portion of the Safety Plan Template may be reproduced without the express, written permission.  You can contact the authors at [email or web address] or [email or web address].

## 2024-09-02 DIAGNOSIS — F33.1 MAJOR DEPRESSIVE DISORDER, RECURRENT EPISODE, MODERATE (H): ICD-10-CM

## 2024-09-03 RX ORDER — DESVENLAFAXINE 25 MG/1
25 TABLET, EXTENDED RELEASE ORAL DAILY
Qty: 30 TABLET | Refills: 0 | Status: SHIPPED | OUTPATIENT
Start: 2024-09-03 | End: 2024-09-12

## 2024-09-04 NOTE — TELEPHONE ENCOUNTER
"Date of Last Office Visit: 8/2/2024  [facility]      RTC: 4 weeks   No shows: 0  Cancellations: 0  Date of Next Office Visit:    9/12/2024 7:50 AM (30 min)  Jake   Arrive by:  7:35 AM   ADULT PSYCHIATRY RETURN    URPSY ([facility])   Salina Lagunas MD     ------------------------------    Medication requested:    desvenlafaxine (PRISTIQ) 25 MG 24 hr tablet 30 tablet 0 8/16/2024 -- No   Sig - Route: Take 1 tablet (25 mg) by mouth daily - Oral     ------------------------------  From last visit note:   \"- Taper Trintellix from 20mg to 10mg then stop after 1-2 weeks   - Start Desvenlafaxine (Pristiq) 50mg daily for mood, anxiety, pain   - Continue Mirtazapine 15mg daily for mood \"         Refill decision: Refill pended and routed to the provider for review/determination due to the following criteria not met:     Last dose ordered and dose requested from pharmacy doesn't match dose in last visit note. 25mg daily ordered, 50mg daily per note.        "

## 2024-09-05 ENCOUNTER — VIRTUAL VISIT (OUTPATIENT)
Dept: BEHAVIORAL HEALTH | Facility: CLINIC | Age: 29
End: 2024-09-05
Payer: COMMERCIAL

## 2024-09-05 DIAGNOSIS — F84.0 AUTISM SPECTRUM DISORDER: Primary | ICD-10-CM

## 2024-09-05 DIAGNOSIS — F90.8 ATTENTION DEFICIT HYPERACTIVITY DISORDER (ADHD), OTHER TYPE: ICD-10-CM

## 2024-09-05 DIAGNOSIS — F41.1 GAD (GENERALIZED ANXIETY DISORDER): ICD-10-CM

## 2024-09-05 DIAGNOSIS — F33.0 MILD EPISODE OF RECURRENT MAJOR DEPRESSIVE DISORDER (H): ICD-10-CM

## 2024-09-05 PROCEDURE — 90837 PSYTX W PT 60 MINUTES: CPT | Mod: 95 | Performed by: SOCIAL WORKER

## 2024-09-05 ASSESSMENT — COLUMBIA-SUICIDE SEVERITY RATING SCALE - C-SSRS
TOTAL  NUMBER OF INTERRUPTED ATTEMPTS SINCE LAST CONTACT: NO
1. SINCE LAST CONTACT, HAVE YOU WISHED YOU WERE DEAD OR WISHED YOU COULD GO TO SLEEP AND NOT WAKE UP?: NO
TOTAL  NUMBER OF ABORTED OR SELF INTERRUPTED ATTEMPTS SINCE LAST CONTACT: NO
ATTEMPT SINCE LAST CONTACT: NO
6. HAVE YOU EVER DONE ANYTHING, STARTED TO DO ANYTHING, OR PREPARED TO DO ANYTHING TO END YOUR LIFE?: NO
SUICIDE, SINCE LAST CONTACT: NO
2. HAVE YOU ACTUALLY HAD ANY THOUGHTS OF KILLING YOURSELF?: NO

## 2024-09-05 NOTE — PROGRESS NOTES
[facility]                                     Progress Note    Patient Name: Angela Jones  Date: 9/5/2024         Service Type: Individual      Session Start Time: 7:00am  Session End Time: 7:53am     Session Length: 53 minutes    Session #: 41    Attendees: Client attended alone    Service Modality:  Video Visit:      Provider verified identity through the following two step process.  Patient provided:  Patient is known previously to provider    Telemedicine Visit: The patient's condition can be safely assessed and treated via synchronous audio and visual telemedicine encounter.      Reason for Telemedicine Visit: Patient has requested telehealth visit    Originating Site (Patient Location): Patient's home    Distant Site (Provider Location): Provider Remote Setting- Home Office    Consent:  The patient/guardian has verbally consented to: the potential risks and benefits of telemedicine (video visit) versus in person care; bill my insurance or make self-payment for services provided; and responsibility for payment of non-covered services.     Patient would like the video invitation sent by:  My Chart    Mode of Communication:  Video Conference via [employer]      Distant Location (Provider):  Off-site    As the provider I attest to compliance with applicable laws and regulations related to telemedicine.    DATA  Extended Session (53+ minutes): PROLONGED SERVICE IN THE OUTPATIENT SETTING REQUIRING DIRECT (FACE-TO-FACE) PATIENT CONTACT BEYOND THE USUAL SERVICE:    - Treatment protocol required additional time to complete, due to the nature of diagnosis being treated.  See Interventions section for details  Interactive Complexity: No  Crisis: No        Progress Since Last Session (Related to Symptoms / Goals / Homework):   Symptoms: No change :  anxiety and depression    Homework: Partially completed      Episode of Care Goals: Minimal progress - PREPARATION (Decided to change - considering  "how); Intervened by negotiating a change plan and determining options / strategies for behavior change, identifying triggers, exploring social supports, and working towards setting a date to begin behavior change     Current / Ongoing Stressors and Concerns:  Patient provides a status update while therapist utilizes reflective listening skills. Patient was able to obtain a bin at [employer] that locks that she can put sharp items into for safe keeping, to reduce risk of self-harm. She told her partner about her safety plan and asked him to help her follow the plan. Her partner also took her to [employer] to get the lock box. She identifies ongoing intrusive thoughts of self-harming. She reports that it feels difficult to receive the support and empathy from her partners (\"I don't want to be pitied\"). She provides an update on her divorce proceedings - served him papers on 8/22. She is representing herself.     Therapist assessed for risk and safety - patient denied any SI. She does identify intrusive thoughts of self-harming. She obtained a lockbox to secure sharp items and talked to other people. She followed her safety plan and contracts for safety. Should there be an increase in symptoms or severity related to SI/SIB, patient should call 911 or go to local ED for evaluation.           Treatment Objective(s) Addressed in This Session:   identify the fears / thoughts that contribute to feeling anxious  state understanding of stressors and relationship to physical symptoms  Increase interest, engagement, and pleasure in doing things  Decrease frequency and intensity of feeling down, depressed, hopeless  Identify negative self-talk and behaviors: challenge core beliefs, myths, and actions  Gain insight     Intervention:   Discussed mindfulness as being aware of what we are experiencing while we are experiencing it.  Contrasted this with avoidance and rumination.  Explored the role of mindfulness as an overall coping " strategy for managing depression, anxiety, and strong emotions.  Explained concept of state of mind using SIFT (sensations, images, feelings, thoughts) pneumonic. Discussed mindfulness as a tool to intentionally shift our awareness and focus as needed.  Discussed physical, mental, and emotional boundaries and limit-setting with others. Explored management of boundaries through direct communication and limiting contact and communication with others.  Discussed barriers to using boundary management skills including strong emotions and physical proximity.  Therapist provided unconditional positive regard and supportive counseling.     Assessments completed prior to visit:   The following assessments were completed by patient for this visit:  PHQ9:       2/27/2024    11:01 AM 4/12/2024    10:59 AM 6/2/2024    11:40 AM 6/13/2024     8:57 AM 7/25/2024     9:01 AM 8/2/2024    12:42 PM 8/29/2024     8:01 AM   PHQ-9 SCORE   PHQ-9 Total Score MyChart 11 (Moderate depression) 14 (Moderate depression) 12 (Moderate depression) 9 (Mild depression) 13 (Moderate depression) 9 (Mild depression) 18 (Moderately severe depression)   PHQ-9 Total Score 11 14    14 12 9 13 9 18     GAD7:       4/12/2024    11:02 AM 4/18/2024     1:29 PM 5/6/2024    10:51 AM 7/25/2024     9:02 AM 8/2/2024     1:02 PM 8/15/2024     8:48 AM 8/29/2024     8:02 AM   JOSHUA-7 SCORE   Total Score 7 (mild anxiety) 8 (mild anxiety) 9 (mild anxiety) 7 (mild anxiety) 8 (mild anxiety) 12 (moderate anxiety) 8 (mild anxiety)   Total Score 7 8 9 7 8 12 8     PROMIS 10-Global Health (only subscores and total score):       10/24/2023     9:01 AM 11/7/2023    11:56 AM 11/21/2023     8:55 AM 2/27/2024    11:02 AM 4/12/2024    11:00 AM 7/12/2024     8:07 AM 8/2/2024     1:03 PM   PROMIS-10 Scores Only   Global Mental Health Score 11 10    10 11 12 14 14 13   Global Physical Health Score 12 12    12 12 11 12 12 12   PROMIS TOTAL - SUBSCORES 23 22    22 23 23 26 26 25      [place] Suicide Severity Rating Scale (Lifetime/Recent)      2/17/2022     3:02 PM 1/9/2024    10:54 AM 7/25/2024     9:01 AM   [place] Suicide Severity Rating (Lifetime/Recent)   Q1 Wished to be Dead (Past Month) no     Q2 Suicidal Thoughts (Past Month) no     Q3 Suicidal Thought Method no     Q4 Suicidal Intent without Specific Plan no     Q5 Suicide Intent with Specific Plan no     Q6 Suicide Behavior (Lifetime) yes     If yes to Q6, within past 3 months? no     Level of Risk per Screen moderate risk     1. Wish to be Dead (Past 1 Month)  N N   2. Non-Specific Active Suicidal Thoughts (Past 1 Month)  N N   Calculated C-SSRS Risk Score (Lifetime/Recent)  No Risk Indicated No Risk Indicated        [place] Suicide Severity Rating Scale (Short Version)      2/17/2022     3:02 PM 9/5/2024     7:12 AM   [place] Suicide Severity Rating (Short Version)   Q1 Wished to be Dead (Past Month) no    Q2 Suicidal Thoughts (Past Month) no    Q3 Suicidal Thought Method no    Q4 Suicidal Intent without Specific Plan no    Q5 Suicide Intent with Specific Plan no    Q6 Suicide Behavior (Lifetime) yes    If yes to Q6, within past 3 months? no    Level of Risk per Screen moderate risk    1. Wish to be Dead (Since Last Contact)  N   2. Non-Specific Active Suicidal Thoughts (Since Last Contact)  N   Actual Attempt (Since Last Contact)  N   Has subject engaged in non-suicidal self-injurious behavior? (Since Last Contact)  N   Interrupted Attempts (Since Last Contact)  N   Aborted or Self-Interrupted Attempt (Since Last Contact)  N   Preparatory Acts or Behavior (Since Last Contact)  N   Suicide (Since Last Contact)  N   Calculated C-SSRS Risk Score (Since Last Contact)  No Risk Indicated          ASSESSMENT: Current Emotional / Mental Status (status of significant symptoms):   Risk status (Self / Other harm or suicidal ideation)   Patient denies current fears or concerns for personal safety.   Patient denies current or recent  suicidal ideation or behaviors.   Patient denies current or recent homicidal ideation or behaviors.   Patient reports current or recent self injurious behavior or ideation including thoughts of self-harm.   Patient denies other safety concerns.   Patient reports there has been no change in risk factors since their last session.     Patient reports there has been no change in protective factors since their last session.     A safety and risk management plan has been developed including: Patient consented to co-developed safety plan.  Safety and risk management plan was completed - see below.  Patient agreed to use safety plan should any safety concerns arise.  A copy was given to the patient.     Appearance:   Appropriate    Eye Contact:   Good    Psychomotor Behavior: Hyperactive  Restless    Attitude:   Cooperative    Orientation:   All   Speech    Rate / Production: Pressured  Stutters    Volume:  Normal    Mood:    Anxious  Depressed  Anhedonia   Affect:    Constricted  Flat  Worrisome    Thought Content:  Clear    Thought Form:  Coherent    Insight:    Good      Medication Review:   No changes to current psychiatric medication(s)     Medication Compliance:   Yes     Changes in Health Issues:   None reported     Chemical Use Review:   Substance Use: Chemical use reviewed, no active concerns identified      Tobacco Use: No current tobacco use.      Diagnosis:  1. Autism spectrum disorder    2. JOSHUA (generalized anxiety disorder)    3. Attention deficit hyperactivity disorder (ADHD), other type    4. Mild episode of recurrent major depressive disorder (H24)        Collateral Reports Completed:   Not Applicable    PLAN: (Patient Tasks / Therapist Tasks / Other)  Patient will return for a virtual visit in 1 week  Patient will access her safety plan in her chart   Patient will continue using her lockbox to secure items in her home  Patient encouraged to journal and practice self-compassion    There has been  demonstrated improvement in functioning while patient has been engaged in psychotherapy/psychological service- if withdrawn the patient would deteriorate and/or relapse.       Fanny Cobb, LICSW                                                         ______________________________________________________________________    Individual Treatment Plan    Patient's Name: Angela Jones  YOB: 1995    Date of Creation: 1/19/2022  Date Treatment Plan Last Reviewed/Revised: 7/12/2024    DSM5 Diagnoses: Autism Spectrum Disorder 299.00(F84.0)  Associated with another neurodevelopmental, mental or behavioral disorder and Attention-Deficit/Hyperactivity Disorder  314.01 (F90.9) Unspecified Attention -Deficit / Hyperactivity Disorder, 296.31 (F33.0) Major Depressive Disorder, Recurrent Episode, Mild _ or 300.02 (F41.1) Generalized Anxiety Disorder  Psychosocial / Contextual Factors: 29 year old  female, , no children   PROMIS (reviewed every 90 days):   PROMIS 10-Global Health (only subscores and total score):       7/25/2023    12:02 PM 10/24/2023     9:01 AM 11/7/2023    11:56 AM 11/21/2023     8:55 AM 2/27/2024    11:02 AM 4/12/2024    11:00 AM 7/12/2024     8:07 AM   PROMIS-10 Scores Only   Global Mental Health Score 10 11 10    10 11 12 14 14   Global Physical Health Score 11 12 12    12 12 11 12 12   PROMIS TOTAL - SUBSCORES 21 23 22    22 23 23 26 26         Referral / Collaboration:  Was/were discussed and patient will pursue.    Anticipated number of session for this episode of care: 6-9  Anticipation frequency of session: Every other week  Anticipated Duration of each session: 53 or more minutes  Treatment plan will be reviewed in 90 days or when goals have been changed.       MeasurableTreatment Goal(s) related to diagnosis / functional impairment(s)  Goal 1: Patient will manage symptoms of anxiety, as evidenced by a JOSHUA-7 score of 5 or lower     I will know I've met my goal  when I feel more confident in my ability to cope with stress.      Objective #A (Patient Action)    Patient will identify the initial signs or symptoms of anxiety.  Status: Continued - Date(s): 7/12/2024    Intervention(s)  Therapist will teach  help patient gain insight into signs of stress (physically and emotionally) .    Objective #B  Patient will use relaxation strategies multiple times per day to reduce the physical symptoms of anxiety.  Status: Continued - Date(s): 7/12/2024    Intervention(s)  Therapist will  teach diaphragmatic breathing and other grounding skills .    Objective #C  Patient will use thought-stopping strategy daily to reduce intrusive thoughts.  Status: Continued - Date(s): 7/12/2024    Intervention(s)  Therapist will  teach thought stopping techniques .      Goal 2: Patient will manage symptoms of depression, as evidenced by a PHQ-9 score of 10 or lower    I will know I've met my goal when I feel more confident in my ability to express my emotions in a healthy way.      Objective #A (Patient Action)    Status: Continued - Date(s): 7/12/2024    Patient will Increase interest, engagement, and pleasure in doing things.    Intervention(s)  Therapist will teach emotional recognition/identification. * .    Objective #B  Patient will Identify negative self-talk and behaviors: challenge core beliefs, myths, and actions.    Status: Continued - Date(s): 7/12/2024    Intervention(s)  Therapist will  help patient practice positive self-talk and thought re-framing .    Goal 3: Client will manage symptoms of ADHD     I will know I've met my goal when I feel confident in completing tasks.      Objective #A (Client Action)    Client will use daily planner 75% of the time.  Status: Continued - Date(s): 7/12/2024    Intervention(s)  Therapist will  ask client to demonstrate use of planner while in session .    Objective #B  Client will identify and compliment positives at least 2 times daily to support  "positive self-image.    Status: Continued - Date(s): 7/12/2024    Intervention(s)  Therapist will  ask patient to demonstrate use of affirmations during session .    Goal 4: Client will manage symptoms and experiences associated with ASD     I will know I've met my goal when I feel confident in my communication in relationships.      Objective #A (Client Action)    Client will work on being assertive and setting clear expectations with healthy boundaries in relationships  Status: Continued - Date(s): 7/12/2024    Intervention(s)  Therapist will  help patient practice assertive communication skills .        Patient has reviewed and agreed to the above plan.      Fanny Cobb, [facility]  July 12, 2024        [facility]                                       Angela Jones     SAFETY PLAN:  Step 1: Warning signs / cues (Thoughts, images, mood, situation, behavior) that a crisis may be developing:  Thoughts: \"I can't do this anymore\" and \"Nothing makes it better\" \"I'm a burden\"   Images: visions of harm: self-harm  Thinking Processes: ruminations (can't stop thinking about my problems):  , racing thoughts, intrusive thoughts (bothersome, unwanted thoughts that come out of nowhere):  , highly critical and negative thoughts:  , and disorganized thinking:    Mood: worsening depression, hopelessness, helplessness, intense worry, and agitation  Behaviors: can't stop crying, not taking care of myself, and not taking care of my responsibilities  Situations: relationship problems and financial stress   Step 2: Coping strategies - Things I can do to take my mind off of my problems without contacting another person (relaxation technique, physical activity):  Distress Tolerance Strategies:  relaxation activities:  , arts and crafts:  , play with my pet , sensory based activities/self-soothe with five senses:  , change body temperature (ice pack/cold water) , and paced breathing/progressive muscle relaxation, " "grounding exercise of naming objects, weighted items (sock filled with rice), play with puddy, use sensory items   Physical Activities: meditation, deep breathing, and stretching ; taking a shower,   Focus on helpful thoughts:  \"This is temporary\", \"I will get through this\", \"It always passes\", and self-compassion statements:    Step 3: People and social settings that provide distraction:   Name: Zander    Name: Demetri  park and work   Step 4: Remind myself of people and things that are important to me and worth living for:  my family and pets and friends  Step 5: When I am in crisis, I can ask these people to help me use my safety plan:   Name: Demetri Fermin    Step 6: Making the environment safe:   dispose of old medications , remove things I could use to hurt myself:  , and be around others  Step 7: Professionals or agencies I can contact during a crisis:  Suicide Prevention Lifeline: Call or Text 984   [facility]: [phone]    Call 911 or go to my nearest emergency department.   I helped develop this safety plan and agree to use it when needed.  I have been given a copy of this plan.    Client signature _________________________________________________________________  Today s date:  7/25/2024  Completed by Provider Name/ Credentials:  CHANTAL Blackmon  July 25, 2024  Adapted from Safety Plan Template 2008 Yessi Leal is reprinted with the express permission of the authors.  No portion of the Safety Plan Template may be reproduced without the express, written permission.  You can contact the authors at [email or web address] or [email or web address].        "

## 2024-09-12 ENCOUNTER — VIRTUAL VISIT (OUTPATIENT)
Dept: PSYCHIATRY | Facility: CLINIC | Age: 29
End: 2024-09-12
Attending: STUDENT IN AN ORGANIZED HEALTH CARE EDUCATION/TRAINING PROGRAM
Payer: COMMERCIAL

## 2024-09-12 VITALS — HEIGHT: 63 IN | WEIGHT: 125 LBS | BODY MASS INDEX: 22.15 KG/M2

## 2024-09-12 DIAGNOSIS — F33.1 MAJOR DEPRESSIVE DISORDER, RECURRENT EPISODE, MODERATE (H): ICD-10-CM

## 2024-09-12 DIAGNOSIS — Z76.0 ENCOUNTER FOR MEDICATION REFILL: ICD-10-CM

## 2024-09-12 DIAGNOSIS — F42.2 MIXED OBSESSIONAL THOUGHTS AND ACTS: Primary | ICD-10-CM

## 2024-09-12 DIAGNOSIS — F33.41 RECURRENT MAJOR DEPRESSIVE DISORDER, IN PARTIAL REMISSION (H): ICD-10-CM

## 2024-09-12 PROCEDURE — 99214 OFFICE O/P EST MOD 30 MIN: CPT | Mod: 95

## 2024-09-12 PROCEDURE — G2211 COMPLEX E/M VISIT ADD ON: HCPCS | Mod: 95

## 2024-09-12 RX ORDER — CLOMIPRAMINE HYDROCHLORIDE 25 MG/1
25 CAPSULE ORAL AT BEDTIME
Qty: 30 CAPSULE | Refills: 0 | Status: SHIPPED | OUTPATIENT
Start: 2024-09-12

## 2024-09-12 RX ORDER — CLOMIPRAMINE HYDROCHLORIDE 25 MG/1
25 CAPSULE ORAL AT BEDTIME
Qty: 15 CAPSULE | Refills: 0 | Status: SHIPPED | OUTPATIENT
Start: 2024-09-12 | End: 2024-09-12

## 2024-09-12 ASSESSMENT — ANXIETY QUESTIONNAIRES
8. IF YOU CHECKED OFF ANY PROBLEMS, HOW DIFFICULT HAVE THESE MADE IT FOR YOU TO DO YOUR WORK, TAKE CARE OF THINGS AT HOME, OR GET ALONG WITH OTHER PEOPLE?: VERY DIFFICULT
GAD7 TOTAL SCORE: 9
GAD7 TOTAL SCORE: 9
7. FEELING AFRAID AS IF SOMETHING AWFUL MIGHT HAPPEN: SEVERAL DAYS
GAD7 TOTAL SCORE: 9

## 2024-09-12 ASSESSMENT — PAIN SCALES - GENERAL: PAINLEVEL: MODERATE PAIN (4)

## 2024-09-12 NOTE — NURSING NOTE
Current patient location: [address] [place], [place] [address]  [place] [address]    Is the patient currently in the state of [place]? YES    Visit mode:VIDEO    If the visit is dropped, the patient can be reconnected by: VIDEO VISIT: Send to e-mail at: [email or web address]    Will anyone else be joining the visit? NO  (If patient encounters technical issues they should call [phone]    How would you like to obtain your AVS? MyChart    Are changes needed to the allergy or medication list? No    Are refills needed on medications prescribed by this physician? NO    Rooming Documentation:  Questionnaire(s) completed      Reason for visit: RECHAYESHA SIEGEL

## 2024-09-12 NOTE — PROGRESS NOTES
[facility]  General Clinic Team  MEDICAL PROGRESS NOTE       CARE TEAM:    PCP- Marissa Walton  Therapist- Fanny Cobb, CHANTAL      is a 29 year old who uses the pronouns she, her.                   Assessment & Plan     Major depressive disorder, in partial remission   ADHD, hyperactive type   Anxiety, unspecified  OCD  Cannabis use    R/o ASD  R/o cluster B personality disorder    Postural orthostatic tachycardia syndrome   Fibromyalgia   Migraines   H/o abnormal EEG   IBS , constipation and diarrhea      Angela Jones is a 29-year-old female with past psychiatric diagnoses of ADHD, hyperactive type, MDD, anxiety, OCD, r/o cluster B personality disorder, phobia for driving, cannabis use, and medical history pertinent for migraine with aura, fibromyalgia, IBS, POTS. She presents to psychiatry clinic to re-establish psychiatric care at our clinic. She had been seen in the community by previous psychiatrists who left practice and her psychiatric medications were managed by her PCP. Other specialists were concerned about medications interaction and  has concern for medication cost specifically Trintellix given her insurance change following her divorce.      She was diagnosed with ADHD since age of 7. She tried stimulants and reported they made her more irritable, suppressed appetite, and worsened anxiety. Non-stimulant Intruniv worked better but she could not continue to get it due to insurance issues. She was diagnosed with depression and anxiety around middle school and had multiple SSRI and SNRI trials. She was diagnosed with OCD in 2013 at [facility] clinic due to time -consuming distress caused by schedule changes, ritualistic behaviors of checking. She completed CBT for OCD and Celexa was increased. She was diagnosed with ASD by her therapist in 2015 for sensitivity to textures, difficulty with social  interaction, subjective report of repetitive movement. Reported her diagnosis was confirmed by Ozzie ~5 years ago.     Today, 's mood appears euthymic. Doesn't find Pristiq helpful for her mood. Notes worsening intrusive thoughts of self harm and in review of Y-BOCS, she scored 22, which indicates moderate OCD. She does not have plan / intent of suicide. Therefore, she is not at imminent risk of danger to self or others. We recommended initiating Clomipramine 25mg at bedtime while continuing her prior medications to minimize any withdrawal symptoms that she she may have experienced with decreased dose of trintellix after last visit, which she agreed. Given remote history of self-harm, I recommended 2 weeks supply of clomipramine at a time. Later, Angela king messaged stating being on multiple medications is burdensome for her financially and she cannot afford to visit pharmacy biweekly given she doesn't drive. Therefore, a month supply of clomipramine was given and pristiq was discontinued. She agreed with having MTM visit to check in with the clomipramine initiation. Additionally,  was instructed to bring in Ozzie's report for ASD next visit.       Psychotropic Drug Interactions:    ADDITIVE SEROTONERGIC: Mirtazapine   Management: routine monitoring  Genesight testing from 8/11/2017 reviewed and she is an ultra rapid metabolizer of 1A2. Normal metabolizer for ADHD medications.   [place] was [place] today: indicates that controlled prescriptions have been filled as prescribed     Risk Statements:   Treatment Risk: Risks, benefits, alternatives and potential adverse effects have been discussed and are understood.   Safety Risk:  did not appear to be an imminent safety risk to self or others.    PLAN    1) Medications:     - Start Clomipramine 25 mg at bedtime   - Continue Trintellix 10mg then stop after 1-2 weeks   - Continue Mirtazapine 15mg at bedtime for mood     Other:   Gabapentin 900mg TID    Ibuprofen 200mg q4h PRN   -MTV     Future considerations:   If OCD is not in full remission / causes significant distress, consider switching to paxil, adding clomipramine, or retrialing zoloft at higher dose. Wellbutrin is another option to also target ADHD.      2) Psychotherapy: weekly individual therapy with Fanny Cobb     3) Next due:  Labs: Clomipramine level after next titration   EKG: Routine monitoring is not indicated for current psychotropic medication regimen   Rating scales: N/A    4) Referrals: MTM for Clomipramine check in     5) Follow-up: Return to clinic in 4 weeks                      Interval History       -Much worse than before with the new medication   -Mood - hasn't been great   -Attributes to family emergency this week. Someone had to be hospitalized as well as gave divorce paper to her ex-   -Answered Y-BOCS. Has some compulsion of repeating phrases mentally to herself   -Racing thoughts and intrusive thoughts of self-harm. No plan/ intent   -Sleep is okay  -Working 4 days/ week       Intrusive thoughts   Current Social History:  Financial/occupational: employed    Living situation: lives alone in an apartment with her cat   Social/spiritual support: partners, family, cat       Pertinent Substance Use:  [Last updated 09/12/24]  Alcohol: Yes: socially    Cannabis: Yes:  edible recreationally, once every 2 weeks , 7mg THC, 7mg CBD  (patient carried it in her purse), help with sensory overload   Tobacco: No  Caffeine:  No   Opioids: No   Narcan Kit current: N/A  Other substances: No     Medical Review of Systems / Med sfx:   A comprehensive review of systems was performed and is negative other than noted above.   Lightheadedness/orthostasis: yes, has POTS    Headaches: denies   GI: denies   CV: denies   Sexual health concerns: denies        Contraception: Yes:                   Summary Points of Current Care  8/2/2024: Transfer visit. Tapered Trintellix from 20mg to 10mg and  "instructed to stop after 1-2 weeks. Start Pristiq 50mg daily   8/14/2024:  reached out due to safety concerns from thoughts of self-harm. RN arranged for welfare check. Patient saw therapist the following day and reviewed safety plan  8/16/2024: Due to worsening intrusive thoughts and mood, patient instructed via phone call to increase Trintellix back to 20mg, and Pristiq was decreased to 25mg                   Physical Exam  (Vitals Only)    LMP  (LMP Unknown)   Pulse Readings from Last 3 Encounters:   08/08/24 86   08/02/24 98   07/15/24 93     Wt Readings from Last 3 Encounters:   08/08/24 57.2 kg (126 lb)   08/02/24 56 kg (123 lb 6.4 oz)   07/15/24 54.4 kg (120 lb)     BP Readings from Last 3 Encounters:   08/08/24 117/75   08/02/24 112/75   07/15/24 98/60                      Mental Status Exam    Alertness: alert  and oriented  Appearance: adequately groomed  Behavior/Demeanor: cooperative, with good  eye contact   Speech: normal  Language: intact  Psychomotor: normal or unremarkable  Mood:  \"okay\"  Affect: appropriate; congruent to: mood- yes, content- yes  Thought Process/Associations: unremarkable  Thought Content:  Reports  intrusive thoughts of self-harm without plan / intent ;  Denies violent ideation  Perception:  Reports none;  Denies hallucinations  Insight: fair  Judgment: fair  Cognition: does  appear grossly intact; formal cognitive testing was not done  Gait and Station: N/A (telehealth)                  Past Psychotropic Medication Trials    Wellbutrin- sleepiness, Prozac- irritablility, Effexor, Lexapro- not helpful.       Previous trials include Prozac (afternoon lethargy), Zoloft, Wellbutrin (insurance problems, but may have worked for depression and ADHD), Effexor (stomach pains and dizziness). Adderall (decreased appetite), Strattera, Concerta, Intuniv (\"zombie\"), Focalin.        Medication Max Dose (mg) Dates / Duration Helpful? DC Reason / Adverse Effects?   Lamotrigine   350     " "Don't remember    Vortioxetine            Amitryptyline  50    y (got on it for migraines)      Citalopram  20     y      Adderall        Appetite suppressant    Ritalin        Appetite suppressant    Focalin       Provider left practice     Strattera        provider left practice    Wellbutrin        Spacy and tired , brain fog    Cymbalta            Prozac       y  Irritable , lethargy    Zoloft            Effexor        Made migraines worse, stomachache     Intruniv         Worked better than stimulants, focused better and not so jittery, though per chart reported \"zombie\" like feeling      Vyvanse        Worked better than Adderral, issue with insurance    Diphenhydramine(OTC)            Risperidone        Can't recall taking it. May have been prescribed at young age    Aripriprazole        Can't recall taking it. May have been prescribed at young age                  Past Medical History     Patient Active Problem List   Diagnosis    OCD (obsessive compulsive disorder)    Pes planus    Moderate major depression (H)    Generalized anxiety disorder    Migraine with aura    Autism spectrum disorder    IUD (intrauterine device) in place    Fibromyalgia    POTS (postural orthostatic tachycardia syndrome)    Dysautonomia (H)                     Medications     Current Outpatient Medications   Medication Sig Dispense Refill    amitriptyline (ELAVIL) 25 MG tablet TAKE 2 TABLETS(50 MG) BY MOUTH AT BEDTIME. SEE YOUR DOCTOR FOR FURTHER REFILLS. 180 tablet 3    cetirizine (ZYRTEC) 10 MG tablet Take 1 tablet (10 mg) by mouth daily 30 tablet 0    desvenlafaxine succinate (PRISTIQ) 25 MG 24 hr tablet Take 1 tablet (25 mg) by mouth daily. 30 tablet 0    dexAMETHasone (DECADRON) 2 MG tablet Take 2 mg by mouth as needed      diphenhydrAMINE (BENADRYL) 50 MG capsule Take 50 mg by mouth as needed      esomeprazole (NEXIUM) 20 MG DR capsule TAKE 1 CAPSULE BY MOUTH EVERY MORNING BEFORE BREAKFAST 90 capsule 3    fludrocortisone " (FLORINEF) 0.1 MG tablet TAKE 2 TABLETS (0.2 MG) BY MOUTH DAILY 180 tablet 1    gabapentin (NEURONTIN) 300 MG capsule Take 3 capsules (900 mg) by mouth 3 times daily 810 capsule 3    hyoscyamine (LEVSIN/SL) 0.125 MG sublingual tablet Take 1 tablet (0.125 mg) by mouth every 4 hours as needed 120 tablet 5    ibuprofen (ADVIL/MOTRIN) 200 MG tablet Take 200 mg by mouth every 4 hours as needed for pain or other (migrain)      levonorgestrel (MIRENA, 52 MG,) 20 MCG/24HR IUD 1 each (20 mcg) by Intrauterine route once for 1 dose 1 each 0    metoclopramide (REGLAN) 5 MG tablet TAKE 1 TABLET (5 MG) BY MOUTH 3 TIMES DAILY AS NEEDED (FOR NAUSEA) 90 tablet 0    mirtazapine (REMERON) 15 MG tablet Take 1 tablet (15 mg) by mouth daily 30 tablet 1    multivitamin, therapeutic (THERA-VIT) TABS tablet Take 1 tablet by mouth daily      tiZANidine (ZANAFLEX) 2 MG tablet Take 2 mg by mouth daily as needed for muscle spasms      vortioxetine (TRINTELLIX) 10 MG tablet Take 2 tablets (20 mg) by mouth daily Take 1 tablet (10 mg) by mouth daily for 1-2 weeks then stop 60 tablet 0                     Data         4/12/2024    11:00 AM 7/12/2024     8:07 AM 8/2/2024     1:03 PM   PROMIS-10 Total Score w/o Sub Scores   PROMIS TOTAL - SUBSCORES 26 26 25         7/25/2024     9:01 AM 8/2/2024    12:42 PM 8/29/2024     8:01 AM   PHQ-9 SCORE   PHQ-9 Total Score MyChart 13 (Moderate depression) 9 (Mild depression) 18 (Moderately severe depression)   PHQ-9 Total Score 13 9 18         8/2/2024     1:02 PM 8/15/2024     8:48 AM 8/29/2024     8:02 AM   JOSHUA-7 SCORE   Total Score 8 (mild anxiety) 12 (moderate anxiety) 8 (mild anxiety)   Total Score 8 12 8       Liver/Kidney Function, TSH Metabolic Blood counts   Recent Labs   Lab Test 05/20/24  1834 02/18/22  1020   AST 17  --    ALT 10  --    ALKPHOS 63  --    CR 0.80 0.75     Recent Labs   Lab Test 02/18/22  1020   TSH 2.06    Recent Labs   Lab Test 10/25/23  1508   CHOL 202*   TRIG 115   *   HDL  70     Recent Labs   Lab Test 10/25/23  1508   A1C 5.2     Recent Labs   Lab Test 05/20/24  1834   *    Recent Labs   Lab Test 06/04/24  1337   WBC 9.5   HGB 12.4   HCT 38.1   MCV 96                Answers submitted by the patient for this visit:  Patient Health Questionnaire (Submitted on 9/12/2024)  If you checked off any problems, how difficult have these problems made it for you to do your work, take care of things at home, or get along with other people?: Very difficult  PHQ9 TOTAL SCORE: 18  Patient Health Questionnaire (G7) (Submitted on 9/12/2024)  JOSHUA 7 TOTAL SCORE: 9    PROVIDER: Salina Lagunas MD    The longitudinal plan of care for the diagnosis(es)/condition(s) as documented were addressed during this visit. Due to the added complexity in care, I will continue to support  in the subsequent management and with ongoing continuity of care.      Patient discussed with Dr. Flores who will sign the note.  Supervisor is Dr. Yancey.

## 2024-09-12 NOTE — PATIENT INSTRUCTIONS
**For crisis resources, please see the information at the end of this document**   Patient Education    Thank you for coming to the [facility] [place] [facility].     Today's change  -start clomipramine 25mg at bedtime   -discontinue Pristiq       Lab Testing:  If you had lab testing today and your results are reassuring or normal they will be mailed to you or sent through [employer] within 7 days. If the lab tests need quick action we will call you with the results. The phone number we will call with results is # [phone]. If this is not the best number please call our clinic and change the number.     Medication Refills:  If you need any refills please call your pharmacy and they will contact us. Our fax number for refills is [phone].   Three business days of notice are needed for general medication refill requests.   Five business days of notice are needed for controlled substance refill requests.   If you need to change to a different pharmacy, please contact the new pharmacy directly. The new pharmacy will help you get your medications transferred.     Contact Us:  Please call [phone] during business hours (8-5:00 M-F).   If you have medication related questions after clinic hours, or on the weekend, please call [phone].     Financial Assistance [phone]   Medical Records [phone]       MENTAL HEALTH CRISIS RESOURCES:  For a emergency help, please call 911 or go to the nearest Emergency Department.     Emergency Walk-In Options:   Em[facility] Unit @ [facility] ([place]): [phone] - Specialized mental health emergency area designed to be calming  [facility] [place] ([place]): [phone]  [facility] ([place]): [phone]  [facility]): [phone]    [facility] Crisis Information:   [place]: [phone]  [place]: [phone]  [place] ([place]) - Adult: [phone]     Child:  [phone]  [place] - Adult: [phone]     Child: [phone]  [place]: [phone]  List of all [facility] resources:   https://[place].gov/dhs/people-we-serve/adults/health-care/mental-health/resources/crisis-contacts.jsp    National Crisis Information:   Crisis Text Line: Text  [place]  to 815267  Suicide & Crisis Lifeline: 988  National Suicide Prevention Lifeline: 3-178-693-TALK (1-391.307.6591)       For online chat options, visit https://suicidepreventionlifeline.org/chat/  Poison Control Center: 0-898-856-5363  Trans Lifeline: 1-408.871.7282 - Hotline for transgender people of all ages  [employer]: 8-425-388-0731 - Hotline for LGBT youth     For Non-Emergency Support:   Fast Tracker: Mental Health & Substance Use Disorder Resources -   https://www.[employer].org/

## 2024-09-12 NOTE — PROGRESS NOTES
Virtual Visit Details    Type of service:  Video Visit     Originating Location (pt. Location): Home    Distant Location (provider location):  On-site  Platform used for Video Visit: Sidney

## 2024-09-16 RX ORDER — MIRTAZAPINE 15 MG/1
15 TABLET, FILM COATED ORAL AT BEDTIME
Qty: 30 TABLET | Refills: 1 | Status: SHIPPED | OUTPATIENT
Start: 2024-09-16

## 2024-09-17 ENCOUNTER — TELEPHONE (OUTPATIENT)
Dept: PHARMACY | Facility: OTHER | Age: 29
End: 2024-09-17
Payer: COMMERCIAL

## 2024-09-17 NOTE — TELEPHONE ENCOUNTER
MTM referral from: [facility] visit (referral by provider)    MTM referral outreach attempt #2 on September 17, 2024 at 2:05 PM      Outcome: Patient not reachable after several attempts, sent [email or web address] message    Use DSM, specialty saw rc for the carrier/Plan on the flowsheet      Kid Care Yearshart Message Sent    Frances Castillo [facility] [address]  -[facility]  [phone]

## 2024-09-19 ENCOUNTER — MYC MEDICAL ADVICE (OUTPATIENT)
Dept: FAMILY MEDICINE | Facility: CLINIC | Age: 29
End: 2024-09-19

## 2024-09-19 ENCOUNTER — OFFICE VISIT (OUTPATIENT)
Dept: FAMILY MEDICINE | Facility: CLINIC | Age: 29
End: 2024-09-19
Payer: COMMERCIAL

## 2024-09-19 ENCOUNTER — VIRTUAL VISIT (OUTPATIENT)
Dept: BEHAVIORAL HEALTH | Facility: CLINIC | Age: 29
End: 2024-09-19
Payer: COMMERCIAL

## 2024-09-19 VITALS
RESPIRATION RATE: 20 BRPM | OXYGEN SATURATION: 98 % | TEMPERATURE: 98.7 F | DIASTOLIC BLOOD PRESSURE: 80 MMHG | HEART RATE: 83 BPM | WEIGHT: 129.5 LBS | SYSTOLIC BLOOD PRESSURE: 114 MMHG | BODY MASS INDEX: 22.95 KG/M2 | HEIGHT: 63 IN

## 2024-09-19 DIAGNOSIS — F33.0 MILD EPISODE OF RECURRENT MAJOR DEPRESSIVE DISORDER (H): ICD-10-CM

## 2024-09-19 DIAGNOSIS — F90.8 ATTENTION DEFICIT HYPERACTIVITY DISORDER (ADHD), OTHER TYPE: ICD-10-CM

## 2024-09-19 DIAGNOSIS — F41.1 GAD (GENERALIZED ANXIETY DISORDER): ICD-10-CM

## 2024-09-19 DIAGNOSIS — B96.89 BV (BACTERIAL VAGINOSIS): ICD-10-CM

## 2024-09-19 DIAGNOSIS — N76.0 BV (BACTERIAL VAGINOSIS): ICD-10-CM

## 2024-09-19 DIAGNOSIS — F84.0 AUTISM SPECTRUM DISORDER: Primary | ICD-10-CM

## 2024-09-19 DIAGNOSIS — Z11.3 SCREENING EXAMINATION FOR VENEREAL DISEASE: Primary | ICD-10-CM

## 2024-09-19 LAB
CLUE CELLS: PRESENT
HIV 1+2 AB+HIV1 P24 AG SERPL QL IA: NONREACTIVE
T PALLIDUM AB SER QL: NONREACTIVE
TRICHOMONAS, WET PREP: ABNORMAL
WBC'S/HIGH POWER FIELD, WET PREP: ABNORMAL
YEAST, WET PREP: ABNORMAL

## 2024-09-19 PROCEDURE — 87591 N.GONORRHOEAE DNA AMP PROB: CPT

## 2024-09-19 PROCEDURE — 87389 HIV-1 AG W/HIV-1&-2 AB AG IA: CPT

## 2024-09-19 PROCEDURE — 36415 COLL VENOUS BLD VENIPUNCTURE: CPT

## 2024-09-19 PROCEDURE — 87210 SMEAR WET MOUNT SALINE/INK: CPT

## 2024-09-19 PROCEDURE — 86780 TREPONEMA PALLIDUM: CPT

## 2024-09-19 PROCEDURE — G2211 COMPLEX E/M VISIT ADD ON: HCPCS

## 2024-09-19 PROCEDURE — 90837 PSYTX W PT 60 MINUTES: CPT | Mod: 95 | Performed by: SOCIAL WORKER

## 2024-09-19 PROCEDURE — 87491 CHLMYD TRACH DNA AMP PROBE: CPT

## 2024-09-19 PROCEDURE — 99214 OFFICE O/P EST MOD 30 MIN: CPT

## 2024-09-19 RX ORDER — METRONIDAZOLE 500 MG/1
500 TABLET ORAL 2 TIMES DAILY
Qty: 14 TABLET | Refills: 0 | Status: SHIPPED | OUTPATIENT
Start: 2024-09-19

## 2024-09-19 ASSESSMENT — PAIN SCALES - GENERAL: PAINLEVEL: NO PAIN (0)

## 2024-09-19 NOTE — PROGRESS NOTES
[facility]                                     Progress Note    Patient Name: Angela Jones  Date: 9/19/2024         Service Type: Individual      Session Start Time: 10:00am  Session End Time: 10:53am     Session Length: 53 minutes    Session #: 42    Attendees: Client attended alone    Service Modality:  Video Visit:      Provider verified identity through the following two step process.  Patient provided:  Patient is known previously to provider    Telemedicine Visit: The patient's condition can be safely assessed and treated via synchronous audio and visual telemedicine encounter.      Reason for Telemedicine Visit: Patient has requested telehealth visit    Originating Site (Patient Location): Patient's home    Distant Site (Provider Location): Provider Remote Setting- Home Office    Consent:  The patient/guardian has verbally consented to: the potential risks and benefits of telemedicine (video visit) versus in person care; bill my insurance or make self-payment for services provided; and responsibility for payment of non-covered services.     Patient would like the video invitation sent by:  My Chart    Mode of Communication:  Video Conference via Amwell      Distant Location (Provider):  Off-site    As the provider I attest to compliance with applicable laws and regulations related to telemedicine.    DATA  Extended Session (53+ minutes): PROLONGED SERVICE IN THE OUTPATIENT SETTING REQUIRING DIRECT (FACE-TO-FACE) PATIENT CONTACT BEYOND THE USUAL SERVICE:    - Treatment protocol required additional time to complete, due to the nature of diagnosis being treated.  See Interventions section for details  Interactive Complexity: No  Crisis: No        Progress Since Last Session (Related to Symptoms / Goals / Homework):   Symptoms: No change :  anxiety and depression    Homework: Partially completed      Episode of Care Goals: Minimal progress - PREPARATION (Decided to change - considering  "how); Intervened by negotiating a change plan and determining options / strategies for behavior change, identifying triggers, exploring social supports, and working towards setting a date to begin behavior change     Current / Ongoing Stressors and Concerns:  Patient provides a status update while therapist utilizes reflective listening skills. She has plans to talk to her ex today and is going to have someone there with her for support. She has plans to go away for the weekend. She shares about her dad group texting about health problems. She examines family dynamics and relationships (\"parents shouldn't have stayed together\"). Patient had some medication changes and has noticed a major decrease in intrusive thoughts. Patient denies any any SI or SIB.          Treatment Objective(s) Addressed in This Session:   identify the fears / thoughts that contribute to feeling anxious  state understanding of stressors and relationship to physical symptoms  Increase interest, engagement, and pleasure in doing things  Decrease frequency and intensity of feeling down, depressed, hopeless  Identify negative self-talk and behaviors: challenge core beliefs, myths, and actions  Gain insight     Intervention:   Discussed mindfulness as being aware of what we are experiencing while we are experiencing it.  Contrasted this with avoidance and rumination.  Explored the role of mindfulness as an overall coping strategy for managing depression, anxiety, and strong emotions.  Explained concept of state of mind using SIFT (sensations, images, feelings, thoughts) pneumonic. Discussed mindfulness as a tool to intentionally shift our awareness and focus as needed.  Discussed physical, mental, and emotional boundaries and limit-setting with others. Explored management of boundaries through direct communication and limiting contact and communication with others.  Discussed barriers to using boundary management skills including strong emotions " and physical proximity.  Therapist provided unconditional positive regard and supportive counseling.     Assessments completed prior to visit:   The following assessments were completed by patient for this visit:  PHQ9:       4/12/2024    10:59 AM 6/2/2024    11:40 AM 6/13/2024     8:57 AM 7/25/2024     9:01 AM 8/2/2024    12:42 PM 8/29/2024     8:01 AM 9/12/2024     7:46 AM   PHQ-9 SCORE   PHQ-9 Total Score MyChart 14 (Moderate depression) 12 (Moderate depression) 9 (Mild depression) 13 (Moderate depression) 9 (Mild depression) 18 (Moderately severe depression) 18 (Moderately severe depression)   PHQ-9 Total Score 14    14 12 9 13 9 18 18    18     GAD7:       4/18/2024     1:29 PM 5/6/2024    10:51 AM 7/25/2024     9:02 AM 8/2/2024     1:02 PM 8/15/2024     8:48 AM 8/29/2024     8:02 AM 9/12/2024     7:47 AM   JOSHUA-7 SCORE   Total Score 8 (mild anxiety) 9 (mild anxiety) 7 (mild anxiety) 8 (mild anxiety) 12 (moderate anxiety) 8 (mild anxiety) 9 (mild anxiety)   Total Score 8 9 7 8 12 8 9    9     PROMIS 10-Global Health (only subscores and total score):       10/24/2023     9:01 AM 11/7/2023    11:56 AM 11/21/2023     8:55 AM 2/27/2024    11:02 AM 4/12/2024    11:00 AM 7/12/2024     8:07 AM 8/2/2024     1:03 PM   PROMIS-10 Scores Only   Global Mental Health Score 11 10    10 11 12 14 14 13   Global Physical Health Score 12 12    12 12 11 12 12 12   PROMIS TOTAL - SUBSCORES 23 22    22 23 23 26 26 25     [place] Suicide Severity Rating Scale (Lifetime/Recent)      2/17/2022     3:02 PM 1/9/2024    10:54 AM 7/25/2024     9:01 AM   [place] Suicide Severity Rating (Lifetime/Recent)   Q1 Wished to be Dead (Past Month) no     Q2 Suicidal Thoughts (Past Month) no     Q3 Suicidal Thought Method no     Q4 Suicidal Intent without Specific Plan no     Q5 Suicide Intent with Specific Plan no     Q6 Suicide Behavior (Lifetime) yes     If yes to Q6, within past 3 months? no     Level of Risk per Screen moderate risk     1. Wish  to be Dead (Past 1 Month)  N N   2. Non-Specific Active Suicidal Thoughts (Past 1 Month)  N N   Calculated C-SSRS Risk Score (Lifetime/Recent)  No Risk Indicated No Risk Indicated        [place] Suicide Severity Rating Scale (Short Version)      2/17/2022     3:02 PM 9/5/2024     7:12 AM   [place] Suicide Severity Rating (Short Version)   Q1 Wished to be Dead (Past Month) no    Q2 Suicidal Thoughts (Past Month) no    Q3 Suicidal Thought Method no    Q4 Suicidal Intent without Specific Plan no    Q5 Suicide Intent with Specific Plan no    Q6 Suicide Behavior (Lifetime) yes    If yes to Q6, within past 3 months? no    Level of Risk per Screen moderate risk    1. Wish to be Dead (Since Last Contact)  N   2. Non-Specific Active Suicidal Thoughts (Since Last Contact)  N   Actual Attempt (Since Last Contact)  N   Has subject engaged in non-suicidal self-injurious behavior? (Since Last Contact)  N   Interrupted Attempts (Since Last Contact)  N   Aborted or Self-Interrupted Attempt (Since Last Contact)  N   Preparatory Acts or Behavior (Since Last Contact)  N   Suicide (Since Last Contact)  N   Calculated C-SSRS Risk Score (Since Last Contact)  No Risk Indicated          ASSESSMENT: Current Emotional / Mental Status (status of significant symptoms):   Risk status (Self / Other harm or suicidal ideation)   Patient denies current fears or concerns for personal safety.   Patient denies current or recent suicidal ideation or behaviors.   Patient denies current or recent homicidal ideation or behaviors.   Patient denies current or recent self injurious behavior or ideation.   Patient denies other safety concerns.   Patient reports there has been no change in risk factors since their last session.     Patient reports there has been no change in protective factors since their last session.     A safety and risk management plan has been developed including: Patient consented to co-developed safety plan.  Safety and risk  management plan was completed - see below.  Patient agreed to use safety plan should any safety concerns arise.  A copy was given to the patient.     Appearance:   Appropriate    Eye Contact:   Good    Psychomotor Behavior: Hyperactive  Restless    Attitude:   Cooperative    Orientation:   All   Speech    Rate / Production: Pressured  Stutters    Volume:  Normal    Mood:    Anxious  Depressed  Anhedonia   Affect:    Constricted  Flat  Worrisome    Thought Content:  Clear    Thought Form:  Coherent    Insight:    Good      Medication Review:   Changes to psychiatric medications, see updated Medication List in EPIC.      Medication Compliance:   Yes     Changes in Health Issues:   None reported     Chemical Use Review:   Substance Use: Chemical use reviewed, no active concerns identified      Tobacco Use: No current tobacco use.      Diagnosis:  1. Autism spectrum disorder    2. JOSHUA (generalized anxiety disorder)    3. Attention deficit hyperactivity disorder (ADHD), other type    4. Mild episode of recurrent major depressive disorder (H24)        Collateral Reports Completed:   Not Applicable    PLAN: (Patient Tasks / Therapist Tasks / Other)  Patient will return for a virtual visit in 3 weeks  Patient will access her safety plan in her chart   Patient will continue using her lockbox to secure items in her home  Patient encouraged to journal and practice self-compassion    There has been demonstrated improvement in functioning while patient has been engaged in psychotherapy/psychological service- if withdrawn the patient would deteriorate and/or relapse.       Fanny Cobb, [facility]                                                         ______________________________________________________________________    Individual Treatment Plan    Patient's Name: Angela Jones  YOB: 1995    Date of Creation: 1/19/2022  Date Treatment Plan Last Reviewed/Revised: 7/12/2024    DSM5 Diagnoses: Autism Spectrum  Disorder 299.00(F84.0)  Associated with another neurodevelopmental, mental or behavioral disorder and Attention-Deficit/Hyperactivity Disorder  314.01 (F90.9) Unspecified Attention -Deficit / Hyperactivity Disorder, 296.31 (F33.0) Major Depressive Disorder, Recurrent Episode, Mild _ or 300.02 (F41.1) Generalized Anxiety Disorder  Psychosocial / Contextual Factors: 29 year old  female, , no children   PROMIS (reviewed every 90 days):   PROMIS 10-Global Health (only subscores and total score):       7/25/2023    12:02 PM 10/24/2023     9:01 AM 11/7/2023    11:56 AM 11/21/2023     8:55 AM 2/27/2024    11:02 AM 4/12/2024    11:00 AM 7/12/2024     8:07 AM   PROMIS-10 Scores Only   Global Mental Health Score 10 11 10    10 11 12 14 14   Global Physical Health Score 11 12 12    12 12 11 12 12   PROMIS TOTAL - SUBSCORES 21 23 22    22 23 23 26 26         Referral / Collaboration:  Was/were discussed and patient will pursue.    Anticipated number of session for this episode of care: 6-9  Anticipation frequency of session: Every other week  Anticipated Duration of each session: 53 or more minutes  Treatment plan will be reviewed in 90 days or when goals have been changed.       MeasurableTreatment Goal(s) related to diagnosis / functional impairment(s)  Goal 1: Patient will manage symptoms of anxiety, as evidenced by a JOSHUA-7 score of 5 or lower     I will know I've met my goal when I feel more confident in my ability to cope with stress.      Objective #A (Patient Action)    Patient will identify the initial signs or symptoms of anxiety.  Status: Continued - Date(s): 7/12/2024    Intervention(s)  Therapist will teach  help patient gain insight into signs of stress (physically and emotionally) .    Objective #B  Patient will use relaxation strategies multiple times per day to reduce the physical symptoms of anxiety.  Status: Continued - Date(s): 7/12/2024    Intervention(s)  Therapist will  teach  diaphragmatic breathing and other grounding skills .    Objective #C  Patient will use thought-stopping strategy daily to reduce intrusive thoughts.  Status: Continued - Date(s): 7/12/2024    Intervention(s)  Therapist will  teach thought stopping techniques .      Goal 2: Patient will manage symptoms of depression, as evidenced by a PHQ-9 score of 10 or lower    I will know I've met my goal when I feel more confident in my ability to express my emotions in a healthy way.      Objective #A (Patient Action)    Status: Continued - Date(s): 7/12/2024    Patient will Increase interest, engagement, and pleasure in doing things.    Intervention(s)  Therapist will teach emotional recognition/identification. * .    Objective #B  Patient will Identify negative self-talk and behaviors: challenge core beliefs, myths, and actions.    Status: Continued - Date(s): 7/12/2024    Intervention(s)  Therapist will  help patient practice positive self-talk and thought re-framing .    Goal 3: Client will manage symptoms of ADHD     I will know I've met my goal when I feel confident in completing tasks.      Objective #A (Client Action)    Client will use daily planner 75% of the time.  Status: Continued - Date(s): 7/12/2024    Intervention(s)  Therapist will  ask client to demonstrate use of planner while in session .    Objective #B  Client will identify and compliment positives at least 2 times daily to support positive self-image.    Status: Continued - Date(s): 7/12/2024    Intervention(s)  Therapist will  ask patient to demonstrate use of affirmations during session .    Goal 4: Client will manage symptoms and experiences associated with ASD     I will know I've met my goal when I feel confident in my communication in relationships.      Objective #A (Client Action)    Client will work on being assertive and setting clear expectations with healthy boundaries in relationships  Status: Continued - Date(s):  "7/12/2024    Intervention(s)  Therapist will  help patient practice assertive communication skills .        Patient has reviewed and agreed to the above plan.      Fanny Cobb, [facility]  July 12, 2024        [facility]                                       Angela Jones     SAFETY PLAN:  Step 1: Warning signs / cues (Thoughts, images, mood, situation, behavior) that a crisis may be developing:  Thoughts: \"I can't do this anymore\" and \"Nothing makes it better\" \"I'm a burden\"   Images: visions of harm: self-harm  Thinking Processes: ruminations (can't stop thinking about my problems):  , racing thoughts, intrusive thoughts (bothersome, unwanted thoughts that come out of nowhere):  , highly critical and negative thoughts:  , and disorganized thinking:    Mood: worsening depression, hopelessness, helplessness, intense worry, and agitation  Behaviors: can't stop crying, not taking care of myself, and not taking care of my responsibilities  Situations: relationship problems and financial stress   Step 2: Coping strategies - Things I can do to take my mind off of my problems without contacting another person (relaxation technique, physical activity):  Distress Tolerance Strategies:  relaxation activities:  , arts and crafts:  , play with my pet , sensory based activities/self-soothe with five senses:  , change body temperature (ice pack/cold water) , and paced breathing/progressive muscle relaxation, grounding exercise of naming objects, weighted items (sock filled with rice), play with puddy, use sensory items   Physical Activities: meditation, deep breathing, and stretching ; taking a shower,   Focus on helpful thoughts:  \"This is temporary\", \"I will get through this\", \"It always passes\", and self-compassion statements:    Step 3: People and social settings that provide distraction:   Name: Zander    Name: Demetri  park and work   Step 4: Remind myself of people and things that are important to me and " worth living for:  my family and pets and friends  Step 5: When I am in crisis, I can ask these people to help me use my safety plan:   Name: Demetri Fermin    Step 6: Making the environment safe:   dispose of old medications , remove things I could use to hurt myself:  , and be around others  Step 7: Professionals or agencies I can contact during a crisis:  Suicide Prevention Lifeline: Call or Text 407   [facility]: [phone]    Call 911 or go to my nearest emergency department.   I helped develop this safety plan and agree to use it when needed.  I have been given a copy of this plan.    Client signature _________________________________________________________________  Today s date:  7/25/2024  Completed by Provider Name/ Credentials:  CHANTAL Blackmon  July 25, 2024  Adapted from Safety Plan Template 2008 Yessi Prakash and Olivier Leal is reprinted with the express permission of the authors.  No portion of the Safety Plan Template may be reproduced without the express, written permission.  You can contact the authors at [email or web address] or [email or web address].

## 2024-09-19 NOTE — PATIENT INSTRUCTIONS
Safer Sex: Care Instructions  Overview  Safer sex is a way to reduce your risk of getting a sexually transmitted infection (STI). It can also help prevent pregnancy.  Several products can help you practice safer sex and reduce your chance of STIs. One of the best is a condom. There are internal and external condoms. You can use a special rubber sheet (dental dam) for protection during oral sex. Disposable gloves can keep your hands from touching blood, semen, or other body fluids that can carry infections.  Remember that birth control methods such as diaphragms, IUDs, foams, and birth control pills do not stop you from getting STIs.  Follow-up care is a key part of your treatment and safety. Be sure to make and go to all appointments, and call your doctor if you are having problems. It's also a good idea to know your test results and keep a list of the medicines you take.  How can you care for yourself at home?  Think about getting vaccinated to help prevent hepatitis A, hepatitis B, and human papillomavirus (HPV). They can be spread through sex.  Use a condom every time you have sex. Use an external condom, which goes on the penis. Or use an internal condom, which goes into the vagina or anus.  Make sure you use the right size external condom. A condom that's too small can break easily. A condom that's too big can slip off during sex.  Use a new condom each time you have sex. Be careful not to poke a hole in the condom when you open the wrapper.  Don't use an internal condom and an external condom at the same time.  Never use petroleum jelly (such as Vaseline), grease, hand lotion, baby oil, or anything with oil in it. These products can make holes in the condom.  After intercourse, hold the edge of the condom as you remove it. This will help keep semen from spilling out of the condom.  Do not have sex with anyone who has symptoms of an STI, such as sores on the genitals or mouth.  Do not drink a lot of alcohol or  "use drugs before sex.  Limit your sex partners. Sex with one partner who has sex only with you can reduce your risk of getting an STI.  Don't share sex toys. But if you do share them, use a condom and clean the sex toys between each use.  Talk to any partners before you have sex. Talk about what you feel comfortable with and whether you have any boundaries with sex. And find out if your partner or partners may be at risk for any STI. Keep in mind that a person may be able to spread an STI even if they do not have symptoms. You and any partners may want to get tested for STIs.  Where can you learn more?  Go to https://www.[employer].net/patiented  Enter B608 in the search box to learn more about \"Safer Sex: Care Instructions.\"  Current as of: November 27, 2023               Content Version: 14.0    4235-6141 [employer].   Care instructions adapted under license by your healthcare professional. If you have questions about a medical condition or this instruction, always ask your healthcare professional. Healthwise, [facility] disclaims any warranty or liability for your use of this information.      "

## 2024-09-19 NOTE — PROGRESS NOTES
,  Your wet prep shows bacterial vaginosis which it looks like you were treated for last month. If you are experiencing any increased vaginal discharge or foul/fishy smelling odor I can certainly send in for another course of antibiotic treatment, otherwise asymptomatic patients with BV do not require treatment.     Francisca Headley, KADEN       Assessment & Plan     Screening examination for venereal disease  Labs pending, will follow-up with results via myChart and send in prescription treatment if indicated for any abnormal results.  Discussed the best way to prevent against STIs is to use protection such as condoms.   - Vaginal-Chlamydia trachomatis/Neisseria gonorrhoeae by PCR  - HIV Antigen Antibody Combo Cascade  - Treponema Abs w Reflex to RPR and Titer  - Vagina-Wet preparation    Patient declined vaccinations today as due per health maintenance.     Jelena Miller is a 29 year old, presenting for the following health issues:  office visit (No issues or symptoms, just wanting full panel of STD/STI tests.)      9/19/2024     8:12 AM   Additional Questions   Roomed by Trudy HERNANDEZ   Accompanied by self         9/19/2024     8:12 AM   Patient Reported Additional Medications   Patient reports taking the following new medications None     History of Present Illness       Reason for visit:  Sti screening    She eats 2-3 servings of fruits and vegetables daily.She consumes 0 sweetened beverage(s) daily.She exercises with enough effort to increase her heart rate 30 to 60 minutes per day.  She exercises with enough effort to increase her heart rate 4 days per week.   She is taking medications regularly.     Sexually active with both male and female partners  No symptoms currently or known exposures  Just wanting routine screening.     Does not use protection.    Has Mirena IUD, does not get breakthrough bleeding.     Review of Systems  Constitutional, HEENT, cardiovascular, pulmonary, gi and gu systems are  "negative, except as otherwise noted.      Objective    /80 (BP Location: Right arm, Patient Position: Sitting, Cuff Size: Adult Regular)   Pulse 83   Temp 98.7  F (37.1  C) (Temporal)   Resp 20   Ht 1.6 m (5' 3\")   Wt 58.7 kg (129 lb 8 oz)   LMP  (LMP Unknown)   SpO2 98%   BMI 22.94 kg/m    Body mass index is 22.94 kg/m .  Physical Exam   GENERAL: alert and no distress  RESP: lungs clear to auscultation - no rales, rhonchi or wheezes  CV: regular rate and rhythm, normal S1 S2, no S3 or S4, no murmur, click or rub  PSYCH: mentation appears normal, affect normal/bright        Signed Electronically by: GISELLE Li CNP    "

## 2024-09-19 NOTE — TELEPHONE ENCOUNTER
Francisca: pended    Why don t you send in a prescription for antibiotics. I do have increased discharge but I didn t think anything of it.     Chely GROVE, BSN, PHN, RN-[facility]  [phone]

## 2024-09-20 LAB
C TRACH DNA SPEC QL PROBE+SIG AMP: NEGATIVE
N GONORRHOEA DNA SPEC QL NAA+PROBE: NEGATIVE

## 2024-10-08 ENCOUNTER — VIRTUAL VISIT (OUTPATIENT)
Dept: BEHAVIORAL HEALTH | Facility: CLINIC | Age: 29
End: 2024-10-08
Payer: COMMERCIAL

## 2024-10-08 DIAGNOSIS — F41.1 GAD (GENERALIZED ANXIETY DISORDER): ICD-10-CM

## 2024-10-08 DIAGNOSIS — F33.0 MILD EPISODE OF RECURRENT MAJOR DEPRESSIVE DISORDER (H): ICD-10-CM

## 2024-10-08 DIAGNOSIS — F84.0 AUTISM SPECTRUM DISORDER: Primary | ICD-10-CM

## 2024-10-08 PROCEDURE — 90834 PSYTX W PT 45 MINUTES: CPT | Mod: 95 | Performed by: SOCIAL WORKER

## 2024-10-08 NOTE — PROGRESS NOTES
[facility]                                     Progress Note    Patient Name: Angela Jones  Date: 10/8/2024         Service Type: Individual      Session Start Time: 11:10am  Session End Time: 11:52am     Session Length: 42 minutes    Session #: 43    Attendees: Client attended alone    Service Modality:  Video Visit:      Provider verified identity through the following two step process.  Patient provided:  Patient is known previously to provider    Telemedicine Visit: The patient's condition can be safely assessed and treated via synchronous audio and visual telemedicine encounter.      Reason for Telemedicine Visit: Patient has requested telehealth visit    Originating Site (Patient Location): Patient's home    Distant Site (Provider Location): Provider Remote Setting- Home Office    Consent:  The patient/guardian has verbally consented to: the potential risks and benefits of telemedicine (video visit) versus in person care; bill my insurance or make self-payment for services provided; and responsibility for payment of non-covered services.     Patient would like the video invitation sent by:  My Chart    Mode of Communication:  Video Conference via [facility]well      Distant Location (Provider):  Off-site    As the provider I attest to compliance with applicable laws and regulations related to telemedicine.    DATA  Interactive Complexity: No  Crisis: No        Progress Since Last Session (Related to Symptoms / Goals / Homework):   Symptoms: No change :  anxiety and depression    Homework: Partially completed      Episode of Care Goals: Minimal progress - PREPARATION (Decided to change - considering how); Intervened by negotiating a change plan and determining options / strategies for behavior change, identifying triggers, exploring social supports, and working towards setting a date to begin behavior change     Current / Ongoing Stressors and Concerns:  Patient provides a status update while  "therapist utilizes reflective listening skills. She shares about going on a roadtrip with her partners to [place] and then they all got COVID when they got home. She was off of work for 5 days. Patient reviews a recent \"melt-down\" and identifies triggers such as plans changing unexpectedly. She did follow her safety plan.   She put in a transfer at work to be at a different location but isn't sure if that will work with her schedule.     Therapist assessed for risk and safety - patient denied any current SI/SIB.  Should there be an increase in symptoms or severity related to SI/SIB, patient should call 911 or go to local ED for evaluation.         Treatment Objective(s) Addressed in This Session:   identify the fears / thoughts that contribute to feeling anxious  state understanding of stressors and relationship to physical symptoms  Increase interest, engagement, and pleasure in doing things  Decrease frequency and intensity of feeling down, depressed, hopeless  Identify negative self-talk and behaviors: challenge core beliefs, myths, and actions  Gain insight     Intervention:   Discussed mindfulness as being aware of what we are experiencing while we are experiencing it.  Contrasted this with avoidance and rumination.  Explored the role of mindfulness as an overall coping strategy for managing depression, anxiety, and strong emotions.  Explained concept of state of mind using SIFT (sensations, images, feelings, thoughts) pneumonic. Discussed mindfulness as a tool to intentionally shift our awareness and focus as needed.  Discussed physical, mental, and emotional boundaries and limit-setting with others. Explored management of boundaries through direct communication and limiting contact and communication with others.  Discussed barriers to using boundary management skills including strong emotions and physical proximity.  Therapist provided unconditional positive regard and supportive counseling.     Assessments " completed prior to visit:   The following assessments were completed by patient for this visit:  PHQ9:       4/12/2024    10:59 AM 6/2/2024    11:40 AM 6/13/2024     8:57 AM 7/25/2024     9:01 AM 8/2/2024    12:42 PM 8/29/2024     8:01 AM 9/12/2024     7:46 AM   PHQ-9 SCORE   PHQ-9 Total Score MyChart 14 (Moderate depression) 12 (Moderate depression) 9 (Mild depression) 13 (Moderate depression) 9 (Mild depression) 18 (Moderately severe depression) 18 (Moderately severe depression)   PHQ-9 Total Score 14    14 12 9 13 9 18 18    18     GAD7:       4/18/2024     1:29 PM 5/6/2024    10:51 AM 7/25/2024     9:02 AM 8/2/2024     1:02 PM 8/15/2024     8:48 AM 8/29/2024     8:02 AM 9/12/2024     7:47 AM   JOSHUA-7 SCORE   Total Score 8 (mild anxiety) 9 (mild anxiety) 7 (mild anxiety) 8 (mild anxiety) 12 (moderate anxiety) 8 (mild anxiety) 9 (mild anxiety)   Total Score 8 9 7 8 12 8 9    9     PROMIS 10-Global Health (only subscores and total score):       10/24/2023     9:01 AM 11/7/2023    11:56 AM 11/21/2023     8:55 AM 2/27/2024    11:02 AM 4/12/2024    11:00 AM 7/12/2024     8:07 AM 8/2/2024     1:03 PM   PROMIS-10 Scores Only   Global Mental Health Score 11 10    10 11 12 14 14 13   Global Physical Health Score 12 12    12 12 11 12 12 12   PROMIS TOTAL - SUBSCORES 23 22    22 23 23 26 26 25     [place] Suicide Severity Rating Scale (Lifetime/Recent)      2/17/2022     3:02 PM 1/9/2024    10:54 AM 7/25/2024     9:01 AM   [place] Suicide Severity Rating (Lifetime/Recent)   Q1 Wished to be Dead (Past Month) no     Q2 Suicidal Thoughts (Past Month) no     Q3 Suicidal Thought Method no     Q4 Suicidal Intent without Specific Plan no     Q5 Suicide Intent with Specific Plan no     Q6 Suicide Behavior (Lifetime) yes     If yes to Q6, within past 3 months? no     Level of Risk per Screen moderate risk     1. Wish to be Dead (Past 1 Month)  N N   2. Non-Specific Active Suicidal Thoughts (Past 1 Month)  N N   Calculated C-SSRS  Risk Score (Lifetime/Recent)  No Risk Indicated No Risk Indicated        [place] Suicide Severity Rating Scale (Short Version)      2/17/2022     3:02 PM 9/5/2024     7:12 AM   [place] Suicide Severity Rating (Short Version)   Q1 Wished to be Dead (Past Month) no    Q2 Suicidal Thoughts (Past Month) no    Q3 Suicidal Thought Method no    Q4 Suicidal Intent without Specific Plan no    Q5 Suicide Intent with Specific Plan no    Q6 Suicide Behavior (Lifetime) yes    If yes to Q6, within past 3 months? no    Level of Risk per Screen moderate risk    1. Wish to be Dead (Since Last Contact)  N   2. Non-Specific Active Suicidal Thoughts (Since Last Contact)  N   Actual Attempt (Since Last Contact)  N   Has subject engaged in non-suicidal self-injurious behavior? (Since Last Contact)  N   Interrupted Attempts (Since Last Contact)  N   Aborted or Self-Interrupted Attempt (Since Last Contact)  N   Preparatory Acts or Behavior (Since Last Contact)  N   Suicide (Since Last Contact)  N   Calculated C-SSRS Risk Score (Since Last Contact)  No Risk Indicated          ASSESSMENT: Current Emotional / Mental Status (status of significant symptoms):   Risk status (Self / Other harm or suicidal ideation)   Patient denies current fears or concerns for personal safety.   Patient denies current or recent suicidal ideation or behaviors.   Patient denies current or recent homicidal ideation or behaviors.   Patient denies current or recent self injurious behavior or ideation.   Patient denies other safety concerns.   Patient reports there has been no change in risk factors since their last session.     Patient reports there has been no change in protective factors since their last session.     A safety and risk management plan has been developed including: Patient consented to co-developed safety plan.  Safety and risk management plan was completed - see below.  Patient agreed to use safety plan should any safety concerns arise.  A copy was  given to the patient.     Appearance:   Appropriate    Eye Contact:   Good    Psychomotor Behavior: Hyperactive  Restless    Attitude:   Cooperative    Orientation:   All   Speech    Rate / Production: Pressured  Stutters    Volume:  Normal    Mood:    Anxious  Depressed  Anhedonia   Affect:    Constricted  Flat  Worrisome    Thought Content:  Clear    Thought Form:  Coherent    Insight:    Good      Medication Review:   No changes to current psychiatric medication(s)     Medication Compliance:   Yes     Changes in Health Issues:   None reported     Chemical Use Review:   Substance Use: Chemical use reviewed, no active concerns identified      Tobacco Use: No current tobacco use.      Diagnosis:  1. Autism spectrum disorder    2. JOSHUA (generalized anxiety disorder)    3. Mild episode of recurrent major depressive disorder (H)        Collateral Reports Completed:   Not Applicable    PLAN: (Patient Tasks / Therapist Tasks / Other)  Patient will return for a virtual visit in 2 weeks  Patient will access her safety plan in her chart   Patient will continue using her lockbox to secure items in her home  Patient encouraged to journal and use daily planner    There has been demonstrated improvement in functioning while patient has been engaged in psychotherapy/psychological service- if withdrawn the patient would deteriorate and/or relapse.       Fanny Cobb, [facility]                                                         ______________________________________________________________________    Individual Treatment Plan    Patient's Name: Angela Jones  YOB: 1995    Date of Creation: 1/19/2022  Date Treatment Plan Last Reviewed/Revised: 7/12/2024    DSM5 Diagnoses: Autism Spectrum Disorder 299.00(F84.0)  Associated with another neurodevelopmental, mental or behavioral disorder and Attention-Deficit/Hyperactivity Disorder  314.01 (F90.9) Unspecified Attention -Deficit / Hyperactivity Disorder, 296.31  (F33.0) Major Depressive Disorder, Recurrent Episode, Mild _ or 300.02 (F41.1) Generalized Anxiety Disorder  Psychosocial / Contextual Factors: 29 year old  female, , no children   PROMIS (reviewed every 90 days):   PROMIS 10-Global Health (only subscores and total score):       7/25/2023    12:02 PM 10/24/2023     9:01 AM 11/7/2023    11:56 AM 11/21/2023     8:55 AM 2/27/2024    11:02 AM 4/12/2024    11:00 AM 7/12/2024     8:07 AM   PROMIS-10 Scores Only   Global Mental Health Score 10 11 10    10 11 12 14 14   Global Physical Health Score 11 12 12    12 12 11 12 12   PROMIS TOTAL - SUBSCORES 21 23 22    22 23 23 26 26         Referral / Collaboration:  Was/were discussed and patient will pursue.    Anticipated number of session for this episode of care: 6-9  Anticipation frequency of session: Every other week  Anticipated Duration of each session: 53 or more minutes  Treatment plan will be reviewed in 90 days or when goals have been changed.       MeasurableTreatment Goal(s) related to diagnosis / functional impairment(s)  Goal 1: Patient will manage symptoms of anxiety, as evidenced by a JOSHUA-7 score of 5 or lower     I will know I've met my goal when I feel more confident in my ability to cope with stress.      Objective #A (Patient Action)    Patient will identify the initial signs or symptoms of anxiety.  Status: Continued - Date(s): 7/12/2024    Intervention(s)  Therapist will teach  help patient gain insight into signs of stress (physically and emotionally) .    Objective #B  Patient will use relaxation strategies multiple times per day to reduce the physical symptoms of anxiety.  Status: Continued - Date(s): 7/12/2024    Intervention(s)  Therapist will  teach diaphragmatic breathing and other grounding skills .    Objective #C  Patient will use thought-stopping strategy daily to reduce intrusive thoughts.  Status: Continued - Date(s): 7/12/2024    Intervention(s)  Therapist will  teach  thought stopping techniques .      Goal 2: Patient will manage symptoms of depression, as evidenced by a PHQ-9 score of 10 or lower    I will know I've met my goal when I feel more confident in my ability to express my emotions in a healthy way.      Objective #A (Patient Action)    Status: Continued - Date(s): 7/12/2024    Patient will Increase interest, engagement, and pleasure in doing things.    Intervention(s)  Therapist will teach emotional recognition/identification. * .    Objective #B  Patient will Identify negative self-talk and behaviors: challenge core beliefs, myths, and actions.    Status: Continued - Date(s): 7/12/2024    Intervention(s)  Therapist will  help patient practice positive self-talk and thought re-framing .    Goal 3: Client will manage symptoms of ADHD     I will know I've met my goal when I feel confident in completing tasks.      Objective #A (Client Action)    Client will use daily planner 75% of the time.  Status: Continued - Date(s): 7/12/2024    Intervention(s)  Therapist will  ask client to demonstrate use of planner while in session .    Objective #B  Client will identify and compliment positives at least 2 times daily to support positive self-image.    Status: Continued - Date(s): 7/12/2024    Intervention(s)  Therapist will  ask patient to demonstrate use of affirmations during session .    Goal 4: Client will manage symptoms and experiences associated with ASD     I will know I've met my goal when I feel confident in my communication in relationships.      Objective #A (Client Action)    Client will work on being assertive and setting clear expectations with healthy boundaries in relationships  Status: Continued - Date(s): 7/12/2024    Intervention(s)  Therapist will  help patient practice assertive communication skills .        Patient has reviewed and agreed to the above plan.      Fanny Cobb, [facility]  July 12, 2024        [facility]                       "                  ROD Jones     SAFETY PLAN:  Step 1: Warning signs / cues (Thoughts, images, mood, situation, behavior) that a crisis may be developing:  Thoughts: \"I can't do this anymore\" and \"Nothing makes it better\" \"I'm a burden\"   Images: visions of harm: self-harm  Thinking Processes: ruminations (can't stop thinking about my problems):  , racing thoughts, intrusive thoughts (bothersome, unwanted thoughts that come out of nowhere):  , highly critical and negative thoughts:  , and disorganized thinking:    Mood: worsening depression, hopelessness, helplessness, intense worry, and agitation  Behaviors: can't stop crying, not taking care of myself, and not taking care of my responsibilities  Situations: relationship problems and financial stress   Step 2: Coping strategies - Things I can do to take my mind off of my problems without contacting another person (relaxation technique, physical activity):  Distress Tolerance Strategies:  relaxation activities:  , arts and crafts:  , play with my pet , sensory based activities/self-soothe with five senses:  , change body temperature (ice pack/cold water) , and paced breathing/progressive muscle relaxation, grounding exercise of naming objects, weighted items (sock filled with rice), play with puddy, use sensory items   Physical Activities: meditation, deep breathing, and stretching ; taking a shower,   Focus on helpful thoughts:  \"This is temporary\", \"I will get through this\", \"It always passes\", and self-compassion statements:    Step 3: People and social settings that provide distraction:   Name: Zander    Name: Demetri  park and work   Step 4: Remind myself of people and things that are important to me and worth living for:  my family and pets and friends  Step 5: When I am in crisis, I can ask these people to help me use my safety plan:   Name: Demetri Fermin    Step 6: Making the environment safe:   dispose of old medications , remove things I could use to hurt " myself:  , and be around others  Step 7: Professionals or agencies I can contact during a crisis:  Suicide Prevention Lifeline: Call or Text 982   [facility]: [phone]    Call 911 or go to my nearest emergency department.   I helped develop this safety plan and agree to use it when needed.  I have been given a copy of this plan.    Client signature _________________________________________________________________  Today s date:  7/25/2024  Completed by Provider Name/ Credentials:  CHANTAL Blackmon  July 25, 2024  Adapted from Safety Plan Template 2008 Yessi Prakash and Olivier Leal is reprinted with the express permission of the authors.  No portion of the Safety Plan Template may be reproduced without the express, written permission.  You can contact the authors at [email or web address] or [email or web address].

## 2024-10-10 ENCOUNTER — MYC REFILL (OUTPATIENT)
Dept: PSYCHIATRY | Facility: CLINIC | Age: 29
End: 2024-10-10
Payer: COMMERCIAL

## 2024-10-10 DIAGNOSIS — F42.2 MIXED OBSESSIONAL THOUGHTS AND ACTS: ICD-10-CM

## 2024-10-11 RX ORDER — CLOMIPRAMINE HYDROCHLORIDE 25 MG/1
25 CAPSULE ORAL AT BEDTIME
Qty: 30 CAPSULE | Refills: 0 | Status: SHIPPED | OUTPATIENT
Start: 2024-10-11 | End: 2024-11-07

## 2024-10-11 NOTE — TELEPHONE ENCOUNTER
Last seen: 9/12/24  RTC: 4 weeks  Cancel: 10/24/24  No-show: none  Next appt: 11/7/24       Medication requested:   Pending Prescriptions:                       Disp   Refills    clomiPRAMINE (ANAFRANIL) 25 MG capsule    30 cap*0            Sig: Take 1 capsule (25 mg) by mouth at bedtime.        From chart note:   Start Clomipramine 25 mg at bedtime      Medication refill approved per refill protocol.

## 2024-10-25 ENCOUNTER — VIRTUAL VISIT (OUTPATIENT)
Dept: BEHAVIORAL HEALTH | Facility: CLINIC | Age: 29
End: 2024-10-25
Payer: COMMERCIAL

## 2024-10-25 DIAGNOSIS — F33.0 MILD EPISODE OF RECURRENT MAJOR DEPRESSIVE DISORDER (H): ICD-10-CM

## 2024-10-25 DIAGNOSIS — F41.1 GAD (GENERALIZED ANXIETY DISORDER): ICD-10-CM

## 2024-10-25 DIAGNOSIS — F84.0 AUTISM SPECTRUM DISORDER: Primary | ICD-10-CM

## 2024-10-25 PROCEDURE — 90785 PSYTX COMPLEX INTERACTIVE: CPT | Mod: 95 | Performed by: SOCIAL WORKER

## 2024-10-25 PROCEDURE — 90834 PSYTX W PT 45 MINUTES: CPT | Mod: 95 | Performed by: SOCIAL WORKER

## 2024-10-25 ASSESSMENT — ANXIETY QUESTIONNAIRES
7. FEELING AFRAID AS IF SOMETHING AWFUL MIGHT HAPPEN: SEVERAL DAYS
4. TROUBLE RELAXING: MORE THAN HALF THE DAYS
1. FEELING NERVOUS, ANXIOUS, OR ON EDGE: MORE THAN HALF THE DAYS
GAD7 TOTAL SCORE: 13
5. BEING SO RESTLESS THAT IT IS HARD TO SIT STILL: SEVERAL DAYS
6. BECOMING EASILY ANNOYED OR IRRITABLE: NEARLY EVERY DAY
IF YOU CHECKED OFF ANY PROBLEMS ON THIS QUESTIONNAIRE, HOW DIFFICULT HAVE THESE PROBLEMS MADE IT FOR YOU TO DO YOUR WORK, TAKE CARE OF THINGS AT HOME, OR GET ALONG WITH OTHER PEOPLE: VERY DIFFICULT
GAD7 TOTAL SCORE: 13
3. WORRYING TOO MUCH ABOUT DIFFERENT THINGS: MORE THAN HALF THE DAYS
GAD7 TOTAL SCORE: 13
7. FEELING AFRAID AS IF SOMETHING AWFUL MIGHT HAPPEN: SEVERAL DAYS
8. IF YOU CHECKED OFF ANY PROBLEMS, HOW DIFFICULT HAVE THESE MADE IT FOR YOU TO DO YOUR WORK, TAKE CARE OF THINGS AT HOME, OR GET ALONG WITH OTHER PEOPLE?: VERY DIFFICULT
2. NOT BEING ABLE TO STOP OR CONTROL WORRYING: MORE THAN HALF THE DAYS

## 2024-10-25 ASSESSMENT — COLUMBIA-SUICIDE SEVERITY RATING SCALE - C-SSRS
REASONS FOR IDEATION SINCE LAST CONTACT: COMPLETELY TO END OR STOP THE PAIN (YOU COULDN'T GO ON LIVING WITH THE PAIN OR HOW YOU WERE FEELING)
6. HAVE YOU EVER DONE ANYTHING, STARTED TO DO ANYTHING, OR PREPARED TO DO ANYTHING TO END YOUR LIFE?: NO
SUICIDE, SINCE LAST CONTACT: NO
TOTAL  NUMBER OF INTERRUPTED ATTEMPTS SINCE LAST CONTACT: NO
5. HAVE YOU STARTED TO WORK OUT OR WORKED OUT THE DETAILS OF HOW TO KILL YOURSELF? DO YOU INTEND TO CARRY OUT THIS PLAN?: NO
TOTAL  NUMBER OF ABORTED OR SELF INTERRUPTED ATTEMPTS SINCE LAST CONTACT: NO
1. SINCE LAST CONTACT, HAVE YOU WISHED YOU WERE DEAD OR WISHED YOU COULD GO TO SLEEP AND NOT WAKE UP?: NO
ATTEMPT SINCE LAST CONTACT: NO
2. HAVE YOU ACTUALLY HAD ANY THOUGHTS OF KILLING YOURSELF?: YES

## 2024-10-25 ASSESSMENT — PATIENT HEALTH QUESTIONNAIRE - PHQ9
10. IF YOU CHECKED OFF ANY PROBLEMS, HOW DIFFICULT HAVE THESE PROBLEMS MADE IT FOR YOU TO DO YOUR WORK, TAKE CARE OF THINGS AT HOME, OR GET ALONG WITH OTHER PEOPLE: VERY DIFFICULT
SUM OF ALL RESPONSES TO PHQ QUESTIONS 1-9: 17
SUM OF ALL RESPONSES TO PHQ QUESTIONS 1-9: 17

## 2024-10-25 NOTE — PROGRESS NOTES
[facility]                                     Progress Note    Patient Name: Angela Jones  Date: 10/25/2024         Service Type: Individual      Session Start Time: 9:10am  Session End Time: 9:50am     Session Length: 40 minutes    Session #: 44    Attendees: Client attended alone    Service Modality:  Video Visit:      Provider verified identity through the following two step process.  Patient provided:  Patient is known previously to provider    Telemedicine Visit: The patient's condition can be safely assessed and treated via synchronous audio and visual telemedicine encounter.      Reason for Telemedicine Visit: Patient has requested telehealth visit    Originating Site (Patient Location): Patient's home    Distant Site (Provider Location): Provider Remote Setting- Home Office    Consent:  The patient/guardian has verbally consented to: the potential risks and benefits of telemedicine (video visit) versus in person care; bill my insurance or make self-payment for services provided; and responsibility for payment of non-covered services.     Patient would like the video invitation sent by:  My Chart    Mode of Communication:  Video Conference via [employer]well      Distant Location (Provider):  Off-site    As the provider I attest to compliance with applicable laws and regulations related to telemedicine.    DATA  Interactive Complexity: Yes, visit entailed Interactive Complexity evidenced by:  -The need to manage maladaptive communication (related to, e.g., high anxiety, high reactivity, repeated questions, or disagreement) among participants that complicates delivery of care; diagnostic criteria for ASD are met and complicate the delivery of services. Patient requires additional support and alternative methods to engage in psychotherapy services.    Crisis: No        Progress Since Last Session (Related to Symptoms / Goals / Homework):   Symptoms: No change :  anxiety and  "depression    Homework: Partially completed      Episode of Care Goals: Minimal progress - PREPARATION (Decided to change - considering how); Intervened by negotiating a change plan and determining options / strategies for behavior change, identifying triggers, exploring social supports, and working towards setting a date to begin behavior change     Current / Ongoing Stressors and Concerns:  Patient provides a status update while therapist utilizes reflective listening skills. Patient expresses concerns about executive functioning and too many steps. For example, she is locking up sharp objects and items but then it's extra steps to retrieve them so she avoids cooking. \"Extra steps means extra energy that I don't have to spare.\" She shares that her memory has been poor and she has difficulty remembering details from the past two weeks.   She reports difficulties managing relationships - \"I don't know how to support my partner if they're not doing well.\" She reports that she often neglects her own self-care when she is around other people. She is pursuing work accommodations.     Therapist assessed for risk and safety - patient identifies intrusive thoughts about self-harming. \"It's not like I want to, it's that I could do this\" and it's in images.  She has a lockbox and contracts for safety. Should there be an increase in symptoms or severity related to SI/SIB, patient should call 911 or go to local ED for evaluation.         Treatment Objective(s) Addressed in This Session:   identify the fears / thoughts that contribute to feeling anxious  state understanding of stressors and relationship to physical symptoms  Increase interest, engagement, and pleasure in doing things  Decrease frequency and intensity of feeling down, depressed, hopeless  Identify negative self-talk and behaviors: challenge core beliefs, myths, and actions  Gain insight     Intervention:   Discussed mindfulness as being aware of what we are " experiencing while we are experiencing it.  Contrasted this with avoidance and rumination.  Explored the role of mindfulness as an overall coping strategy for managing depression, anxiety, and strong emotions.  Explained concept of state of mind using SIFT (sensations, images, feelings, thoughts) pneumonic. Discussed mindfulness as a tool to intentionally shift our awareness and focus as needed.  Discussed physical, mental, and emotional boundaries and limit-setting with others. Explored management of boundaries through direct communication and limiting contact and communication with others.  Discussed barriers to using boundary management skills including strong emotions and physical proximity.  Therapist provided unconditional positive regard and supportive counseling.     Assessments completed prior to visit:   The following assessments were completed by patient for this visit:  PHQ9:       6/2/2024    11:40 AM 6/13/2024     8:57 AM 7/25/2024     9:01 AM 8/2/2024    12:42 PM 8/29/2024     8:01 AM 9/12/2024     7:46 AM 10/25/2024     9:07 AM   PHQ-9 SCORE   PHQ-9 Total Score MyChart 12 (Moderate depression) 9 (Mild depression) 13 (Moderate depression) 9 (Mild depression) 18 (Moderately severe depression) 18 (Moderately severe depression) 17 (Moderately severe depression)   PHQ-9 Total Score 12 9 13 9 18 18    18 17        Patient-reported    Multiple values from one day are sorted in reverse-chronological order     GAD7:       5/6/2024    10:51 AM 7/25/2024     9:02 AM 8/2/2024     1:02 PM 8/15/2024     8:48 AM 8/29/2024     8:02 AM 9/12/2024     7:47 AM 10/25/2024     9:07 AM   JOSHUA-7 SCORE   Total Score 9 (mild anxiety) 7 (mild anxiety) 8 (mild anxiety) 12 (moderate anxiety) 8 (mild anxiety) 9 (mild anxiety) 13 (moderate anxiety)   Total Score 9 7 8 12 8 9    9 13        Patient-reported    Multiple values from one day are sorted in reverse-chronological order     PROMIS 10-Global Health (only subscores and total  score):       10/24/2023     9:01 AM 11/7/2023    11:56 AM 11/21/2023     8:55 AM 2/27/2024    11:02 AM 4/12/2024    11:00 AM 7/12/2024     8:07 AM 8/2/2024     1:03 PM   PROMIS-10 Scores Only   Global Mental Health Score 11 10    10 11 12 14 14 13   Global Physical Health Score 12 12    12 12 11 12 12 12   PROMIS TOTAL - SUBSCORES 23 22    22 23 23 26 26 25      [place] Suicide Severity Rating Scale (Short Version)      2/17/2022     3:02 PM 9/5/2024     7:12 AM 10/25/2024     9:23 AM   [place] Suicide Severity Rating (Short Version)   Q1 Wished to be Dead (Past Month) no     Q2 Suicidal Thoughts (Past Month) no     Q3 Suicidal Thought Method no     Q4 Suicidal Intent without Specific Plan no     Q5 Suicide Intent with Specific Plan no     Q6 Suicide Behavior (Lifetime) yes     If yes to Q6, within past 3 months? no     Level of Risk per Screen moderate risk     1. Wish to be Dead (Since Last Contact)  N N   2. Non-Specific Active Suicidal Thoughts (Since Last Contact)  N Y   Non-Specific Active Suicidal Thought Description (Since Last Contact)   images about items to use for self-harm   3. Active Suicidal Ideation with any Methods (Not Plan) Without Intent to Act (Since Last Contact)   N   4. Active Suicidal Ideation with Some Intent to Act, Without Specific Plan (Since Last Contact)   N   5. Active Suicidal Ideation with Specific Plan and Intent (Since Last Contact)   N   Most Severe Ideation Rating (Since Last Contact)   1   Frequency (Since Last Contact)   3   Duration (Since Last Contact)   3   Deterrents (Since Last Contact)   1   Reasons for Ideation (Since Last Contact)   5   Actual Attempt (Since Last Contact)  N N   Has subject engaged in non-suicidal self-injurious behavior? (Since Last Contact)  N N   Interrupted Attempts (Since Last Contact)  N N   Aborted or Self-Interrupted Attempt (Since Last Contact)  N N   Preparatory Acts or Behavior (Since Last Contact)  N N   Suicide (Since Last Contact)  N N    Calculated C-SSRS Risk Score (Since Last Contact)  No Risk Indicated Low Risk          ASSESSMENT: Current Emotional / Mental Status (status of significant symptoms):   Risk status (Self / Other harm or suicidal ideation)   Patient denies current fears or concerns for personal safety.   Patient denies current or recent suicidal ideation or behaviors.   Patient denies current or recent homicidal ideation or behaviors.   Patient reports current or recent self injurious behavior or ideation including images of self-harm using household items- no plan or intent.   Patient denies other safety concerns.   Patient reports there has been no change in risk factors since their last session.     Patient reports there has been no change in protective factors since their last session.     A safety and risk management plan has been developed including: Patient consented to co-developed safety plan.  Safety and risk management plan was completed - see below.  Patient agreed to use safety plan should any safety concerns arise.  A copy was given to the patient.     Appearance:   Appropriate    Eye Contact:   Good    Psychomotor Behavior: Hyperactive  Restless    Attitude:   Cooperative    Orientation:   All   Speech    Rate / Production: Pressured  Stutters    Volume:  Normal    Mood:    Anxious  Depressed  Anhedonia   Affect:    Constricted  Flat  Worrisome    Thought Content:  Clear    Thought Form:  Coherent    Insight:    Good      Medication Review:   No changes to current psychiatric medication(s)     Medication Compliance:   Yes     Changes in Health Issues:   None reported     Chemical Use Review:   Substance Use: Chemical use reviewed, no active concerns identified      Tobacco Use: No current tobacco use.      Diagnosis:  1. Autism spectrum disorder    2. JOSHUA (generalized anxiety disorder)    3. Mild episode of recurrent major depressive disorder (H)        Collateral Reports Completed:   Not Applicable    PLAN: (Patient  Tasks / Therapist Tasks / Other)  Patient will return for a virtual visit in 2 weeks  Patient will utilize her safety plan   Patient encouraged to journal and use daily planner  Patient encouraged to prioritize self-care     There has been demonstrated improvement in functioning while patient has been engaged in psychotherapy/psychological service- if withdrawn the patient would deteriorate and/or relapse.       Fanny Cobb, LICSW                                                         ______________________________________________________________________    Individual Treatment Plan    Patient's Name: Angela Jones  YOB: 1995    Date of Creation: 1/19/2022  Date Treatment Plan Last Reviewed/Revised: 7/12/2024    DSM5 Diagnoses: Autism Spectrum Disorder 299.00(F84.0)  Associated with another neurodevelopmental, mental or behavioral disorder and Attention-Deficit/Hyperactivity Disorder  314.01 (F90.9) Unspecified Attention -Deficit / Hyperactivity Disorder, 296.31 (F33.0) Major Depressive Disorder, Recurrent Episode, Mild _ or 300.02 (F41.1) Generalized Anxiety Disorder  Psychosocial / Contextual Factors: 29 year old  female, , no children   PROMIS (reviewed every 90 days):   PROMIS 10-Global Health (only subscores and total score):       7/25/2023    12:02 PM 10/24/2023     9:01 AM 11/7/2023    11:56 AM 11/21/2023     8:55 AM 2/27/2024    11:02 AM 4/12/2024    11:00 AM 7/12/2024     8:07 AM   PROMIS-10 Scores Only   Global Mental Health Score 10 11 10    10 11 12 14 14   Global Physical Health Score 11 12 12    12 12 11 12 12   PROMIS TOTAL - SUBSCORES 21 23 22    22 23 23 26 26         Referral / Collaboration:  Was/were discussed and patient will pursue.    Anticipated number of session for this episode of care: 6-9  Anticipation frequency of session: Every other week  Anticipated Duration of each session: 53 or more minutes  Treatment plan will be reviewed in 90 days or  when goals have been changed.       MeasurableTreatment Goal(s) related to diagnosis / functional impairment(s)  Goal 1: Patient will manage symptoms of anxiety, as evidenced by a JOSHUA-7 score of 5 or lower     I will know I've met my goal when I feel more confident in my ability to cope with stress.      Objective #A (Patient Action)    Patient will identify the initial signs or symptoms of anxiety.  Status: Continued - Date(s): 7/12/2024    Intervention(s)  Therapist will teach  help patient gain insight into signs of stress (physically and emotionally) .    Objective #B  Patient will use relaxation strategies multiple times per day to reduce the physical symptoms of anxiety.  Status: Continued - Date(s): 7/12/2024    Intervention(s)  Therapist will  teach diaphragmatic breathing and other grounding skills .    Objective #C  Patient will use thought-stopping strategy daily to reduce intrusive thoughts.  Status: Continued - Date(s): 7/12/2024    Intervention(s)  Therapist will  teach thought stopping techniques .      Goal 2: Patient will manage symptoms of depression, as evidenced by a PHQ-9 score of 10 or lower    I will know I've met my goal when I feel more confident in my ability to express my emotions in a healthy way.      Objective #A (Patient Action)    Status: Continued - Date(s): 7/12/2024    Patient will Increase interest, engagement, and pleasure in doing things.    Intervention(s)  Therapist will teach emotional recognition/identification. * .    Objective #B  Patient will Identify negative self-talk and behaviors: challenge core beliefs, myths, and actions.    Status: Continued - Date(s): 7/12/2024    Intervention(s)  Therapist will  help patient practice positive self-talk and thought re-framing .    Goal 3: Client will manage symptoms of ADHD     I will know I've met my goal when I feel confident in completing tasks.      Objective #A (Client Action)    Client will use daily planner 75% of the  "time.  Status: Continued - Date(s): 7/12/2024    Intervention(s)  Therapist will  ask client to demonstrate use of planner while in session .    Objective #B  Client will identify and compliment positives at least 2 times daily to support positive self-image.    Status: Continued - Date(s): 7/12/2024    Intervention(s)  Therapist will  ask patient to demonstrate use of affirmations during session .    Goal 4: Client will manage symptoms and experiences associated with ASD     I will know I've met my goal when I feel confident in my communication in relationships.      Objective #A (Client Action)    Client will work on being assertive and setting clear expectations with healthy boundaries in relationships  Status: Continued - Date(s): 7/12/2024    Intervention(s)  Therapist will  help patient practice assertive communication skills .        Patient has reviewed and agreed to the above plan.      Fanny Cobb, [facility]  July 12, 2024        [facility]                                       Angela Jones     SAFETY PLAN:  Step 1: Warning signs / cues (Thoughts, images, mood, situation, behavior) that a crisis may be developing:  Thoughts: \"I can't do this anymore\" and \"Nothing makes it better\" \"I'm a burden\"   Images: visions of harm: self-harm  Thinking Processes: ruminations (can't stop thinking about my problems):  , racing thoughts, intrusive thoughts (bothersome, unwanted thoughts that come out of nowhere):  , highly critical and negative thoughts:  , and disorganized thinking:    Mood: worsening depression, hopelessness, helplessness, intense worry, and agitation  Behaviors: can't stop crying, not taking care of myself, and not taking care of my responsibilities  Situations: relationship problems and financial stress   Step 2: Coping strategies - Things I can do to take my mind off of my problems without contacting another person (relaxation technique, physical activity):  Distress Tolerance " "Strategies:  relaxation activities:  , arts and crafts:  , play with my pet , sensory based activities/self-soothe with five senses:  , change body temperature (ice pack/cold water) , and paced breathing/progressive muscle relaxation, grounding exercise of naming objects, weighted items (sock filled with rice), play with puddy, use sensory items   Physical Activities: meditation, deep breathing, and stretching ; taking a shower,   Focus on helpful thoughts:  \"This is temporary\", \"I will get through this\", \"It always passes\", and self-compassion statements:    Step 3: People and social settings that provide distraction:   Name: Zander    Name: Demetri  park and work   Step 4: Remind myself of people and things that are important to me and worth living for:  my family and pets and friends  Step 5: When I am in crisis, I can ask these people to help me use my safety plan:   Name: Demetri Fermin    Step 6: Making the environment safe:   dispose of old medications , remove things I could use to hurt myself:  , and be around others  Step 7: Professionals or agencies I can contact during a crisis:  Suicide Prevention Lifeline: Call or Text 98   [facility] Services: [phone]    Call 911 or go to my nearest emergency department.   I helped develop this safety plan and agree to use it when needed.  I have been given a copy of this plan.    Client signature _________________________________________________________________  Today s date:  7/25/2024  Completed by Provider Name/ Credentials:  CHANTAL Blackmon  July 25, 2024  Adapted from Safety Plan Template 2008 Yessi Prakash and Olivier Leal is reprinted with the express permission of the authors.  No portion of the Safety Plan Template may be reproduced without the express, written permission.  You can contact the authors at [email or web address] or [email or web address].        "

## 2024-11-05 ENCOUNTER — VIRTUAL VISIT (OUTPATIENT)
Dept: BEHAVIORAL HEALTH | Facility: CLINIC | Age: 29
End: 2024-11-05
Payer: COMMERCIAL

## 2024-11-05 DIAGNOSIS — F41.1 GAD (GENERALIZED ANXIETY DISORDER): ICD-10-CM

## 2024-11-05 DIAGNOSIS — F84.0 AUTISM SPECTRUM DISORDER: Primary | ICD-10-CM

## 2024-11-05 DIAGNOSIS — F33.0 MILD EPISODE OF RECURRENT MAJOR DEPRESSIVE DISORDER (H): ICD-10-CM

## 2024-11-05 PROCEDURE — 90785 PSYTX COMPLEX INTERACTIVE: CPT | Mod: 95 | Performed by: SOCIAL WORKER

## 2024-11-05 PROCEDURE — 90837 PSYTX W PT 60 MINUTES: CPT | Mod: 95 | Performed by: SOCIAL WORKER

## 2024-11-05 ASSESSMENT — PATIENT HEALTH QUESTIONNAIRE - PHQ9
SUM OF ALL RESPONSES TO PHQ QUESTIONS 1-9: 19
10. IF YOU CHECKED OFF ANY PROBLEMS, HOW DIFFICULT HAVE THESE PROBLEMS MADE IT FOR YOU TO DO YOUR WORK, TAKE CARE OF THINGS AT HOME, OR GET ALONG WITH OTHER PEOPLE: VERY DIFFICULT
SUM OF ALL RESPONSES TO PHQ QUESTIONS 1-9: 19

## 2024-11-05 ASSESSMENT — COLUMBIA-SUICIDE SEVERITY RATING SCALE - C-SSRS
TOTAL  NUMBER OF INTERRUPTED ATTEMPTS SINCE LAST CONTACT: NO
6. HAVE YOU EVER DONE ANYTHING, STARTED TO DO ANYTHING, OR PREPARED TO DO ANYTHING TO END YOUR LIFE?: NO
SUICIDE, SINCE LAST CONTACT: NO
ATTEMPT SINCE LAST CONTACT: NO
2. HAVE YOU ACTUALLY HAD ANY THOUGHTS OF KILLING YOURSELF?: NO
1. SINCE LAST CONTACT, HAVE YOU WISHED YOU WERE DEAD OR WISHED YOU COULD GO TO SLEEP AND NOT WAKE UP?: NO
TOTAL  NUMBER OF ABORTED OR SELF INTERRUPTED ATTEMPTS SINCE LAST CONTACT: NO
REASONS FOR IDEATION SINCE LAST CONTACT: DOES NOT APPLY
5. HAVE YOU STARTED TO WORK OUT OR WORKED OUT THE DETAILS OF HOW TO KILL YOURSELF? DO YOU INTEND TO CARRY OUT THIS PLAN?: NO

## 2024-11-05 NOTE — PROGRESS NOTES
[facility]                                     Progress Note    Patient Name: Angela Jones  Date: 11/5/2024         Service Type: Individual      Session Start Time: 9:00am  Session End Time: 9:53am     Session Length: 53 minutes    Session #: 45    Attendees: Client attended alone    Service Modality:  Video Visit:      Provider verified identity through the following two step process.  Patient provided:  Patient is known previously to provider    Telemedicine Visit: The patient's condition can be safely assessed and treated via synchronous audio and visual telemedicine encounter.      Reason for Telemedicine Visit: Patient has requested telehealth visit    Originating Site (Patient Location): Patient's home    Distant Site (Provider Location): Provider Remote Setting- Home Office    Consent:  The patient/guardian has verbally consented to: the potential risks and benefits of telemedicine (video visit) versus in person care; bill my insurance or make self-payment for services provided; and responsibility for payment of non-covered services.     Patient would like the video invitation sent by:  My Chart    Mode of Communication:  Video Conference via [facility]well      Distant Location (Provider):  Off-site    As the provider I attest to compliance with applicable laws and regulations related to telemedicine.    DATA  Extended Session (53+ minutes): PROLONGED SERVICE IN THE OUTPATIENT SETTING REQUIRING DIRECT (FACE-TO-FACE) PATIENT CONTACT BEYOND THE USUAL SERVICE:    - Treatment protocol required additional time to complete, due to the nature of diagnosis being treated.  See Interventions section for details  Interactive Complexity: Yes, visit entailed Interactive Complexity evidenced by:  -The need to manage maladaptive communication (related to, e.g., high anxiety, high reactivity, repeated questions, or disagreement) among participants that complicates delivery of care; diagnostic criteria for  "ASD are met and complicate the delivery of services. Patient requires additional support and alternative methods to engage in psychotherapy services.    Crisis: No        Progress Since Last Session (Related to Symptoms / Goals / Homework):   Symptoms: No change :  anxiety and depression    Homework: Partially completed      Episode of Care Goals: Minimal progress - PREPARATION (Decided to change - considering how); Intervened by negotiating a change plan and determining options / strategies for behavior change, identifying triggers, exploring social supports, and working towards setting a date to begin behavior change     Current / Ongoing Stressors and Concerns:  Patient provides a status update while therapist utilizes reflective listening skills. Patient shares that it's been a really hard week. She was sick (flu) last week and still had to work. She shares that her partners are not doing well including thoughts of suicide. She shares about dissociative episodes when trying to help her partners. She is having flashbacks of memories when friends in the past were suicidal. She reports that she is exhausted (physically and mentally). She is having intrusive thoughts. \"I can't have any sharp objects in eyesight.\" Patient is having difficulty talking to her partners about how she feels, \"I'm afraid I'll mess things up or that things will get worse.\"     Therapist assessed for risk and safety - patient identifies intrusive thoughts about self-harming. \"It's not like I want to, it's that I could do this\" and it's in images.  She has a lockbox and contracts for safety. She reviews her safety plan and identifies opportunities for increased self-care. Should there be an increase in symptoms or severity related to SI/SIB, patient should call 911 or go to local ED for evaluation.         Treatment Objective(s) Addressed in This Session:   identify the fears / thoughts that contribute to feeling anxious  state understanding " of stressors and relationship to physical symptoms  Increase interest, engagement, and pleasure in doing things  Decrease frequency and intensity of feeling down, depressed, hopeless  Identify negative self-talk and behaviors: challenge core beliefs, myths, and actions  Gain insight     Intervention:   Discussed mindfulness as being aware of what we are experiencing while we are experiencing it.  Contrasted this with avoidance and rumination.  Explored the role of mindfulness as an overall coping strategy for managing depression, anxiety, and strong emotions.  Explained concept of state of mind using SIFT (sensations, images, feelings, thoughts) pneumonic. Discussed mindfulness as a tool to intentionally shift our awareness and focus as needed.  Discussed physical, mental, and emotional boundaries and limit-setting with others. Explored management of boundaries through direct communication and limiting contact and communication with others.  Discussed barriers to using boundary management skills including strong emotions and physical proximity.  Therapist provided unconditional positive regard and supportive counseling.     Assessments completed prior to visit:   The following assessments were completed by patient for this visit:  PHQ9:       6/13/2024     8:57 AM 7/25/2024     9:01 AM 8/2/2024    12:42 PM 8/29/2024     8:01 AM 9/12/2024     7:46 AM 10/25/2024     9:07 AM 11/5/2024     8:30 AM   PHQ-9 SCORE   PHQ-9 Total Score MyChart 9 (Mild depression) 13 (Moderate depression) 9 (Mild depression) 18 (Moderately severe depression) 18 (Moderately severe depression) 17 (Moderately severe depression) 19 (Moderately severe depression)   PHQ-9 Total Score 9 13 9 18 18    18 17  19        Patient-reported    Multiple values from one day are sorted in reverse-chronological order     GAD7:       5/6/2024    10:51 AM 7/25/2024     9:02 AM 8/2/2024     1:02 PM 8/15/2024     8:48 AM 8/29/2024     8:02 AM 9/12/2024     7:47 AM  10/25/2024     9:07 AM   JOSHUA-7 SCORE   Total Score 9 (mild anxiety) 7 (mild anxiety) 8 (mild anxiety) 12 (moderate anxiety) 8 (mild anxiety) 9 (mild anxiety) 13 (moderate anxiety)   Total Score 9 7 8 12 8 9    9 13        Patient-reported    Multiple values from one day are sorted in reverse-chronological order     PROMIS 10-Global Health (only subscores and total score):       11/7/2023    11:56 AM 11/21/2023     8:55 AM 2/27/2024    11:02 AM 4/12/2024    11:00 AM 7/12/2024     8:07 AM 8/2/2024     1:03 PM 11/5/2024     8:32 AM   PROMIS-10 Scores Only   Global Mental Health Score 10    10 11 12 14 14 13 9    Global Physical Health Score 12    12 12 11 12 12 12 12    PROMIS TOTAL - SUBSCORES 22    22 23 23 26 26 25 21        Patient-reported    Multiple values from one day are sorted in reverse-chronological order      Steele Suicide Severity Rating Scale (Short Version)      2/17/2022     3:02 PM 9/5/2024     7:12 AM 10/25/2024     9:23 AM 11/5/2024     9:16 AM   Steele Suicide Severity Rating (Short Version)   Q1 Wished to be Dead (Past Month) no      Q2 Suicidal Thoughts (Past Month) no      Q3 Suicidal Thought Method no      Q4 Suicidal Intent without Specific Plan no      Q5 Suicide Intent with Specific Plan no      Q6 Suicide Behavior (Lifetime) yes      If yes to Q6, within past 3 months? no      Level of Risk per Screen moderate risk      1. Wish to be Dead (Since Last Contact)  N N N   2. Non-Specific Active Suicidal Thoughts (Since Last Contact)  N Y N   Non-Specific Active Suicidal Thought Description (Since Last Contact)   images about items to use for self-harm    3. Active Suicidal Ideation with any Methods (Not Plan) Without Intent to Act (Since Last Contact)   N N   4. Active Suicidal Ideation with Some Intent to Act, Without Specific Plan (Since Last Contact)   N N   5. Active Suicidal Ideation with Specific Plan and Intent (Since Last Contact)   N N   Most Severe Ideation Rating (Since Last  Contact)   1 1   Frequency (Since Last Contact)   3 3   Duration (Since Last Contact)   3 2   Deterrents (Since Last Contact)   1 1   Reasons for Ideation (Since Last Contact)   5 0   Actual Attempt (Since Last Contact)  N N N   Has subject engaged in non-suicidal self-injurious behavior? (Since Last Contact)  N N N   Interrupted Attempts (Since Last Contact)  N N N   Aborted or Self-Interrupted Attempt (Since Last Contact)  N N N   Preparatory Acts or Behavior (Since Last Contact)  N N N   Suicide (Since Last Contact)  N N N   Calculated C-SSRS Risk Score (Since Last Contact)  No Risk Indicated Low Risk No Risk Indicated          ASSESSMENT: Current Emotional / Mental Status (status of significant symptoms):   Risk status (Self / Other harm or suicidal ideation)   Patient denies current fears or concerns for personal safety.   Patient denies current or recent suicidal ideation or behaviors.   Patient denies current or recent homicidal ideation or behaviors.   Patient reports current or recent self injurious behavior or ideation including images of self-harm using household items- no plan or intent.   Patient denies other safety concerns.   Patient reports there has been no change in risk factors since their last session.     Patient reports there has been no change in protective factors since their last session.     A safety and risk management plan has been developed including: Patient consented to co-developed safety plan.  Safety and risk management plan was completed - see below.  Patient agreed to use safety plan should any safety concerns arise.  A copy was given to the patient.     Appearance:   Appropriate    Eye Contact:   Good    Psychomotor Behavior: Hyperactive  Restless    Attitude:   Cooperative    Orientation:   All   Speech    Rate / Production: Pressured  Stutters    Volume:  Normal    Mood:    Anxious  Depressed  Anhedonia   Affect:    Constricted  Flat  Worrisome    Thought Content:  Clear     Thought Form:  Coherent    Insight:    Good      Medication Review:   No changes to current psychiatric medication(s)     Medication Compliance:   Yes     Changes in Health Issues:   None reported     Chemical Use Review:   Substance Use: Chemical use reviewed, no active concerns identified      Tobacco Use: No current tobacco use.      Diagnosis:  1. Autism spectrum disorder    2. JOSHUA (generalized anxiety disorder)    3. Mild episode of recurrent major depressive disorder (H)    ADHD    Collateral Reports Completed:   Not Applicable    PLAN: (Patient Tasks / Therapist Tasks / Other)  Patient will return for a virtual visit in 2 weeks  Patient encouraged to practice healthy boundaries  Patient will utilize her safety plan   Patient encouraged to journal and use daily planner  Patient encouraged to prioritize self-care     Action Plan:  Talk to partner (A), ask for support to create a resource list and plan for their partner, and the initiate conversation with other partner (M)    There has been demonstrated improvement in functioning while patient has been engaged in psychotherapy/psychological service- if withdrawn the patient would deteriorate and/or relapse.       Fanny Cobb, [facility]                                                         ______________________________________________________________________    Individual Treatment Plan    Patient's Name: Angela Jones  YOB: 1995    Date of Creation: 1/19/2022  Date Treatment Plan Last Reviewed/Revised: 11/5/2024    DSM5 Diagnoses: Autism Spectrum Disorder 299.00(F84.0)  Associated with another neurodevelopmental, mental or behavioral disorder and Attention-Deficit/Hyperactivity Disorder  314.01 (F90.9) Unspecified Attention -Deficit / Hyperactivity Disorder, 296.31 (F33.0) Major Depressive Disorder, Recurrent Episode, Mild _ or 300.02 (F41.1) Generalized Anxiety Disorder  Psychosocial / Contextual Factors: 29 year old  female,  , no children   PROMIS (reviewed every 90 days):   PROMIS 10-Global Health (only subscores and total score):       11/7/2023    11:56 AM 11/21/2023     8:55 AM 2/27/2024    11:02 AM 4/12/2024    11:00 AM 7/12/2024     8:07 AM 8/2/2024     1:03 PM 11/5/2024     8:32 AM   PROMIS-10 Scores Only   Global Mental Health Score 10    10 11 12 14 14 13 9    Global Physical Health Score 12    12 12 11 12 12 12 12    PROMIS TOTAL - SUBSCORES 22    22 23 23 26 26 25 21          Referral / Collaboration:  Referral to another professional/service is not indicated at this time..    Anticipated number of session for this episode of care: 6-9  Anticipation frequency of session: Every other week  Anticipated Duration of each session: 53 or more minutes  Treatment plan will be reviewed in 90 days or when goals have been changed.       MeasurableTreatment Goal(s) related to diagnosis / functional impairment(s)  Goal 1: Patient will manage symptoms of anxiety, as evidenced by a JOSHUA-7 score of 5 or lower     I will know I've met my goal when I feel more confident in my ability to cope with stress.      Objective #A (Patient Action)    Patient will identify the initial signs or symptoms of anxiety.  Status: Continued - Date(s): 11/5/2024    Intervention(s)  Therapist will teach  help patient gain insight into signs of stress (physically and emotionally) .    Objective #B  Patient will use relaxation strategies multiple times per day to reduce the physical symptoms of anxiety.  Status: Continued - Date(s): 11/5/2024    Intervention(s)  Therapist will  teach diaphragmatic breathing and other grounding skills .    Objective #C  Patient will use thought-stopping strategy daily to reduce intrusive thoughts.  Status: Continued - Date(s): 11/5/2024    Intervention(s)  Therapist will  teach thought stopping techniques .      Goal 2: Patient will manage symptoms of depression, as evidenced by a PHQ-9 score of 10 or lower    I will know  I've met my goal when I feel more confident in my ability to express my emotions in a healthy way.      Objective #A (Patient Action)    Status: Continued - Date(s): 11/5/2024    Patient will Increase interest, engagement, and pleasure in doing things.    Intervention(s)  Therapist will teach emotional recognition/identification. * .    Objective #B  Patient will Identify negative self-talk and behaviors: challenge core beliefs, myths, and actions.    Status: Continued - Date(s): 11/5/2024    Intervention(s)  Therapist will  help patient practice positive self-talk and thought re-framing .    Goal 3: Client will manage symptoms of ADHD     I will know I've met my goal when I feel confident in completing tasks.      Objective #A (Client Action)    Client will use daily planner 75% of the time.  Status: Continued - Date(s): 11/5/2024    Intervention(s)  Therapist will  ask client to demonstrate use of planner while in session .    Objective #B  Client will identify and compliment positives at least 2 times daily to support positive self-image.    Status: Continued - Date(s): 11/5/2024    Intervention(s)  Therapist will  ask patient to demonstrate use of affirmations during session .    Goal 4: Client will manage symptoms and experiences associated with ASD     I will know I've met my goal when I feel confident in my communication in relationships.      Objective #A (Client Action)    Client will work on being assertive and setting clear expectations with healthy boundaries in relationships  Status: Continued - Date(s): 11/5/2024    Intervention(s)  Therapist will  help patient practice assertive communication skills .        Patient has reviewed and agreed to the above plan.      Fanny Cobb, [facility]  November 5, 2024        [facility]                                       Angela Jones     SAFETY PLAN:  Step 1: Warning signs / cues (Thoughts, images, mood, situation, behavior) that a crisis  "may be developing:  Thoughts: \"I can't do this anymore\" and \"Nothing makes it better\" \"I'm a burden\"   Images: visions of harm: self-harm  Thinking Processes: ruminations (can't stop thinking about my problems):  , racing thoughts, intrusive thoughts (bothersome, unwanted thoughts that come out of nowhere):  , highly critical and negative thoughts:  , and disorganized thinking:    Mood: worsening depression, hopelessness, helplessness, intense worry, and agitation  Behaviors: can't stop crying, not taking care of myself, and not taking care of my responsibilities  Situations: relationship problems and financial stress   Step 2: Coping strategies - Things I can do to take my mind off of my problems without contacting another person (relaxation technique, physical activity):  Distress Tolerance Strategies:  relaxation activities:  , arts and crafts:  , play with my pet , sensory based activities/self-soothe with five senses:  , change body temperature (ice pack/cold water) , and paced breathing/progressive muscle relaxation, grounding exercise of naming objects, weighted items (sock filled with rice), play with puddy, use sensory items   Physical Activities: meditation, deep breathing, and stretching ; taking a shower,   Focus on helpful thoughts:  \"This is temporary\", \"I will get through this\", \"It always passes\", and self-compassion statements:    Step 3: People and social settings that provide distraction:   Name: Zander    Name: Demetri  park and work   Step 4: Remind myself of people and things that are important to me and worth living for:  my family and pets and friends  Step 5: When I am in crisis, I can ask these people to help me use my safety plan:   Name: Demetri Fermin    Step 6: Making the environment safe:   dispose of old medications , remove things I could use to hurt myself:  , and be around others  Step 7: Professionals or agencies I can contact during a crisis:  Suicide Prevention Lifeline: Call or Text " 988   [facility]: 9990494982    Call 911 or go to my nearest emergency department.   I helped develop this safety plan and agree to use it when needed.  I have been given a copy of this plan.    Client signature _________________________________________________________________  Today s date:  7/25/2024  Completed by Provider Name/ Credentials:  CHANTAL Blackmon  July 25, 2024  Adapted from Safety Plan Template 2008 Yessi Prakash and Olivier Leal is reprinted with the express permission of the authors.  No portion of the Safety Plan Template may be reproduced without the express, written permission.  You can contact the authors at [email or web address] or [email or web address].

## 2024-11-07 ENCOUNTER — VIRTUAL VISIT (OUTPATIENT)
Dept: PSYCHIATRY | Facility: CLINIC | Age: 29
End: 2024-11-07
Attending: PSYCHIATRY & NEUROLOGY
Payer: COMMERCIAL

## 2024-11-07 ENCOUNTER — MYC REFILL (OUTPATIENT)
Dept: PSYCHIATRY | Facility: CLINIC | Age: 29
End: 2024-11-07
Payer: COMMERCIAL

## 2024-11-07 ENCOUNTER — MYC REFILL (OUTPATIENT)
Dept: FAMILY MEDICINE | Facility: CLINIC | Age: 29
End: 2024-11-07
Payer: COMMERCIAL

## 2024-11-07 DIAGNOSIS — F42.2 MIXED OBSESSIONAL THOUGHTS AND ACTS: ICD-10-CM

## 2024-11-07 DIAGNOSIS — F42.2 MIXED OBSESSIONAL THOUGHTS AND ACTS: Primary | ICD-10-CM

## 2024-11-07 DIAGNOSIS — F90.9 ATTENTION DEFICIT HYPERACTIVITY DISORDER (ADHD), UNSPECIFIED ADHD TYPE: ICD-10-CM

## 2024-11-07 DIAGNOSIS — F33.41 RECURRENT MAJOR DEPRESSIVE DISORDER, IN PARTIAL REMISSION (H): ICD-10-CM

## 2024-11-07 DIAGNOSIS — G43.809 OTHER MIGRAINE, NOT INTRACTABLE, WITHOUT STATUS MIGRAINOSUS: ICD-10-CM

## 2024-11-07 DIAGNOSIS — F41.1 GENERALIZED ANXIETY DISORDER: ICD-10-CM

## 2024-11-07 RX ORDER — MIRTAZAPINE 15 MG/1
15 TABLET, FILM COATED ORAL AT BEDTIME
Qty: 30 TABLET | Refills: 2 | Status: SHIPPED | OUTPATIENT
Start: 2024-11-07

## 2024-11-07 RX ORDER — CLOMIPRAMINE HYDROCHLORIDE 50 MG/1
CAPSULE ORAL
Qty: 30 CAPSULE | Refills: 1 | Status: SHIPPED | OUTPATIENT
Start: 2024-11-07

## 2024-11-07 RX ORDER — CLOMIPRAMINE HYDROCHLORIDE 50 MG/1
CAPSULE ORAL
Qty: 42 CAPSULE | Refills: 1 | Status: SHIPPED | OUTPATIENT
Start: 2024-11-07 | End: 2024-11-07

## 2024-11-07 ASSESSMENT — PATIENT HEALTH QUESTIONNAIRE - PHQ9
SUM OF ALL RESPONSES TO PHQ QUESTIONS 1-9: 18
SUM OF ALL RESPONSES TO PHQ QUESTIONS 1-9: 18
10. IF YOU CHECKED OFF ANY PROBLEMS, HOW DIFFICULT HAVE THESE PROBLEMS MADE IT FOR YOU TO DO YOUR WORK, TAKE CARE OF THINGS AT HOME, OR GET ALONG WITH OTHER PEOPLE: VERY DIFFICULT

## 2024-11-07 NOTE — TELEPHONE ENCOUNTER
PT called to report that insurance is requiring prior authorization for both medications requested. PT is in the process of a med change to a different dose; she reports being very low on the clomiPRAMINE.

## 2024-11-07 NOTE — NURSING NOTE
Current patient location: [address] [place] [address] [address]  [place] [address]    Is the patient currently in the state of [place]? YES    Visit mode:VIDEO    If the visit is dropped, the patient can be reconnected by: VIDEO VISIT: Text to cell phone:   Telephone Information:   Mobile [phone]       Will anyone else be joining the visit? NO  (If patient encounters technical issues they should call [phone]    Are changes needed to the allergy or medication list? No    Are refills needed on medications prescribed by this physician? YES    Rooming Documentation:  Patient will complete questionnaire(s) in [place]    Reason for visit: RECHECK    Brigitte MURRAYF

## 2024-11-07 NOTE — PROGRESS NOTES
Virtual Visit Details    Type of service:  Video Visit     Originating Location (pt. Location): Home    Distant Location (provider location):  On-site  Platform used for Video Visit: [facility]  General Clinic Team  MEDICAL PROGRESS NOTE       CARE TEAM:    PCP- Marissa Walton  Therapist- Fanny Cobb, CHANTAL      is a 29 year old who uses the pronouns she, her.                   Assessment & Plan     Major depressive disorder, in partial remission   ADHD, hyperactive type   Anxiety, unspecified  OCD  Cannabis use    R/o ASD  R/o cluster B personality disorder    Postural orthostatic tachycardia syndrome   Fibromyalgia   Migraines   H/o abnormal EEG   IBS , constipation and diarrhea      Angela Jones is a 29-year-old female with past psychiatric diagnoses of ADHD, hyperactive type, MDD, anxiety, OCD, r/o cluster B personality disorder, phobia for driving, cannabis use, and medical history pertinent for migraine with aura, fibromyalgia, IBS, POTS. She presents to psychiatry clinic to re-establish psychiatric care at our clinic. She had been seen in the community by previous psychiatrists who left practice and her psychiatric medications were managed by her PCP. Other specialists were concerned about medications interaction and  has concern for medication cost specifically Trintellix given her insurance change following her divorce.      She was diagnosed with ADHD since age of 7. She tried stimulants and reported they made her more irritable, suppressed appetite, and worsened anxiety. Non-stimulant Intruniv worked better but she could not continue to get it due to insurance issues. She was diagnosed with depression and anxiety around middle school and had multiple SSRI and SNRI trials. She was diagnosed with OCD in 2013 at [facility] due to time -consuming distress caused by schedule changes,  ritualistic behaviors of checking. She completed CBT for OCD and Celexa was increased. She was diagnosed with ASD by her therapist in 2015 for sensitivity to textures, difficulty with social interaction, subjective report of repetitive movement. Reported her diagnosis was confirmed by Ozzie ~5 years ago.     Today,  tolerated clomipramine without side effects. Agreed with increasing the dose to target ongoing intrusive thoughts of self-harming without plan or intent. She noted overwhelmed with tasks from ADHD and was interested in restarting Intuniv. Communicated with her that we will optimize clomipramine and reassess of symptoms improvement before deciding to add another medication given risk of worsening hypotension and to minimize polypharmacy. Additionally, she exhibits hypersomnia and with additive sedative effects of her medications, concerned for oversedation.  continues to be engaged in therapy to cope with intrusive thoughts and safety plan was reviewed.  agrees to treatment with the capacity to do so. Agrees to call clinic for any problems. The patient understands to call 911 or come to the nearest ED if life threatening or urgent symptoms present.         Psychotropic Drug Interactions:    ADDITIVE SEROTONERGIC: Mirtazapine , clomipramine, amitryptyline   Additive cholinergic and sedation: Amitryptyline, clomipramine, gabapentin     Management: routine monitoring and patient is aware of risks     [place] was [place] today: indicates that controlled prescriptions have been filled as prescribed     Risk Statements:   Treatment Risk: Risks, benefits, alternatives and potential adverse effects have been discussed and are understood.   Safety Risk:  did not appear to be an imminent safety risk to self or others.    PLAN    1) Medications:     - Increase Clomipramine to 50mg at bedtime for two weeks then increase to 100mg until next visit (nurse to check in how she tolerates 50mg before  "deciding the next increase; 75mg vs. 50mg BID vs. 100mg at bedtime)   - Patient was reminded to stop Trintellix 10mg  - Continue Mirtazapine 15mg at bedtime for mood     Other:   Gabapentin 900mg TID   Amitryptyline 25mg for chronic pain   Ibuprofen 200mg q4h PRN    MTV     Future considerations:    -check TCA level when titrate up to 100mg  -switching mirtazapine to wellbutrin to also target ADHD after clomipramine is optimized   -MTM to weigh pro and cons of intuniv vs. Stimulants  vs. Wellbutrin   -Consider Bentley for OCD     2) Psychotherapy: weekly individual therapy with Fanny Cobb     3) Next due:  Labs: Clomipramine level after next titration   EKG: Routine monitoring is not indicated for current psychotropic medication regimen   Rating scales: N/A    4) Referrals: MTM for Clomipramine check in (pt missed last appointment)     5) Follow-up: 4 weeks                      Interval History       OCD:   -intrusive thoughts are not good right now   -no plan/ intention   -ways to hurt self   -\"white knuckling\" it    -denies suicidal ideation/ homicidal ideation     Mood:   -not great, lots of mood swings in the same day   -depressive episodes happened at some point this month, not currently   -low energy, \"trouble with executive functions\": gave example of tasks initiation/ completion   -little pleasure in doing things   -intuniv 1mg helped with Adhd in the past     Sleep   -Too much 12 hours    -not feel rested upon waking up   -no nightmares / flashbacks       Intrusive thoughts   Current Social History:  Financial/occupational: employed    Living situation: lives alone in an apartment with her cat   Social/spiritual support: partners, family, cat       Pertinent Substance Use:  [Last updated 09/12/24]  Alcohol: Yes: socially    Cannabis: Yes:  edible recreationally, once every 2 weeks , 7mg THC, 7mg CBD  (patient carried it in her purse), help with sensory overload   Tobacco: No  Caffeine:  No   Opioids: No  " " Narcan Kit current: N/A  Other substances: No     Medical Review of Systems / Med sfx:   A comprehensive review of systems was performed and is negative other than noted above.   Lightheadedness/orthostasis: yes, has POTS    Headaches: denies   GI: denies   CV: denies   Sexual health concerns: denies        Contraception: Yes:                   Summary Points of Current Care  8/2/2024: Transfer visit. Tapered Trintellix from 20mg to 10mg and instructed to stop after 1-2 weeks. Start Pristiq 50mg daily   8/14/2024:  reached out due to safety concerns from thoughts of self-harm. RN arranged for welfare check. Patient saw therapist the following day and reviewed safety plan  8/16/2024: Due to worsening intrusive thoughts and mood, patient instructed via phone call to increase Trintellix back to 20mg, and Pristiq was decreased to 25mg   9/12/2024: Start Clomipramine 25 mg at bedtime for OCD                 Physical Exam  (Vitals Only)    There were no vitals taken for this visit.  Pulse Readings from Last 3 Encounters:   09/19/24 83   08/08/24 86   08/02/24 98     Wt Readings from Last 3 Encounters:   09/19/24 58.7 kg (129 lb 8 oz)   09/12/24 56.7 kg (125 lb)   08/08/24 57.2 kg (126 lb)     BP Readings from Last 3 Encounters:   09/19/24 114/80   08/08/24 117/75   08/02/24 112/75                      Mental Status Exam    Alertness: alert  and oriented  Appearance: adequately groomed  Behavior/Demeanor: cooperative, with good  eye contact   Speech: normal  Language: intact  Psychomotor: normal or unremarkable  Mood:  \"not good\"  Affect: appropriate; congruent to: mood- yes, content- yes  Thought Process/Associations: unremarkable  Thought Content:  Reports  intrusive thoughts of self-harm without plan / intent ;  Denies violent ideation  Perception:  Reports none;  Denies hallucinations  Insight: fair  Judgment: fair  Cognition: does  appear grossly intact; formal cognitive testing was not done  Gait and Station: " "N/A (telehealth)                  Past Psychotropic Medication Trials    Wellbutrin- sleepiness, Prozac- irritablility, Effexor, Lexapro- not helpful.       Previous trials include Prozac (afternoon lethargy), Zoloft, Wellbutrin (insurance problems, but may have worked for depression and ADHD), Effexor (stomach pains and dizziness). Adderall (decreased appetite), Strattera, Concerta, Intuniv (\"zombie\"), Focalin.       Of note, Genesight testing from 8/11/2017 reviewed and she is an ultra rapid metabolizer of 1A2. Normal metabolizer for ADHD medications.       Medication Max Dose (mg) Dates / Duration Helpful? DC Reason / Adverse Effects?   Lamotrigine   350     Don't remember    Vortioxetine            Amitryptyline  50    y (got on it for migraines)      Citalopram  20     y      Adderall        Appetite suppressant    Ritalin        Appetite suppressant    Focalin       Provider left practice     Strattera        provider left practice    Wellbutrin        Spacy and tired , brain fog    Cymbalta            Prozac       y  Irritable , lethargy    Zoloft            Effexor        Made migraines worse, stomachache     Intruniv         Worked better than stimulants, focused better and not so jittery, though per chart reported \"zombie\" like feeling      Vyvanse        Worked better than Adderral, issue with insurance    Diphenhydramine(OTC)            Risperidone        Can't recall taking it. May have been prescribed at young age    Aripriprazole        Can't recall taking it. May have been prescribed at young age                  Past Medical History     Patient Active Problem List   Diagnosis    OCD (obsessive compulsive disorder)    Pes planus    Moderate major depression (H)    Generalized anxiety disorder    Migraine with aura    Autism spectrum disorder    IUD (intrauterine device) in place    Fibromyalgia    POTS (postural orthostatic tachycardia syndrome)    Dysautonomia (H)                     Medications "     Current Outpatient Medications   Medication Sig Dispense Refill    amitriptyline (ELAVIL) 25 MG tablet TAKE 2 TABLETS(50 MG) BY MOUTH AT BEDTIME. SEE YOUR DOCTOR FOR FURTHER REFILLS. 180 tablet 3    cetirizine (ZYRTEC) 10 MG tablet Take 1 tablet (10 mg) by mouth daily 30 tablet 0    clomiPRAMINE (ANAFRANIL) 25 MG capsule Take 1 capsule (25 mg) by mouth at bedtime. 30 capsule 0    dexAMETHasone (DECADRON) 2 MG tablet Take 2 mg by mouth as needed      diphenhydrAMINE (BENADRYL) 50 MG capsule Take 50 mg by mouth as needed      esomeprazole (NEXIUM) 20 MG DR capsule TAKE 1 CAPSULE BY MOUTH EVERY MORNING BEFORE BREAKFAST 90 capsule 3    fludrocortisone (FLORINEF) 0.1 MG tablet TAKE 2 TABLETS (0.2 MG) BY MOUTH DAILY 180 tablet 1    gabapentin (NEURONTIN) 300 MG capsule Take 3 capsules (900 mg) by mouth 3 times daily 810 capsule 3    hyoscyamine (LEVSIN/SL) 0.125 MG sublingual tablet Take 1 tablet (0.125 mg) by mouth every 4 hours as needed 120 tablet 5    ibuprofen (ADVIL/MOTRIN) 200 MG tablet Take 200 mg by mouth every 4 hours as needed for pain or other (migrain)      levonorgestrel (MIRENA, 52 MG,) 20 MCG/24HR IUD 1 each (20 mcg) by Intrauterine route once for 1 dose 1 each 0    metoclopramide (REGLAN) 5 MG tablet TAKE 1 TABLET (5 MG) BY MOUTH 3 TIMES DAILY AS NEEDED (FOR NAUSEA) 90 tablet 0    metroNIDAZOLE (FLAGYL) 500 MG tablet Take 1 tablet (500 mg) by mouth 2 times daily. 14 tablet 0    mirtazapine (REMERON) 15 MG tablet Take 1 tablet (15 mg) by mouth at bedtime. 30 tablet 1    multivitamin, therapeutic (THERA-VIT) TABS tablet Take 1 tablet by mouth daily      tiZANidine (ZANAFLEX) 2 MG tablet Take 2 mg by mouth daily as needed for muscle spasms      vortioxetine (TRINTELLIX) 10 MG tablet Take 2 tablets (20 mg) by mouth daily Take 1 tablet (10 mg) by mouth daily for 1-2 weeks then stop 60 tablet 0                     Data         7/12/2024     8:07 AM 8/2/2024     1:03 PM 11/5/2024     8:32 AM   PROMIS-10  Total Score w/o Sub Scores   PROMIS TOTAL - SUBSCORES 26 25 21        Patient-reported         9/12/2024     7:46 AM 10/25/2024     9:07 AM 11/5/2024     8:30 AM   PHQ-9 SCORE   PHQ-9 Total Score MyChart 18 (Moderately severe depression) 17 (Moderately severe depression) 19 (Moderately severe depression)   PHQ-9 Total Score 18    18 17  19        Patient-reported    Multiple values from one day are sorted in reverse-chronological order         8/29/2024     8:02 AM 9/12/2024     7:47 AM 10/25/2024     9:07 AM   JOSHUA-7 SCORE   Total Score 8 (mild anxiety) 9 (mild anxiety) 13 (moderate anxiety)   Total Score 8 9    9 13        Patient-reported    Multiple values from one day are sorted in reverse-chronological order       Liver/Kidney Function, TSH Metabolic Blood counts   Recent Labs   Lab Test 05/20/24  1834 02/18/22  1020   AST 17  --    ALT 10  --    ALKPHOS 63  --    CR 0.80 0.75     Recent Labs   Lab Test 02/18/22  1020   TSH 2.06    Recent Labs   Lab Test 10/25/23  1508   CHOL 202*   TRIG 115   *   HDL 70     Recent Labs   Lab Test 10/25/23  1508   A1C 5.2     Recent Labs   Lab Test 05/20/24  1834   *    Recent Labs   Lab Test 06/04/24  1337   WBC 9.5   HGB 12.4   HCT 38.1   MCV 96                Answers submitted by the patient for this visit:  Patient Health Questionnaire (Submitted on 9/12/2024)  If you checked off any problems, how difficult have these problems made it for you to do your work, take care of things at home, or get along with other people?: Very difficult  PHQ9 TOTAL SCORE: 18  Patient Health Questionnaire (G7) (Submitted on 9/12/2024)  JOSHUA 7 TOTAL SCORE: 9    PROVIDER: Salina Lagunas MD    The longitudinal plan of care for the diagnosis(es)/condition(s) as documented were addressed during this visit. Due to the added complexity in care, I will continue to support  in the subsequent management and with ongoing continuity of care.     Patient not staffed in  clinic.  Note will be reviewed and signed by supervisor Dr. Yancey.

## 2024-11-07 NOTE — PATIENT INSTRUCTIONS
**For crisis resources, please see the information at the end of this document**   Patient Education    Thank you for coming to the [facility].     -increase clomipramine to 50mg for 2 weeks then a nurse will check in with you   -discontinue trintellix   Lab Testing:  If you had lab testing today and your results are reassuring or normal they will be mailed to you or sent through [employer] within 7 days. If the lab tests need quick action we will call you with the results. The phone number we will call with results is # [phone]. If this is not the best number please call our clinic and change the number.     Medication Refills:  If you need any refills please call your pharmacy and they will contact us. Our fax number for refills is [phone].   Three business days of notice are needed for general medication refill requests.   Five business days of notice are needed for controlled substance refill requests.   If you need to change to a different pharmacy, please contact the new pharmacy directly. The new pharmacy will help you get your medications transferred.     Contact Us:  Please call [phone] during business hours (8-5:00 M-F).   If you have medication related questions after clinic hours, or on the weekend, please call [phone].     Financial Assistance [phone]   Medical Records [phone]       MENTAL HEALTH CRISIS RESOURCES:  For a emergency help, please call 911 or go to the nearest Emergency Department.     Emergency Walk-In Options:   [facility] Unit @ [facility] ([place]): [phone] - Specialized mental health emergency area designed to be calming  [facility] ([place]): [phone]  [facility] ([place]): [phone]  [facility]): [phone]    [facility] Crisis Information:   [place]: [phone]  [place]: [phone]  [place] ([place]) - Adult:  [phone]     Child: [phone]  [place] - Adult: [phone]     Child: [phone]  [place]: [phone]  List of all [facility] resources:   https://[place].gov/dhs/people-we-serve/adults/health-care/mental-health/resources/crisis-contacts.jsp    National Crisis Information:   Crisis Text Line: Text  [place]  to 284635  Suicide & Crisis Lifeline: 988  National Suicide Prevention Lifeline: 9-641-791-TALK (1-832.419.1305)       For online chat options, visit https://suicidepreventionlifeline.org/chat/  Poison Control Center: 1-812.556.2096  Trans Lifeline: 1-855.220.8216 - Hotline for transgender people of all ages  [employer]: 1-655-547-7884 - Hotline for LGBT youth     For Non-Emergency Support:   Fast Tracker: Mental Health & Substance Use Disorder Resources -   https://www.[employer] [facility].org/

## 2024-11-08 RX ORDER — CLOMIPRAMINE HYDROCHLORIDE 50 MG/1
CAPSULE ORAL
Qty: 42 CAPSULE | Refills: 1 | OUTPATIENT
Start: 2024-11-08

## 2024-11-08 NOTE — TELEPHONE ENCOUNTER
Disp Refills Start End STARR   clomiPRAMINE (ANAFRANIL) 50 MG capsule (Discontinued) 42 capsule 1 11/7/2024 11/7/2024 No   Sig: Take 50mg at bedtime for 2 weeks then increase to 100mg at bedtime until next visit

## 2024-11-14 ENCOUNTER — ANCILLARY PROCEDURE (OUTPATIENT)
Dept: GENERAL RADIOLOGY | Facility: CLINIC | Age: 29
End: 2024-11-14
Attending: NURSE PRACTITIONER
Payer: COMMERCIAL

## 2024-11-14 ENCOUNTER — OFFICE VISIT (OUTPATIENT)
Dept: URGENT CARE | Facility: URGENT CARE | Age: 29
End: 2024-11-14
Payer: COMMERCIAL

## 2024-11-14 VITALS
BODY MASS INDEX: 23.1 KG/M2 | WEIGHT: 130.4 LBS | SYSTOLIC BLOOD PRESSURE: 100 MMHG | TEMPERATURE: 99.1 F | OXYGEN SATURATION: 99 % | DIASTOLIC BLOOD PRESSURE: 70 MMHG

## 2024-11-14 DIAGNOSIS — S61.412A LACERATION OF LEFT HAND WITHOUT FOREIGN BODY, INITIAL ENCOUNTER: ICD-10-CM

## 2024-11-14 DIAGNOSIS — Z23 NEED FOR IMMUNIZATION AGAINST TETANUS ALONE: Primary | ICD-10-CM

## 2024-11-14 DIAGNOSIS — M79.642 PAIN OF LEFT HAND: ICD-10-CM

## 2024-11-14 PROCEDURE — 99213 OFFICE O/P EST LOW 20 MIN: CPT | Mod: 25 | Performed by: NURSE PRACTITIONER

## 2024-11-14 PROCEDURE — 73130 X-RAY EXAM OF HAND: CPT | Mod: TC | Performed by: RADIOLOGY

## 2024-11-14 PROCEDURE — 90715 TDAP VACCINE 7 YRS/> IM: CPT | Performed by: NURSE PRACTITIONER

## 2024-11-14 PROCEDURE — 90471 IMMUNIZATION ADMIN: CPT | Performed by: NURSE PRACTITIONER

## 2024-11-14 ASSESSMENT — ENCOUNTER SYMPTOMS
EYES NEGATIVE: 1
FATIGUE: 0
RESPIRATORY NEGATIVE: 1
CARDIOVASCULAR NEGATIVE: 1
FEVER: 0

## 2024-11-15 NOTE — PROGRESS NOTES
Assessment & Plan       ICD-10-CM    1. Need for immunization against tetanus alone  Z23 TDAP 7+ (ADACEL,BOOSTRIX)      2. Laceration of left hand without foreign body, initial encounter  S61.412A       3. Pain of left hand  M79.642 XR Hand Left G/E 3 Views      Tetanus booster given today in clinic.    Patient Instructions     Rest: Stay off the affected site, elevate above the level of the heart as much as possible.  Ice: Ice several times throughout the day.   Compression: ACE wrap/cover site   Elevation: Rest and elevate the affected site above the level of the heart, may elevate on pillow, try to elevate above heart (lay flat with injury propped higher than body)    Gentle early range of motion is important.      For pain control you can rotate between Tylenol and ibuprofen  Ibuprofen 600 mg (3 of the 200 mg OTC tablets) up to 4 times daily with food or milk  Tylenol 1000 mg every 8 hours as needed          Follow up with any problems, questions or concerns or if symptoms worsen or fail to improve. Patient agreed to the treatment plan and verbalized understanding.     I independently visualized the xray: No fracture or dislocation noted on today's x-rays. Radiologist's read pending at this time.    Jelena Miller is a 29 year old female who presents to clinic today for the following health issues:  Chief Complaint   Patient presents with    Laceration     Happened 24 hrs ago Tdap 2019     HPI  Patient states a little over 24 hours ago she was working as a  and accidentally hit her hand on a metal tub cutting open her skin on the dorsal left hand just below the ring finger and small finger MCP.  She states the site continues to ooze blood.  She states she is change the dressing several times today.  She states she has held pressure over it for several minutes but not for an extended period of time.  She states her left hand is tender over the fifth MCP.  She states she has not taken any  medications for this.  She states she did cleanse the site under clean running water and soap several times since the injury.      Review of Systems   Constitutional:  Negative for fatigue and fever.   HENT: Negative.     Eyes: Negative.    Respiratory: Negative.     Cardiovascular: Negative.    Musculoskeletal:         She states she has discomfort over the fifth metacarpal on the left hand.   Skin:         A 2 cm linear laceration on the dorsal left hand below the ring finger and small finger MCP.  She also has 1/2 cm laceration underneath the ring finger below the MCP and another half centimeter laceration just above this.  She states the main laceration is 1 that has been oozing blood over the last 24 hours.       Problem List:  2024-08: Dysautonomia (H)  2023-10: Fibromyalgia  2023-10: POTS (postural orthostatic tachycardia syndrome)  2022-02: Autism spectrum disorder  2022-02: IUD (intrauterine device) in place  2020-02: Moderate episode of recurrent major depressive disorder (H)  2018-09: Bipolar 2 disorder (H)  2018-09: Seizure-like activity (H)  2015-01: Health Care Home  2014-11: Migraine with aura  2014-03: Pes planus  2014-03: Moderate major depression (H)  2014-03: Generalized anxiety disorder  2013-08: OCD (obsessive compulsive disorder)      Past Medical History:   Diagnosis Date    Flat foot 3/25/2014    JOSHUA (generalised anxiety disorder) 3/25/2014    Hypoxia in liveborn infant     born hypoxic    Migraine     Migraine with aura 11/4/2014    Migraines     Moderate major depression (H) 3/25/2014    OCD (obsessive compulsive disorder) 8/4/2013    Palpitations     Self mutilating behavior 4/5/2013    cut self with pencil sharpener blade, left arm         Social History     Tobacco Use    Smoking status: Never     Passive exposure: Never    Smokeless tobacco: Never   Substance Use Topics    Alcohol use: Yes     Alcohol/week: 1.0 standard drink of alcohol     Comment: Alcoholic Drinks/day: socially             Objective    /70   Temp 99.1  F (37.3  C) (Oral)   Wt 59.1 kg (130 lb 6.4 oz)   SpO2 99%   BMI 23.10 kg/m    Physical Exam  Vitals and nursing note reviewed.   Constitutional:       Appearance: Normal appearance.   HENT:      Head: Normocephalic and atraumatic.   Cardiovascular:      Rate and Rhythm: Normal rate and regular rhythm.      Heart sounds: Normal heart sounds.   Pulmonary:      Effort: Pulmonary effort is normal.      Breath sounds: Normal breath sounds.   Musculoskeletal:      Comments: Both hands are examined for comparison and symmetry.  Skin is pink, warm, intact, and dry with the exception of her lacerations.  No signs of infection.  Good active and passive range of motion for all upper digits including full extension and flexion.  Neurovascular exam is normal with well-perfused upper digits bilaterally.  The motor and sensory examination of the median, ulnar, and radial nerves is grossly intact.  Tenderness to palpation over the left fifth MCP.  No other pertinent findings.   Skin:     Comments: Patient has a 2 cm laceration across the dorsal left hand below the level of the fourth and fifth digits at the MCPs.  She also has 2 smaller 0.5 cm lacerations below the ring finger MCP.  No oozing of blood on examination.  No signs of infection.  Minimal edema at the site.  No ecchymosis.   Neurological:      Mental Status: She is alert and oriented to person, place, and time.            Mady Alan NP

## 2024-11-15 NOTE — PATIENT INSTRUCTIONS
X-ray negative for fracture or dislocation - will only call if radiologist sees any issue that changes the treatment plan.    Rest: Stay off the affected site, elevate above the level of the heart as much as possible.  Ice: Ice several times throughout the day.   Compression: ACE wrap/cover site.  Keep pressure dressing on until tomorrow.  Tonight while watching TV before bed apply consistent pressure for 30 minutes.  Elevation: Rest and elevate the affected site above the level of the heart, may elevate on pillow, try to elevate above heart (lay flat with injury propped higher than body)    Gentle early range of motion is important.  Can start this tomorrow.    For pain control you can rotate between Tylenol and ibuprofen  Ibuprofen 600 mg (3 of the 200 mg OTC tablets) up to 3 times daily with food or milk  Tylenol 1000 mg every 8 hours as needed    Watch for signs of infection, return if any signs or symptoms of it.

## 2024-11-19 ENCOUNTER — TELEPHONE (OUTPATIENT)
Dept: PSYCHIATRY | Facility: CLINIC | Age: 29
End: 2024-11-19
Payer: COMMERCIAL

## 2024-11-19 ENCOUNTER — VIRTUAL VISIT (OUTPATIENT)
Dept: BEHAVIORAL HEALTH | Facility: CLINIC | Age: 29
End: 2024-11-19
Payer: COMMERCIAL

## 2024-11-19 DIAGNOSIS — F41.1 GAD (GENERALIZED ANXIETY DISORDER): ICD-10-CM

## 2024-11-19 DIAGNOSIS — F84.0 AUTISM SPECTRUM DISORDER: Primary | ICD-10-CM

## 2024-11-19 DIAGNOSIS — F33.0 MILD EPISODE OF RECURRENT MAJOR DEPRESSIVE DISORDER (H): ICD-10-CM

## 2024-11-19 PROCEDURE — 90837 PSYTX W PT 60 MINUTES: CPT | Mod: 95 | Performed by: SOCIAL WORKER

## 2024-11-19 ASSESSMENT — ANXIETY QUESTIONNAIRES
7. FEELING AFRAID AS IF SOMETHING AWFUL MIGHT HAPPEN: NOT AT ALL
4. TROUBLE RELAXING: SEVERAL DAYS
3. WORRYING TOO MUCH ABOUT DIFFERENT THINGS: MORE THAN HALF THE DAYS
IF YOU CHECKED OFF ANY PROBLEMS ON THIS QUESTIONNAIRE, HOW DIFFICULT HAVE THESE PROBLEMS MADE IT FOR YOU TO DO YOUR WORK, TAKE CARE OF THINGS AT HOME, OR GET ALONG WITH OTHER PEOPLE: VERY DIFFICULT
7. FEELING AFRAID AS IF SOMETHING AWFUL MIGHT HAPPEN: NOT AT ALL
2. NOT BEING ABLE TO STOP OR CONTROL WORRYING: NEARLY EVERY DAY
GAD7 TOTAL SCORE: 10
5. BEING SO RESTLESS THAT IT IS HARD TO SIT STILL: SEVERAL DAYS
1. FEELING NERVOUS, ANXIOUS, OR ON EDGE: MORE THAN HALF THE DAYS
6. BECOMING EASILY ANNOYED OR IRRITABLE: SEVERAL DAYS
GAD7 TOTAL SCORE: 10
8. IF YOU CHECKED OFF ANY PROBLEMS, HOW DIFFICULT HAVE THESE MADE IT FOR YOU TO DO YOUR WORK, TAKE CARE OF THINGS AT HOME, OR GET ALONG WITH OTHER PEOPLE?: VERY DIFFICULT
GAD7 TOTAL SCORE: 10

## 2024-11-19 ASSESSMENT — PATIENT HEALTH QUESTIONNAIRE - PHQ9
SUM OF ALL RESPONSES TO PHQ QUESTIONS 1-9: 14
10. IF YOU CHECKED OFF ANY PROBLEMS, HOW DIFFICULT HAVE THESE PROBLEMS MADE IT FOR YOU TO DO YOUR WORK, TAKE CARE OF THINGS AT HOME, OR GET ALONG WITH OTHER PEOPLE: SOMEWHAT DIFFICULT
SUM OF ALL RESPONSES TO PHQ QUESTIONS 1-9: 14

## 2024-11-19 NOTE — TELEPHONE ENCOUNTER
Salina Lagunas MD  You20 minutes ago (12:57 PM)     LT  Krystal Landry,    Thank you for checking in on her.    Let's increase it to 50mg BID so she can adjust to the higher dose and will consider moving it to 100mg at bedtime if she tolerates it well. Could you let her know the plan and see if she agrees.    If she does, could you remind her of side effects of constipation, sedation, dry mouth and to reach out to clinic if she experiences intolerable side effects. She can start taking remaining 50mg twice a day with meals and I will send in new refills to last until our next appointment.    Salina     Patient updated via [employer] message (per patient preference).

## 2024-11-19 NOTE — PROGRESS NOTES
[facility]                                     Progress Note    Patient Name: Angela Jones  Date: 11/19/2024         Service Type: Individual      Session Start Time: 9:00am  Session End Time: 9:53am     Session Length: 53 minutes    Session #: 46    Attendees: Client attended alone    Service Modality:  Video Visit:      Provider verified identity through the following two step process.  Patient provided:  Patient is known previously to provider    Telemedicine Visit: The patient's condition can be safely assessed and treated via synchronous audio and visual telemedicine encounter.      Reason for Telemedicine Visit: Patient has requested telehealth visit    Originating Site (Patient Location): Patient's home    Distant Site (Provider Location): Provider Remote Setting- Home Office    Consent:  The patient/guardian has verbally consented to: the potential risks and benefits of telemedicine (video visit) versus in person care; bill my insurance or make self-payment for services provided; and responsibility for payment of non-covered services.     Patient would like the video invitation sent by:  My Chart    Mode of Communication:  Video Conference via [employer]      Distant Location (Provider):  Off-site    As the provider I attest to compliance with applicable laws and regulations related to telemedicine.    DATA  Extended Session (53+ minutes): PROLONGED SERVICE IN THE OUTPATIENT SETTING REQUIRING DIRECT (FACE-TO-FACE) PATIENT CONTACT BEYOND THE USUAL SERVICE:    - Treatment protocol required additional time to complete, due to the nature of diagnosis being treated.  See Interventions section for details  Interactive Complexity: Yes, visit entailed Interactive Complexity evidenced by:  -The need to manage maladaptive communication (related to, e.g., high anxiety, high reactivity, repeated questions, or disagreement) among participants that complicates delivery of care; diagnostic criteria  for ASD are met and complicate the delivery of services. Patient requires additional support and alternative methods to engage in psychotherapy services.    Crisis: No        Progress Since Last Session (Related to Symptoms / Goals / Homework):   Symptoms: No change :  anxiety and depression    Homework: Partially completed      Episode of Care Goals: Minimal progress - PREPARATION (Decided to change - considering how); Intervened by negotiating a change plan and determining options / strategies for behavior change, identifying triggers, exploring social supports, and working towards setting a date to begin behavior change     Current / Ongoing Stressors and Concerns:  Patient provides a status update while therapist utilizes reflective listening skills. Patient shares about reactions and responses to the election results. She shares that she helped someone move out of an unhealthy living situation last week. Patient shares that they have had some thoughts around gender (gender fluid, depends on the day, still using she/her pronouns). Patient provides an update on work status and ongoing stressors. She reports that her accommodations did go through which is helpful (rest day in between every two days of work, gets to sit on a chair). Patient has noticed a reduction in intrusive thoughts of self-harm. She met with her psychiatrist two weeks ago and made some medication adjustments that have been beneficial.     Therapist assessed for risk and safety - patient denied any current SI/SIB.  Should there be an increase in symptoms or severity related to SI/SIB, patient should call 911 or go to local ED for evaluation.         Treatment Objective(s) Addressed in This Session:   identify the fears / thoughts that contribute to feeling anxious  state understanding of stressors and relationship to physical symptoms  Increase interest, engagement, and pleasure in doing things  Decrease frequency and intensity of feeling down,  depressed, hopeless  Identify negative self-talk and behaviors: challenge core beliefs, myths, and actions  Gain insight     Intervention:   Discussed mindfulness as being aware of what we are experiencing while we are experiencing it.  Contrasted this with avoidance and rumination.  Explored the role of mindfulness as an overall coping strategy for managing depression, anxiety, and strong emotions.  Explained concept of state of mind using SIFT (sensations, images, feelings, thoughts) pneumonic. Discussed mindfulness as a tool to intentionally shift our awareness and focus as needed.  Discussed physical, mental, and emotional boundaries and limit-setting with others. Explored management of boundaries through direct communication and limiting contact and communication with others.  Discussed barriers to using boundary management skills including strong emotions and physical proximity.  Therapist provided unconditional positive regard and supportive counseling.     Assessments completed prior to visit:   The following assessments were completed by patient for this visit:  PHQ9:       8/2/2024    12:42 PM 8/29/2024     8:01 AM 9/12/2024     7:46 AM 10/25/2024     9:07 AM 11/5/2024     8:30 AM 11/7/2024     8:20 AM 11/19/2024     8:51 AM   PHQ-9 SCORE   PHQ-9 Total Score MyChart 9 (Mild depression) 18 (Moderately severe depression) 18 (Moderately severe depression) 17 (Moderately severe depression) 19 (Moderately severe depression) 18 (Moderately severe depression) 14 (Moderate depression)   PHQ-9 Total Score 9 18 18    18 17  19  18  14        Patient-reported    Multiple values from one day are sorted in reverse-chronological order     GAD7:       7/25/2024     9:02 AM 8/2/2024     1:02 PM 8/15/2024     8:48 AM 8/29/2024     8:02 AM 9/12/2024     7:47 AM 10/25/2024     9:07 AM 11/19/2024     8:52 AM   JOSHUA-7 SCORE   Total Score 7 (mild anxiety) 8 (mild anxiety) 12 (moderate anxiety) 8 (mild anxiety) 9 (mild anxiety)  13 (moderate anxiety) 10 (moderate anxiety)   Total Score 7 8 12 8 9    9 13  10        Patient-reported    Multiple values from one day are sorted in reverse-chronological order     PROMIS 10-Global Health (only subscores and total score):       11/21/2023     8:55 AM 2/27/2024    11:02 AM 4/12/2024    11:00 AM 7/12/2024     8:07 AM 8/2/2024     1:03 PM 11/5/2024     8:32 AM 11/19/2024     8:53 AM   PROMIS-10 Scores Only   Global Mental Health Score 11 12 14 14 13 9  10    Global Physical Health Score 12 11 12 12 12 12  11    PROMIS TOTAL - SUBSCORES 23 23 26 26 25 21  21        Patient-reported      [place] Suicide Severity Rating Scale (Short Version)      2/17/2022     3:02 PM 9/5/2024     7:12 AM 10/25/2024     9:23 AM 11/5/2024     9:16 AM   [place] Suicide Severity Rating (Short Version)   Q1 Wished to be Dead (Past Month) no      Q2 Suicidal Thoughts (Past Month) no      Q3 Suicidal Thought Method no      Q4 Suicidal Intent without Specific Plan no      Q5 Suicide Intent with Specific Plan no      Q6 Suicide Behavior (Lifetime) yes      If yes to Q6, within past 3 months? no      Level of Risk per Screen moderate risk      1. Wish to be Dead (Since Last Contact)  N N N   2. Non-Specific Active Suicidal Thoughts (Since Last Contact)  N Y N   Non-Specific Active Suicidal Thought Description (Since Last Contact)   images about items to use for self-harm    3. Active Suicidal Ideation with any Methods (Not Plan) Without Intent to Act (Since Last Contact)   N N   4. Active Suicidal Ideation with Some Intent to Act, Without Specific Plan (Since Last Contact)   N N   5. Active Suicidal Ideation with Specific Plan and Intent (Since Last Contact)   N N   Most Severe Ideation Rating (Since Last Contact)   1 1   Frequency (Since Last Contact)   3 3   Duration (Since Last Contact)   3 2   Deterrents (Since Last Contact)   1 1   Reasons for Ideation (Since Last Contact)   5 0   Actual Attempt (Since Last Contact)  N N  N   Has subject engaged in non-suicidal self-injurious behavior? (Since Last Contact)  N N N   Interrupted Attempts (Since Last Contact)  N N N   Aborted or Self-Interrupted Attempt (Since Last Contact)  N N N   Preparatory Acts or Behavior (Since Last Contact)  N N N   Suicide (Since Last Contact)  N N N   Calculated C-SSRS Risk Score (Since Last Contact)  No Risk Indicated Low Risk No Risk Indicated          ASSESSMENT: Current Emotional / Mental Status (status of significant symptoms):   Risk status (Self / Other harm or suicidal ideation)   Patient denies current fears or concerns for personal safety.   Patient denies current or recent suicidal ideation or behaviors.   Patient denies current or recent homicidal ideation or behaviors.   Patient denies current or recent self injurious behavior or ideation.   Patient denies other safety concerns.   Patient reports there has been no change in risk factors since their last session.     Patient reports there has been no change in protective factors since their last session.     A safety and risk management plan has been developed including: Patient consented to co-developed safety plan.  Safety and risk management plan was completed - see below.  Patient agreed to use safety plan should any safety concerns arise.  A copy was given to the patient.     Appearance:   Appropriate    Eye Contact:   Good    Psychomotor Behavior: Hyperactive  Restless    Attitude:   Cooperative    Orientation:   All   Speech    Rate / Production: Pressured  Stutters    Volume:  Normal    Mood:    Anxious  Depressed  Anhedonia   Affect:    Constricted  Flat  Worrisome    Thought Content:  Clear    Thought Form:  Coherent    Insight:    Good      Medication Review:   Changes to psychiatric medications, see updated Medication List in EPIC.      Medication Compliance:   Yes     Changes in Health Issues:   None reported     Chemical Use Review:   Substance Use: Chemical use reviewed, no active  concerns identified      Tobacco Use: No current tobacco use.      Diagnosis:  1. Autism spectrum disorder    2. JOSHUA (generalized anxiety disorder)    3. Mild episode of recurrent major depressive disorder (H)    ADHD    Collateral Reports Completed:   Not Applicable    PLAN: (Patient Tasks / Therapist Tasks / Other)  Patient will return for a virtual visit in 3 weeks  Patient encouraged to practice healthy boundaries  Patient will utilize her safety plan   Patient encouraged to journal and use daily planner  Patient encouraged to prioritize self-care       There has been demonstrated improvement in functioning while patient has been engaged in psychotherapy/psychological service- if withdrawn the patient would deteriorate and/or relapse.       Fanny Cobb, [facility]                                                         ______________________________________________________________________    Individual Treatment Plan    Patient's Name: Angela Jones  YOB: 1995    Date of Creation: 1/19/2022  Date Treatment Plan Last Reviewed/Revised: 11/5/2024    DSM5 Diagnoses: Autism Spectrum Disorder 299.00(F84.0)  Associated with another neurodevelopmental, mental or behavioral disorder and Attention-Deficit/Hyperactivity Disorder  314.01 (F90.9) Unspecified Attention -Deficit / Hyperactivity Disorder, 296.31 (F33.0) Major Depressive Disorder, Recurrent Episode, Mild _ or 300.02 (F41.1) Generalized Anxiety Disorder  Psychosocial / Contextual Factors: 29 year old  female, , no children   PROMIS (reviewed every 90 days):   PROMIS 10-Global Health (only subscores and total score):       11/7/2023    11:56 AM 11/21/2023     8:55 AM 2/27/2024    11:02 AM 4/12/2024    11:00 AM 7/12/2024     8:07 AM 8/2/2024     1:03 PM 11/5/2024     8:32 AM   PROMIS-10 Scores Only   Global Mental Health Score 10    10 11 12 14 14 13 9    Global Physical Health Score 12    12 12 11 12 12 12 12    PROMIS TOTAL -  SUBSCORES 22    22 23 23 26 26 25 21          Referral / Collaboration:  Referral to another professional/service is not indicated at this time..    Anticipated number of session for this episode of care: 6-9  Anticipation frequency of session: Every other week  Anticipated Duration of each session: 53 or more minutes  Treatment plan will be reviewed in 90 days or when goals have been changed.       MeasurableTreatment Goal(s) related to diagnosis / functional impairment(s)  Goal 1: Patient will manage symptoms of anxiety, as evidenced by a JOSHUA-7 score of 5 or lower     I will know I've met my goal when I feel more confident in my ability to cope with stress.      Objective #A (Patient Action)    Patient will identify the initial signs or symptoms of anxiety.  Status: Continued - Date(s): 11/5/2024    Intervention(s)  Therapist will teach  help patient gain insight into signs of stress (physically and emotionally) .    Objective #B  Patient will use relaxation strategies multiple times per day to reduce the physical symptoms of anxiety.  Status: Continued - Date(s): 11/5/2024    Intervention(s)  Therapist will  teach diaphragmatic breathing and other grounding skills .    Objective #C  Patient will use thought-stopping strategy daily to reduce intrusive thoughts.  Status: Continued - Date(s): 11/5/2024    Intervention(s)  Therapist will  teach thought stopping techniques .      Goal 2: Patient will manage symptoms of depression, as evidenced by a PHQ-9 score of 10 or lower    I will know I've met my goal when I feel more confident in my ability to express my emotions in a healthy way.      Objective #A (Patient Action)    Status: Continued - Date(s): 11/5/2024    Patient will Increase interest, engagement, and pleasure in doing things.    Intervention(s)  Therapist will teach emotional recognition/identification. * .    Objective #B  Patient will Identify negative self-talk and behaviors: challenge core beliefs,  "myths, and actions.    Status: Continued - Date(s): 11/5/2024    Intervention(s)  Therapist will  help patient practice positive self-talk and thought re-framing .    Goal 3: Client will manage symptoms of ADHD     I will know I've met my goal when I feel confident in completing tasks.      Objective #A (Client Action)    Client will use daily planner 75% of the time.  Status: Continued - Date(s): 11/5/2024    Intervention(s)  Therapist will  ask client to demonstrate use of planner while in session .    Objective #B  Client will identify and compliment positives at least 2 times daily to support positive self-image.    Status: Continued - Date(s): 11/5/2024    Intervention(s)  Therapist will  ask patient to demonstrate use of affirmations during session .    Goal 4: Client will manage symptoms and experiences associated with ASD     I will know I've met my goal when I feel confident in my communication in relationships.      Objective #A (Client Action)    Client will work on being assertive and setting clear expectations with healthy boundaries in relationships  Status: Continued - Date(s): 11/5/2024    Intervention(s)  Therapist will  help patient practice assertive communication skills .        Patient has reviewed and agreed to the above plan.      Fanny Cobb, [facility]  November 5, 2024        [facility]                                       Angela Jones     SAFETY PLAN:  Step 1: Warning signs / cues (Thoughts, images, mood, situation, behavior) that a crisis may be developing:  Thoughts: \"I can't do this anymore\" and \"Nothing makes it better\" \"I'm a burden\"   Images: visions of harm: self-harm  Thinking Processes: ruminations (can't stop thinking about my problems):  , racing thoughts, intrusive thoughts (bothersome, unwanted thoughts that come out of nowhere):  , highly critical and negative thoughts:  , and disorganized thinking:    Mood: worsening depression, hopelessness, " "helplessness, intense worry, and agitation  Behaviors: can't stop crying, not taking care of myself, and not taking care of my responsibilities  Situations: relationship problems and financial stress   Step 2: Coping strategies - Things I can do to take my mind off of my problems without contacting another person (relaxation technique, physical activity):  Distress Tolerance Strategies:  relaxation activities:  , arts and crafts:  , play with my pet , sensory based activities/self-soothe with five senses:  , change body temperature (ice pack/cold water) , and paced breathing/progressive muscle relaxation, grounding exercise of naming objects, weighted items (sock filled with rice), play with puddy, use sensory items   Physical Activities: meditation, deep breathing, and stretching ; taking a shower,   Focus on helpful thoughts:  \"This is temporary\", \"I will get through this\", \"It always passes\", and self-compassion statements:    Step 3: People and social settings that provide distraction:   Name: Zander    Name: Demetri  park and work   Step 4: Remind myself of people and things that are important to me and worth living for:  my family and pets and friends  Step 5: When I am in crisis, I can ask these people to help me use my safety plan:   Name: Demetri Fermin    Step 6: Making the environment safe:   dispose of old medications , remove things I could use to hurt myself:  , and be around others  Step 7: Professionals or agencies I can contact during a crisis:  Suicide Prevention Lifeline: Call or Text 822   [facility] Crisis Services: [phone]    Call 911 or go to my nearest emergency department.   I helped develop this safety plan and agree to use it when needed.  I have been given a copy of this plan.    Client signature _________________________________________________________________  Today s date:  7/25/2024  Completed by Provider Name/ Credentials:  CHANTAL Blackmon  July 25, 2024  Adapted from Safety Plan " Template 2008 Yessi Prakash and Olivier Leal is reprinted with the express permission of the authors.  No portion of the Safety Plan Template may be reproduced without the express, written permission.  You can contact the authors at [email or web address] or [email or web address].

## 2024-11-19 NOTE — TELEPHONE ENCOUNTER
Could you please assist in reaching out to  in about 1-2 weeks to see how she does on Clomipramine 50mg before we decide on the next increase. Additionally, can you check if she stop Trintellix then?     Thank you.     [place]     Follow up:  Writer spoke with  for check in. She started the clomipramine 50 mg about two weeks ago (11/7/24), and reports things are going well so far. She denies experiencing any side effects or concerns with the medication. She hasn't noticed any change in mental health symptoms. She stopped Trintellix two weeks ago.   She is open to increase dose to 100 mg after two weeks on 50 mg if Dr Lagunas wants to continue with this plan. She asked that writer send a [employer] message update.    denies any safety or urgent concerns. Confirmed next visit with Dr Lagunas is 12/13/24.

## 2024-12-03 ENCOUNTER — VIRTUAL VISIT (OUTPATIENT)
Dept: PHARMACY | Facility: CLINIC | Age: 29
End: 2024-12-03
Payer: COMMERCIAL

## 2024-12-03 DIAGNOSIS — F41.1 GENERALIZED ANXIETY DISORDER: ICD-10-CM

## 2024-12-03 DIAGNOSIS — F32.9 MAJOR DEPRESSION: Primary | ICD-10-CM

## 2024-12-03 DIAGNOSIS — F42.2 MIXED OBSESSIONAL THOUGHTS AND ACTS: ICD-10-CM

## 2024-12-03 PROCEDURE — 99207 PR NO CHARGE LOS: CPT | Mod: 93 | Performed by: PHARMACIST

## 2024-12-03 NOTE — Clinical Note
Hello! I was able to connect with patient. She is tolerating clomipramine well; mood symptoms largely unchanged. She hasn't increased dose yet so I recommended increasing to 100mg per your previous plan. She is agreeable and will see you again next week -12/13.   Thank you!  Caryn

## 2024-12-03 NOTE — PATIENT INSTRUCTIONS
"Recommendations from today's MTM visit:                                                      Ok to increase clomipramine to 100mg nightly    Follow-up: 12/13/24 with psychiatry provider    It was great speaking with you today.  I value your experience and would be very thankful for your time in providing feedback in our clinic survey. In the next few days, you may receive an email or text message from [facility] with a link to a survey related to your  clinical pharmacist.\"     To schedule another MTM appointment, please call the clinic directly or you may call the MTM scheduling line at [phone] or toll-free at [phone].     My Clinical Pharmacist's contact information:                                                      Please feel free to contact me with any questions or concerns you have.      Caryn Byrne, PharmD, BCPP  Medication Therapy Management Pharmacist  [facility]    "

## 2024-12-03 NOTE — PROGRESS NOTES
Medication Therapy Management (MTM) Encounter    ASSESSMENT:                            Medication Adherence/Access: No issues identified.    Mental Health   Mixed obsessional thoughts, Anxiety, Depression, and ADHD:   Tolerating clomipramine well.  Reviewed strategies to help with dry mouth including maintaining adequate hydration.  Discussed options such as artificial saliva if needed in the future.  Mood symptoms largely unchanged.  Reviewed previous plan as outlined by psychiatry provider with option to increase to 100 mg once daily if tolerated.    PLAN:                            Ok to increase clomipramine to 100mg nightly    Follow-up: 12/13/24 with psychiatry provider    SUBJECTIVE/OBJECTIVE:                          Angela Jones is a 29 year old adult seen for a follow-up visit.       Reason for visit: medication check - clomipramine new start.    Allergies/ADRs: Reviewed in chart  Past Medical History: Reviewed in chart  Tobacco:  reports that  has never smoked.  has never been exposed to tobacco smoke.  has never used smokeless tobacco.  Alcohol: none    Medication Adherence/Access: no issues reported.    Mental Health   Mixed obsessional thoughts, Anxiety, Depression, and ADHD   Mirtazapine 15mg nightly  Clomipramine 50mg once daily  Gabapentin 900mg three times daily; amitriptyline 50mg daily  (takes for pain and migraine)     is seen at [facility]. Most recent visit 11/7/24 at which time clomipramine was increased to 50mg daily with plan to increase further to 50mg twice daily or 100mg once daily.  Medical comorbidities include: POTS, migraine, GI, Pain.     Today, patient reports  - things are going well; overall feels mood symptoms (depression, anxiety) are largely unchanged  - tolerating clomipramine well; no side effects other than mild dry mouth  - hasn't increased clomipramine yet because wasn't sure what the plan was. Prefers to take it  once daily instead of twice daily.   - has stopped Trintellix as directed           ----------------      I spent 15 minutes with this patient today. A copy of the visit note was provided to the patient's provider(s).    A summary of these recommendations was sent via [employer].    aCryn Byrne, PharmD, BCPP  Medication Therapy Management Pharmacist  [facility]      Telemedicine Visit Details  The patient's medications can be safely assessed via a telemedicine encounter.  Type of service:  Telephone visit  Originating Location (pt. Location): Home    Distant Location (provider location):  Off-site  Start Time:  1030 am  End Time: 10:45 AM     Medication Therapy Recommendations  Major depression   1 Current Medication: clomiPRAMINE (ANAFRANIL) 50 MG capsule   Current Medication Sig: Take 1 capsule (50 mg) by mouth 2 times daily.   Rationale: Does not understand instructions - Adherence - Adherence   Recommendation: Provide Education   Status: Patient Agreed - Adherence/Education   Identified Date: 12/3/2024 Completed Date: 12/3/2024

## 2024-12-10 ENCOUNTER — VIRTUAL VISIT (OUTPATIENT)
Dept: BEHAVIORAL HEALTH | Facility: CLINIC | Age: 29
End: 2024-12-10
Payer: COMMERCIAL

## 2024-12-10 DIAGNOSIS — F41.1 GAD (GENERALIZED ANXIETY DISORDER): ICD-10-CM

## 2024-12-10 DIAGNOSIS — F84.0 AUTISM SPECTRUM DISORDER: Primary | ICD-10-CM

## 2024-12-10 DIAGNOSIS — F33.0 MILD EPISODE OF RECURRENT MAJOR DEPRESSIVE DISORDER (H): ICD-10-CM

## 2024-12-10 PROCEDURE — 90785 PSYTX COMPLEX INTERACTIVE: CPT | Mod: 95 | Performed by: SOCIAL WORKER

## 2024-12-10 PROCEDURE — 90834 PSYTX W PT 45 MINUTES: CPT | Mod: 95 | Performed by: SOCIAL WORKER

## 2024-12-10 ASSESSMENT — PATIENT HEALTH QUESTIONNAIRE - PHQ9
SUM OF ALL RESPONSES TO PHQ QUESTIONS 1-9: 15
10. IF YOU CHECKED OFF ANY PROBLEMS, HOW DIFFICULT HAVE THESE PROBLEMS MADE IT FOR YOU TO DO YOUR WORK, TAKE CARE OF THINGS AT HOME, OR GET ALONG WITH OTHER PEOPLE: VERY DIFFICULT
SUM OF ALL RESPONSES TO PHQ QUESTIONS 1-9: 15

## 2024-12-10 NOTE — PROGRESS NOTES
[facility]                                     Progress Note    Patient Name: Angela Jones  Date: 12/10/2024         Service Type: Individual      Session Start Time: 9:00am  Session End Time: 9:45am     Session Length: 45 minutes    Session #: 47    Attendees: Client attended alone    Service Modality:  Video Visit:      Provider verified identity through the following two step process.  Patient provided:  Patient is known previously to provider    Telemedicine Visit: The patient's condition can be safely assessed and treated via synchronous audio and visual telemedicine encounter.      Reason for Telemedicine Visit: Patient has requested telehealth visit    Originating Site (Patient Location): Patient's home    Distant Site (Provider Location): Provider Remote Setting- Home Office    Consent:  The patient/guardian has verbally consented to: the potential risks and benefits of telemedicine (video visit) versus in person care; bill my insurance or make self-payment for services provided; and responsibility for payment of non-covered services.     Patient would like the video invitation sent by:  My Chart    Mode of Communication:  Video Conference via [employer]well      Distant Location (Provider):  Off-site    As the provider I attest to compliance with applicable laws and regulations related to telemedicine.    DATA  Interactive Complexity: Yes, visit entailed Interactive Complexity evidenced by:  -The need to manage maladaptive communication (related to, e.g., high anxiety, high reactivity, repeated questions, or disagreement) among participants that complicates delivery of care; diagnostic criteria for ASD are met and complicate the delivery of services. Patient requires additional support and alternative methods to engage in psychotherapy services.    Crisis: No        Progress Since Last Session (Related to Symptoms / Goals / Homework):   Symptoms: No change :  anxiety and  depression    Homework: Partially completed      Episode of Care Goals: Minimal progress - PREPARATION (Decided to change - considering how); Intervened by negotiating a change plan and determining options / strategies for behavior change, identifying triggers, exploring social supports, and working towards setting a date to begin behavior change     Current / Ongoing Stressors and Concerns:  Patient provides a status update while therapist utilizes reflective listening skills. Patient shares about Thanksgiving and a big group gathering. She shares about helping a couple move up from [facility]. She shares about being worried about growing dependent upon her community. She shares that her partner is doing a lot better. She is going to find a new apartment by next year. She identifies some financial concerns and plans to talk to her . She reports that physical concerns (chronic pain, fatigue, dizziness, shakiness) are making it hard to work 30 hours per week. There is a court date set for January related to the divorce.     Therapist assessed for risk and safety - patient denied any current SI/SIB.  Should there be an increase in symptoms or severity related to SI/SIB, patient should call 911 or go to local ED for evaluation.         Treatment Objective(s) Addressed in This Session:   identify the fears / thoughts that contribute to feeling anxious  state understanding of stressors and relationship to physical symptoms  Increase interest, engagement, and pleasure in doing things  Decrease frequency and intensity of feeling down, depressed, hopeless  Identify negative self-talk and behaviors: challenge core beliefs, myths, and actions  Gain insight     Intervention:   Discussed mindfulness as being aware of what we are experiencing while we are experiencing it.  Contrasted this with avoidance and rumination.  Explored the role of mindfulness as an overall coping strategy for managing depression, anxiety, and  strong emotions.  Explained concept of state of mind using SIFT (sensations, images, feelings, thoughts) pneumonic. Discussed mindfulness as a tool to intentionally shift our awareness and focus as needed.  Discussed physical, mental, and emotional boundaries and limit-setting with others. Explored management of boundaries through direct communication and limiting contact and communication with others.  Discussed barriers to using boundary management skills including strong emotions and physical proximity.  Therapist provided unconditional positive regard and supportive counseling.     Assessments completed prior to visit:   The following assessments were completed by patient for this visit:  PHQ9:       8/29/2024     8:01 AM 9/12/2024     7:46 AM 10/25/2024     9:07 AM 11/5/2024     8:30 AM 11/7/2024     8:20 AM 11/19/2024     8:51 AM 12/10/2024     8:59 AM   PHQ-9 SCORE   PHQ-9 Total Score MyChart 18 (Moderately severe depression) 18 (Moderately severe depression) 17 (Moderately severe depression) 19 (Moderately severe depression) 18 (Moderately severe depression) 14 (Moderate depression) 15 (Moderately severe depression)   PHQ-9 Total Score 18 18    18 17  19  18  14  15        Patient-reported    Multiple values from one day are sorted in reverse-chronological order     GAD7:       7/25/2024     9:02 AM 8/2/2024     1:02 PM 8/15/2024     8:48 AM 8/29/2024     8:02 AM 9/12/2024     7:47 AM 10/25/2024     9:07 AM 11/19/2024     8:52 AM   JOSHUA-7 SCORE   Total Score 7 (mild anxiety) 8 (mild anxiety) 12 (moderate anxiety) 8 (mild anxiety) 9 (mild anxiety) 13 (moderate anxiety) 10 (moderate anxiety)   Total Score 7 8 12 8 9    9 13  10        Patient-reported    Multiple values from one day are sorted in reverse-chronological order     PROMIS 10-Global Health (only subscores and total score):       2/27/2024    11:02 AM 4/12/2024    11:00 AM 7/12/2024     8:07 AM 8/2/2024     1:03 PM 11/5/2024     8:32 AM 11/19/2024      8:53 AM 12/10/2024     9:00 AM   PROMIS-10 Scores Only   Global Mental Health Score 12 14 14 13 9  10  9    Global Physical Health Score 11 12 12 12 12  11  11    PROMIS TOTAL - SUBSCORES 23 26 26 25 21  21  20        Patient-reported      [place] Suicide Severity Rating Scale (Short Version)      2/17/2022     3:02 PM 9/5/2024     7:12 AM 10/25/2024     9:23 AM 11/5/2024     9:16 AM   [place] Suicide Severity Rating (Short Version)   Q1 Wished to be Dead (Past Month) no      Q2 Suicidal Thoughts (Past Month) no      Q3 Suicidal Thought Method no      Q4 Suicidal Intent without Specific Plan no      Q5 Suicide Intent with Specific Plan no      Q6 Suicide Behavior (Lifetime) yes      If yes to Q6, within past 3 months? no      Level of Risk per Screen moderate risk      1. Wish to be Dead (Since Last Contact)  N N N   2. Non-Specific Active Suicidal Thoughts (Since Last Contact)  N Y N   Non-Specific Active Suicidal Thought Description (Since Last Contact)   images about items to use for self-harm    3. Active Suicidal Ideation with any Methods (Not Plan) Without Intent to Act (Since Last Contact)   N N   4. Active Suicidal Ideation with Some Intent to Act, Without Specific Plan (Since Last Contact)   N N   5. Active Suicidal Ideation with Specific Plan and Intent (Since Last Contact)   N N   Most Severe Ideation Rating (Since Last Contact)   1 1   Frequency (Since Last Contact)   3 3   Duration (Since Last Contact)   3 2   Deterrents (Since Last Contact)   1 1   Reasons for Ideation (Since Last Contact)   5 0   Actual Attempt (Since Last Contact)  N N N   Has subject engaged in non-suicidal self-injurious behavior? (Since Last Contact)  N N N   Interrupted Attempts (Since Last Contact)  N N N   Aborted or Self-Interrupted Attempt (Since Last Contact)  N N N   Preparatory Acts or Behavior (Since Last Contact)  N N N   Suicide (Since Last Contact)  N N N   Calculated C-SSRS Risk Score (Since Last Contact)  No Risk  Indicated Low Risk No Risk Indicated          ASSESSMENT: Current Emotional / Mental Status (status of significant symptoms):   Risk status (Self / Other harm or suicidal ideation)   Patient denies current fears or concerns for personal safety.   Patient denies current or recent suicidal ideation or behaviors.   Patient denies current or recent homicidal ideation or behaviors.   Patient denies current or recent self injurious behavior or ideation.   Patient denies other safety concerns.   Patient reports there has been no change in risk factors since their last session.     Patient reports there has been no change in protective factors since their last session.     A safety and risk management plan has been developed including: Patient consented to co-developed safety plan.  Safety and risk management plan was completed - see below.  Patient agreed to use safety plan should any safety concerns arise.  A copy was given to the patient.     Appearance:   Appropriate    Eye Contact:   Good    Psychomotor Behavior: Hyperactive  Restless    Attitude:   Cooperative    Orientation:   All   Speech    Rate / Production: Pressured  Stutters    Volume:  Normal    Mood:    Anxious  Depressed  Anhedonia   Affect:    Constricted  Flat  Worrisome    Thought Content:  Clear    Thought Form:  Coherent    Insight:    Good      Medication Review:   Changes to psychiatric medications, see updated Medication List in EPIC.      Medication Compliance:   Yes     Changes in Health Issues:   None reported     Chemical Use Review:   Substance Use: Chemical use reviewed, no active concerns identified      Tobacco Use: No current tobacco use.      Diagnosis:  1. Autism spectrum disorder    2. JOSHUA (generalized anxiety disorder)    3. Mild episode of recurrent major depressive disorder (H)    ADHD    Collateral Reports Completed:   Not Applicable    PLAN: (Patient Tasks / Therapist Tasks / Other)  Patient will return for a virtual visit in  January  Patient encouraged to practice healthy boundaries  Patient will utilize her safety plan   Patient encouraged to journal and use daily planner  Patient encouraged to prioritize self-care       There has been demonstrated improvement in functioning while patient has been engaged in psychotherapy/psychological service- if withdrawn the patient would deteriorate and/or relapse.       Fanny Cobb, [facility]                                                         ______________________________________________________________________    Individual Treatment Plan    Patient's Name: Angela Jones  YOB: 1995    Date of Creation: 1/19/2022  Date Treatment Plan Last Reviewed/Revised: 11/5/2024    DSM5 Diagnoses: Autism Spectrum Disorder 299.00(F84.0)  Associated with another neurodevelopmental, mental or behavioral disorder and Attention-Deficit/Hyperactivity Disorder  314.01 (F90.9) Unspecified Attention -Deficit / Hyperactivity Disorder, 296.31 (F33.0) Major Depressive Disorder, Recurrent Episode, Mild _ or 300.02 (F41.1) Generalized Anxiety Disorder  Psychosocial / Contextual Factors: 29 year old  female, , no children   PROMIS (reviewed every 90 days):   PROMIS 10-Global Health (only subscores and total score):       11/7/2023    11:56 AM 11/21/2023     8:55 AM 2/27/2024    11:02 AM 4/12/2024    11:00 AM 7/12/2024     8:07 AM 8/2/2024     1:03 PM 11/5/2024     8:32 AM   PROMIS-10 Scores Only   Global Mental Health Score 10    10 11 12 14 14 13 9    Global Physical Health Score 12    12 12 11 12 12 12 12    PROMIS TOTAL - SUBSCORES 22    22 23 23 26 26 25 21          Referral / Collaboration:  Referral to another professional/service is not indicated at this time..    Anticipated number of session for this episode of care: 6-9  Anticipation frequency of session: Every other week  Anticipated Duration of each session: 53 or more minutes  Treatment plan will be reviewed in 90 days  or when goals have been changed.       MeasurableTreatment Goal(s) related to diagnosis / functional impairment(s)  Goal 1: Patient will manage symptoms of anxiety, as evidenced by a JOSHUA-7 score of 5 or lower     I will know I've met my goal when I feel more confident in my ability to cope with stress.      Objective #A (Patient Action)    Patient will identify the initial signs or symptoms of anxiety.  Status: Continued - Date(s): 11/5/2024    Intervention(s)  Therapist will teach  help patient gain insight into signs of stress (physically and emotionally) .    Objective #B  Patient will use relaxation strategies multiple times per day to reduce the physical symptoms of anxiety.  Status: Continued - Date(s): 11/5/2024    Intervention(s)  Therapist will  teach diaphragmatic breathing and other grounding skills .    Objective #C  Patient will use thought-stopping strategy daily to reduce intrusive thoughts.  Status: Continued - Date(s): 11/5/2024    Intervention(s)  Therapist will  teach thought stopping techniques .      Goal 2: Patient will manage symptoms of depression, as evidenced by a PHQ-9 score of 10 or lower    I will know I've met my goal when I feel more confident in my ability to express my emotions in a healthy way.      Objective #A (Patient Action)    Status: Continued - Date(s): 11/5/2024    Patient will Increase interest, engagement, and pleasure in doing things.    Intervention(s)  Therapist will teach emotional recognition/identification. * .    Objective #B  Patient will Identify negative self-talk and behaviors: challenge core beliefs, myths, and actions.    Status: Continued - Date(s): 11/5/2024    Intervention(s)  Therapist will  help patient practice positive self-talk and thought re-framing .    Goal 3: Client will manage symptoms of ADHD     I will know I've met my goal when I feel confident in completing tasks.      Objective #A (Client Action)    Client will use daily planner 75% of the  "time.  Status: Continued - Date(s): 11/5/2024    Intervention(s)  Therapist will  ask client to demonstrate use of planner while in session .    Objective #B  Client will identify and compliment positives at least 2 times daily to support positive self-image.    Status: Continued - Date(s): 11/5/2024    Intervention(s)  Therapist will  ask patient to demonstrate use of affirmations during session .    Goal 4: Client will manage symptoms and experiences associated with ASD     I will know I've met my goal when I feel confident in my communication in relationships.      Objective #A (Client Action)    Client will work on being assertive and setting clear expectations with healthy boundaries in relationships  Status: Continued - Date(s): 11/5/2024    Intervention(s)  Therapist will  help patient practice assertive communication skills .        Patient has reviewed and agreed to the above plan.      Fanny Cobb, [facility]  November 5, 2024        [facility]                                       Angela Jones     SAFETY PLAN:  Step 1: Warning signs / cues (Thoughts, images, mood, situation, behavior) that a crisis may be developing:  Thoughts: \"I can't do this anymore\" and \"Nothing makes it better\" \"I'm a burden\"   Images: visions of harm: self-harm  Thinking Processes: ruminations (can't stop thinking about my problems):  , racing thoughts, intrusive thoughts (bothersome, unwanted thoughts that come out of nowhere):  , highly critical and negative thoughts:  , and disorganized thinking:    Mood: worsening depression, hopelessness, helplessness, intense worry, and agitation  Behaviors: can't stop crying, not taking care of myself, and not taking care of my responsibilities  Situations: relationship problems and financial stress   Step 2: Coping strategies - Things I can do to take my mind off of my problems without contacting another person (relaxation technique, physical activity):  Distress " "Tolerance Strategies:  relaxation activities:  , arts and crafts:  , play with my pet , sensory based activities/self-soothe with five senses:  , change body temperature (ice pack/cold water) , and paced breathing/progressive muscle relaxation, grounding exercise of naming objects, weighted items (sock filled with rice), play with puddy, use sensory items   Physical Activities: meditation, deep breathing, and stretching ; taking a shower,   Focus on helpful thoughts:  \"This is temporary\", \"I will get through this\", \"It always passes\", and self-compassion statements:    Step 3: People and social settings that provide distraction:   Name: Zander    Name: Demetri  park and work   Step 4: Remind myself of people and things that are important to me and worth living for:  my family and pets and friends  Step 5: When I am in crisis, I can ask these people to help me use my safety plan:   Name: Demetri Fermin    Step 6: Making the environment safe:   dispose of old medications , remove things I could use to hurt myself:  , and be around others  Step 7: Professionals or agencies I can contact during a crisis:  Suicide Prevention Lifeline: Call or Text 983   [facility]: [phone]    Call 911 or go to my nearest emergency department.   I helped develop this safety plan and agree to use it when needed.  I have been given a copy of this plan.    Client signature _________________________________________________________________  Today s date:  7/25/2024  Completed by Provider Name/ Credentials:  LAST Blackmon  July 25, 2024  Adapted from Safety Plan Template 2008 Yessi Prakash and Olivier Leal is reprinted with the express permission of the authors.  No portion of the Safety Plan Template may be reproduced without the express, written permission.  You can contact the authors at [email or web address] or [email or web address].        "

## 2024-12-19 ENCOUNTER — MYC MEDICAL ADVICE (OUTPATIENT)
Dept: FAMILY MEDICINE | Facility: CLINIC | Age: 29
End: 2024-12-19
Payer: COMMERCIAL

## 2024-12-19 ENCOUNTER — MYC MEDICAL ADVICE (OUTPATIENT)
Dept: PSYCHIATRY | Facility: CLINIC | Age: 29
End: 2024-12-19
Payer: COMMERCIAL

## 2024-12-19 DIAGNOSIS — F33.41 RECURRENT MAJOR DEPRESSIVE DISORDER, IN PARTIAL REMISSION (H): ICD-10-CM

## 2024-12-19 DIAGNOSIS — F42.2 MIXED OBSESSIONAL THOUGHTS AND ACTS: ICD-10-CM

## 2024-12-19 DIAGNOSIS — M79.7 FIBROMYALGIA: Primary | ICD-10-CM

## 2024-12-19 DIAGNOSIS — K21.9 GASTROESOPHAGEAL REFLUX DISEASE WITHOUT ESOPHAGITIS: ICD-10-CM

## 2024-12-19 DIAGNOSIS — Z76.0 ENCOUNTER FOR MEDICATION REFILL: ICD-10-CM

## 2024-12-19 DIAGNOSIS — K58.9 IRRITABLE BOWEL SYNDROME WITHOUT DIARRHEA: ICD-10-CM

## 2024-12-19 DIAGNOSIS — T50.905A MEDICATION SIDE EFFECTS, INITIAL ENCOUNTER: ICD-10-CM

## 2024-12-19 DIAGNOSIS — G43.809 OTHER MIGRAINE, NOT INTRACTABLE, WITHOUT STATUS MIGRAINOSUS: ICD-10-CM

## 2024-12-19 DIAGNOSIS — F41.1 GENERALIZED ANXIETY DISORDER: ICD-10-CM

## 2024-12-19 DIAGNOSIS — G90.A POTS (POSTURAL ORTHOSTATIC TACHYCARDIA SYNDROME): ICD-10-CM

## 2024-12-19 RX ORDER — MIRTAZAPINE 15 MG/1
15 TABLET, FILM COATED ORAL AT BEDTIME
Qty: 90 TABLET | Refills: 0 | Status: CANCELLED | OUTPATIENT
Start: 2024-12-19

## 2024-12-19 RX ORDER — OMEPRAZOLE 10 MG/1
20 CAPSULE, DELAYED RELEASE ORAL DAILY
Qty: 90 CAPSULE | Refills: 3 | Status: SHIPPED | OUTPATIENT
Start: 2024-12-19

## 2024-12-19 RX ORDER — CLOMIPRAMINE HYDROCHLORIDE 50 MG/1
100 CAPSULE ORAL AT BEDTIME
Qty: 180 CAPSULE | Refills: 0 | Status: CANCELLED | OUTPATIENT
Start: 2024-12-19

## 2024-12-19 RX ORDER — METOCLOPRAMIDE 5 MG/1
5 TABLET ORAL 3 TIMES DAILY PRN
Qty: 90 TABLET | Refills: 3 | Status: SHIPPED | OUTPATIENT
Start: 2024-12-19

## 2024-12-19 RX ORDER — GABAPENTIN 300 MG/1
900 CAPSULE ORAL 3 TIMES DAILY
Qty: 810 CAPSULE | Refills: 3 | Status: SHIPPED | OUTPATIENT
Start: 2024-12-19

## 2024-12-19 RX ORDER — FLUDROCORTISONE ACETATE 0.1 MG/1
0.2 TABLET ORAL DAILY
Qty: 180 TABLET | Refills: 1 | Status: SHIPPED | OUTPATIENT
Start: 2024-12-19

## 2024-12-19 RX ORDER — MIRTAZAPINE 15 MG/1
15 TABLET, FILM COATED ORAL AT BEDTIME
Qty: 90 TABLET | Refills: 3 | Status: SHIPPED | OUTPATIENT
Start: 2024-12-19

## 2024-12-19 RX ORDER — TIZANIDINE 2 MG/1
2 TABLET ORAL DAILY PRN
Qty: 90 TABLET | Refills: 3 | Status: SHIPPED | OUTPATIENT
Start: 2024-12-19

## 2024-12-19 NOTE — TELEPHONE ENCOUNTER
Last seen: 11/7/24  RTC: 4 weeks  Cancel: none  No-show: 12/13/24  Next appt: 1/24/25      Medication requested:   Pending Prescriptions:                       Disp   Refills    clomiPRAMINE (ANAFRANIL) 50 MG capsule    180 ca*0            Sig: Take 2 capsules (100 mg) by mouth at bedtime.    mirtazapine (REMERON) 15 MG tablet        90 tab*0            Sig: Take 1 tablet (15 mg) by mouth at bedtime.      From chart note:      1) Medications:      - Increase Clomipramine to 50mg at bedtime for two weeks then increase to 100mg until next visit (nurse to check in how she tolerates 50mg before deciding the next increase; 75mg vs. 50mg BID vs. 100mg at bedtime)   - Patient was reminded to stop Trintellix 10mg  - Continue Mirtazapine 15mg at bedtime for mood      Other:   Gabapentin 900mg TID   Amitryptyline 25mg for chronic pain   Ibuprofen 200mg q4h PRN    MTV      Medication unable to be refilled by RN due to criteria not met as indicated.                 []Eligibility - not seen in the last year              []Supervision - no future appointment              [x]Compliance - no shows, cancellations or lapse in therapy              []Verification - order discrepancy              []Controlled medication              []Medication not included in policy              [x]90-day supply request              []Other:

## 2024-12-19 NOTE — TELEPHONE ENCOUNTER
Patient Andelhart message sent requesting 90 days supply medications for the upcoming new year due to deductible purpose.    Will route encounter to provider.    Fran Power RN  [facility]

## 2025-01-05 ENCOUNTER — HOSPITAL ENCOUNTER (OUTPATIENT)
Facility: CLINIC | Age: 30
Setting detail: OBSERVATION
Discharge: STILL ADMITTED OR ADMINISTRATIVE | End: 2025-01-08
Attending: EMERGENCY MEDICINE | Admitting: EMERGENCY MEDICINE
Payer: COMMERCIAL

## 2025-01-05 DIAGNOSIS — F42.9 OBSESSIVE-COMPULSIVE DISORDER, UNSPECIFIED TYPE: ICD-10-CM

## 2025-01-05 DIAGNOSIS — R45.851 SUICIDAL IDEATION: ICD-10-CM

## 2025-01-05 DIAGNOSIS — F33.1 MODERATE EPISODE OF RECURRENT MAJOR DEPRESSIVE DISORDER (H): ICD-10-CM

## 2025-01-05 DIAGNOSIS — F41.1 GENERALIZED ANXIETY DISORDER: ICD-10-CM

## 2025-01-05 PROBLEM — F84.0 AUTISM SPECTRUM DISORDER: Status: ACTIVE | Noted: 2022-02-18

## 2025-01-05 PROCEDURE — G0378 HOSPITAL OBSERVATION PER HR: HCPCS

## 2025-01-05 PROCEDURE — 99223 1ST HOSP IP/OBS HIGH 75: CPT | Mod: 95 | Performed by: NURSE PRACTITIONER

## 2025-01-05 PROCEDURE — 99285 EMERGENCY DEPT VISIT HI MDM: CPT

## 2025-01-05 PROCEDURE — 250N000013 HC RX MED GY IP 250 OP 250 PS 637: Performed by: NURSE PRACTITIONER

## 2025-01-05 RX ORDER — CLOMIPRAMINE HYDROCHLORIDE 50 MG/1
100 CAPSULE ORAL AT BEDTIME
Status: DISCONTINUED | OUTPATIENT
Start: 2025-01-05 | End: 2025-01-08 | Stop reason: HOSPADM

## 2025-01-05 RX ORDER — METHOCARBAMOL 750 MG/1
750 TABLET, FILM COATED ORAL 4 TIMES DAILY PRN
Status: DISCONTINUED | OUTPATIENT
Start: 2025-01-05 | End: 2025-01-08 | Stop reason: HOSPADM

## 2025-01-05 RX ORDER — ACETAMINOPHEN 325 MG/1
650 TABLET ORAL EVERY 4 HOURS PRN
Status: DISCONTINUED | OUTPATIENT
Start: 2025-01-05 | End: 2025-01-08 | Stop reason: HOSPADM

## 2025-01-05 RX ORDER — TRAZODONE HYDROCHLORIDE 50 MG/1
50 TABLET, FILM COATED ORAL
Status: DISCONTINUED | OUTPATIENT
Start: 2025-01-05 | End: 2025-01-08 | Stop reason: HOSPADM

## 2025-01-05 RX ORDER — ONDANSETRON 4 MG/1
4 TABLET, ORALLY DISINTEGRATING ORAL EVERY 6 HOURS PRN
Status: DISCONTINUED | OUTPATIENT
Start: 2025-01-05 | End: 2025-01-08 | Stop reason: HOSPADM

## 2025-01-05 RX ORDER — TIZANIDINE 2 MG/1
2 TABLET ORAL DAILY PRN
Status: DISCONTINUED | OUTPATIENT
Start: 2025-01-05 | End: 2025-01-05

## 2025-01-05 RX ORDER — FLUDROCORTISONE ACETATE 0.1 MG/1
0.2 TABLET ORAL DAILY
Status: DISCONTINUED | OUTPATIENT
Start: 2025-01-06 | End: 2025-01-05

## 2025-01-05 RX ORDER — MIRTAZAPINE 15 MG/1
15 TABLET, FILM COATED ORAL AT BEDTIME
Status: DISCONTINUED | OUTPATIENT
Start: 2025-01-05 | End: 2025-01-05

## 2025-01-05 RX ORDER — OLANZAPINE 10 MG/2ML
10 INJECTION, POWDER, FOR SOLUTION INTRAMUSCULAR 2 TIMES DAILY PRN
Status: DISCONTINUED | OUTPATIENT
Start: 2025-01-05 | End: 2025-01-08 | Stop reason: HOSPADM

## 2025-01-05 RX ORDER — OLANZAPINE 10 MG/1
10 TABLET, ORALLY DISINTEGRATING ORAL 2 TIMES DAILY PRN
Status: DISCONTINUED | OUTPATIENT
Start: 2025-01-05 | End: 2025-01-08 | Stop reason: HOSPADM

## 2025-01-05 RX ORDER — IBUPROFEN 200 MG
200 TABLET ORAL EVERY 4 HOURS PRN
Status: DISCONTINUED | OUTPATIENT
Start: 2025-01-05 | End: 2025-01-08 | Stop reason: HOSPADM

## 2025-01-05 RX ORDER — AMITRIPTYLINE HYDROCHLORIDE 50 MG/1
50 TABLET ORAL AT BEDTIME
Status: DISCONTINUED | OUTPATIENT
Start: 2025-01-05 | End: 2025-01-08 | Stop reason: HOSPADM

## 2025-01-05 RX ORDER — PANTOPRAZOLE SODIUM 40 MG/1
40 TABLET, DELAYED RELEASE ORAL
Status: DISCONTINUED | OUTPATIENT
Start: 2025-01-06 | End: 2025-01-08 | Stop reason: HOSPADM

## 2025-01-05 RX ORDER — FLUDROCORTISONE ACETATE 0.1 MG/1
0.2 TABLET ORAL AT BEDTIME
Status: DISCONTINUED | OUTPATIENT
Start: 2025-01-05 | End: 2025-01-08 | Stop reason: HOSPADM

## 2025-01-05 RX ORDER — MIRTAZAPINE 30 MG/1
30 TABLET, FILM COATED ORAL AT BEDTIME
Status: DISCONTINUED | OUTPATIENT
Start: 2025-01-05 | End: 2025-01-08 | Stop reason: HOSPADM

## 2025-01-05 RX ORDER — MULTIVITAMIN,THERAPEUTIC
1 TABLET ORAL DAILY
Status: DISCONTINUED | OUTPATIENT
Start: 2025-01-06 | End: 2025-01-08 | Stop reason: HOSPADM

## 2025-01-05 RX ORDER — GABAPENTIN 300 MG/1
900 CAPSULE ORAL 3 TIMES DAILY
Status: DISCONTINUED | OUTPATIENT
Start: 2025-01-05 | End: 2025-01-08 | Stop reason: HOSPADM

## 2025-01-05 RX ORDER — HYDROXYZINE HYDROCHLORIDE 50 MG/1
50 TABLET, FILM COATED ORAL EVERY 6 HOURS PRN
Status: DISCONTINUED | OUTPATIENT
Start: 2025-01-05 | End: 2025-01-08 | Stop reason: HOSPADM

## 2025-01-05 RX ADMIN — FLUDROCORTISONE ACETATE 0.2 MG: 0.1 TABLET ORAL at 22:57

## 2025-01-05 RX ADMIN — MIRTAZAPINE 30 MG: 30 TABLET, FILM COATED ORAL at 22:04

## 2025-01-05 RX ADMIN — GABAPENTIN 900 MG: 300 CAPSULE ORAL at 21:20

## 2025-01-05 RX ADMIN — AMITRIPTYLINE HYDROCHLORIDE 50 MG: 50 TABLET, FILM COATED ORAL at 22:04

## 2025-01-05 RX ADMIN — CLOMIPRAMINE HYDROCHLORIDE 100 MG: 50 CAPSULE ORAL at 22:04

## 2025-01-05 ASSESSMENT — COLUMBIA-SUICIDE SEVERITY RATING SCALE - C-SSRS
2. HAVE YOU ACTUALLY HAD ANY THOUGHTS OF KILLING YOURSELF IN THE PAST MONTH?: YES
5. HAVE YOU STARTED TO WORK OUT OR WORKED OUT THE DETAILS OF HOW TO KILL YOURSELF? DO YOU INTEND TO CARRY OUT THIS PLAN?: YES
4. HAVE YOU HAD THESE THOUGHTS AND HAD SOME INTENTION OF ACTING ON THEM?: YES
6. HAVE YOU EVER DONE ANYTHING, STARTED TO DO ANYTHING, OR PREPARED TO DO ANYTHING TO END YOUR LIFE?: NO
3. HAVE YOU BEEN THINKING ABOUT HOW YOU MIGHT KILL YOURSELF?: YES
1. IN THE PAST MONTH, HAVE YOU WISHED YOU WERE DEAD OR WISHED YOU COULD GO TO SLEEP AND NOT WAKE UP?: YES

## 2025-01-05 ASSESSMENT — ACTIVITIES OF DAILY LIVING (ADL)
ADLS_ACUITY_SCORE: 41

## 2025-01-05 NOTE — ED TRIAGE NOTES
Reporting intrusive thoughts of self harm and suicide over the last two weeks. Has affected her ability to work.  She states she has been looking at sharp objects, with intent to kill self.  Has never had a suicide attempt.  Family has noted withdrawn mood.       Triage Assessment (Adult)       Row Name 01/05/25 1428          Triage Assessment    Airway WDL WDL        Respiratory WDL    Respiratory WDL WDL        Skin Circulation/Temperature WDL    Skin Circulation/Temperature WDL WDL        Cardiac WDL    Cardiac WDL X     Cardiac Rhythm ST        Peripheral/Neurovascular WDL    Peripheral Neurovascular WDL WDL        Cognitive/Neuro/Behavioral WDL    Cognitive/Neuro/Behavioral WDL WDL

## 2025-01-05 NOTE — ED NOTES
[facility]  ED to EMPATH Checklist:      Goal for EMPATH: Suicidality    Current Behavior: calm    Safety Concerns: Suicidal, with a plan to    Legal Hold Status: Voluntary    Medically Cleared by ED provider: Yes    Patient Therapeutically Searched: Therapeutic search by ED staff (strings, belts, shoes, pockets, electronics, etc.)    Belongings: In room locker    Independent Ambulation at Baseline: Yes/No: Yes    Participates in Care/Conversation: Yes/No: Yes    Patient Informed about EMPATH: Yes/No: Yes    DEC: Not ordered    Patient Ready to be Transferred to EMPATH? Yes/No: Yes

## 2025-01-05 NOTE — ED PROVIDER NOTES
Emergency Department Note      History of Present Illness     Chief Complaint   Suicidal      HPI   nAgela Jones is a 29 year old adult with a history of generalized anxiety disorder, Migraine and self mutilating behavior who presents for evaluation of a suicidal ideation. They states that they have been having intrusive thoughts of self harm over past couple weeks with cutting themselves with sharp objects. No previous attempt. Denies any other modes of self harm including ingestion of drug or any alcohol or drug use. They have a outpatient therapist who suggested empath unit if their mental health worsens.     Independent Historian   None    Review of External Notes   December 10, 2024 psychotherapist note    Past Medical History     Medical History and Problem List   Generalized anxiety disorder   Migraine   Depression   OCD  Self mutilating behavior     Medications   Amitriptyline   Cetirizine    Clomipramine    Dexamethasone    Diphenhydramine    Fludrocortisone    Gabapentin    Hyoscyamine    Ibuprofen    Levonorgestrel    Metoclopramide    Mirtazapine    Omeprazole    Tizanidine      Surgical History   Upper GI endoscopy   Physical Exam     Patient Vitals for the past 24 hrs:   BP Temp Temp src Pulse Resp SpO2 Weight   01/05/25 1432 120/79 -- -- -- -- -- --   01/05/25 1427 -- 98.7  F (37.1  C) Temporal 103 16 98 % 56.7 kg (125 lb)     Physical Exam  Physical Exam   Nursing note and vitals reviewed.  General: Oriented to person, place, and time. Appears well-developed and well-nourished.   Head: No signs of trauma.   Mouth/Throat: Oropharynx is clear and moist.   Eyes: Conjunctivae are normal. Pupils are equal, round, and reactive to light.   Neck: Normal range of motion. No nuchal rigidity.   Cardiovascular: Normal rate and regular rhythm.    Respiratory: Effort normal and breath sounds normal. No respiratory distress.   Abdominal: Soft. There is no tenderness. There is no guarding.   Musculoskeletal:  Normal range of motion. no edema.   Neurological: The patient is alert and oriented to person, place, and time.  PERRLA, EOMI, visual fields intact, strength in upper/lower extremities normal and symmetrical.   Sensation normal. Speech normal  GCS eye subscore is 4. GCS verbal subscore is 5. GCS motor subscore is 6.   Skin: Skin is warm and dry. No rash noted.   Psychiatric: normal mood and affect. behavior is normal.      Diagnostics     Lab Results   Labs Ordered and Resulted from Time of ED Arrival to Time of ED Departure - No data to display    Imaging   No orders to display       Independent Interpretation   None    ED Course      Medications Administered   Medications - No data to display    Procedures   Procedures     Discussion of Management   None    ED Course   ED Course as of 01/05/25 1501   Sun Jan 05, 2025   4565 I obtained the history and examined the patient as above.        Additional Documentation  None    Medical Decision Making / Diagnosis     CMS Diagnoses: None    MIPS       None    MDM   Angela Jones is a 29 year old adult with a history of increased suicidal ideation.  The patient has no signs of a toxic ingestion.  There is no medical issues that appear to be attributing to her symptoms.  She denies drug or alcohol abuse.  Patient is pleasant and cooperative.  She is appropriate for transfer to the empath unit.    Disposition   The patient was transferred to [facility].     Diagnosis     ICD-10-CM    1. Suicidal ideation  R45.851       2. Moderate episode of recurrent major depressive disorder (H)  F33.1       3. Generalized anxiety disorder  F41.1       4. Obsessive-compulsive disorder, unspecified type  F42.9            Scribe Disclosure:  Cori MIRANDA, am serving as a scribe at 3:01 PM on 1/5/2025 to document services personally performed by Farhad Fernandes MD based on my observations and the provider's statements to me.        Farhad Fernandes MD  01/06/25 3074

## 2025-01-06 PROCEDURE — G0378 HOSPITAL OBSERVATION PER HR: HCPCS

## 2025-01-06 PROCEDURE — 99233 SBSQ HOSP IP/OBS HIGH 50: CPT | Performed by: PSYCHIATRY & NEUROLOGY

## 2025-01-06 PROCEDURE — 250N000013 HC RX MED GY IP 250 OP 250 PS 637: Performed by: NURSE PRACTITIONER

## 2025-01-06 RX ADMIN — PANTOPRAZOLE SODIUM 40 MG: 40 TABLET, DELAYED RELEASE ORAL at 07:34

## 2025-01-06 RX ADMIN — GABAPENTIN 900 MG: 300 CAPSULE ORAL at 20:47

## 2025-01-06 RX ADMIN — MIRTAZAPINE 30 MG: 30 TABLET, FILM COATED ORAL at 20:47

## 2025-01-06 RX ADMIN — AMITRIPTYLINE HYDROCHLORIDE 50 MG: 50 TABLET, FILM COATED ORAL at 20:48

## 2025-01-06 RX ADMIN — GABAPENTIN 900 MG: 300 CAPSULE ORAL at 07:33

## 2025-01-06 RX ADMIN — MULTIVITAMIN TABLET 1 TABLET: TABLET at 07:34

## 2025-01-06 RX ADMIN — CLOMIPRAMINE HYDROCHLORIDE 100 MG: 50 CAPSULE ORAL at 20:49

## 2025-01-06 RX ADMIN — FLUDROCORTISONE ACETATE 0.2 MG: 0.1 TABLET ORAL at 20:48

## 2025-01-06 RX ADMIN — IBUPROFEN 200 MG: 200 TABLET, FILM COATED ORAL at 11:04

## 2025-01-06 RX ADMIN — GABAPENTIN 900 MG: 300 CAPSULE ORAL at 13:20

## 2025-01-06 ASSESSMENT — ACTIVITIES OF DAILY LIVING (ADL)
ADLS_ACUITY_SCORE: 41

## 2025-01-06 NOTE — ED NOTES
Pt has been sleeping in sensory room A with no interruptions > 6 hrs. No distress noted; respirations even and ulabored.

## 2025-01-06 NOTE — ED PROVIDER NOTES
[facility] Unit - Psychiatric Consultation  [facility] Emergency Department    Angela Jones MRN: [id number]   Age: 29 year old YOB: 1995     History     Chief Complaint   Patient presents with    Suicidal     HPI  Angela Jones is a 29 year old adult with history notable for OCD, major depressive disorder, and a generalized anxiety disorder who is currently under observation status on the [facility] unit, now approaching 24 hours in the emergency department.  Overnight, there were no acute issues.  On reassessment today, the patient was sleeping in a sensory room.  They awoke easily and accompanied me to the interview room for our meeting.  The patient notes taking clomipramine for a little over 1 month and tolerating the medication well since its last dose increase about 2 weeks ago.  The patient notes no meaningful improvement since starting the medication.  They are tolerating the higher dose of mirtazapine without side effects.  Sleep is fair.  Energy is low.  Anxiety is moderate to high.  Depressive symptoms are characterized as moderate.  Intrusive thoughts related to underlying OCD persist and the patient is hopeful to gain reduction of that symptom.  Intrusive thoughts are related to thoughts of self-injury and harm.  The patient denied active thoughts of suicide although alluded to chronic passive thoughts, currently complicated by intrusive thoughts of self-harm.  The patient feels safe on the unit.  There was no indication of psychosis or homicidal thoughts.    Past Medical History  Past Medical History:   Diagnosis Date    Flat foot 3/25/2014    JOSHUA (generalised anxiety disorder) 3/25/2014    Hypoxia in liveborn infant     born hypoxic    Migraine     Migraine with aura 11/4/2014    Migraines     Moderate major depression (H) 3/25/2014    OCD (obsessive compulsive disorder) 8/4/2013    Palpitations     Self mutilating behavior 4/5/2013    cut self with pencil sharpener blade, left arm      Past Surgical History:   Procedure Laterality Date    NO PAST SURGERIES      UPPER GI ENDOSCOPY  6/7/12     amitriptyline (ELAVIL) 25 MG tablet  clomiPRAMINE (ANAFRANIL) 50 MG capsule  dexAMETHasone (DECADRON) 2 MG tablet  diphenhydrAMINE (BENADRYL) 50 MG capsule  esomeprazole (NEXIUM) 20 MG DR capsule  fludrocortisone (FLORINEF) 0.1 MG tablet  gabapentin (NEURONTIN) 300 MG capsule  hyoscyamine (LEVSIN/SL) 0.125 MG sublingual tablet  ibuprofen (ADVIL/MOTRIN) 200 MG tablet  mirtazapine (REMERON) 15 MG tablet  multivitamin, therapeutic (THERA-VIT) TABS tablet  tiZANidine (ZANAFLEX) 2 MG tablet  levonorgestrel (MIRENA, 52 MG,) 20 MCG/24HR IUD  metoclopramide (REGLAN) 5 MG tablet      Allergies   Allergen Reactions    Adhesive Tape Hives     EKG Patches; any adhesives including band aides; burns      EKG Patches; any adhesives including band aides; burns  EKG Patches; any adhesives including band aides; burns  EKG Patches; any adhesives including band aides; burns      Glucose Other (See Comments)     Other reaction(s): GI intolerance  GI intolerance      Azithromycin Nausea and Vomiting    Fructose Cramps, Diarrhea, GI Disturbance and Nausea and Vomiting    Lactose Nausea    Levofloxacin Other (See Comments)     Tendinopathy     Family History  Family History   Problem Relation Age of Onset    Depression Mother         depression and anxiety     Anxiety Disorder Mother     Mental Illness Mother     Neurologic Disorder Father         neuropathy     Depression Father         depression/anxiety     Bipolar Disorder Father     Anxiety Disorder Father     Heart Disease Father     Cerebrovascular Disease Maternal Grandmother     Cancer Paternal Grandfather         Lung    Cerebrovascular Disease Paternal Grandfather     Substance Abuse Other     Mental Illness Other     Skin Cancer No family hx of     Neuropathy Father     Diabetes Type 2  Father     Migraines Father     Breast Cancer Maternal Grandmother     Alzheimer  "Disease Maternal Grandmother      Social History   Social History     Tobacco Use    Smoking status: Never     Passive exposure: Never    Smokeless tobacco: Never   Vaping Use    Vaping status: Never Used   Substance Use Topics    Alcohol use: Yes     Alcohol/week: 1.0 standard drink of alcohol     Comment: Alcoholic Drinks/day: socially     Drug use: No          Review of Systems  A medically appropriate review of systems was performed with pertinent positives and negatives noted in the HPI, and all other systems negative.    Physical Examination   BP: 120/79  Pulse: 103  Temp: 98.7  F (37.1  C)  Resp: 16  Height: 160 cm (5' 3\")  Weight: 56.7 kg (125 lb)  SpO2: 98 %    Physical Exam  General: Appears stated age.   Neuro: Alert and fully oriented. Extremities appear to demonstrate normal strength on visual inspection.   Integumentary/Skin: no rash visualized, normal color    Psychiatric Examination   Appearance: awake, alert  Attitude:  cooperative  Eye Contact:  fair  Mood:  anxious and sad   Affect:  mood congruent  Speech:  clear, coherent  Psychomotor Behavior:  no evidence of tardive dyskinesia, dystonia, or tics  Thought Process:  logical and linear  Associations:  no loose associations  Thought Content:  no evidence of psychotic thought and passive suicidal ideation present  Insight:  fair  Judgement:  fair  Oriented to:  time, person, and place  Attention Span and Concentration:  fair  Recent and Remote Memory:  fair  Language: able to name/identify objects without impairment  Fund of Knowledge: intact with awareness of current and past events    ED Course     ED Course as of 01/06/25 1436   Sun Jan 05, 2025   1455 I obtained the history and examined the patient as above.        Labs Ordered and Resulted from Time of ED Arrival to Time of ED Departure - No data to display    Assessments & Plan (with Medical Decision Making)   Patient presenting with intrusive thoughts of self-injurious behavior stemming from " underlying OCD, superimposed on chronic suicidal thoughts stemming from a major depressive disorder.  The patient's treatment plan is focused on optimizing antidepressant medications aimed at addressing these targeted symptoms. Nursing notes reviewed noting no acute issues.     I have reviewed the assessment completed by the [facility].     Preliminary diagnosis:    ICD-10-CM    1. Suicidal ideation  R45.851       2. Moderate episode of recurrent major depressive disorder (H)  F33.1       3. Generalized anxiety disorder  F41.1       4. Obsessive-compulsive disorder, unspecified type  F42.9            Treatment Plan:  -Continue clomipramine 100 mg nightly for antidepressant treatment and intrusive thoughts associated with OCD.  We reviewed that a higher dose may be required to gain a meaningful response.  No side effects at the current dose reported.  -Plan to check a clomipramine serum level tomorrow morning, about 12 hours after taking the evening dose, to help guide optimal dosing.  -Continue the higher dose of mirtazapine at 30 mg nightly for additional antidepressant treatment.  -Continue amitriptyline 50 mg nightly for management of migraine headaches  -Continue gabapentin for treatment of fibromyalgia, currently managed by outpatient neurology.  -Consider intensive outpatient programming  -Anticipate resuming individual psychotherapy and outpatient medication management  -Continue observation status and reassess tomorrow.    --  Ap Rayo MD   [facility] EMERGENCY DEPT  [facility] Unit       Ap Rayo MD  01/06/25 9579

## 2025-01-06 NOTE — ED PROVIDER NOTES
"[facility] Unit - Initial Psychiatric Observation Note  [facility] Emergency Department  Observation Initiation Date: Jan 5, 2025    Angela Jones MRN: [id number]   Age: 29 year old YOB: 1995     Telemedicine Visit: The patient's condition can be safely assessed and treated via synchronous audio and visual telemedicine encounter.      Reason for Telemedicine Visit: Services only offered telehealth      Originating Site (Patient Location): [facility] emergency department unit    Distant Site (Provider Location): Provider Remote Home Office Setting    Consent:  The patient/guardian has verbally consented to: the potential risks and benefits of telemedicine (video visit or phone) versus in person care; bill my insurance or make self-payment for services provided; and responsibility for payment of non-covered services.     Mode of Communication: [employer], a secure HIPAA compliant video platform      Start time:  2044  End time:   2101   History     Chief Complaint   Patient presents with    Suicidal     HPI   \"Alma\" ROD Jones is a 29 year old adult with a past history notable for major depressive disorder, generalized anxiety disorder, migraine, self-injurious/mutilating behavior, POTS, fibromyalgia, OCD who presented voluntarily to the ED with suicide ideation. They were cleared medically and transferred to [facility] for additional assessment and treatment planning. At the time of the assessment today, 1/5/25, they are nearing 7 hours in emergency care.    Please see the [facility] assessment & reassessment (if available), ED physician notes and nursing notes for additional information and collateral content if available. All were reviewed prior to meeting with the patient.     Alma is interviewed while she is seated in a chair in a conference room. They were somewhat engaged throughout the interview, initially more guarded with their chair turned away from the camera. Their voice was soft and slowed. " "They reported not knowing what to do. They reported that since starting gabapentin, they are not able to remember things or get their words out. They are working with a neurologist for fibromyalgia. They tried to decrease the dose but the pain exacerbated. They reported \"I just want to know what is wrong with me.\" They reported suicide ideation with loose plans without intention that they describe as a response to chronic health conditions and relationship stress. They report current self-harm urges through cutting and has acted on the urges through deep scratching. Alma sees a therapist in the community who included [facility] as part of their safety plan. She also is connected with outpatient psychiatry.    Past Medical History  Past Medical History:   Diagnosis Date    Flat foot 3/25/2014    JOSHUA (generalised anxiety disorder) 3/25/2014    Hypoxia in liveborn infant     born hypoxic    Migraine     Migraine with aura 11/4/2014    Migraines     Moderate major depression (H) 3/25/2014    OCD (obsessive compulsive disorder) 8/4/2013    Palpitations     Self mutilating behavior 4/5/2013    cut self with pencil sharpener blade, left arm     Past Surgical History:   Procedure Laterality Date    NO PAST SURGERIES      UPPER GI ENDOSCOPY  6/7/12     amitriptyline (ELAVIL) 25 MG tablet  clomiPRAMINE (ANAFRANIL) 50 MG capsule  dexAMETHasone (DECADRON) 2 MG tablet  diphenhydrAMINE (BENADRYL) 50 MG capsule  esomeprazole (NEXIUM) 20 MG DR capsule  fludrocortisone (FLORINEF) 0.1 MG tablet  gabapentin (NEURONTIN) 300 MG capsule  hyoscyamine (LEVSIN/SL) 0.125 MG sublingual tablet  ibuprofen (ADVIL/MOTRIN) 200 MG tablet  mirtazapine (REMERON) 15 MG tablet  multivitamin, therapeutic (THERA-VIT) TABS tablet  tiZANidine (ZANAFLEX) 2 MG tablet  levonorgestrel (MIRENA, 52 MG,) 20 MCG/24HR IUD  metoclopramide (REGLAN) 5 MG tablet      Allergies   Allergen Reactions    Adhesive Tape Hives     EKG Patches; any adhesives including band aides; " "burns      EKG Patches; any adhesives including band aides; burns  EKG Patches; any adhesives including band aides; burns  EKG Patches; any adhesives including band aides; burns      Glucose Other (See Comments)     Other reaction(s): GI intolerance  GI intolerance      Azithromycin Nausea and Vomiting    Fructose Cramps, Diarrhea, GI Disturbance and Nausea and Vomiting    Lactose Nausea    Levofloxacin Other (See Comments)     Tendinopathy     Family History  Family History   Problem Relation Age of Onset    Depression Mother         depression and anxiety     Anxiety Disorder Mother     Mental Illness Mother     Neurologic Disorder Father         neuropathy     Depression Father         depression/anxiety     Bipolar Disorder Father     Anxiety Disorder Father     Heart Disease Father     Cerebrovascular Disease Maternal Grandmother     Cancer Paternal Grandfather         Lung    Cerebrovascular Disease Paternal Grandfather     Substance Abuse Other     Mental Illness Other     Skin Cancer No family hx of     Neuropathy Father     Diabetes Type 2  Father     Migraines Father     Breast Cancer Maternal Grandmother     Alzheimer Disease Maternal Grandmother      Social History   Social History     Tobacco Use    Smoking status: Never     Passive exposure: Never    Smokeless tobacco: Never   Vaping Use    Vaping status: Never Used   Substance Use Topics    Alcohol use: Yes     Alcohol/week: 1.0 standard drink of alcohol     Comment: Alcoholic Drinks/day: socially     Drug use: No          Review of Systems  A medically appropriate review of systems was performed with pertinent positives and negatives noted in the HPI, and all other systems negative.    Physical Examination   BP: 120/79  Pulse: 103  Temp: 98.7  F (37.1  C)  Resp: 16  Height: 160 cm (5' 3\")  Weight: 56.7 kg (125 lb)  SpO2: 98 %    Physical Exam  General: Appears stated age.   Neuro: Alert and fully oriented. Extremities appear to demonstrate normal " strength on visual inspection.   Integumentary/Skin: no rash visualized, normal color    Psychiatric Examination   Appearance: awake, alert  Attitude:  cooperative and guarded  Eye Contact:  poor   Mood:  depressed  Affect:  mood congruent  Speech:  clear, coherent, increased speech latency, and paucity of speech  Psychomotor Behavior:  no evidence of tardive dyskinesia, dystonia, or tics  Thought Process:  linear  Associations:  no loose associations  Thought Content:  active suicidal ideation present, no auditory hallucinations present, and no visual hallucinations present  Insight:  fair  Judgement:  intact  Oriented to:  time, person, and place  Attention Span and Concentration:  intact  Recent and Remote Memory:  fair  Language: able to name/identify objects without impairment  Fund of Knowledge: intact with awareness of current and past events    ED Course     ED Course as of 01/05/25 2124   Sun Jan 05, 2025   6840 I obtained the history and examined the patient as above.        Labs Ordered and Resulted from Time of ED Arrival to Time of ED Departure - No data to display    Assessments & Plan (with Medical Decision Making)   Patient presenting with an increase in suicide ideation and self-injurious behavior urges with self-reported scratching behaviors. She presented to the ED voluntarily with increased depression and anxiety. She is open to medication changes to treat her symptoms. Together, through a shared decision-making process, a plan of care was developed as is noted below. Nursing notes reviewed noting no acute issues.     I have reviewed the assessment completed by the [facility].     During the observation period, the patient did not require medications for agitation, and did not require restraints/seclusion for patient and/or provider safety.     The patient was found to have a psychiatric condition that would benefit from an observation stay in the emergency department for further psychiatric  stabilization and/or coordination of a safe disposition. The observation plan includes serial assessments of psychiatric condition, potential administration of medications if indicated, further disposition pending the patient's psychiatric course during the monitoring period.     Preliminary diagnosis:    ICD-10-CM    1. Suicidal ideation  R45.851       2. Moderate episode of recurrent major depressive disorder (H)  F33.1       3. Generalized anxiety disorder  F41.1            Treatment Plan:  - Admit for Observation.  - Increase mirtazapine from 15 mg to 30 mg daily to target depression symptoms and also treat insomnia. Discussed symptoms of serotonin syndrome given that she is also taking amitriptyline and clomipramine for other indications.  - Stop tizanidine given significant drug-drug interactions with other medications; will start methocarbamol 750 mg QID as needed for muscle spasms.  - Resume prior to admission medications including amitriptyline 50 mg for migraine prevention, clomipramine 100 mg daily for OCD, and gabapentin 900 mg TID for fibromyalgia/chronic pain.  -Medication education provided this visit includes, rationale for medication, importance of compliance, medication interactions, and common side effects.   -Supportive psychotherapy provided regarding patient's stressors and past mental health symptoms problem solving ways to improve overall wellness and cope with acute and chronic mental health symptoms.  -Observe overnight and reassess tomorrow.      --  GISELLE Weiner CNP   [facility] EMERGENCY DEPT  [facility] Unit      Pam Edge APRN CNP  01/06/25 0134

## 2025-01-06 NOTE — PROGRESS NOTES
"29 year old genderqueer adult received from ED. Pt reports she uses she/her pronouns. Pt reports she came to the hospital because, \"I'm suicidal and I have a lot of intrusive thoughts.\" Pt states she has had these symptoms for the past one to two weeks. Pt denies any big stressors occurred to bring about these symptoms. Pt states she has a plan to overdose on her amitriptyline and clomipramine. She denies any intent stating, \"I don't want to.\" Pt denies any previous suicide attempts. Pt states she engages in SIB by cutting or scratching. She last engaged in SIB by scratching her wrists last week. Pt denies any open wounds on her body. Pt reports she has a therapist and psychiatrist OP. She endorses the use of recreational marijuana as needed for her pain. Pt denies the use of alcohol or tobacco. Pt denies any HI/[place]. Pt reports diagnosis of autism, POTS, fibromyalgia, anxiety, MDD and OCD. Pt uses a cane for balance but has been using a walker while on the unit.     Nursing and risk assessments completed.  Assessments reviewed with LMHP and physician. Video monitoring in progress, patient informed.  Admission information reviewed with patient. Patient given a tour of [facility] and instructions on using the facility. Questions regarding [facility] addressed. Pt search completed and belongings inventoried.  "

## 2025-01-06 NOTE — PROGRESS NOTES
"Triage & Transition Services, Extended Care     Therapy Progress Note    Patient:  goes by \",\" uses they/them pronouns  Date of Service: January 6, 2025  Site of Service: [facility] EMERGENCY DEPT                             [id number]  Patient was seen yes  Mode of Assessment: In person    Presentation Summary: Pt was awake and sitting in chair when approached by writer. Pt was agreeable to meet. During interaction Pt voiced \"feeling better\" this morning. Pt says they feel \"calmer\" than they have been. However, Pt stated that nights are worse for their intrusive thoughts and SI. Pt did endorse SI being worse than baseline. Pt rated SI at a 3 out of 5, but stated that the intensity scale wasn't large enough to fit their needs. Pt reported an increase in panic attacks and crying spells recently, which they believe to contribute to the recent increase in intrusive thoughts and SI. Pt endorses sweating, racing heart, and dry mouth when having panic attacks. Pt denied coping skills being helpful during these times. Per Pt they are having to work full-time in order to remain on health insurance, but believes working part-time would benefit their physical and mental health. However, that is not currently an option. Pt denied HI/AH/VH.    Therapeutic Intervention(s) Provided: Engaged in guided discovery, explored patient's perspectives and helped expand them through socratic dialogue.    Current Symptoms: anxious, panic attack, racing thoughts, shortness of breath or racing heart thoughts of death/suicide, crying or feels like crying anxious, racing thoughts, shortness of breath or racing heart  (None reported)  (None reported)    Mental Status Exam   Affect: Appropriate  Appearance: Appropriate  Attention Span/Concentration: Attentive  Eye Contact: Variable    Fund of Knowledge: Appropriate   Language /Speech Content: Fluent  Language /Speech Volume: Normal  Language /Speech Rate/Productions: " "Normal  Recent Memory: Intact  Remote Memory: Intact  Mood: Depressed, Anxious  Orientation to Person: Yes   Orientation to Place: Yes  Orientation to Time of Day: Yes  Orientation to Date: Yes     Situation (Do they understand why they are here?): Yes  Psychomotor Behavior: Normal  Thought Content: Clear  Thought Form: Intact    Treatment Objective(s) Addressed: rapport building, processing feelings, orienting the patient to therapy    Patient Response to Interventions: acceptance expressed, verbalizes understanding    Progress Towards Goals: Patient Reports Symptoms Are: ongoing  Patient Progress Toward Goals: is making progress  Comment: Pt reports \"feeling better\" and \"calmer\", but endorses SI to be worse than baseline.  Next Step to Work Toward Discharge: symptom stabilization  Symptom Stabilization Comment: Reassess 1/7/24 for SI, intrusive thoughts.    Case Management:      Plan: observation  yes provider Dr. Rayo  yes    Clinical Substantiation: It is the recommendation of this writer that the Pt remain on [facility] for continued observation. Continued observation could allow for symptom stabilization and medication management. Pt did report \"feeling better\" and \"calmer\" this morning in regards to self-harm intrusive thoughts. However they did endorse SI to be above their baseline. Pt had difficulty rating SI intensity as it was \"below the top\", but \"above the bottom\" of the scale. Pt does have SI with a plan, but states there is no intent. Pt has had an increase in panic attacks and crying spells recently, which they feel have contributed to the increase in intrusive thoughts and SI. Pt is connected to OP therapy, but is experiencing stressors regarding work and ability to maintain health insurance, which are also psychosocial stressors contributing to current presentation. Reassess on 1/7/25 for SI with a plan, intensity of intrusive thoughts, and anxiety symptoms. Based on the Pt's current presentation and " overall functioning continued observation is the least restrictive level of care.    Legal Status: Legal Status: Voluntary/Patient has signed consent for treatment    Session Status: Time session started: 0818  Time session ended: 0841  Session Duration (minutes): 23 minutes  Session Number: 2  Anticipated number of sessions or this episode of care: 3    Time Spent: 23 minutes    CPT Code: CPT Codes: 27807 - Psychotherapy (with patient) - 30 (16-37*) min    Diagnosis:   Patient Active Problem List   Diagnosis Code    OCD (obsessive compulsive disorder) F42.9    Pes planus M21.40    Moderate major depression (H) F32.9    Generalized anxiety disorder F41.1    Migraine with aura G43.109    Autism spectrum disorder F84.0    IUD (intrauterine device) in place Z97.5    Fibromyalgia M79.7    POTS (postural orthostatic tachycardia syndrome) G90.A    Dysautonomia (H) G90.1    Suicidal ideation R45.851       Primary Problem This Admission: Active Hospital Problems    Suicidal ideation      Autism spectrum disorder      *Moderate major depression (H)      Generalized anxiety disorder      OCD (obsessive compulsive disorder)      Laurita Espitia  MSW Intern

## 2025-01-06 NOTE — ED NOTES
Patient awake and sitting in lounge area-working on art project and word find. Seems calm and co-operative with medications/care. Monitor safety closely.

## 2025-01-06 NOTE — ED NOTES
"Patient calm, pleasant and cooperative. Watching TV and talking with a peer. Patient denies suicidal ideation currently but does endorse some SIB thoughts. However, patient said these thoughts are \"quiet\" and manageable. After dinner patient showered and took her medications. Reviewed medication changes with patient. Tomorrow she will ask provider if Lyrica would fit into her plan of care. Patient is sleeping in Sensory Room \"C\".  "

## 2025-01-06 NOTE — PLAN OF CARE
ROD Karen  January 5, 2025  Plan of Care Hand-off Note     Patient Recommended Care Path: observation    Clinical Substantiation:   A lower level of care has been unsuccessful in treating and stabilizing patient s mental health symptoms because of experiencing intrusive thoughts to harm self via self-directed violence that have been worsening over the last couple of weeks. Pt reports this is the most depressed she has ever been. Patient has a plan for suicide but denies intent to end her life. However, with brief observation, monitored therapeutic treatment, and intervention of mental health symptoms in [facility], symptoms may be mitigated with potential for disposition to a less restrictive level of care than an inpatient setting. Patient is not currently on the inpatient worklist. Next steps in care include recommendation for medications with psychiatry, reassessment with therapist. Extended Care will follow, working to address disposition     Goals for crisis stabilization:  Symptom stabilization, safety plan in place and referrals to programmatic care  Next steps for Care Team:  reassessment, programmatic care referrals, med recommendations  Treatment Objectives Addressed:  Lethal means counseling, processing feelings, assessing safety, identifying treatment goals  Therapeutic Interventions:  Engaged in guided discovery, explored patient's perspectives and helped expand them through socratic dialogue.    Has a specific means been identified for suicidal/homicide actions: Yes  If yes, describe:  Patient reports she would overdose if she killed herself. Access to medications include dexamethasone and amitryptaline.  Explain action steps toward mitigation:  Talking about lethal means counseling - patient has sharps in her home locked up. It would be recommended to lock up access to medications as well.  Document completion of mitigation actions:  patient will lock up medications and/or sharps with help of  support system  The follow up action still needed prior to discharge:  plan for lethal means mitigation/planning     Patient coping skills attempted to reduce the crisis:  Talking with her partners,locking up sharps, help-seeking coming to the ED      Collateral contact information:   No, no emergency contact on file  Legal Status: Voluntary/Patient has signed consent for treatment                                                   Reviewed court records: yes     Psychiatry Consult: No, to be seen by [facility] provider     LUIS FERNANDO Montilla

## 2025-01-06 NOTE — CONSULTS
"Diagnostic Evaluation Consultation  Crisis Assessment    Patient Name: Angela Jones, goes by \"Lisa\"   Age:  29 year old  Legal Sex: female  Gender Identity: genderqueer  Pronouns:   Race: White  Ethnicity: Not  or   Language: English      Patient was assessed: In person   Crisis Assessment Start Date: 01/05/25  Crisis Assessment Start Time: 1841  Crisis Assessment Stop Time: 1916  Patient location: [facility] EMERGENCY DEPT                             [facility]09    Referral Data and Chief Complaint  Angela Jones presents to the ED with family/friends. Patient is presenting to the ED for the following concerns: Suicidal ideation, Depression, Anxiety, Intrusive Thoughts. Factors that make the mental health crisis life threatening or complex are:  Lisa presents to the ED with her partners for experiencing intrusive thoughts to harm self via self-directed violence that have been worsening over the last couple of weeks. Pt reports this is the most depressed she has ever been. Patient has a plan for suicide but denies intent to end her life.       Informed Consent and Assessment Methods  Explained the crisis assessment process, including applicable information disclosures and limits to confidentiality, assessed understanding of the process, and obtained consent to proceed with the assessment.  Assessment methods included conducting a formal interview with patient, review of medical records, collaboration with medical staff, and obtaining relevant collateral information from family and community providers when available.  : done     History of the Crisis    Lisa reports having increasing intrusive thoughts over the last couple of weeks. Patient has acted on the thoughts to harm herself via self-directed violence 3 times, either by scratching with her finger nails, or with a knife, however the cuts are superficial and require no medical intervention per RN. Pt reports the thoughts are worse " in the evenings and while she is at work. Patient is a  and works with sharps daily, and pt reports seeing sharp objects make the thoughts worse. At home, patient reports she locks up her sharp objects and keeps them out of sight, however patient states sometimes she forgets to put them away and then she sees the objects out and experiences intrusive thoughts to cut herself.   Patient endorses acute suicidal ideation with a plan, does not endorse intent, to die via intentional overdose. Patient reports the only 2 medications she has access to that are viable options for overdosing are dexamethasone or amitriptyline. Patient reports she thought this upcoming Thursday would be the day she attempts to end her life. Patient does not know why she decided on Thursday and reports there is no significance with this day. Patient states she does not want to end her life.    Pt endorses using marijuana to help with pain, which has been worse when it is cold, near daily. Patient endorses sleeping well, 9-10 hours a night. Patient reports appetite to be at her baseline.     Brief Psychosocial History  Family:  , Children no  Support System:  Partner, Friend  Employment Status:  employed full-time  Source of Income:  salary/wages  Financial Environmental Concerns:  none  Current Hobbies:  interaction with pets, arts/crafts  Barriers in Personal Life:  mental health concerns    Significant Clinical History  Current Anxiety Symptoms:  obsessions/compulsions, racing thoughts  Current Depression/Trauma:  avoidance, thoughts of death/suicide, helplessness  Current Somatic Symptoms:  somatic symptoms (abdominal pain, headache, tension), racing thoughts  Current Psychosis/Thought Disturbance:     Current Eating Symptoms:  loss of appetite  Chemical Use History:  Alcohol: None  Benzodiazepines: None  Opiates: None  Cocaine: None  Marijuana: Daily  Last Use:: 01/05/25   Past diagnosis:  ADHD, Anxiety Disorder,  Depression, Autism  Family history:  ADHD, Anxiety Disorder, Bipolar Disorder, Substance Use Disorder, Autism  Past treatment:  Individual therapy, Partial Hospitalization  Details of most recent treatment:  Patient has been seeing her therapist, Fanny CORNEJO with [facility] for several years, currently every-other week. Patient meets with psychiatrist, Salina Lagunas MD, every other month. Next appointment is scheduled for 1/24/25  Other relevant history:       Have there been any medication changes in the past two weeks:  no       Is the patient compliant with medications:  yes        Collateral Information  Is there collateral information: No (No emergency contact on file)        Risk Assessment  McGregor Suicide Severity Rating Scale Full Clinical Version:  Suicidal Ideation  Q6 Suicide Behavior (Lifetime): no          McGregor Suicide Severity Rating Scale Recent:   Suicidal Ideation (Recent)  Q1 Wished to be Dead (Past Month): yes  Q2 Suicidal Thoughts (Past Month): yes  Q3 Suicidal Thought Method: yes  Q4 Suicidal Intent without Specific Plan: no  Q5 Suicide Intent with Specific Plan: yes  Level of Risk per Screen: high risk          Environmental or Psychosocial Events: loss of a relationship due to divorce/separation, legal issues such as DWI, DUI, lawsuit, CPS involvement, etc.  Protective Factors: Protective Factors: responsibilities and duties to others, including pets and children, strong bond to family unit, community support, or employment, lives in a responsibly safe and stable environment, help seeking, supportive ongoing medical and mental health care relationships    Does the patient have thoughts of harming others? Feels Like Hurting Others: no  Previous Attempt to Hurt Others: no  Is the patient engaging in sexually inappropriate behavior?: no  Does Patient have a known history of aggressive behavior: No    Is the patient engaging in sexually inappropriate  behavior?  no        Mental Status Exam   Affect: Flat  Appearance: Appropriate  Attention Span/Concentration: Attentive  Eye Contact: Avoidant    Fund of Knowledge: Appropriate   Language /Speech Content: Fluent  Language /Speech Volume: Soft  Language /Speech Rate/Productions: Normal  Recent Memory: Intact  Remote Memory: Intact  Mood: Sad  Orientation to Person: Yes   Orientation to Place: Yes  Orientation to Time of Day: Yes  Orientation to Date: Yes     Situation (Do they understand why they are here?): Yes  Psychomotor Behavior: Normal  Thought Content: Suicidal  Thought Form: Intact        Medication  Psychotropic medications:   Medication Orders - Psychiatric (From admission, onward)      Start     Dose/Rate Route Frequency Ordered Stop    01/05/25 2200  amitriptyline (ELAVIL) tablet 50 mg         50 mg Oral AT BEDTIME 01/05/25 2003 01/05/25 2200  clomiPRAMINE (ANAFRANIL) capsule 100 mg         100 mg Oral AT BEDTIME 01/05/25 2003 01/05/25 2200  mirtazapine (REMERON) tablet 15 mg         15 mg Oral AT BEDTIME 01/05/25 2003               Current Care Team  Patient Care Team:  Marissa Walton MD as PCP - General (Family Medicine)  Marissa Walton MD as Assigned PCP  Fanny Cobb LICSW as Assigned Behavioral Health Provider  Gris Adam APRN CNP as Assigned OBGYN Provider  Esmer Hilton MD as MD (Otolaryngology)  Esmer Hilton MD as Assigned Surgical Provider  Caryn Byrne RPH as Pharmacist (Pharmacist)  Caryn Byrne RPH as Assigned [facility] Pharmacist    Diagnosis  Patient Active Problem List   Diagnosis Code    OCD (obsessive compulsive disorder) F42.9    Pes planus M21.40    Moderate major depression (H) F32.9    Generalized anxiety disorder F41.1    Migraine with aura G43.109    Autism spectrum disorder F84.0    IUD (intrauterine device) in place Z97.5    Fibromyalgia M79.7    POTS (postural orthostatic tachycardia syndrome) G90.A    Dysautonomia (H) G90.1        Primary Problem This Admission  Active Hospital Problems    Autism spectrum disorder      *Moderate major depression (H)      OCD (obsessive compulsive disorder)        Clinical Summary and Substantiation of Recommendations   Clinical Substantiation:   A lower level of care has been unsuccessful in treating and stabilizing patient s mental health symptoms because of experiencing intrusive thoughts to harm self via self-directed violence that have been worsening over the last couple of weeks. Pt reports this is the most depressed she has ever been. Patient has a plan for suicide but denies intent to end her life. However, with brief observation, monitored therapeutic treatment, and intervention of mental health symptoms in [facility], symptoms may be mitigated with potential for disposition to a less restrictive level of care than an inpatient setting. Patient is not currently on the inpatient worklist. Next steps in care include recommendation for medications with psychiatry, reassessment with therapist. Extended Care will follow, working to address disposition       Goals for crisis stabilization:  Symptom stabilization, safety plan in place and referrals to programmatic care    Next steps for Care Team:  reassessment, programmatic care referrals, med recommendations    Treatment Objectives Addressed:  Lethal means counseling, processing feelings, assessing safety, identifying treatment goals    Therapeutic Interventions:  Engaged in guided discovery, explored patient's perspectives and helped expand them through socratic dialogue.    Has a specific means been identified for suicidal/homicide actions: Yes    If yes, describe:  Patient reports she would overdose if she killed herself. Access to medications include dexamethasone and amitryptaline.    Explain action steps toward mitigation:  Talking about lethal means counseling - patient has sharps in her home locked up. It would be recommended to lock up access to  medications as well.    Document completion of mitigation actions:  patient will lock up medications and/or sharps with help of support system    The follow up action still needed prior to discharge:  plan for lethal means mitigation/planning    Patient coping skills attempted to reduce the crisis:  Talking with her partners,locking up sharps, help-seeking coming to the ED    Disposition  Recommended referrals: Programmatic Care        Reviewed case and recommendations with attending provider. Attending Name: aPm Edge CNP       Attending concurs with disposition: yes       Patient and/or validated legal guardian concurs with disposition:   yes       Final disposition:  observation      Legal status: Voluntary/Patient has signed consent for treatment                                                                                Reviewed court records: yes     Assessment Details   Total duration spent with the patient: 35 min     CPT code(s) utilized: 70800 - Psychotherapy for Crisis - 60 (30-74*) min    LUIS FERNANDO Montilla, Psychotherapist  [facility] - Triage & Transition Services  Callback: [phone]

## 2025-01-07 ENCOUNTER — TELEPHONE (OUTPATIENT)
Dept: BEHAVIORAL HEALTH | Facility: CLINIC | Age: 30
End: 2025-01-07
Payer: COMMERCIAL

## 2025-01-07 PROCEDURE — G0378 HOSPITAL OBSERVATION PER HR: HCPCS

## 2025-01-07 PROCEDURE — 250N000013 HC RX MED GY IP 250 OP 250 PS 637: Performed by: NURSE PRACTITIONER

## 2025-01-07 PROCEDURE — 99233 SBSQ HOSP IP/OBS HIGH 50: CPT | Mod: GC | Performed by: PSYCHIATRY & NEUROLOGY

## 2025-01-07 RX ADMIN — GABAPENTIN 900 MG: 300 CAPSULE ORAL at 14:12

## 2025-01-07 RX ADMIN — GABAPENTIN 900 MG: 300 CAPSULE ORAL at 21:44

## 2025-01-07 RX ADMIN — GABAPENTIN 900 MG: 300 CAPSULE ORAL at 07:55

## 2025-01-07 RX ADMIN — ACETAMINOPHEN 650 MG: 325 TABLET, FILM COATED ORAL at 17:56

## 2025-01-07 RX ADMIN — MULTIVITAMIN TABLET 1 TABLET: TABLET at 07:55

## 2025-01-07 RX ADMIN — CLOMIPRAMINE HYDROCHLORIDE 100 MG: 50 CAPSULE ORAL at 23:34

## 2025-01-07 RX ADMIN — FLUDROCORTISONE ACETATE 0.2 MG: 0.1 TABLET ORAL at 21:45

## 2025-01-07 RX ADMIN — HYDROXYZINE HYDROCHLORIDE 50 MG: 50 TABLET ORAL at 07:55

## 2025-01-07 RX ADMIN — AMITRIPTYLINE HYDROCHLORIDE 50 MG: 50 TABLET, FILM COATED ORAL at 21:44

## 2025-01-07 RX ADMIN — OLANZAPINE 10 MG: 10 TABLET, ORALLY DISINTEGRATING ORAL at 11:25

## 2025-01-07 RX ADMIN — MIRTAZAPINE 30 MG: 30 TABLET, FILM COATED ORAL at 21:44

## 2025-01-07 RX ADMIN — PANTOPRAZOLE SODIUM 40 MG: 40 TABLET, DELAYED RELEASE ORAL at 07:55

## 2025-01-07 ASSESSMENT — ACTIVITIES OF DAILY LIVING (ADL)
ADLS_ACUITY_SCORE: 41

## 2025-01-07 ASSESSMENT — COLUMBIA-SUICIDE SEVERITY RATING SCALE - C-SSRS
SUICIDE, SINCE LAST CONTACT: NO
1. SINCE LAST CONTACT, HAVE YOU WISHED YOU WERE DEAD OR WISHED YOU COULD GO TO SLEEP AND NOT WAKE UP?: YES
TOTAL  NUMBER OF ABORTED OR SELF INTERRUPTED ATTEMPTS SINCE LAST CONTACT: NO
TOTAL  NUMBER OF INTERRUPTED ATTEMPTS SINCE LAST CONTACT: NO
5. HAVE YOU STARTED TO WORK OUT OR WORKED OUT THE DETAILS OF HOW TO KILL YOURSELF? DO YOU INTEND TO CARRY OUT THIS PLAN?: NO
ATTEMPT SINCE LAST CONTACT: NO
2. HAVE YOU ACTUALLY HAD ANY THOUGHTS OF KILLING YOURSELF?: YES
REASONS FOR IDEATION SINCE LAST CONTACT: MOSTLY TO END OR STOP THE PAIN (YOU COULDN'T GO ON LIVING WITH THE PAIN OR HOW YOU WERE FEELING)
6. HAVE YOU EVER DONE ANYTHING, STARTED TO DO ANYTHING, OR PREPARED TO DO ANYTHING TO END YOUR LIFE?: NO

## 2025-01-07 NOTE — ED NOTES
Patient has intrusive suicidal thoughts to harm herself.  She works as a  and works with sharp objects, seeing sharp objects makes her thoughts worse.  At home she locks all of them.This morning patient woke up with extreme anxiety, I gave her hydroxyzine and Olanzapine, she found relief with Olanzapine.   She walks with a walker.    Plan  Inpatient.

## 2025-01-07 NOTE — ED PROVIDER NOTES
EmPATH Unit - Psychiatric Consultation  [facility] Emergency Department    Angela Jones MRN: [id number]   Age: 29 year old YOB: 1995     History     Chief Complaint   Patient presents with    Suicidal     HPI  Angela Jones is a 29 year old adult with history notable for OCD, major depressive disorder, and a generalized anxiety disorder who is currently under observation status on the EmPATH unit, now approaching 47 hours in the emergency department.  Overnight, there were no acute issues.  On reassessment today, the patient reports continued severity of mood and anxiety symptoms.  She did gain some reduction in the intensity of her anxiety after taking a dose of Zyprexa earlier today.  She continues to experience severe depressed mood and suicidal ideation.  Energy is low.  Appetite is fair to above baseline.  She slept well last night.  Concentration is limited.  Anhedonia and a sense of helplessness is endorsed.  Intrusive thoughts related to self-injurious behavior persist at a high intensity.  She does not feel safe discharging home due to these ongoing symptoms.  There was no indication of psychosis or homicidal thoughts.  She remains agreeable with her treatment plan.    Past Medical History  Past Medical History:   Diagnosis Date    Flat foot 3/25/2014    JOSHUA (generalised anxiety disorder) 3/25/2014    Hypoxia in liveborn infant     born hypoxic    Migraine     Migraine with aura 11/4/2014    Migraines     Moderate major depression (H) 3/25/2014    OCD (obsessive compulsive disorder) 8/4/2013    Palpitations     Self mutilating behavior 4/5/2013    cut self with pencil sharpener blade, left arm     Past Surgical History:   Procedure Laterality Date    NO PAST SURGERIES      UPPER GI ENDOSCOPY  6/7/12     amitriptyline (ELAVIL) 25 MG tablet  clomiPRAMINE (ANAFRANIL) 50 MG capsule  dexAMETHasone (DECADRON) 2 MG tablet  diphenhydrAMINE (BENADRYL) 50 MG capsule  esomeprazole (NEXIUM) 20 MG  DR capsule  fludrocortisone (FLORINEF) 0.1 MG tablet  gabapentin (NEURONTIN) 300 MG capsule  hyoscyamine (LEVSIN/SL) 0.125 MG sublingual tablet  ibuprofen (ADVIL/MOTRIN) 200 MG tablet  mirtazapine (REMERON) 15 MG tablet  multivitamin, therapeutic (THERA-VIT) TABS tablet  tiZANidine (ZANAFLEX) 2 MG tablet  levonorgestrel (MIRENA, 52 MG,) 20 MCG/24HR IUD  metoclopramide (REGLAN) 5 MG tablet      Allergies   Allergen Reactions    Adhesive Tape Hives     EKG Patches; any adhesives including band aides; burns      EKG Patches; any adhesives including band aides; burns  EKG Patches; any adhesives including band aides; burns  EKG Patches; any adhesives including band aides; burns      Glucose Other (See Comments)     Other reaction(s): GI intolerance  GI intolerance      Azithromycin Nausea and Vomiting    Fructose Cramps, Diarrhea, GI Disturbance and Nausea and Vomiting    Lactose Nausea    Levofloxacin Other (See Comments)     Tendinopathy     Family History  Family History   Problem Relation Age of Onset    Depression Mother         depression and anxiety     Anxiety Disorder Mother     Mental Illness Mother     Neurologic Disorder Father         neuropathy     Depression Father         depression/anxiety     Bipolar Disorder Father     Anxiety Disorder Father     Heart Disease Father     Cerebrovascular Disease Maternal Grandmother     Cancer Paternal Grandfather         Lung    Cerebrovascular Disease Paternal Grandfather     Substance Abuse Other     Mental Illness Other     Skin Cancer No family hx of     Neuropathy Father     Diabetes Type 2  Father     Migraines Father     Breast Cancer Maternal Grandmother     Alzheimer Disease Maternal Grandmother      Social History   Social History     Tobacco Use    Smoking status: Never     Passive exposure: Never    Smokeless tobacco: Never   Vaping Use    Vaping status: Never Used   Substance Use Topics    Alcohol use: Yes     Alcohol/week: 1.0 standard drink of alcohol     " Comment: Alcoholic Drinks/day: socially     Drug use: No          Review of Systems  A medically appropriate review of systems was performed with pertinent positives and negatives noted in the HPI, and all other systems negative.    Physical Examination   BP: 120/79  Pulse: 103  Temp: 98.7  F (37.1  C)  Resp: 16  Height: 160 cm (5' 3\")  Weight: 56.7 kg (125 lb)  SpO2: 98 %    Physical Exam  General: Appears stated age.   Neuro: Alert and fully oriented. Extremities appear to demonstrate normal strength on visual inspection.   Integumentary/Skin: no rash visualized, normal color    Psychiatric Examination   Appearance: awake, alert  Attitude:  cooperative  Eye Contact:  fair  Mood:  anxious and depressed  Affect:  mood congruent  Speech:  clear, coherent  Psychomotor Behavior:  no evidence of tardive dyskinesia, dystonia, or tics and tremor observed   Thought Process:  linear  Associations:  no loose associations  Thought Content:  no evidence of psychotic thought and active suicidal ideation present  Insight:  fair  Judgement:  fair  Oriented to:  time, person, and place  Attention Span and Concentration:  fair  Recent and Remote Memory:  fair  Language: able to name/identify objects without impairment  Fund of Knowledge: intact with awareness of current and past events    ED Course     ED Course as of 01/07/25 1313   Sun Jan 05, 2025   7440 I obtained the history and examined the patient as above.        Labs Ordered and Resulted from Time of ED Arrival to Time of ED Departure - No data to display    Assessments & Plan (with Medical Decision Making)   Patient presenting with intrusive thoughts of self-injurious behavior stemming from underlying OCD, superimposed on chronic suicidal thoughts stemming from a major depressive disorder.  Thoughts of self-injurious behavior continue to contribute to her current suicide risk therefore her treatment plan focused on pursuing psychiatric hospitalization for further " stabilization as we await further response to her medications. Nursing notes reviewed noting no acute issues.     I have reviewed the assessment completed by the [facility].     Preliminary diagnosis:    ICD-10-CM    1. Suicidal ideation  R45.851       2. Moderate episode of recurrent major depressive disorder (H)  F33.1       3. Generalized anxiety disorder  F41.1       4. Obsessive-compulsive disorder, unspecified type  F42.9            Treatment Plan:  -Continue clomipramine 100 mg nightly for antidepressant treatment and intrusive thoughts associated with OCD.  We reviewed that a higher dose may be required to gain a meaningful response.  No side effects at the current dose reported.  The results of a serum level will help determine if a higher dose is indicated.  -Serum clomipramine level was not drawn this morning.  We will try again tomorrow morning.  Optimal blood draws should occur about 12 hours after her last dose, approximately 10 AM.  -Continue the higher dose of mirtazapine at 30 mg nightly for additional antidepressant treatment.  -Continue amitriptyline 50 mg nightly for management of migraine headaches  -Continue gabapentin for treatment of fibromyalgia, currently managed by outpatient neurology.  -Consider intensive outpatient programming  -Transfer to inpatient psychiatry for further treatment and stabilization    --  Ap Rayo MD   [facility] EMERGENCY DEPT  EmPATH Unit       Ap Rayo MD  01/07/25 8061

## 2025-01-07 NOTE — TELEPHONE ENCOUNTER
S: Empath, DEC  Laurita  calling at 1:02 PM about a 29 year old/Genderqueer presenting with SI w/ plan to o.d. on medications, depression and anxiety.      B: Pt arrived via [facility]. Presenting problem, stressors: unknown    Pt affect in ED: Depressed and normal, anxious  Pt Dx: Major Depressive Disorder, Generalized Anxiety Disorder, and OCD  Previous IPMH hx? No  Pt endorses SI with a plan to o.d. on medications    Hx of suicide attempt? No  Pt endorses SIB via cutting, most recent episode 1/4/25  Pt denies HI   Pt denies hallucinations .   Pt RARS Score: 3    Hx of aggression/violence, sexual offenses, legal concerns, Epic care plan? describe: No  Current concerns for aggression this visit? No  Does pt have a history of Civil Commitment? No  Is Pt their own guardian? Yes    Pt is prescribed medication. Is patient medication compliant? Yes  Pt endorses OP services: Therapist  CD concerns: None  Acute or chronic medical concerns: Walker use for fibromyalgia   Does Pt present with specific needs, assistive devices, or exclusionary criteria? Ambulates with a walker, must notify CRN      Pt is ambulatory  Pt is able to perform ADLs independently      A: Pt to be reviewed for [facility] admission. Pt is Voluntary  Preferred placement: [facility]    COVID Symptoms: No  If yes, COVID test required   Utox: Not ordered, intake to request lab    CMP: N/A  CBC: N/A  HCG: Not ordered, intake to request lab     R: Patient cleared and ready for behavioral bed placement: Yes  Pt placed on IP worklist? Yes    Does Patient need a Transfer Center request created? Yes, writer completed Transfer Center request at:  1:03 PM

## 2025-01-07 NOTE — PROGRESS NOTES
"Triage and Transition Services Extended Care Reassessment     Patient:  goes by \",\" uses they/them pronouns  Date of Service: January 7, 2025  Site of Service: [facility] EMERGENCY DEPT                             [id number]  Patient was seen yes  Mode of Assessment: In person     Reason for Reassessment: suicidal ideation, depression    History of Patient's Original Emergency Room Encounter: Lisa reports having increasing intrusive thoughts over the last couple of weeks. Patient has acted on the thoughts to harm herself via self-directed violence 3 times, either by scratching with her finger nails, or with a knife, however the cuts are superficial and require no medical intervention per RN. Pt reports the thoughts are worse in the evenings and while she is at work. Patient is a  and works with sharps daily, and pt reports seeing sharp objects make the thoughts worse. At home, patient reports she locks up her sharp objects and keeps them out of sight, however patient states sometimes she forgets to put them away and then she sees the objects out and experiences intrusive thoughts to cut herself.   Patient endorses acute suicidal ideation with a plan, does not endorse intent, to die via intentional overdose. Patient reports the only 2 medications she has access to that are viable options for overdosing are dexamethasone or amitriptyline. Patient reports she thought this upcoming Thursday would be the day she attempts to end her life. Patient does not know why she decided on Thursday and reports there is no significance with this day. Patient states she does not want to end her life.    Pt endorses using marijuana to help with pain, which has been worse when it is cold, near daily. Patient endorses sleeping well, 9-10 hours a night. Patient reports appetite to be at her baseline.    Current Patient Presentation: Anxious, engaged, cooperative.    Presentation Summary: Pt was sitting " "in chair and watching TV when approached by writer. Pt was agreeable to meet. Pt reports feeling much more anxious today than yesterday. Pt was unable to identify anything in particular that was making the anxiety worse. Pt endorsed SI returning today after a brief reduction. Pt identified SI intensity at an 8 out of 10 and confirms that it is worse than yesterday. Pt states the thoughts are \"fleeting, but frequent\". Pt does say there is no current intent to act on previously identified plan, but feels like they \"can't control themselves\". Pt endorses SI plans to be \"spontaneous\" and believes that there is a possibility for her to act on them. Pt said \"if I were to leave today I know I would go home and self-harm\". Pt denies any HI/AH/VH.    Changes Observed Since Initial Assessment: increase in presenting symptoms    Therapeutic Interventions Provided: Engaged in guided discovery, explored patient's perspectives and helped expand them through socratic dialogue.    Current Symptoms: anxious, racing thoughts thoughts of death/suicide anxious, sweating, flushing, shaking  (None reported)  (None reported)    Mental Status Exam   Affect: Appropriate  Appearance: Appropriate  Attention Span/Concentration: Attentive  Eye Contact: Variable    Fund of Knowledge: Appropriate   Language /Speech Content: Fluent  Language /Speech Volume: Normal  Language /Speech Rate/Productions: Normal  Recent Memory: Intact  Remote Memory: Intact  Mood: Anxious, Depressed  Orientation to Person: Yes   Orientation to Place: Yes  Orientation to Time of Day: Yes  Orientation to Date: Yes     Situation (Do they understand why they are here?): Yes  Psychomotor Behavior: Normal  Thought Content: Clear  Thought Form: Intact    Treatment Objective(s) Addressed: rapport building, processing feelings, orienting the patient to therapy    Patient Response to Interventions: verbalizes understanding, acceptance expressed    Progress Towards Goals:  Patient " "Reports Symptoms Are: worsening  Patient Progress Toward Goals: is not making progress  Comment: Pt reports symptoms have gotten worse since reassessment on 1/6/25. SI is rated at a 8 out of 10 and Pt stated \"if I were to go home I know I would self-harm\".  Next Step to Work Toward Discharge: awaiting acceptance at community level of care  Symptom Stabilization Comment: Reassess 1/7/24 for SI, intrusive thoughts.  Awaiting Acceptance at Community Level of Care Comment: Pt is awaiting acceptance at an [facility] placement.    Case Management:      C-SSRS Since Last Contact:   1. Wish to be Dead (Since Last Contact): Yes  2. Non-Specific Active Suicidal Thoughts (Since Last Contact): Yes  3. Active Suicidal Ideation with any Methods (Not Plan) Without Intent to Act (Since Last Contact): Yes  4. Active Suicidal Ideation with Some Intent to Act, Without Specific Plan (Since Last Contact): No  5. Active Suicidal Ideation with Specific Plan and Intent (Since Last Contact): No  Most Severe Ideation Rating (Since Last Contact): 5 (Pt reports an 8 out of 10 as 1-5 is not a large enough scale.)  Frequency (Since Last Contact): Daily or almost daily  Duration (Since Last Contact): Less than 1 hour/some of the time  Controllability (Since Last Contact): Can control thoughts with a lot of difficulty  Deterrents (Since Last Contact): Does not apply  Reasons for Ideation (Since Last Contact): Mostly to end or stop the pain (You couldn't go on living with the pain or how you were feeling)  Actual Attempt (Since Last Contact): No  Has subject engaged in non-suicidal self-injurious behavior? (Since Last Contact): No  Interrupted Attempts (Since Last Contact): No  Aborted or Self-Interrupted Attempt (Since Last Contact): No  Preparatory Acts or Behavior (Since Last Contact): No  Suicide (Since Last Contact): No     Calculated C-SSRS Risk Score (Since Last Contact): Moderate Risk    Plan: Final Disposition / Recommended Care Path: inpatient " "mental health  Plan for Care reviewed with assigned Medical Provider: yes  Plan for Care Team Review: provider  Comments: Dr. Rayo  Patient and/or validated legal guardian concurs: yes    Clinical Substantiation: It is the recommendation of this writer that the Pt pursue inpatient mental health placement. Pt reported feeling worse today and stated that she woke up with anxiety. Pt was unable to identify a specific event or thought that lead to said anxiety. Pt stated she was experiencing a stomach ache and shaking. When asked about SI Pt reported intensity at an 8 out of 10 and said that their SI is worse than yesterday. Pt endorses the thoughts are \"fleeting, but frequent\" and they are having difficulty controlling them. Pt denied a current plan, but says the plan and thoughts are \"spontaneous\" and they \"don't know\" if they would act upon them. Pt did state if they were to discharge today \"I know I would go home and self-harm\". Based on the Pt's current presentation and increase in mental health symptoms current placement at [facility] has been unsuccessful in reducing the Pt's mental health symptoms and current crisis. A higher level of care could be more successful in addressing Pt's current symptoms and providing a stable, safe environment.    Legal Status: Legal Status: Voluntary/Patient has signed consent for treatment    Session Status: Time session started: 1101  Time session ended: 1112  Session Duration (minutes): 11 minutes  Session Number: 3  Anticipated number of sessions or this episode of care: 4    Session Start Time: 1101  Session Stop Time: 1112  CPT codes: Non-Billable  Time Spent: 11 minutes      CPT code(s) utilized: Non-Billable    Diagnosis:   Patient Active Problem List   Diagnosis Code    OCD (obsessive compulsive disorder) F42.9    Pes planus M21.40    Moderate major depression (H) F32.9    Generalized anxiety disorder F41.1    Migraine with aura G43.109    Autism spectrum disorder F84.0    " IUD (intrauterine device) in place Z97.5    Fibromyalgia M79.7    POTS (postural orthostatic tachycardia syndrome) G90.A    Dysautonomia (H) G90.1    Suicidal ideation R45.851       Primary Problem This Admission: Active Hospital Problems    Suicidal ideation      Autism spectrum disorder      *Moderate major depression (H)      Generalized anxiety disorder      OCD (obsessive compulsive disorder)        Laurita Espitia  MSW Intern

## 2025-01-07 NOTE — PROGRESS NOTES
Pt appeared to rest comfortably through the night in sensory room C.  No signs of emotional distress, physical discomfort or pain were noted during rounds or reported by the patient through the shift.  Even and unlabored respirations visualized during rounds, pt made occasional independent position changes through the night.

## 2025-01-08 ENCOUNTER — HOSPITAL ENCOUNTER (INPATIENT)
Facility: CLINIC | Age: 30
LOS: 6 days | Discharge: HOME | DRG: 885 | End: 2025-01-14
Attending: PSYCHIATRY & NEUROLOGY | Admitting: PSYCHIATRY & NEUROLOGY
Payer: COMMERCIAL

## 2025-01-08 ENCOUNTER — TELEPHONE (OUTPATIENT)
Dept: BEHAVIORAL HEALTH | Facility: CLINIC | Age: 30
End: 2025-01-08
Payer: COMMERCIAL

## 2025-01-08 VITALS
TEMPERATURE: 98.7 F | OXYGEN SATURATION: 98 % | BODY MASS INDEX: 22.22 KG/M2 | HEART RATE: 92 BPM | RESPIRATION RATE: 18 BRPM | HEIGHT: 63 IN | DIASTOLIC BLOOD PRESSURE: 53 MMHG | WEIGHT: 125.4 LBS | SYSTOLIC BLOOD PRESSURE: 105 MMHG

## 2025-01-08 DIAGNOSIS — E73.9 LACTOSE INTOLERANCE: ICD-10-CM

## 2025-01-08 DIAGNOSIS — R21 RASH: ICD-10-CM

## 2025-01-08 DIAGNOSIS — F32.3 MAJOR DEPRESSIVE DISORDER WITH PSYCHOTIC FEATURES (H): Primary | ICD-10-CM

## 2025-01-08 DIAGNOSIS — F84.0 AUTISM SPECTRUM DISORDER: ICD-10-CM

## 2025-01-08 PROBLEM — R45.851 SUICIDE IDEATION: Status: ACTIVE | Noted: 2025-01-08

## 2025-01-08 LAB
AMPHETAMINES UR QL SCN: NORMAL
BARBITURATES UR QL SCN: NORMAL
BENZODIAZ UR QL SCN: NORMAL
BZE UR QL SCN: NORMAL
CANNABINOIDS UR QL SCN: NORMAL
FENTANYL UR QL: NORMAL
HCG UR QL: NEGATIVE
OPIATES UR QL SCN: NORMAL
PCP QUAL URINE (ROCHE): NORMAL
SARS-COV-2 RNA RESP QL NAA+PROBE: NEGATIVE

## 2025-01-08 PROCEDURE — G0378 HOSPITAL OBSERVATION PER HR: HCPCS

## 2025-01-08 PROCEDURE — 124N000002 HC R&B MH UMMC

## 2025-01-08 PROCEDURE — 36415 COLL VENOUS BLD VENIPUNCTURE: CPT | Performed by: PSYCHIATRY & NEUROLOGY

## 2025-01-08 PROCEDURE — 81025 URINE PREGNANCY TEST: CPT | Performed by: PSYCHIATRY & NEUROLOGY

## 2025-01-08 PROCEDURE — 250N000013 HC RX MED GY IP 250 OP 250 PS 637: Performed by: CLINICAL NURSE SPECIALIST

## 2025-01-08 PROCEDURE — 80307 DRUG TEST PRSMV CHEM ANLYZR: CPT | Performed by: PSYCHIATRY & NEUROLOGY

## 2025-01-08 PROCEDURE — 87635 SARS-COV-2 COVID-19 AMP PRB: CPT | Performed by: PSYCHIATRY & NEUROLOGY

## 2025-01-08 PROCEDURE — 99233 SBSQ HOSP IP/OBS HIGH 50: CPT | Performed by: PSYCHIATRY & NEUROLOGY

## 2025-01-08 PROCEDURE — 250N000013 HC RX MED GY IP 250 OP 250 PS 637: Performed by: NURSE PRACTITIONER

## 2025-01-08 PROCEDURE — 80335 ANTIDEPRESSANT TRICYCLIC 1/2: CPT | Performed by: PSYCHIATRY & NEUROLOGY

## 2025-01-08 RX ORDER — ACETAMINOPHEN 325 MG/1
650 TABLET ORAL EVERY 4 HOURS PRN
Status: CANCELLED | OUTPATIENT
Start: 2025-01-08

## 2025-01-08 RX ORDER — IBUPROFEN 200 MG
200 TABLET ORAL EVERY 4 HOURS PRN
Status: DISCONTINUED | OUTPATIENT
Start: 2025-01-08 | End: 2025-01-14 | Stop reason: HOSPADM

## 2025-01-08 RX ORDER — HYDROXYZINE HYDROCHLORIDE 25 MG/1
25 TABLET, FILM COATED ORAL EVERY 4 HOURS PRN
Status: DISCONTINUED | OUTPATIENT
Start: 2025-01-08 | End: 2025-01-14 | Stop reason: HOSPADM

## 2025-01-08 RX ORDER — CLOMIPRAMINE HYDROCHLORIDE 50 MG/1
100 CAPSULE ORAL AT BEDTIME
Status: DISCONTINUED | OUTPATIENT
Start: 2025-01-09 | End: 2025-01-14 | Stop reason: HOSPADM

## 2025-01-08 RX ORDER — MULTIVITAMIN,THERAPEUTIC
1 TABLET ORAL DAILY
Status: DISCONTINUED | OUTPATIENT
Start: 2025-01-09 | End: 2025-01-14 | Stop reason: HOSPADM

## 2025-01-08 RX ORDER — FLUDROCORTISONE ACETATE 0.1 MG/1
0.2 TABLET ORAL DAILY
Status: DISCONTINUED | OUTPATIENT
Start: 2025-01-09 | End: 2025-01-14 | Stop reason: HOSPADM

## 2025-01-08 RX ORDER — OLANZAPINE 10 MG/2ML
10 INJECTION, POWDER, FOR SOLUTION INTRAMUSCULAR 3 TIMES DAILY PRN
Status: CANCELLED | OUTPATIENT
Start: 2025-01-08

## 2025-01-08 RX ORDER — OLANZAPINE 10 MG/2ML
10 INJECTION, POWDER, FOR SOLUTION INTRAMUSCULAR 3 TIMES DAILY PRN
Status: DISCONTINUED | OUTPATIENT
Start: 2025-01-08 | End: 2025-01-14 | Stop reason: HOSPADM

## 2025-01-08 RX ORDER — ACETAMINOPHEN 325 MG/1
650 TABLET ORAL EVERY 4 HOURS PRN
Status: DISCONTINUED | OUTPATIENT
Start: 2025-01-08 | End: 2025-01-14 | Stop reason: HOSPADM

## 2025-01-08 RX ORDER — MIRTAZAPINE 15 MG/1
15 TABLET, FILM COATED ORAL AT BEDTIME
Status: DISCONTINUED | OUTPATIENT
Start: 2025-01-09 | End: 2025-01-09

## 2025-01-08 RX ORDER — GABAPENTIN 300 MG/1
900 CAPSULE ORAL 3 TIMES DAILY
Status: DISCONTINUED | OUTPATIENT
Start: 2025-01-09 | End: 2025-01-14 | Stop reason: HOSPADM

## 2025-01-08 RX ORDER — MAGNESIUM HYDROXIDE/ALUMINUM HYDROXICE/SIMETHICONE 120; 1200; 1200 MG/30ML; MG/30ML; MG/30ML
30 SUSPENSION ORAL EVERY 4 HOURS PRN
Status: CANCELLED | OUTPATIENT
Start: 2025-01-08

## 2025-01-08 RX ORDER — TRAZODONE HYDROCHLORIDE 50 MG/1
50 TABLET, FILM COATED ORAL
Status: CANCELLED | OUTPATIENT
Start: 2025-01-08

## 2025-01-08 RX ORDER — AMOXICILLIN 250 MG
1 CAPSULE ORAL 2 TIMES DAILY PRN
Status: CANCELLED | OUTPATIENT
Start: 2025-01-08

## 2025-01-08 RX ORDER — TRAZODONE HYDROCHLORIDE 50 MG/1
50 TABLET, FILM COATED ORAL
Status: DISCONTINUED | OUTPATIENT
Start: 2025-01-08 | End: 2025-01-14 | Stop reason: HOSPADM

## 2025-01-08 RX ORDER — TIZANIDINE 2 MG/1
2 TABLET ORAL DAILY PRN
Status: DISCONTINUED | OUTPATIENT
Start: 2025-01-08 | End: 2025-01-14 | Stop reason: HOSPADM

## 2025-01-08 RX ORDER — METOCLOPRAMIDE 5 MG/1
5 TABLET ORAL 3 TIMES DAILY PRN
Status: DISCONTINUED | OUTPATIENT
Start: 2025-01-08 | End: 2025-01-14 | Stop reason: HOSPADM

## 2025-01-08 RX ORDER — OLANZAPINE 10 MG/1
10 TABLET ORAL 3 TIMES DAILY PRN
Status: CANCELLED | OUTPATIENT
Start: 2025-01-08

## 2025-01-08 RX ORDER — MAGNESIUM HYDROXIDE/ALUMINUM HYDROXICE/SIMETHICONE 120; 1200; 1200 MG/30ML; MG/30ML; MG/30ML
30 SUSPENSION ORAL EVERY 4 HOURS PRN
Status: DISCONTINUED | OUTPATIENT
Start: 2025-01-08 | End: 2025-01-14 | Stop reason: HOSPADM

## 2025-01-08 RX ORDER — OLANZAPINE 10 MG/1
10 TABLET ORAL 3 TIMES DAILY PRN
Status: DISCONTINUED | OUTPATIENT
Start: 2025-01-08 | End: 2025-01-14 | Stop reason: HOSPADM

## 2025-01-08 RX ADMIN — CLOMIPRAMINE HYDROCHLORIDE 100 MG: 50 CAPSULE ORAL at 19:27

## 2025-01-08 RX ADMIN — MIRTAZAPINE 30 MG: 30 TABLET, FILM COATED ORAL at 19:26

## 2025-01-08 RX ADMIN — IBUPROFEN 200 MG: 200 TABLET, FILM COATED ORAL at 16:28

## 2025-01-08 RX ADMIN — MULTIVITAMIN TABLET 1 TABLET: TABLET at 08:02

## 2025-01-08 RX ADMIN — AMITRIPTYLINE HYDROCHLORIDE 50 MG: 25 TABLET, FILM COATED ORAL at 22:33

## 2025-01-08 RX ADMIN — OLANZAPINE 10 MG: 10 TABLET, ORALLY DISINTEGRATING ORAL at 16:28

## 2025-01-08 RX ADMIN — FLUDROCORTISONE ACETATE 0.2 MG: 0.1 TABLET ORAL at 19:27

## 2025-01-08 RX ADMIN — GABAPENTIN 900 MG: 300 CAPSULE ORAL at 19:26

## 2025-01-08 RX ADMIN — IBUPROFEN 200 MG: 200 TABLET, FILM COATED ORAL at 08:10

## 2025-01-08 RX ADMIN — GABAPENTIN 900 MG: 300 CAPSULE ORAL at 08:03

## 2025-01-08 RX ADMIN — GABAPENTIN 900 MG: 300 CAPSULE ORAL at 13:50

## 2025-01-08 RX ADMIN — TRAZODONE HYDROCHLORIDE 50 MG: 50 TABLET ORAL at 22:33

## 2025-01-08 RX ADMIN — HYDROXYZINE HYDROCHLORIDE 50 MG: 50 TABLET ORAL at 08:13

## 2025-01-08 RX ADMIN — PANTOPRAZOLE SODIUM 40 MG: 40 TABLET, DELAYED RELEASE ORAL at 08:03

## 2025-01-08 ASSESSMENT — ACTIVITIES OF DAILY LIVING (ADL)
ADLS_ACUITY_SCORE: 41
HYGIENE/GROOMING: INDEPENDENT
ORAL_HYGIENE: INDEPENDENT
ADLS_ACUITY_SCORE: 23
LAUNDRY: WITH SUPERVISION
ADLS_ACUITY_SCORE: 41
DRESS: INDEPENDENT
ADLS_ACUITY_SCORE: 41
ADLS_ACUITY_SCORE: 23
ADLS_ACUITY_SCORE: 41

## 2025-01-08 ASSESSMENT — LIFESTYLE VARIABLES: SKIP TO QUESTIONS 9-10: 1

## 2025-01-08 NOTE — TELEPHONE ENCOUNTER
R: [place]  Access Inpatient Bed Call Log 1/8/25 at 8:05 AM: Intake has called facilities that have not updated the bed status within the last 12 hours.                         [facility] is at capacity.           [facility] is posting 0 beds. [phone]   [facility] is posting 0 beds. Negative covid required.  [facility] is posting 0 beds. Neg covid. No high school/[facility]-psych.   [facility] is posting 0 beds. [phone]  [facility] is posting 0 beds. [phone]   [facility] is posting 2 beds. Negative covid. [phone] Per call @ 11:58 AM, @ cap for YA reviews at this time.   [facility] is posting 0 beds [phone]    Pt remains on the work list pending appropriate bed availability.       5:27 PM Paged Hasmukh to review pt [facility].   5:58 PM Hasmukh accepts pt for [facility].   6:15 PM Informed [facility] CRN Angela of pt in queue and pts need for walker. Angela will call [facility] for report.   6:17 PM Provided Lisa in EmPath with placement info.       Final Disposition: [facility]

## 2025-01-08 NOTE — TELEPHONE ENCOUNTER
12:53 AM Intake called [facility] and requested lab collection from pt's RN, Stefano.         R: [place]  Access Inpatient Bed Call Log  1/8/2025 @ 12:25AM  Intake has called facilities that have not updated their bed status within the last 12 hours.??      ADULTS:     *[facility]  [place] -- [facility]: @ Cap per website.  [place] -- [facility]:  @ Cap per website.  [place] -- [facility]: @ Cap per website.  [place] -- [facility]: @ Cap per website. Per Charlee @ 12:52AM, they are closed to admissions until at least Thursday d/t Covid outbreak on unit  [place] -- [facility]: @ Cap per website.  [facility] -- [facility]: @ Cap per website.  [facility]/YA beds: Posting 3 beds. Ages 18-35, Voluntary only, COVID test req'd, NO aggression, physical or sexual assault, violence hx or drug abuse, or psychosis. Per Sol @ 12:52AM, 2 YA beds.  [place] -- [facility]: @ Cap per website.  [place] -- [facility]: @ cap per website.  [place] -- [facility]:  @ cap per website     Pt remains on waitlist pending appropriate placement availability.

## 2025-01-08 NOTE — ED NOTES
Patient pleasant and cooperative. Spent most of the shift watching TV. Makes needs known. Continues to endorse SI, but contracts for safety on the unit. Flat affect and depressed in mood. Continues to feel hopeless and helpless. Plan remains IP voluntarily. Waiting for bed placement.     Clomipramine draw in the morning. Still needs HS dose to come up from pharmacy to give.

## 2025-01-08 NOTE — PROGRESS NOTES
"Triage & Transition Services, Extended Care     Therapy Progress Note    Patient:  goes by \"Lisa,\" uses she/her pronouns  Date of Service: January 8, 2025  Site of Service: [facility] EMERGENCY DEPT                             [id number]    Patient was seen yes  Mode of Assessment: In person    Presentation Summary: Patient has been sitting in her recliner all morning watching TV and eating breakfast/snacks.  She readily meet with writer in consult room B.  Patients doing okay today, calmer mood yet still has shakes from anxiety, and notes having continued intrusive thoughts of self-harm.  These intrusive thoughts have not changed in intensity or frequency yet though.  She continues to identify [facility] admission as an appropriate transfer so writer will provide her with any updates regarding placement.  Patient indicates some anxiety is related to not letting her parents know about this crisis and pending hospitalization.  She shares that both parents are psychologists so they will often go into \"therapy mode\" when she shares struggles with them.  Writer offered to be the liaison between her and parents if needed which patient declined at this time.  She also asks about the possibility of briefly using her cell phone to confirm rent and internet bills have been paid successfully. Writer will assist with that task after lunch.  Patient is aware of a lab draw happening this morning to check medication levels before the provider here will make further medication adjustments.  Patient will make needs known as they arise throughout the day.    Therapeutic Intervention(s) Provided: Engaged in safety planning, Engaged in cognitive restructuring/ reframing, looked at common cognitive distortions and challenged negative thoughts., Engaged in guided discovery, explored patient's perspectives and helped expand them through socratic dialogue., Coached on coping techniques/relaxation skills to help improve " distress tolerance and managing intense emotions., Taught the link between thoughts, feelings, and behaviors., Identified and practiced coping skills.    Current Symptoms: anxious helplessness, thoughts of death/suicide anxious, sweating, flushing, shaking  (none)  (no concerns noted)    Mental Status Exam   Affect: Appropriate  Appearance: Appropriate  Attention Span/Concentration: Attentive  Eye Contact: Variable    Fund of Knowledge: Appropriate   Language /Speech Content: Fluent  Language /Speech Volume: Normal  Language /Speech Rate/Productions: Normal  Recent Memory: Intact  Remote Memory: Intact  Mood: Anxious, Depressed  Orientation to Person: Yes   Orientation to Place: Yes  Orientation to Time of Day: Yes  Orientation to Date: Yes     Situation (Do they understand why they are here?): Yes  Psychomotor Behavior: Normal  Thought Content: Clear  Thought Form: Intact    Treatment Objective(s) Addressed: rapport building, orienting the patient to therapy, processing feelings, assessing safety, identifying additional supports, exploring obstacles to safety in the community    Patient Response to Interventions: eager to participate, acceptance expressed, verbalizes understanding    Progress Towards Goals: Patient Reports Symptoms Are: ongoing  Patient Progress Toward Goals: is not making progress  Comment: still anxious however that is slightly reduced; intrusive thoughts of self-harm remain  Next Step to Work Toward Discharge: symptom stabilization  Symptom Stabilization Comment: reduce intensity of SI, intrusive thoughts, self-harm, anxiety  Awaiting Acceptance at Community Level of Care Comment: Pt is awaiting acceptance at an [facility] placement.    Case Management: Summary of Interaction: none    Plan: inpatient mental health  yes provider, RN Andannie  yes    Clinical Substantiation: Recommendation of inpatient admission remains today as patient continues to cooper intrusive thoughts of self-harm and suicide yet  she is feeling more calm overall.  Patient also notes feeling less agitated today.  Her hands and body are still shaky from the remaining anxiety and this is decreased from yesterday.  We are waiting on labs for the most recent medication level which the provider started her on recently.  Patient is occupying self by watching TV, working on puzzles, and talking with support network on the phone.  She has yet to meet with provider to review the lab results and discuss further medication optimization.    Legal Status: Legal Status: Voluntary/Patient has signed consent for treatment    Session Status: Time session started: 1050  Time session ended: 1106  Session Duration (minutes): 16 minutes  Session Number: 4  Anticipated number of sessions or this episode of care: 6    Time Spent: 16 minutes    CPT Code: CPT Codes: 54281 - Psychotherapy (with patient) - 30 (16-37*) min    Diagnosis:   Patient Active Problem List   Diagnosis Code    OCD (obsessive compulsive disorder) F42.9    Pes planus M21.40    Moderate major depression (H) F32.9    Generalized anxiety disorder F41.1    Migraine with aura G43.109    Autism spectrum disorder F84.0    IUD (intrauterine device) in place Z97.5    Fibromyalgia M79.7    POTS (postural orthostatic tachycardia syndrome) G90.A    Dysautonomia (H) G90.1    Suicidal ideation R45.851       Primary Problem This Admission: Active Hospital Problems    Suicidal ideation      Autism spectrum disorder      *Moderate major depression (H)      Generalized anxiety disorder      OCD (obsessive compulsive disorder)      JOSE L Mantilla, [facility]  Licensed Mental Health Professional (LMHP)  Ric, [phone]

## 2025-01-08 NOTE — PROGRESS NOTES
"Pt working on puzzle at table in milieu. Reports hydroxyzine is more helpful for anxiety today than yesterday. States she is \"doing ok actually\". She denies any current needs or concerns.   "

## 2025-01-08 NOTE — ED NOTES
Received a phone call from Intake and would like to add COVID test to her labs on top of HCG and UTOX; COVID test in place and will perform when pt is awake.

## 2025-01-08 NOTE — PROGRESS NOTES
"Pt moved from sensory room C to recliner chair and requested remote to watch TV. Breakfast tray provided. Compliant with scheduled medications. Requested and received hydroxyzine for anxiety and ibuprofen for \"all over\" pain 3/10. Pt changed into clean scrubs. Informed that she will have lab draw at 1130 today. She denies any other current needs or concerns. Awaiting inpatient bed.   "

## 2025-01-08 NOTE — ED PROVIDER NOTES
EmPATH Unit - Psychiatric Consultation  [facility] Emergency Department    Angela Jones MRN: [id number]   Age: 29 year old YOB: 1995     History     Chief Complaint   Patient presents with    Suicidal     HPI  Angela Jones is a 29 year old adult with history notable for OCD, major depressive disorder, and a generalized anxiety disorder who is currently under observation status on the EmPATH unit, now approaching 72 hours in the emergency department.  Overnight, there were no acute issues.  On reassessment today, the patient reports a slight reduction in the severity of her anxiety.  Her mood remains depressed as she awaits response to her current medication plan.  She explains that she would be open to additional medication adjustments however would prefer not to add a new medication due to concern for potential side effects stemming from past experiences.  Sleep was poor last night.  She is familiar with taking melatonin with good response in the past although notes that the benefits wear off after taking it regularly.  Appetite is fair.  Energy is low.  Concentration is fair.  Suicidal thoughts are present, described as passive, although intrusive thoughts related to self-harm persist without change.  She would not feel safe to discharge home given the intensity of these thoughts.  There was no indication of psychosis or homicidal thoughts.  No medication side effects reported today.    Past Medical History  Past Medical History:   Diagnosis Date    Flat foot 3/25/2014    JOSHUA (generalised anxiety disorder) 3/25/2014    Hypoxia in liveborn infant     born hypoxic    Migraine     Migraine with aura 11/4/2014    Migraines     Moderate major depression (H) 3/25/2014    OCD (obsessive compulsive disorder) 8/4/2013    Palpitations     Self mutilating behavior 4/5/2013    cut self with pencil sharpener blade, left arm     Past Surgical History:   Procedure Laterality Date    NO PAST SURGERIES       UPPER GI ENDOSCOPY  6/7/12     amitriptyline (ELAVIL) 25 MG tablet  clomiPRAMINE (ANAFRANIL) 50 MG capsule  dexAMETHasone (DECADRON) 2 MG tablet  diphenhydrAMINE (BENADRYL) 50 MG capsule  esomeprazole (NEXIUM) 20 MG DR capsule  fludrocortisone (FLORINEF) 0.1 MG tablet  gabapentin (NEURONTIN) 300 MG capsule  hyoscyamine (LEVSIN/SL) 0.125 MG sublingual tablet  ibuprofen (ADVIL/MOTRIN) 200 MG tablet  mirtazapine (REMERON) 15 MG tablet  multivitamin, therapeutic (THERA-VIT) TABS tablet  tiZANidine (ZANAFLEX) 2 MG tablet  levonorgestrel (MIRENA, 52 MG,) 20 MCG/24HR IUD  metoclopramide (REGLAN) 5 MG tablet      Allergies   Allergen Reactions    Adhesive Tape Hives     EKG Patches; any adhesives including band aides; burns      EKG Patches; any adhesives including band aides; burns  EKG Patches; any adhesives including band aides; burns  EKG Patches; any adhesives including band aides; burns      Glucose Other (See Comments)     Other reaction(s): GI intolerance  GI intolerance      Azithromycin Nausea and Vomiting    Fructose Cramps, Diarrhea, GI Disturbance and Nausea and Vomiting    Lactose Nausea    Levofloxacin Other (See Comments)     Tendinopathy     Family History  Family History   Problem Relation Age of Onset    Depression Mother         depression and anxiety     Anxiety Disorder Mother     Mental Illness Mother     Neurologic Disorder Father         neuropathy     Depression Father         depression/anxiety     Bipolar Disorder Father     Anxiety Disorder Father     Heart Disease Father     Cerebrovascular Disease Maternal Grandmother     Cancer Paternal Grandfather         Lung    Cerebrovascular Disease Paternal Grandfather     Substance Abuse Other     Mental Illness Other     Skin Cancer No family hx of     Neuropathy Father     Diabetes Type 2  Father     Migraines Father     Breast Cancer Maternal Grandmother     Alzheimer Disease Maternal Grandmother      Social History   Social History     Tobacco Use  "   Smoking status: Never     Passive exposure: Never    Smokeless tobacco: Never   Vaping Use    Vaping status: Never Used   Substance Use Topics    Alcohol use: Yes     Alcohol/week: 1.0 standard drink of alcohol     Comment: Alcoholic Drinks/day: socially     Drug use: No          Review of Systems  A medically appropriate review of systems was performed with pertinent positives and negatives noted in the HPI, and all other systems negative.    Physical Examination   BP: 120/79  Pulse: 103  Temp: 98.7  F (37.1  C)  Resp: 16  Height: 160 cm (5' 3\")  Weight: 56.7 kg (125 lb)  SpO2: 98 %    Physical Exam  General: Appears stated age.   Neuro: Alert and fully oriented. Extremities appear to demonstrate normal strength on visual inspection.   Integumentary/Skin: no rash visualized, normal color    Psychiatric Examination   Appearance: awake, alert  Attitude:  cooperative  Eye Contact:  fair  Mood:  anxious and depressed  Affect:  intensity is blunted  Speech:  clear, coherent  Psychomotor Behavior:  no evidence of tardive dyskinesia, dystonia, or tics  Thought Process:  logical and linear  Associations:  no loose associations  Thought Content:  no evidence of psychotic thought and passive suicidal ideation present  Insight:  fair  Judgement:  fair  Oriented to:  time, person, and place  Attention Span and Concentration:  fair  Recent and Remote Memory:  fair  Language: able to name/identify objects without impairment  Fund of Knowledge: intact with awareness of current and past events    ED Course     ED Course as of 01/08/25 1425   Sun Jan 05, 2025   1455 I obtained the history and examined the patient as above.        Labs Ordered and Resulted from Time of ED Arrival to Time of ED Departure   HCG QUALITATIVE URINE - Normal       Result Value    hCG Urine Qualitative Negative     COVID-19 VIRUS (CORONAVIRUS) BY PCR - Normal    SARS CoV2 PCR Negative     URINE DRUG SCREEN PANEL - Normal    Amphetamines Urine Screen " Negative      Barbituates Urine Screen Negative      Benzodiazepine Urine Screen Negative      Cannabinoids Urine Screen Negative      Cocaine Urine Screen Negative      Fentanyl Qual Urine Screen Negative      Opiates Urine Screen Negative      PCP Urine Screen Negative     CLOMIPRAMINE AND METABOLITE, SP       Assessments & Plan (with Medical Decision Making)   Patient presenting with intrusive thoughts of self-injurious behavior stemming from underlying OCD, superimposed on chronic suicidal thoughts stemming from a major depressive disorder.  Thoughts of self-injurious behavior continue to contribute to her current suicide risk therefore her treatment plan focused on pursuing psychiatric hospitalization for further stabilization as we await further response to her medications. Nursing notes reviewed noting no acute issues.     I have reviewed the assessment completed by the [facility].     Preliminary diagnosis:    ICD-10-CM    1. Suicidal ideation  R45.851       2. Moderate episode of recurrent major depressive disorder (H)  F33.1       3. Generalized anxiety disorder  F41.1       4. Obsessive-compulsive disorder, unspecified type  F42.9            Treatment Plan:  -Increase clomipramine to 150 mg nightly for additional antidepressant treatment and to better address intrusive thoughts associated with OCD.    -Serum clomipramine level were drawn this morning to reflect a dose of 100 mg daily.    -Continue the higher dose of mirtazapine at 30 mg nightly for additional antidepressant treatment.  -Continue amitriptyline 50 mg nightly for management of migraine headaches  -Noting polypharmacy, the risk of potential side effects was reviewed with the patient.  -Continue gabapentin for treatment of fibromyalgia, currently managed by outpatient neurology.  -Consider intensive outpatient programming  -Awaiting bed availability to transfer to inpatient psychiatry for further treatment and stabilization.    --  Ap Rayo,  MD [facility] EMERGENCY DEPT  EmPATH Unit       Ap Rayo MD  01/08/25 3946

## 2025-01-08 NOTE — ED NOTES
Pt slept well overnight in [facility] with no distress noted; got up once and ambulated to  using a walker. Lab results are back *see results review* Pt currently in [facility] and appears to be sleeping; respirations even and unlabored.

## 2025-01-09 LAB
ALBUMIN SERPL BCG-MCNC: 4.3 G/DL (ref 3.5–5.2)
ALP SERPL-CCNC: 61 U/L (ref 40–150)
ALT SERPL W P-5'-P-CCNC: 13 U/L (ref 0–70)
ANION GAP SERPL CALCULATED.3IONS-SCNC: 17 MMOL/L (ref 7–15)
AST SERPL W P-5'-P-CCNC: 15 U/L (ref 0–45)
ATRIAL RATE - MUSE: 84 BPM
BASOPHILS # BLD AUTO: 0.1 10E3/UL (ref 0–0.2)
BASOPHILS NFR BLD AUTO: 1 %
BILIRUB SERPL-MCNC: 0.2 MG/DL
BUN SERPL-MCNC: 18.5 MG/DL (ref 6–20)
CALCIUM SERPL-MCNC: 9 MG/DL (ref 8.8–10.4)
CHLORIDE SERPL-SCNC: 103 MMOL/L (ref 98–107)
CREAT SERPL-MCNC: 0.7 MG/DL (ref 0.51–1.17)
DIASTOLIC BLOOD PRESSURE - MUSE: NORMAL MMHG
EGFRCR SERPLBLD CKD-EPI 2021: >90 ML/MIN/1.73M2
EOSINOPHIL # BLD AUTO: 0 10E3/UL (ref 0–0.7)
EOSINOPHIL NFR BLD AUTO: 1 %
ERYTHROCYTE [DISTWIDTH] IN BLOOD BY AUTOMATED COUNT: 12 % (ref 10–15)
EST. AVERAGE GLUCOSE BLD GHB EST-MCNC: 100 MG/DL
FOLATE SERPL-MCNC: 12 NG/ML (ref 4.6–34.8)
GLUCOSE SERPL-MCNC: 139 MG/DL (ref 70–99)
HBA1C MFR BLD: 5.1 %
HCO3 SERPL-SCNC: 20 MMOL/L (ref 22–29)
HCT VFR BLD AUTO: 37.2 % (ref 35–53)
HGB BLD-MCNC: 12.4 G/DL (ref 11.7–17.7)
IMM GRANULOCYTES # BLD: 0 10E3/UL
IMM GRANULOCYTES NFR BLD: 0 %
INTERPRETATION ECG - MUSE: NORMAL
LYMPHOCYTES # BLD AUTO: 1.3 10E3/UL (ref 0.8–5.3)
LYMPHOCYTES NFR BLD AUTO: 29 %
MCH RBC QN AUTO: 31.2 PG (ref 26.5–33)
MCHC RBC AUTO-ENTMCNC: 33.3 G/DL (ref 31.5–36.5)
MCV RBC AUTO: 94 FL (ref 78–100)
MONOCYTES # BLD AUTO: 0.3 10E3/UL (ref 0–1.3)
MONOCYTES NFR BLD AUTO: 8 %
NEUTROPHILS # BLD AUTO: 2.6 10E3/UL (ref 1.6–8.3)
NEUTROPHILS NFR BLD AUTO: 61 %
NRBC # BLD AUTO: 0 10E3/UL
NRBC BLD AUTO-RTO: 0 /100
P AXIS - MUSE: 60 DEGREES
PLATELET # BLD AUTO: 210 10E3/UL (ref 150–450)
POTASSIUM SERPL-SCNC: 3.7 MMOL/L (ref 3.4–5.3)
PR INTERVAL - MUSE: 150 MS
PROT SERPL-MCNC: 6.8 G/DL (ref 6.4–8.3)
QRS DURATION - MUSE: 70 MS
QT - MUSE: 386 MS
QTC - MUSE: 456 MS
R AXIS - MUSE: 52 DEGREES
RBC # BLD AUTO: 3.97 10E6/UL (ref 3.8–5.9)
SODIUM SERPL-SCNC: 140 MMOL/L (ref 135–145)
SYSTOLIC BLOOD PRESSURE - MUSE: NORMAL MMHG
T AXIS - MUSE: 36 DEGREES
TSH SERPL DL<=0.005 MIU/L-ACNC: 2.35 UIU/ML (ref 0.3–4.2)
VENTRICULAR RATE- MUSE: 84 BPM
VIT B12 SERPL-MCNC: 306 PG/ML (ref 232–1245)
VIT D+METAB SERPL-MCNC: 22 NG/ML (ref 20–50)
WBC # BLD AUTO: 4.3 10E3/UL (ref 4–11)

## 2025-01-09 PROCEDURE — 80053 COMPREHEN METABOLIC PANEL: CPT | Performed by: NURSE PRACTITIONER

## 2025-01-09 PROCEDURE — 85025 COMPLETE CBC W/AUTO DIFF WBC: CPT | Performed by: NURSE PRACTITIONER

## 2025-01-09 PROCEDURE — 90837 PSYTX W PT 60 MINUTES: CPT | Performed by: COUNSELOR

## 2025-01-09 PROCEDURE — 84443 ASSAY THYROID STIM HORMONE: CPT | Performed by: NURSE PRACTITIONER

## 2025-01-09 PROCEDURE — 82310 ASSAY OF CALCIUM: CPT | Performed by: NURSE PRACTITIONER

## 2025-01-09 PROCEDURE — 36415 COLL VENOUS BLD VENIPUNCTURE: CPT | Performed by: NURSE PRACTITIONER

## 2025-01-09 PROCEDURE — 250N000013 HC RX MED GY IP 250 OP 250 PS 637: Performed by: NURSE PRACTITIONER

## 2025-01-09 PROCEDURE — 99223 1ST HOSP IP/OBS HIGH 75: CPT | Mod: AI | Performed by: NURSE PRACTITIONER

## 2025-01-09 PROCEDURE — 83036 HEMOGLOBIN GLYCOSYLATED A1C: CPT | Performed by: NURSE PRACTITIONER

## 2025-01-09 PROCEDURE — 82607 VITAMIN B-12: CPT | Performed by: NURSE PRACTITIONER

## 2025-01-09 PROCEDURE — 82746 ASSAY OF FOLIC ACID SERUM: CPT | Performed by: NURSE PRACTITIONER

## 2025-01-09 PROCEDURE — 250N000013 HC RX MED GY IP 250 OP 250 PS 637: Performed by: CLINICAL NURSE SPECIALIST

## 2025-01-09 PROCEDURE — 250N000013 HC RX MED GY IP 250 OP 250 PS 637: Performed by: PSYCHIATRY & NEUROLOGY

## 2025-01-09 PROCEDURE — H2032 ACTIVITY THERAPY, PER 15 MIN: HCPCS

## 2025-01-09 PROCEDURE — 82306 VITAMIN D 25 HYDROXY: CPT | Performed by: NURSE PRACTITIONER

## 2025-01-09 PROCEDURE — 124N000002 HC R&B MH UMMC

## 2025-01-09 PROCEDURE — 97150 GROUP THERAPEUTIC PROCEDURES: CPT | Mod: GO

## 2025-01-09 RX ORDER — ARIPIPRAZOLE 2 MG/1
2 TABLET ORAL DAILY
Status: DISCONTINUED | OUTPATIENT
Start: 2025-01-09 | End: 2025-01-10

## 2025-01-09 RX ORDER — DIPHENHYDRAMINE HCL 50 MG
50 CAPSULE ORAL EVERY 6 HOURS PRN
Status: DISCONTINUED | OUTPATIENT
Start: 2025-01-09 | End: 2025-01-14 | Stop reason: HOSPADM

## 2025-01-09 RX ORDER — OMEPRAZOLE 10 MG/1
20 CAPSULE, DELAYED RELEASE ORAL DAILY
Status: ON HOLD | COMMUNITY
End: 2025-01-09

## 2025-01-09 RX ORDER — MIRTAZAPINE 30 MG/1
30 TABLET, FILM COATED ORAL AT BEDTIME
Status: DISCONTINUED | OUTPATIENT
Start: 2025-01-09 | End: 2025-01-14

## 2025-01-09 RX ORDER — GUANFACINE 1 MG/1
1 TABLET, EXTENDED RELEASE ORAL AT BEDTIME
Status: DISCONTINUED | OUTPATIENT
Start: 2025-01-10 | End: 2025-01-14 | Stop reason: HOSPADM

## 2025-01-09 RX ADMIN — FLUDROCORTISONE ACETATE 0.2 MG: 0.1 TABLET ORAL at 08:50

## 2025-01-09 RX ADMIN — THERA TABS 1 TABLET: TAB at 08:38

## 2025-01-09 RX ADMIN — MIRTAZAPINE 30 MG: 30 TABLET, FILM COATED ORAL at 20:54

## 2025-01-09 RX ADMIN — GABAPENTIN 900 MG: 300 CAPSULE ORAL at 08:38

## 2025-01-09 RX ADMIN — GABAPENTIN 900 MG: 300 CAPSULE ORAL at 20:54

## 2025-01-09 RX ADMIN — IBUPROFEN 200 MG: 200 TABLET, FILM COATED ORAL at 16:21

## 2025-01-09 RX ADMIN — HYDROXYZINE HYDROCHLORIDE 25 MG: 25 TABLET, FILM COATED ORAL at 16:05

## 2025-01-09 RX ADMIN — CLOMIPRAMINE HYDROCHLORIDE 100 MG: 50 CAPSULE ORAL at 21:50

## 2025-01-09 RX ADMIN — ARIPIPRAZOLE 2 MG: 2 TABLET ORAL at 14:12

## 2025-01-09 RX ADMIN — GABAPENTIN 900 MG: 300 CAPSULE ORAL at 14:12

## 2025-01-09 RX ADMIN — DIPHENHYDRAMINE HYDROCHLORIDE 50 MG: 50 CAPSULE ORAL at 20:54

## 2025-01-09 ASSESSMENT — ACTIVITIES OF DAILY LIVING (ADL)
ADLS_ACUITY_SCORE: 23
ADLS_ACUITY_SCORE: 30
ADLS_ACUITY_SCORE: 30
DRESS: INDEPENDENT
ADLS_ACUITY_SCORE: 23
ADLS_ACUITY_SCORE: 30
ADLS_ACUITY_SCORE: 30
HYGIENE/GROOMING: INDEPENDENT
ADLS_ACUITY_SCORE: 23
ADLS_ACUITY_SCORE: 30
ADLS_ACUITY_SCORE: 30
ADLS_ACUITY_SCORE: 23
ADLS_ACUITY_SCORE: 23
ADLS_ACUITY_SCORE: 30
ADLS_ACUITY_SCORE: 23
ADLS_ACUITY_SCORE: 23
ADLS_ACUITY_SCORE: 30
ADLS_ACUITY_SCORE: 23
ADLS_ACUITY_SCORE: 23
ADLS_ACUITY_SCORE: 30
ADLS_ACUITY_SCORE: 23
LAUNDRY: WITH SUPERVISION
ADLS_ACUITY_SCORE: 23
ORAL_HYGIENE: INDEPENDENT
ADLS_ACUITY_SCORE: 23

## 2025-01-09 NOTE — PLAN OF CARE
"     INITIAL PSYCHOSOCIAL ASSESSMENT AND NOTE    Information for assessment was obtained from:       [x]Patient     []Parent     []Community provider    [x]Hospital records   []Other     []Guardian       Presenting Problem:  Patient is a 29 year old female who uses she/they. Patient was admitted to [facility] on 1/8/2025 [address] [facility] voluntarily.    Presenting issues and presentation for admit:   Patient presenting with intrusive thoughts of self-injurious behavior stemming from underlying OCD   Patient endorses acute suicidal ideation with a plan, does not endorse intent, to die via intentional overdose. Patient reports the only 2 medications she has access to that are viable options for overdosing are dexamethasone or amitriptyline.   Patient has acted on the thoughts to harm herself via self-directed violence 3 times, either by scratching with her finger nails, or with a knife, however the cuts are superficial and require no medical intervention per RN.   Suicidal thoughts are present, described as passive, although intrusive thoughts related to self-harm persist without change.     Pt reports there was no specific stressor but things had \"snowballed.\"      Drug screen negative.      The following areas have been assessed:    History of Mental Health and Chemical Dependency:  Mental Health History:  Patient has a historical diagnosis of   OCD, major depressive disorder, and a generalized anxiety disorder   1. Suicidal ideation  R45.851         2. Moderate episode of recurrent major depressive disorder (H)  F33.1         3. Generalized anxiety disorder  F41.1         4. Obsessive-compulsive disorder, unspecified type  F42.9           Autism spectrum disorder  Moderate major depression (H)  OCD (obsessive compulsive disorder) - time -consuming distress caused by schedule changes, ritualistic behaviors of checking.   Mixed obsessional thoughts and acts   Major depressive " disorder, in partial remission   ADHD, hyperactive type - dx at age 7  Anxiety, unspecified  OCD  Cannabis use    R/o ASD  R/o cluster B personality disorder    phobia for driving     .   The patient has not a history of suicide attempts .   Patient  has a history of engaged in non-suicidal self-injury .     Previous psychiatric hospitalizations and treatments (including outpatient, residential, and inpatient care:  1/8/25 to present @ [facility] [address]  1/5/25 to 1/8/25 @ [facility] Daphne Larios    Other services  Completed CBT for OCD - SHe reports that she hated this due to being told how to think and she has autism.      Substance Use History    Pt endorses using marijuana to help with pain, which has been worse when it is cold, near daily.         Patient's current relationship status is   .   Patient reported having zero child(brandyn).       Family Description (Constellation, significant information and events, Family Psychiatric History):     Pt's parents are both psychologists.  Mother is semi-retired and father still works.  They are from [place] but moved to [facility] [place] [facility] x 15 years before moving back.  Pt is 3/4 siblings and was born in [place].  A sister is on [facility] [place], a sister is in [place], a non-binary sibling is in [place].  Parents split their time between [place] and [place].      Pt has been  x 8 years and is in the process of a divorce. She does not want Javy Jones to know she is here and has been listed as COnfidential. She feels he might use this in the legal proceedings against her.  She is poly amorous and has a polycule with sevral members.  She has 2 partners with male presenting names and she signed Ester for them.    Significant Medical issues, Life events or Trauma history:     PODS and Fibromyalgia      Living Situation:  Patient's current living/housing situation is staying in own home/apartment. They live with self and they report that housing is stable and they are  able to return upon discharge.       Educational Background:    Patient's highest education level was GED and associate degree / vocational certificate. Patient reports they are  able to understand written materials.     Occupational and Financial Status:     Patient is currently employed full time and reports they are able to function appropriately at work..  Patient reports  income is obtained through employment.  Patient does identify finances as a current stressor.   Pt tells me that there is some conflict with employers regarding her base salary and commissions and have decreased her compensation as a result.    They are insured under [employer]  Insurance [employer] Phone  [phone]   Effective Date 05/01/2024.   Restrictions (No/Yes): No    Pt has been trying to get on MA-EPD and on CADI with SMRT x 3 years now.    Occupational History:     Legal Concerns (current or past history):       Current Concerns: currently in the middle of a divorce with  and financial professionals    Past History: None      Legal Status:  Voluntary    Commitment History: None     Service History: None    Ethnic/Cultural/Spiritual considerations:   The patient describes their cultural background as White/,  poly , gender queer.  Contextual influences on patient's health include transportation.   Patient identified their preferred language to be English. Patient reported they do not need the assistance of an .  Spiritual considerations include: Taoism    Social Functioning (organizations, interests, support system):   In their free time, patient reports they like to play video games, craft, Legos,   She reports have s few friends..      Patient identified partner as part of their support system.  Patient identified the quality of these relationships as good.       Current Treatment Providers are:  Primary Care Provider:  Name/Clinic: [facility] Wendy Walton MD    Number:         Specialists  Rheumatology and Pain and Palliative for fibromyalgia  Cardiology for PODS          Medication Management/Psychiatry:  Name/Clinic: [facility]  Salina Carrillo MD   Pt missed the 12/13/24 appointment.  Jan 24 Adult Med Follow UP with Salina Lagunas  Friday Jan 24, 2025 2:25 PM  Please Note: This is a virtual visit; there is no need to come to the facility.                Therapist:    Fanny NISW with [facility] for several years, currently every-other week.   21 Adult Psychotherapy with Fanny Cobb  Tuesday Jan 21, 2025 8:45 AM  Please Note: This is a virtual visit; there is no need to come to the facility.     Please log on at  8:45 AM to complete your check-in. Then, the provider will join you (or send you a link to join them) at  9:00 AM.       Targeted Mental Health /:  Pt reports having a  at [facility] but does not remember the name.  I called and it is Diallo Ramirez @ [phone]. [facility]  Mental Health Resources  [address], [place] [address]  [phone]  fax: [phone]  Mental Health Resources Case Management.  Supervisor:  Chely Betts at [phone]            Other contact information (family, friends, SO) and EDDI status:     Pt would like to be listed as confidential. I have alerted BRAEDEN and RN.  She does not want her estranged  or parents to call her.    She has signed ROIs for partners:  Zander Roberts @ [phone]  Corey Coe @ [phone]          GOALS FOR HOSPITALIZATION:  What do patient want to accomplish during this hospitalization to make things better for the patient.?   Patient priorities:  The patient reported that what is most important to them is - too br. They identified to get more stable as a goal of this hospitalization.    Social Service Assessment/Plan:  Patient view:   Patient reports it is important for the care  team to know s to not be referred to with any gender language like ma'am/miss etc.  Upon discharge, they anticipate needing MA and SMRT and CADI set up for them.      Strengths and Assets:  The patient uses these coping skills to help with stress and hard times: breathing techniques.          Patient will have psychiatric assessment and medication management by the psychiatrist. Medications will be reviewed and adjusted per DO/MD/APRN CNP as indicated. The treatment team will continue to assess and stabilize the patient's mental health symptoms with the use of medications and therapeutic programming. Hospital staff will provide a safe environment and a therapeutic milieu. Staff will continue to assess patient as needed. Patient will participate in unit groups and activities. Patient will receive individual and group support on the unit.      CTC will do individual inpatient treatment planning and after care planning. CTC will discuss options for increasing community supports with the patient. CTC will coordinate with outpatient providers and will place referrals to ensure appropriate follow up care is in place.

## 2025-01-09 NOTE — PLAN OF CARE
01/08/25 7162   Patient Belongings   Did you bring any home meds/supplements to the hospital?  Yes   Disposition of meds  Sent to security/pharmacy per site process   Patient Belongings locker;remains with patient   Patient Belongings Remaining with Patient other (see comments)   Patient Belongings Put in Hospital Secure Location (Security or Locker, etc.) cane;cash/credit card;bracelet;cell phone/electronics;clothing;earrings;keys;money (see comment);plastic bag;purse/wallet;shoes;tote;other (see comments)   Belongings Search Yes   Clothing Search Yes   Second Staff [facility] PA         With patient:    1 book, 1 notepad      In locker:    Cane, tote bag, 2 fidgets, putty, 5 candies, bag of crackers, stuffed animal, ipad, ipad , charging block, comb, toothbrush, toothpaste, deodorant, 1 gloves, gatorade bottle, 1 jacket, 1 bra, 1 shawl, 1 hat, 1 sweater, 1 pants, 1 long sleeved shirt, 1 socks, 1 boots, 1 underwear, purse, planner, vape pen, pack of gummies, cuticle oil, pen, 2 bracelets, 4 earrings, earplugs w/ case, stone, stamps, 2 comic books, small fabric bag, 2 sets of keys, ear pods, 11 dee and don's cards, phone, wallet, [place] ID, 2 insurance cards, bus card      Sent to security:    VISA [id number]  VISA [id number]  VISA [id number]    Cash  $55.72          ..A               Admission:  I am responsible for any personal items that are not sent to the safe or pharmacy.  [facility] is not responsible for loss, theft or damage of any property in my possession.    Signature:  _________________________________ Date: _______  Time: _____                                              Staff Signature:  ____________________________ Date: ________  Time: _____      2nd Staff person, if patient is unable/unwilling to sign:    Signature: ________________________________ Date: ________  Time: _____     Discharge:  Joi has returned all of my personal belongings:    Signature: _________________________________ Date: ________  Time:  _____                                          Staff Signature:  ____________________________ Date: ________  Time: _____

## 2025-01-09 NOTE — PROGRESS NOTES
"Patient arrived  1/8/2025  8:38 PM from [facility].    EPIC Admitting  DX: MDD  Major depressive disorder with psychotic features (H)  Suicide ideation    Vital signs reviewed related to admission.  /81 (BP Location: Left arm, Cuff Size: Adult Regular)   Pulse 111   Temp 98.1  F (36.7  C) (Oral)   Resp 17   Ht 1.6 m (5' 3\")   Wt 58.2 kg (128 lb 4.8 oz)   SpO2 95%   BMI 22.73 kg/m  .    Patient arrived on the unit and was cooperative with the clothing search and admission profile interview. States that the reason for her admission is because of \"mental health problems\".  Patient is well groomed and polite. Uses a cane for fibromyalgia. Was provided a walker.     Patient reports that this is her first mental health admission. Reports being depressed, \" I can't tell how depressed I am\". Rates her anxiety 5/10.  Denies auditory and visual hallucinations. Denies suicidal ideation. States that she was always has thoughts of self harm but has no intent to act. Med compliant. Affect is flat. Cooperative on the unit.     Patient is voluntary.           "

## 2025-01-09 NOTE — PHARMACY-ADMISSION MEDICATION HISTORY
Admission Medication History Completed by Pharmacy    See [email or web address] Admission Navigator for allergy information, preferred outpatient pharmacy and prior to admission medications.     Medication History Sources:   Prescription fill history via Epic Surescripts report  Patient phone interview    Pertinent Information or Changes Made to PTA Med List:  Writer reviewed PTA medications with patient via phone.  Patient was an excellent historian and patient's report is consistent with fill records.  All refills up to date/refilled within the past month.     Prior to Admission medications    Medication Sig Last Dose       amitriptyline (ELAVIL) 25 MG tablet TAKE 2 TABLETS(50 MG) BY MOUTH AT BEDTIME. SEE YOUR DOCTOR FOR FURTHER REFILLS.  Patient taking differently: Take 25 mg by mouth at bedtime for migraine ppx.     Past Week       clomiPRAMINE (ANAFRANIL) 50 MG capsule     Take 2 capsules (100 mg) by mouth at bedtime. 1/8/2025       dexAMETHasone (DECADRON) 2 MG tablet Take 2 mg by mouth once as directed (fibromyalgia flare-up). Take 2mg twice daily for 2 days during fibromyalgia flare up.     Past Month       diphenhydrAMINE (BENADRYL) 50 MG capsule     Take 50 mg by mouth as needed for allergies. Past Month       esomeprazole (NEXIUM) 20 MG DR capsule Take 20 mg by mouth at bedtime. Take 30-60 minutes before eating.     1/8/2025       fludrocortisone (FLORINEF) 0.1 MG tablet     Take 2 tablets (0.2 mg) by mouth daily for POTS. 1/8/2025       gabapentin (NEURONTIN) 300 MG capsule Take 3 capsules (900 mg) by mouth 3 times daily.     1/8/2025       hyoscyamine (LEVSIN/SL) 0.125 MG sublingual tablet Take 1 tablet (0.125 mg) by mouth every 4 hours as needed for IBS.     1/8/2025       ibuprofen (ADVIL/MOTRIN) 200 MG tablet Take 200 mg by mouth every 4 hours as needed for pain or other (migraine)     Past Month       metoclopramide (REGLAN) 5 MG tablet Take 1 tablet (5 mg) by mouth 3 times daily as needed (for nausea).     Past  Week       mirtazapine (REMERON) 15 MG tablet     Take 1 tablet (15 mg) by mouth at bedtime. 1/8/2025       multivitamin, therapeutic (THERA-VIT) TABS tablet     Take 1 tablet by mouth daily 1/8/2025       tiZANidine (ZANAFLEX) 2 MG tablet Take 1 tablet (2 mg) by mouth daily as needed for muscle spasms.     1/8/2025                  Date completed: 01/09/25    Medication history completed by:   Emilia Sullivan, Pharm.D., [facility]  Behavioral Health Inpatient Pharmacist  [facility] ([facility]) Emergency Department  Contact via TASCET or Epic Messaging

## 2025-01-09 NOTE — PLAN OF CARE
"  Rehab Group    Start time: 10:15  End time: 11:45  Patient time total: 90 minutes    attended full group    #7 attended   Group Type: OT Clinic   Group Topic Covered: balanced lifestyle, coping skills, healthy leisure time, relaxation , and social skills       Group Session Detail:  Pt actively participated in occupational therapy clinic to facilitate coping skill exploration, creative expression within personally meaningful activities, and clinical observation of social, cognitive, and kinesthetic performance skills.    Patient Response/Contribution:  cooperative with task, organized, socially appropriate, safe use of materials/supplies, and actively engaged       Patient Detail:  Pt response: Patient arrived on time to this group and was independent in selecting a jc art project to complete. Patient was independent to initiate, gather materials, sequence, and adjust to workspace demands as needed. Demonstrated good focus, planning, and problem solving for selected jc art task. However, patient displayed somewhat perfectionistic tendencies while completing the jc art project stating \"I'm too meticulous for my own good\" when referring to the placement of diamonds on the project. Patient also stated \"I hold myself to really high standards\". Despite this, patient socialized appropriately with peers and remained calm, pleasant, and cooperative throughout the duration of group.       [id number] OT Group (2 or more in attendance)      Patient Active Problem List   Diagnosis    OCD (obsessive compulsive disorder)    Pes planus    Moderate major depression (H)    Generalized anxiety disorder    Migraine with aura    Autism spectrum disorder    IUD (intrauterine device) in place    Fibromyalgia    POTS (postural orthostatic tachycardia syndrome)    Dysautonomia (H)    Suicidal ideation    Suicide ideation    Major depressive disorder with psychotic features (H)      "

## 2025-01-09 NOTE — PLAN OF CARE
01/09/25 1230   Individualization/Patient Specific Goals   Patient Personal Strengths intellectual cognitive skills;motivated for recovery;family/social support   Patient Vulnerabilities limited social skills;history of unsuccessful treatment   Anxieties, Fears or Concerns pt has intrusive thoughts   Individualized Care Needs Multidisciplinary evaluation, medication management   Patient/Family-Specific Goals (Include Timeframe) Stabilize and return home   Interprofessional Rounds   Summary Pt presented to the ED with suicidal ideation and plan to overdose. Pt had self injurious behaviors and had superficial cuts.   Participants advanced practice nurse;CTC;nursing   Behavioral Team Discussion   Participants Robin Avalos APRN< CNP, Uyen CORNEJO, Jolene Frederick RN   Progress Today the pt is reporting that she is not suicidal.   Anticipated length of stay 1 week   Continued Stay Criteria/Rationale The pt needs further evaluation of risk of harm to self.   Medical/Physical fibralmyalgia - sees a nuerologist.   Precautions none on team sheet   Plan Evaluate and treat   Rationale for change in precautions or plan provider will be placing precuation orders   Safety Plan will be completed by unit therapist   Anticipated Discharge Disposition home or self-care     Goal Outcome Evaluation:

## 2025-01-09 NOTE — PLAN OF CARE
Rehab Group    Start time: 13:15  End time: 14:00  Patient time total: 10 minutes    attended partial group    #7 attended   Group Type: occupational therapy   Group Topic Covered: coping skills, emotional regulation, healthy leisure time, and social skills       Group Session Detail:  Patient engaged in an interactive game of Charades/Pictionary with a focus on identifying and coping with challenging emotions. This activity addressed participants  social skills, visuospatial skills, and coping abilities. Group members were instructed to blindly select an action or emotion from a container and were given the option to either act out or draw the selection. Other participants were then encouraged to guess the action or emotion. If a participant selected an emotion to portray to peers instead of an action, group members were prompted to identify at least one strategy for coping with said emotion.      Patient Response/Contribution:  socially appropriate, passively engaged     Patient Detail:  Patient arrived late to this group. Patient appeared to have a good understanding of game concepts after initial description of the activity. However, after looking at multiple emotion/action options to portray to group members, patient elected to passively observe the group activity noting that the options seemed too difficult to portray. After roughly ten minutes of observing, patient left group to speak with a unit provider and did not return to the group.       No Charge due to passive participation and limited time in group.       Patient Active Problem List   Diagnosis    OCD (obsessive compulsive disorder)    Pes planus    Moderate major depression (H)    Generalized anxiety disorder    Migraine with aura    Autism spectrum disorder    IUD (intrauterine device) in place    Fibromyalgia    POTS (postural orthostatic tachycardia syndrome)    Dysautonomia (H)    Suicidal ideation    Suicide ideation    Major depressive  disorder with psychotic features (H)
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
## 2025-01-09 NOTE — PLAN OF CARE
"  Problem: Suicide Risk  Goal: Absence of Self-Harm  Outcome: Progressing           Brief shift overview: Pt has spent this shift making phone calls and attending scheduled programming. Pt met with the therapist this shift, reports this is her first IP  admission. Pt requested to be listed as confidential and does not want calls from her estranged .     Medications: Pt started on Abilify this shift, pt denies having any questions about this medication and states she will call her dad and ask him about it because \"he has been on everything.\" Pt reports some shakiness in her hands and uncontrollable muscle movements, no uncontrollable muscle movements noted upon observation, encouraged to let staff know when this happens. Pt was offered to have writer speak with provider about PRN medication for shakiness but she declines. Pt was medication compliant with all scheduled medications, declined any PRNs this shift.     Medical Issues: Pt had an EKG this shift, this was placed in the chart. Pt has a hx of POTS and dizziness, uses walker or cane intermittently for balance, walker in exam room at this time as pt reports she is feeling steady on her feet today, gait appears balanced. Pt reports some pain due to fibromyalgia, uses gabapentin and declines PRN for pain, reports gabapentin effective for pain control, VSS.     Mental Health: Pt has hx of ASD, eye contact poor and pt appears anxious upon check in. Pt states she struggles to talk about her mental health and does not like to discuss her symptoms. Pt reports a hx of SIB, denies any thoughts of SIB at this time. Pt reports she has intrusive thoughts of suicide, described them as an image that pops in her head that then makes her think of how she could end her life or harm self with this object, denies any current intrusive images/thoughts, contracts for safety on the unit. Pt denies hallucinations or HI. Pt voice noted to be shaky and she appeared quite " anxious when speaking with writer, offered PRN for anxiety but declines, states she can manage her anxiety at this time. Pt affect is blunted, noted to be polite and cooperative.     Recommendations to oncoming staff:       Pt uses she/her pronouns but asking that no one use gendered terms when referring to her ie; miss, mrs, mr etc    No calls from estranged

## 2025-01-09 NOTE — ED NOTES
HS medications given early with the exceptio of amitriptyline due to not being in stock on the unit. EMS arrived at 2000. Report given. Paperwork handed off. Patient transferred off the unit at 2006 to [address]. Receiving nurse is Angela. Patient pleasant and cooperative. Contracts for safety for transfer.

## 2025-01-09 NOTE — DISCHARGE INSTRUCTIONS
Behavioral Discharge Planning and Instructions    Summary: You were admitted on 1/8/2025  due to Manic Symptomology, Suicidal Ideations, and Self Injurious Behaviors.  You were treated by GISELLE Neely CNP. Your symptoms resolved and you were assessed as safe to return home. You asked for discharge.  You were discharged on 1/14/25 from [facility].  Your friends/partners are providing transportation home.      Main Diagnosis:   Bipolar affective disorder, current episode depressed, severe, without psychosis  Generalized anxiety disorder  Panic attacks without agoraphobia  OCD  ADHD, per history  Autism spectrum disorder, per history  Cluster B traits  Cannabis use disorder, moderate, dependence  Fibromyalgia  Migraine headaches        Health Care Follow-up:   You have [facility] for coverage of your mental health services.  You are thinking you may lose insurance and are exploring options through [email or web address]        Call and schedule with your primary care provider as needed.  Primary Care Provider:  Name/Clinic: [facility] Winter Walton MD   [phone]         Schedule with specialty care as needed.  Specialists  Rheumatology and Pain and Palliative for fibromyalgia  Cardiology for PODS        Participate in your virtual health appointment for Medication Management/Psychiatry: Friday Jan 24, 2025 2:25 PM  Name/Clinic: [facility]  Salina Carrillo MD   Jan 24 Adult Med Follow UP with Salina Lagunas  Friday Jan 24, 2025 2:25 PM  Please Note: This is a virtual visit; there is no need to come to the facility.              Participate in your [employer] health appointment for Therapist:  on Tuesday Jan 21, 2025 8:45 AM  Fanny CORNEJO with [facility] for several years, currently every-other week.   JAN 21 Adult Psychotherapy with Fanny Cobb  Tuesday Jan 21, 2025 8:45 AM  Please Note: This is a virtual visit;  there is no need to come to the facility.     Please log on at  8:45 AM to complete your check-in. Then, the provider will join you (or send you a link to join them) at  9:00 AM.         You met with your Targeted Mental Health  on Tuesday, January 14, 2025 on you last day here.  Diallo Ramirez @ [phone].  [email or web address]  Mental Health Resources  [address], [place] [address]  Main: [phone]  Mental Health Resources Case Management.     The Health Unit Coordinator has faxed these discharge instructions to fax: [phone]        You had an intake for the [facility] Day Treatment Program.  They will reach out to you with the next steps.  You can also contact them at [email or web address] ([phone])   OR  [facility] Outpatient Programs  [phone]        [facility] has Dialectical Behavioral Programs that you can explore if you are interested.  [facility] Locations for Adult Therapy Services:  Adult DBT in [place]  Adult DBT in [place]  Adult DBT in [place]  Adult DBT in [place]  Adult DBT in [facility] for Adult DBT  Call Us  [phone]        Information will be faxed to your outpatient providers to ensure a healthy continuity of care for you.     Attend all scheduled appointments with your outpatient providers. Call at least 24 hours in advance if you need to reschedule an appointment to ensure continued access to your outpatient providers.     Major Treatments, Procedures and Findings:  You were provided with: a psychiatric assessment, assessed for medical stability, medication evaluation and/or management, group therapy, milieu management, and medical interventions    You were seen by internal medicine for your physical issues..  Rash of bilateral posterior knees; suspect atopic dermatitis   New rash behind bilateral knees which is reported to be present X 2 days. Reports associated pruritus, non painful. On exam,  eczmatous rash of bilateral posterior knee (dry, erythematous rash) in large (~10 cm) oval pattern on left posterior knee and lesser on right posterior knee. Patient notes wearing hospital scrubs X several days prior to rash development. Denies hx of sensitive skin or frequent rash. No new medications prior to rash beginning.   -Trial of hydrocortisone 0.5% BID X 7 days (ordered)   -Notify medicine if rash of bilateral posterior knees worsening with hydrocortisone cream or not improving in 3-5 days (patient care order placed)   -Continue good hygiene, okay to use gentle soup and water for washing area, avoid aggressive scrubbing       Lactose intolerance   Lactose listed on allergy list as nausea. Patient requests lactase tablets prior to meals which she notes she tolerates well PTA.   -Lactase 3,000-6,000 units TID prior to meals (ordered)      Migraine headaches   -Continue PTA amitriptyline      POTS  Symptoms at baseline.   -Continue PTA fludrocortisone      Fibromyalgia   -Continue PTA gabapentin      IBS   -Continue PTA hyoscyamine      GERD   -Continue PTA esomeprazole             Symptoms to Report: feeling more aggressive, increased confusion, losing more sleep, or mood getting worse    Early warning signs can include: increased unusual thinking, such as paranoia or hearing voices    Safety and Wellness:  Take all medicines as directed.  Make no changes unless your doctor suggests them.      Follow treatment recommendations.  Refrain from alcohol and non-prescribed drugs.  If there is a concern for safety, call 911.    Resources:   Call or Text [facility] or [facility] [phone] [facility] Mental Crisis Program      Mental Health Crisis Resources  Throughout [place]: call **CRISIS (**570149)  Crisis Text Line: is available for free, 24/7 by texting [place] to 368829  Suicide Awareness Voices of Education (SAVE) (www.save.org): 661-397-NUDW (3901)  The National Suicide Prevention Lifeline is now: 616  Suicide and Crisis Lifeline. Call 988 anytime.  National [place] on Mental Illness (www.[place].heidy.org): 373.612.6757 or 691-814-4228.  Qkvf8wrwm: text the word LIFE to 54860 for immediate support and crisis intervention  Mental Health Consumer/Survivor Network of [place] (www.mhcsn.net): 170.482.5438 or 687-390-6035  Mental Health Association of [place] (www.mentalhealth.org): 620.660.7666 or 726-598-1341  Peer Support Connection [place] Warmline (PSC) 1-312.483.5228 Available from 5pm - 9am (7 days a week/365 days a year)        General Medication Instructions:   See your medication sheet(s) for instructions.   Take all medicines as directed.  Make no changes unless your doctor suggests them.   Go to all your doctor visits.  Be sure to have all your required lab tests. This way, your medicines can be refilled on time.  Do not use any drugs not prescribed by your doctor.  Avoid alcohol.    Advance Directives:   Scanned document on file with [employer]? No scanned doc  Is document scanned? Pt states no documents  Honoring Choices Your Rights Handout: Informed and given  Was more information offered? Pt unable to request    The Treatment team has appreciated the opportunity to work with you. If you have any questions or concerns about your recent admission, you can contact the unit which can receive your call 24 hours a day, 7 days a week. They will be able to get in touch with a Provider if needed. The unit number is [phone] .

## 2025-01-09 NOTE — PLAN OF CARE
Goal Outcome Evaluation:    IP Treatment Plan    Client's Name: Angela Jones  YOB: 1995      Treatment Plan Date: January 9, 2025      Anticipated number of sessions for this episode of care: 5    Current Concerns/Problem Areas:    Goal 1: Identify behaviors that sabotage growth and incorporate healthier behaviors  Pt will learn and practice one new cognitive coping strategy to manage obsessive behaviors and Pt will increase the use of healthy coping skills from 3 times to 6 times/dayPt will decrease reports of SI from 4 to 1 a day      Intervention(s)    Engaged in activity scheduling and behavioral activation, looking at and reviewing the prior week's goals, problem solving any barriers and acknowledging successes, as well as setting new goals and Reviewed healthy living that supports positive mental health, including looking at sleep hygiene, regular movement, nutrition, and regular socialization        Pt centered considerations  Pt considerations gender neutral pronouns  Has two partners, they are supportive  Doesn't want parents involved in care  Job is stressful, she likes dog grooming, but pace without breaks is hard, she said she does have accommodations  ASD/ ADHD/ panic attacks/ OCD intrusive thoughts reported

## 2025-01-09 NOTE — PLAN OF CARE
BEH IP Unit Acuity Rating Score (UARS)  Patient is given one point for every criteria they meet.    CRITERIA SCORING   On a 72 hour hold, court hold, committed, stay of commitment, or revocation. 0    Patient LOS on BEH unit exceeds 20 days. 0  LOS: 1   Patient under guardianship, 55+, otherwise medically complex, or under age 11. 0   Suicide ideation without relief of precipitating factors. 1   Current plan for suicide. 1   Current plan for homicide. 0   Imminent risk or actual attempt to seriously harm another without relief of factors precipitating the attempt. 0   Severe dysfunction in daily living (ex: complete neglect for self care, extreme disruption in vegetative function, extreme deterioration in social interactions). 0   Recent (last 7 days) or current physical aggression in the ED or on unit. 0   Restraints or seclusion episode in past 72 hours. 0   Recent (last 7 days) or current verbal aggression, agitation, yelling, etc., while in the ED or unit. 0   Active psychosis. 0   Need for constant or near constant redirection (from leaving, from others, etc).  0   Intrusive or disruptive behaviors. 0   Patient requires 3 or more hours of individualized nursing care per 8-hour shift (i.e. for ADLs, meds, therapeutic interventions). 0   TOTAL 2

## 2025-01-09 NOTE — PLAN OF CARE
"Goal Outcome Evaluation:    Initial meeting note:    Therapist introduced self to patient and discussed psychotherapy service available to patient.     Pt response: Pt expressed interest in meeting with therapist    Plan: Made plan to meet with pt again; began identifying goals      Individual Therapy Note      Date of Service: January 9, 2025    Patient:  goes by \",\" uses she/her pronouns    Individuals Present:  Joyce Perico [facility]    Session start: 1:20 pm  Session end: 2:15 pm  Session duration in minutes: 55      Modality Used: Person Centered and Rapport Building    Goals: Rapport building, safety planning, provide tools for anxiety and panic attacks, self care, rest breaks    Patient Description of current symptoms: very anxious, OCD intrusive thoughts     Mental Status Exam:   Attitude: cooperative  Eye Contact: fair  Mood: anxious  Affect: intensity is exaggerated, intensity is heightened, and intensity is dramatic  Speech: clear, coherent and pressured speech  Psychomotor Behavior: fidgeting  Thought Process:  linear and concrete  Associations: no loose associations  Thought Content: obsessions present and intrusive thoughts Si and SIB when stressed, pace of work is a stressor  Insight: fair  Judgement: fair  Attention Span and Concentration: intact    Pt progress: Pt was open to [ing ideas for intrusive thoughts, anxitey and panic attacks    Treatment Objective(s) Addressed:   The focus of this session was on rapport building, orienting the patient to therapy, safety planning, building distress tolerance, and exploring obstacles to safety in the community     Progress Towards Goals and Assessment of Patient:   Patient is making progress towards treatment goals as evidenced by willingness and desire to engage in groups and 1:1 therapy.       Therapeutic Intervention(s):   Provided active listening, unconditional positive regard, and validation.   Engaged in safety planning identifying " coping skills, warning signs, health support and resources, Understand and identify reasons for hospitalization, how to use the hospital to create change and prevent future hospitalizations , Provided positive reinforcement for progress towards goals, gains in knowledge, and application of skills previously taught, Discussed TIPP (body temperature, intense exercise, PMR), Identified stress relief practices, Explored strategies for self-soothing, Reviewed healthy living that supports positive mental health, including looking at sleep hygiene, regular movement, nutrition, and regular socialization, and Introduced and practiced Wise Mind Introduced and practiced urge surfing      Plan/next step: She would like to meet with a therapist 1:1 next week    72950 - Psychotherapy (with patient) - 60 (53+*) min    Patient Active Problem List   Diagnosis    OCD (obsessive compulsive disorder)    Pes planus    Moderate major depression (H)    Generalized anxiety disorder    Migraine with aura    Autism spectrum disorder    IUD (intrauterine device) in place    Fibromyalgia    POTS (postural orthostatic tachycardia syndrome)    Dysautonomia (H)    Suicidal ideation    Suicide ideation    Major depressive disorder with psychotic features (H)

## 2025-01-10 LAB
ATRIAL RATE - MUSE: 84 BPM
DIASTOLIC BLOOD PRESSURE - MUSE: NORMAL MMHG
INTERPRETATION ECG - MUSE: NORMAL
P AXIS - MUSE: 60 DEGREES
PR INTERVAL - MUSE: 150 MS
QRS DURATION - MUSE: 70 MS
QT - MUSE: 386 MS
QTC - MUSE: 456 MS
R AXIS - MUSE: 52 DEGREES
SYSTOLIC BLOOD PRESSURE - MUSE: NORMAL MMHG
T AXIS - MUSE: 36 DEGREES
VENTRICULAR RATE- MUSE: 84 BPM

## 2025-01-10 PROCEDURE — 250N000013 HC RX MED GY IP 250 OP 250 PS 637: Performed by: NURSE PRACTITIONER

## 2025-01-10 PROCEDURE — 90853 GROUP PSYCHOTHERAPY: CPT

## 2025-01-10 PROCEDURE — 97150 GROUP THERAPEUTIC PROCEDURES: CPT | Mod: GO

## 2025-01-10 PROCEDURE — 124N000002 HC R&B MH UMMC

## 2025-01-10 PROCEDURE — 250N000013 HC RX MED GY IP 250 OP 250 PS 637: Performed by: CLINICAL NURSE SPECIALIST

## 2025-01-10 PROCEDURE — 250N000013 HC RX MED GY IP 250 OP 250 PS 637: Performed by: PHYSICIAN ASSISTANT

## 2025-01-10 PROCEDURE — 99222 1ST HOSP IP/OBS MODERATE 55: CPT | Performed by: PHYSICIAN ASSISTANT

## 2025-01-10 PROCEDURE — 99232 SBSQ HOSP IP/OBS MODERATE 35: CPT | Performed by: NURSE PRACTITIONER

## 2025-01-10 RX ORDER — ARIPIPRAZOLE 5 MG/1
5 TABLET ORAL DAILY
Status: DISCONTINUED | OUTPATIENT
Start: 2025-01-11 | End: 2025-01-13

## 2025-01-10 RX ORDER — DIAPER,BRIEF,INFANT-TODD,DISP
EACH MISCELLANEOUS 2 TIMES DAILY
Status: DISCONTINUED | OUTPATIENT
Start: 2025-01-10 | End: 2025-01-14 | Stop reason: HOSPADM

## 2025-01-10 RX ORDER — CHOLECALCIFEROL (VITAMIN D3) 125 MCG
6000 CAPSULE ORAL
Status: DISCONTINUED | OUTPATIENT
Start: 2025-01-10 | End: 2025-01-10

## 2025-01-10 RX ORDER — LANOLIN ALCOHOL/MO/W.PET/CERES
2000 CREAM (GRAM) TOPICAL DAILY
Status: DISCONTINUED | OUTPATIENT
Start: 2025-01-10 | End: 2025-01-14 | Stop reason: HOSPADM

## 2025-01-10 RX ORDER — QUETIAPINE FUMARATE 25 MG/1
25 TABLET, FILM COATED ORAL 3 TIMES DAILY PRN
Status: DISCONTINUED | OUTPATIENT
Start: 2025-01-10 | End: 2025-01-14 | Stop reason: HOSPADM

## 2025-01-10 RX ORDER — CHOLECALCIFEROL (VITAMIN D3) 125 MCG
3000-6000 CAPSULE ORAL
Status: DISCONTINUED | OUTPATIENT
Start: 2025-01-10 | End: 2025-01-14 | Stop reason: HOSPADM

## 2025-01-10 RX ADMIN — Medication 125 MCG: at 08:52

## 2025-01-10 RX ADMIN — TRAZODONE HYDROCHLORIDE 50 MG: 50 TABLET ORAL at 22:07

## 2025-01-10 RX ADMIN — CLOMIPRAMINE HYDROCHLORIDE 100 MG: 50 CAPSULE ORAL at 20:23

## 2025-01-10 RX ADMIN — Medication 6000 UNITS: at 17:59

## 2025-01-10 RX ADMIN — MIRTAZAPINE 30 MG: 30 TABLET, FILM COATED ORAL at 20:24

## 2025-01-10 RX ADMIN — GABAPENTIN 900 MG: 300 CAPSULE ORAL at 13:37

## 2025-01-10 RX ADMIN — GUANFACINE 1 MG: 1 TABLET, EXTENDED RELEASE ORAL at 20:25

## 2025-01-10 RX ADMIN — ARIPIPRAZOLE 2 MG: 2 TABLET ORAL at 08:53

## 2025-01-10 RX ADMIN — GABAPENTIN 900 MG: 300 CAPSULE ORAL at 08:52

## 2025-01-10 RX ADMIN — Medication 6000 UNITS: at 12:33

## 2025-01-10 RX ADMIN — THERA TABS 1 TABLET: TAB at 08:53

## 2025-01-10 RX ADMIN — CYANOCOBALAMIN TAB 1000 MCG 2000 MCG: 1000 TAB at 08:52

## 2025-01-10 RX ADMIN — QUETIAPINE FUMARATE 25 MG: 25 TABLET ORAL at 17:55

## 2025-01-10 RX ADMIN — QUETIAPINE FUMARATE 25 MG: 25 TABLET ORAL at 12:33

## 2025-01-10 RX ADMIN — GABAPENTIN 900 MG: 300 CAPSULE ORAL at 20:23

## 2025-01-10 RX ADMIN — FLUDROCORTISONE ACETATE 0.2 MG: 0.1 TABLET ORAL at 08:53

## 2025-01-10 RX ADMIN — HYDROCORTISONE: 0.5 CREAM TOPICAL at 12:31

## 2025-01-10 ASSESSMENT — ACTIVITIES OF DAILY LIVING (ADL)
ADLS_ACUITY_SCORE: 30

## 2025-01-10 NOTE — PLAN OF CARE
"  Rehab Group    Start time: 11:15  End time: 11:55  Patient time total: 40 minutes    attended full group    #7 attended   Group Type: occupational therapy   Group Topic Covered: balanced lifestyle, cognitive activities, healthy leisure time, and social skills       Group Session Detail:  Patient engaged in an interactive game of Blank Slate to promote healthy leisure exploration, social skills, and cognitive activity. Participants of this group were instructed to effectively sequence turn taking, accurately recall and follow game instructions, sustain attention on game, and practice prosocial behaviors.    Patient Response/Contribution:  cooperative with task, socially appropriate, listened actively, attentive, and actively engaged       Patient Detail:  Patient Response: Patient arrived to this interactive group game of Blank Slate on time. Patient verbalized unfamiliarity with the game and game concepts upon arrival to group. However, after presentation of game instructions, patient appeared to have a good understanding of game objectives. During the game, patient demonstrated good and appropriate social skills and offered encouragement to peers throughout he activity, though, did express having \"competitiveness\" when playing group games. Patient independently sequenced turn taking and demonstrated good focus throughout the game. Affect appeared congruent with the activity. Patient remained calm, pleasant, and cooperative throughout the duration of group.       [id number] OT Group (2 or more in attendance)      Patient Active Problem List   Diagnosis    OCD (obsessive compulsive disorder)    Pes planus    Moderate major depression (H)    Generalized anxiety disorder    Migraine with aura    Autism spectrum disorder    IUD (intrauterine device) in place    Fibromyalgia    POTS (postural orthostatic tachycardia syndrome)    Dysautonomia (H)    Suicidal ideation    Suicide ideation    Major depressive disorder with " psychotic features (H)

## 2025-01-10 NOTE — PLAN OF CARE
Team Note Due:  Thursday    Assessment/Intervention/Current Symtoms and Care Coordination:  Chart review and met with team, discussed pt progress, symptomology, and response to treatment.  Discussed the discharge plan and any potential impediments to discharge.      Team Meeting  Pt has been started on Abilify.  CTC reported that the pt has not been able to get MA and CADI because she has had her 's income in the home.  CTC will discuss DBT with pt.      Discharge Plan or Goal:  Home with services         Barriers to Discharge:  None         Referral Status:      Primary Care Provider:  Name/Clinic: [facility] Wendy Walton MD   Number:            Specialists  Rheumatology and Pain and Palliative for fibromyalgia  Cardiology for PODS              Medication Management/Psychiatry:  Name/Clinic: [facility]  Salina Carrillo MD   Pt missed the 12/13/24 appointment.  JAN 24 Adult Med Follow UP with Salina Lagunas  Friday Jan 24, 2025 2:25 PM  Please Note: This is a virtual visit; there is no need to come to the facility.                     Therapist:    Fanny NISW with [facility] for several years, currently every-other week.   21 Adult Psychotherapy with Fanny Cobb  Tuesday Jan 21, 2025 8:45 AM  Please Note: This is a virtual visit; there is no need to come to the facility.     Please log on at  8:45 AM to complete your check-in. Then, the provider will join you (or send you a link to join them) at  9:00 AM.         Targeted Mental Health /:  Pt reports having a  at [facility] but does not remember the name.  I called and it is Diallo Ramirez @ [phone]. Left vm  His colleague Emilia called me back and explained the situation. Pt has an appointment with Diallo on 1/14 and he will come visit her here if she is here.  [facility]  Mental Health Resources  [address], [place]  [address]  [phone]  fax: [phone]  Mental Health Resources Case Management.  Supervisor:  Chely Betts at [phone]                   Legal Status:  Voluntary           Contacts (include EDDI status):  Pt would like to be listed as confidential. I have alerted BRAEDEN and RN.  She does not want her estranged  or parents to call her.     She has signed ROIs for partners:  Zander oRberts @ [phone]  Corey Coe @ [phone]        Upcoming Meetings and Dates/Important Information and next steps:      Will wait for discharge date.

## 2025-01-10 NOTE — PLAN OF CARE
"  Rehab Group    Start time: 13:15  End time: 13:50  Patient time total: 20 minutes    attended partial group    #5 attended   Group Type: occupational therapy   Group Topic Covered: balanced lifestyle, coping skills, healthy leisure time, and Physical activity       Group Session Detail:  Patient engaged in a seated guided exercise activity to promote physical wellness, healthy leisure exploration, and alternative coping strategies. Participants were instructed to follow along with a guided 30-minute seated exercise routine as well as reflect on their mood before and after participating in the activity and share insights with group members.    Patient Response/Contribution:  cooperative with task, listened actively, attentive, and actively engaged       Patient Detail:  Patient Response: Patient arrived to this movement-based group on time and was an active participant throughout time in group. During the initial check-in, patient shared feeling \"drained\" and further described that being around a lot of people is challenging. When prompted to identify one thing she is looking forward to this weekend, patient expressed that she can't think of anything. However, patient did describe enjoying watching TV and playing video games. Patient followed along with the guided movement activity to the best of her ability. After roughly 20 minutes of participation in the movement activity, patient elected to leave group early. Despite this, patient remained calm, cooperative, and pleasant throughout time in group.       [id number] OT Group (2 or more in attendance)      Patient Active Problem List   Diagnosis    OCD (obsessive compulsive disorder)    Pes planus    Moderate major depression (H)    Generalized anxiety disorder    Migraine with aura    Autism spectrum disorder    IUD (intrauterine device) in place    Fibromyalgia    POTS (postural orthostatic tachycardia syndrome)    Dysautonomia (H)    Suicidal ideation    Suicide " ideation    Major depressive disorder with psychotic features (H)

## 2025-01-10 NOTE — CONSULTS
[facility]  Consult Note - Hospitalist Service  Date of Admission:  1/8/2025  Consult Requested by:     Jamila Brian MD     Reason for Consult: Rash of lower extremities    Assessment & Plan   Angela Jones is a 29 year old adult admitted on 1/8/2025.  has a past medical history significant for generalized anxiety disorder, migraines, fibromyalgia, self mutilating behaviors, OCD, ASD, who presented to the ED for evaluation of suicidal ideations, currently admitted to inpatient mental health. Internal medicine was consulted for new rash on lower extremities.     SI  JOSHUA  Suicidal ideation   OCD   Patient presented with suicidal ideations. PTA on clomipramine (increased on admission), mirtazapine. Started Abilify 1/9/25.   -management per psychiatry     Rash of bilateral posterior knees; suspect atopic dermatitis   New rash behind bilateral knees which is reported to be present X 2 days. Reports associated pruritus, non painful. On exam, eczmatous rash of bilateral posterior knee (dry, erythematous rash) in large (~10 cm) oval pattern on left posterior knee and lesser on right posterior knee. Patient notes wearing hospital scrubs X several days prior to rash development. Denies hx of sensitive skin or frequent rash. No new medications prior to rash beginning.   -Trial of hydrocortisone 0.5% BID X 7 days (ordered)   -Notify medicine if rash of bilateral posterior knees worsening with hydrocortisone cream or not improving in 3-5 days (patient care order placed)   -Continue good hygiene, okay to use gentle soup and water for washing area, avoid aggressive scrubbing      Lactose intolerance   Lactose listed on allergy list as nausea. Patient requests lactase tablets prior to meals which she notes she tolerates well PTA.   -Lactase 3,000-6,000 units TID prior to meals (ordered)     Migraine headaches   -Continue PTA amitriptyline     POTS  Symptoms at baseline.    -Continue PTA fludrocortisone     Fibromyalgia   -Continue PTA gabapentin     IBS   -Continue PTA hyoscyamine     GERD   -Continue PTA esomeprazole      The patient's care was discussed with the Bedside Nurse and Patient. Recommendations communicated to primary team via this note.     Currently patient is medically stable and medicine will sign off. Thank you for allowing us to be a part of this patients care. Please notify on call [facility] if any intercurrent medical issues arise.       Clinically Significant Risk Factors                                  # Financial/Environmental Concerns:           HAKAN Hicks  Hospitalist Service  Securely message with [employer] (more info)  Text page via [facility] Paging/Directory   ______________________________________________________________________    Chief Complaint   Rash of bilateral knees    History is obtained from the patient    History of Present Illness   Angela Jones is a 29 year old adult who has a past medical history significant for generalized anxiety disorder, migraines, fibromyalgia, self mutilating behaviors, OCD, ASD, who presented to the ED for evaluation of suicidal ideations, currently admitted to inpatient mental health. Internal medicine was consulted for new rash on lower extremities. Patient reports new rash of bilateral posterior knees which she first noted yesterday while taking a shower. Notes associated pruritus, denies pain or drainage. Denies hx of similar rash in the past. Denies hx of sensitive skin, eczema. Notes mild nasal congestion, otherwise denies fevers, chills, cough, sputum production, nausea, vomiting, poor appetite, chest pain. Notes chronic intermittent dizziness which she associates with POTS. Patient reports she was wearing hospital scrub pants in the ED for several days prior to noticing rash. Currently wearing leggings from home. Denies any other new detergents, lotions or creams. Patient notes new medication, Abilify,  started yesterday after noticing rash of bilateral posterior knees.     Patient also notes she has lactose intolerance for which she takes lactase pills with good effect at home. She requests lactase pills during hospital stay to allow her to order additional options for foods.       Past Medical History    Past Medical History:   Diagnosis Date    Flat foot 3/25/2014    JOSHUA (generalised anxiety disorder) 3/25/2014    Hypoxia in liveborn infant     born hypoxic    Migraine     Migraine with aura 11/4/2014    Migraines     Moderate major depression (H) 3/25/2014    OCD (obsessive compulsive disorder) 8/4/2013    Palpitations     Self mutilating behavior 4/5/2013    cut self with pencil sharpener blade, left arm       Past Surgical History   Past Surgical History:   Procedure Laterality Date    NO PAST SURGERIES      UPPER GI ENDOSCOPY  6/7/12       Medications   Medications Prior to Admission   Medication Sig Dispense Refill Last Dose/Taking    amitriptyline (ELAVIL) 25 MG tablet TAKE 2 TABLETS(50 MG) BY MOUTH AT BEDTIME. SEE YOUR DOCTOR FOR FURTHER REFILLS. (Patient taking differently: Take 25 mg by mouth at bedtime. TAKE 2 TABLETS(50 MG) BY MOUTH AT BEDTIME. SEE YOUR DOCTOR FOR FURTHER REFILLS.) 180 tablet 3 Past Week    clomiPRAMINE (ANAFRANIL) 50 MG capsule Take 2 capsules (100 mg) by mouth at bedtime. 60 capsule 1 1/8/2025    dexAMETHasone (DECADRON) 2 MG tablet Take 2 mg by mouth once as directed (fibromyalgia flare-up). Take 2mg twice daily for 2 days during fibromyalgia flare up.   Past Month    diphenhydrAMINE (BENADRYL) 50 MG capsule Take 50 mg by mouth as needed for allergies.   Past Month    esomeprazole (NEXIUM) 20 MG DR capsule Take 20 mg by mouth at bedtime. Take 30-60 minutes before eating.   1/8/2025    fludrocortisone (FLORINEF) 0.1 MG tablet Take 2 tablets (0.2 mg) by mouth daily. 180 tablet 1 1/8/2025    gabapentin (NEURONTIN) 300 MG capsule Take 3 capsules (900 mg) by mouth 3 times daily. 810  "capsule 3 1/8/2025    hyoscyamine (LEVSIN/SL) 0.125 MG sublingual tablet Take 1 tablet (0.125 mg) by mouth every 4 hours as needed. 120 tablet 5 1/8/2025    ibuprofen (ADVIL/MOTRIN) 200 MG tablet Take 200 mg by mouth every 4 hours as needed for pain or other (migrain)   Past Month    metoclopramide (REGLAN) 5 MG tablet Take 1 tablet (5 mg) by mouth 3 times daily as needed (for nausea). 90 tablet 3 Past Week    mirtazapine (REMERON) 15 MG tablet Take 1 tablet (15 mg) by mouth at bedtime. 90 tablet 3 1/8/2025    multivitamin, therapeutic (THERA-VIT) TABS tablet Take 1 tablet by mouth daily   1/8/2025    tiZANidine (ZANAFLEX) 2 MG tablet Take 1 tablet (2 mg) by mouth daily as needed for muscle spasms. 90 tablet 3 1/8/2025    levonorgestrel (MIRENA, 52 MG,) 20 MCG/24HR IUD 1 each (20 mcg) by Intrauterine route once for 1 dose 1 each 0           Allergies   Allergies   Allergen Reactions    Adhesive Tape Hives     EKG Patches; any adhesives including band aides; burns      EKG Patches; any adhesives including band aides; burns  EKG Patches; any adhesives including band aides; burns  EKG Patches; any adhesives including band aides; burns      Glucose Other (See Comments)     Other reaction(s): GI intolerance  GI intolerance      Azithromycin Nausea and Vomiting    Fructose Cramps, Diarrhea, GI Disturbance and Nausea and Vomiting    Lactose Nausea    Levofloxacin Other (See Comments)     Tendinopathy        Physical Exam   Blood pressure 118/85, pulse 86, temperature 98.1  F (36.7  C), temperature source Oral, resp. rate 16, height 1.6 m (5' 3\"), weight 57.7 kg (127 lb 4.8 oz), SpO2 90%, not currently breastfeeding.  GENERAL: Alert and oriented x 3. NAD. Poor eye contact. Pleasant.   HEENT: Anicteric sclera. . Mucous membranes moist and without lesions.   RESPIRATORY: Effort normal on RA.  MUSCULOSKELETAL: Moves all extremities.   NEUROLOGICAL: No focal deficits. Gait steady. Mild upper extremity tremor.   EXTREMITIES: No " peripheral edema.   SKIN: No jaundice. Eczmatous rash of bilateral posterior knee (dry, erythematous rash) in large (~10 cm) oval pattern on left posterior knee and lesser on right posterior knee      Medical Decision Making       45 MINUTES SPENT BY ME on the date of service doing chart review, history, exam, documentation & further activities per the note.      Data     I have personally reviewed the following data over the past 24 hrs:    N/A  \   N/A   / N/A     N/A N/A N/A /  N/A   N/A N/A N/A \     ALT: N/A AST: N/A AP: N/A TBILI: N/A   ALB: N/A TOT PROTEIN: N/A LIPASE: N/A     TSH: N/A T4: N/A A1C: N/A

## 2025-01-10 NOTE — PLAN OF CARE
Goal Outcome Evaluation:    Pt spent 75 % of the shift in the [facility] area watching TV and minimally socializing with select peers. Pt presented with full range affect anxious and depressed mood. Pt attended and participated unit group this shift.  denied SI/AVH/HI but endorsed some passive SIB thoughts as well as anxiety 6/10 and depression 8/10 this shift. Pt was able to contract for safety while in the unit. Pt reported poor appetite this shift d/t needing diet change and nutrition consult was obtained. Pt was medication compliant this shift and reported rash behind bilateral knees as a possible side effects.PRN ordered and IM consulted. Bilateral hand tremors were noted and patient reported them to be hereditary and chronic. Pt was visited by family and she reported to have enjoyed the company. Pt was clean and well kempt this shift. Staff will continue to monitor and support with current POC.      Plan of Care Reviewed With: patient

## 2025-01-10 NOTE — PROGRESS NOTES
"Rehab Group     Start time: 1715  End time: 1800  Patient time total: 45 minutes     attended full group     #8 attended   Group Type: music   Group Topic Covered:      balanced lifestyle, coping skills, and mindfulness      Group Session Detail:      Cooperatively engaged in Evening Music Relaxation group to decrease anxiety and promote mindfulness.        Patient Response/Contribution:      cooperative with task       Patient Detail:      Attentive affect, appropriately engaged in session, responding well to the music.  [place] at times.      Appears to tend to \"go with the flow\", though she indicated to MT she felt relieved when other peers were asked to leave group if they were talking over the music.     Pleasant, easy-going affect.        Activity Therapy Per 15 min ()    Patient Active Problem List   Diagnosis    OCD (obsessive compulsive disorder)    Pes planus    Moderate major depression (H)    Generalized anxiety disorder    Migraine with aura    Autism spectrum disorder    IUD (intrauterine device) in place    Fibromyalgia    POTS (postural orthostatic tachycardia syndrome)    Dysautonomia (H)    Suicidal ideation    Suicide ideation    Major depressive disorder with psychotic features (H)       "

## 2025-01-10 NOTE — PLAN OF CARE
Rehab Group    Start time: 10:15  End time: 11:00  Patient time total: 40 minutes    attended partial group    #7 attended   Group Type: OT Clinic   Group Topic Covered: balanced lifestyle, coping skills, healthy leisure time, relaxation , and social skills       Group Session Detail:  Pt actively participated in occupational therapy clinic to facilitate coping skill exploration, creative expression within personally meaningful activities, and clinical observation of social, cognitive, and kinesthetic performance skills.    Patient Response/Contribution:  cooperative with task, organized, socially appropriate, attentive, and actively engaged       Patient Detail:  Pt response: Patient arrived to this group somewhat late, however, independently selected to continue a previously started [employer] art project. Patient was independent to initiate, gather materials, sequence, and adjust to workspace demands as needed. Demonstrated good focus, planning, and problem solving for selected jc art task. Patient engaged with peers and staff appropriately throughout the group and appeared to enjoy discussing the singer Prince with group members. Affect appeared congruent with the activity and brightened during interactions. Patient remained calm, pleasant, and cooperative throughout he duration of the group and was independent in initiating and completing clean up and task supplies.       [id number] OT Group (2 or more in attendance)      Patient Active Problem List   Diagnosis    OCD (obsessive compulsive disorder)    Pes planus    Moderate major depression (H)    Generalized anxiety disorder    Migraine with aura    Autism spectrum disorder    IUD (intrauterine device) in place    Fibromyalgia    POTS (postural orthostatic tachycardia syndrome)    Dysautonomia (H)    Suicidal ideation    Suicide ideation    Major depressive disorder with psychotic features (H)

## 2025-01-10 NOTE — PLAN OF CARE
BEH IP Unit Acuity Rating Score (UARS)  Patient is given one point for every criteria they meet.    CRITERIA SCORING   On a 72 hour hold, court hold, committed, stay of commitment, or revocation. 0    Patient LOS on BEH unit exceeds 20 days. 0  LOS: 2   Patient under guardianship, 55+, otherwise medically complex, or under age 11. 0   Suicide ideation without relief of precipitating factors. 1   Current plan for suicide. 1   Current plan for homicide. 0   Imminent risk or actual attempt to seriously harm another without relief of factors precipitating the attempt. 0   Severe dysfunction in daily living (ex: complete neglect for self care, extreme disruption in vegetative function, extreme deterioration in social interactions). 0   Recent (last 7 days) or current physical aggression in the ED or on unit. 0   Restraints or seclusion episode in past 72 hours. 0   Recent (last 7 days) or current verbal aggression, agitation, yelling, etc., while in the ED or unit. 0   Active psychosis. 0   Need for constant or near constant redirection (from leaving, from others, etc).  0   Intrusive or disruptive behaviors. 0   Patient requires 3 or more hours of individualized nursing care per 8-hour shift (i.e. for ADLs, meds, therapeutic interventions). 0   TOTAL 2                              VS are stable.  Breastfeeding every 1-4 hours on demand.  Baby was skin to skin most of the time. Positive feedback offered to parents. Is content between feedings. Is voiding. Is stooling.Does not have  episodes of regurgitation.  Night feeding plan; breastfeeding; staying in room  Weight: 2.25 kg (4 lb 15.4 oz)  Percent Weight Change Since Birth: -5.8  Lab Results   Component Value Date    BGM 66 2020    TCBIL 13.2 (A) 2020    BILITOTAL 2020     Next  TCB at discharge  Parents are participating in  cares and gaining in confidence. Will continue to monitor and assess. Encouraged unrestricted feedings on cue, 8-12 times in 24 hours.  Car seat test performed and passed

## 2025-01-10 NOTE — PLAN OF CARE
Problem: Sleep Disturbance  Goal: Adequate Sleep/Rest  Intervention: Promote Sleep/Rest  Recent Flowsheet Documentation  Taken 1/10/2025 0205 by Kat Lorenzo RN  Sleep/Rest Enhancement:   regular sleep/rest pattern promoted   awakenings minimized   noise level reduced   Goal Outcome Evaluation:         Pt had an uneventful night. No signs or symptoms of pain or discomfort noted during 15 minutes safety checks. Pt appeared sleeping comfortable with no distress noted. Pt had about 7 hours of sleep. No prn given this shift. No safety concern noted this shift. Will continue to monitor and will offer therapeutic support if need arise.

## 2025-01-10 NOTE — PROGRESS NOTES
"[facility], [place]   Psychiatric Progress Note        Interim History:   Team meeting report: The patient's care was discussed with the treatment team during the daily team meeting and/or staff's chart notes were reviewed.  Staff report patient has been calm, pleasant, cooperative.  She is visible in the milieu and attending all groups.  No behavioral issues.  Reports high depression and anxiety.  She was seen watching TV.  Affect is full range.  Rated depression 8 and anxiety 6/10, 10 being the worst in the evening.  Reported poor appetite.  Her family came to visit and it went well.  Slept 7 hours    Met with patient.  Appears anxious but slightly better.  She went to all groups because \"I get bored\".  Appetite continues to be poor however, she ate her breakfast even though it was not what she ordered.  Her sleep was \"not great\".  She had cold sweats which is nothing new for her.  She is also reporting rash on the back of her legs which is new.  She wants to see internal medicine for it.  Continues to report high depression and anxiety.  Continues have suicidal ideation, no plan or intent to act on her thoughts.  Reports taking Zyprexa in the emergency room which was helpful for the anxiety.  We discussed starting Seroquel which she was agreeable to do.  She agreed to increase the dose of the Abilify.    Discussed her genetic testing from 2017.  We went over the medications that would be helpful for her.  She wants to have a copy of the genetic testing when she discharges.  She will stay here this weekend. Tentative discharge early next week.         Medications:     Current Facility-Administered Medications   Medication Dose Route Frequency Provider Last Rate Last Admin    [START ON 1/11/2025] ARIPiprazole (ABILIFY) tablet 5 mg  5 mg Oral Daily Heather Avalos APRN CNP        cholecalciferol (VITAMIN D3) capsule 125 mcg  125 mcg Oral Daily Heather Avalos, " APRN CNP   125 mcg at 01/10/25 0852    clomiPRAMINE (ANAFRANIL) capsule 100 mg  100 mg Oral At Bedtime Hasmukh Moses GISELLE CNP   100 mg at 01/09/25 2150    cyanocobalamin (VITAMIN B-12) tablet 2,000 mcg  2,000 mcg Oral Daily Heather AvalosjaspreetlianaMANI heckN CNP   2,000 mcg at 01/10/25 0852    fludrocortisone (FLORINEF) tablet 0.2 mg  0.2 mg Oral Daily NoeMoses APRN CNP   0.2 mg at 01/10/25 0853    gabapentin (NEURONTIN) capsule 900 mg  900 mg Oral TID Moses Noe APRN CNP   900 mg at 01/10/25 0852    guanFACINE (INTUNIV) 24 hr tablet 1 mg  1 mg Oral At Bedtime TerencejaspreetkellyHeather APRN CNP        hydrocortisone (CORTAID) 0.5 % cream   Topical BID Nataliia Cuevas PA        lactase (LACTAID) tablet 3,000-6,000 Units  3,000-6,000 Units Oral TID w/meals Nataliia Cuevas PA        mirtazapine (REMERON) tablet 30 mg  30 mg Oral At Bedtime TerencejaspreetCaro mendozanikitaroz CarollianaGISELLE heck CNP   30 mg at 01/09/25 2054    multivitamin, therapeutic (THERA-VIT) tablet 1 tablet  1 tablet Oral Daily HasmukhJoycelynMoses, APRN CNP   1 tablet at 01/10/25 0853            Allergies:     Allergies   Allergen Reactions    Adhesive Tape Hives     EKG Patches; any adhesives including band aides; burns      EKG Patches; any adhesives including band aides; burns  EKG Patches; any adhesives including band aides; burns  EKG Patches; any adhesives including band aides; burns      Corn Syrup [Glucose] Other (See Comments)     Other reaction(s): GI intolerance  GI intolerance      Azithromycin Nausea and Vomiting    Fructose Cramps, Diarrhea, GI Disturbance and Nausea and Vomiting    Levofloxacin Other (See Comments)     Tendinopathy            Labs:     Recent Results (from the past 4 weeks)   HCG qualitative urine    Collection Time: 01/08/25  3:12 AM   Result Value Ref Range    hCG Urine Qualitative Negative Negative   COVID-19 Virus (Coronavirus) by PCR Nose    Collection Time: 01/08/25  3:12 AM    Specimen: Nose; Swab   Result  Value Ref Range    SARS CoV2 PCR Negative Negative   Urine Drug Screen Panel    Collection Time: 01/08/25  3:12 AM   Result Value Ref Range    Amphetamines Urine Screen Negative Screen Negative    Barbituates Urine Screen Negative Screen Negative    Benzodiazepine Urine Screen Negative Screen Negative    Cannabinoids Urine Screen Negative Screen Negative    Cocaine Urine Screen Negative Screen Negative    Fentanyl Qual Urine Screen Negative Screen Negative    Opiates Urine Screen Negative Screen Negative    PCP Urine Screen Negative Screen Negative   Comprehensive metabolic panel    Collection Time: 01/09/25  8:49 AM   Result Value Ref Range    Sodium 140 135 - 145 mmol/L    Potassium 3.7 3.4 - 5.3 mmol/L    Carbon Dioxide (CO2) 20 (L) 22 - 29 mmol/L    Anion Gap 17 (H) 7 - 15 mmol/L    Urea Nitrogen 18.5 6.0 - 20.0 mg/dL    Creatinine 0.70 0.51 - 1.17 mg/dL    GFR Estimate >90 >60 mL/min/1.73m2    Calcium 9.0 8.8 - 10.4 mg/dL    Chloride 103 98 - 107 mmol/L    Glucose 139 (H) 70 - 99 mg/dL    Alkaline Phosphatase 61 40 - 150 U/L    AST 15 0 - 45 U/L    ALT 13 0 - 70 U/L    Protein Total 6.8 6.4 - 8.3 g/dL    Albumin 4.3 3.5 - 5.2 g/dL    Bilirubin Total 0.2 <=1.2 mg/dL   TSH with free T4 reflex    Collection Time: 01/09/25  8:49 AM   Result Value Ref Range    TSH 2.35 0.30 - 4.20 uIU/mL   Vitamin D Deficiency    Collection Time: 01/09/25  8:49 AM   Result Value Ref Range    Vitamin D, Total (25-Hydroxy) 22 20 - 50 ng/mL   Vitamin B12    Collection Time: 01/09/25  8:49 AM   Result Value Ref Range    Vitamin B12 306 232 - 1,245 pg/mL   Folate    Collection Time: 01/09/25  8:49 AM   Result Value Ref Range    Folic Acid 12.0 4.6 - 34.8 ng/mL   Hemoglobin A1c    Collection Time: 01/09/25  8:49 AM   Result Value Ref Range    Estimated Average Glucose 100 <117 mg/dL    Hemoglobin A1C 5.1 <5.7 %   CBC with platelets and differential    Collection Time: 01/09/25  8:49 AM   Result Value Ref Range    WBC Count 4.3 4.0 - 11.0  10e3/uL    RBC Count 3.97 3.80 - 5.90 10e6/uL    Hemoglobin 12.4 11.7 - 17.7 g/dL    Hematocrit 37.2 35.0 - 53.0 %    MCV 94 78 - 100 fL    MCH 31.2 26.5 - 33.0 pg    MCHC 33.3 31.5 - 36.5 g/dL    RDW 12.0 10.0 - 15.0 %    Platelet Count 210 150 - 450 10e3/uL    % Neutrophils 61 %    % Lymphocytes 29 %    % Monocytes 8 %    % Eosinophils 1 %    % Basophils 1 %    % Immature Granulocytes 0 %    NRBCs per 100 WBC 0 <1 /100    Absolute Neutrophils 2.6 1.6 - 8.3 10e3/uL    Absolute Lymphocytes 1.3 0.8 - 5.3 10e3/uL    Absolute Monocytes 0.3 0.0 - 1.3 10e3/uL    Absolute Eosinophils 0.0 0.0 - 0.7 10e3/uL    Absolute Basophils 0.1 0.0 - 0.2 10e3/uL    Absolute Immature Granulocytes 0.0 <=0.4 10e3/uL    Absolute NRBCs 0.0 10e3/uL   EKG 12-lead, complete    Collection Time: 01/09/25 12:15 PM   Result Value Ref Range    Systolic Blood Pressure  mmHg    Diastolic Blood Pressure  mmHg    Ventricular Rate 84 BPM    Atrial Rate 84 BPM    MI Interval 150 ms    QRS Duration 70 ms     ms    QTc 456 ms    P Axis 60 degrees    R AXIS 52 degrees    T Axis 36 degrees    Interpretation ECG       Sinus rhythm  Normal ECG  When compared with ECG of 17-May-2022 10:52,  No significant change was found              Precautions:     Behavioral Orders   Procedures    Code 1 - Restrict to Unit    Routine Programming     As clinically indicated    Status 15     Every 15 minutes.    Suicide precautions: Suicide Risk: LOW; Clinical rationale to override score: modification to the care environment     Patients on Suicide Precautions should have a Combination Diet ordered that includes a Diet selection(s) AND a Behavioral Tray selection for Safe Tray - with utensils, or Safe Tray - NO utensils       Order Specific Question:   Suicide Risk     Answer:   LOW     Order Specific Question:   Clinical rationale to override score:     Answer:   modification to the care environment            Psychiatric Examination:   Temp: 98.1  F (36.7  C) Temp  src: Oral BP: 118/85 Pulse: 86   Resp: 16 SpO2: 90 % O2 Device: None (Room air)    Weight is 127 lbs 4.8 oz  Body mass index is 22.55 kg/m .    Appearance: awake, alert and adequately groomed  Attitude:  cooperative  Eye Contact:  good  Mood:  anxious and depressed  Affect:  mood congruent  Speech:  clear, coherent  Psychomotor Behavior:  no evidence of tardive dyskinesia, dystonia, or tics  Throught Process:  logical and goal oriented  Associations:  no loose associations  Thought Content:  no evidence of suicidal ideation or homicidal ideation, no auditory hallucinations present, and no visual hallucinations present  Insight:  fair  Judgement:  fair  Oriented to:  time, person, and place  Attention Span and Concentration:  fair  Recent and Remote Memory:  fair         Precautions:     Behavioral Orders   Procedures    Code 1 - Restrict to Unit    Routine Programming     As clinically indicated    Status 15     Every 15 minutes.    Suicide precautions: Suicide Risk: LOW; Clinical rationale to override score: modification to the care environment     Patients on Suicide Precautions should have a Combination Diet ordered that includes a Diet selection(s) AND a Behavioral Tray selection for Safe Tray - with utensils, or Safe Tray - NO utensils       Order Specific Question:   Suicide Risk     Answer:   LOW     Order Specific Question:   Clinical rationale to override score:     Answer:   modification to the care environment          DIagnoses:   Bipolar affective disorder, current episode depressed, severe, without psychosis  Generalized anxiety disorder  Panic attacks without agoraphobia  OCD  ADHD, per history  Autism spectrum disorder, per history  Cluster B traits  Cannabis use disorder, moderate, dependence  Fibromyalgia  Migraine headaches  Borderline vitamin D and B12 deficiency         Plan:   Medications:  --Discontinue amitriptyline to reduce the number of antidepressants she is taking  -- Increase Abilify, 5  mg every morning for mood stabilization  --Continue clomipramine, 100 mg at bedtime for OCD  --Continue Remeron, 30 mg at bedtime  --Continue gabapentin, 900 mg 3 times a day for fibromyalgia.  It helps with the anxiety as well.  --Start Intuniv, 1 mg at bedtime for ADHD  --Vitamin D, 5000 units, once a day  --Vitamin B12, 2000 mcg once a day  --Start Seroquel, 25 mg 3 times a day as needed for anxiety  --Additional medications are available as needed    Lab work:  --Lab work was reviewed.  U tox was reviewed.    -- Ordered CBC, CMP, TSH with T4, vitamin D, B12, folate.  All WNL.  Vitamin D and B12 are on the lower side of normal.  --Ordered EKG. WNL.     Medical:  --Internal medicine to follow up for medical problems     Consults:   --Care was coordinated with the treatment team.   --The patient was consulted on nature of illness and treatment options.      Disposition Plan   Reason for ongoing admission: poses an imminent risk to self  Discharge location: home with self-care  Discharge Medications: not ordered  Follow-up Appointments: not scheduled  Legal Status: voluntary   Discharge will be granted once symptoms improved.    Heather DESAI, CNP    This note was created with the help of Dragon dictation system. All grammatical/typing errors or context distortion are unintentional and inherent to software.

## 2025-01-10 NOTE — PROGRESS NOTES
"CLINICAL NUTRITION SERVICES - ASSESSMENT NOTE     Nutrition Prescription    RECOMMENDATIONS FOR MDs/PROVIDERS TO ORDER:  None at this time    Malnutrition Status:    Patient does not meet two of the established criteria necessary for diagnosing malnutrition    Recommendations already ordered by Registered Dietitian (RD):  -Handout: Fructose malabsorption nutrition therapy  -Remove lactose allergy, add corn syrup allergy  -Gatorade w/ meals  -Added HT dislikes: pears, peaches  -Extra salt packets w/ meals for management of POTS    Future/Additional Recommendations:  Monitor PO intake, GI sx, weight trends.      REASON FOR ASSESSMENT  Angela Jones is a/an 29 year old adult assessed by the dietitian for RN Consult - Pt reports that she is fractose intorelalance and has not been receiving any better options for meals     CLINICAL HISTORY  PMHx significant for depression, anxiety, OCD, ASD, ADHD, cluster B traits, fibromyalgia, migraine headaches, IBS, and POTS. Admitted from ED 1/8/25 due to concern for suicidal ideation and self-injurious behavior.     NUTRITION HISTORY  RD met with Alma in the milieu who reports receiving corn syrup-containing foods despite fructose allergy and would like lactose allergy removed d/t discussion with provider that she would prefer to manage lactose intolerance with lactaid. Pt is agreeable to Gatorade and extra salt packets with meals for management of POTS.     GI: Pt denied N/V/D/C     CURRENT NUTRITION ORDERS  Diet: Regular  Supplement: none  Allergies: Glucose, fructose, lactose  Intake/Tolerance: eating and drinking adequately per RN notes    LABS  Reviewed     MEDICATIONS  Abilify, vit D3, clomipramine, vit B12, remeron, MVI, atarax prn    ANTHROPOMETRICS  Height: 160 cm (5' 3\")  Most Recent Weight: 57.7 kg (127 lb 4.8 oz)    IBW: 52.3 kg   BMI: Normal BMI  Weight History:   Wt Readings from Last 15 Encounters:   01/09/25 57.7 kg (127 lb 4.8 oz)   01/05/25 56.9 kg (125 lb 6.4 " oz)   11/14/24 59.1 kg (130 lb 6.4 oz)   09/19/24 58.7 kg (129 lb 8 oz)   09/12/24 56.7 kg (125 lb)   08/08/24 57.2 kg (126 lb)   08/02/24 56 kg (123 lb 6.4 oz)   07/15/24 54.4 kg (120 lb)   06/10/24 55.3 kg (122 lb)   06/06/24 55.3 kg (122 lb)   06/04/24 54.4 kg (120 lb)   06/03/24 56.7 kg (125 lb)   05/20/24 54.4 kg (120 lb)   04/18/24 55.6 kg (122 lb 8 oz)   04/12/24 54 kg (119 lb)   No significant weight loss noted    Dosing Weight: 57 kg (actual BW)    ASSESSED NUTRITION NEEDS  Estimated Energy Needs: 1425 - 1710 kcals/day (25 - 30 kcals/kg)  Justification: Maintenance  Estimated Protein Needs: 45 - 57 grams protein/day (0.8 - 1 grams of pro/kg)  Justification: Maintenance  Estimated Fluid Needs: 1 mL/kcal  Justification: Maintenance or Per Provider    PHYSICAL FINDINGS  See malnutrition section below.  Itz: 22  No abnormal nutrition-related physical findings observed.     MALNUTRITION  % Intake: No decreased intake noted  % Weight Loss: None noted  Subcutaneous Fat Loss: None observed  Muscle Loss: None observed  Fluid Accumulation/Edema: None noted  Malnutrition Diagnosis: Patient does not meet two of the established criteria necessary for diagnosing malnutrition    NUTRITION DIAGNOSIS  Impaired nutrient utilization related to fructose intolerance as evidenced by inability to tolerate high-fructose foods.       INTERVENTIONS  Implementation  Nutrition Education: RD role in care   Allergies adjusted  Collaboration with other providers  Modify composition of meals/snacks     Goals  Patient to consume % of nutritionally adequate meal trays TID, or the equivalent with supplements/snacks.     Monitoring/Evaluation  Progress toward goals will be monitored and evaluated per protocol.    Qi Almodovar MPH, RDN, LD  Behavioral Health Adult & Pediatric Dietitian  BEH Clinical Dietitian Vocera, Teams, or Desk: [phone]  Weekend/Holiday Vocera: Weekend Holiday Clinical Dietitian [Multi Site Groups]

## 2025-01-11 ENCOUNTER — HEALTH MAINTENANCE LETTER (OUTPATIENT)
Age: 30
End: 2025-01-11

## 2025-01-11 PROCEDURE — 124N000002 HC R&B MH UMMC

## 2025-01-11 PROCEDURE — 97150 GROUP THERAPEUTIC PROCEDURES: CPT | Mod: GO

## 2025-01-11 PROCEDURE — 250N000013 HC RX MED GY IP 250 OP 250 PS 637: Performed by: CLINICAL NURSE SPECIALIST

## 2025-01-11 PROCEDURE — 250N000013 HC RX MED GY IP 250 OP 250 PS 637: Performed by: PHYSICIAN ASSISTANT

## 2025-01-11 PROCEDURE — 250N000013 HC RX MED GY IP 250 OP 250 PS 637: Performed by: NURSE PRACTITIONER

## 2025-01-11 RX ADMIN — GABAPENTIN 900 MG: 300 CAPSULE ORAL at 08:02

## 2025-01-11 RX ADMIN — TRAZODONE HYDROCHLORIDE 50 MG: 50 TABLET ORAL at 20:45

## 2025-01-11 RX ADMIN — FLUDROCORTISONE ACETATE 0.2 MG: 0.1 TABLET ORAL at 08:01

## 2025-01-11 RX ADMIN — GABAPENTIN 900 MG: 300 CAPSULE ORAL at 14:15

## 2025-01-11 RX ADMIN — Medication 6000 UNITS: at 17:59

## 2025-01-11 RX ADMIN — CLOMIPRAMINE HYDROCHLORIDE 100 MG: 50 CAPSULE ORAL at 20:41

## 2025-01-11 RX ADMIN — Medication 6000 UNITS: at 08:02

## 2025-01-11 RX ADMIN — MIRTAZAPINE 30 MG: 30 TABLET, FILM COATED ORAL at 20:42

## 2025-01-11 RX ADMIN — CYANOCOBALAMIN TAB 1000 MCG 2000 MCG: 1000 TAB at 08:01

## 2025-01-11 RX ADMIN — Medication 6000 UNITS: at 12:08

## 2025-01-11 RX ADMIN — Medication 125 MCG: at 08:02

## 2025-01-11 RX ADMIN — GABAPENTIN 900 MG: 300 CAPSULE ORAL at 20:41

## 2025-01-11 RX ADMIN — QUETIAPINE FUMARATE 25 MG: 25 TABLET ORAL at 20:45

## 2025-01-11 RX ADMIN — ARIPIPRAZOLE 5 MG: 5 TABLET ORAL at 08:02

## 2025-01-11 RX ADMIN — THERA TABS 1 TABLET: TAB at 08:02

## 2025-01-11 RX ADMIN — IBUPROFEN 200 MG: 200 TABLET, FILM COATED ORAL at 12:08

## 2025-01-11 RX ADMIN — GUANFACINE 1 MG: 1 TABLET, EXTENDED RELEASE ORAL at 20:42

## 2025-01-11 ASSESSMENT — ACTIVITIES OF DAILY LIVING (ADL)
ADLS_ACUITY_SCORE: 30
ORAL_HYGIENE: INDEPENDENT
HYGIENE/GROOMING: INDEPENDENT
ADLS_ACUITY_SCORE: 30
DRESS: INDEPENDENT
ADLS_ACUITY_SCORE: 30
LAUNDRY: WITH SUPERVISION
ADLS_ACUITY_SCORE: 30
HYGIENE/GROOMING: INDEPENDENT

## 2025-01-11 NOTE — CARE PLAN
Rehab Group    Start time: 1115  End time: 1200  Patient time total: 45 minutes    attended full group    #9 attended   Group Type: occupational therapy   Group Topic Covered: balanced lifestyle, cognitive activities, coping skills, emotional regulation, healthy leisure time, Physical activity, self-care, self-esteem, and social skills       Group Session Detail:   Exercise and Cognitive Jenga for concentration, sequencing, fine motor skills, large motor movement, grounding, proprioceptive input, cognitive processing, flexible thinking, coping, mood stabilization, reality-based activity, encouraging daily self-care, follow through, frustration tolerance, healthy leisure exploration, building self-esteem and socialization.       Patient Response/Contribution:  attentive and actively engaged       Patient Detail:  Pt was initially a bit critical of many things, though this eased as group progressed and therapist was able to build some rapport with her.  Pt participated in select exercises and the cognitive challenges she selected.  She often needed hints or clues to solve cognitive challenges though.  She was social with select peers.          [id number] OT Group (2 or more in attendance)      Patient Active Problem List   Diagnosis    OCD (obsessive compulsive disorder)    Pes planus    Moderate major depression (H)    Generalized anxiety disorder    Migraine with aura    Autism spectrum disorder    IUD (intrauterine device) in place    Fibromyalgia    POTS (postural orthostatic tachycardia syndrome)    Dysautonomia (H)    Suicidal ideation    Suicide ideation    Major depressive disorder with psychotic features (H)

## 2025-01-11 NOTE — PLAN OF CARE
Group Attendance:  attended full group    Time session began: 1700  Time session ended:1748  Patient's total time in group: 48    Total # Attendees   8   Group Type psychotherapeutic     Group Topic Covered emotional regulation and insight improvement     Group Session Detail Group agreed on three questions to process, reporting that the topics were their current sources of distress. The topics they elected to process were: Commitment and Nicole as a dysregulating factor; can someone be happy in an unhappy situation, and ways to overcome emotional setbacks. Writer engaged the patients in processing each topic, one after the other. Patients asked follow up questions and provided their own sources of frustrations which were addressed in respectful ways through modeling of response - listening, give-and take in communication. Each patient asked at least one question as a follow up, and discussed potential ways to address difficult conversations in the future.     Patient's response to the group topic/interactions:  listened actively, expressed understanding of topic, attentive, and actively engaged     Patient Details: Participated fully.         [id number] - Group psychotherapy - 1 Session    Patient Active Problem List   Diagnosis    OCD (obsessive compulsive disorder)    Pes planus    Moderate major depression (H)    Generalized anxiety disorder    Migraine with aura    Autism spectrum disorder    IUD (intrauterine device) in place    Fibromyalgia    POTS (postural orthostatic tachycardia syndrome)    Dysautonomia (H)    Suicidal ideation    Suicide ideation    Major depressive disorder with psychotic features (H)

## 2025-01-11 NOTE — PLAN OF CARE
Problem: Anxiety  Goal: Anxiety Reduction or Resolution  Outcome: Progressing   Goal Outcome Evaluation:       Continues to feel anxious, but able to control some of the disruptive thinking.When asked about SI or SIB thoughts she had to think about it then said no. She states feeling safe on the unit and will let this staff know if she is struggling .Voiced concerns about no groups on the weekend, she likes to be kept busy. This staff offered to find her things to keep her busy. She was medication compliant, blunted affect, mood anxious.

## 2025-01-11 NOTE — PLAN OF CARE
Nursing assessment completed. Patient awake and visible. Attended and participated in group. Watched movie in the lounge with peers. Calm and cooperative with a blunted affect, but brightens during interaction. Denies current SI/SIB or thoughts to harm others. VSS. Medication compliant. Took PRN trazodone and seroquel with scheduled HS medications to help with sleep and nightmares.Continue to monitor and assess.

## 2025-01-11 NOTE — PLAN OF CARE
Pt appeared to sleep for a total of 6.5 hours overnight. No PRN medications were administered and no concerns were noted.     Problem: Suicide Risk  Goal: Absence of Self-Harm  Outcome: Progressing

## 2025-01-11 NOTE — PLAN OF CARE
"  Goal Outcome Evaluation:    Plan of Care Reviewed With: patient      Pt calm and cooperative. Reports mood as \"anxious 7/10, depressed 5/10\". Affect is mood incongruent. Pt appears tremulous, said it's \"baseline\". Denies SI,HI,SIB,AVH. Visible in the unit socializing with peers. Eating and drinking in adequate amount. Med compliant.   PRN given Seroquel, trazodone.     Pt's partner brought a four wheel walker for pt. Walker has some ligature risk and was kept in the exam room for Provider to examine and Okay it for pt to use. This was explained to pt and was in agreement with it   Vital signs:  Temp: 98.2  F (36.8  C) Temp src: Oral BP: 122/80 Pulse: 98   Resp: 16 SpO2: 100 % O2 Device: None (Room air)            "

## 2025-01-12 PROCEDURE — 124N000002 HC R&B MH UMMC

## 2025-01-12 PROCEDURE — 250N000013 HC RX MED GY IP 250 OP 250 PS 637: Performed by: NURSE PRACTITIONER

## 2025-01-12 PROCEDURE — 250N000013 HC RX MED GY IP 250 OP 250 PS 637: Performed by: CLINICAL NURSE SPECIALIST

## 2025-01-12 PROCEDURE — 250N000013 HC RX MED GY IP 250 OP 250 PS 637: Performed by: PHYSICIAN ASSISTANT

## 2025-01-12 RX ADMIN — Medication 6000 UNITS: at 08:26

## 2025-01-12 RX ADMIN — Medication 6000 UNITS: at 17:27

## 2025-01-12 RX ADMIN — FLUDROCORTISONE ACETATE 0.2 MG: 0.1 TABLET ORAL at 08:28

## 2025-01-12 RX ADMIN — Medication 6000 UNITS: at 12:25

## 2025-01-12 RX ADMIN — ARIPIPRAZOLE 5 MG: 5 TABLET ORAL at 08:27

## 2025-01-12 RX ADMIN — HYDROXYZINE HYDROCHLORIDE 25 MG: 25 TABLET, FILM COATED ORAL at 17:27

## 2025-01-12 RX ADMIN — GABAPENTIN 900 MG: 300 CAPSULE ORAL at 14:53

## 2025-01-12 RX ADMIN — THERA TABS 1 TABLET: TAB at 08:29

## 2025-01-12 RX ADMIN — GABAPENTIN 900 MG: 300 CAPSULE ORAL at 08:27

## 2025-01-12 RX ADMIN — CYANOCOBALAMIN TAB 1000 MCG 2000 MCG: 1000 TAB at 08:28

## 2025-01-12 RX ADMIN — GABAPENTIN 900 MG: 300 CAPSULE ORAL at 20:14

## 2025-01-12 RX ADMIN — MIRTAZAPINE 30 MG: 30 TABLET, FILM COATED ORAL at 20:14

## 2025-01-12 RX ADMIN — GUANFACINE 1 MG: 1 TABLET, EXTENDED RELEASE ORAL at 20:14

## 2025-01-12 RX ADMIN — CLOMIPRAMINE HYDROCHLORIDE 100 MG: 50 CAPSULE ORAL at 20:13

## 2025-01-12 RX ADMIN — QUETIAPINE FUMARATE 25 MG: 25 TABLET ORAL at 20:14

## 2025-01-12 RX ADMIN — Medication 125 MCG: at 08:29

## 2025-01-12 ASSESSMENT — ACTIVITIES OF DAILY LIVING (ADL)
ADLS_ACUITY_SCORE: 30
HYGIENE/GROOMING: INDEPENDENT
ADLS_ACUITY_SCORE: 30
LAUNDRY: WITH SUPERVISION
DRESS: STREET CLOTHES
LAUNDRY: WITH SUPERVISION
ADLS_ACUITY_SCORE: 30
ORAL_HYGIENE: INDEPENDENT
ADLS_ACUITY_SCORE: 30
HYGIENE/GROOMING: INDEPENDENT
DRESS: INDEPENDENT
ADLS_ACUITY_SCORE: 30
ADLS_ACUITY_SCORE: 30
ORAL_HYGIENE: INDEPENDENT

## 2025-01-12 NOTE — PLAN OF CARE
"Goal Outcome Evaluation:        was up ad bull, spent most of the shift in the milieu. Pt was social and engaged in activities (games/conversations/meals)  with select peers throughout the shift.      continues to report feeling moderately anxious though denies feeling depressed today.  said she has suicidal ideation at times but has no intent to act on those thoughts.    Pt had friends visit today which she said went well.   was med adherent, her food and fluid intake were WNL.    Pain:   is prescribed Gabapentin for fibromyalgia pain which she said is mostly effective in treating her pain \"I sometimes will take Ibuprofen if the gabapentin isn't working but I haven't needed it today\".  reports she is understanding but a little frustrated that she cannot use her own personal walker here \"I know mine has the brake cable, but I really use the seat on it to rest at times while walking, the walker's here don't have a seat\"                 "

## 2025-01-12 NOTE — PLAN OF CARE
Problem: Adult Behavioral Health Plan of Care  Goal: Absence of New-Onset Illness or Injury  Outcome: Progressing   Goal Outcome Evaluation:      Pt a wake x1 briefly otherwise appeared to be a sleep at all other safety checks.  Pt slept 7 hours.

## 2025-01-12 NOTE — CARE PLAN
"  Rehab Group    Start time: 1710  End time: 1755  Patient time total: 45 minutes    attended full group    #9 attended   Group Type: occupational therapy   Group Topic Covered: balanced lifestyle, cognitive activities, coping skills, educational support, healthy leisure time, problem solving, substance use/recovery , self-esteem, and social skills       Group Session Detail:  Recovery and Jigsaw Puzzle Analogy with education, group discussion and hands on jigsaw puzzle activity for mental processing, building insight, fostering hope for a sustainable mental health and/or chemical dependency recovery, motivation, coping, follow through, frustration tolerance, mood stabilization, reality-based activity, teamwork, encouraging social supports, and building self-esteem.       Patient Response/Contribution:  socially appropriate, attentive, and actively engaged       Patient Detail:  Pt was pleasant, though somewhat anxious and has notable hand tremors.  Pt shared during the check in question that \"memory\" has been a barrier to her recovery.  When asked to expand on this she stated that she sometimes forgets to take her medication and forgets, \"what I'm supposed to do.\"  Pt worked with two other peers to assemble a jigsaw puzzle.  She took a leadership role and her group made good progress during the time allotted.          [id number] OT Group (2 or more in attendance)      Patient Active Problem List   Diagnosis    OCD (obsessive compulsive disorder)    Pes planus    Moderate major depression (H)    Generalized anxiety disorder    Migraine with aura    Autism spectrum disorder    IUD (intrauterine device) in place    Fibromyalgia    POTS (postural orthostatic tachycardia syndrome)    Dysautonomia (H)    Suicidal ideation    Suicide ideation    Major depressive disorder with psychotic features (H)       "

## 2025-01-13 LAB
CLOMIPRAMINE SERPL-MCNC: 106 NG/ML
CLOMIPRAMINE+NOR SERPL-MCNC: 346 NG/ML
NORCLOMIPRAMINE SERPL-MCNC: 240 NG/ML

## 2025-01-13 PROCEDURE — 250N000013 HC RX MED GY IP 250 OP 250 PS 637: Performed by: CLINICAL NURSE SPECIALIST

## 2025-01-13 PROCEDURE — 250N000013 HC RX MED GY IP 250 OP 250 PS 637: Performed by: PHYSICIAN ASSISTANT

## 2025-01-13 PROCEDURE — 90853 GROUP PSYCHOTHERAPY: CPT

## 2025-01-13 PROCEDURE — 250N000013 HC RX MED GY IP 250 OP 250 PS 637: Performed by: NURSE PRACTITIONER

## 2025-01-13 PROCEDURE — 124N000002 HC R&B MH UMMC

## 2025-01-13 PROCEDURE — 99232 SBSQ HOSP IP/OBS MODERATE 35: CPT | Performed by: NURSE PRACTITIONER

## 2025-01-13 PROCEDURE — 97150 GROUP THERAPEUTIC PROCEDURES: CPT | Mod: GO

## 2025-01-13 RX ORDER — QUETIAPINE FUMARATE 50 MG/1
50 TABLET, FILM COATED ORAL AT BEDTIME
Status: DISCONTINUED | OUTPATIENT
Start: 2025-01-13 | End: 2025-01-14 | Stop reason: HOSPADM

## 2025-01-13 RX ORDER — ARIPIPRAZOLE 10 MG/1
10 TABLET ORAL DAILY
Status: DISCONTINUED | OUTPATIENT
Start: 2025-01-14 | End: 2025-01-14 | Stop reason: HOSPADM

## 2025-01-13 RX ADMIN — FLUDROCORTISONE ACETATE 0.2 MG: 0.1 TABLET ORAL at 08:16

## 2025-01-13 RX ADMIN — CYANOCOBALAMIN TAB 1000 MCG 2000 MCG: 1000 TAB at 08:17

## 2025-01-13 RX ADMIN — HYDROXYZINE HYDROCHLORIDE 25 MG: 25 TABLET, FILM COATED ORAL at 18:23

## 2025-01-13 RX ADMIN — MIRTAZAPINE 30 MG: 30 TABLET, FILM COATED ORAL at 20:34

## 2025-01-13 RX ADMIN — THERA TABS 1 TABLET: TAB at 08:14

## 2025-01-13 RX ADMIN — CLOMIPRAMINE HYDROCHLORIDE 100 MG: 50 CAPSULE ORAL at 20:33

## 2025-01-13 RX ADMIN — GABAPENTIN 900 MG: 300 CAPSULE ORAL at 20:33

## 2025-01-13 RX ADMIN — GUANFACINE 1 MG: 1 TABLET, EXTENDED RELEASE ORAL at 20:33

## 2025-01-13 RX ADMIN — ARIPIPRAZOLE 5 MG: 5 TABLET ORAL at 08:16

## 2025-01-13 RX ADMIN — Medication 6000 UNITS: at 12:25

## 2025-01-13 RX ADMIN — GABAPENTIN 900 MG: 300 CAPSULE ORAL at 14:01

## 2025-01-13 RX ADMIN — Medication 6000 UNITS: at 08:16

## 2025-01-13 RX ADMIN — Medication 125 MCG: at 08:14

## 2025-01-13 RX ADMIN — QUETIAPINE FUMARATE 50 MG: 50 TABLET ORAL at 20:33

## 2025-01-13 RX ADMIN — GABAPENTIN 900 MG: 300 CAPSULE ORAL at 08:15

## 2025-01-13 RX ADMIN — Medication 6000 UNITS: at 17:48

## 2025-01-13 ASSESSMENT — ACTIVITIES OF DAILY LIVING (ADL)
HYGIENE/GROOMING: INDEPENDENT
ADLS_ACUITY_SCORE: 30
ORAL_HYGIENE: INDEPENDENT
LAUNDRY: WITH SUPERVISION
HYGIENE/GROOMING: INDEPENDENT
ADLS_ACUITY_SCORE: 30
DRESS: STREET CLOTHES
ADLS_ACUITY_SCORE: 30
DRESS: INDEPENDENT
ADLS_ACUITY_SCORE: 30
LAUNDRY: WITH SUPERVISION
ADLS_ACUITY_SCORE: 30
ADLS_ACUITY_SCORE: 30
ORAL_HYGIENE: INDEPENDENT

## 2025-01-13 NOTE — PLAN OF CARE
"Problem: Suicide Risk  Goal: Absence of Self-Harm  Outcome: Progressing     Problem: Anxiety  Goal: Anxiety Reduction or Resolution  Outcome: Not Progressing     Patient has been observed in the milieu, interacting with select peers. She was out at meal and visiting times.   Patient presents with a flat, somewhat blunted affect. Patient reports that she alternates between having a high anxiety day or feeling very depressed every other day. Patient endorses a high anxiety day today, and reports that another patient that has been disruptive to the milieu has been making it worse. PRN hydroxyzine was requested and given for anxiety 6/10, which was somewhat helpful. She continues to appear fearful, and watchful of other patient. She worries that other patient is waiting outside her room for her room mate after being verbally aggressive with room mate earlier in the shift. Patient has been avoiding going in or out of room if other patient is near the desk. Patient offered reassurance that situation is being monitored closely for safety. PRN seroquel was also requested and given at bedtime. Patient denies SI/SIB/HI/AH/VH and contracts for safety.    /77   Pulse 99   Temp 98.2  F (36.8  C) (Temporal)   Resp 16   Ht 1.6 m (5' 3\")   Wt 57.8 kg (127 lb 8 oz)   SpO2 99%   BMI 22.59 kg/m      "

## 2025-01-13 NOTE — PLAN OF CARE
BEH IP Unit Acuity Rating Score (UARS)  Patient is given one point for every criteria they meet.    CRITERIA SCORING   On a 72 hour hold, court hold, committed, stay of commitment, or revocation. 0    Patient LOS on BEH unit exceeds 20 days. 0  LOS: 5   Patient under guardianship, 55+, otherwise medically complex, or under age 11. 0   Suicide ideation without relief of precipitating factors. 0   Current plan for suicide. 0   Current plan for homicide. 0   Imminent risk or actual attempt to seriously harm another without relief of factors precipitating the attempt. 0   Severe dysfunction in daily living (ex: complete neglect for self care, extreme disruption in vegetative function, extreme deterioration in social interactions). 0   Recent (last 7 days) or current physical aggression in the ED or on unit. 0   Restraints or seclusion episode in past 72 hours. 0   Recent (last 7 days) or current verbal aggression, agitation, yelling, etc., while in the ED or unit. 0   Active psychosis. 0   Need for constant or near constant redirection (from leaving, from others, etc).  0   Intrusive or disruptive behaviors. 0   Patient requires 3 or more hours of individualized nursing care per 8-hour shift (i.e. for ADLs, meds, therapeutic interventions). 0   TOTAL 0

## 2025-01-13 NOTE — PLAN OF CARE
"Goal Outcome Evaluation:    Plan of Care Reviewed With: patient         was up ad bull, social with select peers, engaged participation  groups. Pt's food and fluid intake were WNL.  denies feeling depressed, denies having suicidal ideation or self harm thoughts. Pt reports \"I do feel anxious, less than yesterday though, it's getting better\".    Pain:   said that her scheduled Gabapentin has effectively treated her Fibromyalgia so far this shift.         "

## 2025-01-13 NOTE — PROGRESS NOTES
[facility], [place]   Psychiatric Progress Note        Interim History:   Team meeting report: The patient's care was discussed with the treatment team during the daily team meeting and/or staff's chart notes were reviewed.  Nursing staff reports the patient is calm, pleasant, cooperative.  The patient was visible in the milieu.  She was social with selected peers.  She ate well.  Affect was blunted.  Reported high anxiety and depression..  Anxiety mostly related to the patient was very disruptive on the unit.  The patient is avoiding going out of her room because of this patient.In the morning, continue to report anxiety and depression but rated both as moderate.  She was visible and social.  Attended groups.  A friend visited today.  No suicidal slept through the night thoughts.    Met with patient.  Continues to report that she is not sleeping very well.  She took trazodone as Seroquel without much improvement.  She is agreeable to schedule Seroquel at bedtime instead of adding another antidepressant.  Continues to reports high anxiety mainly related to the noise on the unit.  Depression is better.  She feels tired.  No hallucinations, paranoia, delusions.  Appetite is intact.  No other questions or concerns. Continues to report passive suicidal ideation, no plan or intent.  Discussed increasing the dose of the Abilify, which she agreed to do.     Discussed disposition.  The patient does not feel ready.  She was agreeable to attend day treatment program.  She already has a psychiatrist and therapist.      1/10/2025: Discussed her genetic testing from 2017.  We went over the medications that would be helpful for her.  She wants to have a copy of the genetic testing when she discharges.  She will stay here this weekend. Tentative discharge early next week.           Medications:     Current Facility-Administered Medications   Medication Dose Route Frequency Provider Last Rate Last Admin     ARIPiprazole (ABILIFY) tablet 5 mg  5 mg Oral Daily Robbie Carohardeep Latesha, GISELLE CNP   5 mg at 01/13/25 0816    cholecalciferol (VITAMIN D3) capsule 125 mcg  125 mcg Oral Daily Robbie Carohardeep Stackshabbir, APRN CNP   125 mcg at 01/13/25 0814    clomiPRAMINE (ANAFRANIL) capsule 100 mg  100 mg Oral At Bedtime Moses Noe APRN CNP   100 mg at 01/12/25 2013    cyanocobalamin (VITAMIN B-12) tablet 2,000 mcg  2,000 mcg Oral Daily Robbie Lizethroz GISELLE Charlton CNP   2,000 mcg at 01/13/25 0817    fludrocortisone (FLORINEF) tablet 0.2 mg  0.2 mg Oral Daily Moses Noe APRN CNP   0.2 mg at 01/13/25 0816    gabapentin (NEURONTIN) capsule 900 mg  900 mg Oral TID Moses Noe APRN CNP   900 mg at 01/13/25 0815    guanFACINE (INTUNIV) 24 hr tablet 1 mg  1 mg Oral At Bedtime [facility] GISELLE Charlton CNP   1 mg at 01/12/25 2014    hydrocortisone (CORTAID) 0.5 % cream   Topical BID Nataliia Cuevas PA   Given at 01/10/25 1231    lactase (LACTAID) tablet 3,000-6,000 Units  3,000-6,000 Units Oral TID w/meals Nataliia Cuevas PA   6,000 Units at 01/13/25 1225    mirtazapine (REMERON) tablet 30 mg  30 mg Oral At Bedtime RandyHebron Carohardeep GISELLE Charlton CNP   30 mg at 01/12/25 2014    multivitamin, therapeutic (THERA-VIT) tablet 1 tablet  1 tablet Oral Daily Moses Noe APRN CNP   1 tablet at 01/13/25 0814            Allergies:     Allergies   Allergen Reactions    Adhesive Tape Hives     EKG Patches; any adhesives including band aides; burns      EKG Patches; any adhesives including band aides; burns  EKG Patches; any adhesives including band aides; burns  EKG Patches; any adhesives including band aides; burns      Corn Syrup [Glucose] Other (See Comments)     Other reaction(s): GI intolerance  GI intolerance      Azithromycin Nausea and Vomiting    Fructose Cramps, Diarrhea, GI Disturbance and Nausea and Vomiting    Levofloxacin Other (See Comments)     Tendinopathy            Labs:      Recent Results (from the past 4 weeks)   HCG qualitative urine    Collection Time: 01/08/25  3:12 AM   Result Value Ref Range    hCG Urine Qualitative Negative Negative   COVID-19 Virus (Coronavirus) by PCR Nose    Collection Time: 01/08/25  3:12 AM    Specimen: Nose; Swab   Result Value Ref Range    SARS CoV2 PCR Negative Negative   Urine Drug Screen Panel    Collection Time: 01/08/25  3:12 AM   Result Value Ref Range    Amphetamines Urine Screen Negative Screen Negative    Barbituates Urine Screen Negative Screen Negative    Benzodiazepine Urine Screen Negative Screen Negative    Cannabinoids Urine Screen Negative Screen Negative    Cocaine Urine Screen Negative Screen Negative    Fentanyl Qual Urine Screen Negative Screen Negative    Opiates Urine Screen Negative Screen Negative    PCP Urine Screen Negative Screen Negative   Comprehensive metabolic panel    Collection Time: 01/09/25  8:49 AM   Result Value Ref Range    Sodium 140 135 - 145 mmol/L    Potassium 3.7 3.4 - 5.3 mmol/L    Carbon Dioxide (CO2) 20 (L) 22 - 29 mmol/L    Anion Gap 17 (H) 7 - 15 mmol/L    Urea Nitrogen 18.5 6.0 - 20.0 mg/dL    Creatinine 0.70 0.51 - 1.17 mg/dL    GFR Estimate >90 >60 mL/min/1.73m2    Calcium 9.0 8.8 - 10.4 mg/dL    Chloride 103 98 - 107 mmol/L    Glucose 139 (H) 70 - 99 mg/dL    Alkaline Phosphatase 61 40 - 150 U/L    AST 15 0 - 45 U/L    ALT 13 0 - 70 U/L    Protein Total 6.8 6.4 - 8.3 g/dL    Albumin 4.3 3.5 - 5.2 g/dL    Bilirubin Total 0.2 <=1.2 mg/dL   TSH with free T4 reflex    Collection Time: 01/09/25  8:49 AM   Result Value Ref Range    TSH 2.35 0.30 - 4.20 uIU/mL   Vitamin D Deficiency    Collection Time: 01/09/25  8:49 AM   Result Value Ref Range    Vitamin D, Total (25-Hydroxy) 22 20 - 50 ng/mL   Vitamin B12    Collection Time: 01/09/25  8:49 AM   Result Value Ref Range    Vitamin B12 306 232 - 1,245 pg/mL   Folate    Collection Time: 01/09/25  8:49 AM   Result Value Ref Range    Folic Acid 12.0 4.6 - 34.8  ng/mL   Hemoglobin A1c    Collection Time: 01/09/25  8:49 AM   Result Value Ref Range    Estimated Average Glucose 100 <117 mg/dL    Hemoglobin A1C 5.1 <5.7 %   CBC with platelets and differential    Collection Time: 01/09/25  8:49 AM   Result Value Ref Range    WBC Count 4.3 4.0 - 11.0 10e3/uL    RBC Count 3.97 3.80 - 5.90 10e6/uL    Hemoglobin 12.4 11.7 - 17.7 g/dL    Hematocrit 37.2 35.0 - 53.0 %    MCV 94 78 - 100 fL    MCH 31.2 26.5 - 33.0 pg    MCHC 33.3 31.5 - 36.5 g/dL    RDW 12.0 10.0 - 15.0 %    Platelet Count 210 150 - 450 10e3/uL    % Neutrophils 61 %    % Lymphocytes 29 %    % Monocytes 8 %    % Eosinophils 1 %    % Basophils 1 %    % Immature Granulocytes 0 %    NRBCs per 100 WBC 0 <1 /100    Absolute Neutrophils 2.6 1.6 - 8.3 10e3/uL    Absolute Lymphocytes 1.3 0.8 - 5.3 10e3/uL    Absolute Monocytes 0.3 0.0 - 1.3 10e3/uL    Absolute Eosinophils 0.0 0.0 - 0.7 10e3/uL    Absolute Basophils 0.1 0.0 - 0.2 10e3/uL    Absolute Immature Granulocytes 0.0 <=0.4 10e3/uL    Absolute NRBCs 0.0 10e3/uL   EKG 12-lead, complete    Collection Time: 01/09/25 12:15 PM   Result Value Ref Range    Systolic Blood Pressure  mmHg    Diastolic Blood Pressure  mmHg    Ventricular Rate 84 BPM    Atrial Rate 84 BPM    IL Interval 150 ms    QRS Duration 70 ms     ms    QTc 456 ms    P Axis 60 degrees    R AXIS 52 degrees    T Axis 36 degrees    Interpretation ECG       Sinus rhythm  Normal ECG  When compared with ECG of 17-May-2022 10:52,  No significant change was found  Confirmed by MD CHARLY, TOMASZ ([id number]) on 1/10/2025 3:10:01 PM              Precautions:     Behavioral Orders   Procedures    Code 1 - Restrict to Unit    Fall precautions    Routine Programming     As clinically indicated    Status 15     Every 15 minutes.    Suicide precautions: Suicide Risk: LOW; Clinical rationale to override score: modification to the care environment     Patients on Suicide Precautions should have a Combination Diet ordered that  includes a Diet selection(s) AND a Behavioral Tray selection for Safe Tray - with utensils, or Safe Tray - NO utensils       Order Specific Question:   Suicide Risk     Answer:   LOW     Order Specific Question:   Clinical rationale to override score:     Answer:   modification to the care environment            Psychiatric Examination:   Temp: 98.4  F (36.9  C) Temp src: Oral BP: 112/78 Pulse: 89   Resp: 16 SpO2: 100 % O2 Device: None (Room air)    Weight is 127 lbs 8 oz  Body mass index is 22.59 kg/m .    Appearance: awake, alert and adequately groomed  Attitude:  cooperative  Eye Contact:  good  Mood:  anxious and depressed  Affect:  mood congruent  Speech:  clear, coherent  Psychomotor Behavior:  no evidence of tardive dyskinesia, dystonia, or tics  Throught Process:  logical and goal oriented  Associations:  no loose associations  Thought Content:  no evidence of suicidal ideation or homicidal ideation, no auditory hallucinations present, and no visual hallucinations present  Insight:  fair  Judgement:  fair  Oriented to:  time, person, and place  Attention Span and Concentration:  fair  Recent and Remote Memory:  fair         Precautions:     Behavioral Orders   Procedures    Code 1 - Restrict to Unit    Fall precautions    Routine Programming     As clinically indicated    Status 15     Every 15 minutes.    Suicide precautions: Suicide Risk: LOW; Clinical rationale to override score: modification to the care environment     Patients on Suicide Precautions should have a Combination Diet ordered that includes a Diet selection(s) AND a Behavioral Tray selection for Safe Tray - with utensils, or Safe Tray - NO utensils       Order Specific Question:   Suicide Risk     Answer:   LOW     Order Specific Question:   Clinical rationale to override score:     Answer:   modification to the care environment          DIagnoses:   Bipolar affective disorder, current episode depressed, severe, without psychosis  Generalized  anxiety disorder  Panic attacks without agoraphobia  OCD  ADHD, per history  Autism spectrum disorder, per history  Cluster B traits  Cannabis use disorder, moderate, dependence  Fibromyalgia  Migraine headaches  Borderline vitamin D and B12 deficiency         Plan:   Medications:  --Discontinue amitriptyline to reduce the number of antidepressants she is taking  --Increase Abilify, 10 mg every morning for mood stabilization  --Schedule Seroquel 50 mg, at bedtime for sleep  --Continue clomipramine, 100 mg at bedtime for OCD  --Continue Remeron, 30 mg at bedtime  --Continue gabapentin, 900 mg 3 times a day for fibromyalgia.  It helps with the anxiety as well.  --Start Intuniv, 1 mg at bedtime for ADHD  --Vitamin D, 5000 units, once a day  --Vitamin B12, 2000 mcg once a day  --Start Seroquel, 25 mg 3 times a day as needed for anxiety  --Additional medications are available as needed    Lab work:  --Lab work was reviewed.  U tox was reviewed.    -- Ordered CBC, CMP, TSH with T4, vitamin D, B12, folate.  All WNL.  Vitamin D and B12 are on the lower side of normal.  --Ordered EKG. WNL.     Medical:  --Internal medicine to follow up for medical problems     Other:  -- DA order was placed for Day Treatment Program  --Care was coordinated with the treatment team.   --The patient was consulted on nature of illness and treatment options.      Disposition Plan   Reason for ongoing admission: poses an imminent risk to self  Discharge location: home with self-care  Discharge Medications: not ordered  Follow-up Appointments: not scheduled  Legal Status: voluntary   Discharge will be granted once symptoms improved.    Heather DESAI, CNP    This note was created with the help of Dragon dictation system. All grammatical/typing errors or context distortion are unintentional and inherent to software.

## 2025-01-13 NOTE — PLAN OF CARE
Team Note Due:  Thursday    Assessment/Intervention/Current Symtoms and Care Coordination:  Chart review and met with team, discussed pt progress, symptomology, and response to treatment.  Discussed the discharge plan and any potential impediments to discharge.      Team Meeting  Pt  reports she is anxious but not depressed.  Pt reports passive SI.  Pt is having medication adjustments.  Pt will discharge mid-week.  CTC suggests referral to day programming.      I approached pt after lunch and she was sitting in the lounge not interacting with anyone per my usual observation.  We discussed that she already had   We talked about the mid-week discharge.  She said she can arranged a ride with friends or partners.  She liked the idea of going to a day program.   She said she did not think she could go back to work yet.  She has a transportation issue and she thought that it would work to come to our day program. I said we would get an order for the assessment. I said I would also look at [facility] that might have a program that would incorporate her autism.  She also said she was looking into medical assistance as she thinks she will be losing her insurance.      Her TCM should be meeting with her here tomorrow.    Discharge Plan or Goal:  Home with services         Barriers to Discharge:  None         Referral Status:      Primary Care Provider:  Name/Clinic: [facility] Wendy Walton MD   Number:            Specialists  Rheumatology and Pain and Palliative for fibromyalgia  Cardiology for PODS              Medication Management/Psychiatry:  Name/Clinic: [facility]  Salina Carrillo MD   Pt missed the 12/13/24 appointment.  JAN 24 Adult Med Follow UP with Salina Lagunas  Friday Jan 24, 2025 2:25 PM  Please Note: This is a virtual visit; there is no need to come to the facility.                     Therapist:    Fanny CORNEJO with [facility]  for several years, currently every-other week.   21 Adult Psychotherapy with Fanny Cobb  Tuesday Jan 21, 2025 8:45 AM  Please Note: This is a virtual visit; there is no need to come to the facility.     Please log on at  8:45 AM to complete your check-in. Then, the provider will join you (or send you a link to join them) at  9:00 AM.         Targeted Mental Health /:  Pt reports having a  at [facility] but does not remember the name.  I called and it is Diallo Ramirez @ [phone]. Left vm  His colleague Emilia called me back and explained the situation. Pt has an appointment with Diallo on 1/14 and he will come visit her here if she is here.  [facility]  Mental Health Resources  [address], [place] [address]  [phone]  fax: [phone]  [facility] Resources Case Management.  Supervisor:  Chely Betts at [phone]                   Legal Status:  Voluntary           Contacts (include EDDI status):  Pt would like to be listed as confidential. I have alerted BRAEDEN and RN.  She does not want her estranged  or parents to call her.     She has signed ROIs for partners:  Zander Roberts @ [phone]  Corey Coe @ [phone]        Upcoming Meetings and Dates/Important Information and next steps:      Will wait for discharge date.

## 2025-01-13 NOTE — PLAN OF CARE
"  Rehab Group    Start time: 10:15  End time: 11:45  Patient time total: 90 minutes    attended full group    #9 attended   Group Type: OT Clinic   Group Topic Covered: balanced lifestyle, coping skills, healthy leisure time, relaxation , and social skills       Group Session Detail:  Pt actively participated in occupational therapy clinic to facilitate coping skill exploration, creative expression within personally meaningful activities, and clinical observation of social, cognitive, and kinesthetic performance skills.       Patient Response/Contribution:  cooperative with task, organized, socially appropriate, attentive, and actively engaged       Patient Detail:  Pt response: Patient arrived on time to this group and was independent in selecting to continue a previously started jc art project. Patient was independent to initiate, gather materials, sequence, and adjust to workspace demands as needed. Patient demonstrated good focus, planning, and problem solving for selected jc art task. After completion of the jc art project, patient independently selected to begin a window cling coloring task. While working on this project, patient demonstrated perfectionistic tendencies as she verbalized being unhappy with the way the paint was turning out. Patient opted to give this project to a peer instead of completing it. Following encouragement from group facilitator, patient then elected to begin a sun catcher painting activity. Patient continued to display perfectionistic tendencies during this activity as she vocalized \"Ugh, no!\" on one occasion and \"Ah, no!\" on another occasion when paint had gotten outside the lines of the sun catcher. Despite this, patient initiated clean up of the paint and continued to work on the project. Patient was able to ask for assistance as needed, and socialized appropriately with peers and staff throughout the duration of the group. Affect appeared congruent with the " activity. Patient remained pleasant and cooperative throughout he duration of the group.       [id number] OT Group (2 or more in attendance)      Patient Active Problem List   Diagnosis    OCD (obsessive compulsive disorder)    Pes planus    Moderate major depression (H)    Generalized anxiety disorder    Migraine with aura    Autism spectrum disorder    IUD (intrauterine device) in place    Fibromyalgia    POTS (postural orthostatic tachycardia syndrome)    Dysautonomia (H)    Suicidal ideation    Suicide ideation    Major depressive disorder with psychotic features (H)

## 2025-01-13 NOTE — PLAN OF CARE
"  Rehab Group    Start time: 13:15  End time: 14:00  Patient time total: 35 minutes    attended partial group    #4 attended   Group Type: occupational therapy   Group Topic Covered: cognitive activities, coping skills, emotional regulation, and social skills       Group Session Detail:  Patient engaged in a cognitive-based leisure activity (I Know) with peers. Therapeutic benefits and skills addressed during today's leisure activity include: problem solving, promoting attention/focus, improving social skills, exploring healthy leisure opportunities, and identifying healthy coping strategies.      Patient Response/Contribution:  cooperative with task, socially appropriate, listened actively, and actively engaged       Patient Detail:  Patient arrived to this interactive group game of \"I Know\" on time. Upon arrival to group, patient expressed familiarity with game concepts and objectives. Patient independently and accurately sequenced turn taking throughout the game and offered assistance to peers intermittently. When prompted to identify how stress presents for her, patient replied that she gets \"panicky.\" When further prompted by group facilitator to identify a way to cope with this \"panicky\" feeling, patient identified \"watching my comfort show.\" Patient demonstrated some lack of insight when she pulled a card that asked her if she ever wishes she was less angry as patient described that she feels that she is always justified when she is angry. Despite this, patient socialized appropriately with peers throughout the duration of the game and remained actively engaged throughout the group, however, eye contact was minimal. Patient elected to leave group roughly ten minutes early to use the restroom and did not return to group. Affect appeared congruent with the activity. Patient remained calm, pleasant, and cooperative throughout the duration of the group.       [id number] OT Group (2 or more in attendance)      Patient " Active Problem List   Diagnosis    OCD (obsessive compulsive disorder)    Pes planus    Moderate major depression (H)    Generalized anxiety disorder    Migraine with aura    Autism spectrum disorder    IUD (intrauterine device) in place    Fibromyalgia    POTS (postural orthostatic tachycardia syndrome)    Dysautonomia (H)    Suicidal ideation    Suicide ideation    Major depressive disorder with psychotic features (H)

## 2025-01-14 ENCOUNTER — TELEPHONE (OUTPATIENT)
Dept: BEHAVIORAL HEALTH | Facility: CLINIC | Age: 30
End: 2025-01-14
Payer: COMMERCIAL

## 2025-01-14 VITALS
DIASTOLIC BLOOD PRESSURE: 73 MMHG | TEMPERATURE: 98.4 F | SYSTOLIC BLOOD PRESSURE: 104 MMHG | BODY MASS INDEX: 22.7 KG/M2 | OXYGEN SATURATION: 100 % | HEART RATE: 94 BPM | HEIGHT: 63 IN | WEIGHT: 128.1 LBS | RESPIRATION RATE: 16 BRPM

## 2025-01-14 PROCEDURE — 97150 GROUP THERAPEUTIC PROCEDURES: CPT | Mod: GO

## 2025-01-14 PROCEDURE — 250N000013 HC RX MED GY IP 250 OP 250 PS 637: Performed by: CLINICAL NURSE SPECIALIST

## 2025-01-14 PROCEDURE — 250N000013 HC RX MED GY IP 250 OP 250 PS 637: Performed by: PHYSICIAN ASSISTANT

## 2025-01-14 PROCEDURE — 99238 HOSP IP/OBS DSCHRG MGMT 30/<: CPT | Performed by: NURSE PRACTITIONER

## 2025-01-14 PROCEDURE — 250N000013 HC RX MED GY IP 250 OP 250 PS 637: Performed by: NURSE PRACTITIONER

## 2025-01-14 RX ORDER — DIAPER,BRIEF,INFANT-TODD,DISP
EACH MISCELLANEOUS 2 TIMES DAILY
Qty: 28.35 G | Refills: 0 | Status: SHIPPED | OUTPATIENT
Start: 2025-01-14

## 2025-01-14 RX ORDER — QUETIAPINE FUMARATE 50 MG/1
50 TABLET, FILM COATED ORAL AT BEDTIME
Qty: 30 TABLET | Refills: 0 | Status: SHIPPED | OUTPATIENT
Start: 2025-01-14

## 2025-01-14 RX ORDER — ARIPIPRAZOLE 10 MG/1
10 TABLET ORAL DAILY
Qty: 30 TABLET | Refills: 0 | Status: SHIPPED | OUTPATIENT
Start: 2025-01-15

## 2025-01-14 RX ORDER — GUANFACINE 1 MG/1
1 TABLET, EXTENDED RELEASE ORAL AT BEDTIME
Qty: 30 TABLET | Refills: 0 | Status: SHIPPED | OUTPATIENT
Start: 2025-01-14

## 2025-01-14 RX ORDER — CHOLECALCIFEROL (VITAMIN D3) 125 MCG
3000-6000 CAPSULE ORAL
Qty: 30 TABLET | Refills: 0 | Status: SHIPPED | OUTPATIENT
Start: 2025-01-14

## 2025-01-14 RX ORDER — MIRTAZAPINE 15 MG/1
15 TABLET, FILM COATED ORAL AT BEDTIME
Status: DISCONTINUED | OUTPATIENT
Start: 2025-01-14 | End: 2025-01-14 | Stop reason: HOSPADM

## 2025-01-14 RX ORDER — HYDROXYZINE HYDROCHLORIDE 25 MG/1
25 TABLET, FILM COATED ORAL EVERY 4 HOURS PRN
Qty: 15 TABLET | Refills: 0 | Status: SHIPPED | OUTPATIENT
Start: 2025-01-14

## 2025-01-14 RX ADMIN — CYANOCOBALAMIN TAB 1000 MCG 2000 MCG: 1000 TAB at 08:50

## 2025-01-14 RX ADMIN — Medication 125 MCG: at 08:50

## 2025-01-14 RX ADMIN — ARIPIPRAZOLE 10 MG: 10 TABLET ORAL at 08:50

## 2025-01-14 RX ADMIN — Medication 3000 UNITS: at 08:07

## 2025-01-14 RX ADMIN — FLUDROCORTISONE ACETATE 0.2 MG: 0.1 TABLET ORAL at 08:49

## 2025-01-14 RX ADMIN — GABAPENTIN 900 MG: 300 CAPSULE ORAL at 08:50

## 2025-01-14 RX ADMIN — THERA TABS 1 TABLET: TAB at 08:49

## 2025-01-14 ASSESSMENT — ACTIVITIES OF DAILY LIVING (ADL)
ADLS_ACUITY_SCORE: 30

## 2025-01-14 ASSESSMENT — ANXIETY QUESTIONNAIRES
3. WORRYING TOO MUCH ABOUT DIFFERENT THINGS: NEARLY EVERY DAY
7. FEELING AFRAID AS IF SOMETHING AWFUL MIGHT HAPPEN: SEVERAL DAYS
2. NOT BEING ABLE TO STOP OR CONTROL WORRYING: NEARLY EVERY DAY
1. FEELING NERVOUS, ANXIOUS, OR ON EDGE: NEARLY EVERY DAY
IF YOU CHECKED OFF ANY PROBLEMS ON THIS QUESTIONNAIRE, HOW DIFFICULT HAVE THESE PROBLEMS MADE IT FOR YOU TO DO YOUR WORK, TAKE CARE OF THINGS AT HOME, OR GET ALONG WITH OTHER PEOPLE: VERY DIFFICULT
GAD7 TOTAL SCORE: 15
GAD7 TOTAL SCORE: 15
6. BECOMING EASILY ANNOYED OR IRRITABLE: SEVERAL DAYS
5. BEING SO RESTLESS THAT IT IS HARD TO SIT STILL: SEVERAL DAYS
4. TROUBLE RELAXING: NEARLY EVERY DAY

## 2025-01-14 ASSESSMENT — PATIENT HEALTH QUESTIONNAIRE - PHQ9: SUM OF ALL RESPONSES TO PHQ QUESTIONS 1-9: 20

## 2025-01-14 NOTE — PLAN OF CARE
Behavioral Discharge Planning and Instructions    Summary: You were admitted on 1/8/2025  due to Manic Symptomology, Suicidal Ideations, and Self Injurious Behaviors.  You were treated by GISELLE Neely CNP. Your symptoms resolved and you were assessed as safe to return home. You asked for discharge.  You were discharged on 1/14/25 from [facility].  Your friends/partners are providing transportation home.      Main Diagnosis:   Bipolar affective disorder, current episode depressed, severe, without psychosis  Generalized anxiety disorder  Panic attacks without agoraphobia  OCD  ADHD, per history  Autism spectrum disorder, per history  Cluster B traits  Cannabis use disorder, moderate, dependence  Fibromyalgia  Migraine headaches        Health Care Follow-up:   You have [facility] for coverage of your mental health services.  You are thinking you may lose insurance and are exploring options through [email or web address]        Call and schedule with your primary care provider as needed.  Primary Care Provider:  Name/Clinic: [facility] Winter Walton MD   [phone]         Schedule with specialty care as needed.  Specialists  Rheumatology and Pain and Palliative for fibromyalgia  Cardiology for PODS        Participate in your virtual health appointment for Medication Management/Psychiatry: Friday Jan 24, 2025 2:25 PM  Name/Clinic: [facility]  Salina Carrillo MD   Jan 24 Adult Med Follow UP with Salina Lagunas  Friday Jan 24, 2025 2:25 PM  Please Note: This is a virtual visit; there is no need to come to the facility.              Participate in your [facility] [employer] health appointment for Therapist:  on Tuesday Jan 21, 2025 8:45 AM  Fanny CORNEJO with [facility] for several years, currently every-other week.   JAN 21 Adult Psychotherapy with Fanny Cobb  Tuesday Jan 21, 2025 8:45 AM  Please Note: This is a virtual  visit; there is no need to come to the facility.     Please log on at  8:45 AM to complete your check-in. Then, the provider will join you (or send you a link to join them) at  9:00 AM.         You met with your [facility]  on Tuesday, January 14, 2025 on you last day here.  Diallo Ramirez @ [phone].  [email or web address]  Mental Health Resources  [address], [place] [address]  Main: [phone]  Mental [facility] Resources Case Management.     The Health Unit Coordinator has faxed these discharge instructions to fax: [phone]        You had an intake for the [facility] Day Treatment Program.  They will reach out to you with the next steps.  You can also contact them at [facility] ([phone])   OR  [facility] Outpatient Programs  [phone]        Mental [facility] has Dialectical Behavioral Programs that you can explore if you are interested.  [facility] Locations for Adult Therapy Services:  Adult DBT in [place]  Adult DBT in [place]  Adult DBT in [place]  Adult DBT in [place]  Adult DBT in [facility]  Call Us  [phone]        Information will be faxed to your outpatient providers to ensure a healthy continuity of care for you.     Attend all scheduled appointments with your outpatient providers. Call at least 24 hours in advance if you need to reschedule an appointment to ensure continued access to your outpatient providers.     Major Treatments, Procedures and Findings:  You were provided with: a psychiatric assessment, assessed for medical stability, medication evaluation and/or management, group therapy, milieu management, and medical interventions    You were seen by internal medicine for your physical issues..  Rash of bilateral posterior knees; suspect atopic dermatitis   New rash behind bilateral knees which is reported to be present X 2 days. Reports associated pruritus, non painful. On  exam, eczmatous rash of bilateral posterior knee (dry, erythematous rash) in large (~10 cm) oval pattern on left posterior knee and lesser on right posterior knee. Patient notes wearing hospital scrubs X several days prior to rash development. Denies hx of sensitive skin or frequent rash. No new medications prior to rash beginning.   -Trial of hydrocortisone 0.5% BID X 7 days (ordered)   -Notify medicine if rash of bilateral posterior knees worsening with hydrocortisone cream or not improving in 3-5 days (patient care order placed)   -Continue good hygiene, okay to use gentle soup and water for washing area, avoid aggressive scrubbing       Lactose intolerance   Lactose listed on allergy list as nausea. Patient requests lactase tablets prior to meals which she notes she tolerates well PTA.   -Lactase 3,000-6,000 units TID prior to meals (ordered)      Migraine headaches   -Continue PTA amitriptyline      POTS  Symptoms at baseline.   -Continue PTA fludrocortisone      Fibromyalgia   -Continue PTA gabapentin      IBS   -Continue PTA hyoscyamine      GERD   -Continue PTA esomeprazole             Symptoms to Report: feeling more aggressive, increased confusion, losing more sleep, or mood getting worse    Early warning signs can include: increased unusual thinking, such as paranoia or hearing voices    Safety and Wellness:  Take all medicines as directed.  Make no changes unless your doctor suggests them.      Follow treatment recommendations.  Refrain from alcohol and non-prescribed drugs.  If there is a concern for safety, call 911.    Resources:   Call or Text [facility] or [facility] [phone] [facility] Mental Crisis Program      Mental Health Crisis Resources  Throughout [place]: call **CRISIS (**241718)  Crisis Text Line: is available for free, 24/7 by texting [place] to 479732  Suicide Awareness Voices of Education (SAVE) (www.save.org): 551-729-WZQS (8358)  The National Suicide Prevention Lifeline is now: 194  Suicide and Crisis Lifeline. Call 988 anytime.  National Center Line on Mental Illness (www.[place].heidy.org): 442.204.2813 or 693-910-8077.  [employer]: text the word LIFE to 59192 for immediate support and crisis intervention  Mental Health Consumer/Survivor Network of [place] (www.mhcsn.net): 396.577.5746 or 883-215-1022  Mental Health Association of [place] (www.mentalhealth.org): 985.266.4113 or 639-319-6179  Peer Support Connection [place] Warmline (PSC) 1-378.780.4791 Available from 5pm - 9am (7 days a week/365 days a year)        General Medication Instructions:   See your medication sheet(s) for instructions.   Take all medicines as directed.  Make no changes unless your doctor suggests them.   Go to all your doctor visits.  Be sure to have all your required lab tests. This way, your medicines can be refilled on time.  Do not use any drugs not prescribed by your doctor.  Avoid alcohol.    Advance Directives:   Scanned document on file with [employer]? No scanned doc  Is document scanned? Pt states no documents  Honoring Choices Your Rights Handout: Informed and given  Was more information offered? Pt unable to request    The Treatment team has appreciated the opportunity to work with you. If you have any questions or concerns about your recent admission, you can contact the unit which can receive your call 24 hours a day, 7 days a week. They will be able to get in touch with a Provider if needed. The unit number is [phone] .

## 2025-01-14 NOTE — PROGRESS NOTES
Discharge note:  Discharge orders are received.  Reviewed and discussed discharge instructions, follow up care, future appointments and medication administration.  Med's filled at pharmacy and sent with pt. Patient denies thoughts of SI, SIB or hallucinations.  Verbalized understanding of discharge instructions. Received personal belongings.  All forms are signed.  Patient to discharge to home.    My signature below certifies that the above stated patient is homebound and upon completion of the Face-To-Face encounter, has the need for intermittent skilled nursing, physical therapy and/or speech or occupational therapy services in their home for their current diagnosis as outlined in their initial plan of care. These services will continue to be monitored by myself or another physician.

## 2025-01-14 NOTE — CONSULTS
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 more symptoms (required for diagnosis)   - Depressed mood. Note: In children and adolescents, can be irritable mood.     - Diminished interest or pleasure in all, or almost all, activities.    - Decreased sleep.    - Fatigue or loss of energy.    - Feelings of worthlessness or excessive guilt.    - Diminished ability to think or concentrate, or indecisiveness.    - Recurrent thoughts of death (not just fear of dying), recurrent suicidal ideation without a specific plan, or a suicide attempt or a specific plan for committing suicide.   B) The symptoms cause clinically significant distress or impairment in social, occupational, or other important areas of functioning  C) The episode is not attributable to the physiological effects of a substance or to another medical condition  D) " The occurence of major depressive episode is not better explained by other thought / psychotic disorders  E) There has never been a manic episode or hypomanic episode    Functional Status:  Patient reports the following functional impairments:  health maintenance, operation of a motor vehicle, organization, relationship(s), self-care, social interactions, and work / vocational responsibilities.     Programmatic care:  Current LOCUS was assigned and patient needs the following level of care based on score 19  .    Clinical Summary:  1. Psychosocial, Cultural and Contextual Factors: Patient is a 29 year old who uses she/they pronouns. Patient has been working with case management, therapy, and medication management.  They have a long history of mental health disorders with various levels of care in the past, but have typically been able to manage this outpatinet. Patient states this is their first inpatient mental health stay.  Patient does appear to do better with direct questions as when things are too vague they find it difficult to answer.  Patient reports that this is related to their autism.  2. Principal DSM5 Diagnoses  (Sustained by DSM5 Criteria Listed Above):   296.33 (F33.2) Major Depressive Disorder, Recurrent Episode, Severe _.  3. Other Diagnoses that is relevant to services:   300.01 (F41.0) Panic Disorder  300.02 (F41.1) Generalized Anxiety Disorder.  4. Historical Diagnosis:  296.89 Bipolar II Disorder Depressed  300.3 (F42) Obsessive Compulsive Disorder .  Rule Outs: Cluster B personality disorder  Patient reports that a bipolar 2 diagnosis by history however it is unclear if this is the case as patient does endorse that they were using cannabis when that diagnosis was originally given  5. Prognosis: Return to Baseline Functioning.  6. Likely consequences of symptoms if not treated: If left untreated patient is likely to have a reassurance of symptoms.  7. Client strengths include:  creative,  educated, empathetic, employed, insightful, and intelligent .     Recommendations:     1. Plan for Safety and Risk Management:   Safety and Risk: Recommended that patient call 911 or go to the local ED should there be a change in any of these risk factors.          Report to child / adult protection services was NA.     2. Patient's identified no susan / Nondenominational / spiritual influences that need to be incorporated into care.     3. Initial Treatment will focus on:    Depressed Mood -    Anxiety -    Adjustment Difficulties related to: going through a divorce  Functional Impairment at: work  Mood Instability -    Risk Management / Safety Concerns related to: Self-harm ideation and Suicidal ideation.     4. Resources/Service Plan:    services are not indicated.   Modifications to assist communication are not indicated.   Additional disability accommodations are not indicated.      5. Collaboration:   Collaboration / coordination of treatment will be initiated with the following  support professionals: FRANK.      6.  Referrals:   The following referral(s) will be initiated: IOP/DTP.       A Release of Information has been obtained for the following: N/A.     Clinical Substantiation/medical necessity for the above recommendations:  Patient has a robust network of outpatinet supports, but has continued to experience decompensating mental health.  Additionally patient is currently on an inpatient mental health unit in a stepdown to IOP and day treatment appears to be an appropriate step down to continue to stabilize patient's mental health symptoms.    7. MAT:    MAT:  Discussed the general effects of drugs and alcohol on health and well-being. Provider gave patient printed information about the  effects of chemical use on their health and well being. Recommendations:  abstain from non prescribed chemicals .     8. Records:   These were not available for review at time of assessment.   Information in this  "assessment was obtained from the medical record and  provided by patient who is a good historian.    Patient will have open access to their mental health medical record.    9.   Interactive Complexity: No    10. Safety Plan:   Theo Safety Plan      Creation Date: 8/15/24 Last Update Date: 1/9/25      Step 1: Warning signs:    Warning Signs    Thoughts: \"I can't do this anymore\" and \"Nothing makes it better\"    Images: visions of harm: self-harm    Thinking Processes: ruminations (can't stop thinking about my problems):  , racing thoughts, intrusive thoughts (bothersome, unwanted thoughts that come out of nowhere):  , highly critical and negative thoughts:  , and disorganized thinking:    Mood: worsening depression, hopelessness, helplessness, intense worry, and agitation    Behaviors: can't stop crying, not taking care of myself, and not taking care of my responsibilities    Situations: relationship problems and financial stress      Step 2: Internal coping strategies - Things I can do to take my mind off my problems without contacting another person:    Strategies    Distress Tolerance Strategies:  relaxation activities:  , arts and crafts:  , play with my pet , sensory based activities/self-soothe with five senses:  , change body temperature (ice pack/cold water) , and paced breathing/progressive muscle relaxation    Physical Activities: meditation, deep breathing, and stretching    Focus on helpful thoughts:  \"This is temporary\", \"I will get through this\", \"It always passes\", and self-compassion statements:      Step 3: People and social settings that provide distraction:    Name Contact Information    [facility] [phone]    [place] [phone]       Places    friend's house      Step 4: People whom I can ask for help during a crisis:    Name Contact Information    [facility] [phone]    [place] [phone]      Step 5: Professionals or agencies I can contact during a crisis:    Clinician/Agency Name Phone " Emergency Contact    Fanny [facility]rap[facility] [place]  [phone]    Salina Lagunas  [phone]      Local Emergency Department Emergency Department Address Emergency Department Phone    [facility]  [phone]      Suicide Prevention Lifeline Phone: Call or Text 978  Crisis Text Line: Text HOME to 511705     Step 6: Making the environment safer (plan for lethal means safety):   Sharps locked     Optional: What is most important to me and worth living for?:   My friends, family and pets     Theo Safety Plan. Yessi Prakash and Olivier Leal. Used with permission of the authors.           Provider Name/ Credentials:  Dayo Bentley M[facility]  January 14, 2025

## 2025-01-14 NOTE — PLAN OF CARE
Rehab Group    Start time: 10:15  End time: 11:00  Patient time total: 45 minutes    attended full group    #7 attended   Group Type: OT Clinic   Group Topic Covered: balanced lifestyle, coping skills, healthy leisure time, relaxation , and social skills       Group Session Detail:  Pt actively participated in occupational therapy clinic to facilitate coping skill exploration, creative expression within personally meaningful activities, and clinical observation of social, cognitive, and kinesthetic performance skills.       Patient Response/Contribution:  cooperative with task, organized, socially appropriate, safe use of materials/supplies, and actively engaged       Patient Detail:  Pt response: Patient arrived to this group on  time and independently selected to continue to work on a previously started sun catcher painting project. Patient was independent to initiate, gather materials, sequence, and adjust to workspace demands as needed. Demonstrated good focus, planning, and problem solving for selected sun catcher task. Patient socialized appropriately with peers and staff throughout the group and appeared to enjoy discussing her job as a  with group members. After completing the selected sun catcher task, patient independently selected to complete a small fuzzy color project and gathered all needed materials for this task without further assistance. Patient remained clam, pleasant, and cooperative throughout duration of the group. Affect appeared congruent with the activity.       [id number] OT Group (2 or more in attendance)      Patient Active Problem List   Diagnosis    OCD (obsessive compulsive disorder)    Pes planus    Moderate major depression (H)    Generalized anxiety disorder    Migraine with aura    Autism spectrum disorder    IUD (intrauterine device) in place    Fibromyalgia    POTS (postural orthostatic tachycardia syndrome)    Dysautonomia (H)    Suicidal ideation    Suicide ideation     Major depressive disorder with psychotic features (H)

## 2025-01-14 NOTE — PLAN OF CARE
"  Rehab Group    Start time: 13:20  End time: 13:55  Patient time total: 10 minutes    attended partial group    #5 attended   Group Type: occupational therapy   Group Topic Covered: self-esteem and social skills, identification of personal achievements        Group Session Detail:  Patient engaged in a topic group focused on the importance and benefit of acknowledging personal achievements. Group participants were encouraged to think about a personal achievement that is meaningful to them and select a creative modality to represent this success. Group discussions centered around participant strengths pertaining to their selected achievement and reflecting on how acknowledging past successes can improve confidence, act as a motivator, and demonstrate progress.       Patient Response/Contribution:  socially appropriate and attentive       Patient Detail:  Patient arrived on time to this group. During the initial check-in, patient identified doing \"skin care\" as a habit/routine she has successfully built/maintained. When instructed to draw a representation of a greatest achievement, patient opted to disengage from the activity stating, \"I don't draw.\" Patient also declined the option to journal about a personal achievement and elected to leave group. Patient remained pleasant and calm throughout time in group before choosing to leave group early.       [id number] OT Group (2 or more in attendance)      Patient Active Problem List   Diagnosis    OCD (obsessive compulsive disorder)    Pes planus    Moderate major depression (H)    Generalized anxiety disorder    Migraine with aura    Autism spectrum disorder    IUD (intrauterine device) in place    Fibromyalgia    POTS (postural orthostatic tachycardia syndrome)    Dysautonomia (H)    Suicidal ideation    Suicide ideation    Major depressive disorder with psychotic features (H)      "

## 2025-01-14 NOTE — PLAN OF CARE
Goal Outcome Evaluation:    Pt spent 75 % of the shift in the [facility] area watching TV,reading a book and minimally socializing with select peers. Pt presented with full restricted and guarded affect anxious and depressed mood. Pt attended and participated unit group this shift. Titi denied SI/SIB/AVH/HI but endorsed anxiety 6/10 and depression 5/10 this shift. Pt endorsed feeling sad and scared of the peer in the unit SD who called her a sex trafficker and rapist. Writer redirected and reassured titi of her safety while in the unit. Pt was visited by significant others and she reported to have enjoyed the company. Pt was clean and well kempt this shift. Staff will continue to monitor and support with current POC.     Plan of Care Reviewed With: patient

## 2025-01-14 NOTE — TELEPHONE ENCOUNTER
Summary of Patient Care Communication Handoff to Patient Navigator Coordinator    PATIENT'S NAME: Angela Jones  MRN:   [id number]  :   1995    DATE OF SERVICE: 25    Referral Needed: Yes    Is the patient coming from an inpatient unit? Yes   What station is the patient on? 30    Unit Phone Number [phone]    Name of CTC to Contact with Follow up: Danielle    Is this referral a high priority (high priority is patient discharging within the next 24 hours and needs placement) yes    What program is this referral for? Adult MH Referral    What program is this referral for? Adult Mental Health Referral  Level of Care Recommended:  Intensive Outpatinet/ DTP  Specialty Track Recommendations:  MH Specialty Tracks: Other:  GM  Schedule Preferences: Schedule Preference:  Mornings  Are there any potential barriers for entrance into programmatic care? suicidal ideation, SIB, Transportation  Followed up from  Needed?:  No  Mental Health Referral Needed: No  Release of Information Needed:  No  Faxing Needed: No  Follow up Requests:  Patient Navigator Coordinator Follow-Up Needed: Referral to designated [place] Program.  Comments:   Dayo Bentley    Patient Navigator Coordinator Contact Information  Pool Message: [email or web address] ([phone])   Phone:  [phone]  Fax:  [phone]  Email:  [email or web address]

## 2025-01-14 NOTE — PLAN OF CARE
Group Attendance:  attended full group    Time session began: 1635  Time session ended: 1710  Patient's total time in group: 35    Total # Attendees   7   Group Type psychotherapeutic and DBT     Group Topic Covered insight improvement, symptom management, Triggers and warning sighs, goals and motivation, and relationships and boundaries     Group Session Detail DBT House Activity -  complete DBT house and verbal processing in group - enhance self-awareness and insight by using visual representation of emotional landscape, and help identify areas to develop/strengthen emotional regulation and coping skills.      Patient's response to the group topic/interactions:  cooperative with task, organized, listened actively, and actively engaged     Patient Details: Pt was engaged throughout session, was somewhat withdrawn and down, shared insightful reflections. Reports valuing integrity and self-care.           [id number] - Group psychotherapy - 1 Session  Patient Active Problem List   Diagnosis    OCD (obsessive compulsive disorder)    Pes planus    Moderate major depression (H)    Generalized anxiety disorder    Migraine with aura    Autism spectrum disorder    IUD (intrauterine device) in place    Fibromyalgia    POTS (postural orthostatic tachycardia syndrome)    Dysautonomia (H)    Suicidal ideation    Suicide ideation    Major depressive disorder with psychotic features (H)

## 2025-01-14 NOTE — PLAN OF CARE
BEH IP Unit Acuity Rating Score (UARS)  Patient is given one point for every criteria they meet.    CRITERIA SCORING   On a 72 hour hold, court hold, committed, stay of commitment, or revocation. 0    Patient LOS on BEH unit exceeds 20 days. 0  LOS: 6   Patient under guardianship, 55+, otherwise medically complex, or under age 11. 0   Suicide ideation without relief of precipitating factors. 0   Current plan for suicide. 0   Current plan for homicide. 0   Imminent risk or actual attempt to seriously harm another without relief of factors precipitating the attempt. 0   Severe dysfunction in daily living (ex: complete neglect for self care, extreme disruption in vegetative function, extreme deterioration in social interactions). 0   Recent (last 7 days) or current physical aggression in the ED or on unit. 0   Restraints or seclusion episode in past 72 hours. 0   Recent (last 7 days) or current verbal aggression, agitation, yelling, etc., while in the ED or unit. 0   Active psychosis. 0   Need for constant or near constant redirection (from leaving, from others, etc).  0   Intrusive or disruptive behaviors. 0   Patient requires 3 or more hours of individualized nursing care per 8-hour shift (i.e. for ADLs, meds, therapeutic interventions). 0   TOTAL 0

## 2025-01-15 ENCOUNTER — TELEPHONE (OUTPATIENT)
Dept: BEHAVIORAL HEALTH | Facility: CLINIC | Age: 30
End: 2025-01-15
Payer: COMMERCIAL

## 2025-01-15 NOTE — TELEPHONE ENCOUNTER
**Patient Navigator Follow Up**    1/15/2025;  Referral for IOP-ADT;  GM-Morning track      I called the primary and only phone# on file and it rings & rings and unable to leave a VM.    I sent patient follow up [facility] message.    Patient returned my call and provided new phone# to reach patient at.  I have now updated patient's Epic chart reflect that.    I discussed programming with patient.  Patient wants to start in IOP-2; mornings on Monday, 1/20.    I added patient to the census and sent in basket message to the program as well.    I sent patient program welcome letter through [facility] as well.      Thank you,    Gabriela CENTENO  Patient Navigator

## 2025-01-15 NOTE — TELEPHONE ENCOUNTER
----- Message from Gabriela LYON sent at 1/15/2025  3:07 PM CST -----  Regarding: Referral for IOP-2; mornings  Adult Mental Health Programmatic Care Schedule Request    Patient Name: Angela Jones  Location of programming: [facility]  Start Date: 1/20/2025    Adult Program Group: Adult Program Group: IOP 2 General Mood Track [[id number]]  Schedule:  M, T, Th, F 9am- 12noon  12 hours per week for 9 weeks  Number of visits to be scheduled: 36 days    Attending Provider (MD):  Dr. Lee Ross ([place])  Visit Type:  In-Person    Accommodations Needed: No  Alerts Identified/Substantiation: No  Consulted with Supervisor: No

## 2025-01-20 ENCOUNTER — HOSPITAL ENCOUNTER (OUTPATIENT)
Dept: BEHAVIORAL HEALTH | Facility: CLINIC | Age: 30
End: 2025-01-20
Attending: PSYCHIATRY & NEUROLOGY
Payer: COMMERCIAL

## 2025-01-20 VITALS
RESPIRATION RATE: 16 BRPM | TEMPERATURE: 97.5 F | OXYGEN SATURATION: 100 % | SYSTOLIC BLOOD PRESSURE: 118 MMHG | HEART RATE: 95 BPM | DIASTOLIC BLOOD PRESSURE: 63 MMHG

## 2025-01-20 DIAGNOSIS — F32.3 MAJOR DEPRESSIVE DISORDER WITH PSYCHOTIC FEATURES (H): ICD-10-CM

## 2025-01-20 DIAGNOSIS — F41.1 GENERALIZED ANXIETY DISORDER: ICD-10-CM

## 2025-01-20 DIAGNOSIS — F31.11 BIPOLAR AFFECTIVE DISORDER, CURRENTLY MANIC, MILD (H): Primary | ICD-10-CM

## 2025-01-20 PROCEDURE — 90853 GROUP PSYCHOTHERAPY: CPT

## 2025-01-20 ASSESSMENT — ANXIETY QUESTIONNAIRES
4. TROUBLE RELAXING: NEARLY EVERY DAY
7. FEELING AFRAID AS IF SOMETHING AWFUL MIGHT HAPPEN: SEVERAL DAYS
IF YOU CHECKED OFF ANY PROBLEMS ON THIS QUESTIONNAIRE, HOW DIFFICULT HAVE THESE PROBLEMS MADE IT FOR YOU TO DO YOUR WORK, TAKE CARE OF THINGS AT HOME, OR GET ALONG WITH OTHER PEOPLE: EXTREMELY DIFFICULT
GAD7 TOTAL SCORE: 16
6. BECOMING EASILY ANNOYED OR IRRITABLE: NEARLY EVERY DAY
GAD7 TOTAL SCORE: 16
5. BEING SO RESTLESS THAT IT IS HARD TO SIT STILL: NEARLY EVERY DAY
1. FEELING NERVOUS, ANXIOUS, OR ON EDGE: MORE THAN HALF THE DAYS
2. NOT BEING ABLE TO STOP OR CONTROL WORRYING: MORE THAN HALF THE DAYS
3. WORRYING TOO MUCH ABOUT DIFFERENT THINGS: MORE THAN HALF THE DAYS

## 2025-01-20 ASSESSMENT — PATIENT HEALTH QUESTIONNAIRE - PHQ9: SUM OF ALL RESPONSES TO PHQ QUESTIONS 1-9: 24

## 2025-01-20 ASSESSMENT — PAIN SCALES - GENERAL: PAINLEVEL_OUTOF10: MODERATE PAIN (4)

## 2025-01-20 NOTE — GROUP NOTE
Process Group Note    PATIENT'S NAME: Angela Jones  MRN:   [id number]  :   1995  ACCT. NUMBER: [id number]  DATE OF SERVICE: 25  START TIME:  9:00 AM  END TIME:  9:50 AM  FACILITATOR: Angelina Chaudhry LICSW  TOPIC:  Process Group    Diagnoses:     300.01 (F41.0) Panic Disorder  300.02 (F41.1) Generalized Anxiety Disorder.     296.33 (F33.2) Major Depressive Disorder, Recurrent Episode, Severe _.    [facility]  TRACK: IOP 2     NUMBER OF PARTICIPANTS: 5        Data:    Session content: At the start of this group, patients were invited to check in by identifying themselves, describing their current emotional status, and identifying issues to address in this group.   Area(s) of treatment focus addressed in this session included Symptom Management and Personal Safety.    Patient reported feeling anxious. Patient discussed working toward symptom management. Patient identified coping as skills they will use to address their goal(s). Patient reported self may be a barrier to working toward their goal(s) and/or addressing mental health symptoms. Patient reported no safety concerns and/or self-injurious behaviors. Patient reported no substance use. Patient reported they are taking their medications as prescribed. Patient reported feeling proud that they part of group. Patient discussed self love with the treatment group.              Therapeutic Interventions/Treatment Strategies:  Treatment modalities used include Cognitive Behavioral Therapy and Dialectical Behavioral Therapy.    Assessment:    Patient response:   Patient responded to session by accepting feedback, giving feedback, listening, and focusing on goals    Possible barriers to participation / learning include: and no barriers identified    Health Issues:   None reported       Substance Use Review:   Substance Use: No active concerns identified.    Mental Status/Behavioral  Observations  Appearance:   Appropriate   Eye Contact:   Good   Psychomotor Behavior: Normal   Attitude:   Cooperative   Orientation:   All  Speech   Rate / Production: Normal    Volume:  Normal   Mood:    Normal  Affect:    Appropriate   Thought Content:   Clear  Thought Form:  Coherent  Logical     Insight:    Good     Plan:   Safety Plan: No current safety concerns identified.  Recommended that patient call 911 or go to the local ED should there be a change in any of these risk factors.   Barriers to treatment: None identified  Patient Contracts (see media tab):  None  Substance Use: Not addressed in session   Continue or Discharge: Patient will continue in Adult Day Treatment (ADT)  as planned. Patient is likely to benefit from learning and using skills as they work toward the goals identified in their treatment plan.      Angelina Chaudhry, [facility]  January 20, 2025

## 2025-01-20 NOTE — GROUP NOTE
Psychotherapy Group Note    PATIENT'S NAME: Angela Jones  MRN:   [id number]  :   1995  ACCT. NUMBER: [id number]  DATE OF SERVICE: 25  START TIME: 10:00 AM  END TIME: 10:50 AM  FACILITATOR: Angelina Chaudhry LICSW  TOPIC: MH EBP Group: Behavioral Activation  [facility]  TRACK: IOP 2     NUMBER OF PARTICIPANTS: 5    Summary of Group / Topics Discussed:  Behavioral Activation: Behavior Chain Analysis: Patients explored the steps leading up to identified unwanted behaviors, and ways to replace them with more effective behaviors. Patients examined factors contributing to unwanted behaviors, with a goal of determining ways to interrupt the unhealthy patterns.    Patient Session Goals / Objectives:  Identify thoughts, feelings, and actions that contribute to unwanted behaviors  Identify thoughts, feelings, and actions that contribute to more effective/healthy and desired behaviors  Make plans to track and implement changes and share experiences in group  Identify personal barriers to change      Patient Participation / Response:  Fully participated with the group by sharing personal reflections / insights and openly received / provided feedback with other participants.    Demonstrated understanding of topics discussed through group discussion and participation, Expressed understanding of the relationship between behaviors, thoughts, and feelings, and Shared experiences and challenges with making behavioral changes    Treatment Plan:  Patient has a current master individualized treatment plan.  See Epic treatment plan for more information.    CHANTAL Lugo

## 2025-01-20 NOTE — H&P
"[facility] Mental Health Outpatient Programs  Initial Psychiatric Evaluation    Patient: Angela Jones  Preferred Name:   MRN: [id number]  : 1995  Acct. No.: [id number]  Date of Service: 25  Program Track: [facility]    Date of Diagnostic Assessment/Psychosocial Evaluation:     Identifying Data:  Angela Jones, a 29 year old-year-old with reported history of  , presents for initial visit to provide oversight of programmatic care. Patient attended the session alone, uses  she/they  pronouns, and prefers to be called: \"Alma\"    Presenting Concern:  Per diagnostic assessment: \"med management\"    History of Present Illness:  Chart reviewed, history as documented reviewed with . Patient endorses:  History Bipolar, depression,anxiety, ADHD, and Autism and OCD  Bipolar is now from recent hospitalization  Hospitalized on  and discharged on   On diagnosis, was using marijuana for pain  I am currently in a manic episode  I have not been sleeping  I want to spend  lot of raven  I cannot seat still, restless and want to keep moving  I am talking more  I cannot focus on anything  Recently out of the hospital. Manic symptoms are the as before going into the hospital  I have been having suicide ideation, to the level of prehospitalization  I do not have any plan, no intent as well  Context,   Undergoing divorce at this time  Work stress, I am a . I have been sick so often that I have to work part time  I am afraid of losing my job and the benefits  I cannot take care of myself and work the amount of work  I am afraid to lose my job and benefit  They said I do not qualify to FMLA  Fibromyalgia is additional stress  The inauguration today is an additional source of stressors  My partner and myself are trans, not sure what this presidency will bring  Medications  I have a psychiatrist, Dr Salina Lagunas in " [place]  Aripiprazole 10 mg daily  Been taking in the hispital about 2 weeks  I have been resteless, want to move  Having trouble seating still  Started around when Abilify was started  Clomipramine 100 mg   Increased when inpatient  Gabapentin 900 mg three times a day   Been taking for while, more than a year  Guafanicine 1 mg   Started in the hospital  Hydroxyzine 25 mg Q4 as needed   I use it several time a day  It helpd me feels like I do not want to jumb off the wall  Mirtazapine 15 mg at bed time  Increased in the hospital  Quetiapine 50 mg daily for sleep  Started in the hospital    Goals for Treatment (also please see individualized treatment plan):  I want to feel calm and be able to be able to focus    Review of Symptoms  Review of systems as recorded in diagnostic assessment reviewed with patient.  Today notes:  Sleep: I can fall asleep but I wake up several times at night  I always wake up around 6 am and I cannot get back to sleep  Appetite: I am not really hungry. When I started eating then I east too much  Fernanda, see above  Anxious  Lot of physical symptoms  My brain worries, latches on anything in front of me and spin around it  Suicidal ideation: has passive suicidal thoughts described as passive  Thoughts of non-suicidal self-injury: denied  Recent self-injurious behavior: denied  Not recently  Last was before the hospital  Homicidal ideation: denied  Other safety concerns: denied    Activities of Daily Living and Related Systems Impacted by Illness:  Hygiene: I am trying  Socialization: hard  Activities of Daily Living: (cleaning, shopping, bills, etc.): it is hard to motivate myslelf  Concerns related to work: see above    Substance use:  THC for pain, once a day    Current Medications:  Current Outpatient Medications   Medication Sig Dispense Refill    ARIPiprazole (ABILIFY) 10 MG tablet Take 1 tablet (10 mg) by mouth daily. (Patient taking differently: Take 5 mg by mouth daily.) 30 tablet 0     cholecalciferol (VITAMIN D3) 125 mcg (5000 units) capsule Take 1 capsule (125 mcg) by mouth daily. 30 capsule 0    clomiPRAMINE (ANAFRANIL) 50 MG capsule Take 2 capsules (100 mg) by mouth at bedtime. 60 capsule 1    cyanocobalamin (CYANOCOBALAMIN) 2000 MCG tablet Take 1 tablet (2,000 mcg) by mouth daily. 30 tablet 0    diphenhydrAMINE (BENADRYL) 50 MG capsule Take 50 mg by mouth as needed for allergies.      fludrocortisone (FLORINEF) 0.1 MG tablet Take 2 tablets (0.2 mg) by mouth daily. 180 tablet 1    gabapentin (NEURONTIN) 300 MG capsule Take 3 capsules (900 mg) by mouth 3 times daily. 810 capsule 3    guanFACINE (INTUNIV) 1 MG TB24 24 hr tablet Take 1 tablet (1 mg) by mouth at bedtime. 30 tablet 0    hydrOXYzine HCl (ATARAX) 25 MG tablet Take 1 tablet (25 mg) by mouth every 4 hours as needed for anxiety. (Patient taking differently: Take 25-50 mg by mouth every 4 hours as needed for anxiety.) 15 tablet 0    hyoscyamine (LEVSIN/SL) 0.125 MG sublingual tablet Take 1 tablet (0.125 mg) by mouth every 4 hours as needed. 120 tablet 5    ibuprofen (ADVIL/MOTRIN) 200 MG tablet Take 200 mg by mouth every 4 hours as needed for pain or other (migrain)      lactase (LACTAID) 3000 UNIT tablet Take 1-2 tablets (3,000-6,000 Units) by mouth 3 times daily (with meals). 30 tablet 0    metoclopramide (REGLAN) 5 MG tablet Take 1 tablet (5 mg) by mouth 3 times daily as needed (for nausea). 90 tablet 3    mirtazapine (REMERON) 15 MG tablet Take 1 tablet (15 mg) by mouth at bedtime. 90 tablet 3    multivitamin, therapeutic (THERA-VIT) TABS tablet Take 1 tablet by mouth daily      QUEtiapine (SEROQUEL) 50 MG tablet Take 1 tablet (50 mg) by mouth at bedtime. 30 tablet 0    tiZANidine (ZANAFLEX) 2 MG tablet Take 1 tablet (2 mg) by mouth daily as needed for muscle spasms. 90 tablet 3    hydrocortisone (CORTAID) 0.5 % external cream Apply topically 2 times daily. (Patient not taking: Reported on 1/20/2025) 28.35 g 0    levonorgestrel  "(MIRENA, 52 MG,) 20 MCG/24HR IUD 1 each (20 mcg) by Intrauterine route once for 1 dose 1 each 0       The above list was reviewed and updated in EPIC with patient today.     Patient is taking medications as prescribed and denies adverse effects    Medication History/Past Medication Trials:  All most everything  Pristiq, that was bad  Adderall, work but make me shake  Strattera   Wellbutrin, tired and zombie. No affect      Remainder of history, including psychiatric, substance, family, social as documented in diagnostic assessment/psychosocial evaluation and/or above    Medical Review of Systems:  Pertinent:     Laboratory Results:  Most recent labs reviewed. Pertinent updates/findings: None.       Mental Status Examination:  Vital Signs: /63   Pulse 95   Temp 97.5  F (36.4  C)   Resp 16   SpO2 100%    Appearance: adequately groomed, appears stated age, and in no apparent distress.  Attitude: cooperative   Eye Contact: good   Muscle Strength and Tone: normal  Psychomotor Behavior: normal or unremarkable   Gait and Station: normal width, turn, arm swing  Speech: clear, coherent, decreased prosody, regular rate, regular rhythm, and fluent  Associations: No loosening of associations  Thought Process: coherent and goal directed  Thought Content: no evidence of psychotic thought, passive suicidal ideation present, no auditory hallucinations present, and no visual hallucinations present  Mood: \"anxious\"  Affect: mood congruent  Insight: good  Judgment: intact, adequate for safety  Impulse Control: intact  Oriented to: time, place, person, and situation  Attention Span and Concentration: Normal  Language: Intact  Recent and Remote Memory: Delayed & immediate recall intact  Fund of Knowledge/Assessment of Intelligence: Average  Capacity of Activities of Daily Living: Independent, able to participate in programmatic care services.    DSM5 Diagnosis/es:    ICD-10-CM    1. Bipolar affective disorder, currently manic, " mild (H)  F31.11       2. Generalized anxiety disorder  F41.1         Assessment/Plan:   presents today for initial psychiatric evaluation as part of oversight of programmatic care. She presents with a complex history of mental health challenges, including Bipolar disorder (recently diagnosed during hospitalization), depression, anxiety, ADHD, autism, and OCD.   Per patient, she is currently experiencing a manic episode, with report of reduced sleep, restlessness, impulsivity, excessive talking, difficulty focusing, and excessive spending. Suicidal ideation, though passive and without intent or plan, mirrors pre-hospitalization severity.   Stressors include an ongoing divorce, fear of losing her job, frequent illness limiting work capacity, fibromyalgia, and sociopolitical concerns tied to her transgender identity.   Medications include aripiprazole, Clomipramine, Gabapentin, Guanfacine, Hydroxyzine, Mirtazapine, and Quetiapine. She started becoming restless, more anxious, inability to stay still correlating with Aripiprazole initiation. Adjustments today include reducing Aripiprazole to 5 mg daily and modifying hydroxyzine to 25-50 mg as needed up to three times daily.     Although a manic symptoms are evident per patient reports, overlapping symptoms associated with Cluster B personality disorders may also be contributing to the clinical presentation. Stressors appear to exacerbate underlying emotional dysregulation, further complicating the differential diagnosis  Psychotherapy is recommended to learn coping skills to be able to contend with stressors, reduce safety risks, and improve self-care  Follow up in 1 week or sooner as needed      Bipolar, manic  Engage in psychotherapy  Continue with current medication regimen  Change Aripiprazole to 5 mg daily  CHANGE Hydroxyzine 25-50 mg three times a day as needed   NO CHANGE to Clomipramine, Gabapentin, Guanfacine, Mirtazapine, and Quetiapine  Improve sleep  hygiene  Maintain a balanced diet  Engage in physical activities as tolerated    Anxiety  As noted above    Safety Assessment:   reports suicidal ideation and/or non-suicidal self-injury or thoughts thereof as noted above   is future-oriented and is engaged in treatment planning   I do not feel that  meets criteria for a 72-hour involuntary hold and remains appropriate for an outpatient level of care      Signed:   MONTSE HANEY, KADEN   January 20, 2025      Visit Details:  Type of service: In-person  Location (patient and provider): [facility] Outpatient Programmatic Care Offices    Level of Medical Decision Making:   - At least 1 chronic problem that is not stable  - Engaged in prescription drug management during visit (discussed any medication benefits, side effects, alternatives, etc.)  Discussion of management or test interpretation with external physician/other qualified healthcare professional/appropriate source - programmatic care multidisciplinary treatment team    Chart review as part of intake process includes diagnostic assessment/psychosocial evaluation and any recent updates, as well as recent ED and/or inpatient documentation, where applicable.The reader is referred to those sources for additional information.    60 min spent on the date of the encounter in chart review, patient visit, review of tests, documentation, care coordination, and/or discussion with other providers about the issues documented above.      This document completed in part using [employer] dictation software and therefore may contain inadvertent word or phrase substitutions.

## 2025-01-21 ENCOUNTER — HOSPITAL ENCOUNTER (OUTPATIENT)
Dept: BEHAVIORAL HEALTH | Facility: CLINIC | Age: 30
End: 2025-01-21
Attending: PSYCHIATRY & NEUROLOGY
Payer: COMMERCIAL

## 2025-01-21 DIAGNOSIS — F31.11 BIPOLAR AFFECTIVE DISORDER, CURRENTLY MANIC, MILD (H): Primary | ICD-10-CM

## 2025-01-21 PROCEDURE — 90853 GROUP PSYCHOTHERAPY: CPT

## 2025-01-21 PROCEDURE — 90853 GROUP PSYCHOTHERAPY: CPT | Performed by: PSYCHOLOGIST

## 2025-01-21 NOTE — GROUP NOTE
Psychotherapy Group Note    PATIENT'S NAME: Angela Jones  MRN:   [id number]  :   1995  ACCT. NUMBER: [id number]  DATE OF SERVICE: 25  START TIME: 11:00 AM  END TIME: 11:50 AM  FACILITATOR: Edilia Rodriguez Psy.D, LP  TOPIC:  EBP Group: Behavioral Activation  [facility] Mental Health Outpatient Programs  TRACK: iop2    NUMBER OF PARTICIPANTS: 6    Summary of Group / Topics Discussed:  Behavioral Activation: The Change Process - Behavior Change: Patients explored the process and types of change, including but not limited to, theories of change, steps to making change, methods of changing behavior, and potential barriers.  Patients worked to identify what changes may benefit their daily lives, and work towards a plan to implement change.      Patient Session Goals / Objectives:  Demonstrate understanding of the change process.    Identify positive and negative behavioral patterns.  Make plans to track and implement changes and share experiences in group.  Identify personal barriers to change      Patient Participation / Response:  Fully participated with the group by sharing personal reflections / insights and openly received / provided feedback with other participants.    Expressed understanding of the relationship between behaviors, thoughts, and feelings    Treatment Plan:  Patient has a current master individualized treatment plan.  See Epic treatment plan for more information.    Edilia Rodriguez Psy.D, HATTIE

## 2025-01-21 NOTE — GROUP NOTE
Psychotherapy Group Note    PATIENT'S NAME: Angela Jones  MRN:   [id number]  :   1995  ACCT. NUMBER: [id number]  DATE OF SERVICE: 25  START TIME: 10:00 AM  END TIME: 10:50 AM  FACILITATOR: Angelina Chaudhry LICSW  TOPIC: MH EBP Group: Behavioral Activation  [facility] Adult Mental Health Outpatient Programs  TRACK: IOP 2     NUMBER OF PARTICIPANTS: 6    Summary of Group / Topics Discussed:  Behavioral Activation: [place]: {Patients identified situations that prompt unwanted and unhelpful emotions / thoughts / behaviors.   Patients discussed how to problem solve by proactively using coping skills in potentially difficult situations. Components included describing the situation, brainstorming coping skills, imagining how scenario can/will unfold, rehearsing the action plan, and practicing relaxation to follow.  Patients practiced using these skills to reduce symptom distress and increase effective coping  behaviors.      Patient Session Goals / Objectives:  Identify difficult situation(s), and gain proficiency with alternative behaviors / skills to problem solve.  Increase confidence using coping skills through group practice in session.  Receive and provide feedback regarding skill development.  Apply coping skills in daily life situations.      Patient Participation / Response:  Fully participated with the group by sharing personal reflections / insights and openly received / provided feedback with other participants.    Demonstrated understanding of topics discussed through group discussion and participation, Expressed understanding of the relationship between behaviors, thoughts, and feelings, Shared experiences and challenges with making behavioral changes, and Identified barriers to change    Treatment Plan:  Patient has a current master individualized treatment plan.  See Epic treatment plan for more information.    CHANTAL Lugo

## 2025-01-21 NOTE — GROUP NOTE
Process Group Note    PATIENT'S NAME: Angela Jones  MRN:   [id number]  :   1995  ACCT. NUMBER: [id number]  DATE OF SERVICE: 25  START TIME:  9:00 AM  END TIME:  9:50 AM  FACILITATOR: Angelina Chaudhry LICSW  TOPIC:  Process Group    Diagnoses:    300.01 (F41.0) Panic Disorder  300.02 (F41.1) Generalized Anxiety Disorder.     296.33 (F33.2) Major Depressive Disorder, Recurrent Episode, Severe _.    [facility] Outpatient Programs  TRACK: IOP 2     NUMBER OF PARTICIPANTS: 6        Data:    Session content: At the start of this group, patients were invited to check in by identifying themselves, describing their current emotional status, and identifying issues to address in this group.   Area(s) of treatment focus addressed in this session included Symptom Management and Personal Safety.    Patient reported feeling hopeful. Patient discussed working toward emotional regulation. Patient identified distress tolerance as skills they will use to address their goal(s). Patient reported side effects may be a barrier to working toward their goal(s) and/or addressing mental health symptoms. Patient reported no safety concerns and/or self-injurious behaviors. Patient reported no substance use. Patient reported they are taking their medications as prescribed. Patient reported feeling proud that they part of group. Patient discussed emotional concerns with the treatment group.              Therapeutic Interventions/Treatment Strategies:  Treatment modalities used include Cognitive Behavioral Therapy and Dialectical Behavioral Therapy.    Assessment:    Patient response:   Patient responded to session by accepting feedback, giving feedback, listening, focusing on goals, and being attentive    Possible barriers to participation / learning include: and no barriers identified    Health Issues:   None reported       Substance Use Review:   Substance Use: No active concerns identified.    Mental  Status/Behavioral Observations  Appearance:   Appropriate   Eye Contact:   Good   Psychomotor Behavior: Normal   Attitude:   Cooperative   Orientation:   All  Speech   Rate / Production: Normal    Volume:  Normal   Mood:    Normal  Affect:    Appropriate   Thought Content:   Clear  Thought Form:  Coherent  Logical     Insight:    Good     Plan:   Safety Plan: No current safety concerns identified.  Recommended that patient call 911 or go to the local ED should there be a change in any of these risk factors.   Barriers to treatment: None identified  Patient Contracts (see media tab):  None  Substance Use: Not addressed in session   Continue or Discharge: Patient will continue in Adult Day Treatment (ADT)  as planned. Patient is likely to benefit from learning and using skills as they work toward the goals identified in their treatment plan.      Angelina Chaudhry, CHANTAL  January 21, 2025

## 2025-01-23 RX ORDER — ARIPIPRAZOLE 10 MG/1
5 TABLET ORAL DAILY
Qty: 30 TABLET | Refills: 0 | Status: SHIPPED | OUTPATIENT
Start: 2025-01-23

## 2025-01-23 NOTE — ADDENDUM NOTE
Encounter addended by: Vamshi Crum, KADEN on: 1/23/2025 11:06 AM   Actions taken: Visit diagnoses modified, Pharmacy for encounter modified, Order list changed, Diagnosis association updated

## 2025-01-24 ENCOUNTER — VIRTUAL VISIT (OUTPATIENT)
Dept: PSYCHIATRY | Facility: CLINIC | Age: 30
End: 2025-01-24
Attending: PSYCHIATRY & NEUROLOGY
Payer: COMMERCIAL

## 2025-01-24 DIAGNOSIS — F84.0 AUTISM SPECTRUM DISORDER: ICD-10-CM

## 2025-01-24 DIAGNOSIS — F33.1 MDD (MAJOR DEPRESSIVE DISORDER), RECURRENT EPISODE, MODERATE (H): Primary | ICD-10-CM

## 2025-01-24 DIAGNOSIS — F90.1 ATTENTION DEFICIT HYPERACTIVITY DISORDER (ADHD), PREDOMINANTLY HYPERACTIVE TYPE: ICD-10-CM

## 2025-01-24 DIAGNOSIS — F12.20 CANNABIS USE DISORDER, MODERATE, DEPENDENCE (H): ICD-10-CM

## 2025-01-24 DIAGNOSIS — F41.9 ANXIETY: ICD-10-CM

## 2025-01-24 DIAGNOSIS — F42.2 MIXED OBSESSIONAL THOUGHTS AND ACTS: ICD-10-CM

## 2025-01-24 RX ORDER — CLOMIPRAMINE HYDROCHLORIDE 50 MG/1
100 CAPSULE ORAL AT BEDTIME
Qty: 60 CAPSULE | Refills: 2 | Status: SHIPPED | OUTPATIENT
Start: 2025-01-24

## 2025-01-24 RX ORDER — ARIPIPRAZOLE 5 MG/1
5 TABLET ORAL DAILY
Qty: 30 TABLET | Refills: 1 | Status: SHIPPED | OUTPATIENT
Start: 2025-01-24

## 2025-01-24 RX ORDER — QUETIAPINE FUMARATE 50 MG/1
TABLET, FILM COATED ORAL
Qty: 30 TABLET | Refills: 2 | Status: SHIPPED | OUTPATIENT
Start: 2025-01-24

## 2025-01-24 RX ORDER — GUANFACINE 1 MG/1
1 TABLET, EXTENDED RELEASE ORAL AT BEDTIME
Qty: 30 TABLET | Refills: 2 | Status: SHIPPED | OUTPATIENT
Start: 2025-01-24

## 2025-01-24 ASSESSMENT — ANXIETY QUESTIONNAIRES
7. FEELING AFRAID AS IF SOMETHING AWFUL MIGHT HAPPEN: SEVERAL DAYS
4. TROUBLE RELAXING: NEARLY EVERY DAY
GAD7 TOTAL SCORE: 15
3. WORRYING TOO MUCH ABOUT DIFFERENT THINGS: MORE THAN HALF THE DAYS
1. FEELING NERVOUS, ANXIOUS, OR ON EDGE: NEARLY EVERY DAY
4. TROUBLE RELAXING: NEARLY EVERY DAY
4. TROUBLE RELAXING: NEARLY EVERY DAY
IF YOU CHECKED OFF ANY PROBLEMS ON THIS QUESTIONNAIRE, HOW DIFFICULT HAVE THESE PROBLEMS MADE IT FOR YOU TO DO YOUR WORK, TAKE CARE OF THINGS AT HOME, OR GET ALONG WITH OTHER PEOPLE: EXTREMELY DIFFICULT
IF YOU CHECKED OFF ANY PROBLEMS ON THIS QUESTIONNAIRE, HOW DIFFICULT HAVE THESE PROBLEMS MADE IT FOR YOU TO DO YOUR WORK, TAKE CARE OF THINGS AT HOME, OR GET ALONG WITH OTHER PEOPLE: EXTREMELY DIFFICULT
6. BECOMING EASILY ANNOYED OR IRRITABLE: SEVERAL DAYS
6. BECOMING EASILY ANNOYED OR IRRITABLE: SEVERAL DAYS
1. FEELING NERVOUS, ANXIOUS, OR ON EDGE: NEARLY EVERY DAY
5. BEING SO RESTLESS THAT IT IS HARD TO SIT STILL: NEARLY EVERY DAY
6. BECOMING EASILY ANNOYED OR IRRITABLE: SEVERAL DAYS
3. WORRYING TOO MUCH ABOUT DIFFERENT THINGS: MORE THAN HALF THE DAYS
8. IF YOU CHECKED OFF ANY PROBLEMS, HOW DIFFICULT HAVE THESE MADE IT FOR YOU TO DO YOUR WORK, TAKE CARE OF THINGS AT HOME, OR GET ALONG WITH OTHER PEOPLE?: EXTREMELY DIFFICULT
5. BEING SO RESTLESS THAT IT IS HARD TO SIT STILL: NEARLY EVERY DAY
7. FEELING AFRAID AS IF SOMETHING AWFUL MIGHT HAPPEN: SEVERAL DAYS
3. WORRYING TOO MUCH ABOUT DIFFERENT THINGS: MORE THAN HALF THE DAYS
5. BEING SO RESTLESS THAT IT IS HARD TO SIT STILL: NEARLY EVERY DAY
GAD7 TOTAL SCORE: 15
6. BECOMING EASILY ANNOYED OR IRRITABLE: SEVERAL DAYS
2. NOT BEING ABLE TO STOP OR CONTROL WORRYING: MORE THAN HALF THE DAYS
7. FEELING AFRAID AS IF SOMETHING AWFUL MIGHT HAPPEN: SEVERAL DAYS
4. TROUBLE RELAXING: NEARLY EVERY DAY
2. NOT BEING ABLE TO STOP OR CONTROL WORRYING: MORE THAN HALF THE DAYS
1. FEELING NERVOUS, ANXIOUS, OR ON EDGE: NEARLY EVERY DAY
GAD7 TOTAL SCORE: 15
GAD7 TOTAL SCORE: 15
IF YOU CHECKED OFF ANY PROBLEMS ON THIS QUESTIONNAIRE, HOW DIFFICULT HAVE THESE PROBLEMS MADE IT FOR YOU TO DO YOUR WORK, TAKE CARE OF THINGS AT HOME, OR GET ALONG WITH OTHER PEOPLE: EXTREMELY DIFFICULT
GAD7 TOTAL SCORE: 15
7. FEELING AFRAID AS IF SOMETHING AWFUL MIGHT HAPPEN: SEVERAL DAYS
GAD7 TOTAL SCORE: 15
IF YOU CHECKED OFF ANY PROBLEMS ON THIS QUESTIONNAIRE, HOW DIFFICULT HAVE THESE PROBLEMS MADE IT FOR YOU TO DO YOUR WORK, TAKE CARE OF THINGS AT HOME, OR GET ALONG WITH OTHER PEOPLE: EXTREMELY DIFFICULT
1. FEELING NERVOUS, ANXIOUS, OR ON EDGE: NEARLY EVERY DAY
2. NOT BEING ABLE TO STOP OR CONTROL WORRYING: MORE THAN HALF THE DAYS
GAD7 TOTAL SCORE: 15
8. IF YOU CHECKED OFF ANY PROBLEMS, HOW DIFFICULT HAVE THESE MADE IT FOR YOU TO DO YOUR WORK, TAKE CARE OF THINGS AT HOME, OR GET ALONG WITH OTHER PEOPLE?: EXTREMELY DIFFICULT
3. WORRYING TOO MUCH ABOUT DIFFERENT THINGS: MORE THAN HALF THE DAYS
GAD7 TOTAL SCORE: 15
2. NOT BEING ABLE TO STOP OR CONTROL WORRYING: MORE THAN HALF THE DAYS
5. BEING SO RESTLESS THAT IT IS HARD TO SIT STILL: NEARLY EVERY DAY
8. IF YOU CHECKED OFF ANY PROBLEMS, HOW DIFFICULT HAVE THESE MADE IT FOR YOU TO DO YOUR WORK, TAKE CARE OF THINGS AT HOME, OR GET ALONG WITH OTHER PEOPLE?: EXTREMELY DIFFICULT
8. IF YOU CHECKED OFF ANY PROBLEMS, HOW DIFFICULT HAVE THESE MADE IT FOR YOU TO DO YOUR WORK, TAKE CARE OF THINGS AT HOME, OR GET ALONG WITH OTHER PEOPLE?: EXTREMELY DIFFICULT

## 2025-01-24 NOTE — NURSING NOTE
Current patient location: Patient declined to provide     Is the patient currently in the state of [place]? YES    Visit mode: VIDEO    If the visit is dropped, the patient can be reconnected by:VIDEO VISIT: Send to e-mail at: [email or web address]    Will anyone else be joining the visit? NO  (If patient encounters technical issues they should call [phone]    Are changes needed to the allergy or medication list? Pt stated no changes to allergies and Pt stated no med changes    Are refills needed on medications prescribed by this physician? NO    Rooming Documentation:  Questionnaire(s) completed    Reason for visit: INDIA Barriga VVF

## 2025-01-24 NOTE — PROGRESS NOTES
Virtual Visit Details    Type of service:  Video Visit     Originating Location (pt. Location): Home    Distant Location (provider location):  On-site  Platform used for Video Visit: [facility]  General Clinic Team  MEDICAL PROGRESS NOTE       CARE TEAM:    PCP- Marissa Walton  Therapist- Fanny Cobb, CHANTAL      is a 29 year old who uses the pronouns she, her.                   Assessment & Plan     Major depressive disorder, recurrent, moderate  vs. Bipolar, mixed episode   Generalized anxiety disorder with panic attacks   OCD  Cannabis use    ADHD, hyperactive type, by history   ASD, by history    SIB   Cluster B traits   Postural orthostatic tachycardia syndrome   Fibromyalgia   Migraines   IBS      Angela Jones is a 29-year-old female with above diagnoses. She presented to resident clinic to re-establish care on 8/2024.      She was hospitalized on 1/8-1/14/2025 for increased intrusive thoughts and self-harm. She was started on abilify 10mg daily, quetiapine 50mg, hydroxyzine 25mg PRN, and intuniv 1mg.     Today,   presents with reported elevated mood over the past few days with insomnia. No decreased need of sleep, racing thoughts. She is attending [facility] [place] and was told by the provider to reduce abilify back to 5mg. She requests prescription to be resent as the medication cannot be cut in half. Prescription sent. Quetiapine 50mg PRN was added to help with difficulty staying asleep. She has ongoing intrusive thoughts that is less frequent since clomipramine was increased. She noted self-harm urges with no suicidal ideation or intention. Fine tremors observed on her right hand that she attributed to anxiety that started during the hospitalization. Limited physical examination due to nature of virtual visit. Continue to monitor.  She is interested in transferring to [facility] that is adapted for patients  "with neurodevelopmental disorder. Stated this was discussed with IOP provider. In the meantime, she will continue attending the program here.     Psychotropic Drug Interactions:    ADDITIVE SEROTONERGIC: mirtazapine , clomipramine, abilify, quetiapine   ADDITIVE CHOLINERGIC:  clomipramine, benadryl, tizanidine, metoclopramide     Management: routine monitoring and patient is aware of risks     [place] was [place] today: indicates that controlled prescriptions have been filled as prescribed     Risk Statements:   Treatment Risk: Risks, benefits, alternatives and potential adverse effects have been discussed and are understood.   Safety Risk:  did not appear to be an imminent safety risk to self or others.    PLAN    1) Medications:     - Decrease Abilify to 5mg daily as recommended by IOP provider (prescription of 10mg sent by IOP provider though patient stated she cannot cut the pills in half)   - Start quetiapine 50mg at bedtime PRN for sleep and anxiety   - Continue quetiapine 50mg at bedtime   - Continue clomipramine 100mg at bedtime   - Continue mirtazapine 15mg at bedtime for mood    - Continue Intuniv 1 mg at bedtime       Other:   Gabapentin 900mg TID   Ibuprofen 200mg q4h PRN    MTV   Tizanidine 2mg daily PRN   Metoclopramide 5mg TID PRN   Fludrocrotisone 0.1mg for POTS    Benadryl 50mg as needed     Future considerations:    -consider lamotrigine for mood stabilization   -optimizing clomipramine for OCD   -switching mirtazapine to wellbutrin to also target ADHD after clomipramine is optimized     2) Psychotherapy: weekly individual therapy with Fanny Cobb     3) Next due:  Labs: Clomipramine level after next titration   EKG: Routine monitoring is not indicated for current psychotropic medication regimen   Rating scales: N/A    4) Referrals: none     5) Follow-up: 4 weeks                      Interval History     -was very depressed before presenting to the ED   -doing ok now   -been \"manic\" these " last couple days: elaborated that her mood is up and down on day to day and has trouble staying still, talkativeness, wanting to spend money  -waking up several times during the night for 20min-1 hour at a time   -is doing IOP and was told abilify may have been increased too quick   -taking 10mg now and can't cut it in half, requests for 5mg to be sent   -hand shakiness makes it hard to do fine motor skills   -hand shakiness off and on the whole time when she was in the hospital   -has self-harm urges   -no suicidal ideation / plan / intention   -looking at switching IOP , the [facility] one is good but not adapted DBT for neurodivergent       Intrusive thoughts   Current Social History:  Financial/occupational: employed  as a    Living situation: lives alone in an apartment with her cat   Social/spiritual support: partners, family, cat       Pertinent Substance Use:  [Last updated 1/24/25]  Alcohol: Yes: socially    Cannabis: Yes:  THC gummies daily. Not more than 10mg/ day   Tobacco: vaping infrequently   Caffeine:  1 cup /day   Opioids: No   Narcan Kit current: N/A  Other substances: No     Medical Review of Systems / Med sfx:   A comprehensive review of systems was performed and is negative other than noted above.   Lightheadedness/orthostasis: yes, has POTS    Headaches: denies   GI: denies    CV: heart racing from POTS   Sexual health concerns: denies     Contraception: IUD                   Summary Points of Current Care  8/2/2024: Transfer visit. Tapered Trintellix from 20mg to 10mg and instructed to stop after 1-2 weeks. Start Pristiq 50mg daily   8/14/2024:  reached out due to safety concerns from thoughts of self-harm. RN arranged for welfare check. Patient saw therapist the following day and reviewed safety plan  8/16/2024: Due to worsening intrusive thoughts and mood, patient instructed via phone call to increase Trintellix back to 20mg, and Pristiq was decreased to 25mg   9/12/2024:  "Start clomipramine 25 mg at bedtime for OCD  11/7/2024: Increase clomipramine to 50mg with instruction to increase to 100 mg at bedtime if well tolerated   1/24/2025: Reduce Abilify to 5mg daily per IOP provider's recommendation.                   Physical Exam  (Vitals Only)    There were no vitals taken for this visit.  Pulse Readings from Last 3 Encounters:   01/20/25 95   01/14/25 94   01/08/25 92     Wt Readings from Last 3 Encounters:   01/14/25 58.1 kg (128 lb 1.6 oz)   01/05/25 56.9 kg (125 lb 6.4 oz)   11/14/24 59.1 kg (130 lb 6.4 oz)     BP Readings from Last 3 Encounters:   01/20/25 118/63   01/14/25 104/73   01/08/25 105/53                      Mental Status Exam    Alertness: alert  and oriented  Appearance: adequately groomed  Behavior/Demeanor: cooperative, with good  eye contact   Speech: normal  Language: intact  Psychomotor: normal or unremarkable  Mood:  \"I have been manic\"  Affect: appropriate; congruent to: mood- yes, content- yes  Thought Process/Associations:  linear  Thought Content:  Reports  intrusive thoughts of self-harm without plan / intent ;  Denies violent ideation  Perception:  Reports none;  Denies hallucinations  Insight: fair  Judgment: adequate for safety  Cognition: does  appear grossly intact; formal cognitive testing was not done  Gait and Station: N/A (telehealth)                  Past Psychotropic Medication Trials    Wellbutrin- sleepiness, Prozac- irritablility, Effexor, Lexapro- not helpful.       Previous trials include Prozac (afternoon lethargy), Zoloft, Wellbutrin (insurance problems, but may have worked for depression and ADHD), Effexor (stomach pains and dizziness). Adderall (decreased appetite), Strattera, Concerta, Intuniv (\"zombie\"), Focalin.       Of note, Genesight testing from 8/11/2017 reviewed and she is an ultra rapid metabolizer of 1A2. Normal metabolizer for ADHD medications.       Medication Max Dose (mg) Dates / Duration Helpful? DC Reason / Adverse " "Effects?   Lamotrigine   350     Don't remember    Vortioxetine            Amitryptyline  50    y (got on it for migraines)      Citalopram  20     y      Adderall        Appetite suppressant    Ritalin        Appetite suppressant    Focalin       Provider left practice     Strattera        provider left practice    Wellbutrin        Spacy and tired , brain fog    Cymbalta            Prozac       y  Irritable , lethargy    Zoloft            Effexor        Made migraines worse, stomachache     Intruniv         Worked better than stimulants, focused better and not so jittery, though per chart reported \"zombie\" like feeling      Vyvanse        Worked better than Adderral, issue with insurance    Diphenhydramine(OTC)            Risperidone        Can't recall taking it. May have been prescribed at young age    Aripriprazole        Can't recall taking it. May have been prescribed at young age    Clomipramine  100  11/2024- current  y                  Past Medical History     Patient Active Problem List   Diagnosis    OCD (obsessive compulsive disorder)    Pes planus    Moderate major depression (H)    Generalized anxiety disorder    Migraine with aura    Autism spectrum disorder    IUD (intrauterine device) in place    Fibromyalgia    POTS (postural orthostatic tachycardia syndrome)    Dysautonomia (H)    Suicidal ideation    Suicide ideation    Bipolar affective disorder, currently manic, mild (H)                     Medications     Current Outpatient Medications   Medication Sig Dispense Refill    ARIPiprazole (ABILIFY) 10 MG tablet Take 0.5 tablets (5 mg) by mouth daily. 30 tablet 0    cholecalciferol (VITAMIN D3) 125 mcg (5000 units) capsule Take 1 capsule (125 mcg) by mouth daily. 30 capsule 0    clomiPRAMINE (ANAFRANIL) 50 MG capsule Take 2 capsules (100 mg) by mouth at bedtime. 60 capsule 1    cyanocobalamin (CYANOCOBALAMIN) 2000 MCG tablet Take 1 tablet (2,000 mcg) by mouth daily. 30 tablet 0    diphenhydrAMINE " (BENADRYL) 50 MG capsule Take 50 mg by mouth as needed for allergies.      fludrocortisone (FLORINEF) 0.1 MG tablet Take 2 tablets (0.2 mg) by mouth daily. 180 tablet 1    gabapentin (NEURONTIN) 300 MG capsule Take 3 capsules (900 mg) by mouth 3 times daily. 810 capsule 3    guanFACINE (INTUNIV) 1 MG TB24 24 hr tablet Take 1 tablet (1 mg) by mouth at bedtime. 30 tablet 0    hydrocortisone (CORTAID) 0.5 % external cream Apply topically 2 times daily. (Patient not taking: Reported on 1/20/2025) 28.35 g 0    hydrOXYzine HCl (ATARAX) 25 MG tablet Take 1 tablet (25 mg) by mouth every 4 hours as needed for anxiety. (Patient taking differently: Take 25-50 mg by mouth every 4 hours as needed for anxiety.) 15 tablet 0    hyoscyamine (LEVSIN/SL) 0.125 MG sublingual tablet Take 1 tablet (0.125 mg) by mouth every 4 hours as needed. 120 tablet 5    ibuprofen (ADVIL/MOTRIN) 200 MG tablet Take 200 mg by mouth every 4 hours as needed for pain or other (migrain)      lactase (LACTAID) 3000 UNIT tablet Take 1-2 tablets (3,000-6,000 Units) by mouth 3 times daily (with meals). 30 tablet 0    levonorgestrel (MIRENA, 52 MG,) 20 MCG/24HR IUD 1 each (20 mcg) by Intrauterine route once for 1 dose 1 each 0    metoclopramide (REGLAN) 5 MG tablet Take 1 tablet (5 mg) by mouth 3 times daily as needed (for nausea). 90 tablet 3    mirtazapine (REMERON) 15 MG tablet Take 1 tablet (15 mg) by mouth at bedtime. 90 tablet 3    multivitamin, therapeutic (THERA-VIT) TABS tablet Take 1 tablet by mouth daily      QUEtiapine (SEROQUEL) 50 MG tablet Take 1 tablet (50 mg) by mouth at bedtime. 30 tablet 0    tiZANidine (ZANAFLEX) 2 MG tablet Take 1 tablet (2 mg) by mouth daily as needed for muscle spasms. 90 tablet 3                     Data         12/10/2024     9:00 AM 1/14/2025    12:00 PM 1/20/2025     9:00 AM   PROMIS-10 Total Score w/o Sub Scores   PROMIS TOTAL - SUBSCORES 20  23 19       Patient-reported         12/10/2024     8:59 AM 1/14/2025     12:00 PM 1/20/2025     9:00 AM   PHQ-9 SCORE   PHQ-9 Total Score MyChart 15 (Moderately severe depression)     PHQ-9 Total Score 15  20 24       Patient-reported         1/14/2025    12:00 PM 1/20/2025     9:00 AM 1/24/2025     2:16 PM   JOSHUA-7 SCORE   Total Score   15 (severe anxiety)   Total Score 15 16 15        Patient-reported       Liver/Kidney Function, TSH Metabolic Blood counts   Recent Labs   Lab Test 01/09/25  0849 05/20/24  1834   AST 15 17   ALT 13 10   ALKPHOS 61 63   CR 0.70 0.80     Recent Labs   Lab Test 01/09/25  0849   TSH 2.35    Recent Labs   Lab Test 10/25/23  1508   CHOL 202*   TRIG 115   *   HDL 70     Recent Labs   Lab Test 01/09/25  0849   A1C 5.1     Recent Labs   Lab Test 01/09/25  0849   *    Recent Labs   Lab Test 01/09/25  0849   WBC 4.3   HGB 12.4   HCT 37.2   MCV 94                PROVIDER: Salina Lagunas MD    Level of Medical Decision Making:   - At least 1 chronic problem that is not stable  - Engaged in prescription drug management during visit (discussed any medication benefits, side effects, alternatives, etc.)        The longitudinal plan of care for the diagnosis(es)/condition(s) as documented were addressed during this visit. Due to the added complexity in care, I will continue to support  in the subsequent management and with ongoing continuity of care.       Patient staffed in clinic with Dr. Anderson who will sign the note.  Supervisor is Dr. Thompson.

## 2025-01-24 NOTE — PATIENT INSTRUCTIONS
**For crisis resources, please see the information at the end of this document**   Patient Education    Thank you for coming to the [facility] [place] [facility].     Lab Testing:  If you had lab testing today and your results are reassuring or normal they will be mailed to you or sent through Gen9 within 7 days. If the lab tests need quick action we will call you with the results. The phone number we will call with results is # [phone]. If this is not the best number please call our clinic and change the number.     Medication Refills:  If you need any refills please call your pharmacy and they will contact us. Our fax number for refills is [phone].   Three business days of notice are needed for general medication refill requests.   Five business days of notice are needed for controlled substance refill requests.   If you need to change to a different pharmacy, please contact the new pharmacy directly. The new pharmacy will help you get your medications transferred.     Contact Us:  Please call [phone] during business hours (8-5:00 M-F).   If you have medication related questions after clinic hours, or on the weekend, please call [phone].     Financial Assistance [phone]   Medical Records [phone]       MENTAL HEALTH CRISIS RESOURCES:  For a emergency help, please call 911 or go to the nearest Emergency Department.     Emergency Walk-In Options:   Em[facility] Unit @ [facility] ([place]): [phone] - Specialized mental health emergency area designed to be calming  [facility] ([place]): [phone]  [facility] ([place]): [phone]  [facility]): [phone]    [facility] Crisis Information:   [place]: [phone]  [place]: [phone]  [place] ([place]) - Adult: [phone]     Child: [phone]  [place] - Adult: [phone]     Child: [phone]  [place]:  [phone]  List of all [facility] resources:   https://[place].gov/dhs/people-we-serve/adults/health-care/mental-health/resources/crisis-contacts.jsp    National Crisis Information:   Crisis Text Line: Text  [place]  to 513503  Suicide & Crisis Lifeline: 988  National Suicide Prevention Lifeline: 1-457-016-TALK (1-768.612.1874)       For online chat options, visit https://suicidepreventionlifeline.org/chat/  Poison Control Center: 2-976-662-2971  Trans Lifeline: 7-623-886-3522 - Hotline for transgender people of all ages  The [employer]: 8-518-918-6399 - Hotline for LGBT youth     For Non-Emergency Support:   Fast Tracker: Mental Health & Substance Use Disorder Resources -   https://www.[employer].org/

## 2025-01-27 ENCOUNTER — HOSPITAL ENCOUNTER (OUTPATIENT)
Dept: BEHAVIORAL HEALTH | Facility: CLINIC | Age: 30
End: 2025-01-27
Attending: PSYCHIATRY & NEUROLOGY
Payer: COMMERCIAL

## 2025-01-27 DIAGNOSIS — F31.11 BIPOLAR AFFECTIVE DISORDER, CURRENTLY MANIC, MILD (H): Primary | ICD-10-CM

## 2025-01-27 PROCEDURE — 90853 GROUP PSYCHOTHERAPY: CPT

## 2025-01-27 NOTE — GROUP NOTE
Psychotherapy Group Note    PATIENT'S NAME: Angela Jones  MRN:   [id number]  :   1995  ACCT. NUMBER: [id number]  DATE OF SERVICE: 25  START TIME: 10:00 AM  END TIME: 10:50 AM  FACILITATOR: Angelina Chaudhry LICSW  TOPIC: MH EBP Group: Coping Skills  [facility]  TRACK: IOP 2     NUMBER OF PARTICIPANTS: 8    Summary of Group / Topics Discussed:  Coping Skills: Additional Coping Skills:  Patients discussed and practiced social anxiety discussion.  Reviewed the benefits of applying the aforementioned coping strategies.  Patients explored how these strategies might be applied to daily stressors or distressing situations.    Patient Session Goals / Objectives:  Understand the purpose and benefits of applying social anxiety coping strategies  Reframed and reflected on social anxiety  Discussed how social anxiety impacts us  Address barriers to utilizing coping skills when in distress.      Patient Participation / Response:  Fully participated with the group by sharing personal reflections / insights and openly received / provided feedback with other participants.    Demonstrated understanding of topics discussed through group discussion and participation, Expressed understanding of the relevance / importance of coping skills at distressing times in life, Demonstrated knowledge of when to consider using a variety of coping skills in daily life, and Identified barriers to applying coping skills    Treatment Plan:  Patient has a current master individualized treatment plan.  See Epic treatment plan for more information.    CHANTAL Lugo

## 2025-01-27 NOTE — GROUP NOTE
Psychotherapy Group Note    PATIENT'S NAME: Angela Jones  MRN:   [id number]  :   1995  ACCT. NUMBER: [id number]  DATE OF SERVICE: 25  START TIME: 11:00 AM  END TIME: 11:50 AM  FACILITATOR: Chiquita Bonilla LICSW  TOPIC: MH EBP Group: Specialty Awareness  [facility]  TRACK: IOP 3    NUMBER OF PARTICIPANTS: 8    Summary of Group / Topics Discussed:  Specialty Topics: Hope: The topic of hope was presented in order to help patients better understand the symptoms of hopelessness and how to become more hopeful. Patients discussed their current awareness of the topic and relevance to their functioning. Individual experiences with symptoms and treatment options were also discussed. Patients explored options for ongoing/future treatment and symptom management.      Patient Session Goals / Objectives:  Discussed definition of hopelessness  Discussed how hopelessness impacts functioning  Set a plan to utilize skills to reduce hopelessness      Patient Participation / Response:  Fully participated with the group by sharing personal reflections / insights and openly received / provided feedback with other participants.    Demonstrated understanding of topics discussed through group discussion and participation, Identified / Expressed readiness to act on skill suggestions discussed in topic, and Verbalized understanding of ways to proactively manage illness    Treatment Plan:  Patient has a current master individualized treatment plan.  See Epic treatment plan for more information.    CHANTAL Fong

## 2025-01-27 NOTE — GROUP NOTE
Process Group Note    PATIENT'S NAME: Angela Jones  MRN:   [id number]  :   1995  ACCT. NUMBER: [id number]  DATE OF SERVICE: 25  START TIME:  9:00 AM  END TIME:  9:50 AM  FACILITATOR: Angelina Chaudhry LICSW  TOPIC:  Process Group    Diagnoses:    300.01 (F41.0) Panic Disorder  300.02 (F41.1) Generalized Anxiety Disorder.     296.33 (F33.2) Major Depressive Disorder, Recurrent Episode, Severe _.    [facility] Programs  TRACK: IOP 2     NUMBER OF PARTICIPANTS: 8        Data:    Session content: At the start of this group, patients were invited to check in by identifying themselves, describing their current emotional status, and identifying issues to address in this group.   Area(s) of treatment focus addressed in this session included Symptom Management and Personal Safety.    Patient reported feeling anxious. Patient discussed working toward communication. Patient identified grounding as skills they will use to address their goal(s). Patient reported self may be a barrier to working toward their goal(s) and/or addressing mental health symptoms. Patient reported no safety concerns and/or self-injurious behaviors. Patient reported no substance use. Patient reported they are taking their medications as prescribed. Patient reported feeling proud that they part of group. Patient discussed wellness with the treatment group.              Therapeutic Interventions/Treatment Strategies:  Treatment modalities used include Cognitive Behavioral Therapy and Dialectical Behavioral Therapy.    Assessment:    Patient response:   Patient responded to session by accepting feedback, giving feedback, listening, and focusing on goals    Possible barriers to participation / learning include: and no barriers identified    Health Issues:   None reported       Substance Use Review:   Substance Use: No active concerns identified.    Mental Status/Behavioral  Observations  Appearance:   Appropriate   Eye Contact:   Good   Psychomotor Behavior: Normal   Attitude:   Cooperative   Orientation:   All  Speech   Rate / Production: Normal    Volume:  Normal   Mood:    Normal  Affect:    Appropriate   Thought Content:   Clear  Thought Form:  Coherent  Logical     Insight:    Good     Plan:   Safety Plan: No current safety concerns identified.  Recommended that patient call 911 or go to the local ED should there be a change in any of these risk factors.   Barriers to treatment: None identified  Patient Contracts (see media tab):  None  Substance Use: Not addressed in session   Continue or Discharge: Patient will continue in Adult Day Treatment (ADT)  as planned. Patient is likely to benefit from learning and using skills as they work toward the goals identified in their treatment plan.      Angelina Chaudhry, [facility]  January 27, 2025

## 2025-01-28 ENCOUNTER — HOSPITAL ENCOUNTER (OUTPATIENT)
Dept: BEHAVIORAL HEALTH | Facility: CLINIC | Age: 30
End: 2025-01-28
Attending: PSYCHIATRY & NEUROLOGY
Payer: COMMERCIAL

## 2025-01-28 DIAGNOSIS — F31.11 BIPOLAR AFFECTIVE DISORDER, CURRENTLY MANIC, MILD (H): Primary | ICD-10-CM

## 2025-01-28 PROCEDURE — 90853 GROUP PSYCHOTHERAPY: CPT

## 2025-01-28 NOTE — GROUP NOTE
Psychotherapy Group Note    PATIENT'S NAME: Angela Jones  MRN:   [id number]  :   1995  ACCT. NUMBER: [id number]  DATE OF SERVICE: 25  START TIME: 10:00 AM  END TIME: 10:50 AM  FACILITATOR: Angelina Chaudhry LICSW  TOPIC:  EBP Group: Self-Awareness  [facility]patient Programs  TRACK: IOP 2     NUMBER OF PARTICIPANTS: 9    Summary of Group / Topics Discussed:  Self-Awareness: Personal Strengths: Topic focused on assisting patients in identifying personal strengths and how they relate to the management of mental health symptoms. Patients discussed the benefits of acknowledging their personal strengths and their impact on mood improvement, mindfulness, and perspective. Patients worked to increase time spent on recognition and appreciation of what is positive and working in their lives. The goal is to reduce rumination and negative thinking resulting in increased mindfulness and resilience. Patients will work to put skills into practice and problem-solve barriers.     Patient Session Goals / Objectives:  Identified personal strengths  Identified barriers to recognition of personal strengths  Verbalized understanding of strategies to increase use of their strengths in management of daily symptoms      Patient Participation / Response:  Fully participated with the group by sharing personal reflections / insights and openly received / provided feedback with other participants.    Demonstrated understanding of topics discussed through group discussion and participation, Demonstrated understanding of values, strengths, and challenges to learn about themselves, and Identified / Expressed readiness to act intentionally, increase self-compassion, promote personal growth    Treatment Plan:  Patient has a current master individualized treatment plan.  See Epic treatment plan for more information.    CHANTAL Lugo

## 2025-01-28 NOTE — GROUP NOTE
Psychotherapy Group Note    PATIENT'S NAME: Angela Jones  MRN:   [id number]  :   1995  ACCT. NUMBER: [id number]  DATE OF SERVICE: 25  START TIME: 11:00 AM  END TIME: 11:50 AM  FACILITATOR: Chiquita Bonilla LICSW  TOPIC: MH EBP Group: Specialty Awareness  [facility]  TRACK: IOP 2    NUMBER OF PARTICIPANTS: 9    Summary of Group / Topics Discussed:  Specialty Topics: Hope part 2: The topic of hope was presented in order to help patients better understand the symptoms of hopelessness and how to become more hopeful. Patients discussed their current awareness of the topic and relevance to their functioning. Individual experiences with symptoms and treatment options were also discussed. Patients explored options for ongoing/future treatment and symptom management.      Patient Session Goals / Objectives:  Discussed definition of hopelessness  Discussed how hopelessness impacts functioning  Set a plan to utilize skills to reduce hopelessness      Patient Participation / Response:  Fully participated with the group by sharing personal reflections / insights and openly received / provided feedback with other participants.    Demonstrated understanding of topics discussed through group discussion and participation, Identified / Expressed readiness to act on skill suggestions discussed in topic, and Verbalized understanding of ways to proactively manage illness    Treatment Plan:  Patient has a current master individualized treatment plan.  See Epic treatment plan for more information.    CHANTAL Fong

## 2025-01-28 NOTE — GROUP NOTE
Process Group Note    PATIENT'S NAME: Angela Jones  MRN:   [id number]  :   1995  ACCT. NUMBER: [id number]  DATE OF SERVICE: 25  START TIME:  9:00 AM  END TIME:  9:50 AM  FACILITATOR: Angelina Chaudhry LICSW  TOPIC:  Process Group    Diagnoses:    300.01 (F41.0) Panic Disorder  300.02 (F41.1) Generalized Anxiety Disorder.     296.33 (F33.2) Major Depressive Disorder, Recurrent Episode, Severe _.    [facility]patient [facility]  TRACK: IOP 2     NUMBER OF PARTICIPANTS: 9        Data:    Session content: At the start of this group, patients were invited to check in by identifying themselves, describing their current emotional status, and identifying issues to address in this group.   Area(s) of treatment focus addressed in this session included Symptom Management and Personal Safety.    Patient reported feeling content. Patient discussed working toward skill. Patient identified micro goals as skills they will use to address their goal(s). Patient reported self may be a barrier to working toward their goal(s) and/or addressing mental health symptoms. Patient reported no safety concerns and/or self-injurious behaviors. Patient reported no substance use. Patient reported they are taking their medications as prescribed. Patient reported feeling proud that they part of group. Patient discussed progress with the treatment group.              Therapeutic Interventions/Treatment Strategies:  Treatment modalities used include Cognitive Behavioral Therapy and Dialectical Behavioral Therapy.    Assessment:    Patient response:   Patient responded to session by accepting feedback, giving feedback, listening, and focusing on goals    Possible barriers to participation / learning include: and no barriers identified    Health Issues:   None reported       Substance Use Review:   Substance Use: No active concerns identified.    Mental Status/Behavioral Observations  Appearance:   Appropriate    Eye Contact:   Good   Psychomotor Behavior: Normal   Attitude:   Cooperative   Orientation:   All  Speech   Rate / Production: Normal    Volume:  Normal   Mood:    Normal  Affect:    Appropriate   Thought Content:   Clear  Thought Form:  Coherent  Logical     Insight:    Good     Plan:   Safety Plan: No current safety concerns identified.  Recommended that patient call 911 or go to the local ED should there be a change in any of these risk factors.   Barriers to treatment: None identified  Patient Contracts (see media tab):  None  Substance Use: Not addressed in session   Continue or Discharge: Patient will continue in Adult Day Treatment (ADT)  as planned. Patient is likely to benefit from learning and using skills as they work toward the goals identified in their treatment plan.      Angelina Chaudhry, CHANTAL  January 28, 2025

## 2025-01-29 ENCOUNTER — MYC MEDICAL ADVICE (OUTPATIENT)
Dept: PSYCHIATRY | Facility: CLINIC | Age: 30
End: 2025-01-29
Payer: COMMERCIAL

## 2025-01-29 ENCOUNTER — MYC REFILL (OUTPATIENT)
Dept: PSYCHIATRY | Facility: CLINIC | Age: 30
End: 2025-01-29
Payer: COMMERCIAL

## 2025-01-29 DIAGNOSIS — F32.3 MAJOR DEPRESSIVE DISORDER WITH PSYCHOTIC FEATURES (H): ICD-10-CM

## 2025-01-29 DIAGNOSIS — F42.2 MIXED OBSESSIONAL THOUGHTS AND ACTS: ICD-10-CM

## 2025-01-29 RX ORDER — CLOMIPRAMINE HYDROCHLORIDE 50 MG/1
100 CAPSULE ORAL AT BEDTIME
Qty: 60 CAPSULE | Refills: 2 | Status: CANCELLED | OUTPATIENT
Start: 2025-01-29

## 2025-01-29 RX ORDER — HYDROXYZINE HYDROCHLORIDE 25 MG/1
25-50 TABLET, FILM COATED ORAL 2 TIMES DAILY PRN
Qty: 60 TABLET | Refills: 1 | Status: SHIPPED | OUTPATIENT
Start: 2025-01-29

## 2025-01-30 ENCOUNTER — VIRTUAL VISIT (OUTPATIENT)
Dept: BEHAVIORAL HEALTH | Facility: CLINIC | Age: 30
End: 2025-01-30
Payer: COMMERCIAL

## 2025-01-30 DIAGNOSIS — F33.0 MILD EPISODE OF RECURRENT MAJOR DEPRESSIVE DISORDER: ICD-10-CM

## 2025-01-30 DIAGNOSIS — F41.1 GENERALIZED ANXIETY DISORDER: Primary | ICD-10-CM

## 2025-01-30 DIAGNOSIS — F84.0 AUTISM SPECTRUM DISORDER: ICD-10-CM

## 2025-01-30 NOTE — PROGRESS NOTES
[facility]                                     Progress Note    Patient Name: Angela Jones  Date: 1/30/2025         Service Type: Individual      Session Start Time: 1:03pm  Session End Time: 1:50pm     Session Length: 47 minutes    Session #: 48    Attendees: Client attended alone    Service Modality:  Video Visit:      Provider verified identity through the following two step process.  Patient provided:  Patient is known previously to provider    Telemedicine Visit: The patient's condition can be safely assessed and treated via synchronous audio and visual telemedicine encounter.      Reason for Telemedicine Visit: Patient has requested telehealth visit    Originating Site (Patient Location): Patient's home    Distant Site (Provider Location): Provider Remote Setting- Home Office    Consent:  The patient/guardian has verbally consented to: the potential risks and benefits of telemedicine (video visit) versus in person care; bill my insurance or make self-payment for services provided; and responsibility for payment of non-covered services.     Patient would like the video invitation sent by:  My Chart    Mode of Communication:  Video Conference via [facility]      Distant Location (Provider):  Off-site    As the provider I attest to compliance with applicable laws and regulations related to telemedicine.    DATA  Interactive Complexity: Yes, visit entailed Interactive Complexity evidenced by:  -The need to manage maladaptive communication (related to, e.g., high anxiety, high reactivity, repeated questions, or disagreement) among participants that complicates delivery of care; diagnostic criteria for ASD are met and complicate the delivery of services. Patient requires additional support and alternative methods to engage in psychotherapy services.   Crisis: No        Progress Since Last Session (Related to Symptoms / Goals / Homework):   Symptoms: Worsening anxiety and depression   "    Homework: Partially completed      Episode of Care Goals: Minimal progress - PREPARATION (Decided to change - considering how); Intervened by negotiating a change plan and determining options / strategies for behavior change, identifying triggers, exploring social supports, and working towards setting a date to begin behavior change     Current / Ongoing Stressors and Concerns:  Patient provides a status update while therapist utilizes reflective listening skills. Patient reports that her mental health has been worsening since December and got really bad around [place]. She went to the EMPATH unit because her intrusive thoughts were unmanageable. She received help with medications and was then transferred to the inpatient unit at [facility]. She did not have a good experience there because of another patient. She did start IOP but was recommended to participate in partial hospitalization. She has been referred to [facility] for adaptive DBT but nothing has been set up yet. She is still going to IOP at [facility] until she can get established at [facility] (4 days week/3 hrs day). The structure is good and the group members are nice. \"It would not be good if I just stopped doing anything at all.\" She is currently still feeling very anxious.   She does plan to go down to part-time hours at work after treatment. She wants to move out of her apartment this year. They had their first court hearing about the divorce recently and need to follow up with an FENE.     Therapist assessed for risk and safety - patient denied any current SI/SIB.  Should there be an increase in symptoms or severity related to SI/SIB, patient should call 911 or go to local ED for evaluation.         Treatment Objective(s) Addressed in This Session:   identify the fears / thoughts that contribute to feeling anxious  state understanding of stressors and relationship to physical symptoms  Increase interest, engagement, and pleasure in doing " things  Decrease frequency and intensity of feeling down, depressed, hopeless  Identify negative self-talk and behaviors: challenge core beliefs, myths, and actions  Gain insight     Intervention:   Discussed mindfulness as being aware of what we are experiencing while we are experiencing it.  Contrasted this with avoidance and rumination.  Explored the role of mindfulness as an overall coping strategy for managing depression, anxiety, and strong emotions.  Explained concept of state of mind using SIFT (sensations, images, feelings, thoughts) pneumonic. Discussed mindfulness as a tool to intentionally shift our awareness and focus as needed.  Discussed physical, mental, and emotional boundaries and limit-setting with others. Explored management of boundaries through direct communication and limiting contact and communication with others.  Discussed barriers to using boundary management skills including strong emotions and physical proximity.  Therapist provided unconditional positive regard and supportive counseling.     Assessments completed prior to visit:   The following assessments were completed by patient for this visit:  PHQ9:       10/25/2024     9:07 AM 11/5/2024     8:30 AM 11/7/2024     8:20 AM 11/19/2024     8:51 AM 12/10/2024     8:59 AM 1/14/2025    12:00 PM 1/20/2025     9:00 AM   PHQ-9 SCORE   PHQ-9 Total Score MyChart 17 (Moderately severe depression) 19 (Moderately severe depression) 18 (Moderately severe depression) 14 (Moderate depression) 15 (Moderately severe depression)     PHQ-9 Total Score 17  19  18  14  15  20 24       Patient-reported     GAD7:       8/29/2024     8:02 AM 9/12/2024     7:47 AM 10/25/2024     9:07 AM 11/19/2024     8:52 AM 1/14/2025    12:00 PM 1/20/2025     9:00 AM 1/24/2025     2:16 PM   JOSHUA-7 SCORE   Total Score 8 (mild anxiety) 9 (mild anxiety) 13 (moderate anxiety) 10 (moderate anxiety)   15 (severe anxiety)   Total Score 8 9    9 13  10  15 16 15         Patient-reported     PROMIS 10-Global Health (only subscores and total score):       7/12/2024     8:07 AM 8/2/2024     1:03 PM 11/5/2024     8:32 AM 11/19/2024     8:53 AM 12/10/2024     9:00 AM 1/14/2025    12:00 PM 1/20/2025     9:00 AM   PROMIS-10 Scores Only   Global Mental Health Score 14 13 9  10  9  12 8   Global Physical Health Score 12 12 12  11  11  11 11   PROMIS TOTAL - SUBSCORES 26 25 21  21  20  23 19       Patient-reported      [place] Suicide Severity Rating Scale (Short Version)      10/25/2024     9:23 AM 11/5/2024     9:16 AM 1/5/2025     2:31 PM 1/5/2025     6:12 PM 1/6/2025    11:52 AM 1/7/2025    11:57 AM 1/8/2025     9:24 PM   [place] Suicide Severity Rating (Short Version)   Q1 Wished to be Dead (Past Month)   1-->yes 1-->yes 1-->yes  0-->no   Q2 Suicidal Thoughts (Past Month)   1-->yes 1-->yes 1-->yes  1-->yes   Q3 Suicidal Thought Method   1-->yes 1-->yes 1-->yes  1-->yes   Q4 Suicidal Intent without Specific Plan   1-->yes 1-->yes 0-->no  0-->no   Q5 Suicide Intent with Specific Plan   1-->yes 0-->no 1-->yes  1-->yes   Q6 Suicide Behavior (Lifetime)   0-->no 0-->no 0-->no  0-->no   Level of Risk per Screen   high risk high risk high risk  high risk   1. Wish to be Dead (Since Last Contact) N N    Y    2. Non-Specific Active Suicidal Thoughts (Since Last Contact) Y N    Y    Non-Specific Active Suicidal Thought Description (Since Last Contact) images about items to use for self-harm         3. Active Suicidal Ideation with any Methods (Not Plan) Without Intent to Act (Since Last Contact) N N    Y    4. Active Suicidal Ideation with Some Intent to Act, Without Specific Plan (Since Last Contact) N N    N    5. Active Suicidal Ideation with Specific Plan and Intent (Since Last Contact) N N    N    Most Severe Ideation Rating (Since Last Contact) 1 1    5    Frequency (Since Last Contact) 3 3    4    Duration (Since Last Contact) 3 2    2    Deterrents (Since Last Contact) 1 1    0     Reasons for Ideation (Since Last Contact) 5 0    4    Actual Attempt (Since Last Contact) N N    N    Has subject engaged in non-suicidal self-injurious behavior? (Since Last Contact) N N    N    Interrupted Attempts (Since Last Contact) N N    N    Aborted or Self-Interrupted Attempt (Since Last Contact) N N    N    Preparatory Acts or Behavior (Since Last Contact) N N    N    Suicide (Since Last Contact) N N    N    Calculated C-SSRS Risk Score (Since Last Contact) Low Risk No Risk Indicated    Moderate Risk           ASSESSMENT: Current Emotional / Mental Status (status of significant symptoms):   Risk status (Self / Other harm or suicidal ideation)   Patient denies current fears or concerns for personal safety.   Patient denies current or recent suicidal ideation or behaviors.   Patient denies current or recent homicidal ideation or behaviors.   Patient denies current or recent self injurious behavior or ideation.   Patient denies other safety concerns.   Patient reports there has been no change in risk factors since their last session.     Patient reports there has been no change in protective factors since their last session.     A safety and risk management plan has been developed including: Patient consented to co-developed safety plan.  Safety and risk management plan was completed - see below.  Patient agreed to use safety plan should any safety concerns arise.  A copy was given to the patient.     Appearance:   Appropriate    Eye Contact:   Good    Psychomotor Behavior: Hyperactive  Restless    Attitude:   Cooperative    Orientation:   All   Speech    Rate / Production: Pressured  Stutters    Volume:  Normal    Mood:    Anxious  Depressed  Anhedonia   Affect:    Constricted  Flat  Worrisome    Thought Content:  Clear    Thought Form:  Coherent    Insight:    Good      Medication Review:   Changes to psychiatric medications, see updated Medication List in EPIC.      Medication  Compliance:   Yes     Changes in Health Issues:   None reported     Chemical Use Review:   Substance Use: Chemical use reviewed, no active concerns identified      Tobacco Use: No current tobacco use.      Diagnosis:  1. Generalized anxiety disorder    2. Major depressive disorder with psychotic features (H)    3. Autism spectrum disorder    ADHD  R/O Bipolar Disorder  R/O OCD     Collateral Reports Completed:   Not Applicable    PLAN: (Patient Tasks / Therapist Tasks / Other)  Patient will return for a virtual visit in 4 weeks but has been added to therapist waitlist  Patient encouraged to continue participating in IOP treatment  Patient encouraged to practice healthy boundaries  Patient will utilize her safety plan   Patient encouraged to journal and use daily planner  Patient encouraged to prioritize self-care       There has been demonstrated improvement in functioning while patient has been engaged in psychotherapy/psychological service- if withdrawn the patient would deteriorate and/or relapse.       Fanny Cobb, [facility]                                                         ______________________________________________________________________    Individual Treatment Plan    Patient's Name: Angela Jones  YOB: 1995    Date of Creation: 1/19/2022  Date Treatment Plan Last Reviewed/Revised: 1/30/2025    DSM5 Diagnoses: Autism Spectrum Disorder 299.00(F84.0)  Associated with another neurodevelopmental, mental or behavioral disorder and Attention-Deficit/Hyperactivity Disorder  314.01 (F90.9) Unspecified Attention -Deficit / Hyperactivity Disorder, 296.31 (F33.0) Major Depressive Disorder, Recurrent Episode, Mild _ or 300.02 (F41.1) Generalized Anxiety Disorder  Psychosocial / Contextual Factors: 29 year old  female, , no children   PROMIS (reviewed every 90 days):   PROMIS 10-Global Health (only subscores and total score):       7/12/2024     8:07 AM 8/2/2024     1:03 PM  11/5/2024     8:32 AM 11/19/2024     8:53 AM 12/10/2024     9:00 AM 1/14/2025    12:00 PM 1/20/2025     9:00 AM   PROMIS-10 Scores Only   Global Mental Health Score 14 13 9  10  9  12 8   Global Physical Health Score 12 12 12  11  11  11 11   PROMIS TOTAL - SUBSCORES 26 25 21  21  20  23 19         Referral / Collaboration:  Referral to another professional/service is not indicated at this time..    Anticipated number of session for this episode of care: 6-9  Anticipation frequency of session: Every other week  Anticipated Duration of each session: 53 or more minutes  Treatment plan will be reviewed in 90 days or when goals have been changed.       MeasurableTreatment Goal(s) related to diagnosis / functional impairment(s)  Goal 1: Patient will manage symptoms of anxiety, as evidenced by a JOSHUA-7 score of 5 or lower     I will know I've met my goal when I feel more confident in my ability to cope with stress.      Objective #A (Patient Action)    Patient will identify the initial signs or symptoms of anxiety.  Status: Continued - Date(s): 1/30/2025    Intervention(s)  Therapist will teach  help patient gain insight into signs of stress (physically and emotionally) .    Objective #B  Patient will use relaxation strategies multiple times per day to reduce the physical symptoms of anxiety.  Status: Continued - Date(s): 1/30/2025    Intervention(s)  Therapist will  teach diaphragmatic breathing and other grounding skills .    Objective #C  Patient will use thought-stopping strategy daily to reduce intrusive thoughts.  Status: Continued - Date(s): 1/30/2025    Intervention(s)  Therapist will  teach thought stopping techniques .      Goal 2: Patient will manage symptoms of depression, as evidenced by a PHQ-9 score of 10 or lower    I will know I've met my goal when I feel more confident in my ability to express my emotions in a healthy way.      Objective #A (Patient Action)    Status: Continued - Date(s):  "1/30/2025    Patient will Increase interest, engagement, and pleasure in doing things.    Intervention(s)  Therapist will teach emotional recognition/identification. * .    Objective #B  Patient will Identify negative self-talk and behaviors: challenge core beliefs, myths, and actions.    Status: Continued - Date(s): 1/30/2025    Intervention(s)  Therapist will  help patient practice positive self-talk and thought re-framing .    Goal 3: Client will manage symptoms of ADHD     I will know I've met my goal when I feel confident in completing tasks.      Objective #A (Client Action)    Client will use daily planner 75% of the time.  Status: Continued - Date(s): 1/30/2025    Intervention(s)  Therapist will  ask client to demonstrate use of planner while in session .    Objective #B  Client will identify and compliment positives at least 2 times daily to support positive self-image.    Status: Continued - Date(s): 1/30/2025    Intervention(s)  Therapist will  ask patient to demonstrate use of affirmations during session .    Goal 4: Client will manage symptoms and experiences associated with ASD     I will know I've met my goal when I feel confident in my communication in relationships.      Objective #A (Client Action)    Client will work on being assertive and setting clear expectations with healthy boundaries in relationships  Status: Continued - Date(s): 1/30/2025    Intervention(s)  Therapist will  help patient practice assertive communication skills .        Patient has reviewed and agreed to the above plan.      Fanny Cobb, [facility]  January 30, 2025        [facility]                                       Angela Jones     SAFETY PLAN:  Step 1: Warning signs / cues (Thoughts, images, mood, situation, behavior) that a crisis may be developing:  Thoughts: \"I can't do this anymore\" and \"Nothing makes it better\" \"I'm a burden\"   Images: visions of harm: self-harm  Thinking Processes: " "ruminations (can't stop thinking about my problems):  , racing thoughts, intrusive thoughts (bothersome, unwanted thoughts that come out of nowhere):  , highly critical and negative thoughts:  , and disorganized thinking:    Mood: worsening depression, hopelessness, helplessness, intense worry, and agitation  Behaviors: can't stop crying, not taking care of myself, and not taking care of my responsibilities  Situations: relationship problems and financial stress   Step 2: Coping strategies - Things I can do to take my mind off of my problems without contacting another person (relaxation technique, physical activity):  Distress Tolerance Strategies:  relaxation activities:  , arts and crafts:  , play with my pet , sensory based activities/self-soothe with five senses:  , change body temperature (ice pack/cold water) , and paced breathing/progressive muscle relaxation, grounding exercise of naming objects, weighted items (sock filled with rice), play with puddy, use sensory items   Physical Activities: meditation, deep breathing, and stretching ; taking a shower,   Focus on helpful thoughts:  \"This is temporary\", \"I will get through this\", \"It always passes\", and self-compassion statements:    Step 3: People and social settings that provide distraction:   Name: Zander    Name: Demetri  park and work   Step 4: Remind myself of people and things that are important to me and worth living for:  my family and pets and friends  Step 5: When I am in crisis, I can ask these people to help me use my safety plan:   Name: Demetri Fermin    Step 6: Making the environment safe:   dispose of old medications , remove things I could use to hurt myself:  , and be around others  Step 7: Professionals or agencies I can contact during a crisis:  Suicide Prevention Lifeline: Call or Text 423   [facility] Crisis Services: [phone]    Call 911 or go to my nearest emergency department.   I helped develop this safety plan and agree to use it when " needed.  I have been given a copy of this plan.    Client signature _________________________________________________________________  Today s date:  7/25/2024  Completed by Provider Name/ Credentials:  Fanny Cobb [facility]  July 25, 2024  Adapted from Safety Plan Template 2008 Yessi Prakash and Olivier Leal is reprinted with the express permission of the authors.  No portion of the Safety Plan Template may be reproduced without the express, written permission.  You can contact the authors at [email or web address] or [email or web address].

## 2025-02-03 ENCOUNTER — HOSPITAL ENCOUNTER (OUTPATIENT)
Dept: BEHAVIORAL HEALTH | Facility: CLINIC | Age: 30
End: 2025-02-03
Attending: PSYCHIATRY & NEUROLOGY
Payer: COMMERCIAL

## 2025-02-03 DIAGNOSIS — F31.11 BIPOLAR AFFECTIVE DISORDER, CURRENTLY MANIC, MILD (H): Primary | ICD-10-CM

## 2025-02-03 PROCEDURE — 90853 GROUP PSYCHOTHERAPY: CPT

## 2025-02-03 NOTE — GROUP NOTE
Psychotherapy Group Note    PATIENT'S NAME: Angela Jones  MRN:   [id number]  :   1995  ACCT. NUMBER: [id number]  DATE OF SERVICE: 25  START TIME: 11:00 AM  END TIME: 11:50 AM  FACILITATOR: Chiquita Bonilla LICSW  TOPIC: MH EBP Group: Coping Skills  [facility]  TRACK: IOP 2    NUMBER OF PARTICIPANTS: 5    Summary of Group / Topics Discussed:  Coping Skills: Additional Coping Skills:  Patients discussed and practiced stress management techniques.  Reviewed the benefits of applying the aforementioned coping strategies.  Patients explored how these strategies might be applied to daily stressors or distressing situations.    Patient Session Goals / Objectives:  Understand the purpose and benefits of applying stress management coping strategies  Learn about and identify stress management techniques  Address barriers to utilizing coping skills when in distress.      Patient Participation / Response:  Fully participated with the group by sharing personal reflections / insights and openly received / provided feedback with other participants.    Demonstrated understanding of topics discussed through group discussion and participation, Expressed understanding of the relevance / importance of coping skills at distressing times in life, and Demonstrated knowledge of when to consider using a variety of coping skills in daily life    Treatment Plan:  Patient has a current master individualized treatment plan.  See Epic treatment plan for more information.    CHANTAL Fong

## 2025-02-03 NOTE — GROUP NOTE
Psychotherapy Group Note    PATIENT'S NAME: Angela Jones  MRN:   [id number]  :   1995  ACCT. NUMBER: [id number]  DATE OF SERVICE: 25  START TIME: 10:00 AM  END TIME: 10:50 AM  FACILITATOR: Angelina Chaudhry LICSW  TOPIC:  DBT Group: Emotion Regulation  [facility] Outpatient [facility]  TRACK: IOP 2     NUMBER OF PARTICIPANTS: 5    Summary of Group/Topics Discussed:  Emotion Regulation: The Emotion Regulation skills teach Patients to manage their emotions, even though complete emotional control cannot be achieved. To a certain extent, we all are who we are, and emotionality is part of us; however, Patients can learn to have more control. Patients are provided education around understanding and identifying emotions, the functions emotions serve and how to change emotional responses. Change skills help Patients reduce the intensity and/or duration of painful or unwanted emotions. Patients will also be taught skills that support a reduction in emotional vulnerability and increase resilience. Today Patients practiced wise mind.       Patient Session Goals/Objectives:  Demonstrate understanding of wise mind   Identify barriers to wise mind   Engage in discussion around the pros and cons of using wise mind  Verbalize ways that wise mind can be used in their own lives.      Patient Participation / Response:  Fully participated with the group by sharing personal reflections / insights and openly received / provided feedback with other participants.    Demonstrated understanding of topics discussed through group discussion and participation, Expressed understanding of the relationship between behaviors, thoughts, and feelings, Shared experiences and challenges with making behavioral changes, and Identified barriers to change    Treatment Plan:  Patient has a current master individualized treatment plan.  See Epic treatment plan for more information.    CHANTAL Lugo

## 2025-02-03 NOTE — GROUP NOTE
Process Group Note    PATIENT'S NAME: Angela Jones  MRN:   [id number]  :   1995  ACCT. NUMBER: [id number]  DATE OF SERVICE: 25  START TIME:  9:00 AM  END TIME:  9:50 AM  FACILITATOR: Angelina Chaudhry LICSW  TOPIC:  Process Group    Diagnoses:    300.01 (F41.0) Panic Disorder  300.02 (F41.1) Generalized Anxiety Disorder.     296.33 (F33.2) Major Depressive Disorder, Recurrent Episode, Severe _.    [facility]  TRACK: IOP 2     NUMBER OF PARTICIPANTS: 5        Data:    Session content: At the start of this group, patients were invited to check in by identifying themselves, describing their current emotional status, and identifying issues to address in this group.   Area(s) of treatment focus addressed in this session included Symptom Management and Personal Safety.    Patient reported feeling anxious. Patient discussed working toward emotional regulation. Patient identified grounding as skills they will use to address their goal(s). Patient reported self may be a barrier to working toward their goal(s) and/or addressing mental health symptoms. Patient reported no safety concerns and/or self-injurious behaviors. Patient reported no substance use. Patient reported they are taking their medications as prescribed. Patient reported feeling proud that they part of group. Patient discussed sensory  with the treatment group.              Therapeutic Interventions/Treatment Strategies:  Treatment modalities used include Cognitive Behavioral Therapy and Dialectical Behavioral Therapy.    Assessment:    Patient response:   Patient responded to session by accepting feedback, giving feedback, and listening    Possible barriers to participation / learning include: and no barriers identified    Health Issues:   None reported       Substance Use Review:   Substance Use: No active concerns identified.    Mental Status/Behavioral Observations  Appearance:   Appropriate   Eye  Contact:   Good   Psychomotor Behavior: Normal   Attitude:   Cooperative   Orientation:   All  Speech   Rate / Production: Normal    Volume:  Normal   Mood:    Normal  Affect:    Appropriate   Thought Content:   Clear  Thought Form:  Coherent  Logical     Insight:    Good     Plan:   Safety Plan: No current safety concerns identified.  Recommended that patient call 911 or go to the local ED should there be a change in any of these risk factors.   Barriers to treatment: None identified  Patient Contracts (see media tab):  None  Substance Use: Not addressed in session   Continue or Discharge: Patient will continue in Adult Day Treatment (ADT)  as planned. Patient is likely to benefit from learning and using skills as they work toward the goals identified in their treatment plan.      Angelina Chaudhry, CH[facility]  February 3, 2025

## 2025-02-04 ENCOUNTER — HOSPITAL ENCOUNTER (OUTPATIENT)
Dept: BEHAVIORAL HEALTH | Facility: CLINIC | Age: 30
End: 2025-02-04
Attending: PSYCHIATRY & NEUROLOGY
Payer: COMMERCIAL

## 2025-02-04 DIAGNOSIS — F31.11 BIPOLAR AFFECTIVE DISORDER, CURRENTLY MANIC, MILD (H): Primary | ICD-10-CM

## 2025-02-04 PROCEDURE — 90853 GROUP PSYCHOTHERAPY: CPT

## 2025-02-04 NOTE — GROUP NOTE
Psychotherapy Group Note    PATIENT'S NAME: Angela Jones  MRN:   [id number]  :   1995  ACCT. NUMBER: [id number]  DATE OF SERVICE: 25  START TIME: 10:00 AM  END TIME: 10:50 AM  FACILITATOR: Angelina Chaudhry LICSW  TOPIC:  DBT Group: Distress Tolerance  [facility] Adult [facility] Outpatient [facility]  TRACK: IOP 2     NUMBER OF PARTICIPANTS: 5    Summary of Group/Topics Discussed:  Distress Tolerance: Distress Tolerance skills teach Patients how to tolerate and survive crisis situations without making things worse. The Crisis Survival Skills cover techniques for tolerating painful events, urges, and emotions when you cannot make things better right away. The Reality Acceptance Skills show Patients how to reduce suffering by helping them accept and enter fully into life even when it is not the life they want. Today Patients discussed TIPP/DIVE      Patient Session Goals/Objectives:  Demonstrate understanding of Crisis survival skills and radical acceptance skills.  Engage in discussion around the use of TIPP/DIVE  Identify barriers to using TIPP/DIVE and problem solve with group members.  Verbalize ways that TIPP/DIVE can be used in their own lives.      Patient Participation / Response:  Fully participated with the group by sharing personal reflections / insights and openly received / provided feedback with other participants.    Demonstrated understanding of topics discussed through group discussion and participation, Expressed understanding of the relationship between behaviors, thoughts, and feelings, Shared experiences and challenges with making behavioral changes, and Identified barriers to change    Treatment Plan:  Patient has a current master individualized treatment plan.  See Epic treatment plan for more information.    CHANTAL Lugo

## 2025-02-04 NOTE — GROUP NOTE
Psychotherapy Group Note    PATIENT'S NAME: Angela Jones  MRN:   [id number]  :   1995  ACCT. NUMBER: [id number]  DATE OF SERVICE: 25  START TIME: 11:00 AM  END TIME: 11:50 AM  FACILITATOR: Chiquita Bonilla LICSW  TOPIC: MH EBP Group: Coping Skills  [facility] Outpatient [facility]  TRACK: IOP 2    NUMBER OF PARTICIPANTS: 5    Summary of Group / Topics Discussed:  Coping Skills: Additional Coping Skills:  Patients discussed the mental health benefits of being outdoors.  Reviewed the benefits of applying the aforementioned coping strategies.  Patients explored how these strategies might be applied to daily stressors or distressing situations.    Patient Session Goals / Objectives:  Understand the purpose and benefits of applying nature coping strategies  [place] about nature connection activities  Address barriers to utilizing coping skills when in distress.      Patient Participation / Response:  Fully participated with the group by sharing personal reflections / insights and openly received / provided feedback with other participants.    Demonstrated understanding of topics discussed through group discussion and participation, Expressed understanding of the relevance / importance of coping skills at distressing times in life, Demonstrated knowledge of when to consider using a variety of coping skills in daily life, and Identified / Expressed personal readiness to practice new coping skills    Treatment Plan:  Patient has a current master individualized treatment plan.  See Epic treatment plan for more information.    CHANTAL Fong

## 2025-02-04 NOTE — GROUP NOTE
Process Group Note    PATIENT'S NAME: Angela Jones  MRN:   [id number]  :   1995  ACCT. NUMBER: [id number]  DATE OF SERVICE: 25  START TIME:  9:00 AM  END TIME:  9:50 AM  FACILITATOR: Angelina Chaudhry LICSW  TOPIC:  Process Group    Diagnoses:    300.01 (F41.0) Panic Disorder  300.02 (F41.1) Generalized Anxiety Disorder.     296.33 (F33.2) Major Depressive Disorder, Recurrent Episode, Severe _.    [facility]  TRACK: IOP 2     NUMBER OF PARTICIPANTS: 5        Data:    Session content: At the start of this group, patients were invited to check in by identifying themselves, describing their current emotional status, and identifying issues to address in this group.   Area(s) of treatment focus addressed in this session included Symptom Management and Personal Safety.    Patient reported feeling anxious. Patient discussed working toward emotional regulation. Patient identified emotions as skills they will use to address their goal(s). Patient reported self may be a barrier to working toward their goal(s) and/or addressing mental health symptoms. Patient reported no safety concerns and/or self-injurious behaviors. Patient reported no substance use. Patient reported they are taking their medications as prescribed. Patient reported feeling proud that they part of group. Patient discussed emotions with the treatment group.              Therapeutic Interventions/Treatment Strategies:  Treatment modalities used include Cognitive Behavioral Therapy and Dialectical Behavioral Therapy.    Assessment:    Patient response:   Patient responded to session by accepting feedback, giving feedback, listening, and focusing on goals    Possible barriers to participation / learning include: and no barriers identified    Health Issues:   None reported       Substance Use Review:   Substance Use: No active concerns identified.    Mental Status/Behavioral  Observations  Appearance:   Appropriate   Eye Contact:   Good   Psychomotor Behavior: Normal   Attitude:   Cooperative   Orientation:   All  Speech   Rate / Production: Normal    Volume:  Normal   Mood:    Normal  Affect:    Appropriate   Thought Content:   Clear  Thought Form:  Coherent  Logical     Insight:    Good     Plan:   Safety Plan: No current safety concerns identified.  Recommended that patient call 911 or go to the local ED should there be a change in any of these risk factors.   Barriers to treatment: None identified  Patient Contracts (see media tab):  None  Substance Use: Not addressed in session   Continue or Discharge: Patient will continue in Adult Day Treatment (ADT)  as planned. Patient is likely to benefit from learning and using skills as they work toward the goals identified in their treatment plan.      Angelina Chaudhry, [facility]  February 4, 2025

## 2025-02-10 ENCOUNTER — HOSPITAL ENCOUNTER (OUTPATIENT)
Dept: BEHAVIORAL HEALTH | Facility: CLINIC | Age: 30
End: 2025-02-10
Attending: PSYCHIATRY & NEUROLOGY
Payer: COMMERCIAL

## 2025-02-10 DIAGNOSIS — F31.11 BIPOLAR AFFECTIVE DISORDER, CURRENTLY MANIC, MILD (H): Primary | ICD-10-CM

## 2025-02-10 PROCEDURE — 90853 GROUP PSYCHOTHERAPY: CPT

## 2025-02-10 NOTE — GROUP NOTE
Process Group Note    PATIENT'S NAME: Angela Jones  MRN:   [id number]  :   1995  ACCT. NUMBER: [id number]  DATE OF SERVICE: 2/10/25  START TIME:  9:00 AM  END TIME:  9:50 AM  FACILITATOR: Angelina Chaudhry LICSW  TOPIC:  Process Group    Diagnoses:    300.01 (F41.0) Panic Disorder  300.02 (F41.1) Generalized Anxiety Disorder.     296.33 (F33.2) Major Depressive Disorder, Recurrent Episode, Severe _.    [facility] Out[facility]  TRACK: IOP 2     NUMBER OF PARTICIPANTS: 8        Data:    Session content: At the start of this group, patients were invited to check in by identifying themselves, describing their current emotional status, and identifying issues to address in this group.   Area(s) of treatment focus addressed in this session included Symptom Management and Personal Safety.    Patient reported feeling content. Patient discussed working toward emotional regulation. Patient identified check the facts as skills they will use to address their goal(s). Patient reported self may be a barrier to working toward their goal(s) and/or addressing mental health symptoms. Patient reported no safety concerns and/or self-injurious behaviors. Patient reported no substance use. Patient reported they are taking their medications as prescribed. Patient reported feeling proud that they part of group. Patient discussed affirmations with the treatment group.              Therapeutic Interventions/Treatment Strategies:  Treatment modalities used include Cognitive Behavioral Therapy and Dialectical Behavioral Therapy.    Assessment:    Patient response:   Patient responded to session by accepting feedback, giving feedback, listening, and focusing on goals    Possible barriers to participation / learning include: and no barriers identified    Health Issues:   None reported       Substance Use Review:   Substance Use: No active concerns identified.    Mental Status/Behavioral  Observations  Appearance:   Appropriate   Eye Contact:   Good   Psychomotor Behavior: Normal   Attitude:   Cooperative   Orientation:   All  Speech   Rate / Production: Normal    Volume:  Normal   Mood:    Normal  Affect:    Appropriate   Thought Content:   Clear  Thought Form:  Coherent  Logical     Insight:    Good     Plan:   Safety Plan: No current safety concerns identified.  Recommended that patient call 911 or go to the local ED should there be a change in any of these risk factors.   Barriers to treatment: None identified  Patient Contracts (see media tab):  None  Substance Use: Not addressed in session   Continue or Discharge: Patient will continue in Adult Day Treatment (ADT)  as planned. Patient is likely to benefit from learning and using skills as they work toward the goals identified in their treatment plan.      Angelina Chaudhry, [facility]  February 10, 2025

## 2025-02-10 NOTE — GROUP NOTE
Psychotherapy Group Note    PATIENT'S NAME: Angela Jones  MRN:   [id number]  :   1995  ACCT. NUMBER: [id number]  DATE OF SERVICE: 2/10/25  START TIME: 10:00 AM  END TIME: 10:50 AM  FACILITATOR: Angelina Chaudhry LICSW  TOPIC:  EBP Group: Specialty Awareness  [facility] Outpatient [facility]  TRACK: IOP 2     NUMBER OF PARTICIPANTS: 7    Summary of Group / Topics Discussed:  Specialty Topics: Grief/Transitions: Patients received an overview of the grief process.  Patients explored their relationship to loss and how that has affected their mental health symptoms.  Strategies for recognizing loss and ideas for engaging in the grief process was presented and discussed.  Patients identified needs for support and coping skills to manage loss.  The purpose of this specialty topic is to help patients identify the aspects of change due to loss that individuals experience in addition to mental health symptoms to better cope with the grief, loss, and life transitions.      Patient Session Goals / Objectives:  Identified grief, loss, and life transitions   Discussed how grief impacts mental health symptoms and disrupts usual functioning  Identified needs for support and coping with grief and planned further action for coping      Patient Participation / Response:  Fully participated with the group by sharing personal reflections / insights and openly received / provided feedback with other participants.    Demonstrated understanding of topics discussed through group discussion and participation, Identified / Expressed readiness to act on skill suggestions discussed in topic, and Verbalized understanding of ways to proactively manage illness    Treatment Plan:  Patient has a current master individualized treatment plan.  See Epic treatment plan for more information.    CHANTAL Lugo

## 2025-02-10 NOTE — GROUP NOTE
Psychotherapy Group Note    PATIENT'S NAME: Angela Jones  MRN:   [id number]  :   1995  ACCT. NUMBER: [id number]  DATE OF SERVICE: 2/10/25  START TIME: 11:00 AM  END TIME: 11:50 AM  FACILITATOR: Chiquita Bonilla LICSW  TOPIC: MH EBP Group: Coping Skills  [facility]  TRACK: IOP 2    NUMBER OF PARTICIPANTS: 8    Summary of Group / Topics Discussed:  Coping Skills: Additional Coping Skills:  Patients discussed and practiced LOOKING BACK, a way to focus on positives and accomplishments from the recent past.  Reviewed the benefits of applying the aforementioned coping strategies.  Patients explored how these strategies might be applied to daily stressors or distressing situations.    Patient Session Goals / Objectives:  Understand the purpose and benefits of applying LOOKING BACK coping strategies  Identify accomplishments  Identify areas of gratitude  Address barriers to utilizing coping skills when in distress.      Patient Participation / Response:  Moderately participated, sharing some personal reflections / insights and adequately adequately received / provided feedback with other participants.    Demonstrated understanding of topics discussed through group discussion and participation and Expressed understanding of the relevance / importance of coping skills at distressing times in life    Treatment Plan:  Patient has a current master individualized treatment plan.  See Epic treatment plan for more information.    CHANTAL Fong

## 2025-02-11 ENCOUNTER — HOSPITAL ENCOUNTER (OUTPATIENT)
Dept: BEHAVIORAL HEALTH | Facility: CLINIC | Age: 30
End: 2025-02-11
Attending: PSYCHIATRY & NEUROLOGY
Payer: COMMERCIAL

## 2025-02-11 DIAGNOSIS — F31.11 BIPOLAR AFFECTIVE DISORDER, CURRENTLY MANIC, MILD (H): Primary | ICD-10-CM

## 2025-02-11 PROCEDURE — 90853 GROUP PSYCHOTHERAPY: CPT

## 2025-02-11 NOTE — GROUP NOTE
Process Group Note    PATIENT'S NAME: Angela Jones  MRN:   [id number]  :   1995  ACCT. NUMBER: [id number]  DATE OF SERVICE: 25  START TIME:  9:00 AM  END TIME:  9:50 AM  FACILITATOR: Angelina Chaudhry LICSW  TOPIC:  Process Group    Diagnoses:    300.01 (F41.0) Panic Disorder  300.02 (F41.1) Generalized Anxiety Disorder.     296.33 (F33.2) Major Depressive Disorder, Recurrent Episode, Severe _.    [facility]  TRACK: IOP 2     NUMBER OF PARTICIPANTS: 7        Data:    Session content: At the start of this group, patients were invited to check in by identifying themselves, describing their current emotional status, and identifying issues to address in this group.   Area(s) of treatment focus addressed in this session included Symptom Management and Personal Safety.    Patient reported feeling tired. Patient discussed working toward emotional regulation. Patient identified DBT as skills they will use to address their goal(s). Patient reported self may be a barrier to working toward their goal(s) and/or addressing mental health symptoms. Patient reported no safety concerns and/or self-injurious behaviors. Patient reported no substance use. Patient reported they are taking their medications as prescribed. Patient reported feeling proud that they part of group. Patient discussed panic attack with the treatment group.              Therapeutic Interventions/Treatment Strategies:  Treatment modalities used include Cognitive Behavioral Therapy and Dialectical Behavioral Therapy.    Assessment:    Patient response:   Patient responded to session by accepting feedback, giving feedback, listening, and focusing on goals    Possible barriers to participation / learning include: and no barriers identified    Health Issues:   None reported       Substance Use Review:   Substance Use: No active concerns identified.    Mental Status/Behavioral  Observations  Appearance:   Appropriate   Eye Contact:   Good   Psychomotor Behavior: Normal   Attitude:   Cooperative   Orientation:   All  Speech   Rate / Production: Normal    Volume:  Normal   Mood:    Normal  Affect:    Appropriate   Thought Content:   Clear  Thought Form:  Coherent  Logical     Insight:    Good     Plan:   Safety Plan: No current safety concerns identified.  Recommended that patient call 911 or go to the local ED should there be a change in any of these risk factors.   Barriers to treatment: None identified  Patient Contracts (see media tab):  None  Substance Use: Not addressed in session   Continue or Discharge: Patient will continue in Adult Day Treatment (ADT)  as planned. Patient is likely to benefit from learning and using skills as they work toward the goals identified in their treatment plan.      Angelina Chaudhry, [facility]  February 11, 2025

## 2025-02-11 NOTE — GROUP NOTE
Psychotherapy Group Note    PATIENT'S NAME: Angela Jones  MRN:   [id number]  :   1995  ACCT. NUMBER: [id number]  DATE OF SERVICE: 25  START TIME: 10:00 AM  END TIME: 10:50 AM  FACILITATOR: Angelina Chaudhry LICSW  TOPIC: MH EBP Group: Cognitive Restructuring  [facility] Out[facility]  TRACK: IOP 2     NUMBER OF PARTICIPANTS: 7    Summary of Group / Topics Discussed:  Cognitive Restructuring: Distortions: Patients received an overview of how to identify common cognitive distortions. Patients will explore alternatives to cognitive distortions and practice challenging their negative thought patterns. The goal is to help patients target modify ineffective thought patterns.     Patient Session Goals / Objectives:  Familiarized self with ineffective / unhealthy thoughts and how they develop.    Explored impact of ineffective thoughts / distortions on mood and activity  Formulated new neutral/positive alternatives to challenge less helpful / ineffective thoughts.  Practiced and plan to apply in daily life             Patient Participation / Response:  Fully participated with the group by sharing personal reflections / insights and openly received / provided feedback with other participants.    Demonstrated understanding of topics discussed through group discussion and participation, Expressed understanding of the relationship between behaviors, thoughts, and feelings, and Demonstrated knowledge of personal thought patterns and how they impact their mood and behavior.    Treatment Plan:  Patient has a current master individualized treatment plan.  See Epic treatment plan for more information.    CHANTAL Lugo

## 2025-02-11 NOTE — GROUP NOTE
Psychotherapy Group Note    PATIENT'S NAME: Angela Jones  MRN:   [id number]  :   1995  ACCT. NUMBER: [id number]  DATE OF SERVICE: 25  START TIME: 11:00 AM  END TIME: 11:50 AM  FACILITATOR: Chiquita Bonilla LICSW  TOPIC: MH EBP Group: Coping Skills  [facility] Adult Mental Health Outpatient Programs  TRACK: IOP 2    NUMBER OF PARTICIPANTS: 7    Summary of Group / Topics Discussed:  Coping Skills: Additional Coping Skills:  Patients discussed and practiced LOOKING FORWARD, an exercise to improve optimism and motivation for change.  Reviewed the benefits of applying the aforementioned coping strategies.  Patients explored how these strategies might be applied to daily stressors or distressing situations.    Patient Session Goals / Objectives:  Understand the purpose and benefits of applying LOOKING FORWARD coping strategies  Identify goals for the future  Identify things to look forward to  Address barriers to utilizing coping skills when in distress.      Patient Participation / Response:  Fully participated with the group by sharing personal reflections / insights and openly received / provided feedback with other participants.    Demonstrated understanding of topics discussed through group discussion and participation, Expressed understanding of the relevance / importance of coping skills at distressing times in life, and Demonstrated knowledge of when to consider using a variety of coping skills in daily life    Treatment Plan:  Patient has a current master individualized treatment plan.  See Epic treatment plan for more information.    CHANTAL Fong

## 2025-02-13 ENCOUNTER — HOSPITAL ENCOUNTER (OUTPATIENT)
Dept: BEHAVIORAL HEALTH | Facility: CLINIC | Age: 30
End: 2025-02-13
Attending: PSYCHIATRY & NEUROLOGY
Payer: COMMERCIAL

## 2025-02-13 DIAGNOSIS — F31.11 BIPOLAR AFFECTIVE DISORDER, CURRENTLY MANIC, MILD (H): Primary | ICD-10-CM

## 2025-02-13 PROCEDURE — 90853 GROUP PSYCHOTHERAPY: CPT

## 2025-02-13 PROCEDURE — 90853 GROUP PSYCHOTHERAPY: CPT | Performed by: SOCIAL WORKER

## 2025-02-13 NOTE — GROUP NOTE
Psychoeducation Group Note    PATIENT'S NAME: Angela Jones  MRN:   [id number]  :   1995  ACCT. NUMBER: [id number]  DATE OF SERVICE: 25  START TIME: 10:00 AM  END TIME: 10:50 AM  FACILITATOR: Angely Batista LICSW  TOPIC: MH Wellness Group: Health Maintenance  [facility] Mental Health Outpatient Programs  TRACK: IOP 2    NUMBER OF PARTICIPANTS: 8    Summary of Group / Topics Discussed:  Health Maintenance: Eight Dimensions of Wellness: The concept of holistic health through the model of eight dimensions was introduced. Group members participated in identifying behaviors and activities in each of the dimensions of wellness.  The importance of each dimension was reinforced and the concept of balance in life as it relates to wellness was explored.      Patient Session Goals / Objectives:  Verbalized understanding of balance in wellness and how it relates to their life  Identified and explained the eight dimensions of wellness  Categorized activities and wellness needs into corresponding dimensions appropriately during exercise        Patient Participation / Response:  Fully participated with the group by sharing personal reflections / insights and openly received / provided feedback with other participants.    Demonstrated understanding of topics discussed through group discussion and participation, Identified / Expressed personal readiness to practice skills, and Verbalized understanding of health maintenance topic    Treatment Plan:  Patient has a current master individualized treatment plan.  See Epic treatment plan for more information.    CHANTAL Abreu

## 2025-02-13 NOTE — GROUP NOTE
Psychotherapy Group Note    PATIENT'S NAME: Angela Jones  MRN:   [id number]  :   1995  ACCT. NUMBER: [id number]  DATE OF SERVICE: 25  START TIME: 11:00 AM  END TIME: 11:50 AM  FACILITATOR: Chiquita Bonilla LICSW  TOPIC: MH EBP Group: Coping Skills  [facility] Adult Mental Health Outpatient Programs  TRACK: IOP 2    NUMBER OF PARTICIPANTS: 8    Summary of Group / Topics Discussed:  Coping Skills: Additional Coping Skills:  Patients learned the definition and ways to improve  of self-esteem.  Reviewed the benefits of applying the aforementioned coping strategies.  Patients explored how these strategies might be applied to daily stressors or distressing situations.    Patient Session Goals / Objectives:  Understand the purpose and benefits of applying self-esteem coping strategies  Identify ways to improve self-esteem  Address barriers to utilizing coping skills when in distress.      Patient Participation / Response:  Fully participated with the group by sharing personal reflections / insights and openly received / provided feedback with other participants.    Demonstrated understanding of topics discussed through group discussion and participation, Expressed understanding of the relevance / importance of coping skills at distressing times in life, and Demonstrated knowledge of when to consider using a variety of coping skills in daily life    Treatment Plan:  Patient has a current master individualized treatment plan.  See Epic treatment plan for more information.    CHANTAL Fong

## 2025-02-13 NOTE — GROUP NOTE
Process Group Note    PATIENT'S NAME: Angela Jones  MRN:   [id number]  :   1995  ACCT. NUMBER: [id number]  DATE OF SERVICE: 25  START TIME:  9:00 AM  END TIME:  9:50 AM  FACILITATOR: Cyndee Carter LICSW  TOPIC:  Process Group    Diagnoses:  300.01 (F41.0) Panic Disorder  300.02 (F41.1) Generalized Anxiety Disorder.     296.33 (F33.2) Major Depressive Disorder, Recurrent Episode, Severe _    [facility] Outpatient Programs  TRACK: IOP 2    NUMBER OF PARTICIPANTS: 8        Data:    Session content: At the start of this group, patients were invited to check in by identifying themselves, describing their current emotional status, and identifying issues to address in this group.     Area(s) of treatment focus addressed in this session included Symptom Management, Personal Safety, Community Resources/Discharge Planning, Abstinence/Relapse Prevention, Develop / Improve Independent Living Skills, and Develop Socialization / Interpersonal Relationship Skills.     reports anxious mood and chronic pain as well as shakiness. Denies S/I or safety issues.  reports using coping skills for symptom management including workman mind.  reported on her stress of multiple trips needed to  her medications. Writer encouraged her to consult with the Pharmacist.  denies chemical use. Her partner is home sick.         2025    11:00 AM   Suicide Ideation Check In   Since last session, how often have you had suicidal thoughts? No thoughts of suicide       Therapeutic Interventions/Treatment Strategies:  Psychotherapist offered support, feedback and validation and reinforced use of skills. Treatment modalities used include Cognitive Behavioral Therapy.    Assessment:    Patient response:   Patient responded to session by verbalizing understanding    Possible barriers to participation / learning include: and no barriers identified    Health Issues:   Yes: Pain,  Associated Psychological Distress  Reports medication compliance.        Substance Use Review:   Substance Use: No active concerns identified.    Mental Status/Behavioral Observations  Appearance:   Appropriate   Eye Contact:   Good   Psychomotor Behavior: Normal   Attitude:   Cooperative  Interested  Orientation:   All  Speech   Rate / Production: Normal    Volume:  Normal   Mood:    Anxious  Depressed   Affect:    Appropriate  Worrisome   Thought Content:   Clear and Safety denies any current safety concerns including suicidal ideation, self-harm, and homicidal ideation  Thought Form:  Coherent  Goal Directed  Logical     Insight:    Good     Plan:   Safety Plan: No current safety concerns identified.  Recommended that patient call 911 or go to the local ED should there be a change in any of these risk factors.   Barriers to treatment: None identified  Patient Contracts (see media tab):  None  Substance Use: Not addressed in session   Continue or Discharge: Patient will continue in Adult Day Treatment (ADT)  as planned. Patient is likely to benefit from learning and using skills as they work toward the goals identified in their treatment plan.      Cyndee Carter, [facility]  February 13, 2025

## 2025-02-17 ENCOUNTER — HOSPITAL ENCOUNTER (OUTPATIENT)
Dept: BEHAVIORAL HEALTH | Facility: CLINIC | Age: 30
End: 2025-02-17
Attending: PSYCHIATRY & NEUROLOGY
Payer: COMMERCIAL

## 2025-02-17 DIAGNOSIS — F31.11 BIPOLAR AFFECTIVE DISORDER, CURRENTLY MANIC, MILD (H): Primary | ICD-10-CM

## 2025-02-17 PROCEDURE — 90853 GROUP PSYCHOTHERAPY: CPT

## 2025-02-17 RX ORDER — LAMOTRIGINE 25 MG/1
100 TABLET ORAL DAILY
Qty: 42 TABLET | Refills: 0 | Status: SHIPPED | OUTPATIENT
Start: 2025-02-17

## 2025-02-17 NOTE — GROUP NOTE
Psychotherapy Group Note    PATIENT'S NAME: Angela Jones  MRN:   [id number]  :   1995  ACCT. NUMBER: [id number]  DATE OF SERVICE: 25  START TIME: 10:00 AM  END TIME: 10:50 AM  FACILITATOR: Angelina Chaudhry LICSW  TOPIC: MH EBP Group: Relationship Skills  [facility] Mental Health Outpatient Programs  TRACK: IOP 2    NUMBER OF PARTICIPANTS: 9    Summary of Group / Topics Discussed:  Relationship Skills: Assertive Communication: Patients were provided with a general overview of assertive communication skills and how practicing assertive communication skills will assist patients in developing healthier and more effective relationships. Patients reviewed their current awareness on ability to practice assertive communication, ways to increase assertive communication, and identified/problem solved barriers to assertive communication.     Patient Session Goals / Objectives:  Identified and discussed patient individual challenges with communication  Presented and practiced effective communication skills in session  Assisted patients in implementing more effective communication skills in their relationships      Patient Participation / Response:  Fully participated with the group by sharing personal reflections / insights and openly received / provided feedback with other participants.    Demonstrated understanding of topics discussed through group discussion and participation, Demonstrated understanding of relationship skills and communication skills, Identified / Expressed personal readiness to incorporate effective communication skills, and Verbalized understanding of communication skills, communication challenges, and communication strengths    Treatment Plan:  Patient has a current master individualized treatment plan.  See Epic treatment plan for more information.    CHANTAL Lugo

## 2025-02-17 NOTE — ADDENDUM NOTE
Encounter addended by: Angelina Chaudhry [facility] on: 2/17/2025 12:58 PM   Actions taken: Flowsheet accepted

## 2025-02-17 NOTE — PROGRESS NOTES
[facility]   Adult Mental Health Outpatient Programs  Psychiatric Progress Record    Program Track: IOP 2 [place]    PATIENT'S NAME: Angela Jones  MRN:   [id number]  :   1995  ACCT. NUMBER: [id number]  DATE OF SERVICE: 25    Interval History:   presents today for follow-up and ongoing program supervision.   Endorses:  I am ok but anxious today. I woke up anxious  Tightness in the chest  I cannot catch a good breath  Adrenaline rush, very activated  It is a little different form the restlessness and wanting to move I was feeling last time  I am still getting that a little bit but at a different degree  I cut the Abilify to 5 mg   I met Psychiatrist, who started Quetiapine 50 mg  History of bipolar, mostly depression although most recent complaint is [place]       Review of Symptoms  Sleep: I am not sleeping great, lot of tossing and turning  Appetite: it is ok  Suicidal ideation: denies current or recent suicidal ideation or behavior  Thoughts of non-suicidal self-injury: denied  Recent self-injurious behavior: denied  Homicidal ideation: denied  Other safety concerns: denied    Activities of Daily Living and Related Systems Impacted by Illness:  Hygiene: ok  Socialization: isolating  Activities of Daily Living: (cleaning, shopping, bills, etc.): hard  Concerns related to work: no    Substance use:  Denies    Medications:  Current Outpatient Medications   Medication Sig Dispense Refill    lamoTRIgine (LAMICTAL) 25 MG tablet Take 4 tablets (100 mg) by mouth daily. 42 tablet 0    ARIPiprazole (ABILIFY) 5 MG tablet Take 1 tablet (5 mg) by mouth daily. 30 tablet 1    cholecalciferol (VITAMIN D3) 125 mcg (5000 units) capsule Take 1 capsule (125 mcg) by mouth daily. 30 capsule 0    clomiPRAMINE (ANAFRANIL) 50 MG capsule Take 2 capsules (100 mg) by mouth at bedtime. 60 capsule 2    cyanocobalamin (CYANOCOBALAMIN) 2000 MCG tablet Take 1 tablet (2,000 mcg) by mouth daily. 30 tablet 0     diphenhydrAMINE (BENADRYL) 50 MG capsule Take 50 mg by mouth as needed for allergies.      fludrocortisone (FLORINEF) 0.1 MG tablet Take 2 tablets (0.2 mg) by mouth daily. 180 tablet 1    gabapentin (NEURONTIN) 300 MG capsule Take 3 capsules (900 mg) by mouth 3 times daily. 810 capsule 3    guanFACINE (INTUNIV) 1 MG TB24 24 hr tablet Take 1 tablet (1 mg) by mouth at bedtime. 30 tablet 2    hydrocortisone (CORTAID) 0.5 % external cream Apply topically 2 times daily. (Patient not taking: Reported on 1/20/2025) 28.35 g 0    hydrOXYzine HCl (ATARAX) 25 MG tablet Take 1-2 tablets (25-50 mg) by mouth 2 times daily as needed for anxiety. 60 tablet 1    hyoscyamine (LEVSIN/SL) 0.125 MG sublingual tablet Take 1 tablet (0.125 mg) by mouth every 4 hours as needed. 120 tablet 5    ibuprofen (ADVIL/MOTRIN) 200 MG tablet Take 200 mg by mouth every 4 hours as needed for pain or other (migrain)      lactase (LACTAID) 3000 UNIT tablet Take 1-2 tablets (3,000-6,000 Units) by mouth 3 times daily (with meals). 30 tablet 0    levonorgestrel (MIRENA, 52 MG,) 20 MCG/24HR IUD 1 each (20 mcg) by Intrauterine route once for 1 dose 1 each 0    metoclopramide (REGLAN) 5 MG tablet Take 1 tablet (5 mg) by mouth 3 times daily as needed (for nausea). 90 tablet 3    mirtazapine (REMERON) 15 MG tablet Take 1 tablet (15 mg) by mouth at bedtime. 90 tablet 3    multivitamin, therapeutic (THERA-VIT) TABS tablet Take 1 tablet by mouth daily      QUEtiapine (SEROQUEL) 50 MG tablet Take 1 tablet (50mg) at bedtime and additional 1 tablet (50mg) as needed for sleep / anxiety 30 tablet 2    tiZANidine (ZANAFLEX) 2 MG tablet Take 1 tablet (2 mg) by mouth daily as needed for muscle spasms. 90 tablet 3       The above list was reviewed and updated in [facility] with patient today.     Patient is taking medications as prescribed and denies adverse effects    Laboratory Results:  Most recent labs reviewed. Pertinent updates/findings: None.     Mental Status  "Examination:  Vital Signs: There were no vitals taken for this visit.   Appearance: adequately groomed, appears stated age, and in no apparent distress.  Attitude: cooperative   Eye Contact: good   Muscle Strength and Tone: normal  Psychomotor Behavior: normal or unremarkable   Gait and Station: normal width, turn, arm swing  Speech: clear, coherent, decreased prosody, regular rate, regular rhythm, and fluent  Associations: No loosening of associations  Thought Process: coherent and goal directed  Thought Content: no evidence of suicidal ideation or homicidal ideation, no evidence of psychotic thought, no auditory hallucinations present, and no visual hallucinations present  Mood: \"anxious and depressed\"  Affect: mood congruent  Insight: good  Judgment: intact, adequate for safety  Impulse Control: intact  Oriented to: time, place, person, and situation  Attention Span and Concentration: Normal  Language: Intact  Recent and Remote Memory: Delayed & immediate recall intact  Fund of Knowledge/Assessment of Intelligence: Average  Capacity of Activities of Daily Living: Independent, able to participate in programmatic care services.    Diagnosis/es:    ICD-10-CM    1. Bipolar affective disorder, currently manic, mild (H)  F31.11 lamoTRIgine (LAMICTAL) 25 MG tablet        Assessment/Plan:  1/20/2025   presents today for initial psychiatric evaluation as part of oversight of programmatic care. She presents with a complex history of mental health challenges, including Bipolar disorder (recently diagnosed during hospitalization), depression, anxiety, ADHD, autism, and OCD.   Per patient, she is currently experiencing a manic episode, with report of reduced sleep, restlessness, impulsivity, excessive talking, difficulty focusing, and excessive spending. Suicidal ideation, though passive and without intent or plan, mirrors pre-hospitalization severity.   Stressors include an ongoing divorce, fear of losing her job, " frequent illness limiting work capacity, fibromyalgia, and sociopolitical concerns tied to her transgender identity.   Medications include aripiprazole, Clomipramine, Gabapentin, Guanfacine, Hydroxyzine, Mirtazapine, and Quetiapine. She started becoming restless, more anxious, inability to stay still correlating with Aripiprazole initiation. Adjustments today include reducing Aripiprazole to 5 mg daily and modifying hydroxyzine to 25-50 mg as needed up to three times daily.      Although a manic symptoms are evident per patient reports, overlapping symptoms associated with Cluster B personality disorders may also be contributing to the clinical presentation. Stressors appear to exacerbate underlying emotional dysregulation, further complicating the differential diagnosis  Psychotherapy is recommended to learn coping skills to be able to contend with stressors, reduce safety risks, and improve self-care  Follow up in 1 week or sooner as needed        2/17/2025   presents today for follow-up psychiatric evaluation and assessment of progress. She reports feeling anxious upon waking, with symptoms including chest tightness, difficulty catching a full breath. She notes that this anxiety is distinct from previous restlessness and urges to move [akathisia], though some of those symptoms of akathisia persist but at a lesser intensity after reduced Abilify to 5 mg. D    Outpatient psychiatrist added an additional Quetiapine 50 mg for anxiety and sleep. Given history of bipolar disorder, primarily depressive episodes and plans to discontinue Abilify, she agreed to start Lamotrigine. No additional medication changes were made. She was encouraged to continue using Hydroxyzine as needed for anxiety, as she finds it effective.   She will engage in intensive outpatient psychotherapy to develop coping skills, peer support, mitigate safety risks, and improve self-care..    Bipolar, manic  Overall improved  Engage in  psychotherapy  Continue with current medication regimen  START Lamotrigine 25 mg for 14 days, the 50 mg for 14 days  Aripiprazole to 5 mg daily  Hydroxyzine 25-50 mg three times a day as needed   NO CHANGE to Clomipramine, Gabapentin, Guanfacine, Mirtazapine, and Quetiapine  Improve sleep hygiene  Maintain a balanced diet  Engage in physical activities as tolerated      Safety Assessment:   reports suicidal ideation and/or non-suicidal self-injury or thoughts thereof as noted above   is future-oriented and is engaged in treatment planning   I do not feel that  meets criteria for a 72-hour involuntary hold and remains appropriate for an outpatient level of care    MONTSE HANEY DNP on 2/17/2025 at 11:04 AM    Visit Details:  Type of service: In-person  Location (patient and provider): [facility] Adult Mental Health Outpatient Programmatic Care Offices    Level of Medical Decision Making:   - At least 1 chronic problem that is not stable  - Engaged in prescription drug management during visit (discussed any medication benefits, side effects, alternatives, etc.)  Discussion of management or test interpretation with external physician/other qualified healthcare professional/appropriate source - programmatic care multidisciplinary treatment team    30 min spent on the date of the encounter in chart review, patient visit, review of tests, documentation, care coordination, and/or discussion with other providers about the issues documented above.      This document completed in part using [employer] dictation software and therefore may contain inadvertent word or phrase substitutions.

## 2025-02-17 NOTE — GROUP NOTE
Process Group Note    PATIENT'S NAME: Angela Jones  MRN:   [id number]  :   1995  ACCT. NUMBER: [id number]  DATE OF SERVICE: 25  START TIME:  9:00 AM  END TIME:  9:50 AM  FACILITATOR: Angelina Chaudhry LICSW  TOPIC:  Process Group    Diagnoses:    300.01 (F41.0) Panic Disorder  300.02 (F41.1) Generalized Anxiety Disorder.     296.33 (F33.2) Major Depressive Disorder, Recurrent Episode, Severe _.    [facility] Mental Health Outpatient Programs  TRACK: IOP 2     NUMBER OF PARTICIPANTS: 9        Data:    Session content: At the start of this group, patients were invited to check in by identifying themselves, describing their current emotional status, and identifying issues to address in this group.   Area(s) of treatment focus addressed in this session included Symptom Management and Personal Safety.    Patient reported feeling anxious. Patient discussed working toward emotional regulation. Patient identified mindfulness as skills they will use to address their goal(s). Patient reported self may be a barrier to working toward their goal(s) and/or addressing mental health symptoms. Patient reported no safety concerns and/or self-injurious behaviors. Patient reported no substance use. Patient reported they are taking their medications as prescribed. Patient reported feeling proud that they part of group. Patient discussed interpersonal concerns with the treatment group.              Therapeutic Interventions/Treatment Strategies:  Treatment modalities used include Cognitive Behavioral Therapy and Dialectical Behavioral Therapy.    Assessment:    Patient response:   Patient responded to session by accepting feedback, giving feedback, listening, focusing on goals, and being attentive    Possible barriers to participation / learning include: and no barriers identified    Health Issues:   None reported       Substance Use Review:   Substance Use: No active concerns identified.    Mental  Status/Behavioral Observations  Appearance:   Appropriate   Eye Contact:   Good   Psychomotor Behavior: Normal   Attitude:   Cooperative   Orientation:   All  Speech   Rate / Production: Normal    Volume:  Normal   Mood:    Normal  Affect:    Appropriate   Thought Content:   Clear  Thought Form:  Coherent  Logical     Insight:    Good     Plan:   Safety Plan: No current safety concerns identified.  Recommended that patient call 911 or go to the local ED should there be a change in any of these risk factors.   Barriers to treatment: None identified  Patient Contracts (see media tab):  None  Substance Use: Not addressed in session   Continue or Discharge: Patient will continue in Adult Day Treatment (ADT)  as planned. Patient is likely to benefit from learning and using skills as they work toward the goals identified in their treatment plan.      Angelina Chaudhry, CHANTAL  February 17, 2025

## 2025-02-18 ENCOUNTER — HOSPITAL ENCOUNTER (OUTPATIENT)
Dept: BEHAVIORAL HEALTH | Facility: CLINIC | Age: 30
End: 2025-02-18
Attending: PSYCHIATRY & NEUROLOGY
Payer: COMMERCIAL

## 2025-02-18 ENCOUNTER — MEDICAL CORRESPONDENCE (OUTPATIENT)
Dept: HEALTH INFORMATION MANAGEMENT | Facility: CLINIC | Age: 30
End: 2025-02-18
Payer: COMMERCIAL

## 2025-02-18 DIAGNOSIS — F31.11 BIPOLAR AFFECTIVE DISORDER, CURRENTLY MANIC, MILD (H): Primary | ICD-10-CM

## 2025-02-18 PROCEDURE — 90853 GROUP PSYCHOTHERAPY: CPT

## 2025-02-18 NOTE — ADDENDUM NOTE
Encounter addended by: Angelina Chaudhry, [facility] on: 2/18/2025 3:03 PM   Actions taken: Flowsheet accepted

## 2025-02-18 NOTE — GROUP NOTE
Psychotherapy Group Note    PATIENT'S NAME: Angela Jones  MRN:   [id number]  :   1995  ACCT. NUMBER: [id number]  DATE OF SERVICE: 25  START TIME: 10:00 AM  END TIME: 10:50 AM  FACILITATOR: Angelina Chaudhry LICSW  TOPIC: MH EBP Group: Relationship Skills  [facility]  TRACK: IOP 2     NUMBER OF PARTICIPANTS: 8    Summary of Group / Topics Discussed:  Relationship Skills: Assertive Communication: Patients were provided with a general overview of assertive communication skills and how practicing assertive communication skills will assist patients in developing healthier and more effective relationships. Patients reviewed their current awareness on ability to practice assertive communication, ways to increase assertive communication, and identified/problem solved barriers to assertive communication.     Patient Session Goals / Objectives:  Identified and discussed patient individual challenges with communication  Presented and practiced effective communication skills in session  Assisted patients in implementing more effective communication skills in their relationships      Patient Participation / Response:  Fully participated with the group by sharing personal reflections / insights and openly received / provided feedback with other participants.    Demonstrated understanding of topics discussed through group discussion and participation and Demonstrated understanding of relationship skills and communication skills    Treatment Plan:  Patient has a current master individualized treatment plan.  See Epic treatment plan for more information.    CHANTAL Lugo

## 2025-02-18 NOTE — GROUP NOTE
Process Group Note    PATIENT'S NAME: Angela Jones  MRN:   [id number]  :   1995  ACCT. NUMBER: [id number]  DATE OF SERVICE: 25  START TIME:  9:00 AM  END TIME:  9:50 AM  FACILITATOR: Angelina Chaudhry LICSW  TOPIC:  Process Group    Diagnoses:    300.01 (F41.0) Panic Disorder  300.02 (F41.1) Generalized Anxiety Disorder.     296.33 (F33.2) Major Depressive Disorder, Recurrent Episode, Severe _.    [facility]  TRACK: IOP 2     NUMBER OF PARTICIPANTS: 8        Data:    Session content: At the start of this group, patients were invited to check in by identifying themselves, describing their current emotional status, and identifying issues to address in this group.   Area(s) of treatment focus addressed in this session included Symptom Management and Personal Safety.    Patient reported feeling anxious. Patient discussed working toward grounding. Patient identified mindfulness as skills they will use to address their goal(s). Patient reported self may be a barrier to working toward their goal(s) and/or addressing mental health symptoms. Patient reported no safety concerns and/or self-injurious behaviors. Patient reported no substance use. Patient reported they are taking their medications as prescribed. Patient reported feeling proud that they part of group. Patient discussed physical concerns with the treatment group.              Therapeutic Interventions/Treatment Strategies:  Treatment modalities used include Cognitive Behavioral Therapy and Dialectical Behavioral Therapy.    Assessment:    Patient response:   Patient responded to session by accepting feedback, giving feedback, listening, focusing on goals, and being attentive    Possible barriers to participation / learning include: and no barriers identified    Health Issues:   None reported       Substance Use Review:   Substance Use: No active concerns identified.    Mental Status/Behavioral  Observations  Appearance:   Appropriate   Eye Contact:   Good   Psychomotor Behavior: Normal   Attitude:   Cooperative   Orientation:   All  Speech   Rate / Production: Normal    Volume:  Normal   Mood:    Normal  Affect:    Appropriate   Thought Content:   Clear  Thought Form:  Coherent  Logical     Insight:    Good     Plan:   Safety Plan: No current safety concerns identified.  Recommended that patient call 911 or go to the local ED should there be a change in any of these risk factors.   Barriers to treatment: None identified  Patient Contracts (see media tab):  None  Substance Use: Not addressed in session   Continue or Discharge: Patient will continue in Adult Day Treatment (ADT)  as planned. Patient is likely to benefit from learning and using skills as they work toward the goals identified in their treatment plan.      Angelina Chaudhry, [facility]  February 18, 2025

## 2025-02-18 NOTE — GROUP NOTE
Psychotherapy Group Note    PATIENT'S NAME: Angela Jones  MRN:   [id number]  :   1995  ACCT. NUMBER: [id number]  DATE OF SERVICE: 25  START TIME: 11:00 AM  END TIME: 11:50 AM  FACILITATOR: Chiquita Bonilla LICSW  TOPIC: MH EBP Group: Relationship Skills  [facility]  TRACK: IOP 2    NUMBER OF PARTICIPANTS: 8    Summary of Group / Topics Discussed:  Relationship Skills: Boundaries: Patients were provided with a general overview of interpersonal boundaries and how lack of boundaries relates to symptoms and functioning. The purpose is to help patients identify boundary issues and gain awareness and skills to work towards healthier interpersonal boundaries. Current awareness of healthy boundary characteristics and barriers to establishing healthy boundaries were discussed.    Patient Session Goals / Objectives:  Familiarized patients with the concept of interpersonal boundaries and their characteristics  Discussed and practiced strategies to promote healthier interpersonal boundaries  Identified boundary issues and identified plan to improve boundaries      Patient Participation / Response:  Fully participated with the group by sharing personal reflections / insights and openly received / provided feedback with other participants.    Demonstrated understanding of topics discussed through group discussion and participation, Demonstrated understanding of relationship skills and communication skills, and Identified / Expressed personal readiness to incorporate effective communication skills    Treatment Plan:  Patient has a current master individualized treatment plan.  See Epic treatment plan for more information.    CHANTAL Fong

## 2025-02-24 ENCOUNTER — HOSPITAL ENCOUNTER (OUTPATIENT)
Dept: BEHAVIORAL HEALTH | Facility: CLINIC | Age: 30
End: 2025-02-24
Attending: PSYCHIATRY & NEUROLOGY
Payer: COMMERCIAL

## 2025-02-24 DIAGNOSIS — F31.11 BIPOLAR AFFECTIVE DISORDER, CURRENTLY MANIC, MILD (H): Primary | ICD-10-CM

## 2025-02-24 PROCEDURE — 90853 GROUP PSYCHOTHERAPY: CPT

## 2025-02-24 NOTE — GROUP NOTE
Psychotherapy Group Note    PATIENT'S NAME: Angela Jones  MRN:   [id number]  :   1995  ACCT. NUMBER: [id number]  DATE OF SERVICE: 25  START TIME: 11:00 AM  END TIME: 11:50 AM  FACILITATOR: Chiquita Bonilla LICSW  TOPIC: MH EBP Group: Coping Skills  [facility]  TRACK: IOP 2    NUMBER OF PARTICIPANTS: 6    Summary of Group / Topics Discussed:  Coping Skills: Additional Coping Skills/Worry Management part 2:  Patients discussed and practiced WORRY MANAGEMENT TECHNIQUES.  Reviewed the benefits of applying the aforementioned coping strategies.  Patients explored how these strategies might be applied to daily stressors or distressing situations.    Patient Session Goals / Objectives:  Understand the purpose and benefits of applying WORRY MANAGEMENT coping strategies  Identify preferred worry management skills  Address barriers to utilizing coping skills when in distress.      Patient Participation / Response:  Moderately participated, sharing some personal reflections / insights and adequately adequately received / provided feedback with other participants.    Demonstrated understanding of topics discussed through group discussion and participation and Expressed understanding of the relevance / importance of coping skills at distressing times in life    Treatment Plan:  Patient has a current master individualized treatment plan.  See Epic treatment plan for more information.    CHANTAL Fong

## 2025-02-24 NOTE — GROUP NOTE
Process Group Note    PATIENT'S NAME: Angela Jones  MRN:   [id number]  :   1995  ACCT. NUMBER: [id number]  DATE OF SERVICE: 25  START TIME:  9:00 AM  END TIME:  9:50 AM  FACILITATOR: Angelina Chaduhry LICSW  TOPIC:  Process Group    Diagnoses:    300.01 (F41.0) Panic Disorder  300.02 (F41.1) Generalized Anxiety Disorder.     296.33 (F33.2) Major Depressive Disorder, Recurrent Episode, Severe _.    [facility]  TRACK: IOP 2     NUMBER OF PARTICIPANTS: 6        Data:    Session content: At the start of this group, patients were invited to check in by identifying themselves, describing their current emotional status, and identifying issues to address in this group.   Area(s) of treatment focus addressed in this session included Symptom Management and Personal Safety.    Patient reported feeling calm. Patient discussed working toward regulation. Patient identified grounding as skills they will use to address their goal(s). Patient reported self may be a barrier to working toward their goal(s) and/or addressing mental health symptoms. Patient reported no safety concerns and/or self-injurious behaviors. Patient reported no substance use. Patient reported they are taking their medications as prescribed. Patient reported feeling proud that they part of group. Patient discussed emotions with the treatment group.              Therapeutic Interventions/Treatment Strategies:  Treatment modalities used include Cognitive Behavioral Therapy and Dialectical Behavioral Therapy.    Assessment:    Patient response:   Patient responded to session by accepting feedback, giving feedback, listening, and focusing on goals    Possible barriers to participation / learning include: and no barriers identified    Health Issues:   None reported       Substance Use Review:   Substance Use: No active concerns identified.    Mental Status/Behavioral Observations  Appearance:   Appropriate    Eye Contact:   Good   Psychomotor Behavior: Normal   Attitude:   Cooperative   Orientation:   All  Speech   Rate / Production: Normal    Volume:  Normal   Mood:    Normal  Affect:    Appropriate   Thought Content:   Clear  Thought Form:  Coherent  Logical     Insight:    Good     Plan:   Safety Plan: No current safety concerns identified.  Recommended that patient call 911 or go to the local ED should there be a change in any of these risk factors.   Barriers to treatment: None identified  Patient Contracts (see media tab):  No Substance Use Contract  Substance Use: Not addressed in session   Continue or Discharge: Patient will continue in Adult Day Treatment (ADT)  as planned. Patient is likely to benefit from learning and using skills as they work toward the goals identified in their treatment plan.      Angelina Chaudhry, [facility]  February 24, 2025

## 2025-02-24 NOTE — GROUP NOTE
Psychotherapy Group Note    PATIENT'S NAME: Angela Jones  MRN:   [id number]  :   1995  ACCT. NUMBER: [id number]  DATE OF SERVICE: 25  START TIME: 10:00 AM  END TIME: 10:50 AM  FACILITATOR: Angelina Chaudhry LICSW  TOPIC: MH EBP Group: Relationship Skills  [facility]  TRACK: IOP 2     NUMBER OF PARTICIPANTS: 6    Summary of Group / Topics Discussed:  Relationship Skills: Conflict Resolution: Topic of conflict resolution in interpersonal communication was discussed. Patients received an overview of how communication skills during times of conflict impact symptoms and functioning. Patients discussed their current communication challenges and how this impacts their functioning. Patients also verbalized understanding of skills learned and practiced in session.     Patient Session Goals / Objectives:  Identified and discussed patient individual challenges with communication  Presented and practiced effective communication skills in session  Assisted patients in implementing more effective communication skills in their relationships      Patient Participation / Response:  Fully participated with the group by sharing personal reflections / insights and openly received / provided feedback with other participants.    Demonstrated understanding of topics discussed through group discussion and participation, Demonstrated understanding of relationship skills and communication skills, and Identified / Expressed personal readiness to incorporate effective communication skills    Treatment Plan:  Patient has a current master individualized treatment plan.  See Epic treatment plan for more information.    CHANTAL Lugo

## 2025-02-25 ENCOUNTER — HOSPITAL ENCOUNTER (OUTPATIENT)
Dept: BEHAVIORAL HEALTH | Facility: CLINIC | Age: 30
End: 2025-02-25
Attending: PSYCHIATRY & NEUROLOGY
Payer: COMMERCIAL

## 2025-02-25 DIAGNOSIS — F31.11 BIPOLAR AFFECTIVE DISORDER, CURRENTLY MANIC, MILD (H): Primary | ICD-10-CM

## 2025-02-25 PROCEDURE — 90853 GROUP PSYCHOTHERAPY: CPT

## 2025-02-25 NOTE — GROUP NOTE
Process Group Note    PATIENT'S NAME: Angela Jones  MRN:   [id number]  :   1995  ACCT. NUMBER: [id number]  DATE OF SERVICE: 25  START TIME:  9:00 AM  END TIME:  9:50 AM  FACILITATOR: Angelina Chaudhry LICSW  TOPIC:  Process Group    Diagnoses:    300.01 (F41.0) Panic Disorder  300.02 (F41.1) Generalized Anxiety Disorder.     296.33 (F33.2) Major Depressive Disorder, Recurrent Episode, Severe _.    [facility]  TRACK: IOP 2     NUMBER OF PARTICIPANTS: 8        Data:    Session content: At the start of this group, patients were invited to check in by identifying themselves, describing their current emotional status, and identifying issues to address in this group.   Area(s) of treatment focus addressed in this session included Symptom Management and Personal Safety.    Patient reported feeling anxious. Patient discussed working toward emotion management. Patient identified grounding as skills they will use to address their goal(s). Patient reported self may be a barrier to working toward their goal(s) and/or addressing mental health symptoms. Patient reported no safety concerns and/or self-injurious behaviors. Patient reported bob substance use. Patient reported they are taking their medications as prescribed. Patient reported feeling proud that they part of group. Patient discussed emotions with the treatment group.              Therapeutic Interventions/Treatment Strategies:  Treatment modalities used include Cognitive Behavioral Therapy and Dialectical Behavioral Therapy.    Assessment:    Patient response:   Patient responded to session by accepting feedback, giving feedback, listening, and focusing on goals    Possible barriers to participation / learning include: and no barriers identified    Health Issues:   None reported       Substance Use Review:   Substance Use: No active concerns identified.    Mental Status/Behavioral  Observations  Appearance:   Appropriate   Eye Contact:   Good   Psychomotor Behavior: Normal   Attitude:   Cooperative   Orientation:   All  Speech   Rate / Production: Normal    Volume:  Normal   Mood:    Normal  Affect:    Appropriate   Thought Content:   Clear  Thought Form:  Coherent  Logical     Insight:    Good     Plan:   Safety Plan: No current safety concerns identified.  Recommended that patient call 911 or go to the local ED should there be a change in any of these risk factors.   Barriers to treatment: None identified  Patient Contracts (see media tab):  None  Substance Use: Not addressed in session   Continue or Discharge: Patient will continue in Adult Day Treatment (ADT)  as planned. Patient is likely to benefit from learning and using skills as they work toward the goals identified in their treatment plan.      Angelina Chaudhry, [facility]  February 25, 2025

## 2025-02-25 NOTE — GROUP NOTE
Psychotherapy Group Note    PATIENT'S NAME: Angela Jones  MRN:   [id number]  :   1995  ACCT. NUMBER: [id number]  DATE OF SERVICE: 25  START TIME:  9:00 AM  END TIME:  9:50 AM  FACILITATOR: Angelina Chaudhry LICSW  TOPIC: MH EBP Group: Coping Skills  [facility]  TRACK: IOP 2     NUMBER OF PARTICIPANTS: 8    Summary of Group / Topics Discussed:  Coping Skills: Radical Acceptance: Patients learned radical acceptance as a way to tolerate heightened stress in the moment.  Patients identified situations that necessitate radical acceptance.  They focused on applying acceptance of the moment to tolerate difficult emotions and events. Patients discussed how to distinguish when this can be useful in their lives and when other skills are more relevant or helpful.    Patient Session Goals / Objectives:  Understand that some amount of pain exists for each of us in our lives  Process the difficulty of acceptance in our lives and benefits of radical acceptance to mood and functioning.  Demonstrate understanding of when to use the radical acceptance to tolerate distress by providing examples of situations where this could be applied.  Identify barriers to applying radical acceptance to difficult situations and explore strategies to overcome them  Discussed Willing vs. Willfulness         Patient Participation / Response:  {OP MH PROGRESS NOTE PATIENT PARTICIPATION:260702}    {OP  PROGRESS NOTE PATIENT RESPONSE - COPING SKILLS:461745}    Treatment Plan:  Patient has {OP  PROGRAMMATIC TX PLAN STATUS:721023}    CHANTAL Lugo

## 2025-02-25 NOTE — GROUP NOTE
Psychotherapy Group Note    PATIENT'S NAME: Angela Jones  MRN:   [id number]  :   1995  ACCT. NUMBER: [id number]  DATE OF SERVICE: 25  START TIME: 11:00 AM  END TIME: 11:50 AM  FACILITATOR: Chiquita Bonilla LICSW  TOPIC: MH EBP Group: Self-Awareness  [facility]  TRACK: IOP 2    NUMBER OF PARTICIPANTS: 8    Summary of Group / Topics Discussed:  Self-Awareness: Gratitude: Topic focused on assisting patients in identifying key concepts in gratitude. Patients discussed the benefits of practicing gratitude and its impact on mood improvement, mindfulness, and perspective. Patients worked to increase time spent on recognition and appreciation of what is positive and working in their lives. Patients discussed the concepts and benefits of feeling grateful. The goal is to reduce rumination and negative thinking resulting in increased mindfulness and resilience. Patients specifically discussed how they can practice and problem solve barriers to daily gratitude practice.     Patient Session Goals / Objectives:  [place] the concept and benefits of gratitude  Identified ways to practice gratitude in daily life  Problem solved barriers to practicing gratitude      Patient Participation / Response:  Fully participated with the group by sharing personal reflections / insights and openly received / provided feedback with other participants.    Demonstrated understanding of topics discussed through group discussion and participation, Demonstrated understanding of values, strengths, and challenges to learn about themselves, and Identified / Expressed readiness to act intentionally, increase self-compassion, promote personal growth    Treatment Plan:  Patient has a current master individualized treatment plan.  See Epic treatment plan for more information.    CHANTAL Fong

## 2025-02-25 NOTE — GROUP NOTE
Psychotherapy Group Note    PATIENT'S NAME: Angela Jones  MRN:   [id number]  :   1995  ACCT. NUMBER: [id number]  DATE OF SERVICE: 25  START TIME: 10:00 AM  END TIME: 10:50 AM  FACILITATOR: Angelina Chaudhry LICSW  TOPIC: MH EBP Group: Coping Skills  [facility]  TRACK: IOP 2     NUMBER OF PARTICIPANTS: 8    Summary of Group / Topics Discussed:  Coping Skills: Radical Acceptance: Patients learned radical acceptance as a way to tolerate heightened stress in the moment.  Patients identified situations that necessitate radical acceptance.  They focused on applying acceptance of the moment to tolerate difficult emotions and events. Patients discussed how to distinguish when this can be useful in their lives and when other skills are more relevant or helpful.    Patient Session Goals / Objectives:  Understand that some amount of pain exists for each of us in our lives  Process the difficulty of acceptance in our lives and benefits of radical acceptance to mood and functioning.  Demonstrate understanding of when to use the radical acceptance to tolerate distress by providing examples of situations where this could be applied.  Identify barriers to applying radical acceptance to difficult situations and explore strategies to overcome them  Discussed Willingness vs. Willfulness       Patient Participation / Response:  Fully participated with the group by sharing personal reflections / insights and openly received / provided feedback with other participants.    Demonstrated understanding of topics discussed through group discussion and participation, Expressed understanding of the relevance / importance of coping skills at distressing times in life, and Demonstrated knowledge of when to consider using a variety of coping skills in daily life    Treatment Plan:  Patient has a current master individualized treatment plan.  See Epic treatment plan for more  information.    Angelina Chaudhry, LICSW

## 2025-02-26 ENCOUNTER — VIRTUAL VISIT (OUTPATIENT)
Dept: BEHAVIORAL HEALTH | Facility: CLINIC | Age: 30
End: 2025-02-26
Payer: COMMERCIAL

## 2025-02-26 DIAGNOSIS — F33.0 MILD EPISODE OF RECURRENT MAJOR DEPRESSIVE DISORDER: ICD-10-CM

## 2025-02-26 DIAGNOSIS — F84.0 AUTISM SPECTRUM DISORDER: ICD-10-CM

## 2025-02-26 DIAGNOSIS — F41.1 GENERALIZED ANXIETY DISORDER: Primary | ICD-10-CM

## 2025-02-26 PROCEDURE — 90785 PSYTX COMPLEX INTERACTIVE: CPT | Mod: 95 | Performed by: SOCIAL WORKER

## 2025-02-26 PROCEDURE — 90837 PSYTX W PT 60 MINUTES: CPT | Mod: 95 | Performed by: SOCIAL WORKER

## 2025-02-26 ASSESSMENT — PATIENT HEALTH QUESTIONNAIRE - PHQ9
SUM OF ALL RESPONSES TO PHQ QUESTIONS 1-9: 17
10. IF YOU CHECKED OFF ANY PROBLEMS, HOW DIFFICULT HAVE THESE PROBLEMS MADE IT FOR YOU TO DO YOUR WORK, TAKE CARE OF THINGS AT HOME, OR GET ALONG WITH OTHER PEOPLE: VERY DIFFICULT
SUM OF ALL RESPONSES TO PHQ QUESTIONS 1-9: 17
SUM OF ALL RESPONSES TO PHQ QUESTIONS 1-9: 17
10. IF YOU CHECKED OFF ANY PROBLEMS, HOW DIFFICULT HAVE THESE PROBLEMS MADE IT FOR YOU TO DO YOUR WORK, TAKE CARE OF THINGS AT HOME, OR GET ALONG WITH OTHER PEOPLE: VERY DIFFICULT
SUM OF ALL RESPONSES TO PHQ QUESTIONS 1-9: 17

## 2025-02-26 ASSESSMENT — ANXIETY QUESTIONNAIRES
6. BECOMING EASILY ANNOYED OR IRRITABLE: MORE THAN HALF THE DAYS
GAD7 TOTAL SCORE: 15
5. BEING SO RESTLESS THAT IT IS HARD TO SIT STILL: NEARLY EVERY DAY
3. WORRYING TOO MUCH ABOUT DIFFERENT THINGS: MORE THAN HALF THE DAYS
7. FEELING AFRAID AS IF SOMETHING AWFUL MIGHT HAPPEN: SEVERAL DAYS
5. BEING SO RESTLESS THAT IT IS HARD TO SIT STILL: NEARLY EVERY DAY
5. BEING SO RESTLESS THAT IT IS HARD TO SIT STILL: NEARLY EVERY DAY
4. TROUBLE RELAXING: NEARLY EVERY DAY
2. NOT BEING ABLE TO STOP OR CONTROL WORRYING: SEVERAL DAYS
GAD7 TOTAL SCORE: 15
7. FEELING AFRAID AS IF SOMETHING AWFUL MIGHT HAPPEN: SEVERAL DAYS
4. TROUBLE RELAXING: NEARLY EVERY DAY
GAD7 TOTAL SCORE: 15
IF YOU CHECKED OFF ANY PROBLEMS ON THIS QUESTIONNAIRE, HOW DIFFICULT HAVE THESE PROBLEMS MADE IT FOR YOU TO DO YOUR WORK, TAKE CARE OF THINGS AT HOME, OR GET ALONG WITH OTHER PEOPLE: VERY DIFFICULT
1. FEELING NERVOUS, ANXIOUS, OR ON EDGE: NEARLY EVERY DAY
6. BECOMING EASILY ANNOYED OR IRRITABLE: MORE THAN HALF THE DAYS
3. WORRYING TOO MUCH ABOUT DIFFERENT THINGS: MORE THAN HALF THE DAYS
6. BECOMING EASILY ANNOYED OR IRRITABLE: MORE THAN HALF THE DAYS
8. IF YOU CHECKED OFF ANY PROBLEMS, HOW DIFFICULT HAVE THESE MADE IT FOR YOU TO DO YOUR WORK, TAKE CARE OF THINGS AT HOME, OR GET ALONG WITH OTHER PEOPLE?: VERY DIFFICULT
IF YOU CHECKED OFF ANY PROBLEMS ON THIS QUESTIONNAIRE, HOW DIFFICULT HAVE THESE PROBLEMS MADE IT FOR YOU TO DO YOUR WORK, TAKE CARE OF THINGS AT HOME, OR GET ALONG WITH OTHER PEOPLE: VERY DIFFICULT
GAD7 TOTAL SCORE: 15
IF YOU CHECKED OFF ANY PROBLEMS ON THIS QUESTIONNAIRE, HOW DIFFICULT HAVE THESE PROBLEMS MADE IT FOR YOU TO DO YOUR WORK, TAKE CARE OF THINGS AT HOME, OR GET ALONG WITH OTHER PEOPLE: VERY DIFFICULT
GAD7 TOTAL SCORE: 15
2. NOT BEING ABLE TO STOP OR CONTROL WORRYING: SEVERAL DAYS
3. WORRYING TOO MUCH ABOUT DIFFERENT THINGS: MORE THAN HALF THE DAYS
GAD7 TOTAL SCORE: 15
4. TROUBLE RELAXING: NEARLY EVERY DAY
1. FEELING NERVOUS, ANXIOUS, OR ON EDGE: NEARLY EVERY DAY
2. NOT BEING ABLE TO STOP OR CONTROL WORRYING: SEVERAL DAYS
2. NOT BEING ABLE TO STOP OR CONTROL WORRYING: SEVERAL DAYS
IF YOU CHECKED OFF ANY PROBLEMS ON THIS QUESTIONNAIRE, HOW DIFFICULT HAVE THESE PROBLEMS MADE IT FOR YOU TO DO YOUR WORK, TAKE CARE OF THINGS AT HOME, OR GET ALONG WITH OTHER PEOPLE: VERY DIFFICULT
4. TROUBLE RELAXING: NEARLY EVERY DAY
7. FEELING AFRAID AS IF SOMETHING AWFUL MIGHT HAPPEN: SEVERAL DAYS
1. FEELING NERVOUS, ANXIOUS, OR ON EDGE: NEARLY EVERY DAY
8. IF YOU CHECKED OFF ANY PROBLEMS, HOW DIFFICULT HAVE THESE MADE IT FOR YOU TO DO YOUR WORK, TAKE CARE OF THINGS AT HOME, OR GET ALONG WITH OTHER PEOPLE?: VERY DIFFICULT
8. IF YOU CHECKED OFF ANY PROBLEMS, HOW DIFFICULT HAVE THESE MADE IT FOR YOU TO DO YOUR WORK, TAKE CARE OF THINGS AT HOME, OR GET ALONG WITH OTHER PEOPLE?: VERY DIFFICULT
5. BEING SO RESTLESS THAT IT IS HARD TO SIT STILL: NEARLY EVERY DAY
7. FEELING AFRAID AS IF SOMETHING AWFUL MIGHT HAPPEN: SEVERAL DAYS
1. FEELING NERVOUS, ANXIOUS, OR ON EDGE: NEARLY EVERY DAY
3. WORRYING TOO MUCH ABOUT DIFFERENT THINGS: MORE THAN HALF THE DAYS
8. IF YOU CHECKED OFF ANY PROBLEMS, HOW DIFFICULT HAVE THESE MADE IT FOR YOU TO DO YOUR WORK, TAKE CARE OF THINGS AT HOME, OR GET ALONG WITH OTHER PEOPLE?: VERY DIFFICULT
6. BECOMING EASILY ANNOYED OR IRRITABLE: MORE THAN HALF THE DAYS

## 2025-02-26 ASSESSMENT — COLUMBIA-SUICIDE SEVERITY RATING SCALE - C-SSRS
TOTAL  NUMBER OF INTERRUPTED ATTEMPTS SINCE LAST CONTACT: NO
6. HAVE YOU EVER DONE ANYTHING, STARTED TO DO ANYTHING, OR PREPARED TO DO ANYTHING TO END YOUR LIFE?: NO
ATTEMPT SINCE LAST CONTACT: NO
1. SINCE LAST CONTACT, HAVE YOU WISHED YOU WERE DEAD OR WISHED YOU COULD GO TO SLEEP AND NOT WAKE UP?: NO
2. HAVE YOU ACTUALLY HAD ANY THOUGHTS OF KILLING YOURSELF?: NO
TOTAL  NUMBER OF ABORTED OR SELF INTERRUPTED ATTEMPTS SINCE LAST CONTACT: NO
SUICIDE, SINCE LAST CONTACT: NO

## 2025-02-26 NOTE — PROGRESS NOTES
[facility]                                     Progress Note    Patient Name: Angela Jones  Date: 2/26/2025         Service Type: Individual      Session Start Time: 10:00am  Session End Time: 10:53am     Session Length: 53 minutes    Session #: 49    Attendees: Client attended alone    Service Modality:  Video Visit:      Provider verified identity through the following two step process.  Patient provided:  Patient is known previously to provider    Telemedicine Visit: The patient's condition can be safely assessed and treated via synchronous audio and visual telemedicine encounter.      Reason for Telemedicine Visit: Patient has requested telehealth visit    Originating Site (Patient Location): Patient's home    Distant Site (Provider Location): Provider Remote Setting- Home Office    Consent:  The patient/guardian has verbally consented to: the potential risks and benefits of telemedicine (video visit) versus in person care; bill my insurance or make self-payment for services provided; and responsibility for payment of non-covered services.     Patient would like the video invitation sent by:  My Chart    Mode of Communication:  Video Conference via [facility]      Distant Location (Provider):  Off-site    As the provider I attest to compliance with applicable laws and regulations related to telemedicine.    DATA  Extended Session (53+ minutes): PROLONGED SERVICE IN THE OUTPATIENT SETTING REQUIRING DIRECT (FACE-TO-FACE) PATIENT CONTACT BEYOND THE USUAL SERVICE:    - Treatment protocol required additional time to complete, due to the nature of diagnosis being treated (see below).  See Interventions section for details  Interactive Complexity: Yes, visit entailed Interactive Complexity evidenced by:  -The need to manage maladaptive communication (related to, e.g., high anxiety, high reactivity, repeated questions, or disagreement) among participants that complicates delivery of care;  diagnostic criteria for ASD are met and complicate the delivery of services. Patient requires additional support and alternative methods to engage in psychotherapy services.   Crisis: No        Progress Since Last Session (Related to Symptoms / Goals / Homework):   Symptoms: Worsening anxiety and depression      Homework: Partially completed      Episode of Care Goals: Minimal progress - PREPARATION (Decided to change - considering how); Intervened by negotiating a change plan and determining options / strategies for behavior change, identifying triggers, exploring social supports, and working towards setting a date to begin behavior change     Current / Ongoing Stressors and Concerns:  Patient provides a status update while therapist utilizes reflective listening skills. She is still at [facility] 4 days/week until able to transition to [facility]. She did get an intake completed and just has to call to figure out when she can start (M-Th, 1-4pm). She feels that the IOP program is fine - a lot of DBT skills. She has trouble remembering how to access the skills when she needs it. She feels that her medications are finally working and are the right combination. She struggles when she is home alone especially in the evening after dinner time.   She is on leave from work until 3/22 and then she can return part-time with accommodations.     Therapist assessed for risk and safety - patient reports intrusive thoughts of self-harm with no intent to die. Patient encouraged to use her safety plan. Should there be an increase in symptoms or severity related to SI/SIB, patient should call 911 or go to local ED for evaluation.         Treatment Objective(s) Addressed in This Session:   identify the fears / thoughts that contribute to feeling anxious  state understanding of stressors and relationship to physical symptoms  Increase interest, engagement, and pleasure in doing things  Decrease frequency and  intensity of feeling down, depressed, hopeless  Identify negative self-talk and behaviors: challenge core beliefs, myths, and actions  Gain insight     Intervention:   Discussed mindfulness as being aware of what we are experiencing while we are experiencing it.  Contrasted this with avoidance and rumination.  Explored the role of mindfulness as an overall coping strategy for managing depression, anxiety, and strong emotions.  Explained concept of state of mind using SIFT (sensations, images, feelings, thoughts) pneumonic. Discussed mindfulness as a tool to intentionally shift our awareness and focus as needed.  Discussed physical, mental, and emotional boundaries and limit-setting with others. Explored management of boundaries through direct communication and limiting contact and communication with others.  Discussed barriers to using boundary management skills including strong emotions and physical proximity.  Discussed option of coming up with a night time safety plan to help her stay regulated and grounded. Therapist provided unconditional positive regard and supportive counseling.     Assessments completed prior to visit:   The following assessments were completed by patient for this visit:  PHQ9:       11/5/2024     8:30 AM 11/7/2024     8:20 AM 11/19/2024     8:51 AM 12/10/2024     8:59 AM 1/14/2025    12:00 PM 1/20/2025     9:00 AM 2/26/2025     9:47 AM   PHQ-9 SCORE   PHQ-9 Total Score MyChart 19 (Moderately severe depression) 18 (Moderately severe depression) 14 (Moderate depression) 15 (Moderately severe depression)   17 (Moderately severe depression)   PHQ-9 Total Score 19  18  14  15  20 24 17        Patient-reported     GAD7:       9/12/2024     7:47 AM 10/25/2024     9:07 AM 11/19/2024     8:52 AM 1/14/2025    12:00 PM 1/20/2025     9:00 AM 1/24/2025     2:16 PM 2/26/2025     9:46 AM   JOSHUA-7 SCORE   Total Score 9 (mild anxiety) 13 (moderate anxiety) 10 (moderate anxiety)   15 (severe anxiety) 15  (severe anxiety)   Total Score 9    9 13  10  15 16 15  15        Patient-reported     PROMIS 10-Global Health (only subscores and total score):       7/12/2024     8:07 AM 8/2/2024     1:03 PM 11/5/2024     8:32 AM 11/19/2024     8:53 AM 12/10/2024     9:00 AM 1/14/2025    12:00 PM 1/20/2025     9:00 AM   PROMIS-10 Scores Only   Global Mental Health Score 14 13 9  10  9  12 8   Global Physical Health Score 12 12 12  11  11  11 11   PROMIS TOTAL - SUBSCORES 26 25 21  21  20  23 19       Patient-reported      [place] Suicide Severity Rating Scale (Short Version)      11/5/2024     9:16 AM 1/5/2025     2:31 PM 1/5/2025     6:12 PM 1/6/2025    11:52 AM 1/7/2025    11:57 AM 1/8/2025     9:24 PM 2/26/2025    10:23 AM   [place] Suicide Severity Rating (Short Version)   Q1 Wished to be Dead (Past Month)  1-->yes 1-->yes 1-->yes  0-->no    Q2 Suicidal Thoughts (Past Month)  1-->yes 1-->yes 1-->yes  1-->yes    Q3 Suicidal Thought Method  1-->yes 1-->yes 1-->yes  1-->yes    Q4 Suicidal Intent without Specific Plan  1-->yes 1-->yes 0-->no  0-->no    Q5 Suicide Intent with Specific Plan  1-->yes 0-->no 1-->yes  1-->yes    Q6 Suicide Behavior (Lifetime)  0-->no 0-->no 0-->no  0-->no    Level of Risk per Screen  high risk high risk high risk  high risk    1. Wish to be Dead (Since Last Contact) N    Y  N   2. Non-Specific Active Suicidal Thoughts (Since Last Contact) N    Y  N   3. Active Suicidal Ideation with any Methods (Not Plan) Without Intent to Act (Since Last Contact) N    Y     4. Active Suicidal Ideation with Some Intent to Act, Without Specific Plan (Since Last Contact) N    N     5. Active Suicidal Ideation with Specific Plan and Intent (Since Last Contact) N    N     Most Severe Ideation Rating (Since Last Contact) 1    5     Frequency (Since Last Contact) 3    4     Duration (Since Last Contact) 2    2     Deterrents (Since Last Contact) 1    0     Reasons for Ideation (Since Last Contact) 0    4     Actual  Attempt (Since Last Contact) N    N  N   Has subject engaged in non-suicidal self-injurious behavior? (Since Last Contact) N    N  N   Interrupted Attempts (Since Last Contact) N    N  N   Aborted or Self-Interrupted Attempt (Since Last Contact) N    N  N   Preparatory Acts or Behavior (Since Last Contact) N    N  N   Suicide (Since Last Contact) N    N  N   Calculated C-SSRS Risk Score (Since Last Contact) No Risk Indicated    Moderate Risk  No Risk Indicated          ASSESSMENT: Current Emotional / Mental Status (status of significant symptoms):   Risk status (Self / Other harm or suicidal ideation)   Patient denies current fears or concerns for personal safety.   Patient denies current or recent suicidal ideation or behaviors.   Patient denies current or recent homicidal ideation or behaviors.   Patient reports current or recent self injurious behavior or ideation including SIB ideation, intrusive thoughts.   Patient denies other safety concerns.   Patient reports there has been no change in risk factors since their last session.     Patient reports there has been no change in protective factors since their last session.     A safety and risk management plan has been developed including: Patient consented to co-developed safety plan.  Safety and risk management plan was completed - see below.  Patient agreed to use safety plan should any safety concerns arise.  A copy was given to the patient.     Appearance:   Appropriate    Eye Contact:   Good    Psychomotor Behavior: Hyperactive  Restless    Attitude:   Cooperative    Orientation:   All   Speech    Rate / Production: Pressured  Stutters    Volume:  Normal    Mood:    Anxious  Depressed  Anhedonia   Affect:    Constricted  Flat  Worrisome    Thought Content:  Clear    Thought Form:  Coherent    Insight:    Good      Medication Review:   No changes to current psychiatric medication(s)     Medication Compliance:   Yes     Changes in Health Issues:   None  reported     Chemical Use Review:   Substance Use: Chemical use reviewed, no active concerns identified      Tobacco Use: No current tobacco use.      Diagnosis:  1. Generalized anxiety disorder    2. Autism spectrum disorder    3. Mild episode of recurrent major depressive disorder    ADHD  R/O Bipolar Disorder  R/O OCD     Collateral Reports Completed:   Not Applicable    PLAN: (Patient Tasks / Therapist Tasks / Other)  Patient will return for a virtual visit in 2 weeks  Patient encouraged to continue participating in IOP treatment  Patient encouraged to practice healthy boundaries  Patient will utilize her safety plan   Patient will consider visual aids to help her use skills while at home alone   Patient encouraged to journal and use daily planner  Patient encouraged to prioritize self-care       There has been demonstrated improvement in functioning while patient has been engaged in psychotherapy/psychological service- if withdrawn the patient would deteriorate and/or relapse.       Fanny Cobb, [facility]                                                         ______________________________________________________________________    Individual Treatment Plan    Patient's Name: Angela Jones  YOB: 1995    Date of Creation: 1/19/2022  Date Treatment Plan Last Reviewed/Revised: 1/30/2025    DSM5 Diagnoses: Autism Spectrum Disorder 299.00(F84.0)  Associated with another neurodevelopmental, mental or behavioral disorder and Attention-Deficit/Hyperactivity Disorder  314.01 (F90.9) Unspecified Attention -Deficit / Hyperactivity Disorder, 296.31 (F33.0) Major Depressive Disorder, Recurrent Episode, Mild _ or 300.02 (F41.1) Generalized Anxiety Disorder  Psychosocial / Contextual Factors: 29 year old  female, , no children   PROMIS (reviewed every 90 days):   PROMIS 10-Global Health (only subscores and total score):       7/12/2024     8:07 AM 8/2/2024     1:03 PM 11/5/2024     8:32  AM 11/19/2024     8:53 AM 12/10/2024     9:00 AM 1/14/2025    12:00 PM 1/20/2025     9:00 AM   PROMIS-10 Scores Only   Global Mental Health Score 14 13 9  10  9  12 8   Global Physical Health Score 12 12 12  11  11  11 11   PROMIS TOTAL - SUBSCORES 26 25 21  21  20  23 19         Referral / Collaboration:  Referral to another professional/service is not indicated at this time..    Anticipated number of session for this episode of care: 6-9  Anticipation frequency of session: Every other week  Anticipated Duration of each session: 53 or more minutes  Treatment plan will be reviewed in 90 days or when goals have been changed.       MeasurableTreatment Goal(s) related to diagnosis / functional impairment(s)  Goal 1: Patient will manage symptoms of anxiety, as evidenced by a JOSHUA-7 score of 5 or lower     I will know I've met my goal when I feel more confident in my ability to cope with stress.      Objective #A (Patient Action)    Patient will identify the initial signs or symptoms of anxiety.  Status: Continued - Date(s): 1/30/2025    Intervention(s)  Therapist will teach  help patient gain insight into signs of stress (physically and emotionally) .    Objective #B  Patient will use relaxation strategies multiple times per day to reduce the physical symptoms of anxiety.  Status: Continued - Date(s): 1/30/2025    Intervention(s)  Therapist will  teach diaphragmatic breathing and other grounding skills .    Objective #C  Patient will use thought-stopping strategy daily to reduce intrusive thoughts.  Status: Continued - Date(s): 1/30/2025    Intervention(s)  Therapist will  teach thought stopping techniques .      Goal 2: Patient will manage symptoms of depression, as evidenced by a PHQ-9 score of 10 or lower    I will know I've met my goal when I feel more confident in my ability to express my emotions in a healthy way.      Objective #A (Patient Action)    Status: Continued - Date(s): 1/30/2025    Patient will Increase  "interest, engagement, and pleasure in doing things.    Intervention(s)  Therapist will teach emotional recognition/identification. * .    Objective #B  Patient will Identify negative self-talk and behaviors: challenge core beliefs, myths, and actions.    Status: Continued - Date(s): 1/30/2025    Intervention(s)  Therapist will  help patient practice positive self-talk and thought re-framing .    Goal 3: Client will manage symptoms of ADHD     I will know I've met my goal when I feel confident in completing tasks.      Objective #A (Client Action)    Client will use daily planner 75% of the time.  Status: Continued - Date(s): 1/30/2025    Intervention(s)  Therapist will  ask client to demonstrate use of planner while in session .    Objective #B  Client will identify and compliment positives at least 2 times daily to support positive self-image.    Status: Continued - Date(s): 1/30/2025    Intervention(s)  Therapist will  ask patient to demonstrate use of affirmations during session .    Goal 4: Client will manage symptoms and experiences associated with ASD     I will know I've met my goal when I feel confident in my communication in relationships.      Objective #A (Client Action)    Client will work on being assertive and setting clear expectations with healthy boundaries in relationships  Status: Continued - Date(s): 1/30/2025    Intervention(s)  Therapist will  help patient practice assertive communication skills .        Patient has reviewed and agreed to the above plan.      Fanny Cobb, [facility]  January 30, 2025        [facility] Jones     SAFETY PLAN:  Step 1: Warning signs / cues (Thoughts, images, mood, situation, behavior) that a crisis may be developing:  Thoughts: \"I can't do this anymore\" and \"Nothing makes it better\" \"I'm a burden\"   Images: visions of harm: self-harm  Thinking Processes: ruminations (can't stop thinking about my " "problems):  , racing thoughts, intrusive thoughts (bothersome, unwanted thoughts that come out of nowhere):  , highly critical and negative thoughts:  , and disorganized thinking:    Mood: worsening depression, hopelessness, helplessness, intense worry, and agitation  Behaviors: can't stop crying, not taking care of myself, and not taking care of my responsibilities  Situations: relationship problems and financial stress   Step 2: Coping strategies - Things I can do to take my mind off of my problems without contacting another person (relaxation technique, physical activity):  Distress Tolerance Strategies:  relaxation activities:  , arts and crafts:  , play with my pet , sensory based activities/self-soothe with five senses:  , change body temperature (ice pack/cold water) , and paced breathing/progressive muscle relaxation, grounding exercise of naming objects, weighted items (sock filled with rice), play with puddy, use sensory items   Physical Activities: meditation, deep breathing, and stretching ; taking a shower,   Focus on helpful thoughts:  \"This is temporary\", \"I will get through this\", \"It always passes\", and self-compassion statements:    Step 3: People and social settings that provide distraction:   Name: Zander    Name: Demetri  park and work   Step 4: Remind myself of people and things that are important to me and worth living for:  my family and pets and friends  Step 5: When I am in crisis, I can ask these people to help me use my safety plan:   Name: Demetri Fermin    Step 6: Making the environment safe:   dispose of old medications , remove things I could use to hurt myself:  , and be around others  Step 7: Professionals or agencies I can contact during a crisis:  Suicide Prevention Lifeline: Call or Text 101   [facility] Crisis [facility]: [phone]    Call 911 or go to my nearest emergency department.   I helped develop this safety plan and agree to use it when needed.  I have been given a copy of this " plan.    Client signature _________________________________________________________________  Today s date:  7/25/2024  Completed by Provider Name/ Credentials:  CHANTAL Blackmon  July 25, 2024  Adapted from Safety Plan Template 2008 Yessi Prakash and Olivier Leal is reprinted with the express permission of the authors.  No portion of the Safety Plan Template may be reproduced without the express, written permission.  You can contact the authors at [email or web address] or [email or web address].         Abormal VS: Temp > 100F or < 96.8F; SBP < 90 mmHG; HR > 120bpm; Resp > 24/min

## 2025-02-27 ENCOUNTER — HOSPITAL ENCOUNTER (OUTPATIENT)
Dept: BEHAVIORAL HEALTH | Facility: CLINIC | Age: 30
End: 2025-02-27
Attending: PSYCHIATRY & NEUROLOGY
Payer: COMMERCIAL

## 2025-02-27 DIAGNOSIS — F31.11 BIPOLAR AFFECTIVE DISORDER, CURRENTLY MANIC, MILD (H): Primary | ICD-10-CM

## 2025-02-27 PROCEDURE — 90853 GROUP PSYCHOTHERAPY: CPT

## 2025-02-27 NOTE — GROUP NOTE
Psychotherapy Group Note    PATIENT'S NAME: Angela Jones  MRN:   [id number]  :   1995  ACCT. NUMBER: [id number]  DATE OF SERVICE: 25  START TIME: 10:00 AM  END TIME: 10:50 AM  FACILITATOR: Angelina Chaudhry LICSW  TOPIC: MH EBP Group: Coping Skills  [facility]  TRACK: IOP 2     NUMBER OF PARTICIPANTS: 8    Summary of Group / Topics Discussed:  Coping Skills: Building Positive Experiences: Patients discussed the importance of planning and engaging in positive experiences, as strategies to increase positive thinking, hope, and self-worth.  Explored the benefits of planning / creating positive experiences, including recognizing and reducing negativity bias by focusing on and building positive experiences.   Several approaches to building positive experiences were presented and discussed relevant to each patient.      Patient Session Goals / Objectives:  Understand the purpose of planning / creating / participating / sharing in positive experiences.  Explore patient s experiences related to negative thinking and how it influences activities and moodIdentify current positive events in patient s life.   Set goals to increase a variety of positive experiences.  Address barriers to planning / engaging in positive experiences.      Patient Participation / Response:  Fully participated with the group by sharing personal reflections / insights and openly received / provided feedback with other participants.    Demonstrated understanding of topics discussed through group discussion and participation, Expressed understanding of the relevance / importance of coping skills at distressing times in life, Demonstrated knowledge of when to consider using a variety of coping skills in daily life, and Identified barriers to applying coping skills    Treatment Plan:  Patient has a current master individualized treatment plan.  See Epic treatment plan for more  information.    Angelina Chaudhry, LICSW

## 2025-02-27 NOTE — GROUP NOTE
Psychotherapy Group Note    PATIENT'S NAME: Angela Jones  MRN:   [id number]  :   1995  ACCT. NUMBER: [id number]  DATE OF SERVICE: 25  START TIME: 11:00 AM  END TIME: 11:50 AM  FACILITATOR: Chiquita Bonilla LICSW  TOPIC:  EBP Group: [facility]  TRACK: IOP 2    NUMBER OF PARTICIPANTS: 8    Summary of Group / Topics Discussed:  Mindfulness: What is Mindfulness: Patients received an overview on what mindfulness is and how mindfulness can benefit general health, mental health symptoms, and stressors. The history of mindfulness, its application to mental health therapies, and key concepts were also discussed. Patients discussed current awareness, knowledge, and practice of mindfulness skills. Patients also discussed barriers to mindfulness practice.     Patient Session Goals / Objectives:  Demonstrated understanding of key concepts and application to daily life  Identified when/how to use mindfulness   Resolved barriers to practice  Identified plan to use mindfulness in daily life      Patient Participation / Response:  Fully participated with the group by sharing personal reflections / insights and openly received / provided feedback with other participants.    Demonstrated understanding of topics discussed through group discussion and participation, Demonstrated understanding of mindfulness skills and benefits of practice, and Identified / Expressed personal readiness to practice mindfulness skills    Treatment Plan:  Patient has a current master individualized treatment plan.  See Epic treatment plan for more information.    CHANTAL Fong

## 2025-02-27 NOTE — GROUP NOTE
Process Group Note    PATIENT'S NAME: Angela Jones  MRN:   [id number]  :   1995  ACCT. NUMBER: [id number]  DATE OF SERVICE: 25  START TIME:  9:00 AM  END TIME:  9:50 AM  FACILITATOR: Angelina Chaudhry LICSW  TOPIC:  Process Group    Diagnoses:    300.01 (F41.0) Panic Disorder  300.02 (F41.1) Generalized Anxiety Disorder.     296.33 (F33.2) Major Depressive Disorder, Recurrent Episode, Severe _.    [facility] Outpatient Programs  TRACK: IOP 2     NUMBER OF PARTICIPANTS: 8        Data:    Session content: At the start of this group, patients were invited to check in by identifying themselves, describing their current emotional status, and identifying issues to address in this group.   Area(s) of treatment focus addressed in this session included Symptom Management and Personal Safety.    Patient reported feeling anxious. Patient discussed working toward regulation. Patient identified mindfulness as skills they will use to address their goal(s). Patient reported self may be a barrier to working toward their goal(s) and/or addressing mental health symptoms. Patient reported no safety concerns and/or self-injurious behaviors. Patient reported no substance use. Patient reported they are taking their medications as prescribed. Patient reported feeling proud that they part of group. Patient discussed interpersonal concerns with the treatment group.              Therapeutic Interventions/Treatment Strategies:  Treatment modalities used include Cognitive Behavioral Therapy and Dialectical Behavioral Therapy.    Assessment:    Patient response:   Patient responded to session by accepting feedback, giving feedback, listening, focusing on goals, and being attentive    Possible barriers to participation / learning include: and no barriers identified    Health Issues:   None reported       Substance Use Review:   Substance Use: No active concerns identified.    Mental Status/Behavioral  Observations  Appearance:   Appropriate   Eye Contact:   Good   Psychomotor Behavior: Normal   Attitude:   Cooperative   Orientation:   All  Speech   Rate / Production: Normal    Volume:  Normal   Mood:    Normal  Affect:    Appropriate   Thought Content:   Clear  Thought Form:  Coherent  Logical     Insight:    Good     Plan:   Safety Plan: Committed to safety and agreed to follow previously developed safety coping plan.    Barriers to treatment: None identified  Patient Contracts (see media tab):  None  Substance Use: Not addressed in session   Continue or Discharge: Patient will continue in Adult Day Treatment (ADT)  as planned. Patient is likely to benefit from learning and using skills as they work toward the goals identified in their treatment plan.      Angelina Chaudhry, [facility]  February 27, 2025

## 2025-02-28 ENCOUNTER — HOSPITAL ENCOUNTER (OUTPATIENT)
Dept: BEHAVIORAL HEALTH | Facility: CLINIC | Age: 30
Discharge: HOME | End: 2025-02-28
Attending: PSYCHIATRY & NEUROLOGY
Payer: COMMERCIAL

## 2025-02-28 ENCOUNTER — MYC REFILL (OUTPATIENT)
Dept: PSYCHIATRY | Facility: CLINIC | Age: 30
End: 2025-02-28
Payer: COMMERCIAL

## 2025-02-28 DIAGNOSIS — F42.2 MIXED OBSESSIONAL THOUGHTS AND ACTS: ICD-10-CM

## 2025-02-28 DIAGNOSIS — F31.11 BIPOLAR AFFECTIVE DISORDER, CURRENTLY MANIC, MILD (H): ICD-10-CM

## 2025-02-28 RX ORDER — LAMOTRIGINE 100 MG/1
100 TABLET ORAL DAILY
Qty: 30 TABLET | Refills: 0 | Status: SHIPPED | OUTPATIENT
Start: 2025-02-28

## 2025-02-28 NOTE — PROGRESS NOTES
"[facility]   Adult Mental Health Outpatient Programs  Psychiatric Progress Record    Program Track: IOP 2 [place]    PATIENT'S NAME: Angela Jones  MRN:   [id number]  :   1995  ACCT. NUMBER: [id number]  DATE OF SERVICE: 25    Interval History:  \"ok.\"  presents today for follow-up and ongoing program supervision.   Endorses:  I am wondering if I should go off the Abilify. I am significantly off the restlessness  I am anxious going back on it  Mood is pretty ok right now. Calm. Basic anxiety that is my baseline  No manic symptoms right now. I am mostly in depression  I am titrating Lamotrigine, will increase to 100 mg soon    Review of Symptoms  Sleep: Fine  Appetite: ok  Anxious, at baseline  Suicidal ideation: denies current or recent suicidal ideation or behavior  Thoughts of non-suicidal self-injury: denied  Recent self-injurious behavior: denied  Homicidal ideation: denied  Other safety concerns: denied    Activities of Daily Living and Related Systems Impacted by Illness:  Hygiene: no noted concerns  Socialization: I hang out with people  Activities of Daily Living: (cleaning, shopping, bills, etc.): doing  Concerns related to work: no    Substance use:  Denies    Medications:  Current Outpatient Medications   Medication Sig Dispense Refill    ARIPiprazole (ABILIFY) 5 MG tablet Take 1 tablet (5 mg) by mouth daily. 30 tablet 1    cholecalciferol (VITAMIN D3) 125 mcg (5000 units) capsule Take 1 capsule (125 mcg) by mouth daily. 30 capsule 0    clomiPRAMINE (ANAFRANIL) 50 MG capsule Take 2 capsules (100 mg) by mouth at bedtime. 60 capsule 2    cyanocobalamin (CYANOCOBALAMIN) 2000 MCG tablet Take 1 tablet (2,000 mcg) by mouth daily. 30 tablet 0    diphenhydrAMINE (BENADRYL) 50 MG capsule Take 50 mg by mouth as needed for allergies.      fludrocortisone (FLORINEF) 0.1 MG tablet Take 2 tablets (0.2 mg) by mouth daily. 180 tablet 1    gabapentin (NEURONTIN) 300 MG capsule Take 3 capsules " (900 mg) by mouth 3 times daily. 810 capsule 3    guanFACINE (INTUNIV) 1 MG TB24 24 hr tablet Take 1 tablet (1 mg) by mouth at bedtime. 30 tablet 2    hydrocortisone (CORTAID) 0.5 % external cream Apply topically 2 times daily. (Patient not taking: Reported on 1/20/2025) 28.35 g 0    hydrOXYzine HCl (ATARAX) 25 MG tablet Take 1-2 tablets (25-50 mg) by mouth 2 times daily as needed for anxiety. 60 tablet 1    hyoscyamine (LEVSIN/SL) 0.125 MG sublingual tablet Take 1 tablet (0.125 mg) by mouth every 4 hours as needed. 120 tablet 5    ibuprofen (ADVIL/MOTRIN) 200 MG tablet Take 200 mg by mouth every 4 hours as needed for pain or other (migrain)      lactase (LACTAID) 3000 UNIT tablet Take 1-2 tablets (3,000-6,000 Units) by mouth 3 times daily (with meals). 30 tablet 0    lamoTRIgine (LAMICTAL) 25 MG tablet Take 4 tablets (100 mg) by mouth daily. 42 tablet 0    levonorgestrel (MIRENA, 52 MG,) 20 MCG/24HR IUD 1 each (20 mcg) by Intrauterine route once for 1 dose 1 each 0    metoclopramide (REGLAN) 5 MG tablet Take 1 tablet (5 mg) by mouth 3 times daily as needed (for nausea). 90 tablet 3    mirtazapine (REMERON) 15 MG tablet Take 1 tablet (15 mg) by mouth at bedtime. 90 tablet 3    multivitamin, therapeutic (THERA-VIT) TABS tablet Take 1 tablet by mouth daily      QUEtiapine (SEROQUEL) 50 MG tablet Take 1 tablet (50mg) at bedtime and additional 1 tablet (50mg) as needed for sleep / anxiety 30 tablet 2    tiZANidine (ZANAFLEX) 2 MG tablet Take 1 tablet (2 mg) by mouth daily as needed for muscle spasms. 90 tablet 3       The above list was reviewed and updated in [facility] with patient today.     Patient is taking medications as prescribed and denies adverse effects    Laboratory Results:  Most recent labs reviewed. Pertinent updates/findings: None.     Mental Status Examination:  Vital Signs: There were no vitals taken for this visit.   Appearance: adequately groomed, appears stated age, and in no apparent distress.  Attitude:  "cooperative   Eye Contact: good   Muscle Strength and Tone: normal  Psychomotor Behavior: normal or unremarkable   Gait and Station: normal width, turn, arm swing  Speech: clear, coherent, decreased prosody, regular rate, regular rhythm, and fluent  Associations: No loosening of associations  Thought Process: coherent and goal directed  Thought Content: no evidence of suicidal ideation or homicidal ideation, no evidence of psychotic thought, no auditory hallucinations present, and no visual hallucinations present  Mood: \"anxious, sad , and depressed\"  Affect: mood congruent  Insight: good  Judgment: intact, adequate for safety  Impulse Control: intact  Oriented to: time, place, person, and situation  Attention Span and Concentration: Normal  Language: Intact  Recent and Remote Memory: Delayed & immediate recall intact  Fund of Knowledge/Assessment of Intelligence: Average  Capacity of Activities of Daily Living: Independent, able to participate in programmatic care services.    Diagnosis/es:  No diagnosis found.    Assessment/Plan:  1/20/2025   presents today for initial psychiatric evaluation as part of oversight of programmatic care. She presents with a complex history of mental health challenges, including Bipolar disorder (recently diagnosed during hospitalization), depression, anxiety, ADHD, autism, and OCD.   Per patient, she is currently experiencing a manic episode, with report of reduced sleep, restlessness, impulsivity, excessive talking, difficulty focusing, and excessive spending. Suicidal ideation, though passive and without intent or plan, mirrors pre-hospitalization severity.   Stressors include an ongoing divorce, fear of losing her job, frequent illness limiting work capacity, fibromyalgia, and sociopolitical concerns tied to her transgender identity.   Medications include aripiprazole, Clomipramine, Gabapentin, Guanfacine, Hydroxyzine, Mirtazapine, and Quetiapine. She started becoming " restless, more anxious, inability to stay still correlating with Aripiprazole initiation. Adjustments today include reducing Aripiprazole to 5 mg daily and modifying hydroxyzine to 25-50 mg as needed up to three times daily.      Although a manic symptoms are evident per patient reports, overlapping symptoms associated with Cluster B personality disorders may also be contributing to the clinical presentation. Stressors appear to exacerbate underlying emotional dysregulation, further complicating the differential diagnosis  Psychotherapy is recommended to learn coping skills to be able to contend with stressors, reduce safety risks, and improve self-care  Follow up in 1 week or sooner as needed        2/17/2025   presents today for follow-up psychiatric evaluation and assessment of progress. She reports feeling anxious upon waking, with symptoms including chest tightness, difficulty catching a full breath. She notes that this anxiety is distinct from previous restlessness and urges to move [akathisia], though some of those symptoms of akathisia persist but at a lesser intensity after reduced Abilify to 5 mg. D     Outpatient psychiatrist added an additional Quetiapine 50 mg for anxiety and sleep. Given history of bipolar disorder, primarily depressive episodes and plans to discontinue Abilify, she agreed to start Lamotrigine. No additional medication changes were made. She was encouraged to continue using Hydroxyzine as needed for anxiety, as she finds it effective.   She will engage in intensive outpatient psychotherapy to develop coping skills, peer support, mitigate safety risks, and improve self-care..     Bipolar, manic  Overall improved  Engage in psychotherapy  Continue with current medication regimen  START Lamotrigine 25 mg for 14 days, the 50 mg for 14 days  Aripiprazole to 5 mg daily  Hydroxyzine 25-50 mg three times a day as needed   NO CHANGE to Clomipramine, Gabapentin, Guanfacine, Mirtazapine,  and Quetiapine  Improve sleep hygiene  Maintain a balanced diet  Engage in physical activities as tolerated      2/28/2025   presents today for follow-up psychiatric evaluation and assessment of progress. Discontinue abilify. Continue titrating Lamotrigine to 100 mg soon.    Bipolar, manic  Overall improved  Engage in psychotherapy  Continue with current medication regimen  CONTINUE Lamotrigine 50 mg   Increasing to 100 mg   STOP Aripiprazole to 5 mg daily  Hydroxyzine 25-50 mg three times a day as needed   NO CHANGE to Clomipramine, Gabapentin, Guanfacine, Mirtazapine, and Quetiapine  Improve sleep hygiene  Maintain a balanced diet  Engage in physical activities as tolerated    Safety Assessment:   reports suicidal ideation and/or non-suicidal self-injury or thoughts thereof as noted above   is future-oriented and is engaged in treatment planning   I do not feel that  meets criteria for a 72-hour involuntary hold and remains appropriate for an outpatient level of care    MONTSE HANEY DNP on 2/28/2025 at 11:32 AM    Visit Details:  Type of service: In-person  Location (patient and provider): [facility] Adult Mental Health Outpatient Programmatic Care Offices    Level of Medical Decision Making:   - At least 1 chronic problem that is not stable  - Engaged in prescription drug management during visit (discussed any medication benefits, side effects, alternatives, etc.)  Discussion of management or test interpretation with external physician/other qualified healthcare professional/appropriate source - programmatic care multidisciplinary treatment team    30 min spent on the date of the encounter in chart review, patient visit, review of tests, documentation, care coordination, and/or discussion with other providers about the issues documented above.      This document completed in part using [employer] dictation software and therefore may contain inadvertent word or phrase  substitutions.

## 2025-03-03 ENCOUNTER — HOSPITAL ENCOUNTER (OUTPATIENT)
Dept: BEHAVIORAL HEALTH | Facility: CLINIC | Age: 30
End: 2025-03-03
Attending: PSYCHIATRY & NEUROLOGY
Payer: COMMERCIAL

## 2025-03-03 DIAGNOSIS — F31.11 BIPOLAR AFFECTIVE DISORDER, CURRENTLY MANIC, MILD (H): Primary | ICD-10-CM

## 2025-03-03 PROCEDURE — 90853 GROUP PSYCHOTHERAPY: CPT

## 2025-03-03 RX ORDER — CLOMIPRAMINE HYDROCHLORIDE 50 MG/1
100 CAPSULE ORAL AT BEDTIME
Qty: 60 CAPSULE | Refills: 2 | Status: SHIPPED | OUTPATIENT
Start: 2025-03-03

## 2025-03-03 NOTE — TELEPHONE ENCOUNTER
Last seen: 01/24/2025  RTC: 4 Weeks  Any patient initiated cancellations or no shows since last visit? No  Next appt: None, also routing to scheduling.  Last filled: 02/15/2025 - Pt states in [facility] message that they have lost the medication.     Incoming refill from patient via [facility] by patient or caregiver    Medication requested:   Pending Prescriptions:                       Disp   Refills    clomiPRAMINE (ANAFRANIL) 50 MG capsule    60 cap*2            Sig: Take 2 capsules (100 mg) by mouth at bedtime.      From chart note:   Medications:      - Decrease Abilify to 5mg daily as recommended by IOP provider (prescription of 10mg sent by IOP provider though patient stated she cannot cut the pills in half)   - Start quetiapine 50mg at bedtime PRN for sleep and anxiety   - Continue quetiapine 50mg at bedtime   - Continue clomipramine 100mg at bedtime   - Continue mirtazapine 15mg at bedtime for mood    - Continue Intuniv 1 mg at bedtime    Is note signed/closed? Yes    Is this a 90 day request for a psych medication? YES     LPNs - Please check  if controlled and send to provider  Others - Send to RNs

## 2025-03-03 NOTE — GROUP NOTE
Psychoeducation Group Note    PATIENT'S NAME: Angela Jones  MRN:   [id number]  :   1995  ACCT. NUMBER: [id number]  DATE OF SERVICE: 3/03/25  START TIME: 10:00 AM  END TIME: 10:50 AM  FACILITATOR: Angelina Chaudhry LICSW; Tabby Greenwood OTR  TOPIC:  Wellness Group: [facility] Adult Mental Health Outpatient Programs  TRACK: IOP 2     NUMBER OF PARTICIPANTS: 6    Summary of Group / Topics Discussed:    Coping Cards: Provided education on the benefits of developing a robust coping plan to help manage distress and regulate emotions.  Discussed the importance of having easy access to this plan in times of increased symptoms.  Brainstormed with group members to identify coping skills in the categories of sensory strategies, distraction techniques, cognitive strategies, and physical activity.  Instructed patients to select 1-2 skills from each category to include in their deck of coping cards, then provided time and materials for patients to create this deck.  Prompted patients to consider where they might keep this tool and when they might find it most helpful.         Patient Session Goals / Objectives:   Review the benefits of having a robust coping plan to help manage distress and regulate emotions   Develop a deck of personal coping cards to promote self-regulation and independent skill use   Established a plan for practice of these skills in their own environments           Patient Participation / Response:  Fully participated with the group by sharing personal reflections / insights and openly received / provided feedback with other participants.    Demonstrated understanding of topics discussed through group discussion and participation and Identified / Expressed personal readiness to practice skills    Treatment Plan:  Patient has a current master individualized treatment plan.  See Epic treatment plan for more information.    CHANTAL Lugo

## 2025-03-03 NOTE — GROUP NOTE
Process Group Note    PATIENT'S NAME: Angela Jones  MRN:   [id number]  :   1995  ACCT. NUMBER: [id number]  DATE OF SERVICE: 3/03/25  START TIME:  9:00 AM  END TIME:  9:50 AM  FACILITATOR: Angelina Chaudhry LICSW  TOPIC:  Process Group    Diagnoses:    300.01 (F41.0) Panic Disorder  300.02 (F41.1) Generalized Anxiety Disorder.     296.33 (F33.2) Major Depressive Disorder, Recurrent Episode, Severe _.    [facility] Mental Health Outpatient Programs  TRACK: IOP 2     NUMBER OF PARTICIPANTS: 6        Data:    Session content: At the start of this group, patients were invited to check in by identifying themselves, describing their current emotional status, and identifying issues to address in this group.   Area(s) of treatment focus addressed in this session included Symptom Management and Personal Safety.    Patient reported feeling neutral. Patient discussed working toward wellness. Patient identified CBT as skills they will use to address their goal(s). Patient reported self may be a barrier to working toward their goal(s) and/or addressing mental health symptoms. Patient reported no safety concerns and/or self-injurious behaviors. Patient reported no substance use. Patient reported they are taking their medications as prescribed. Patient reported feeling proud that they part of group. Patient discussed coping with the treatment group.              Therapeutic Interventions/Treatment Strategies:  Treatment modalities used include Cognitive Behavioral Therapy and Dialectical Behavioral Therapy.    Assessment:    Patient response:   Patient responded to session by accepting feedback, giving feedback, listening, focusing on goals, and being attentive    Possible barriers to participation / learning include: and no barriers identified    Health Issues:   None reported       Substance Use Review:   Substance Use: No active concerns identified.    Mental Status/Behavioral  Observations  Appearance:   Appropriate   Eye Contact:   Good   Psychomotor Behavior: Normal   Attitude:   Cooperative   Orientation:   All  Speech   Rate / Production: Normal    Volume:  Normal   Mood:    Normal  Affect:    Appropriate   Thought Content:   Clear  Thought Form:  Coherent  Logical     Insight:    Good     Plan:   Safety Plan: No current safety concerns identified.  Recommended that patient call 911 or go to the local ED should there be a change in any of these risk factors.   Barriers to treatment: None identified  Patient Contracts (see media tab):  None  Substance Use: Not addressed in session   Continue or Discharge: Patient will continue in Adult Day Treatment (ADT)  as planned. Patient is likely to benefit from learning and using skills as they work toward the goals identified in their treatment plan.      Angelina Chaudhry, [facility]  March 3, 2025

## 2025-03-03 NOTE — GROUP NOTE
Psychotherapy Group Note    PATIENT'S NAME: Angela Jones  MRN:   [id number]  :   1995  ACCT. NUMBER: [id number]  DATE OF SERVICE: 3/03/25  START TIME: 11:00 AM  END TIME: 11:50 AM  FACILITATOR: Chiquita Bonilla LICSW  TOPIC: MH EBP Group: Coping Skills  [facility] Adult Mental Health Outpatient Programs  TRACK: IOP 2    NUMBER OF PARTICIPANTS: 6    Summary of Group / Topics Discussed:  Coping Skills: Building Positive Experiences: Patients discussed the importance of planning and engaging in positive experiences, as strategies to increase positive thinking, hope, and self-worth.  Explored the benefits of planning / creating positive experiences, including recognizing and reducing negativity bias by focusing on and building positive experiences.   Several approaches to building positive experiences were presented and discussed relevant to each patient.      Patient Session Goals / Objectives:  Understand the purpose of planning / creating / participating / sharing in positive experiences.  Explore patient s experiences related to negative thinking and how it influences activities and moodIdentify current positive events in patient s life.   Set goals to increase a variety of positive experiences.  Address barriers to planning / engaging in positive experiences.      Patient Participation / Response:  Minimally participated, only when prompted / asked.    Demonstrated understanding of topics discussed through group discussion and participation    Treatment Plan:  Patient has a current master individualized treatment plan.  See Epic treatment plan for more information.    CHANTAL Fong

## 2025-03-04 ENCOUNTER — HOSPITAL ENCOUNTER (OUTPATIENT)
Dept: BEHAVIORAL HEALTH | Facility: CLINIC | Age: 30
End: 2025-03-04
Attending: PSYCHIATRY & NEUROLOGY
Payer: COMMERCIAL

## 2025-03-04 DIAGNOSIS — F31.11 BIPOLAR AFFECTIVE DISORDER, CURRENTLY MANIC, MILD (H): Primary | ICD-10-CM

## 2025-03-04 PROCEDURE — 90853 GROUP PSYCHOTHERAPY: CPT | Performed by: COUNSELOR

## 2025-03-04 PROCEDURE — 90853 GROUP PSYCHOTHERAPY: CPT

## 2025-03-04 NOTE — GROUP NOTE
Psychotherapy Group Note    PATIENT'S NAME: Angela Jones  MRN:   [id number]  :   1995  ACCT. NUMBER: [id number]  DATE OF SERVICE: 3/04/25  START TIME: 11:00 AM  END TIME: 11:50 AM  FACILITATOR: Arash Moss LMFT  TOPIC:  EBP Group: Emotions Management  [facility] Mental Health Outpatient Programs  TRACK: IOP2    NUMBER OF PARTICIPANTS: 7    Summary of Group / Topics Discussed:  Emotions Management: Model of Emotions: Patients were introduced to the cyclical model of emotions.  Explored emotions are shaped by different events and one s interpretation of events.  Group discussed how emotions begin with an event, followed by one s interpretation, followed by associated feelings.  Discussion included a review of personal urges and actions that can/do follow an emotional experience in the patient s life, and the end results.    Patient Session Goals / Objectives:  Demonstrate understanding of types various emotions.  Identify and discuss specific emotions and when they occur; understand triggers.  Identify individual emotions and physical sensations that accompany them.  Discuss urges and actions, and how to influence the intensity of emotional reactions and disrupt the cycle.    Discuss barriers to emotional regulation.  Choose 1-2 strategies to assist with emotional response to potentially distressing situations.      Patient Participation / Response:  Fully participated with the group by sharing personal reflections / insights and openly received / provided feedback with other participants.    Demonstrated understanding of topics discussed through group discussion and participation and Expressed understanding of the relevance / importance of emotions management skills at distressing times in life    Treatment Plan:  Patient has a current master individualized treatment plan.  See Epic treatment plan for more information.    RAMIREZ Ernandez

## 2025-03-04 NOTE — GROUP NOTE
Process Group Note    PATIENT'S NAME: Angela Jones  MRN:   [id number]  :   1995  ACCT. NUMBER: [id number]  DATE OF SERVICE: 3/04/25  START TIME:  9:00 AM  END TIME:  9:50 AM  FACILITATOR: Angelina Chaudhry LICSW  TOPIC:  Process Group    Diagnoses:  300.01 (F41.0) Panic Disorder  300.02 (F41.1) Generalized Anxiety Disorder.     296.33 (F33.2) Major Depressive Disorder, Recurrent Episode, Severe _.      [facility]  TRACK: IOP 2     NUMBER OF PARTICIPANTS: 7        Data:    Session content: At the start of this group, patients were invited to check in by identifying themselves, describing their current emotional status, and identifying issues to address in this group.   Area(s) of treatment focus addressed in this session included Symptom Management and Personal Safety.    Patient reported feeling anxious. Patient discussed working toward wellness. Patient identified grounding as skills they will use to address their goal(s). Patient reported self may be a barrier to working toward their goal(s) and/or addressing mental health symptoms. Patient reported no safety concerns and/or self-injurious behaviors. Patient reported no substance use. Patient reported they are taking their medications as prescribed. Patient reported feeling proud that they part of group. Patient discussed wellness with the treatment group.              Therapeutic Interventions/Treatment Strategies:  Treatment modalities used include Cognitive Behavioral Therapy and Dialectical Behavioral Therapy.    Assessment:    Patient response:   Patient responded to session by accepting feedback, giving feedback, listening, and focusing on goals    Possible barriers to participation / learning include: and no barriers identified    Health Issues:   None reported       Substance Use Review:   Substance Use: No active concerns identified.    Mental Status/Behavioral  Observations  Appearance:   Appropriate   Eye Contact:   Good   Psychomotor Behavior: Normal   Attitude:   Cooperative   Orientation:   All  Speech   Rate / Production: Normal    Volume:  Normal   Mood:    Normal  Affect:    Appropriate   Thought Content:   Clear  Thought Form:  Coherent  Logical     Insight:    Good     Plan:   Safety Plan: No current safety concerns identified.  Recommended that patient call 911 or go to the local ED should there be a change in any of these risk factors.   Barriers to treatment: None identified  Patient Contracts (see media tab):  None  Substance Use: Not addressed in session   Continue or Discharge: Patient will continue in Adult Day Treatment (ADT)  as planned. Patient is likely to benefit from learning and using skills as they work toward the goals identified in their treatment plan.      Angelina Chaudhry, [facility]  March 4, 2025

## 2025-03-04 NOTE — GROUP NOTE
Psychotherapy Group Note    PATIENT'S NAME: Angela Jones  MRN:   [id number]  :   1995  ACCT. NUMBER: [id number]  DATE OF SERVICE: 3/04/25  START TIME: 10:00 AM  END TIME: 10:50 AM  FACILITATOR: Angelina Chaudhry LICSW  TOPIC:  DBT Group: Distress Tolerance  [facility]  TRACK: IOP 2    NUMBER OF PARTICIPANTS: 7    Summary of Group/Topics Discussed:  Distress Tolerance: Distress Tolerance skills teach Patients how to tolerate and survive crisis situations without making things worse. The Crisis Survival Skills cover techniques for tolerating painful events, urges, and emotions when you cannot make things better right away. The Reality Acceptance Skills show Patients how to reduce suffering by helping them accept and enter fully into life even when it is not the life they want. Today Patients practiced radical acceptance      Patient Session Goals/Objectives:  Demonstrate understanding of Crisis survival skills and radical acceptance skills.  Engage in discussion around the use of radical acceptance  Identify barriers to using radical acceptance and problem solve with group members.  Verbalize ways that radical acceptance can be used in their own lives.      Patient Participation / Response:  Fully participated with the group by sharing personal reflections / insights and openly received / provided feedback with other participants.    Demonstrated understanding of topics discussed through group discussion and participation, Expressed understanding of the relationship between behaviors, thoughts, and feelings, Shared experiences and challenges with making behavioral changes, and Identified barriers to change    Treatment Plan:  Patient has a current master individualized treatment plan.  See Epic treatment plan for more information.    CHANTAL Lugo

## 2025-03-12 ENCOUNTER — VIRTUAL VISIT (OUTPATIENT)
Dept: BEHAVIORAL HEALTH | Facility: CLINIC | Age: 30
End: 2025-03-12
Payer: COMMERCIAL

## 2025-03-12 DIAGNOSIS — F84.0 AUTISM SPECTRUM DISORDER: ICD-10-CM

## 2025-03-12 DIAGNOSIS — F32.3 MAJOR DEPRESSIVE DISORDER WITH PSYCHOTIC FEATURES (H): Primary | ICD-10-CM

## 2025-03-12 DIAGNOSIS — F41.1 GENERALIZED ANXIETY DISORDER: ICD-10-CM

## 2025-03-12 PROCEDURE — 90785 PSYTX COMPLEX INTERACTIVE: CPT | Mod: 95 | Performed by: SOCIAL WORKER

## 2025-03-12 PROCEDURE — 90837 PSYTX W PT 60 MINUTES: CPT | Mod: 95 | Performed by: SOCIAL WORKER

## 2025-03-12 NOTE — PROGRESS NOTES
[facility]                                     Progress Note    Patient Name: Angela Jones  Date: 3/12/2025         Service Type: Individual      Session Start Time: 9:00am  Session End Time: 9:55am     Session Length: 55 minutes    Session #: 50    Attendees: Client attended alone    Service Modality:  Video Visit:      Provider verified identity through the following two step process.  Patient provided:  Patient is known previously to provider    Telemedicine Visit: The patient's condition can be safely assessed and treated via synchronous audio and visual telemedicine encounter.      Reason for Telemedicine Visit: Patient has requested telehealth visit    Originating Site (Patient Location): Patient's other friend's home    Distant Site (Provider Location): Provider Remote Setting- Home Office    Consent:  The patient/guardian has verbally consented to: the potential risks and benefits of telemedicine (video visit) versus in person care; bill my insurance or make self-payment for services provided; and responsibility for payment of non-covered services.     Patient would like the video invitation sent by:  My Chart    Mode of Communication:  Video Conference via [facility]      Distant Location (Provider):  Off-site    As the provider I attest to compliance with applicable laws and regulations related to telemedicine.    DATA  Extended Session (53+ minutes): PROLONGED SERVICE IN THE OUTPATIENT SETTING REQUIRING DIRECT (FACE-TO-FACE) PATIENT CONTACT BEYOND THE USUAL SERVICE:    - Treatment protocol required additional time to complete, due to the nature of diagnosis being treated (see below).  See Interventions section for details  Interactive Complexity: Yes, visit entailed Interactive Complexity evidenced by:  -The need to manage maladaptive communication (related to, e.g., high anxiety, high reactivity, repeated questions, or disagreement) among participants that complicates delivery of  "care; diagnostic criteria for ASD are met and complicate the delivery of services. Patient requires additional support and alternative methods to engage in psychotherapy services.   Crisis: No        Progress Since Last Session (Related to Symptoms / Goals / Homework):   Symptoms: Worsening anxiety and depression      Homework: Partially completed      Episode of Care Goals: Minimal progress - PREPARATION (Decided to change - considering how); Intervened by negotiating a change plan and determining options / strategies for behavior change, identifying triggers, exploring social supports, and working towards setting a date to begin behavior change     Current / Ongoing Stressors and Concerns:  Patient provides a status update while therapist utilizes reflective listening skills. She reports feeling very anxious. She shares that it's been \"a wild\" couple of weeks. She switched to the [facility] program which started this week and it hasn't been what she thought it would be as [facility]. She has only gone to the program one day so far. She reports that she is \"doing much better now compared to when I started at [facility].\" She had a migraine and her partner got laid off from work which has been stressful. She is supposed to start back at work next week but hasn't heard any updates about this. She is still not doing well at home by herself. Her partner asked if she'd consider moving in with them and she is reluctant due to accessibility issues and wondering if it's been too soon?     Therapist assessed for risk and safety - patient reports intrusive thoughts of self-harm with no intent to die. Patient encouraged to use her safety plan. Should there be an increase in symptoms or severity related to SI/SIB, patient should call 911 or go to local ED for evaluation.         Treatment Objective(s) Addressed in This Session:   identify the fears / thoughts that contribute to feeling anxious  state understanding of " stressors and relationship to physical symptoms  Increase interest, engagement, and pleasure in doing things  Decrease frequency and intensity of feeling down, depressed, hopeless  Identify negative self-talk and behaviors: challenge core beliefs, myths, and actions  Gain insight     Intervention:   Discussed mindfulness as being aware of what we are experiencing while we are experiencing it.  Contrasted this with avoidance and rumination.  Explored the role of mindfulness as an overall coping strategy for managing depression, anxiety, and strong emotions.  Explained concept of state of mind using SIFT (sensations, images, feelings, thoughts) pneumonic. Discussed mindfulness as a tool to intentionally shift our awareness and focus as needed.  Discussed physical, mental, and emotional boundaries and limit-setting with others. Explored management of boundaries through direct communication and limiting contact and communication with others.  Discussed barriers to using boundary management skills including strong emotions and physical proximity.  Discussed option of coming up with a night time safety plan to help her stay regulated and grounded. Therapist provided unconditional positive regard and supportive counseling.     Assessments completed prior to visit:   The following assessments were completed by patient for this visit:  PHQ9:       11/5/2024     8:30 AM 11/7/2024     8:20 AM 11/19/2024     8:51 AM 12/10/2024     8:59 AM 1/14/2025    12:00 PM 1/20/2025     9:00 AM 2/26/2025     9:47 AM   PHQ-9 SCORE   PHQ-9 Total Score MyChart 19 (Moderately severe depression) 18 (Moderately severe depression) 14 (Moderate depression) 15 (Moderately severe depression)   17 (Moderately severe depression)   PHQ-9 Total Score 19  18  14  15  20 24 17        Patient-reported     GAD7:       9/12/2024     7:47 AM 10/25/2024     9:07 AM 11/19/2024     8:52 AM 1/14/2025    12:00 PM 1/20/2025     9:00 AM 1/24/2025     2:16 PM  2/26/2025     9:46 AM   JOSHUA-7 SCORE   Total Score 9 (mild anxiety) 13 (moderate anxiety) 10 (moderate anxiety)   15 (severe anxiety) 15 (severe anxiety)   Total Score 9    9 13  10  15 16 15  15        Patient-reported     PROMIS 10-Global Health (only subscores and total score):       7/12/2024     8:07 AM 8/2/2024     1:03 PM 11/5/2024     8:32 AM 11/19/2024     8:53 AM 12/10/2024     9:00 AM 1/14/2025    12:00 PM 1/20/2025     9:00 AM   PROMIS-10 Scores Only   Global Mental Health Score 14 13 9  10  9  12 8   Global Physical Health Score 12 12 12  11  11  11 11   PROMIS TOTAL - SUBSCORES 26 25 21  21  20  23 19       Patient-reported      [place] Suicide Severity Rating Scale (Short Version)      1/5/2025     2:31 PM 1/5/2025     6:12 PM 1/6/2025    11:52 AM 1/7/2025    11:57 AM 1/8/2025     9:24 PM 2/26/2025    10:23 AM 3/7/2025    10:00 AM   [place] Suicide Severity Rating (Short Version)   Q1 Wished to be Dead (Past Month) 1-->yes 1-->yes 1-->yes  0-->no     Q2 Suicidal Thoughts (Past Month) 1-->yes 1-->yes 1-->yes  1-->yes     Q3 Suicidal Thought Method 1-->yes 1-->yes 1-->yes  1-->yes     Q4 Suicidal Intent without Specific Plan 1-->yes 1-->yes 0-->no  0-->no     Q5 Suicide Intent with Specific Plan 1-->yes 0-->no 1-->yes  1-->yes     Q6 Suicide Behavior (Lifetime) 0-->no 0-->no 0-->no  0-->no     Level of Risk per Screen high risk high risk high risk  high risk     1. Wish to be Dead (Since Last Contact)    Y  N N   2. Non-Specific Active Suicidal Thoughts (Since Last Contact)    Y  N N   3. Active Suicidal Ideation with any Methods (Not Plan) Without Intent to Act (Since Last Contact)    Y      4. Active Suicidal Ideation with Some Intent to Act, Without Specific Plan (Since Last Contact)    N      5. Active Suicidal Ideation with Specific Plan and Intent (Since Last Contact)    N      Most Severe Ideation Rating (Since Last Contact)    5      Frequency (Since Last Contact)    4      Duration (Since Last  Contact)    2      Deterrents (Since Last Contact)    0      Reasons for Ideation (Since Last Contact)    4      Actual Attempt (Since Last Contact)    N  N    Has subject engaged in non-suicidal self-injurious behavior? (Since Last Contact)    N  N    Interrupted Attempts (Since Last Contact)    N  N    Aborted or Self-Interrupted Attempt (Since Last Contact)    N  N    Preparatory Acts or Behavior (Since Last Contact)    N  N    Suicide (Since Last Contact)    N  N    Calculated C-SSRS Risk Score (Since Last Contact)    Moderate Risk  No Risk Indicated No Risk Indicated          ASSESSMENT: Current Emotional / Mental Status (status of significant symptoms):   Risk status (Self / Other harm or suicidal ideation)   Patient denies current fears or concerns for personal safety.   Patient denies current or recent suicidal ideation or behaviors.   Patient denies current or recent homicidal ideation or behaviors.   Patient reports current or recent self injurious behavior or ideation including SIB ideation, intrusive thoughts.   Patient denies other safety concerns.   Patient reports there has been no change in risk factors since their last session.     Patient reports there has been no change in protective factors since their last session.     A safety and risk management plan has been developed including: Patient consented to co-developed safety plan.  Safety and risk management plan was completed - see below.  Patient agreed to use safety plan should any safety concerns arise.  A copy was given to the patient.     Appearance:   Appropriate    Eye Contact:   Good    Psychomotor Behavior: Hyperactive  Restless    Attitude:   Cooperative    Orientation:   All   Speech    Rate / Production: Pressured  Stutters    Volume:  Normal    Mood:    Anxious  Depressed  Anhedonia   Affect:    Constricted  Flat  Worrisome    Thought Content:  Clear    Thought Form:  Coherent    Insight:    Good      Medication Review:   No changes  to current psychiatric medication(s)     Medication Compliance:   Yes     Changes in Health Issues:   None reported     Chemical Use Review:   Substance Use: Chemical use reviewed, no active concerns identified      Tobacco Use: No current tobacco use.      Diagnosis:  1. Major depressive disorder with psychotic features (H)    2. Generalized anxiety disorder    3. Autism spectrum disorder    ADHD  R/O Bipolar Disorder  R/O OCD     Collateral Reports Completed:   Not Applicable    PLAN: (Patient Tasks / Therapist Tasks / Other)  Patient will return for a virtual visit in 2 weeks  Patient encouraged to practice healthy boundaries  Patient will utilize her safety plan   Patient will consider visual aids to help her use skills while at home alone   Patient encouraged to journal and use daily planner  Patient encouraged to prioritize self-care   Patient encouraged to continue participating in [facility] program until figuring out an alternative       There has been demonstrated improvement in functioning while patient has been engaged in psychotherapy/psychological service- if withdrawn the patient would deteriorate and/or relapse.       Fanny Cobb, [facility]                                                         ______________________________________________________________________    Individual Treatment Plan    Patient's Name: Angela Jones  YOB: 1995    Date of Creation: 1/19/2022  Date Treatment Plan Last Reviewed/Revised: 1/30/2025    DSM5 Diagnoses: Autism Spectrum Disorder 299.00(F84.0)  Associated with another neurodevelopmental, mental or behavioral disorder and Attention-Deficit/Hyperactivity Disorder  314.01 (F90.9) Unspecified Attention -Deficit / Hyperactivity Disorder, 296.31 (F33.0) Major Depressive Disorder, Recurrent Episode, Mild _ or 300.02 (F41.1) Generalized Anxiety Disorder  Psychosocial / Contextual Factors: 29 year old  female, , no children   PROMIS  (reviewed every 90 days):   PROMIS 10-Global Health (only subscores and total score):       7/12/2024     8:07 AM 8/2/2024     1:03 PM 11/5/2024     8:32 AM 11/19/2024     8:53 AM 12/10/2024     9:00 AM 1/14/2025    12:00 PM 1/20/2025     9:00 AM   PROMIS-10 Scores Only   Global Mental Health Score 14 13 9  10  9  12 8   Global Physical Health Score 12 12 12  11  11  11 11   PROMIS TOTAL - SUBSCORES 26 25 21  21  20  23 19         Referral / Collaboration:  Referral to another professional/service is not indicated at this time..    Anticipated number of session for this episode of care: 6-9  Anticipation frequency of session: Every other week  Anticipated Duration of each session: 53 or more minutes  Treatment plan will be reviewed in 90 days or when goals have been changed.       MeasurableTreatment Goal(s) related to diagnosis / functional impairment(s)  Goal 1: Patient will manage symptoms of anxiety, as evidenced by a JOSHUA-7 score of 5 or lower     I will know I've met my goal when I feel more confident in my ability to cope with stress.      Objective #A (Patient Action)    Patient will identify the initial signs or symptoms of anxiety.  Status: Continued - Date(s): 1/30/2025    Intervention(s)  Therapist will teach  help patient gain insight into signs of stress (physically and emotionally) .    Objective #B  Patient will use relaxation strategies multiple times per day to reduce the physical symptoms of anxiety.  Status: Continued - Date(s): 1/30/2025    Intervention(s)  Therapist will  teach diaphragmatic breathing and other grounding skills .    Objective #C  Patient will use thought-stopping strategy daily to reduce intrusive thoughts.  Status: Continued - Date(s): 1/30/2025    Intervention(s)  Therapist will  teach thought stopping techniques .      Goal 2: Patient will manage symptoms of depression, as evidenced by a PHQ-9 score of 10 or lower    I will know I've met my goal when I feel more confident in  "my ability to express my emotions in a healthy way.      Objective #A (Patient Action)    Status: Continued - Date(s): 1/30/2025    Patient will Increase interest, engagement, and pleasure in doing things.    Intervention(s)  Therapist will teach emotional recognition/identification. * .    Objective #B  Patient will Identify negative self-talk and behaviors: challenge core beliefs, myths, and actions.    Status: Continued - Date(s): 1/30/2025    Intervention(s)  Therapist will  help patient practice positive self-talk and thought re-framing .    Goal 3: Client will manage symptoms of ADHD     I will know I've met my goal when I feel confident in completing tasks.      Objective #A (Client Action)    Client will use daily planner 75% of the time.  Status: Continued - Date(s): 1/30/2025    Intervention(s)  Therapist will  ask client to demonstrate use of planner while in session .    Objective #B  Client will identify and compliment positives at least 2 times daily to support positive self-image.    Status: Continued - Date(s): 1/30/2025    Intervention(s)  Therapist will  ask patient to demonstrate use of affirmations during session .    Goal 4: Client will manage symptoms and experiences associated with ASD     I will know I've met my goal when I feel confident in my communication in relationships.      Objective #A (Client Action)    Client will work on being assertive and setting clear expectations with healthy boundaries in relationships  Status: Continued - Date(s): 1/30/2025    Intervention(s)  Therapist will  help patient practice assertive communication skills .        Patient has reviewed and agreed to the above plan.      Fanny Cobb, [facility]  January 30, 2025        [facility] Joan Jones     SAFETY PLAN:  Step 1: Warning signs / cues (Thoughts, images, mood, situation, behavior) that a crisis may be developing:  Thoughts: \"I can't do this " "anymore\" and \"Nothing makes it better\" \"I'm a burden\"   Images: visions of harm: self-harm  Thinking Processes: ruminations (can't stop thinking about my problems):  , racing thoughts, intrusive thoughts (bothersome, unwanted thoughts that come out of nowhere):  , highly critical and negative thoughts:  , and disorganized thinking:    Mood: worsening depression, hopelessness, helplessness, intense worry, and agitation  Behaviors: can't stop crying, not taking care of myself, and not taking care of my responsibilities  Situations: relationship problems and financial stress   Step 2: Coping strategies - Things I can do to take my mind off of my problems without contacting another person (relaxation technique, physical activity):  Distress Tolerance Strategies:  relaxation activities:  , arts and crafts:  , play with my pet , sensory based activities/self-soothe with five senses:  , change body temperature (ice pack/cold water) , and paced breathing/progressive muscle relaxation, grounding exercise of naming objects, weighted items (sock filled with rice), play with puddy, use sensory items   Physical Activities: meditation, deep breathing, and stretching ; taking a shower,   Focus on helpful thoughts:  \"This is temporary\", \"I will get through this\", \"It always passes\", and self-compassion statements:    Step 3: People and social settings that provide distraction:   Name: Zander    Name: Demetri  park and work   Step 4: Remind myself of people and things that are important to me and worth living for:  my family and pets and friends  Step 5: When I am in crisis, I can ask these people to help me use my safety plan:   Name: Demetri Fermin    Step 6: Making the environment safe:   dispose of old medications , remove things I could use to hurt myself:  , and be around others  Step 7: Professionals or agencies I can contact during a crisis:  Suicide Prevention Lifeline: Call or Text 888   Local Crisis Services: " [phone]    Call 911 or go to my nearest emergency department.   I helped develop this safety plan and agree to use it when needed.  I have been given a copy of this plan.    Client signature _________________________________________________________________  Today s date:  7/25/2024  Completed by Provider Name/ Credentials:  CHANTAL Blackmon  July 25, 2024  Adapted from Safety Plan Template 2008 Yessi Prakash and Olivier Leal is reprinted with the express permission of the authors.  No portion of the Safety Plan Template may be reproduced without the express, written permission.  You can contact the authors at [email or web address]@[email or web address] or [email or web address].

## 2025-03-26 ENCOUNTER — TRANSFERRED RECORDS (OUTPATIENT)
Dept: HEALTH INFORMATION MANAGEMENT | Facility: CLINIC | Age: 30
End: 2025-03-26
Payer: COMMERCIAL

## 2025-03-26 ENCOUNTER — VIRTUAL VISIT (OUTPATIENT)
Dept: BEHAVIORAL HEALTH | Facility: CLINIC | Age: 30
End: 2025-03-26
Payer: COMMERCIAL

## 2025-03-26 DIAGNOSIS — F33.0 MILD EPISODE OF RECURRENT MAJOR DEPRESSIVE DISORDER: ICD-10-CM

## 2025-03-26 DIAGNOSIS — F90.2 ATTENTION DEFICIT HYPERACTIVITY DISORDER (ADHD), COMBINED TYPE: ICD-10-CM

## 2025-03-26 DIAGNOSIS — F84.0 AUTISM SPECTRUM DISORDER: ICD-10-CM

## 2025-03-26 DIAGNOSIS — F41.1 GENERALIZED ANXIETY DISORDER: Primary | ICD-10-CM

## 2025-03-26 PROCEDURE — 90785 PSYTX COMPLEX INTERACTIVE: CPT | Mod: 95 | Performed by: SOCIAL WORKER

## 2025-03-26 PROCEDURE — 90837 PSYTX W PT 60 MINUTES: CPT | Mod: 95 | Performed by: SOCIAL WORKER

## 2025-03-26 NOTE — PROGRESS NOTES
[facility]                                     Progress Note    Patient Name: Angela Jones  Date: 3/26/2025         Service Type: Individual      Session Start Time: 10:00am  Session End Time: 10:53am     Session Length: 53 minutes    Session #: 51    Attendees: Client attended alone    Service Modality:  Video Visit:      Provider verified identity through the following two step process.  Patient provided:  Patient is known previously to provider    Telemedicine Visit: The patient's condition can be safely assessed and treated via synchronous audio and visual telemedicine encounter.      Reason for Telemedicine Visit: Patient has requested telehealth visit    Originating Site (Patient Location): Patient's home    Distant Site (Provider Location): Provider Remote Setting- Home Office    Consent:  The patient/guardian has verbally consented to: the potential risks and benefits of telemedicine (video visit) versus in person care; bill my insurance or make self-payment for services provided; and responsibility for payment of non-covered services.     Patient would like the video invitation sent by:  My Chart    Mode of Communication:  Video Conference via [facility]      Distant Location (Provider):  Off-site    As the provider I attest to compliance with applicable laws and regulations related to telemedicine.    DATA  Extended Session (53+ minutes): PROLONGED SERVICE IN THE OUTPATIENT SETTING REQUIRING DIRECT (FACE-TO-FACE) PATIENT CONTACT BEYOND THE USUAL SERVICE:    - Treatment protocol required additional time to complete, due to the nature of diagnosis being treated (see below).  See Interventions section for details  Interactive Complexity: Yes, visit entailed Interactive Complexity evidenced by:  -The need to manage maladaptive communication (related to, e.g., high anxiety, high reactivity, repeated questions, or disagreement) among participants that complicates delivery of care;  diagnostic criteria for ASD are met and complicate the delivery of services. Patient requires additional support and alternative methods to engage in psychotherapy services.   Crisis: No        Progress Since Last Session (Related to Symptoms / Goals / Homework):   Symptoms: No change anxiety and depression      Homework: Achieved / completed to satisfaction      Episode of Care Goals: Minimal progress - PREPARATION (Decided to change - considering how); Intervened by negotiating a change plan and determining options / strategies for behavior change, identifying triggers, exploring social supports, and working towards setting a date to begin behavior change     Current / Ongoing Stressors and Concerns:  Patient provides a status update while therapist utilizes reflective listening skills - she is feeling tired overall.  Patient reports waking up feeling anxious this morning. Patient is still participating in the [facility] program (4 days/week; 3 hrs/day - goes until May 5th at which point there will be a step-down) - feels that this is actually going quite well. She is trying to apply the learned skills while at home. She shares that her partner is doing a bit better. She has decided to move into her partner's home in September.   Waiting to hear from the [employer] to set up the next meeting for their divorce. She cannot afford a . She put in a request for extension for her leave of absence at work to May and is still waiting to hear back.     Therapist assessed for risk and safety - patient denied any current SI/SIB.  Should there be an increase in symptoms or severity related to SI/SIB, patient should call 911 or go to local ED for evaluation.         Treatment Objective(s) Addressed in This Session:   identify the fears / thoughts that contribute to feeling anxious  state understanding of stressors and relationship to physical symptoms  Increase interest, engagement, and pleasure in doing things  Decrease  frequency and intensity of feeling down, depressed, hopeless  Identify negative self-talk and behaviors: challenge core beliefs, myths, and actions  Gain insight     Intervention:  Reviewed the following CBT interventions:  Learning to recognize one s distortions in thinking that are creating problems, and then to reevaluate them in light of reality.  Gaining a better understanding of the behavior and motivation of others.  Using problem-solving skills to cope with difficult situations.  Learning to develop a greater sense of confidence in one s own abilities.  Reviewing CBT strategies to manage stress including mindfulness, grounding and deep breathing      Assessments completed prior to visit:   The following assessments were completed by patient for this visit:  PHQ9:       11/5/2024     8:30 AM 11/7/2024     8:20 AM 11/19/2024     8:51 AM 12/10/2024     8:59 AM 1/14/2025    12:00 PM 1/20/2025     9:00 AM 2/26/2025     9:47 AM   PHQ-9 SCORE   PHQ-9 Total Score MyChart 19 (Moderately severe depression) 18 (Moderately severe depression) 14 (Moderate depression) 15 (Moderately severe depression)   17 (Moderately severe depression)   PHQ-9 Total Score 19  18  14  15  20 24 17        Patient-reported     GAD7:       9/12/2024     7:47 AM 10/25/2024     9:07 AM 11/19/2024     8:52 AM 1/14/2025    12:00 PM 1/20/2025     9:00 AM 1/24/2025     2:16 PM 2/26/2025     9:46 AM   JOSHUA-7 SCORE   Total Score 9 (mild anxiety) 13 (moderate anxiety) 10 (moderate anxiety)   15 (severe anxiety) 15 (severe anxiety)   Total Score 9    9 13  10  15 16 15  15        Patient-reported     PROMIS 10-Global Health (only subscores and total score):       7/12/2024     8:07 AM 8/2/2024     1:03 PM 11/5/2024     8:32 AM 11/19/2024     8:53 AM 12/10/2024     9:00 AM 1/14/2025    12:00 PM 1/20/2025     9:00 AM   PROMIS-10 Scores Only   Global Mental Health Score 14 13 9  10  9  12 8   Global Physical Health Score 12 12 12  11  11  11 11   PROMIS  TOTAL - SUBSCORES 26 25 21  21  20  23 19       Patient-reported      [place] Suicide Severity Rating Scale (Short Version)      1/5/2025     2:31 PM 1/5/2025     6:12 PM 1/6/2025    11:52 AM 1/7/2025    11:57 AM 1/8/2025     9:24 PM 2/26/2025    10:23 AM 3/7/2025    10:00 AM   [place] Suicide Severity Rating (Short Version)   Q1 Wished to be Dead (Past Month) 1-->yes 1-->yes 1-->yes  0-->no     Q2 Suicidal Thoughts (Past Month) 1-->yes 1-->yes 1-->yes  1-->yes     Q3 Suicidal Thought Method 1-->yes 1-->yes 1-->yes  1-->yes     Q4 Suicidal Intent without Specific Plan 1-->yes 1-->yes 0-->no  0-->no     Q5 Suicide Intent with Specific Plan 1-->yes 0-->no 1-->yes  1-->yes     Q6 Suicide Behavior (Lifetime) 0-->no 0-->no 0-->no  0-->no     Level of Risk per Screen high risk high risk high risk  high risk     1. Wish to be Dead (Since Last Contact)    Y  N N   2. Non-Specific Active Suicidal Thoughts (Since Last Contact)    Y  N N   3. Active Suicidal Ideation with any Methods (Not Plan) Without Intent to Act (Since Last Contact)    Y      4. Active Suicidal Ideation with Some Intent to Act, Without Specific Plan (Since Last Contact)    N      5. Active Suicidal Ideation with Specific Plan and Intent (Since Last Contact)    N      Most Severe Ideation Rating (Since Last Contact)    5      Frequency (Since Last Contact)    4      Duration (Since Last Contact)    2      Deterrents (Since Last Contact)    0      Reasons for Ideation (Since Last Contact)    4      Actual Attempt (Since Last Contact)    N  N    Has subject engaged in non-suicidal self-injurious behavior? (Since Last Contact)    N  N    Interrupted Attempts (Since Last Contact)    N  N    Aborted or Self-Interrupted Attempt (Since Last Contact)    N  N    Preparatory Acts or Behavior (Since Last Contact)    N  N    Suicide (Since Last Contact)    N  N    Calculated C-SSRS Risk Score (Since Last Contact)    Moderate Risk  No Risk Indicated No Risk Indicated           ASSESSMENT: Current Emotional / Mental Status (status of significant symptoms):   Risk status (Self / Other harm or suicidal ideation)   Patient denies current fears or concerns for personal safety.   Patient denies current or recent suicidal ideation or behaviors.   Patient denies current or recent homicidal ideation or behaviors.   Patient reports current or recent self injurious behavior or ideation including SIB ideation, intrusive thoughts.   Patient denies other safety concerns.   Patient reports there has been no change in risk factors since their last session.     Patient reports there has been no change in protective factors since their last session.     A safety and risk management plan has been developed including: Patient consented to co-developed safety plan.  Safety and risk management plan was completed - see below.  Patient agreed to use safety plan should any safety concerns arise.  A copy was given to the patient.     Appearance:   Appropriate    Eye Contact:   Good    Psychomotor Behavior: Hyperactive  Restless    Attitude:   Cooperative    Orientation:   All   Speech    Rate / Production: Pressured  Stutters    Volume:  Normal    Mood:    Anxious  Depressed  Anhedonia   Affect:    Constricted  Flat  Worrisome    Thought Content:  Clear    Thought Form:  Coherent    Insight:    Good      Medication Review:   No changes to current psychiatric medication(s)     Medication Compliance:   Yes     Changes in Health Issues:   None reported     Chemical Use Review:   Substance Use: Chemical use reviewed, no active concerns identified      Tobacco Use: No current tobacco use.      Diagnosis:  1. Major depressive disorder with psychotic features (H)    2. Generalized anxiety disorder    3. Autism spectrum disorder    ADHD  R/O Bipolar Disorder  R/O OCD     Collateral Reports Completed:   Not Applicable    PLAN: (Patient Tasks / Therapist Tasks / Other)  Patient will return for a virtual visit in 2  weeks  *will decide about May and future scheduling plans closer to the date   *sent legal resources for patient to explore   Patient encouraged to practice healthy boundaries  Patient will utilize her safety plan   Patient will consider visual aids to help her use skills while at home alone   Patient encouraged to journal and use daily planner  Patient encouraged to prioritize self-care   Patient encouraged to continue participating in [facility] program until figuring out an alternative       There has been demonstrated improvement in functioning while patient has been engaged in psychotherapy/psychological service- if withdrawn the patient would deteriorate and/or relapse.       Fanny Cobb, [facility]                                                         ______________________________________________________________________    Individual Treatment Plan    Patient's Name: Angela Jones  YOB: 1995    Date of Creation: 1/19/2022  Date Treatment Plan Last Reviewed/Revised: 1/30/2025    DSM5 Diagnoses: Autism Spectrum Disorder 299.00(F84.0)  Associated with another neurodevelopmental, mental or behavioral disorder and Attention-Deficit/Hyperactivity Disorder  314.01 (F90.9) Unspecified Attention -Deficit / Hyperactivity Disorder, 296.31 (F33.0) Major Depressive Disorder, Recurrent Episode, Mild _ or 300.02 (F41.1) Generalized Anxiety Disorder  Psychosocial / Contextual Factors: 29 year old  female, , no children   PROMIS (reviewed every 90 days):   PROMIS 10-Global Health (only subscores and total score):       7/12/2024     8:07 AM 8/2/2024     1:03 PM 11/5/2024     8:32 AM 11/19/2024     8:53 AM 12/10/2024     9:00 AM 1/14/2025    12:00 PM 1/20/2025     9:00 AM   PROMIS-10 Scores Only   Global Mental Health Score 14 13 9  10  9  12 8   Global Physical Health Score 12 12 12  11  11  11 11   PROMIS TOTAL - SUBSCORES 26 25 21  21  20  23 19         Referral /  Collaboration:  Referral to another professional/service is not indicated at this time..    Anticipated number of session for this episode of care: 6-9  Anticipation frequency of session: Every other week  Anticipated Duration of each session: 53 or more minutes  Treatment plan will be reviewed in 90 days or when goals have been changed.       MeasurableTreatment Goal(s) related to diagnosis / functional impairment(s)  Goal 1: Patient will manage symptoms of anxiety, as evidenced by a JOSHUA-7 score of 5 or lower     I will know I've met my goal when I feel more confident in my ability to cope with stress.      Objective #A (Patient Action)    Patient will identify the initial signs or symptoms of anxiety.  Status: Continued - Date(s): 1/30/2025    Intervention(s)  Therapist will teach  help patient gain insight into signs of stress (physically and emotionally) .    Objective #B  Patient will use relaxation strategies multiple times per day to reduce the physical symptoms of anxiety.  Status: Continued - Date(s): 1/30/2025    Intervention(s)  Therapist will  teach diaphragmatic breathing and other grounding skills .    Objective #C  Patient will use thought-stopping strategy daily to reduce intrusive thoughts.  Status: Continued - Date(s): 1/30/2025    Intervention(s)  Therapist will  teach thought stopping techniques .      Goal 2: Patient will manage symptoms of depression, as evidenced by a PHQ-9 score of 10 or lower    I will know I've met my goal when I feel more confident in my ability to express my emotions in a healthy way.      Objective #A (Patient Action)    Status: Continued - Date(s): 1/30/2025    Patient will Increase interest, engagement, and pleasure in doing things.    Intervention(s)  Therapist will teach emotional recognition/identification. * .    Objective #B  Patient will Identify negative self-talk and behaviors: challenge core beliefs, myths, and actions.    Status: Continued - Date(s):  "1/30/2025    Intervention(s)  Therapist will  help patient practice positive self-talk and thought re-framing .    Goal 3: Client will manage symptoms of ADHD     I will know I've met my goal when I feel confident in completing tasks.      Objective #A (Client Action)    Client will use daily planner 75% of the time.  Status: Continued - Date(s): 1/30/2025    Intervention(s)  Therapist will  ask client to demonstrate use of planner while in session .    Objective #B  Client will identify and compliment positives at least 2 times daily to support positive self-image.    Status: Continued - Date(s): 1/30/2025    Intervention(s)  Therapist will  ask patient to demonstrate use of affirmations during session .    Goal 4: Client will manage symptoms and experiences associated with ASD     I will know I've met my goal when I feel confident in my communication in relationships.      Objective #A (Client Action)    Client will work on being assertive and setting clear expectations with healthy boundaries in relationships  Status: Continued - Date(s): 1/30/2025    Intervention(s)  Therapist will  help patient practice assertive communication skills .        Patient has reviewed and agreed to the above plan.      Fanny Cobb, [facility]  January 30, 2025        [facility]                                       Angela Jones     SAFETY PLAN:  Step 1: Warning signs / cues (Thoughts, images, mood, situation, behavior) that a crisis may be developing:  Thoughts: \"I can't do this anymore\" and \"Nothing makes it better\" \"I'm a burden\"   Images: visions of harm: self-harm  Thinking Processes: ruminations (can't stop thinking about my problems):  , racing thoughts, intrusive thoughts (bothersome, unwanted thoughts that come out of nowhere):  , highly critical and negative thoughts:  , and disorganized thinking:    Mood: worsening depression, hopelessness, helplessness, intense worry, and agitation  Behaviors: can't " "stop crying, not taking care of myself, and not taking care of my responsibilities  Situations: relationship problems and financial stress   Step 2: Coping strategies - Things I can do to take my mind off of my problems without contacting another person (relaxation technique, physical activity):  Distress Tolerance Strategies:  relaxation activities:  , arts and crafts:  , play with my pet , sensory based activities/self-soothe with five senses:  , change body temperature (ice pack/cold water) , and paced breathing/progressive muscle relaxation, grounding exercise of naming objects, weighted items (sock filled with rice), play with puddy, use sensory items   Physical Activities: meditation, deep breathing, and stretching ; taking a shower,   Focus on helpful thoughts:  \"This is temporary\", \"I will get through this\", \"It always passes\", and self-compassion statements:    Step 3: People and social settings that provide distraction:   Name: Zander    Name: Demetri  park and work   Step 4: Remind myself of people and things that are important to me and worth living for:  my family and pets and friends  Step 5: When I am in crisis, I can ask these people to help me use my safety plan:   Name: Demetri Fermin    Step 6: Making the environment safe:   dispose of old medications , remove things I could use to hurt myself:  , and be around others  Step 7: Professionals or agencies I can contact during a crisis:  Suicide Prevention Lifeline: Call or Text 986   [facility] Crisis Services: [phone]    Call 911 or go to my nearest emergency department.   I helped develop this safety plan and agree to use it when needed.  I have been given a copy of this plan.    Client signature _________________________________________________________________  Today s date:  7/25/2024  Completed by Provider Name/ Credentials:  CHANTAL Blackmon  July 25, 2024  Adapted from Safety Plan Template 2008 Yessi Prakash and Olivier Leal is reprinted " with the express permission of the authors.  No portion of the Safety Plan Template may be reproduced without the express, written permission.  You can contact the authors at [email or web address] or [email or web address].

## 2025-03-27 RX ORDER — ARIPIPRAZOLE 5 MG/1
5 TABLET ORAL DAILY
Qty: 30 TABLET | Refills: 1 | Status: CANCELLED | OUTPATIENT
Start: 2025-03-27

## 2025-03-27 NOTE — TELEPHONE ENCOUNTER
"Date of Last Office Visit: 1/24/25  RTC: 4 weeks  No shows: 0  Cancellations: 0  Date of Next Office Visit: 0  ------------------------------    Last Script Details::    ARIPiprazole (ABILIFY) 5 MG tablet 30 tablet 1 1/24/2025     ------------------------------  From last visit note:   \"Decrease Abilify to 5mg daily as recommended by IOP provider (prescription of 10mg sent by IOP provider though patient stated she cannot cut the pills in half) \"        Refill Decision:           [x] Supervision: no future appointment scheduled. Scheduling has been notified to contact the pt for appointment.    [x]Medication unable to be refilled by RN due to criteria not met as indicated below:   Other:  Last note states..Decrease Abilify to 5mg daily as recommended by IOP provider (prescription of 10mg sent by IOP provider though patient stated she cannot cut the pills in half)   And then [employer] list states..Patient not taking: Reported on 2/28/2025        Request from pharmacy:  Requested Prescriptions   Pending Prescriptions Disp Refills    ARIPiprazole (ABILIFY) 5 MG tablet 30 tablet 1     Sig: Take 1 tablet (5 mg) by mouth daily.       Antipsychotic Medications Failed - 3/27/2025  5:40 PM        Failed - Lipid panel on file within the past 12 months     Recent Labs   Lab Test 10/25/23  1508   CHOL 202*   TRIG 115   HDL 70   *   NHDL 132*               Failed - Medication indicated for associated diagnosis     Medication is associated with one or more of the following diagnoses:             Agitation            Autistic disorder            Bipolar disorder            Chemotherapy-induced nausea and vomiting            Delirium            Depression            Schizophrenia             Mood disorder            Passed - Most recent blood pressure under 140/90 in past 12 months        Passed - Patient is 12 years of age or older        Passed - Heart Rate on file within past 12 months     Pulse Readings from Last 3 " Encounters:   01/20/25 95   01/14/25 94   01/08/25 92               Passed - A1c or Glucose on file in past 12 months     Recent Labs   Lab Test 01/09/25  0849   *   A1C 5.1       Please review patients last 3 weights. If a weight gain of >10 lbs exists, you may refill the prescription once after instructing the patient to schedule an appointment within the next 30 days.    Wt Readings from Last 3 Encounters:   01/14/25 58.1 kg (128 lb 1.6 oz)   01/05/25 56.9 kg (125 lb 6.4 oz)   11/14/24 59.1 kg (130 lb 6.4 oz)             Passed - Medication is active on med list and the sig matches. RN to manually verify dose and sig if red X/fail.     If the protocol passes (green check), you do not need to verify med dose and sig.    A prescription matches if they are the same clinical intention.    For Example: once daily and every morning are the same.    The protocol can not identify upper and lower case letters as matching and will fail.     For Example: Take 1 tablet (50 mg) by mouth daily     TAKE 1 TABLET (50 MG) BY MOUTH DAILY    For all fails (red x), verify dose and sig.    If the refill does match what is on file, the RN can still proceed to approve the refill request.       If they do not match, route to the appropriate provider.             Passed - Recent (12 month) or future (90 days) visit with authorizing provider s specialty     The patient must have completed an in-person or virtual visit within the past 12 months or has a future visit scheduled within the next 90 days with the authorizing provider s specialty.  Urgent care and e-visits do not qualify as an office visit for this protocol.          Passed - Patient is not pregnant        Passed - No positve pregnancy test on file in past 12 months

## 2025-03-31 NOTE — TELEPHONE ENCOUNTER
Patient has not responded to this writer's Impact message for dosing clarification. Placed a phone call to the patient. She confirmed she discontinued the aripiprazole (Abilify) and confirmed this can be removed from her chart. Provider notified and med tab updated.    Alisha Benavides RN on 3/31/2025 at 9:43 AM

## 2025-04-03 ENCOUNTER — VIRTUAL VISIT (OUTPATIENT)
Dept: PSYCHIATRY | Facility: CLINIC | Age: 30
End: 2025-04-03
Attending: PSYCHIATRY & NEUROLOGY
Payer: COMMERCIAL

## 2025-04-03 DIAGNOSIS — F42.2 MIXED OBSESSIONAL THOUGHTS AND ACTS: ICD-10-CM

## 2025-04-03 DIAGNOSIS — F33.1 MAJOR DEPRESSIVE DISORDER, RECURRENT EPISODE, MODERATE (H): Primary | ICD-10-CM

## 2025-04-03 DIAGNOSIS — F41.9 ANXIETY: ICD-10-CM

## 2025-04-03 DIAGNOSIS — F84.0 AUTISM SPECTRUM DISORDER: ICD-10-CM

## 2025-04-03 DIAGNOSIS — F41.1 GENERALIZED ANXIETY DISORDER: ICD-10-CM

## 2025-04-03 RX ORDER — HYDROXYZINE HYDROCHLORIDE 25 MG/1
25-50 TABLET, FILM COATED ORAL 2 TIMES DAILY PRN
Qty: 45 TABLET | Refills: 3 | Status: SHIPPED | OUTPATIENT
Start: 2025-04-03

## 2025-04-03 RX ORDER — CLOMIPRAMINE HYDROCHLORIDE 50 MG/1
100 CAPSULE ORAL AT BEDTIME
Qty: 60 CAPSULE | Refills: 3 | Status: SHIPPED | OUTPATIENT
Start: 2025-04-03

## 2025-04-03 RX ORDER — QUETIAPINE FUMARATE 50 MG/1
TABLET, FILM COATED ORAL
Qty: 45 TABLET | Refills: 3 | Status: SHIPPED | OUTPATIENT
Start: 2025-04-03

## 2025-04-03 RX ORDER — LAMOTRIGINE 150 MG/1
150 TABLET ORAL DAILY
Qty: 30 TABLET | Refills: 3 | Status: SHIPPED | OUTPATIENT
Start: 2025-04-03

## 2025-04-03 RX ORDER — MIRTAZAPINE 15 MG/1
15 TABLET, FILM COATED ORAL AT BEDTIME
Qty: 30 TABLET | Refills: 3 | Status: SHIPPED | OUTPATIENT
Start: 2025-04-03

## 2025-04-03 RX ORDER — GABAPENTIN 300 MG/1
900 CAPSULE ORAL 3 TIMES DAILY
Qty: 810 CAPSULE | Refills: 3 | Status: SHIPPED | OUTPATIENT
Start: 2025-04-03

## 2025-04-03 RX ORDER — GUANFACINE 1 MG/1
1 TABLET, EXTENDED RELEASE ORAL AT BEDTIME
Qty: 30 TABLET | Refills: 3 | Status: SHIPPED | OUTPATIENT
Start: 2025-04-03

## 2025-04-03 ASSESSMENT — ANXIETY QUESTIONNAIRES
7. FEELING AFRAID AS IF SOMETHING AWFUL MIGHT HAPPEN: SEVERAL DAYS
2. NOT BEING ABLE TO STOP OR CONTROL WORRYING: MORE THAN HALF THE DAYS
1. FEELING NERVOUS, ANXIOUS, OR ON EDGE: NEARLY EVERY DAY
5. BEING SO RESTLESS THAT IT IS HARD TO SIT STILL: SEVERAL DAYS
GAD7 TOTAL SCORE: 10
8. IF YOU CHECKED OFF ANY PROBLEMS, HOW DIFFICULT HAVE THESE MADE IT FOR YOU TO DO YOUR WORK, TAKE CARE OF THINGS AT HOME, OR GET ALONG WITH OTHER PEOPLE?: SOMEWHAT DIFFICULT
4. TROUBLE RELAXING: SEVERAL DAYS
GAD7 TOTAL SCORE: 10
4. TROUBLE RELAXING: SEVERAL DAYS
1. FEELING NERVOUS, ANXIOUS, OR ON EDGE: NEARLY EVERY DAY
6. BECOMING EASILY ANNOYED OR IRRITABLE: NOT AT ALL
2. NOT BEING ABLE TO STOP OR CONTROL WORRYING: MORE THAN HALF THE DAYS
3. WORRYING TOO MUCH ABOUT DIFFERENT THINGS: MORE THAN HALF THE DAYS
GAD7 TOTAL SCORE: 10
7. FEELING AFRAID AS IF SOMETHING AWFUL MIGHT HAPPEN: SEVERAL DAYS
3. WORRYING TOO MUCH ABOUT DIFFERENT THINGS: MORE THAN HALF THE DAYS
GAD7 TOTAL SCORE: 10
GAD7 TOTAL SCORE: 10
6. BECOMING EASILY ANNOYED OR IRRITABLE: NOT AT ALL
IF YOU CHECKED OFF ANY PROBLEMS ON THIS QUESTIONNAIRE, HOW DIFFICULT HAVE THESE PROBLEMS MADE IT FOR YOU TO DO YOUR WORK, TAKE CARE OF THINGS AT HOME, OR GET ALONG WITH OTHER PEOPLE: SOMEWHAT DIFFICULT
8. IF YOU CHECKED OFF ANY PROBLEMS, HOW DIFFICULT HAVE THESE MADE IT FOR YOU TO DO YOUR WORK, TAKE CARE OF THINGS AT HOME, OR GET ALONG WITH OTHER PEOPLE?: SOMEWHAT DIFFICULT
7. FEELING AFRAID AS IF SOMETHING AWFUL MIGHT HAPPEN: SEVERAL DAYS
IF YOU CHECKED OFF ANY PROBLEMS ON THIS QUESTIONNAIRE, HOW DIFFICULT HAVE THESE PROBLEMS MADE IT FOR YOU TO DO YOUR WORK, TAKE CARE OF THINGS AT HOME, OR GET ALONG WITH OTHER PEOPLE: SOMEWHAT DIFFICULT
5. BEING SO RESTLESS THAT IT IS HARD TO SIT STILL: SEVERAL DAYS

## 2025-04-03 ASSESSMENT — PAIN SCALES - GENERAL: PAINLEVEL_OUTOF10: NO PAIN (0)

## 2025-04-03 NOTE — PROGRESS NOTES
Virtual Visit Details    Type of service:  Video Visit     Originating Location (pt. Location): Home    Distant Location (provider location):  On-site  Platform used for Video Visit: [facility]  General Clinic Team  MEDICAL PROGRESS NOTE       CARE TEAM:    PCP- Marissa Walton  Therapist- Fanny Cobb, CHANTAL      is a 30 year old who uses the pronouns she, her.                   Assessment & Plan     Major depressive disorder, recurrent, moderate   R/o bipolar disorder , most recent episode lalit vs. Substance induced   Generalized anxiety disorder with panic attacks   OCD  Cannabis use    ADHD, hyperactive type, by history   ASD, by history    SIB   Cluster B traits   Postural orthostatic tachycardia syndrome   Fibromyalgia   Migraines     Angela Jones is a 29-year-old female with above diagnoses.    Today,   presents with euthymic mood. She was started on lamotrigine in day treatment, which seem to help with stabilizing mood. Per chart, she was in day treatment from 1/16-3/7/2025. She is now receiving DBT through [facility] and finds it helpful for emotional regulation. She would like to keep clomipramine at the same dose as it continues to help with intrusive thoughts. She was informed of upcoming transition of resident.     Psychotropic Drug Interactions:    ADDITIVE SEROTONERGIC: mirtazapine , clomipramine, abilify, quetiapine   ADDITIVE CHOLINERGIC:  clomipramine, benadryl, tizanidine, metoclopramide     Management: routine monitoring and patient is aware of risks     [place] today: indicates that controlled prescriptions have been filled as prescribed     Risk Statements:   Treatment Risk: Risks, benefits, alternatives and potential adverse effects have been discussed and are understood.   Safety Risk:  did not appear to be an imminent safety risk to self or others.    PLAN    1)  Medications:   - Continue quetiapine 50mg at bedtime   - Continue clomipramine 100mg at bedtime   - Continue mirtazapine 15mg at bedtime  - Continue Intuniv 1 mg at bedtime   - Continue lamotrigine 150mg daily (started in IOP)      Future considerations:    -optimize Intuniv for ADHD and anxiety   -optimize clomipramine for OCD       Other:   Gabapentin 900mg TID   Ibuprofen 200mg q4h PRN    MTV   Tizanidine 2mg daily PRN   Metoclopramide 5mg TID PRN   Fludrocrotisone 0.1mg for POTS    Benadryl 50mg as needed     Future considerations:    -consider lamotrigine for mood stabilization   -optimizing clomipramine for OCD   -switching mirtazapine to wellbutrin to also target ADHD after clomipramine is optimized     2) Psychotherapy: continue weekly individual therapy with CHANTAL Blackmon     3) Next due:  Labs: SGA completed 1/2025   EKG: Routine monitoring is not indicated for current psychotropic medication regimen   Rating scales: N/A    4) Referrals: none     5) Follow-up: 3 months with incoming PGY-3                      Interval History       -Lamotrigine 150mg seem to be helping, was started during IOP   -Abilify made her panic so it was stopped   -while she was in IOP they tried to take her off of mirtazapine because she was sleepy during the day but she couldn't sleep without it   -needed hydroxyzine once in a  while   -quetiapine has been helpful for sleep takes once every night   -has intrusive thoughts that are less frequent   -denies suicidal plan /intent   -learning DBT skills and they are helpful     Intrusive thoughts   Current Social History:  Financial/occupational: employed as a    Living situation: lives alone in an apartment with her cat   Social/spiritual support: partners, family, cat       Pertinent Substance Use:  [Last updated 4/03/2025]  Alcohol: Yes: socially    Cannabis: Yes:  THC gummies daily. Not more than 10mg/ day   Tobacco: stopped vaping   Caffeine:  decaf   Opioids:  "No   Narcan Kit current: N/A  Other substances: No     Medical Review of Systems / Med sfx:   A comprehensive review of systems was performed and is negative other than noted above.   Lightheadedness/orthostasis: yes, has POTS    Headaches: denies   GI: denies    CV: heart racing from POTS   Sexual health concerns: denies     Contraception: IUD                   Summary Points of Current Care  8/2/2024: Transfer visit. Tapered Trintellix from 20mg to 10mg and instructed to stop after 1-2 weeks. Start Pristiq 50mg daily   8/14/2024:  reached out due to safety concerns from thoughts of self-harm. RN arranged for welfare check. Patient saw therapist the following day and reviewed safety plan  8/16/2024: Due to worsening intrusive thoughts and mood, patient instructed via phone call to increase Trintellix back to 20mg, and Pristiq was decreased to 25mg   9/12/2024: Start clomipramine 25 mg at bedtime for OCD  11/7/2024: Increase clomipramine to 50mg with instruction to increase to 100 mg at bedtime if well tolerated   1/24/2025: Reduce Abilify to 5mg daily per IOP provider's recommendation.   4/3/2025: no changes, patient was started on Lamotrigine and was titrated up to 150mg a month ago, prefer to keep other medications the same for now                   Physical Exam  (Vitals Only)    There were no vitals taken for this visit.  Pulse Readings from Last 3 Encounters:   01/20/25 95   01/14/25 94   01/08/25 92     Wt Readings from Last 3 Encounters:   01/14/25 58.1 kg (128 lb 1.6 oz)   01/05/25 56.9 kg (125 lb 6.4 oz)   11/14/24 59.1 kg (130 lb 6.4 oz)     BP Readings from Last 3 Encounters:   01/20/25 118/63   01/14/25 104/73   01/08/25 105/53                      Mental Status Exam    Alertness: alert  and oriented  Appearance: adequately groomed  Behavior/Demeanor: cooperative, with good  eye contact   Speech: normal  Language: intact  Psychomotor: normal or unremarkable  Mood:  \"good\"   Affect: appropriate; " "congruent to: mood- yes, content- yes  Thought Process/Associations:  linear  Thought Content:  Reports  intrusive thoughts of self-harm without plan / intent ;  Denies violent ideation  Perception:  Reports none;  Denies hallucinations  Insight: fair  Judgment: adequate for safety  Cognition: does  appear grossly intact; formal cognitive testing was not done  Gait and Station: N/A (telehealth)                  Past Psychotropic Medication Trials    Wellbutrin- sleepiness, Prozac- irritablility, Effexor, Lexapro- not helpful.       Previous trials include Prozac (afternoon lethargy), Zoloft, Wellbutrin (insurance problems, but may have worked for depression and ADHD), Effexor (stomach pains and dizziness). Adderall (decreased appetite), Strattera, Concerta, Intuniv (\"zombie\"), Focalin.       Of note, [employer] testing from 8/11/2017 reviewed and she is an ultra rapid metabolizer of 1A2. Normal metabolizer for ADHD medications.       Medication Max Dose (mg) Dates / Duration Helpful? DC Reason / Adverse Effects?   Lamotrigine   350     Don't remember    Vortioxetine            Amitryptyline  50    y (got on it for migraines)      Citalopram  20     y      Adderall        Appetite suppressant    Ritalin        Appetite suppressant    Focalin       Provider left practice     Strattera        provider left practice    Wellbutrin        Spacy and tired , brain fog    Cymbalta            Prozac       y  Irritable , lethargy    Zoloft            Effexor        Made migraines worse, stomachache     Intruniv         Worked better than stimulants, focused better and not so jittery, though per chart reported \"zombie\" like feeling      Vyvanse        Worked better than Adderral, issue with insurance    Diphenhydramine(OTC)            Risperidone        Can't recall taking it. May have been prescribed at young age    Aripriprazole  10   1/2025 (few weeks)  N  Caused panic symptoms    Clomipramine  100  11/2024- current  y  "   Lamotrigine  150  2/2025-current  Y                   Past Medical History     Patient Active Problem List   Diagnosis    OCD (obsessive compulsive disorder)    Pes planus    Moderate major depression (H)    Generalized anxiety disorder    Migraine with aura    Autism spectrum disorder    IUD (intrauterine device) in place    Fibromyalgia    POTS (postural orthostatic tachycardia syndrome)    Dysautonomia (H)    Suicidal ideation    Suicide ideation    Bipolar affective disorder, currently manic, mild (H)    Attention deficit hyperactivity disorder (ADHD), combined type                     Medications     Current Outpatient Medications   Medication Sig Dispense Refill    lamoTRIgine (LAMICTAL) 100 MG tablet Take 1 tablet (100 mg) by mouth daily. (Patient taking differently: Take 150 mg by mouth daily.) 30 tablet 0    levonorgestrel (MIRENA, 52 MG,) 20 MCG/24HR IUD 1 each (20 mcg) by Intrauterine route once for 1 dose 1 each 0    cholecalciferol (VITAMIN D3) 125 mcg (5000 units) capsule Take 1 capsule (125 mcg) by mouth daily. 30 capsule 0    clomiPRAMINE (ANAFRANIL) 50 MG capsule Take 2 capsules (100 mg) by mouth at bedtime. 60 capsule 1    cyanocobalamin (CYANOCOBALAMIN) 2000 MCG tablet Take 1 tablet (2,000 mcg) by mouth daily. 30 tablet 0    diphenhydrAMINE (BENADRYL) 50 MG capsule Take 50 mg by mouth as needed for allergies.      fludrocortisone (FLORINEF) 0.1 MG tablet Take 2 tablets (0.2 mg) by mouth daily. 180 tablet 1    gabapentin (NEURONTIN) 300 MG capsule Take 3 capsules (900 mg) by mouth 3 times daily. 810 capsule 3    guanFACINE (INTUNIV) 1 MG TB24 24 hr tablet Take 1 tablet (1 mg) by mouth at bedtime. 30 tablet 2    hydrocortisone (CORTAID) 0.5 % external cream Apply topically 2 times daily. (Patient not taking: Reported on 1/20/2025) 28.35 g 0    hydrOXYzine HCl (ATARAX) 25 MG tablet Take 1-2 tablets (25-50 mg) by mouth 2 times daily as needed for anxiety. 60 tablet 1    hyoscyamine (LEVSIN/SL) 0.125  MG sublingual tablet Take 1 tablet (0.125 mg) by mouth every 4 hours as needed. 120 tablet 5    ibuprofen (ADVIL/MOTRIN) 200 MG tablet Take 200 mg by mouth every 4 hours as needed for pain or other (migrain)      lactase (LACTAID) 3000 UNIT tablet Take 1-2 tablets (3,000-6,000 Units) by mouth 3 times daily (with meals). 30 tablet 0    metoclopramide (REGLAN) 5 MG tablet Take 1 tablet (5 mg) by mouth 3 times daily as needed (for nausea). 90 tablet 3    mirtazapine (REMERON) 15 MG tablet Take 1 tablet (15 mg) by mouth at bedtime. 90 tablet 3    multivitamin, therapeutic (THERA-VIT) TABS tablet Take 1 tablet by mouth daily      QUEtiapine (SEROQUEL) 50 MG tablet Take 1 tablet (50mg) at bedtime and additional 1 tablet (50mg) as needed for sleep / anxiety 30 tablet 2    tiZANidine (ZANAFLEX) 2 MG tablet Take 1 tablet (2 mg) by mouth daily as needed for muscle spasms. 90 tablet 3                     Data         12/10/2024     9:00 AM 1/14/2025    12:00 PM 1/20/2025     9:00 AM   PROMIS-10 Total Score w/o Sub Scores   PROMIS TOTAL - SUBSCORES 20  23 19       Patient-reported         1/20/2025     9:00 AM 2/26/2025     9:47 AM 4/3/2025     7:59 AM   PHQ-9 SCORE   PHQ-9 Total Score MyChart  17 (Moderately severe depression) 11 (Moderate depression)   PHQ-9 Total Score 24 17  11        Patient-reported         1/24/2025     2:16 PM 2/26/2025     9:46 AM 4/3/2025     8:00 AM   JOSHUA-7 SCORE   Total Score 15 (severe anxiety) 15 (severe anxiety) 10 (moderate anxiety)   Total Score 15  15  10        Patient-reported       Liver/Kidney Function, TSH Metabolic Blood counts   Recent Labs   Lab Test 01/09/25  0849 05/20/24  1834   AST 15 17   ALT 13 10   ALKPHOS 61 63   CR 0.70 0.80     Recent Labs   Lab Test 01/09/25  0849   TSH 2.35    Recent Labs   Lab Test 10/25/23  1508   CHOL 202*   TRIG 115   *   HDL 70     Recent Labs   Lab Test 01/09/25  0849   A1C 5.1     Recent Labs   Lab Test 01/09/25  0849   *    Recent Labs    Lab Test 01/09/25  0849   WBC 4.3   HGB 12.4   HCT 37.2   MCV 94                PROVIDER: Salina Lagunas MD    Level of Medical Decision Making:   - At least 2 stable chronic problems  - Engaged in prescription drug management during visit (discussed any medication benefits, side effects, alternatives, etc.)        The longitudinal plan of care for the diagnosis(es)/condition(s) as documented were addressed during this visit. Due to the added complexity in care, I will continue to support  in the subsequent management and with ongoing continuity of care.       Patient not staffed in clinic. Supervisor is Dr. Thompson.

## 2025-04-03 NOTE — NURSING NOTE
Current patient location: [address] [place] [address] [address]  [place] [address]    Is the patient currently in the state of [place]? YES    Visit mode: VIDEO    If the visit is dropped, the patient can be reconnected by:VIDEO VISIT: Send to e-mail at: [email or web address]    Will anyone else be joining the visit? NO  (If patient encounters technical issues they should call [phone]    Are changes needed to the allergy or medication list? No    Are refills needed on medications prescribed by this physician? NO    Rooming Documentation:  Questionnaire(s) completed    Reason for visit: [place]

## 2025-04-10 ENCOUNTER — VIRTUAL VISIT (OUTPATIENT)
Dept: BEHAVIORAL HEALTH | Facility: CLINIC | Age: 30
End: 2025-04-10
Payer: COMMERCIAL

## 2025-04-10 DIAGNOSIS — F84.0 AUTISM SPECTRUM DISORDER: ICD-10-CM

## 2025-04-10 DIAGNOSIS — F90.2 ATTENTION DEFICIT HYPERACTIVITY DISORDER (ADHD), COMBINED TYPE: ICD-10-CM

## 2025-04-10 DIAGNOSIS — F41.1 GENERALIZED ANXIETY DISORDER: Primary | ICD-10-CM

## 2025-04-10 DIAGNOSIS — F33.2 SEVERE EPISODE OF RECURRENT MAJOR DEPRESSIVE DISORDER, WITHOUT PSYCHOTIC FEATURES (H): ICD-10-CM

## 2025-04-10 ASSESSMENT — PATIENT HEALTH QUESTIONNAIRE - PHQ9
SUM OF ALL RESPONSES TO PHQ QUESTIONS 1-9: 13
SUM OF ALL RESPONSES TO PHQ QUESTIONS 1-9: 13
10. IF YOU CHECKED OFF ANY PROBLEMS, HOW DIFFICULT HAVE THESE PROBLEMS MADE IT FOR YOU TO DO YOUR WORK, TAKE CARE OF THINGS AT HOME, OR GET ALONG WITH OTHER PEOPLE: SOMEWHAT DIFFICULT

## 2025-04-10 NOTE — PROGRESS NOTES
[facility]                                     Progress Note    Patient Name: Angela Jones  Date: 4/10/2025         Service Type: Individual      Session Start Time: 9:00am  Session End Time: 9:50am     Session Length: 50 minutes    Session #: 52    Attendees: Client attended alone    Service Modality:  Video Visit:      Provider verified identity through the following two step process.  Patient provided:  Patient is known previously to provider    Telemedicine Visit: The patient's condition can be safely assessed and treated via synchronous audio and visual telemedicine encounter.      Reason for Telemedicine Visit: Patient has requested telehealth visit    Originating Site (Patient Location): Patient's home    Distant Site (Provider Location): Provider Remote Setting- Home Office    Consent:  The patient/guardian has verbally consented to: the potential risks and benefits of telemedicine (video visit) versus in person care; bill my insurance or make self-payment for services provided; and responsibility for payment of non-covered services.     Patient would like the video invitation sent by:  My Chart    Mode of Communication:  Video Conference via [facility]well      Distant Location (Provider):  Off-site    As the provider I attest to compliance with applicable laws and regulations related to telemedicine.    DATA  Interactive Complexity: Yes, visit entailed Interactive Complexity evidenced by:  -The need to manage maladaptive communication (related to, e.g., high anxiety, high reactivity, repeated questions, or disagreement) among participants that complicates delivery of care; diagnostic criteria for ASD are met and complicate the delivery of services. Patient requires additional support and alternative methods to engage in psychotherapy services.   Crisis: No        Progress Since Last Session (Related to Symptoms / Goals / Homework):   Symptoms: No change anxiety and depression   "    Homework: Achieved / completed to satisfaction      Episode of Care Goals: Minimal progress - PREPARATION (Decided to change - considering how); Intervened by negotiating a change plan and determining options / strategies for behavior change, identifying triggers, exploring social supports, and working towards setting a date to begin behavior change     Current / Ongoing Stressors and Concerns:  Patient provides a status update while therapist utilizes reflective listening skills - patient is in quite a bit of pain today due to a fibromyalgia flare-up. She shares that she is having \"treatment fatigue.\" She states, \"I feel like I should be feeling better than I am right now.\" She describes a desire to have \"less symptoms\" because it takes a lot of energy to use the coping skills. \"I would like to be able to exert less energy to feel okay.\" She describes the following symptoms: brain fog, tingling, discomfort in body, irritable, daytime sleepiness, trouble falling asleep at night, mood swings. Her partners are all moving together so there is stress surrounding that circumstance.  She is planning to move in by September. Her extension request was accepted for work for May 4. She reports that mediation has been set for 4/18. Will continue IOP until return to work date.     Therapist assessed for risk and safety - patient identifies intrusive thoughts around self-harm. She contracts for safety such as putting away sharp objects. She reviews her safety plan.  Should there be an increase in symptoms or severity related to SI/SIB, patient should call 911 or go to local ED for evaluation.         Treatment Objective(s) Addressed in This Session:   identify the fears / thoughts that contribute to feeling anxious  state understanding of stressors and relationship to physical symptoms  Increase interest, engagement, and pleasure in doing things  Decrease frequency and intensity of feeling down, depressed, hopeless  Identify " negative self-talk and behaviors: challenge core beliefs, myths, and actions  Gain insight     Intervention:  Reviewed the following CBT interventions:  Learning to recognize one s distortions in thinking that are creating problems, and then to reevaluate them in light of reality.  Gaining a better understanding of the behavior and motivation of others.  Using problem-solving skills to cope with difficult situations.  Learning to develop a greater sense of confidence in one s own abilities.  Reviewing CBT strategies to manage stress including mindfulness, grounding and deep breathing      Assessments completed prior to visit:   The following assessments were completed by patient for this visit:  PHQ9:       11/19/2024     8:51 AM 12/10/2024     8:59 AM 1/14/2025    12:00 PM 1/20/2025     9:00 AM 2/26/2025     9:47 AM 4/3/2025     7:59 AM 4/10/2025     8:59 AM   PHQ-9 SCORE   PHQ-9 Total Score MyChart 14 (Moderate depression) 15 (Moderately severe depression)   17 (Moderately severe depression) 11 (Moderate depression) 13 (Moderate depression)   PHQ-9 Total Score 14  15  20 24 17  11  13        Patient-reported     GAD7:       10/25/2024     9:07 AM 11/19/2024     8:52 AM 1/14/2025    12:00 PM 1/20/2025     9:00 AM 1/24/2025     2:16 PM 2/26/2025     9:46 AM 4/3/2025     8:00 AM   JOSHUA-7 SCORE   Total Score 13 (moderate anxiety) 10 (moderate anxiety)   15 (severe anxiety) 15 (severe anxiety) 10 (moderate anxiety)   Total Score 13  10  15 16 15  15  10        Patient-reported     PROMIS 10-Global Health (only subscores and total score):       7/12/2024     8:07 AM 8/2/2024     1:03 PM 11/5/2024     8:32 AM 11/19/2024     8:53 AM 12/10/2024     9:00 AM 1/14/2025    12:00 PM 1/20/2025     9:00 AM   PROMIS-10 Scores Only   Global Mental Health Score 14 13 9  10  9  12 8   Global Physical Health Score 12 12 12  11  11  11 11   PROMIS TOTAL - SUBSCORES 26 25 21  21  20  23 19       Patient-reported      [place] Suicide  Severity Rating Scale (Short Version)      1/5/2025     2:31 PM 1/5/2025     6:12 PM 1/6/2025    11:52 AM 1/7/2025    11:57 AM 1/8/2025     9:24 PM 2/26/2025    10:23 AM 3/7/2025    10:00 AM   [place] Suicide Severity Rating (Short Version)   Q1 Wished to be Dead (Past Month) 1-->yes 1-->yes 1-->yes  0-->no     Q2 Suicidal Thoughts (Past Month) 1-->yes 1-->yes 1-->yes  1-->yes     Q3 Suicidal Thought Method 1-->yes 1-->yes 1-->yes  1-->yes     Q4 Suicidal Intent without Specific Plan 1-->yes 1-->yes 0-->no  0-->no     Q5 Suicide Intent with Specific Plan 1-->yes 0-->no 1-->yes  1-->yes     Q6 Suicide Behavior (Lifetime) 0-->no 0-->no 0-->no  0-->no     Level of Risk per Screen high risk high risk high risk  high risk     1. Wish to be Dead (Since Last Contact)    Y  N N   2. Non-Specific Active Suicidal Thoughts (Since Last Contact)    Y  N N   3. Active Suicidal Ideation with any Methods (Not Plan) Without Intent to Act (Since Last Contact)    Y      4. Active Suicidal Ideation with Some Intent to Act, Without Specific Plan (Since Last Contact)    N      5. Active Suicidal Ideation with Specific Plan and Intent (Since Last Contact)    N      Most Severe Ideation Rating (Since Last Contact)    5      Frequency (Since Last Contact)    4      Duration (Since Last Contact)    2      Deterrents (Since Last Contact)    0      Reasons for Ideation (Since Last Contact)    4      Actual Attempt (Since Last Contact)    N  N    Has subject engaged in non-suicidal self-injurious behavior? (Since Last Contact)    N  N    Interrupted Attempts (Since Last Contact)    N  N    Aborted or Self-Interrupted Attempt (Since Last Contact)    N  N    Preparatory Acts or Behavior (Since Last Contact)    N  N    Suicide (Since Last Contact)    N  N    Calculated C-SSRS Risk Score (Since Last Contact)    Moderate Risk  No Risk Indicated No Risk Indicated          ASSESSMENT: Current Emotional / Mental Status (status of significant  symptoms):   Risk status (Self / Other harm or suicidal ideation)   Patient denies current fears or concerns for personal safety.   Patient denies current or recent suicidal ideation or behaviors.   Patient denies current or recent homicidal ideation or behaviors.   Patient reports current or recent self injurious behavior or ideation including SIB ideation, intrusive thoughts.   Patient denies other safety concerns.   Patient reports there has been no change in risk factors since their last session.     Patient reports there has been no change in protective factors since their last session.     A safety and risk management plan has been developed including: Patient consented to co-developed safety plan.  Safety and risk management plan was completed - see below.  Patient agreed to use safety plan should any safety concerns arise.  A copy was given to the patient.     Appearance:   Appropriate    Eye Contact:   Good    Psychomotor Behavior: Hyperactive  Restless    Attitude:   Cooperative    Orientation:   All   Speech    Rate / Production: Pressured  Stutters    Volume:  Normal    Mood:    Anxious  Depressed  Anhedonia   Affect:    Constricted  Flat  Worrisome    Thought Content:  Clear    Thought Form:  Coherent    Insight:    Good      Medication Review:   No changes to current psychiatric medication(s)     Medication Compliance:   Yes     Changes in Health Issues:   None reported     Chemical Use Review:   Substance Use: Chemical use reviewed, no active concerns identified      Tobacco Use: No current tobacco use.      Diagnosis:  1. Generalized anxiety disorder    2. Autism spectrum disorder    3. Attention deficit hyperactivity disorder (ADHD), combined type    4. Severe episode of recurrent major depressive disorder, without psychotic features (H)    ADHD  R/O Bipolar Disorder  R/O OCD     Collateral Reports Completed:   Not Applicable    PLAN: (Patient Tasks / Therapist Tasks / Other)  Patient will return  for a virtual visit in 2 weeks  Patient encouraged to practice healthy boundaries  Patient will utilize her safety plan   Patient will consider visual aids to help her use skills while at home alone   Patient encouraged to journal and use daily planner  Patient encouraged to prioritize self-care   Patient encouraged to continue participating in [facility] program until figuring out an alternative       There has been demonstrated improvement in functioning while patient has been engaged in psychotherapy/psychological service- if withdrawn the patient would deteriorate and/or relapse.       Fanny Cobb, [facility]                                                         ______________________________________________________________________    Individual Treatment Plan    Patient's Name: Angela Jones  YOB: 1995    Date of Creation: 1/19/2022  Date Treatment Plan Last Reviewed/Revised: 1/30/2025    DSM5 Diagnoses: Autism Spectrum Disorder 299.00(F84.0)  Associated with another neurodevelopmental, mental or behavioral disorder and Attention-Deficit/Hyperactivity Disorder  314.01 (F90.9) Unspecified Attention -Deficit / Hyperactivity Disorder, 296.31 (F33.0) Major Depressive Disorder, Recurrent Episode, Mild _ or 300.02 (F41.1) Generalized Anxiety Disorder  Psychosocial / Contextual Factors: 30 year old  female, , no children   PROMIS (reviewed every 90 days):   PROMIS 10-Global Health (only subscores and total score):       7/12/2024     8:07 AM 8/2/2024     1:03 PM 11/5/2024     8:32 AM 11/19/2024     8:53 AM 12/10/2024     9:00 AM 1/14/2025    12:00 PM 1/20/2025     9:00 AM   PROMIS-10 Scores Only   Global Mental Health Score 14 13 9  10  9  12 8   Global Physical Health Score 12 12 12  11  11  11 11   PROMIS TOTAL - SUBSCORES 26 25 21  21  20  23 19         Referral / Collaboration:  Referral to another professional/service is not indicated at this time..    Anticipated number of  session for this episode of care: 6-9  Anticipation frequency of session: Every other week  Anticipated Duration of each session: 53 or more minutes  Treatment plan will be reviewed in 90 days or when goals have been changed.       MeasurableTreatment Goal(s) related to diagnosis / functional impairment(s)  Goal 1: Patient will manage symptoms of anxiety, as evidenced by a JOSHUA-7 score of 5 or lower     I will know I've met my goal when I feel more confident in my ability to cope with stress.      Objective #A (Patient Action)    Patient will identify the initial signs or symptoms of anxiety.  Status: Continued - Date(s): 1/30/2025    Intervention(s)  Therapist will teach  help patient gain insight into signs of stress (physically and emotionally) .    Objective #B  Patient will use relaxation strategies multiple times per day to reduce the physical symptoms of anxiety.  Status: Continued - Date(s): 1/30/2025    Intervention(s)  Therapist will  teach diaphragmatic breathing and other grounding skills .    Objective #C  Patient will use thought-stopping strategy daily to reduce intrusive thoughts.  Status: Continued - Date(s): 1/30/2025    Intervention(s)  Therapist will  teach thought stopping techniques .      Goal 2: Patient will manage symptoms of depression, as evidenced by a PHQ-9 score of 10 or lower    I will know I've met my goal when I feel more confident in my ability to express my emotions in a healthy way.      Objective #A (Patient Action)    Status: Continued - Date(s): 1/30/2025    Patient will Increase interest, engagement, and pleasure in doing things.    Intervention(s)  Therapist will teach emotional recognition/identification. * .    Objective #B  Patient will Identify negative self-talk and behaviors: challenge core beliefs, myths, and actions.    Status: Continued - Date(s): 1/30/2025    Intervention(s)  Therapist will  help patient practice positive self-talk and thought re-framing .    Goal 3:  "Client will manage symptoms of ADHD     I will know I've met my goal when I feel confident in completing tasks.      Objective #A (Client Action)    Client will use daily planner 75% of the time.  Status: Continued - Date(s): 1/30/2025    Intervention(s)  Therapist will  ask client to demonstrate use of planner while in session .    Objective #B  Client will identify and compliment positives at least 2 times daily to support positive self-image.    Status: Continued - Date(s): 1/30/2025    Intervention(s)  Therapist will  ask patient to demonstrate use of affirmations during session .    Goal 4: Client will manage symptoms and experiences associated with ASD     I will know I've met my goal when I feel confident in my communication in relationships.      Objective #A (Client Action)    Client will work on being assertive and setting clear expectations with healthy boundaries in relationships  Status: Continued - Date(s): 1/30/2025    Intervention(s)  Therapist will  help patient practice assertive communication skills .        Patient has reviewed and agreed to the above plan.      Fanny Cobb, [facility]  January 30, 2025        [facility]                                       Angela Jones     SAFETY PLAN:  Step 1: Warning signs / cues (Thoughts, images, mood, situation, behavior) that a crisis may be developing:  Thoughts: \"I can't do this anymore\" and \"Nothing makes it better\" \"I'm a burden\"   Images: visions of harm: self-harm  Thinking Processes: ruminations (can't stop thinking about my problems):  , racing thoughts, intrusive thoughts (bothersome, unwanted thoughts that come out of nowhere):  , highly critical and negative thoughts:  , and disorganized thinking:    Mood: worsening depression, hopelessness, helplessness, intense worry, and agitation  Behaviors: can't stop crying, not taking care of myself, and not taking care of my responsibilities  Situations: relationship problems and " "financial stress   Step 2: Coping strategies - Things I can do to take my mind off of my problems without contacting another person (relaxation technique, physical activity):  Distress Tolerance Strategies:  relaxation activities:  , arts and crafts:  , play with my pet , sensory based activities/self-soothe with five senses:  , change body temperature (ice pack/cold water) , and paced breathing/progressive muscle relaxation, grounding exercise of naming objects, weighted items (sock filled with rice), play with puddy, use sensory items   Physical Activities: meditation, deep breathing, and stretching ; taking a shower,   Focus on helpful thoughts:  \"This is temporary\", \"I will get through this\", \"It always passes\", and self-compassion statements:    Step 3: People and social settings that provide distraction:   Name: Zander    Name: Demetri  park and work   Step 4: Remind myself of people and things that are important to me and worth living for:  my family and pets and friends  Step 5: When I am in crisis, I can ask these people to help me use my safety plan:   Name: Demetri Fermin    Step 6: Making the environment safe:   dispose of old medications , remove things I could use to hurt myself:  , and be around others  Step 7: Professionals or agencies I can contact during a crisis:  Suicide Prevention Lifeline: Call or Text 987   [facility] Crisis Services: [phone]    Call 911 or go to my nearest emergency department.   I helped develop this safety plan and agree to use it when needed.  I have been given a copy of this plan.    Client signature _________________________________________________________________  Today s date:  7/25/2024  Completed by Provider Name/ Credentials:  CHANTAL Blackmon  July 25, 2024  Adapted from Safety Plan Template 2008 Yessi Prakash and Olivier Leal is reprinted with the express permission of the authors.  No portion of the Safety Plan Template may be reproduced without the express, " written permission.  You can contact the authors at [email or web address] or [email or web address].

## 2025-04-17 ENCOUNTER — MEDICAL CORRESPONDENCE (OUTPATIENT)
Dept: HEALTH INFORMATION MANAGEMENT | Facility: CLINIC | Age: 30
End: 2025-04-17
Payer: COMMERCIAL

## 2025-04-19 DIAGNOSIS — K21.9 GASTROESOPHAGEAL REFLUX DISEASE WITHOUT ESOPHAGITIS: ICD-10-CM

## 2025-04-23 ENCOUNTER — PATIENT OUTREACH (OUTPATIENT)
Dept: CARE COORDINATION | Facility: CLINIC | Age: 30
End: 2025-04-23
Payer: COMMERCIAL

## 2025-04-23 ENCOUNTER — TELEPHONE (OUTPATIENT)
Dept: PSYCHIATRY | Facility: CLINIC | Age: 30
End: 2025-04-23
Payer: COMMERCIAL

## 2025-04-23 NOTE — TELEPHONE ENCOUNTER
Received request from Efrain OLIVER, Clinical Supervisor at [facility] ([facility]). Requesting completion of Diagnostic Assessment forms.     These forms require the Providers' completion and signature.     Routed to Provider.     Lisa Jon EMT

## 2025-04-24 ENCOUNTER — OFFICE VISIT (OUTPATIENT)
Dept: FAMILY MEDICINE | Facility: CLINIC | Age: 30
End: 2025-04-24
Payer: COMMERCIAL

## 2025-04-24 VITALS
SYSTOLIC BLOOD PRESSURE: 104 MMHG | OXYGEN SATURATION: 98 % | TEMPERATURE: 98.2 F | HEIGHT: 64 IN | RESPIRATION RATE: 20 BRPM | HEART RATE: 94 BPM | DIASTOLIC BLOOD PRESSURE: 60 MMHG | WEIGHT: 136.6 LBS | BODY MASS INDEX: 23.32 KG/M2

## 2025-04-24 DIAGNOSIS — Z11.3 SCREENING EXAMINATION FOR VENEREAL DISEASE: Primary | ICD-10-CM

## 2025-04-24 DIAGNOSIS — B96.89 BACTERIAL VAGINOSIS: ICD-10-CM

## 2025-04-24 DIAGNOSIS — N76.0 BACTERIAL VAGINOSIS: ICD-10-CM

## 2025-04-24 LAB
C TRACH DNA SPEC QL PROBE+SIG AMP: NEGATIVE
CLUE CELLS: PRESENT
HIV 1+2 AB+HIV1 P24 AG SERPL QL IA: NONREACTIVE
N GONORRHOEA DNA SPEC QL NAA+PROBE: NEGATIVE
SPECIMEN TYPE: NORMAL
T PALLIDUM AB SER QL: NONREACTIVE
TRICHOMONAS, WET PREP: ABNORMAL
WBC'S/HIGH POWER FIELD, WET PREP: ABNORMAL
YEAST, WET PREP: ABNORMAL

## 2025-04-24 RX ORDER — METRONIDAZOLE 500 MG/1
500 TABLET ORAL 2 TIMES DAILY
Qty: 14 TABLET | Refills: 0 | Status: SHIPPED | OUTPATIENT
Start: 2025-04-24 | End: 2025-05-01

## 2025-04-24 RX ORDER — AMITRIPTYLINE HYDROCHLORIDE 50 MG/1
50 TABLET ORAL AT BEDTIME
COMMUNITY

## 2025-04-24 ASSESSMENT — PAIN SCALES - GENERAL: PAINLEVEL_OUTOF10: NO PAIN (0)

## 2025-04-24 NOTE — PROGRESS NOTES
"  Assessment & Plan     Screening examination for venereal disease  Labs pending as below, will follow-up with any abnormal results needing treatment as indicated via myChart.  - Vaginal-Chlamydia trachomatis/Neisseria gonorrhoeae by PCR  - HIV Antigen Antibody Combo Cascade  - Treponema Abs w Reflex to RPR and Titer  - Vagina-Wet preparation  - Vaginal-Chlamydia trachomatis/Neisseria gonorrhoeae by PCR  - HIV Antigen Antibody Combo Cascade  - Treponema Abs w Reflex to RPR and Titer  - Vagina-Wet preparation        Jelena Miller is a 30 year old, presenting for the following health issues:  STD (Screening no symptoms)      4/24/2025     9:19 AM   Additional Questions   Roomed by Radha Lee   Accompanied by alone         4/24/2025     9:19 AM   Patient Reported Additional Medications   Patient reports taking the following new medications none     HPI       here today for routine STI screening, no symptoms or exposures        Review of Systems  Constitutional, HEENT, cardiovascular, pulmonary, gi and gu systems are negative, except as otherwise noted.      Objective    /60 (BP Location: Right arm, Patient Position: Sitting, Cuff Size: Adult Regular)   Pulse 94   Temp 98.2  F (36.8  C) (Temporal)   Resp 20   Ht 1.615 m (5' 3.58\")   Wt 62 kg (136 lb 9.6 oz)   SpO2 98%   BMI 23.76 kg/m    Body mass index is 23.76 kg/m .  Physical Exam   GENERAL: alert and no distress  NEURO: mentation intact and speech normal  PSYCH: mentation appears normal, affect normal/bright          Signed Electronically by: GISELLE Li CNP    "

## 2025-04-24 NOTE — PATIENT INSTRUCTIONS
Safer Sex: Care Instructions  Overview  Safer sex is a way to reduce your risk of getting a sexually transmitted infection (STI). It can also help prevent pregnancy.  Several products can help you practice safer sex and reduce your chance of STIs. One of the best is a condom. There are internal and external condoms. You can use a special rubber sheet (dental dam) for protection during oral sex. Disposable gloves can keep your hands from touching blood, semen, or other body fluids that can carry infections.  Remember that birth control methods such as diaphragms, IUDs, foams, and birth control pills do not stop you from getting STIs.  Follow-up care is a key part of your treatment and safety. Be sure to make and go to all appointments, and call your doctor if you are having problems. It's also a good idea to know your test results and keep a list of the medicines you take.  How can you care for yourself at home?  Think about getting vaccinated to help prevent hepatitis A, hepatitis B, and human papillomavirus (HPV). They can be spread through sex.  Use a condom every time you have sex. Use an external condom, which goes on the penis. Or use an internal condom, which goes into the vagina or anus.  Make sure you use the right size external condom. A condom that's too small can break easily. A condom that's too big can slip off during sex.  Use a new condom each time you have sex. Be careful not to poke a hole in the condom when you open the wrapper.  Don't use an internal condom and an external condom at the same time.  Never use petroleum jelly (such as Vaseline), grease, hand lotion, baby oil, or anything with oil in it. These products can make holes in the condom.  After intercourse, hold the edge of the condom as you remove it. This will help keep semen from spilling out of the condom.  Do not have sex with anyone who has symptoms of an STI, such as sores on the genitals or mouth.  Do not drink a lot of alcohol or  "use drugs before sex.  Limit your sex partners. Sex with one partner who has sex only with you can reduce your risk of getting an STI.  Don't share sex toys. But if you do share them, use a condom and clean the sex toys between each use.  Talk to any partners before you have sex. Talk about what you feel comfortable with and whether you have any boundaries with sex. And find out if your partner or partners may be at risk for any STI. Keep in mind that a person may be able to spread an STI even if they do not have symptoms. You and any partners may want to get tested for STIs.  Where can you learn more?  Go to https://www.[employer].net/patiented  Enter B608 in the search box to learn more about \"Safer Sex: Care Instructions.\"  Current as of: April 30, 2024  Content Version: 14.4    8683-7820 [employer].   Care instructions adapted under license by your healthcare professional. If you have questions about a medical condition or this instruction, always ask your healthcare professional. [employer] disclaims any warranty or liability for your use of this information.    Safer Sex: Care Instructions  Overview  Safer sex is a way to reduce your risk of getting a sexually transmitted infection (STI). It can also help prevent pregnancy.  Several products can help you practice safer sex and reduce your chance of STIs. One of the best is a condom. There are internal and external condoms. You can use a special rubber sheet (dental dam) for protection during oral sex. Disposable gloves can keep your hands from touching blood, semen, or other body fluids that can carry infections.  Remember that birth control methods such as diaphragms, IUDs, foams, and birth control pills do not stop you from getting STIs.  Follow-up care is a key part of your treatment and safety. Be sure to make and go to all appointments, and call your doctor if you are having problems. It's also a good idea to know your test results " "and keep a list of the medicines you take.  How can you care for yourself at home?  Think about getting vaccinated to help prevent hepatitis A, hepatitis B, and human papillomavirus (HPV). They can be spread through sex.  Use a condom every time you have sex. Use an external condom, which goes on the penis. Or use an internal condom, which goes into the vagina or anus.  Make sure you use the right size external condom. A condom that's too small can break easily. A condom that's too big can slip off during sex.  Use a new condom each time you have sex. Be careful not to poke a hole in the condom when you open the wrapper.  Don't use an internal condom and an external condom at the same time.  Never use petroleum jelly (such as Vaseline), grease, hand lotion, baby oil, or anything with oil in it. These products can make holes in the condom.  After intercourse, hold the edge of the condom as you remove it. This will help keep semen from spilling out of the condom.  Do not have sex with anyone who has symptoms of an STI, such as sores on the genitals or mouth.  Do not drink a lot of alcohol or use drugs before sex.  Limit your sex partners. Sex with one partner who has sex only with you can reduce your risk of getting an STI.  Don't share sex toys. But if you do share them, use a condom and clean the sex toys between each use.  Talk to any partners before you have sex. Talk about what you feel comfortable with and whether you have any boundaries with sex. And find out if your partner or partners may be at risk for any STI. Keep in mind that a person may be able to spread an STI even if they do not have symptoms. You and any partners may want to get tested for STIs.  Where can you learn more?  Go to https://www.[employer].net/patiented  Enter B608 in the search box to learn more about \"Safer Sex: Care Instructions.\"  Current as of: April 30, 2024  Content Version: 14.4    6889-7119 [facility] [employer], [facility].   Care " instructions adapted under license by your healthcare professional. If you have questions about a medical condition or this instruction, always ask your healthcare professional. [employer] disclaims any warranty or liability for your use of this information.    HIV: Care Instructions  Overview     Human immunodeficiency virus (HIV) is a virus that attacks your immune system. This makes it hard for your body to fight infection and disease. HIV is the virus that causes AIDS (acquired immunodeficiency syndrome). But having HIV doesn't mean that you have AIDS. AIDS is the last stage of HIV infection, and with treatment, you can avoid it.  Medicines called antiretrovirals are the main treatment for HIV. By fighting the virus, these medicines can help your immune system stay healthy and can prevent AIDS. And they can help you live about as long as someone without HIV.  HIV often causes flu-like symptoms soon after a person gets infected. These early symptoms go away in a few weeks. After that, you may not have signs of illness for many years.  But the virus is still in your body. If you don't get treated, symptoms come back and then remain. Common symptoms include fatigue, weight loss, fever, night sweats, diarrhea, swollen lymph nodes, and mouth sores. If HIV progresses to AIDS, your symptoms get worse and your body is less and less able to fight infections like pneumonia and tuberculosis.  Follow-up care is a key part of your treatment and safety. Be sure to make and go to all appointments, and call your doctor if you are having problems. It's also a good idea to know your test results and keep a list of the medicines you take.  How can you care for yourself at home?  Take your HIV medicine exactly as directed. Talk to your doctor if you have problems such as trouble paying for your medicine or missing doses. Your doctor wants to help.  Take care to avoid food poisoning. Having HIV means you are more likely  to get food-borne diseases. So learn how to handle, prepare, and store food safely.  If you smoke, try to quit. Having HIV increases your risk of heart attacks and lung cancer. Smoking increases these risks even more. If you need help quitting, talk to your doctor about stop-smoking programs and medicines. These can increase your chances of quitting for good.  Eat healthy foods. Good nutrition can help your immune system and improve your overall health. Talk to your doctor or see a dietitian if you need help.  Be active. It helps relieve stress and helps you feel less tired. It also keeps your heart, lungs, and muscles strong. And it may help your immune system work better.  Learn more about HIV. This will let you take a more active role in your care.  If you inject drugs, use new, clean syringes and needles every time. Don't share injection supplies with others.  Get the support you need.  Join a support group. This can be a good place to share information, problem-solving tips, and emotions.  If you need more support, ask your doctor to connect you with a counselor. Counseling can help you cope with stress and stigma, and it can help if you have substance use disorder or other mental health conditions.  How can you help prevent the spread of HIV?  If you have HIV, you can take steps to avoid spreading the infection to others.  Take antiretroviral medicines.   Getting treated for HIV can help you stay healthy. It also helps protect other people from getting infected.  Let your sex and injection partners know that you have HIV.   Encourage any partners to get medicine to prevent HIV. This is called PrEP (pre-exposure prophylaxis). PrEP can help keep them from getting HIV.  Have safer sex.   Using a condom can help prevent the spread of HIV. So can having one sex partner and choosing activities that have a lower risk than vaginal or anal sex.  Never share needles, syringes, or other injection supplies.   Use new,  "clean supplies every time.  Talk to any partners about PEP (post-exposure prophylaxis).   This medicine can help prevent HIV if it's taken within 3 days of exposure to HIV.  Do not donate blood, plasma, sperm, body organs, or body tissues.   HIV can spread through these things.  When should you call for help?   Call 911 anytime you think you may need emergency care. For example, call if:    You passed out (lost consciousness).     You have severe shortness of breath.     You have chest pain.     You have symptoms of a stroke. These may include:  Sudden numbness, tingling, weakness, or loss of movement in your face, arm, or leg, especially on only one side of your body.  Sudden vision changes.  Sudden trouble speaking.  Sudden confusion or trouble understanding simple statements.  Sudden problems with walking or balance.  A sudden, severe headache that is different from past headaches.   Call your doctor now or seek immediate medical care if:    You have signs of a new or worse problem from HIV, such as:  A fever.  Coughing.  Diarrhea.  Skin changes.  Bleeding.  Confusion or not thinking clearly.   Watch closely for changes in your health, and be sure to contact your doctor if:    You have missed doses of your HIV medicine. Your doctor can offer ideas or help you find the support you need to stick with your treatment.     You are having side effects from your medicines.     You have new or worse symptoms.   Where can you learn more?  Go to https://www.Rs[employer].net.net/patiented  Enter R664 in the search box to learn more about \"HIV: Care Instructions.\"  Current as of: April 30, 2024  Content Version: 14.4    9841-0741 [employer].   Care instructions adapted under license by your healthcare professional. If you have questions about a medical condition or this instruction, always ask your healthcare professional. [employer] disclaims any warranty or liability for your use of this " "information.    Learning About Taking Medicine to Prevent HIV Infections  If you're at risk of being infected with HIV, talk to your doctor about pre-exposure prophylaxis (PrEP). When taken as directed, this medicine can help prevent you from getting HIV. While taking PrEP, you'll need to see your doctor and get regular HIV tests. If you're concerned about the cost of the medicine, there are programs that can help.  Why is it used?        Use PrEP to protect against HIV.   It almost always protects you from getting HIV through sex.  It reduces your risk of getting HIV from injecting drugs.        Take PrEP if you're trying to get pregnant with someone who has HIV.   It can help prevent you from getting HIV. This will protect you and your baby.  How do you take PrEP?    PrEP may be taken as a daily pill. It may also be given as a long-acting shot.   It's important to stay on schedule when taking PrEP. If you skip a pill or miss a shot appointment, PrEP doesn't work as well to block the virus.   Follow-up care is a key part of your treatment and safety. Be sure to make and go to all appointments, and call your doctor if you are having problems. It's also a good idea to know your test results and keep a list of the medicines you take.  Where can you learn more?  Go to https://www.[employer].net/patiented  Enter I152 in the search box to learn more about \"Learning About Taking Medicine to Prevent HIV Infections.\"  Current as of: April 30, 2024  Content Version: 14.4    0494-9701 [employer].   Care instructions adapted under license by your healthcare professional. If you have questions about a medical condition or this instruction, always ask your healthcare professional. [employer] disclaims any warranty or liability for your use of this information.    "

## 2025-04-29 RX ORDER — OMEPRAZOLE 10 MG/1
20 CAPSULE, DELAYED RELEASE ORAL DAILY
Qty: 180 CAPSULE | Refills: 1 | Status: SHIPPED | OUTPATIENT
Start: 2025-04-29

## 2025-04-29 NOTE — TELEPHONE ENCOUNTER
"Writer attempt # 1 to call patient regarding Refill RN's message below. Per patient, \"I was buying this medication over-the-counter, but now needs a refill for it. I last took this medication last night.\"    Routing message to the prescribing provider to review and advise on refill request.     LAUREN DeviN, RN, PHN   [facility]    "

## 2025-04-29 NOTE — TELEPHONE ENCOUNTER
Writer called Mental Health Resources (MHR) and LVM for Efrain SBrice, letting him know the forms will be completed after Patient's 5/2 appointment with Dr. Lagunas, and they will be returned to [facility] no later than 5/9.     Writer left clinic phone number if Efrain has any follow up questions or concerns.     Lisa Jon, EMT

## 2025-05-02 ENCOUNTER — VIRTUAL VISIT (OUTPATIENT)
Dept: PSYCHIATRY | Facility: CLINIC | Age: 30
End: 2025-05-02
Attending: PSYCHIATRY & NEUROLOGY
Payer: COMMERCIAL

## 2025-05-02 DIAGNOSIS — Z13.9 SCREENING FOR CONDITION: ICD-10-CM

## 2025-05-02 DIAGNOSIS — F41.1 GENERALIZED ANXIETY DISORDER: ICD-10-CM

## 2025-05-02 DIAGNOSIS — F12.20 CANNABIS USE DISORDER, MODERATE, DEPENDENCE (H): ICD-10-CM

## 2025-05-02 DIAGNOSIS — F33.1 MODERATE EPISODE OF RECURRENT MAJOR DEPRESSIVE DISORDER (H): Primary | ICD-10-CM

## 2025-05-02 DIAGNOSIS — F90.1 ATTENTION DEFICIT HYPERACTIVITY DISORDER (ADHD), PREDOMINANTLY HYPERACTIVE TYPE: ICD-10-CM

## 2025-05-02 DIAGNOSIS — F42.2 MIXED OBSESSIONAL THOUGHTS AND ACTS: ICD-10-CM

## 2025-05-02 DIAGNOSIS — F84.0 AUTISM SPECTRUM DISORDER: ICD-10-CM

## 2025-05-02 PROCEDURE — 90833 PSYTX W PT W E/M 30 MIN: CPT | Mod: 95

## 2025-05-02 PROCEDURE — 98006 SYNCH AUDIO-VIDEO EST MOD 30: CPT | Mod: HN

## 2025-05-02 PROCEDURE — G2211 COMPLEX E/M VISIT ADD ON: HCPCS | Mod: 95

## 2025-05-02 RX ORDER — LAMOTRIGINE 200 MG/1
200 TABLET ORAL AT BEDTIME
Qty: 30 TABLET | Refills: 3 | Status: SHIPPED | OUTPATIENT
Start: 2025-05-02

## 2025-05-02 ASSESSMENT — PAIN SCALES - GENERAL: PAINLEVEL_OUTOF10: MODERATE PAIN (4)

## 2025-05-02 NOTE — PATIENT INSTRUCTIONS
**For crisis resources, please see the information at the end of this document**   Patient Education    Thank you for coming to the [facility] [place] [facility].     Today's change:     -Increase Lamotrigine to 200mg at bedtime     Lab Testing:  If you had lab testing today and your results are reassuring or normal they will be mailed to you or sent through [facility] within 7 days. If the lab tests need quick action we will call you with the results. The phone number we will call with results is # [phone]. If this is not the best number please call our clinic and change the number.     Medication Refills:  If you need any refills please call your pharmacy and they will contact us. Our fax number for refills is [phone].   Three business days of notice are needed for general medication refill requests.   Five business days of notice are needed for controlled substance refill requests.   If you need to change to a different pharmacy, please contact the new pharmacy directly. The new pharmacy will help you get your medications transferred.     Contact Us:  Please call [phone] during business hours (8-5:00 M-F).   If you have medication related questions after clinic hours, or on the weekend, please call [phone].     Financial Assistance [phone]   Medical Records [phone]       MENTAL HEALTH CRISIS RESOURCES:  For a emergency help, please call 151 or go to the nearest Emergency Department.     Emergency Walk-In Options:   EmPATH Unit @ [place] [facility] ([place]): [phone] - Specialized mental health emergency area designed to be calming  [facility] ([place]): [phone]  [facility] ([place]): [phone]  [place]): [phone]    [facility] Crisis Information:   [place]: [phone]  [place]: [phone]  [place] ([place]) - Adult: [phone]     Child: [phone]  oMr -  Adult: [phone]     Child: [phone]  [place]: [phone]  List of all [facility] resources:   https://[place].gov/dhs/people-we-serve/adults/health-care/mental-health/resources/crisis-contacts.jsp    National Crisis Information:   Crisis Text Line: Text  [place]  to 680316  Suicide & Crisis Lifeline: 988  National Suicide Prevention Lifeline: 5-232-584-TALK (1-924.915.9190)       For online chat options, visit https://suicidepreventionlifeline.org/chat/  Poison Control Center: 2-031-525-4897  Trans Lifeline: 1-506.132.9720 - Hotline for transgender people of all ages  [employer]: 2-486-821-0045 - Hotline for LGBT youth     For Non-Emergency Support:   Fast Tracker: Mental Health & Substance Use Disorder Resources -   https://www.Ensequencetrackermn.org/

## 2025-05-02 NOTE — PROGRESS NOTES
Virtual Visit Details    Type of service:  Video Visit     Originating Location (pt. Location): Home  {PROVIDER LOCATION On-site should be selected for visits conducted from your clinic location or adjoining [facility] hospital, academic office, or other nearby [facility] building. Off-site should be selected for all other provider locations, including home:[id number]}  Distant Location (provider location):  On-site  Platform used for Video Visit: [facility]  General Clinic Team  TRANSFER of CARE DIAGNOSTIC ASSESSMENT       CARE TEAM:    PCP- Marissa Walton  [facility] Diallo Ramirez [phone]   is a 30 year old who uses the pronouns she, her.                   Assessment & Plan     Major depressive disorder, recurrent, moderate   R/o bipolar disorder , most recent episode lalit vs. Substance induced   Generalized anxiety disorder with panic attacks   OCD  Cannabis use    ADHD, hyperactive type, by history   ASD, by history    SIB   Cluster B traits   Postural orthostatic tachycardia syndrome   Fibromyalgia   Migraines     Angela Jones is a 29-year-old female with above diagnoses.    Today,   presents with euthymic mood. She was started on lamotrigine in day treatment, which seem to help with stabilizing mood. Per chart, she was in day treatment from 1/16-3/7/2025. She is now receiving DBT through [facility] and finds it helpful for emotional regulation. She would like to keep clomipramine at the same dose as it continues to help with intrusive thoughts. She was informed of upcoming transition of resident.     Psychotropic Drug Interactions:    ADDITIVE SEROTONERGIC: mirtazapine , clomipramine, abilify, quetiapine   ADDITIVE CHOLINERGIC:  clomipramine, benadryl, tizanidine, metoclopramide     Management: routine monitoring and patient is aware of risks     [place] was [place]  today: indicates that controlled prescriptions have been filled as prescribed     Risk Statements:   Treatment Risk: Risks, benefits, alternatives and potential adverse effects have been discussed and are understood.   Safety Risk:  did not appear to be an imminent safety risk to self or others.    PLAN    1) Medications:   - Continue quetiapine 50mg at bedtime   - Continue clomipramine 100mg at bedtime   - Continue mirtazapine 15mg at bedtime  - Continue Intuniv 1 mg at bedtime   - Increase lamotrigine to 200mg at bedtime      Future considerations:    -optimize Intuniv for ADHD and anxiety   -optimize clomipramine for OCD       Other:   Gabapentin 900mg TID   Ibuprofen 200mg q4h PRN    MTV   Tizanidine 2mg daily PRN   Metoclopramide 5mg TID PRN   Fludrocrotisone 0.1mg for POTS    Benadryl 50mg as needed     Future considerations:    -consider lamotrigine for mood stabilization   -optimizing clomipramine for OCD   -switching mirtazapine to wellbutrin to also target ADHD after clomipramine is optimized     2) Psychotherapy: continue weekly individual therapy with CHANTAL Blackmon     3) Next due:  Labs: SGA completed 1/2025   EKG: Routine monitoring is not indicated for current psychotropic medication regimen   Rating scales: N/A    4) Referrals: none     5) Follow-up: 3 months with incoming PGY-3                      Interval History       -just finishing up IOP   -advised not to go back to work right away  -adapted DBT with [facility]   -taking a couple months off work to build stability/build habit   -Monday  -    -Lamotrigine 150mg seem to be helping, was started during IOP   -Abilify made her panic so it was stopped   -while she was in IOP they tried to take her off of mirtazapine because she was sleepy during the day but she couldn't sleep without it   -needed hydroxyzine once in a  while   -quetiapine has been helpful for sleep takes once every night   -has intrusive thoughts that are less  frequent   -denies suicidal plan /intent   -learning DBT skills and they are helpful     Intrusive thoughts   Current Social History:  Financial/occupational: employed as a    Living situation: lives alone in an apartment with her cat   Social/spiritual support: partners, family, cat       Pertinent Substance Use:  [Last updated 4/03/2025]  Alcohol: Yes: socially    Cannabis: Yes:  THC gummies daily. Not more than 10mg/ day   Tobacco: stopped vaping   Caffeine:  decaf   Opioids: No   Narcan Kit current: N/A  Other substances: No     Medical Review of Systems / Med sfx:   A comprehensive review of systems was performed and is negative other than noted above.   Lightheadedness/orthostasis: yes, has POTS    Headaches: denies   GI: denies    CV: heart racing from POTS   Sexual health concerns: denies     Contraception: IUD                   Summary Points of Current Care  8/2/2024: Transfer visit. Tapered Trintellix from 20mg to 10mg and instructed to stop after 1-2 weeks. Start Pristiq 50mg daily   8/14/2024:  reached out due to safety concerns from thoughts of self-harm. RN arranged for welfare check. Patient saw therapist the following day and reviewed safety plan  8/16/2024: Due to worsening intrusive thoughts and mood, patient instructed via phone call to increase Trintellix back to 20mg, and Pristiq was decreased to 25mg   9/12/2024: Start clomipramine 25 mg at bedtime for OCD  11/7/2024: Increase clomipramine to 50mg with instruction to increase to 100 mg at bedtime if well tolerated   1/24/2025: Reduce Abilify to 5mg daily per IOP provider's recommendation.   4/3/2025: no changes, patient was started on Lamotrigine and was titrated up to 150mg a month ago, prefer to keep other medications the same for now                   Physical Exam  (Vitals Only)    There were no vitals taken for this visit.  Pulse Readings from Last 3 Encounters:   04/24/25 94   01/20/25 95   01/14/25 94     Wt Readings  "from Last 3 Encounters:   04/24/25 62 kg (136 lb 9.6 oz)   01/14/25 58.1 kg (128 lb 1.6 oz)   01/05/25 56.9 kg (125 lb 6.4 oz)     BP Readings from Last 3 Encounters:   04/24/25 104/60   01/20/25 118/63   01/14/25 104/73                      Mental Status Exam    Alertness: alert  and oriented  Appearance: adequately groomed  Behavior/Demeanor: cooperative, with good  eye contact   Speech: normal  Language: intact  Psychomotor: normal or unremarkable  Mood:  \"good\"   Affect: appropriate; congruent to: mood- yes, content- yes  Thought Process/Associations:  linear  Thought Content:  Reports  intrusive thoughts of self-harm without plan / intent ;  Denies violent ideation  Perception:  Reports none;  Denies hallucinations  Insight: fair  Judgment: adequate for safety  Cognition: does  appear grossly intact; formal cognitive testing was not done  Gait and Station: N/A (telehealth)                  Past Psychotropic Medication Trials    Wellbutrin- sleepiness, Prozac- irritablility, Effexor, Lexapro- not helpful.       Previous trials include Prozac (afternoon lethargy), Zoloft, Wellbutrin (insurance problems, but may have worked for depression and ADHD), Effexor (stomach pains and dizziness). Adderall (decreased appetite), Strattera, Concerta, Intuniv (\"zombie\"), Focalin.       Of note, Genesight testing from 8/11/2017 reviewed and she is an ultra rapid metabolizer of 1A2. Normal metabolizer for ADHD medications.       Medication Max Dose (mg) Dates / Duration Helpful? DC Reason / Adverse Effects?   Lamotrigine   350     Don't remember    Vortioxetine            Amitryptyline  50    y (got on it for migraines)      Citalopram  20     y      Adderall        Appetite suppressant    Ritalin        Appetite suppressant    Focalin       Provider left practice     Strattera        provider left practice    Wellbutrin        Spacy and tired , brain fog    Cymbalta            Prozac       y  Irritable , lethargy    Zoloft      " "      Effexor        Made migraines worse, stomachache     Intruniv         Worked better than stimulants, focused better and not so jittery, though per chart reported \"zombie\" like feeling      Vyvanse        Worked better than Adderral, issue with insurance    Diphenhydramine(OTC)            Risperidone        Can't recall taking it. May have been prescribed at young age    Aripriprazole  10   1/2025 (few weeks)  N  Caused panic symptoms    Clomipramine  100  11/2024- current  y    Lamotrigine  150  2/2025-current  Y                   Past Medical History     Patient Active Problem List   Diagnosis    OCD (obsessive compulsive disorder)    Pes planus    Moderate major depression (H)    Generalized anxiety disorder    Migraine with aura    Autism spectrum disorder    IUD (intrauterine device) in place    Fibromyalgia    POTS (postural orthostatic tachycardia syndrome)    Dysautonomia (H)    Suicidal ideation    Suicide ideation    Bipolar affective disorder, currently manic, mild (H)    Attention deficit hyperactivity disorder (ADHD), combined type                     Medications     Current Outpatient Medications   Medication Sig Dispense Refill    amitriptyline (ELAVIL) 50 MG tablet Take 50 mg by mouth at bedtime.      cholecalciferol (VITAMIN D3) 125 mcg (5000 units) capsule Take 1 capsule (125 mcg) by mouth daily. 30 capsule 0    clomiPRAMINE (ANAFRANIL) 50 MG capsule Take 2 capsules (100 mg) by mouth at bedtime. 60 capsule 3    cyanocobalamin (CYANOCOBALAMIN) 2000 MCG tablet Take 1 tablet (2,000 mcg) by mouth daily. 30 tablet 0    diphenhydrAMINE (BENADRYL) 50 MG capsule Take 50 mg by mouth as needed for allergies.      fludrocortisone (FLORINEF) 0.1 MG tablet Take 2 tablets (0.2 mg) by mouth daily. 180 tablet 1    gabapentin (NEURONTIN) 300 MG capsule Take 3 capsules (900 mg) by mouth 3 times daily. 810 capsule 3    guanFACINE (INTUNIV) 1 MG TB24 24 hr tablet Take 1 tablet (1 mg) by mouth at bedtime. 30 " tablet 3    hydrOXYzine HCl (ATARAX) 25 MG tablet Take 1-2 tablets (25-50 mg) by mouth 2 times daily as needed for anxiety. 45 tablet 3    hyoscyamine (LEVSIN/SL) 0.125 MG sublingual tablet Take 1 tablet (0.125 mg) by mouth every 4 hours as needed. 120 tablet 5    ibuprofen (ADVIL/MOTRIN) 200 MG tablet Take 200 mg by mouth every 4 hours as needed for pain or other (migrain)      lactase (LACTAID) 3000 UNIT tablet Take 1-2 tablets (3,000-6,000 Units) by mouth 3 times daily (with meals). 30 tablet 0    lamoTRIgine (LAMICTAL) 150 MG tablet Take 1 tablet (150 mg) by mouth daily. 30 tablet 3    levonorgestrel (MIRENA, 52 MG,) 20 MCG/24HR IUD 1 each (20 mcg) by Intrauterine route once for 1 dose 1 each 0    metoclopramide (REGLAN) 5 MG tablet Take 1 tablet (5 mg) by mouth 3 times daily as needed (for nausea). 90 tablet 3    mirtazapine (REMERON) 15 MG tablet Take 1 tablet (15 mg) by mouth at bedtime. 30 tablet 3    multivitamin, therapeutic (THERA-VIT) TABS tablet Take 1 tablet by mouth daily      omeprazole (PRILOSEC) 10 MG DR capsule TAKE 2 CAPSULES BY MOUTH EVERY  capsule 1    QUEtiapine (SEROQUEL) 50 MG tablet Take 1 tablet (50mg) at bedtime and additional 1 tablet (50mg) as needed for sleep / anxiety 45 tablet 3    tiZANidine (ZANAFLEX) 2 MG tablet Take 1 tablet (2 mg) by mouth daily as needed for muscle spasms. 90 tablet 3                     Data         12/10/2024     9:00 AM 1/14/2025    12:00 PM 1/20/2025     9:00 AM   PROMIS-10 Total Score w/o Sub Scores   PROMIS TOTAL - SUBSCORES 20  23 19       Patient-reported         2/26/2025     9:47 AM 4/3/2025     7:59 AM 4/10/2025     8:59 AM   PHQ-9 SCORE   PHQ-9 Total Score MyChart 17 (Moderately severe depression) 11 (Moderate depression) 13 (Moderate depression)   PHQ-9 Total Score 17  11  13        Patient-reported         1/24/2025     2:16 PM 2/26/2025     9:46 AM 4/3/2025     8:00 AM   JOSHUA-7 SCORE   Total Score 15 (severe anxiety) 15 (severe anxiety) 10  (moderate anxiety)   Total Score 15  15  10        Patient-reported       Liver/Kidney Function, TSH Metabolic Blood counts   Recent Labs   Lab Test 01/09/25  0849 05/20/24  1834   AST 15 17   ALT 13 10   ALKPHOS 61 63   CR 0.70 0.80     Recent Labs   Lab Test 01/09/25  0849   TSH 2.35    Recent Labs   Lab Test 10/25/23  1508   CHOL 202*   TRIG 115   *   HDL 70     Recent Labs   Lab Test 01/09/25  0849   A1C 5.1     Recent Labs   Lab Test 01/09/25  0849   *    Recent Labs   Lab Test 01/09/25  0849   WBC 4.3   HGB 12.4   HCT 37.2   MCV 94                PROVIDER: Salina Lagunas MD    Level of Medical Decision Making:   - At least 2 stable chronic problems  - Engaged in prescription drug management during visit (discussed any medication benefits, side effects, alternatives, etc.)        The longitudinal plan of care for the diagnosis(es)/condition(s) as documented were addressed during this visit. Due to the added complexity in care, I will continue to support  in the subsequent management and with ongoing continuity of care.       Patient not staffed in clinic. Supervisor is Dr. Thompson.      interest to other people)    reduce depressive symptoms, find enjoyment at least once a day, reduce suicidal thoughts, report feeling more positive about self , learn best practices for sleep, make a plan to manage 2-3 anxiety-provoking situations, learn 2-3 triggers for substance use, learn and practice anger management skills , learn 2 new ways of coping with routine stressors, take medications as prescribed on a daily basis, and improve nutrition Supportive / psychodynamic marked symptom improvement and significant improvement in self-reports for depression, OCD, anxiety, mood dysregulation consecutive visits         PROVIDER: Salina Lagunas MD    Level of Medical Decision Making:   - At least 2 stable chronic problems  - Engaged in prescription drug management during visit (discussed any medication benefits, side effects, alternatives, etc.)        The longitudinal plan of care for the diagnosis(es)/condition(s) as documented were addressed during this visit. Due to the added complexity in care, I will continue to support  in the subsequent management and with ongoing continuity of care.       Patient not staffed in clinic. Supervisor is Dr. Thompson.

## 2025-05-02 NOTE — NURSING NOTE
Current patient location: Patient declined to provide     Is the patient currently in the state of [place]? YES    Visit mode: VIDEO    If the visit is dropped, the patient can be reconnected by:VIDEO VISIT: Text to cell phone:   Telephone Information:   Mobile [phone]       Will anyone else be joining the visit? NO  (If patient encounters technical issues they should call [phone])    Are changes needed to the allergy or medication list? No    Are refills needed on medications prescribed by this physician? Patient not sure if refills are needed    Rooming Documentation:  Patient will complete questionnaire(s) in [facility], declined with VVF     Reason for visit: RECHECK    Jeanine Bowles VVF

## 2025-05-02 NOTE — PROGRESS NOTES
"Virtual Visit Details    Type of service:  Video Visit     Originating Location (pt. Location): {video visit patient location:767684::\"Home\"}  {PROVIDER LOCATION On-site should be selected for visits conducted from your clinic location or adjoining [facility] hospital, academic office, or other nearby [facility] building. Off-site should be selected for all other provider locations, including home:[id number]}  Distant Location (provider location):  {virtual location provider:330685}  Platform used for Video Visit: {Virtual Visit Platforms:208267::\"[employer]\"}  "

## 2025-05-05 ENCOUNTER — OFFICE VISIT (OUTPATIENT)
Dept: FAMILY MEDICINE | Facility: CLINIC | Age: 30
End: 2025-05-05
Payer: COMMERCIAL

## 2025-05-05 VITALS
RESPIRATION RATE: 15 BRPM | TEMPERATURE: 98.5 F | WEIGHT: 137 LBS | HEART RATE: 78 BPM | HEIGHT: 63 IN | DIASTOLIC BLOOD PRESSURE: 71 MMHG | SYSTOLIC BLOOD PRESSURE: 108 MMHG | BODY MASS INDEX: 24.27 KG/M2 | OXYGEN SATURATION: 96 %

## 2025-05-05 DIAGNOSIS — R45.851 SUICIDAL IDEATION: ICD-10-CM

## 2025-05-05 DIAGNOSIS — Z11.3 SCREEN FOR STD (SEXUALLY TRANSMITTED DISEASE): ICD-10-CM

## 2025-05-05 DIAGNOSIS — B37.31 YEAST INFECTION OF THE VAGINA: Primary | ICD-10-CM

## 2025-05-05 DIAGNOSIS — F33.1 MODERATE EPISODE OF RECURRENT MAJOR DEPRESSIVE DISORDER (H): ICD-10-CM

## 2025-05-05 LAB
CLUE CELLS: ABNORMAL
TRICHOMONAS, WET PREP: ABNORMAL
WBC'S/HIGH POWER FIELD, WET PREP: ABNORMAL
YEAST, WET PREP: PRESENT

## 2025-05-05 PROCEDURE — 3074F SYST BP LT 130 MM HG: CPT

## 2025-05-05 PROCEDURE — 99214 OFFICE O/P EST MOD 30 MIN: CPT

## 2025-05-05 PROCEDURE — 3078F DIAST BP <80 MM HG: CPT

## 2025-05-05 PROCEDURE — G2211 COMPLEX E/M VISIT ADD ON: HCPCS

## 2025-05-05 PROCEDURE — 87210 SMEAR WET MOUNT SALINE/INK: CPT

## 2025-05-05 PROCEDURE — 87591 N.GONORRHOEAE DNA AMP PROB: CPT

## 2025-05-05 PROCEDURE — 87491 CHLMYD TRACH DNA AMP PROBE: CPT

## 2025-05-05 RX ORDER — FLUCONAZOLE 150 MG/1
150 TABLET ORAL ONCE
Qty: 1 TABLET | Refills: 0 | Status: SHIPPED | OUTPATIENT
Start: 2025-05-05 | End: 2025-05-05

## 2025-05-05 ASSESSMENT — COLUMBIA-SUICIDE SEVERITY RATING SCALE - C-SSRS
2. IN THE PAST MONTH, HAVE YOU ACTUALLY HAD ANY THOUGHTS OF KILLING YOURSELF?: NO
1. WITHIN THE PAST MONTH, HAVE YOU WISHED YOU WERE DEAD OR WISHED YOU COULD GO TO SLEEP AND NOT WAKE UP?: YES
6. HAVE YOU EVER DONE ANYTHING, STARTED TO DO ANYTHING, OR PREPARED TO DO ANYTHING TO END YOUR LIFE?: NO

## 2025-05-05 NOTE — PROGRESS NOTES
"  Assessment & Plan     Yeast infection of the vagina  Wet prep positive for yeast, sent in treatment as below for 1 tab of fluconazole.   Follow-up in clinic if symptoms do not resolve.   - fluconazole (DIFLUCAN) 150 MG tablet  Dispense: 1 tablet; Refill: 0  - Wet prep - Clinic Collect    Screen for STD (sexually transmitted disease)  - Chlamydia trachomatis/Neisseria gonorrhoeae by PCR - Clinic Collect    Moderate episode of recurrent major depressive disorder (H)  Suicidal ideation  See HPI- positive PHQ9 today.   She reports intrusive thoughts but no plans to actually harm herself or commit suicide. She is in therapy and sees psychiatrist regularly. She does not feel she is in a crisis.    Depression Screening Follow Up        5/5/2025    10:19 AM   PHQ   PHQ-9 Total Score 9    Q9: Thoughts of better off dead/self-harm past 2 weeks Several days   F/U: Thoughts of suicide or self-harm No   F/U: Safety concerns No       Patient-reported                     Follow Up Actions Taken  Crisis resource information provided in the After Visit Summary        Jelena Miller is a 30 year old, presenting for the following health issues:  Vaginal Problem (X3 days of vaginal itching and irritation )        5/5/2025    10:30 AM   Additional Questions   Roomed by melissa amezcua     Vaginal Problem        Vaginal itching dryness after finishing flagyl course for BV    No vaginal sores, increased discharge or other vaginal symptoms. No urinary symptoms.   No known STI exposures.     Positive phQ9 today, she reports having intrusive thoughts, but no plans or means to harm herself or others.                 Review of Systems  Constitutional, HEENT, cardiovascular, pulmonary, gi and gu systems are negative, except as otherwise noted.      Objective    /71   Pulse 78   Temp 98.5  F (36.9  C) (Temporal)   Resp 15   Ht 1.6 m (5' 3\")   Wt 62.1 kg (137 lb)   SpO2 96%   BMI 24.27 kg/m    Body mass index is 24.27 " kg/m .  Physical Exam   GENERAL: alert and no distress  NEURO:  mentation intact and speech normal  PSYCH: mentation appears normal, affect normal/bright    Results for orders placed or performed in visit on 05/05/25 (from the past 24 hours)   Wet prep - Clinic Collect    Specimen: Vagina; Swab   Result Value Ref Range    Trichomonas Absent Absent    Yeast Present (A) Absent    Clue Cells Absent Absent    WBCs/high power field 1+ (A) None           Signed Electronically by: GISELLE Li CNP    Answers submitted by the patient for this visit:  Patient Health Questionnaire (Submitted on 5/5/2025)  If you checked off any problems, how difficult have these problems made it for you to do your work, take care of things at home, or get along with other people?: Somewhat difficult  PHQ9 TOTAL SCORE: 9

## 2025-05-06 ENCOUNTER — MYC MEDICAL ADVICE (OUTPATIENT)
Dept: FAMILY MEDICINE | Facility: CLINIC | Age: 30
End: 2025-05-06
Payer: COMMERCIAL

## 2025-05-06 DIAGNOSIS — B37.0 CANDIDIASIS OF MOUTH: Primary | ICD-10-CM

## 2025-05-06 LAB
C TRACH DNA SPEC QL PROBE+SIG AMP: NEGATIVE
N GONORRHOEA DNA SPEC QL NAA+PROBE: NEGATIVE
SPECIMEN TYPE: NORMAL

## 2025-05-07 ENCOUNTER — PATIENT OUTREACH (OUTPATIENT)
Dept: CARE COORDINATION | Facility: CLINIC | Age: 30
End: 2025-05-07
Payer: COMMERCIAL

## 2025-05-07 NOTE — TELEPHONE ENCOUNTER
The forms have been completed and signed by Dr. Lagunas.     Writer faxed the forms back to [employer] ([employer]), ATTN: Efrain OLIVER, at fax #: [phone].     The forms were sent to HIM for scanning and will be held in nurse triage office until scanning is complete.     Lisa Jon, EMT

## 2025-05-07 NOTE — PROGRESS NOTES
Clinic Care Coordination Contact  [facility]/Voicemail    Clinical Data: Care Coordinator Outreach    Outreach Documentation Number of Outreach Attempt   5/7/2025  10:14 AM 1       Left message on patient's voicemail with call back information and requested return call.    Plan: Care Coordinator will try to reach patient again in 1-2 business days.    Mary Coker FRANCINE  Clinic Care Coordination  [facility]  [email or web address]  [phone]

## 2025-05-07 NOTE — TELEPHONE ENCOUNTER
"Francisca -- did you note this at your visit? I don't see a great option for an evisit for this. Please advise    \"So I have concerns that I may also have thrush, my tongue is visibly white and fuzzy. Do I need to come in for another appointment or is this simply treated with the same fluconazole that was already prescribed?   Thanks!\"    Cherri Munoz RN  [facility]    "

## 2025-05-08 RX ORDER — CLOTRIMAZOLE 10 MG/1
10 LOZENGE ORAL
Qty: 35 LOZENGE | Refills: 0 | Status: SHIPPED | OUTPATIENT
Start: 2025-05-08 | End: 2025-05-15

## 2025-05-08 NOTE — TELEPHONE ENCOUNTER
No I did not look in her mouth     Sent in clotrimazole troches to use 5 x daily for a week, follow-up in clinic if not improving at that time    Francisca Headley, DNP
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
## 2025-05-08 NOTE — TELEPHONE ENCOUNTER
Responded to the patient through MyChart.     Cherri Munoz RN  [facility]     Previously Declined (within the last year)

## 2025-05-09 ENCOUNTER — MYC MEDICAL ADVICE (OUTPATIENT)
Dept: FAMILY MEDICINE | Facility: CLINIC | Age: 30
End: 2025-05-09
Payer: COMMERCIAL

## 2025-05-09 DIAGNOSIS — B37.0 CANDIDIASIS OF MOUTH: Primary | ICD-10-CM

## 2025-05-09 NOTE — TELEPHONE ENCOUNTER
Francisca--- please review pt's mychart message and advise. Pt is asking for alternative med for thrush instead of the clotrimazole lozenges.    Jean Vanessa, LAURENN, PHN, RN-[facility]

## 2025-05-12 ENCOUNTER — TELEPHONE (OUTPATIENT)
Dept: PSYCHIATRY | Facility: CLINIC | Age: 30
End: 2025-05-12

## 2025-05-12 ENCOUNTER — OFFICE VISIT (OUTPATIENT)
Dept: INTERNAL MEDICINE | Facility: CLINIC | Age: 30
End: 2025-05-12
Payer: COMMERCIAL

## 2025-05-12 VITALS
HEART RATE: 94 BPM | TEMPERATURE: 98.9 F | OXYGEN SATURATION: 97 % | DIASTOLIC BLOOD PRESSURE: 60 MMHG | RESPIRATION RATE: 17 BRPM | SYSTOLIC BLOOD PRESSURE: 98 MMHG

## 2025-05-12 DIAGNOSIS — Z11.3 SCREEN FOR STD (SEXUALLY TRANSMITTED DISEASE): Primary | ICD-10-CM

## 2025-05-12 DIAGNOSIS — J02.9 SORE THROAT: ICD-10-CM

## 2025-05-12 DIAGNOSIS — B37.0 THRUSH: ICD-10-CM

## 2025-05-12 DIAGNOSIS — Z23 HIGH PRIORITY FOR 2019-NCOV VACCINE: ICD-10-CM

## 2025-05-12 LAB
CLUE CELLS: PRESENT
DEPRECATED S PYO AG THROAT QL EIA: NEGATIVE
S PYO DNA THROAT QL NAA+PROBE: NOT DETECTED
TRICHOMONAS, WET PREP: ABNORMAL
WBC'S/HIGH POWER FIELD, WET PREP: ABNORMAL
YEAST, WET PREP: ABNORMAL

## 2025-05-12 PROCEDURE — 87491 CHLMYD TRACH DNA AMP PROBE: CPT | Performed by: INTERNAL MEDICINE

## 2025-05-12 PROCEDURE — 91320 SARSCV2 VAC 30MCG TRS-SUC IM: CPT | Performed by: INTERNAL MEDICINE

## 2025-05-12 PROCEDURE — 87210 SMEAR WET MOUNT SALINE/INK: CPT | Performed by: INTERNAL MEDICINE

## 2025-05-12 PROCEDURE — 87591 N.GONORRHOEAE DNA AMP PROB: CPT | Performed by: INTERNAL MEDICINE

## 2025-05-12 PROCEDURE — 90480 ADMN SARSCOV2 VAC 1/ONLY CMP: CPT | Performed by: INTERNAL MEDICINE

## 2025-05-12 PROCEDURE — 3078F DIAST BP <80 MM HG: CPT | Performed by: INTERNAL MEDICINE

## 2025-05-12 PROCEDURE — 1125F AMNT PAIN NOTED PAIN PRSNT: CPT | Performed by: INTERNAL MEDICINE

## 2025-05-12 PROCEDURE — 3074F SYST BP LT 130 MM HG: CPT | Performed by: INTERNAL MEDICINE

## 2025-05-12 PROCEDURE — 87651 STREP A DNA AMP PROBE: CPT | Performed by: INTERNAL MEDICINE

## 2025-05-12 PROCEDURE — 99214 OFFICE O/P EST MOD 30 MIN: CPT | Performed by: INTERNAL MEDICINE

## 2025-05-12 RX ORDER — ITRACONAZOLE 100 MG/1
200 CAPSULE ORAL DAILY
Qty: 20 CAPSULE | Refills: 0 | Status: SHIPPED | OUTPATIENT
Start: 2025-05-12 | End: 2025-05-12

## 2025-05-12 RX ORDER — ITRACONAZOLE 10 MG/ML
200 SOLUTION ORAL DAILY
Qty: 200 ML | Refills: 0 | Status: SHIPPED | OUTPATIENT
Start: 2025-05-12 | End: 2025-05-14

## 2025-05-12 ASSESSMENT — PAIN SCALES - GENERAL: PAINLEVEL_OUTOF10: MODERATE PAIN (4)

## 2025-05-12 NOTE — TELEPHONE ENCOUNTER
[facility] Call Center    Phone Message    May a detailed message be left on voicemail: yes     Reason for Call: Other: Patient dropped off disability forms to the clinic.      Action Taken: Other: [facility] Psychiatry Nurse Pool     Travel Screening: Not Applicable     Date of Service: 5/12/25

## 2025-05-12 NOTE — PROGRESS NOTES
Assessment & Plan     Screen for STD (sexually transmitted disease)  - Wet prep - Clinic Collect  - CHLAMYDIA TRACHOMATIS PCR  - NEISSERIA GONORRHOEA PCR    Sore throat  - Wet prep - Clinic Collect  - Streptococcus A Rapid Screen w/Reflex to PCR - Clinic Collect  - CHLAMYDIA TRACHOMATIS PCR  - NEISSERIA GONORRHOEA PCR  - Group A Streptococcus PCR Throat Swab    Thrush  - Wet prep - Clinic Collect    High priority for 2019-nCoV vaccine  Vaccine was administered today.      Subjective    is a 30 year old, presenting for the following health issues:  office visit (Pt reports they are here with possible oral thrush lasting 1-3 days. Symptoms include tongue pain and white spots. Reports recent BV and received antibiotics. Antibiotics cause yeast and possible thrush and fluconazole was taken for that. Wants to confirm that it's thrush so she can let her partners know. )        5/12/2025     3:16 PM   Additional Questions   Roomed by Beatris   Accompanied by alone         5/12/2025     3:16 PM   Patient Reported Additional Medications   Patient reports taking the following new medications none     History of Present Illness       Reason for visit:  Possible thrush  Symptom onset:  1-3 days ago  Symptoms include:  Tongue pain, white appearance  Symptom intensity:  Mild  Symptom progression:  Improving  Had these symptoms before:  No  What makes it worse:  Acidic food    is taking medications regularly.       is a pleasant 30-year-old female with history of anxiety, autism, ADD and depression.  She is currently here for acute visit with concerns about thrush in her mouth and a sore throat.    She was recently treated for BV with antibiotic and subsequently developed vaginal yeast infection 10 days ago and was treated with Diflucan for 1 dose.  STD screening at that time was negative.  Currently she feels that her tongue has some more white spots on them than usual.  She does brush her tongue regularly and it  is quite unusual for her.  Previously A1c and HIV screenings were normal.  She is not always monogamous and does engage in oral sex.  Her partner is very worried about those new symptoms and wants her to be tested.  She also has been having sore throat for the last couple of days which is Midrid in severity, no worsening allergies fevers chills or lymphadenopathy.    Review of systems: As above      Objective    BP 98/60 (BP Location: Left arm, Patient Position: Sitting, Cuff Size: Adult Regular)   Pulse 94   Temp 98.9  F (37.2  C) (Temporal)   Resp 17   SpO2 97%   There is no height or weight on file to calculate BMI.  Physical Exam   General: well appearing female, alert and oriented x3  EYES: Eyelids, conjunctiva, and sclera were normal. Pupils were normal.   HEAD, EARS, NOSE, MOUTH, AND THROAT: no cervical LAD, no thyromegaly or nodules appreciated. TMs are visualized and normal, oropharynx is clear.  She has slight white patches on her tongue, no exudates on tonsils, no other lesions in her mouth.  RESPIRATORY: respirations non labored, CTA bl, no wheezes, rales, no forced expiratory wheezing.  CARDIOVASCULAR: Heart rate and rhythm were normal. No murmurs, rubs,gallops. There was no peripheral edema. No carotid bruits.  GASTROINTESTINAL: Positive bowel sounds, abdomen is soft, non tender, non distended.     MUSCULOSKELETAL: Muscle mass was normal for age. No joint synovitis or deformity.  LYMPHATIC: There were no enlarged nodes palpable.  SKIN/HAIR/NAILS: Skin color was normal.  No rashes.  NEUROLOGIC: The patient was alert and oriented.  Speech was normal.  There is no facial asymmetry.   PSYCHIATRIC:  Mood and affect were normal.  Mild anxiety noted.           Signed Electronically by: Marylu Silva MD

## 2025-05-12 NOTE — LETTER
5/12/2025       RE: Angela Jones  [address]  [place] [address]     To Whom It May Concern,    Angela Jones is under my care at [facility]. Please see the following recommendations for  as she returns to work on June 9, 2025. The recommendations continue until June 9, 2026.     I recommend that she works one day weekly for the first two weeks, two days weekly for two weeks, three days weekly for two weeks, then increase to four days weekly.   should work a maximum of four days per week.     Please accommodate these recommendations to assist  in returning to work successfully. Do not hesitate to contact the clinic if you require any further information.    Sincerely,          Salina Lagunas MD

## 2025-05-12 NOTE — TELEPHONE ENCOUNTER
From: Salina Lagunas MD  Sent: 5/9/2025   9:51 AM CDT  To: PRECIOUS Steiner; Gris Mckay RN  Subject: RE: Work note                                    Krystal Hernandez and Perla,     Thank you for addressing this. Yes, I would appreciate a letter draft, thank you.       Salina  ------ Message -----  From: Mary Coker LSW  Sent: 5/8/2025   2:22 PM CDT  To: Salina Lagunas MD; Psychiatry Nurse-[facility]  Subject: Work note                                        Hi!     I just spoke with . She recently completed IOP with [facility], but they will only provide a work note for up to two weeks following discharge from their programming. She is requesting a letter for extended leave from work (she is hoping 2-annie months, with gradual return - 1 day first week, 2 days second week, etc.). I explained that we may not be able to do the full two months at once. She also plans to drop her long term disability paperwork off to be completed by us within the next couple days.    Nursing - if ok with Salina, could you please draft a letter and send to patient via [employer]?    Thank you!    Mary

## 2025-05-12 NOTE — TELEPHONE ENCOUNTER
Paperwork requires additional assessment information from Dr Lagunas. Patient last seen on 5/2/25. Paperwork in triage for review by Provider.

## 2025-05-12 NOTE — NURSING NOTE
Pt tolerated injection (Covid 19 12+ Pfizer Comirnaty Vaccine). Site (Left deltoid) was cleansed with alcohol prior to injections. No pain, burning, swelling or redness at the site of the injection. Patient instructed to remain in clinic for 15 minutes afterwards, and to report any adverse reactions    Pt reports adhesive allergy - area was covered in gauze and coban per request.

## 2025-05-12 NOTE — TELEPHONE ENCOUNTER
14 pages of disability claim forms for patient were dropped off at the . Forms are being worked on in Triage by [employer] [facility].       Veronique Jaime LPN

## 2025-05-12 NOTE — TELEPHONE ENCOUNTER
Writer relayed Francisca's message to pt in another [employer] message dated 5/9/25.    Jean Vanessa, BSN, PHN, RN-[facility]

## 2025-05-12 NOTE — LETTER
5/12/2025       RE: Angela Jones  [address]  [place] [address]       To Whom It May Concern,    Angela Jones is under my care at [facility]. Please see the following recommendations for  as she returns to work on June 9, 2025.    I recommend that she works one day weekly for the first two weeks, two days weekly for two weeks, three days weekly for two weeks, then increase to four days weekly.   should work a maximum of four days per week.     Please accommodate these recommendations to assist  in returning to work successfully. Do not hesitate to contact the clinic if you require any further information.    Sincerely,          Salina Lagunas MD

## 2025-05-12 NOTE — TELEPHONE ENCOUNTER
Sent in itraconazole oral solution to take daily for 10 days as alternative    Follow-up in clinic if symptoms still not resolving    Francisca Headley DNP

## 2025-05-13 ENCOUNTER — RESULTS FOLLOW-UP (OUTPATIENT)
Dept: INTERNAL MEDICINE | Facility: CLINIC | Age: 30
End: 2025-05-13

## 2025-05-13 LAB
C TRACH DNA SPEC QL NAA+PROBE: NEGATIVE
N GONORRHOEA DNA SPEC QL NAA+PROBE: NEGATIVE
SPECIMEN TYPE: NORMAL
SPECIMEN TYPE: NORMAL

## 2025-05-13 NOTE — TELEPHONE ENCOUNTER
Francisca - Pharmacy calling to state their system flags the recently prescribed itraconazole to have severe interactions with quetiapine and guanfacine. Pharmacy would like clinician to clarify whether this should still be filled or if an alternate would be prescribed.    GERI Elliott, BSN, PHN, AMB-BC (she/her)  [facility] RN

## 2025-05-13 NOTE — TELEPHONE ENCOUNTER
The forms have been completed by Dr. Lagunas and sent to [facility] for scanning. The forms will be held in nurse triage office until scanning is complete.     Writer will send the forms to Patient via [employer] message.    Lisa Jon EMT

## 2025-05-14 RX ORDER — NYSTATIN 100000 [USP'U]/ML
500000 SUSPENSION ORAL 4 TIMES DAILY
Qty: 140 ML | Refills: 0 | Status: SHIPPED | OUTPATIENT
Start: 2025-05-14 | End: 2025-05-21

## 2025-05-19 NOTE — TELEPHONE ENCOUNTER
Writer spoke with  regarding letter request. In addition to disability paperwork, she is requesting a letter to her employer to include the following information:  -She will start work on June 9th.  -Will work one day/week, increasing by one day bi-weekly. (One day/week for two weeks, then two days/week for two weeks, etc)  -She will work a maximum of 4 days per week.    Dr Lagunas updated of letter request.

## 2025-05-19 NOTE — TELEPHONE ENCOUNTER
Krystal Duncan,     The letter looks great. Thank you again for drafting it and I saw you already put my signature in. Thank you! Would you mind routing it to the patient ?     Thank you.     Salina     Completed letter approved by Dr Lagunas and sent to  via [email or web address].

## 2025-05-20 NOTE — TELEPHONE ENCOUNTER
Letter updated with recommendation end date of June 9, 2025 per Dr Lagunas.     Completed forms and updated letter faxed to [facility], fax # [phone].

## 2025-05-20 NOTE — TELEPHONE ENCOUNTER
Writer called Patient and asked her to sign EDDI and send it back so that we can fax the letter and the mental capacity form back to [facility].    Lisa Jon, EMT

## 2025-05-21 ENCOUNTER — OFFICE VISIT (OUTPATIENT)
Dept: FAMILY MEDICINE | Facility: CLINIC | Age: 30
End: 2025-05-21
Payer: COMMERCIAL

## 2025-05-21 ENCOUNTER — TELEPHONE (OUTPATIENT)
Dept: FAMILY MEDICINE | Facility: CLINIC | Age: 30
End: 2025-05-21

## 2025-05-21 ENCOUNTER — RESULTS FOLLOW-UP (OUTPATIENT)
Dept: FAMILY MEDICINE | Facility: CLINIC | Age: 30
End: 2025-05-21

## 2025-05-21 VITALS
SYSTOLIC BLOOD PRESSURE: 106 MMHG | DIASTOLIC BLOOD PRESSURE: 66 MMHG | TEMPERATURE: 97.5 F | HEART RATE: 105 BPM | OXYGEN SATURATION: 98 %

## 2025-05-21 DIAGNOSIS — N76.0 BACTERIAL VAGINITIS: ICD-10-CM

## 2025-05-21 DIAGNOSIS — B37.31 YEAST INFECTION OF THE VAGINA: Primary | ICD-10-CM

## 2025-05-21 DIAGNOSIS — B96.89 BACTERIAL VAGINITIS: ICD-10-CM

## 2025-05-21 LAB
CLUE CELLS: ABNORMAL
TRICHOMONAS, WET PREP: ABNORMAL
WBC'S/HIGH POWER FIELD, WET PREP: ABNORMAL
YEAST, WET PREP: ABNORMAL

## 2025-05-21 PROCEDURE — 3074F SYST BP LT 130 MM HG: CPT | Performed by: FAMILY MEDICINE

## 2025-05-21 PROCEDURE — 87210 SMEAR WET MOUNT SALINE/INK: CPT | Performed by: FAMILY MEDICINE

## 2025-05-21 PROCEDURE — 1125F AMNT PAIN NOTED PAIN PRSNT: CPT | Performed by: FAMILY MEDICINE

## 2025-05-21 PROCEDURE — 99214 OFFICE O/P EST MOD 30 MIN: CPT | Performed by: FAMILY MEDICINE

## 2025-05-21 PROCEDURE — 3078F DIAST BP <80 MM HG: CPT | Performed by: FAMILY MEDICINE

## 2025-05-21 RX ORDER — FLUCONAZOLE 150 MG/1
150 TABLET ORAL ONCE
Qty: 1 TABLET | Refills: 0 | Status: SHIPPED | OUTPATIENT
Start: 2025-05-21 | End: 2025-05-21

## 2025-05-21 ASSESSMENT — PAIN SCALES - GENERAL: PAINLEVEL_OUTOF10: MODERATE PAIN (4)

## 2025-05-21 NOTE — PROGRESS NOTES
Assessment & Plan  Yeast infection of the vagina:  - Yeast infection likely due to antibiotic use. Fluconazole requested to prevent yeast infection post-antibiotic treatment.  - Prescribe fluconazole. Consider boric acid vaginal suppositories, 2 capsules once daily for a week, to balance vaginal pH.  - Risks and side effects: fluconazole may increase plasma concentrations of quetiapine, potentially causing increased sedation.    Bacterial vaginitis:  - Recurrent bacterial vaginosis noted. Recent study suggests treating male partners may be effective, but unclear for female partners.  - Perform wet prep to check for bacterial vaginosis. Referral to OBGYN for further evaluation and management of recurrent bacterial vaginosis. Consider simultaneous treatment of partners if BV recurs.     Jelena Miller is a 30 year old, presenting for the following health issues:  sti screening        5/21/2025    10:03 AM   Additional Questions   Roomed by Na NATH     History of Present Illness       Reason for visit:  Possible thrush  Symptom onset:  1-3 days ago  Symptoms include:  Tongue pain, white appearance  Symptom intensity:  Mild  Symptom progression:  Improving  Had these symptoms before:  No  What makes it worse:  Acidic food    is taking medications regularly.   Angela VELASCO Jones, 30 years    Bacterial Vaginosis  - History of bacterial vaginosis  - Completed a full course of antibiotics  - Concern about recurrence and potential transmission with partner    Ureaplasma  - Partner tested positive for ureaplasma  - No personal testing for ureaplasma yet  - Started on doxycycline as part of expedited partner treatment    Abdominal Pain  - Reports abdominal pain, reason for initial concern and testing    Yeast Infections  - History of recurrent yeast infections  - Concern about potential yeast infection following antibiotic treatment        Objective    /66 (BP Location: Right arm, Patient Position: Sitting, Cuff  Size: Adult Regular)   Pulse 105   Temp 97.5  F (36.4  C) (Temporal)   LMP  (LMP Unknown)   SpO2 98%   There is no height or weight on file to calculate BMI.    Physical Exam  Constitutional:       General:  is not in acute distress.  Eyes:      General: No scleral icterus.  Pulmonary:      Effort: No respiratory distress.   Neurological:      Mental Status:  is alert.   Psychiatric:         Mood and Affect: Mood normal.         Behavior: Behavior normal.                Signed Electronically by: Dileep Leal DO

## 2025-05-21 NOTE — TELEPHONE ENCOUNTER
Per Dr Walton, attach completed fitness for duty certification form to [facility] for patient.

## 2025-05-22 ENCOUNTER — PATIENT OUTREACH (OUTPATIENT)
Dept: CARE COORDINATION | Facility: CLINIC | Age: 30
End: 2025-05-22
Payer: COMMERCIAL

## 2025-05-26 ENCOUNTER — PATIENT OUTREACH (OUTPATIENT)
Dept: CARE COORDINATION | Facility: CLINIC | Age: 30
End: 2025-05-26
Payer: COMMERCIAL

## 2025-06-10 ENCOUNTER — TELEPHONE (OUTPATIENT)
Dept: PSYCHIATRY | Facility: CLINIC | Age: 30
End: 2025-06-10
Payer: COMMERCIAL

## 2025-06-10 ENCOUNTER — VIRTUAL VISIT (OUTPATIENT)
Dept: BEHAVIORAL HEALTH | Facility: CLINIC | Age: 30
End: 2025-06-10
Payer: COMMERCIAL

## 2025-06-10 DIAGNOSIS — F84.0 AUTISM SPECTRUM DISORDER: ICD-10-CM

## 2025-06-10 DIAGNOSIS — F33.2 SEVERE EPISODE OF RECURRENT MAJOR DEPRESSIVE DISORDER, WITHOUT PSYCHOTIC FEATURES (H): ICD-10-CM

## 2025-06-10 DIAGNOSIS — F90.2 ATTENTION DEFICIT HYPERACTIVITY DISORDER (ADHD), COMBINED TYPE: ICD-10-CM

## 2025-06-10 DIAGNOSIS — F41.1 GENERALIZED ANXIETY DISORDER: Primary | ICD-10-CM

## 2025-06-10 PROCEDURE — 90837 PSYTX W PT 60 MINUTES: CPT | Mod: 95 | Performed by: SOCIAL WORKER

## 2025-06-10 PROCEDURE — 90785 PSYTX COMPLEX INTERACTIVE: CPT | Mod: 95 | Performed by: SOCIAL WORKER

## 2025-06-10 ASSESSMENT — ANXIETY QUESTIONNAIRES
6. BECOMING EASILY ANNOYED OR IRRITABLE: SEVERAL DAYS
GAD7 TOTAL SCORE: 10
7. FEELING AFRAID AS IF SOMETHING AWFUL MIGHT HAPPEN: SEVERAL DAYS
4. TROUBLE RELAXING: SEVERAL DAYS
2. NOT BEING ABLE TO STOP OR CONTROL WORRYING: NEARLY EVERY DAY
IF YOU CHECKED OFF ANY PROBLEMS ON THIS QUESTIONNAIRE, HOW DIFFICULT HAVE THESE PROBLEMS MADE IT FOR YOU TO DO YOUR WORK, TAKE CARE OF THINGS AT HOME, OR GET ALONG WITH OTHER PEOPLE: VERY DIFFICULT
7. FEELING AFRAID AS IF SOMETHING AWFUL MIGHT HAPPEN: SEVERAL DAYS
GAD7 TOTAL SCORE: 10
GAD7 TOTAL SCORE: 10
3. WORRYING TOO MUCH ABOUT DIFFERENT THINGS: SEVERAL DAYS
8. IF YOU CHECKED OFF ANY PROBLEMS, HOW DIFFICULT HAVE THESE MADE IT FOR YOU TO DO YOUR WORK, TAKE CARE OF THINGS AT HOME, OR GET ALONG WITH OTHER PEOPLE?: VERY DIFFICULT
5. BEING SO RESTLESS THAT IT IS HARD TO SIT STILL: NOT AT ALL
1. FEELING NERVOUS, ANXIOUS, OR ON EDGE: NEARLY EVERY DAY

## 2025-06-10 ASSESSMENT — PATIENT HEALTH QUESTIONNAIRE - PHQ9
10. IF YOU CHECKED OFF ANY PROBLEMS, HOW DIFFICULT HAVE THESE PROBLEMS MADE IT FOR YOU TO DO YOUR WORK, TAKE CARE OF THINGS AT HOME, OR GET ALONG WITH OTHER PEOPLE: VERY DIFFICULT
SUM OF ALL RESPONSES TO PHQ QUESTIONS 1-9: 13
SUM OF ALL RESPONSES TO PHQ QUESTIONS 1-9: 13

## 2025-06-10 NOTE — PROGRESS NOTES
[facility]                                     Progress Note    Patient Name: Angela Jones  Date: 6/10/2025         Service Type: Individual      Session Start Time: 9:00am  Session End Time: 9:53am     Session Length: 53 minutes    Session #: 54    Attendees: Client attended alone    Service Modality:  Video Visit:      Provider verified identity through the following two step process.  Patient provided:  Patient is known previously to provider    Telemedicine Visit: The patient's condition can be safely assessed and treated via synchronous audio and visual telemedicine encounter.      Reason for Telemedicine Visit: Patient has requested telehealth visit    Originating Site (Patient Location): Patient's home    Distant Site (Provider Location): Provider Remote Setting- Home Office    Consent:  The patient/guardian has verbally consented to: the potential risks and benefits of telemedicine (video visit) versus in person care; bill my insurance or make self-payment for services provided; and responsibility for payment of non-covered services.     Patient would like the video invitation sent by:  My Chart    Mode of Communication:  Video Conference via [employer]      Distant Location (Provider):  Off-site    As the provider I attest to compliance with applicable laws and regulations related to telemedicine.    DATA  Extended Session (53+ minutes): PROLONGED SERVICE IN THE OUTPATIENT SETTING REQUIRING DIRECT (FACE-TO-FACE) PATIENT CONTACT BEYOND THE USUAL SERVICE:    - Treatment protocol required additional time to complete, due to the nature of diagnosis being treated.  See Interventions section for details  Interactive Complexity: Yes, visit entailed Interactive Complexity evidenced by:  -The need to manage maladaptive communication (related to, e.g., high anxiety, high reactivity, repeated questions, or disagreement) among participants that complicates delivery of care; diagnostic criteria for  "ASD are met and complicate the delivery of services. Patient requires additional support and alternative methods to engage in psychotherapy services.   Crisis: No        Progress Since Last Session (Related to Symptoms / Goals / Homework):   Symptoms: No change anxiety and depression     Homework: Achieved / completed to satisfaction      Episode of Care Goals: Minimal progress - PREPARATION (Decided to change - considering how); Intervened by negotiating a change plan and determining options / strategies for behavior change, identifying triggers, exploring social supports, and working towards setting a date to begin behavior change     Current / Ongoing Stressors and Concerns:  Patient provides a status update while therapist utilizes reflective listening skills. She shares about visiting her family for 10 days - she did ask her family for help with finances and this was stressful. She has some feelings of shame and thoughts of failure around her financial situation. She is planning to start work again on Thursday (starting up slowly back to full-time, 4 days/week). She feels anxious/scared about returning to work and creates an outline so she can talk to her boss about her schedule. She has worries about \"asking for too much\" and \"being inflexible.\"       Therapist assessed for risk and safety - patient identifies intrusive thoughts around self-harm. She contracts for safety such as putting away sharp objects. She reviews her safety plan.  Should there be an increase in symptoms or severity related to SI/SIB, patient should call 911 or go to local ED for evaluation.         Treatment Objective(s) Addressed in This Session:   identify the fears / thoughts that contribute to feeling anxious, Increase interest, engagement, and pleasure in doing things  Decrease frequency and intensity of feeling down, depressed, hopeless  Identify negative self-talk and behaviors: challenge core beliefs, myths, and actions  Gain " "insight     Intervention:  Reviewed core mindfulness skills of DBT.  Reviewed the \"what\" skills of observe, describe, and participate.  Reviewed the \"how\" skills of \"non-judgmentally, one-mindfully, and effectively.   Patient practiced words of affirmation and shifting to the present to manage anxious thoughts. Therapist provided unconditional positive regard and supportive counseling.       Assessments completed prior to visit:   The following assessments were completed by patient for this visit:  PHQ9:       1/20/2025     9:00 AM 2/26/2025     9:47 AM 4/3/2025     7:59 AM 4/10/2025     8:59 AM 5/5/2025    10:19 AM 5/9/2025     9:56 AM 6/10/2025     8:54 AM   PHQ-9 SCORE   PHQ-9 Total Score MyChart  17 (Moderately severe depression) 11 (Moderate depression) 13 (Moderate depression) 9 (Mild depression) 7 (Mild depression) 13 (Moderate depression)   PHQ-9 Total Score 24 17  11  13  9  7  13        Patient-reported     GAD7:       1/14/2025    12:00 PM 1/20/2025     9:00 AM 1/24/2025     2:16 PM 2/26/2025     9:46 AM 4/3/2025     8:00 AM 5/9/2025     9:58 AM 6/10/2025     8:55 AM   JOSHUA-7 SCORE   Total Score   15 (severe anxiety) 15 (severe anxiety) 10 (moderate anxiety) 6 (mild anxiety) 10 (moderate anxiety)   Total Score 15 16 15  15  10  6  10        Patient-reported     PROMIS 10-Global Health (only subscores and total score):       8/2/2024     1:03 PM 11/5/2024     8:32 AM 11/19/2024     8:53 AM 12/10/2024     9:00 AM 1/14/2025    12:00 PM 1/20/2025     9:00 AM 5/9/2025     9:57 AM   PROMIS-10 Scores Only   Global Mental Health Score 13 9  10  9  12 8 13    Global Physical Health Score 12 12  11  11  11 11 10    PROMIS TOTAL - SUBSCORES 25 21  21  20  23 19 23        Patient-reported      [place] Suicide Severity Rating Scale (Short Version)      1/5/2025     2:31 PM 1/5/2025     6:12 PM 1/6/2025    11:52 AM 1/7/2025    11:57 AM 1/8/2025     9:24 PM 2/26/2025    10:23 AM 3/7/2025    10:00 AM   [place] Suicide " Severity Rating (Short Version)   Q1 Wished to be Dead (Past Month) 1-->yes 1-->yes 1-->yes  0-->no     Q2 Suicidal Thoughts (Past Month) 1-->yes 1-->yes 1-->yes  1-->yes     Q3 Suicidal Thought Method 1-->yes 1-->yes 1-->yes  1-->yes     Q4 Suicidal Intent without Specific Plan 1-->yes 1-->yes 0-->no  0-->no     Q5 Suicide Intent with Specific Plan 1-->yes 0-->no 1-->yes  1-->yes     Q6 Suicide Behavior (Lifetime) 0-->no 0-->no 0-->no  0-->no     Level of Risk per Screen high risk high risk high risk  high risk     1. Wish to be Dead (Since Last Contact)    Y  N N   2. Non-Specific Active Suicidal Thoughts (Since Last Contact)    Y  N N   3. Active Suicidal Ideation with any Methods (Not Plan) Without Intent to Act (Since Last Contact)    Y      4. Active Suicidal Ideation with Some Intent to Act, Without Specific Plan (Since Last Contact)    N      5. Active Suicidal Ideation with Specific Plan and Intent (Since Last Contact)    N      Most Severe Ideation Rating (Since Last Contact)    5      Frequency (Since Last Contact)    4      Duration (Since Last Contact)    2      Deterrents (Since Last Contact)    0      Reasons for Ideation (Since Last Contact)    4      Actual Attempt (Since Last Contact)    N  N    Has subject engaged in non-suicidal self-injurious behavior? (Since Last Contact)    N  N    Interrupted Attempts (Since Last Contact)    N  N    Aborted or Self-Interrupted Attempt (Since Last Contact)    N  N    Preparatory Acts or Behavior (Since Last Contact)    N  N    Suicide (Since Last Contact)    N  N    Calculated C-SSRS Risk Score (Since Last Contact)    Moderate Risk  No Risk Indicated No Risk Indicated          ASSESSMENT: Current Emotional / Mental Status (status of significant symptoms):   Risk status (Self / Other harm or suicidal ideation)   Patient denies current fears or concerns for personal safety.   Patient denies current or recent suicidal ideation or behaviors.   Patient denies  current or recent homicidal ideation or behaviors.   Patient reports current or recent self injurious behavior or ideation including SIB ideation, intrusive thoughts.   Patient denies other safety concerns.   Patient reports there has been no change in risk factors since their last session.     Patient reports there has been no change in protective factors since their last session.     A safety and risk management plan has been developed including: Patient consented to co-developed safety plan.  Safety and risk management plan was completed - see below.  Patient agreed to use safety plan should any safety concerns arise.  A copy was given to the patient.     Appearance:   Appropriate    Eye Contact:   Good    Psychomotor Behavior: Hyperactive  Restless    Attitude:   Cooperative    Orientation:   All   Speech    Rate / Production: Pressured  Stutters    Volume:  Normal    Mood:    Anxious  Depressed  Anhedonia   Affect:    Constricted  Flat  Worrisome    Thought Content:  Clear    Thought Form:  Coherent    Insight:    Good      Medication Review:   No changes to current psychiatric medication(s)     Medication Compliance:   Yes     Changes in Health Issues:   None reported     Chemical Use Review:   Substance Use: Chemical use reviewed, no active concerns identified      Tobacco Use: No current tobacco use.      Diagnosis:  1. Generalized anxiety disorder    2. Autism spectrum disorder    3. Attention deficit hyperactivity disorder (ADHD), combined type    4. Severe episode of recurrent major depressive disorder, without psychotic features (H)    ADHD  R/O Bipolar Disorder  R/O OCD     Collateral Reports Completed:   Not Applicable    PLAN: (Patient Tasks / Therapist Tasks / Other)  Patient will return for a virtual visit in 2 weeks  Patient encouraged to practice healthy boundaries  Patient will utilize her safety plan   Patient will consider visual aids to help her use skills while at home alone   Patient  encouraged to journal and use daily planner  Patient encouraged to prioritize self-care         There has been demonstrated improvement in functioning while patient has been engaged in psychotherapy/psychological service- if withdrawn the patient would deteriorate and/or relapse.       Fanny Cobb, LICSW                                                         ______________________________________________________________________    Individual Treatment Plan    Patient's Name: Angela Jones  YOB: 1995    Date of Creation: 1/19/2022  Date Treatment Plan Last Reviewed/Revised: 5/9/2025    DSM5 Diagnoses: Autism Spectrum Disorder 299.00(F84.0)  Associated with another neurodevelopmental, mental or behavioral disorder and Attention-Deficit/Hyperactivity Disorder  314.01 (F90.9) Unspecified Attention -Deficit / Hyperactivity Disorder, 296.31 (F33.0) Major Depressive Disorder, Recurrent Episode, Mild _ or 300.02 (F41.1) Generalized Anxiety Disorder  Psychosocial / Contextual Factors: 30 year old  female, , no children   PROMIS (reviewed every 90 days):   PROMIS 10-Global Health (only subscores and total score):       8/2/2024     1:03 PM 11/5/2024     8:32 AM 11/19/2024     8:53 AM 12/10/2024     9:00 AM 1/14/2025    12:00 PM 1/20/2025     9:00 AM 5/9/2025     9:57 AM   PROMIS-10 Scores Only   Global Mental Health Score 13 9  10  9  12 8 13    Global Physical Health Score 12 12  11  11  11 11 10    PROMIS TOTAL - SUBSCORES 25 21  21  20  23 19 23          Referral / Collaboration:  Referral to another professional/service is not indicated at this time..    Anticipated number of session for this episode of care: 6-9  Anticipation frequency of session: Every other week  Anticipated Duration of each session: 53 or more minutes  Treatment plan will be reviewed in 90 days or when goals have been changed.       MeasurableTreatment Goal(s) related to diagnosis / functional  impairment(s)  Goal 1: Patient will manage symptoms of anxiety, as evidenced by a JOSHUA-7 score of 5 or lower     I will know I've met my goal when I feel more confident in my ability to cope with stress.      Objective #A (Patient Action)    Patient will identify the initial signs or symptoms of anxiety.  Status: Continued - Date(s): 5/9/2025    Intervention(s)  Therapist will teach  help patient gain insight into signs of stress (physically and emotionally) .    Objective #B  Patient will use relaxation strategies multiple times per day to reduce the physical symptoms of anxiety.  Status: Continued - Date(s): 5/9/2025    Intervention(s)  Therapist will  teach diaphragmatic breathing and other grounding skills .    Objective #C  Patient will use thought-stopping strategy daily to reduce intrusive thoughts.  Status: Continued - Date(s): 5/9/2025    Intervention(s)  Therapist will  teach thought stopping techniques .      Goal 2: Patient will manage symptoms of depression, as evidenced by a PHQ-9 score of 10 or lower    I will know I've met my goal when I feel more confident in my ability to express my emotions in a healthy way.      Objective #A (Patient Action)    Status: Continued - Date(s): 5/9/2025    Patient will Increase interest, engagement, and pleasure in doing things.    Intervention(s)  Therapist will teach emotional recognition/identification. * .    Objective #B  Patient will Identify negative self-talk and behaviors: challenge core beliefs, myths, and actions.    Status: Continued - Date(s): 5/9/2025    Intervention(s)  Therapist will  help patient practice positive self-talk and thought re-framing .    Goal 3: Client will manage symptoms of ADHD     I will know I've met my goal when I feel confident in completing tasks.      Objective #A (Client Action)    Client will use daily planner 75% of the time.  Status: Continued - Date(s): 5/9/2025    Intervention(s)  Therapist will  ask client to demonstrate  "use of planner while in session .    Objective #B  Client will identify and compliment positives at least 2 times daily to support positive self-image.    Status: Continued - Date(s): 5/9/2025    Intervention(s)  Therapist will  ask patient to demonstrate use of affirmations during session .    Goal 4: Client will manage symptoms and experiences associated with ASD     I will know I've met my goal when I feel confident in my communication in relationships.      Objective #A (Client Action)    Client will work on being assertive and setting clear expectations with healthy boundaries in relationships  Status: Continued - Date(s): 5/9/2025    Intervention(s)  Therapist will  help patient practice assertive communication skills .        Patient has reviewed and agreed to the above plan.      Fanny Cobb, [facility]  May 9, 2025        [facility]                                       Angela Jones     SAFETY PLAN:  Step 1: Warning signs / cues (Thoughts, images, mood, situation, behavior) that a crisis may be developing:  Thoughts: \"I can't do this anymore\" and \"Nothing makes it better\" \"I'm a burden\"   Images: visions of harm: self-harm  Thinking Processes: ruminations (can't stop thinking about my problems):  , racing thoughts, intrusive thoughts (bothersome, unwanted thoughts that come out of nowhere):  , highly critical and negative thoughts:  , and disorganized thinking:    Mood: worsening depression, hopelessness, helplessness, intense worry, and agitation  Behaviors: can't stop crying, not taking care of myself, and not taking care of my responsibilities  Situations: relationship problems and financial stress   Step 2: Coping strategies - Things I can do to take my mind off of my problems without contacting another person (relaxation technique, physical activity):  Distress Tolerance Strategies:  relaxation activities:  , arts and crafts:  , play with my pet , sensory based " "activities/self-soothe with five senses:  , change body temperature (ice pack/cold water) , and paced breathing/progressive muscle relaxation, grounding exercise of naming objects, weighted items (sock filled with rice), play with puddy, use sensory items   Physical Activities: meditation, deep breathing, and stretching ; taking a shower,   Focus on helpful thoughts:  \"This is temporary\", \"I will get through this\", \"It always passes\", and self-compassion statements:    Step 3: People and social settings that provide distraction:   Name: Zander    Name: Demetri  park and work   Step 4: Remind myself of people and things that are important to me and worth living for:  my family and pets and friends  Step 5: When I am in crisis, I can ask these people to help me use my safety plan:   Name: Demetri Fermin    Step 6: Making the environment safe:   dispose of old medications , remove things I could use to hurt myself:  , and be around others  Step 7: Professionals or agencies I can contact during a crisis:  Suicide Prevention Lifeline: Call or Text 988   [facility]: [phone]    Call 911 or go to my nearest emergency department.   I helped develop this safety plan and agree to use it when needed.  I have been given a copy of this plan.    Client signature _________________________________________________________________  Today s date:  7/25/2024  Completed by Provider Name/ Credentials:  CHANTAL Blackmon  July 25, 2024  Adapted from Safety Plan Template 2008 Yessi Prakash and Olivier Leal is reprinted with the express permission of the authors.  No portion of the Safety Plan Template may be reproduced without the express, written permission.  You can contact the authors at [email or web address] or [email or web address].        "

## 2025-06-10 NOTE — TELEPHONE ENCOUNTER
Received request from [facility], requesting Patient records from 1/1/24 - Present be faxed to [facility] at fax #: [phone] and [facility] with [facility]): [phone].     The Patient signed a Release of Information (EDDI) for [facility] on 5/19/25.     Writer sent the records request to [facility] Records Department.     Lisa Jon, EMT

## 2025-06-18 ENCOUNTER — MYC MEDICAL ADVICE (OUTPATIENT)
Dept: FAMILY MEDICINE | Facility: CLINIC | Age: 30
End: 2025-06-18
Payer: COMMERCIAL

## 2025-06-18 NOTE — TELEPHONE ENCOUNTER
Responded to patient via Bashir Power RN   [facility]

## 2025-06-24 ENCOUNTER — VIRTUAL VISIT (OUTPATIENT)
Dept: BEHAVIORAL HEALTH | Facility: CLINIC | Age: 30
End: 2025-06-24
Payer: COMMERCIAL

## 2025-06-24 DIAGNOSIS — F41.1 GENERALIZED ANXIETY DISORDER: Primary | ICD-10-CM

## 2025-06-24 DIAGNOSIS — F33.2 SEVERE EPISODE OF RECURRENT MAJOR DEPRESSIVE DISORDER, WITHOUT PSYCHOTIC FEATURES (H): ICD-10-CM

## 2025-06-24 DIAGNOSIS — F84.0 AUTISM SPECTRUM DISORDER: ICD-10-CM

## 2025-06-24 DIAGNOSIS — F90.2 ATTENTION DEFICIT HYPERACTIVITY DISORDER (ADHD), COMBINED TYPE: ICD-10-CM

## 2025-06-24 PROCEDURE — 90837 PSYTX W PT 60 MINUTES: CPT | Mod: 95 | Performed by: SOCIAL WORKER

## 2025-06-24 PROCEDURE — 90785 PSYTX COMPLEX INTERACTIVE: CPT | Mod: 95 | Performed by: SOCIAL WORKER

## 2025-06-24 ASSESSMENT — ANXIETY QUESTIONNAIRES
7. FEELING AFRAID AS IF SOMETHING AWFUL MIGHT HAPPEN: SEVERAL DAYS
4. TROUBLE RELAXING: SEVERAL DAYS
6. BECOMING EASILY ANNOYED OR IRRITABLE: SEVERAL DAYS
2. NOT BEING ABLE TO STOP OR CONTROL WORRYING: MORE THAN HALF THE DAYS
8. IF YOU CHECKED OFF ANY PROBLEMS, HOW DIFFICULT HAVE THESE MADE IT FOR YOU TO DO YOUR WORK, TAKE CARE OF THINGS AT HOME, OR GET ALONG WITH OTHER PEOPLE?: VERY DIFFICULT
3. WORRYING TOO MUCH ABOUT DIFFERENT THINGS: MORE THAN HALF THE DAYS
GAD7 TOTAL SCORE: 10
5. BEING SO RESTLESS THAT IT IS HARD TO SIT STILL: SEVERAL DAYS
IF YOU CHECKED OFF ANY PROBLEMS ON THIS QUESTIONNAIRE, HOW DIFFICULT HAVE THESE PROBLEMS MADE IT FOR YOU TO DO YOUR WORK, TAKE CARE OF THINGS AT HOME, OR GET ALONG WITH OTHER PEOPLE: VERY DIFFICULT
7. FEELING AFRAID AS IF SOMETHING AWFUL MIGHT HAPPEN: SEVERAL DAYS
1. FEELING NERVOUS, ANXIOUS, OR ON EDGE: MORE THAN HALF THE DAYS

## 2025-06-24 ASSESSMENT — PATIENT HEALTH QUESTIONNAIRE - PHQ9
10. IF YOU CHECKED OFF ANY PROBLEMS, HOW DIFFICULT HAVE THESE PROBLEMS MADE IT FOR YOU TO DO YOUR WORK, TAKE CARE OF THINGS AT HOME, OR GET ALONG WITH OTHER PEOPLE: VERY DIFFICULT
SUM OF ALL RESPONSES TO PHQ QUESTIONS 1-9: 11
SUM OF ALL RESPONSES TO PHQ QUESTIONS 1-9: 11

## 2025-06-24 NOTE — PROGRESS NOTES
[facility]                                     Progress Note    Patient Name: Angela Jones  Date: 6/24/2025         Service Type: Individual      Session Start Time: 9:01am  Session End Time: 9:54am     Session Length: 53 minutes    Session #: 55    Attendees: Client attended alone    Service Modality:  Video Visit:      Provider verified identity through the following two step process.  Patient provided:  Patient is known previously to provider    Telemedicine Visit: The patient's condition can be safely assessed and treated via synchronous audio and visual telemedicine encounter.      Reason for Telemedicine Visit: Patient has requested telehealth visit    Originating Site (Patient Location): Patient's home    Distant Site (Provider Location): Provider Remote Setting- Home Office    Consent:  The patient/guardian has verbally consented to: the potential risks and benefits of telemedicine (video visit) versus in person care; bill my insurance or make self-payment for services provided; and responsibility for payment of non-covered services.     Patient would like the video invitation sent by:  My Chart    Mode of Communication:  Video Conference via [employer]well      Distant Location (Provider):  Off-site    As the provider I attest to compliance with applicable laws and regulations related to telemedicine.    DATA  Extended Session (53+ minutes): PROLONGED SERVICE IN THE OUTPATIENT SETTING REQUIRING DIRECT (FACE-TO-FACE) PATIENT CONTACT BEYOND THE USUAL SERVICE:    - Treatment protocol required additional time to complete, due to the nature of diagnosis being treated.  See Interventions section for details  Interactive Complexity: Yes, visit entailed Interactive Complexity evidenced by:  -The need to manage maladaptive communication (related to, e.g., high anxiety, high reactivity, repeated questions, or disagreement) among participants that complicates delivery of care; diagnostic criteria for  "ASD are met and complicate the delivery of services. Patient requires additional support and alternative methods to engage in psychotherapy services.   Crisis: No        Progress Since Last Session (Related to Symptoms / Goals / Homework):   Symptoms: No change anxiety and depression     Homework: Achieved / completed to satisfaction      Episode of Care Goals: Minimal progress - PREPARATION (Decided to change - considering how); Intervened by negotiating a change plan and determining options / strategies for behavior change, identifying triggers, exploring social supports, and working towards setting a date to begin behavior change     Current / Ongoing Stressors and Concerns:  Patient provides a status update while therapist utilizes reflective listening skills. Patient reports that she has tried returning to work and \"hates\" it. She is in significant physical pain and discomfort after a shift - \"so much pain I can barely function.\" It's been a very difficult transition. \"The money doesn't feel worth it compared to what I'm giving up.\" She is getting panic attacks at work. She is having intrusive thoughts. She feels pressure to \"get better and feel better.\"     Therapist assessed for risk and safety - patient identifies intrusive thoughts around self-harm and SI due to overwhelm and stress from returning to work. She contracts for safety such as putting away sharp objects. She reviews her safety plan and contracts for safety.  Should there be an increase in symptoms or severity related to SI/SIB, patient should call 911 or go to local ED for evaluation.         Treatment Objective(s) Addressed in This Session:   identify the fears / thoughts that contribute to feeling anxious, Increase interest, engagement, and pleasure in doing things  Decrease frequency and intensity of feeling down, depressed, hopeless  Identify negative self-talk and behaviors: challenge core beliefs, myths, and actions  Gain " "insight     Intervention:  Reviewed core mindfulness skills of DBT.  Reviewed the \"what\" skills of observe, describe, and participate.  Reviewed the \"how\" skills of \"non-judgmentally, one-mindfully, and effectively.   Patient practiced words of affirmation and shifting to the present to manage anxious thoughts. Therapist provided unconditional positive regard and supportive counseling.       Assessments completed prior to visit:   The following assessments were completed by patient for this visit:  PHQ9:       2/26/2025     9:47 AM 4/3/2025     7:59 AM 4/10/2025     8:59 AM 5/5/2025    10:19 AM 5/9/2025     9:56 AM 6/10/2025     8:54 AM 6/24/2025     8:54 AM   PHQ-9 SCORE   PHQ-9 Total Score MyChart 17 (Moderately severe depression) 11 (Moderate depression) 13 (Moderate depression) 9 (Mild depression) 7 (Mild depression) 13 (Moderate depression) 11 (Moderate depression)   PHQ-9 Total Score 17  11  13  9  7  13  11        Patient-reported     GAD7:       1/20/2025     9:00 AM 1/24/2025     2:16 PM 2/26/2025     9:46 AM 4/3/2025     8:00 AM 5/9/2025     9:58 AM 6/10/2025     8:55 AM 6/24/2025     8:55 AM   JOSHUA-7 SCORE   Total Score  15 (severe anxiety) 15 (severe anxiety) 10 (moderate anxiety) 6 (mild anxiety) 10 (moderate anxiety) 10 (moderate anxiety)   Total Score 16 15  15  10  6  10  10        Patient-reported     PROMIS 10-Global Health (only subscores and total score):       8/2/2024     1:03 PM 11/5/2024     8:32 AM 11/19/2024     8:53 AM 12/10/2024     9:00 AM 1/14/2025    12:00 PM 1/20/2025     9:00 AM 5/9/2025     9:57 AM   PROMIS-10 Scores Only   Global Mental Health Score 13 9  10  9  12 8 13    Global Physical Health Score 12 12  11  11  11 11 10    PROMIS TOTAL - SUBSCORES 25 21  21  20  23 19 23        Patient-reported      [place] Suicide Severity Rating Scale (Short Version)      1/5/2025     2:31 PM 1/5/2025     6:12 PM 1/6/2025    11:52 AM 1/7/2025    11:57 AM 1/8/2025     9:24 PM 2/26/2025    " 10:23 AM 3/7/2025    10:00 AM   [place] Suicide Severity Rating (Short Version)   Q1 Wished to be Dead (Past Month) 1-->yes 1-->yes 1-->yes  0-->no     Q2 Suicidal Thoughts (Past Month) 1-->yes 1-->yes 1-->yes  1-->yes     Q3 Suicidal Thought Method 1-->yes 1-->yes 1-->yes  1-->yes     Q4 Suicidal Intent without Specific Plan 1-->yes 1-->yes 0-->no  0-->no     Q5 Suicide Intent with Specific Plan 1-->yes 0-->no 1-->yes  1-->yes     Q6 Suicide Behavior (Lifetime) 0-->no 0-->no 0-->no  0-->no     Level of Risk per Screen high risk  high risk  high risk   high risk      1. Wish to be Dead (Since Last Contact)    Y  N N   2. Non-Specific Active Suicidal Thoughts (Since Last Contact)    Y  N N   3. Active Suicidal Ideation with any Methods (Not Plan) Without Intent to Act (Since Last Contact)    Y      4. Active Suicidal Ideation with Some Intent to Act, Without Specific Plan (Since Last Contact)    N      5. Active Suicidal Ideation with Specific Plan and Intent (Since Last Contact)    N      Most Severe Ideation Rating (Since Last Contact)    5      Frequency (Since Last Contact)    4      Duration (Since Last Contact)    2      Deterrents (Since Last Contact)    0      Reasons for Ideation (Since Last Contact)    4      Actual Attempt (Since Last Contact)    N  N    Has subject engaged in non-suicidal self-injurious behavior? (Since Last Contact)    N  N    Interrupted Attempts (Since Last Contact)    N  N    Aborted or Self-Interrupted Attempt (Since Last Contact)    N  N    Preparatory Acts or Behavior (Since Last Contact)    N  N    Suicide (Since Last Contact)    N  N    Calculated C-SSRS Risk Score (Since Last Contact)    Moderate Risk  No Risk Indicated No Risk Indicated       Data saved with a previous flowsheet row definition          ASSESSMENT: Current Emotional / Mental Status (status of significant symptoms):   Risk status (Self / Other harm or suicidal ideation)   Patient denies current fears or concerns  for personal safety.   Patient reports the following current or recent suicidal ideation or behaviors: passive thoughts due to stress and overwhelm.   Patient denies current or recent homicidal ideation or behaviors.   Patient reports current or recent self injurious behavior or ideation including SIB ideation, intrusive thoughts.   Patient denies other safety concerns.   Patient reports there has been no change in risk factors since their last session.     Patient reports there has been no change in protective factors since their last session.     A safety and risk management plan has been developed including: Patient consented to co-developed safety plan.  Safety and risk management plan was completed - see below.  Patient agreed to use safety plan should any safety concerns arise.  A copy was given to the patient.     Appearance:   Appropriate    Eye Contact:   Good    Psychomotor Behavior: Hyperactive  Restless    Attitude:   Cooperative    Orientation:   All   Speech    Rate / Production: Pressured  Stutters    Volume:  Normal    Mood:    Anxious  Depressed  Anhedonia   Affect:    Labile  Tearful Worrisome    Thought Content:  Clear    Thought Form:  Coherent    Insight:    Good      Medication Review:   No changes to current psychiatric medication(s)     Medication Compliance:   Yes     Changes in Health Issues:   None reported     Chemical Use Review:   Substance Use: Chemical use reviewed, no active concerns identified      Tobacco Use: No current tobacco use.      Diagnosis:  1. Generalized anxiety disorder    2. Autism spectrum disorder    3. Attention deficit hyperactivity disorder (ADHD), combined type    4. Severe episode of recurrent major depressive disorder, without psychotic features (H)    ADHD  R/O Bipolar Disorder  R/O OCD     Collateral Reports Completed:   Not Applicable    PLAN: (Patient Tasks / Therapist Tasks / Other)  Patient will return for a virtual visit in 2 weeks  Patient encouraged to  practice healthy boundaries  Patient will utilize her safety plan   Patient will consider visual aids to help her use skills while at home alone   Patient encouraged to journal and use daily planner  Patient encouraged to prioritize self-care         There has been demonstrated improvement in functioning while patient has been engaged in psychotherapy/psychological service- if withdrawn the patient would deteriorate and/or relapse.       Fanny Cobb, LICSW                                                         ______________________________________________________________________    Individual Treatment Plan    Patient's Name: Angela Jones  YOB: 1995    Date of Creation: 1/19/2022  Date Treatment Plan Last Reviewed/Revised: 5/9/2025    DSM5 Diagnoses: Autism Spectrum Disorder 299.00(F84.0)  Associated with another neurodevelopmental, mental or behavioral disorder and Attention-Deficit/Hyperactivity Disorder  314.01 (F90.9) Unspecified Attention -Deficit / Hyperactivity Disorder, 296.31 (F33.0) Major Depressive Disorder, Recurrent Episode, Mild _ or 300.02 (F41.1) Generalized Anxiety Disorder  Psychosocial / Contextual Factors: 30 year old  female, , no children   PROMIS (reviewed every 90 days):   PROMIS 10-Global Health (only subscores and total score):       8/2/2024     1:03 PM 11/5/2024     8:32 AM 11/19/2024     8:53 AM 12/10/2024     9:00 AM 1/14/2025    12:00 PM 1/20/2025     9:00 AM 5/9/2025     9:57 AM   PROMIS-10 Scores Only   Global Mental Health Score 13 9  10  9  12 8 13    Global Physical Health Score 12 12  11  11  11 11 10    PROMIS TOTAL - SUBSCORES 25 21  21  20  23 19 23          Referral / Collaboration:  Referral to another professional/service is not indicated at this time..    Anticipated number of session for this episode of care: 6-9  Anticipation frequency of session: Every other week  Anticipated Duration of each session: 53 or more  minutes  Treatment plan will be reviewed in 90 days or when goals have been changed.       MeasurableTreatment Goal(s) related to diagnosis / functional impairment(s)  Goal 1: Patient will manage symptoms of anxiety, as evidenced by a JOSHUA-7 score of 5 or lower     I will know I've met my goal when I feel more confident in my ability to cope with stress.      Objective #A (Patient Action)    Patient will identify the initial signs or symptoms of anxiety.  Status: Continued - Date(s): 5/9/2025    Intervention(s)  Therapist will teach  help patient gain insight into signs of stress (physically and emotionally) .    Objective #B  Patient will use relaxation strategies multiple times per day to reduce the physical symptoms of anxiety.  Status: Continued - Date(s): 5/9/2025    Intervention(s)  Therapist will  teach diaphragmatic breathing and other grounding skills .    Objective #C  Patient will use thought-stopping strategy daily to reduce intrusive thoughts.  Status: Continued - Date(s): 5/9/2025    Intervention(s)  Therapist will  teach thought stopping techniques .      Goal 2: Patient will manage symptoms of depression, as evidenced by a PHQ-9 score of 10 or lower    I will know I've met my goal when I feel more confident in my ability to express my emotions in a healthy way.      Objective #A (Patient Action)    Status: Continued - Date(s): 5/9/2025    Patient will Increase interest, engagement, and pleasure in doing things.    Intervention(s)  Therapist will teach emotional recognition/identification. * .    Objective #B  Patient will Identify negative self-talk and behaviors: challenge core beliefs, myths, and actions.    Status: Continued - Date(s): 5/9/2025    Intervention(s)  Therapist will  help patient practice positive self-talk and thought re-framing .    Goal 3: Client will manage symptoms of ADHD     I will know I've met my goal when I feel confident in completing tasks.      Objective #A (Client  "Action)    Client will use daily planner 75% of the time.  Status: Continued - Date(s): 5/9/2025    Intervention(s)  Therapist will  ask client to demonstrate use of planner while in session .    Objective #B  Client will identify and compliment positives at least 2 times daily to support positive self-image.    Status: Continued - Date(s): 5/9/2025    Intervention(s)  Therapist will  ask patient to demonstrate use of affirmations during session .    Goal 4: Client will manage symptoms and experiences associated with ASD     I will know I've met my goal when I feel confident in my communication in relationships.      Objective #A (Client Action)    Client will work on being assertive and setting clear expectations with healthy boundaries in relationships  Status: Continued - Date(s): 5/9/2025    Intervention(s)  Therapist will  help patient practice assertive communication skills .        Patient has reviewed and agreed to the above plan.      Fanny Cobb, [facility]  May 9, 2025        [employer]                                       Angela Jones     SAFETY PLAN:  Step 1: Warning signs / cues (Thoughts, images, mood, situation, behavior) that a crisis may be developing:  Thoughts: \"I can't do this anymore\" and \"Nothing makes it better\" \"I'm a burden\"   Images: visions of harm: self-harm  Thinking Processes: ruminations (can't stop thinking about my problems):  , racing thoughts, intrusive thoughts (bothersome, unwanted thoughts that come out of nowhere):  , highly critical and negative thoughts:  , and disorganized thinking:    Mood: worsening depression, hopelessness, helplessness, intense worry, and agitation  Behaviors: can't stop crying, not taking care of myself, and not taking care of my responsibilities  Situations: relationship problems and financial stress   Step 2: Coping strategies - Things I can do to take my mind off of my problems without contacting another person (relaxation " "technique, physical activity):  Distress Tolerance Strategies:  relaxation activities:  , arts and crafts:  , play with my pet , sensory based activities/self-soothe with five senses:  , change body temperature (ice pack/cold water) , and paced breathing/progressive muscle relaxation, grounding exercise of naming objects, weighted items (sock filled with rice), play with puddy, use sensory items   Physical Activities: meditation, deep breathing, and stretching ; taking a shower,   Focus on helpful thoughts:  \"This is temporary\", \"I will get through this\", \"It always passes\", and self-compassion statements:    Step 3: People and social settings that provide distraction:   Name: Zander    Name: Demetri  park and work   Step 4: Remind myself of people and things that are important to me and worth living for:  my family and pets and friends  Step 5: When I am in crisis, I can ask these people to help me use my safety plan:   Name: Demetri Fermin    Step 6: Making the environment safe:   dispose of old medications , remove things I could use to hurt myself:  , and be around others  Step 7: Professionals or agencies I can contact during a crisis:  Suicide Prevention Lifeline: Call or Text 988   [facility] Crisis Services: [phone]    Call 911 or go to my nearest emergency department.   I helped develop this safety plan and agree to use it when needed.  I have been given a copy of this plan.    Client signature _________________________________________________________________  Today s date:  7/25/2024  Completed by Provider Name/ Credentials:  LAST Blackmon  July 25, 2024  Adapted from Safety Plan Template 2008 Yessi Prakash and Olivier Leal is reprinted with the express permission of the authors.  No portion of the Safety Plan Template may be reproduced without the express, written permission.  You can contact the authors at [email or web address] or [email or web address].        "

## 2025-06-25 ENCOUNTER — TRANSFERRED RECORDS (OUTPATIENT)
Dept: HEALTH INFORMATION MANAGEMENT | Facility: CLINIC | Age: 30
End: 2025-06-25

## 2025-07-12 DIAGNOSIS — G90.A POTS (POSTURAL ORTHOSTATIC TACHYCARDIA SYNDROME): ICD-10-CM

## 2025-07-12 RX ORDER — FLUDROCORTISONE ACETATE 0.1 MG/1
0.2 TABLET ORAL DAILY
Qty: 180 TABLET | Refills: 1 | Status: SHIPPED | OUTPATIENT
Start: 2025-07-12

## 2025-07-22 ENCOUNTER — TELEPHONE (OUTPATIENT)
Dept: PSYCHOLOGY | Facility: CLINIC | Age: 30
End: 2025-07-22
Payer: COMMERCIAL

## 2025-07-22 NOTE — TELEPHONE ENCOUNTER
Patient did not appear for scheduled video visit. Therapist called and left patient a voice message. Patient was instructed to join the video before 8:15am otherwise the appointment slot would be filled. Patient also instructed to contact therapist via [employer] regarding scheduling.

## 2025-08-13 DIAGNOSIS — F41.9 ANXIETY: ICD-10-CM

## 2025-08-13 DIAGNOSIS — F33.1 MAJOR DEPRESSIVE DISORDER, RECURRENT EPISODE, MODERATE (H): ICD-10-CM

## 2025-08-14 RX ORDER — QUETIAPINE FUMARATE 50 MG/1
TABLET, FILM COATED ORAL
Qty: 45 TABLET | Refills: 0 | Status: SHIPPED | OUTPATIENT
Start: 2025-08-14

## 2025-08-18 DIAGNOSIS — F41.1 GENERALIZED ANXIETY DISORDER: ICD-10-CM

## 2025-08-19 RX ORDER — GABAPENTIN 300 MG/1
300 CAPSULE ORAL 3 TIMES DAILY
Qty: 90 CAPSULE | Refills: 2 | Status: SHIPPED | OUTPATIENT
Start: 2025-08-19

## 2025-08-19 RX ORDER — GABAPENTIN 600 MG/1
600 TABLET ORAL 3 TIMES DAILY
Qty: 90 TABLET | Refills: 2 | Status: SHIPPED | OUTPATIENT
Start: 2025-08-19

## 2025-08-19 RX ORDER — GABAPENTIN 300 MG/1
300 CAPSULE ORAL 3 TIMES DAILY
Status: CANCELLED | OUTPATIENT
Start: 2025-08-19